# Patient Record
Sex: FEMALE | Race: WHITE | NOT HISPANIC OR LATINO | Employment: OTHER | URBAN - METROPOLITAN AREA
[De-identification: names, ages, dates, MRNs, and addresses within clinical notes are randomized per-mention and may not be internally consistent; named-entity substitution may affect disease eponyms.]

---

## 2017-01-04 ENCOUNTER — OFFICE VISIT (OUTPATIENT)
Dept: UROLOGY | Facility: CLINIC | Age: 72
End: 2017-01-04

## 2017-01-04 VITALS
WEIGHT: 117.5 LBS | BODY MASS INDEX: 18.88 KG/M2 | SYSTOLIC BLOOD PRESSURE: 113 MMHG | HEART RATE: 67 BPM | HEIGHT: 66 IN | DIASTOLIC BLOOD PRESSURE: 63 MMHG

## 2017-01-04 DIAGNOSIS — N81.11 CYSTOCELE, MIDLINE: ICD-10-CM

## 2017-01-04 DIAGNOSIS — N30.00 ACUTE CYSTITIS WITHOUT HEMATURIA: Primary | ICD-10-CM

## 2017-01-04 DIAGNOSIS — N39.3 FEMALE STRESS INCONTINENCE: ICD-10-CM

## 2017-01-04 RX ORDER — CIPROFLOXACIN 500 MG/1
500 TABLET, FILM COATED ORAL 2 TIMES DAILY
Qty: 10 TABLET | Refills: 0 | Status: SHIPPED | OUTPATIENT
Start: 2017-01-04 | End: 2017-01-04

## 2017-01-04 RX ORDER — CIPROFLOXACIN 500 MG/1
500 TABLET, FILM COATED ORAL 2 TIMES DAILY
Qty: 10 TABLET | Refills: 0 | Status: SHIPPED | OUTPATIENT
Start: 2017-01-04 | End: 2017-02-01

## 2017-01-04 ASSESSMENT — PAIN SCALES - GENERAL: PAINLEVEL: NO PAIN (0)

## 2017-01-04 NOTE — MR AVS SNAPSHOT
After Visit Summary   1/4/2017    Shala Caruso    MRN: [id number]           Patient Information     Date Of Birth          1945        Visit Information        Provider Department      1/4/2017 11:00 AM Jeanmarie Pierson MD [facility] and Inst for Prostate and Urologic Cancers        Today's Diagnoses     Urinary tract infection    -  1       Care Instructions    Cipro for infection.  Follow up in 1 month for second post operative appointment.      It was a pleasure meeting with you today.  Thank you for allowing me and my team the privilege of caring for you today.  YOU are the reason we are here, and I truly hope we provided you with the excellent service you deserve.  Please let us know if there is anything else we can do for you so that we can be sure you are leaving completely satisfied with your care experience.                Follow-ups after your visit        Your next 10 appointments already scheduled     Jan 06, 2017  1:00 PM   LAB with  LAB   [facility] ([facility])    [address] [place] [address] [phone]           Patient must bring picture ID.  Patient should be prepared to give a urine specimen  Please do not eat 10-12 hours before your appointment if you are coming in fasting for labs on lipids, cholesterol, or glucose (sugar).  Pregnant women should follow their Care Team instructions. Water with medications is okay. Do not drink coffee or other fluids.   If you have concerns about taking  your medications, please ask at office or if scheduling via [employer], send a message by clicking on Secure Messaging, Message Your Care Team.            Jan 23, 2017  4:00 PM   (Arrive by 3:45 PM)   RETURN DIABETES with Dorita Cramer MD   [facility] Endocrinology ([facility])    [address] [place] [address] [phone]            Feb 01, 2017  9:15 AM    (Arrive by 9:00 AM)   Post-Op with Jeanmarie Pierson MD   [facility] for Prostate and Urologic Cancers ([facility])    [address]  4th Floor  [facility] [address] [phone]            Apr 13, 2017  5:00 PM   (Arrive by 4:45 PM)   RETURN LUPUS with Santiago Corbett MD   [facility] Rheumatology ([facility])    [address]  3rd Floor  [facility] [address] [phone]              Who to contact     Please call your clinic at [phone] to:    Ask questions about your health    Make or cancel appointments    Discuss your medicines    Learn about your test results    Speak to your doctor   If you have compliments or concerns about an experience at your clinic, or if you wish to file a complaint, please contact [facility] Patient Relations at [phone] or email us at [email or web address]         Additional Information About Your Visit        [employer] Information     waygum gives you secure access to your electronic health record. If you see a primary care provider, you can also send messages to your care team and make appointments. If you have questions, please call your primary care clinic.  If you do not have a primary care provider, please call [phone] and they will assist you.      waygum is an electronic gateway that provides easy, online access to your medical records. With waygum, you can request a clinic appointment, read your test results, renew a prescription or communicate with your care team.     To access your existing account, please contact your [facility] or call [phone] for assistance.        Care EveryWhere ID     This is your Care EveryWhere ID. This could be used by other organizations to access your [place] medical records  [id number]        Your Vitals Were     Pulse Height BMI (Body Mass Index)             67  "1.664 m (5' 5.5\") 19.25 kg/m2          Blood Pressure from Last 3 Encounters:   01/04/17 113/63   12/20/16 108/57   12/14/16 122/69    Weight from Last 3 Encounters:   01/04/17 53.298 kg (117 lb 8 oz)   12/20/16 52.617 kg (116 lb)   12/14/16 52.617 kg (116 lb)              We Performed the Following     POST-VOID RESIDUAL BLADDER SCAN          Today's Medication Changes          These changes are accurate as of: 1/4/17 11:13 AM.  If you have any questions, ask your nurse or doctor.               Start taking these medicines.        Dose/Directions    ciprofloxacin 500 MG tablet   Commonly known as:  CIPRO   Used for:  Urinary tract infection   Started by:  Jeanmarie Pierson MD        Dose:  500 mg   Take 1 tablet (500 mg) by mouth 2 times daily   Quantity:  10 tablet   Refills:  0         These medicines have changed or have updated prescriptions.        Dose/Directions    estradiol 0.1 MG/GM cream   Commonly known as:  ESTRACE VAGINAL   This may have changed:    - when to take this  - additional instructions   Used for:  Atrophic vaginitis        Dose:  2 g   Place 2 g vaginally twice a week After using daily for 2 weeks   Quantity:  42.5 g   Refills:  11       insulin glargine 100 UNIT/ML injection   Commonly known as:  LANTUS   This may have changed:    - when to take this  - additional instructions   Used for:  Diabetes mellitus type 1 (H)        Inject 4 units as directed daily LANTUS SOLOSTAR PEN PLEASE   Quantity:  15 mL   Refills:  3       NovoLOG PENFILL 100 UNIT/ML injection   This may have changed:    - when to take this  - additional instructions   Used for:  Controlled type 1 diabetes mellitus (H)   Generic drug:  insulin aspart        Dose:  15 Units   Inject 15 Units Subcutaneous daily   Quantity:  4 cartridge   Refills:  3       warfarin 5 MG tablet   Commonly known as:  COUMADIN   This may have changed:  additional instructions   Used for:  Systemic lupus erythematosus (H)        Take 10mg onTu, " and 12.5mg on MWThFSatSun, or as directed by the Medication Monitoring Clinic at the [facility].   Quantity:  210 tablet   Refills:  1            Where to get your medicines      These medications were sent to [facility] - [place], [place] - [address]  [address], [facility] [address]    Hours:  TRANSPLANT PHONE NUMBER [phone] Phone:  [phone]    - ciprofloxacin 500 MG tablet             Primary Care Provider Office Phone # Fax #    Joe Contreras [phone]        [facility] [address]   [facility] [address]        Thank you!     Thank you for choosing [facility] AND [facility] FOR PROSTATE AND UROLOGIC CANCERS  for your care. Our goal is always to provide you with excellent care. Hearing back from our patients is one way we can continue to improve our services. Please take a few minutes to complete the written survey that you may receive in the mail after your visit with us. Thank you!             Your Updated Medication List - Protect others around you: Learn how to safely use, store and throw away your medicines at www.disposemymeds.org.          This list is accurate as of: 1/4/17 11:13 AM.  Always use your most recent med list.                   Brand Name Dispense Instructions for use    aspirin 81 MG tablet      Take 81 mg by mouth At Bedtime       AYR SALINE NASAL NO-DRIP Gel     3 Tube    Use as needed for nasal dryness       blood glucose monitoring lancets     7203 each    Test 7-8 times daily  (Lancets that go with pt current meter )       * blood glucose monitoring test strip    TRUE METRIX BLOOD GLUCOSE TEST    4 Box    Use to test blood sugar 4 times daily or as directed.       * blood glucose monitoring test strip    no brand specified    720 each    Use to test blood sugar 8 times daily . Trividia  True metrix  Brand DX E10.9       calcium + D 600-200 MG-UNIT Tabs   Generic drug:  calcium  carbonate-vitamin D      Take 1 tablet by mouth 3 times daily       CENTRUM SILVER PO      Take 1 tablet by mouth daily.       ciprofloxacin 500 MG tablet    CIPRO    10 tablet    Take 1 tablet (500 mg) by mouth 2 times daily       estradiol 0.1 MG/GM cream    ESTRACE VAGINAL    42.5 g    Place 2 g vaginally twice a week After using daily for 2 weeks       glucagon 1 MG kit    GLUCAGON EMERGENCY    3 each    Inject 1 mg subcutaneously or intramuscularly for severe hypoglycemic episodes.       HYDROcodone-acetaminophen 5-325 MG per tablet    NORCO    20 tablet    Take 1 tablet by mouth every 4 hours as needed for moderate to severe pain (Moderate to Severe Pain)       insulin glargine 100 UNIT/ML injection    LANTUS    15 mL    Inject 4 units as directed daily LANTUS SOLOSTAR PEN PLEASE       * insulin pen needle 32G X 4 MM    BD PAM U/F    800 each    Use   Up to 8 daily       * insulin pen needle 32G X 4 MM     400 each    Use 4 pen needles daily or as directed.       mycophenolate 500 MG tablet    CELLCEPT - GENERIC EQUIVALENT    360 tablet    Take 2 tablets (1,000 mg) by mouth 2 times daily 2 tabs in the a.m., 2 tabs in p.m.       NIFEdipine ER osmotic 30 MG 24 hr tablet    NIFEDICAL XL    90 tablet    Take 1 tablet (30 mg) by mouth daily       NovoLOG PENFILL 100 UNIT/ML injection   Generic drug:  insulin aspart     4 cartridge    Inject 15 Units Subcutaneous daily       NOVOPEN JR FELIX) Priscila   Generic drug:  Injection Device for insulin      Use as directed.       omega-3 fatty acids 1200 MG capsule      Take 1 capsule by mouth daily.       senna-docusate 8.6-50 MG per tablet    SENOKOT-S;PERICOLACE    100 tablet    Take 1-2 tablets by mouth 2 times daily To prevent constipation while taking narcotic pain medication. Start with 1 tablet twice daily. If no bowel movement in 24 hours, increase to 2 tablets twice daily.  Discontinue if you have loose stools or when you are no longer taking narcotics.        simvastatin 40 MG tablet    ZOCOR    100 tablet    Take 1 tablet (40 mg) by mouth At Bedtime       TRUE METRIX METER W/DEVICE Kit     1 kit    1 each 4 times daily       VITAMIN D (CHOLECALCIFEROL) PO      Take 1,000 Units by mouth daily (with lunch)       warfarin 5 MG tablet    COUMADIN    210 tablet    Take 10mg onTu, and 12.5mg on MWThFSatSun, or as directed by the Medication Monitoring Clinic at the [facility].       * Notice:  This list has 4 medication(s) that are the same as other medications prescribed for you. Read the directions carefully, and ask your doctor or other care provider to review them with you.

## 2017-01-04 NOTE — PROGRESS NOTES
"Urology Clinic Note    Date: 1/4/2017  Time: 10:56 AM  Patient: Shala Caruso  MRN: [id number]    HPI/Subjective: Shala Caruso is a 71 year old female with a history of cystocele s/p anterior colporrhaphy with midurethral sling on 12/20/16. She has been doing well since her surgery. She is having small amounts of leakage but this is improving. Pain controlled. Hematuria has resolved; no dysuria, urgency, frequency.     PVR today is 0. Urine dipstick shows small LE, positive nitrite, and moderate blood.     Objective:  /63 mmHg  Pulse 67  Ht 1.664 m (5' 5.5\")  Wt 53.298 kg (117 lb 8 oz)  BMI 19.25 kg/m2  Gen: In NAD  Resp: Breathing non-labored  CV: Regular rate; extremities warm  Abd: Soft, non-distended, non-tender  Ext: Warm and well perfused  : Well supported with no evidence of mesh extrusion. Minimal urethral mobility. No leakage with cough.     Assessment & Plan: Shala Caruso is a 71 year old female with a history of cystocele s/p anterior colporrhaphy with midurethral sling on 12/20/16.    -Follow up in 4 weeks for symptom and incision check  -Continue premarin cream  -Continue lifting restrictions and pelvic rest    This patient was seen & discussed with Dr. Pierson.    --    Kiko Cain MD  Urology PGY3  pgr: [phone]    10:56 AM, 1/4/2017  Patient was seen, evaluated and plan was formulated in conjunction with me and I agree with the above.  Jeanmarie Pierson MD  "

## 2017-01-04 NOTE — Clinical Note
"1/4/2017       RE: Shala Caruso  [address]  [place] [address]     Dear Colleague,    Thank you for referring your patient, Shala Caruso, to the [facility] UROLOGY AND INST FOR PROSTATE AND UROLOGIC CANCERS at [facility]. Please see a copy of my visit note below.    Urology Clinic Note    Date: 1/4/2017  Time: 10:56 AM  Patient: Shala Caruso  MRN: [id number]    HPI/Subjective: Shala Caruso is a 71 year old female with a history of cystocele s/p anterior colporrhaphy with midurethral sling on 12/20/16. She has been doing well since her surgery. She is having small amounts of leakage but this is improving. Pain controlled. Hematuria has resolved; no dysuria, urgency, frequency.     PVR today is 0. Urine dipstick shows small LE, positive nitrite, and moderate blood.     Objective:  /63 mmHg  Pulse 67  Ht 1.664 m (5' 5.5\")  Wt 53.298 kg (117 lb 8 oz)  BMI 19.25 kg/m2  Gen: In NAD  Resp: Breathing non-labored  CV: Regular rate; extremities warm  Abd: Soft, non-distended, non-tender  Ext: Warm and well perfused  : Well supported with no evidence of mesh extrusion. Minimal urethral mobility. No leakage with cough.     Assessment & Plan: Shala Caruso is a 71 year old female with a history of cystocele s/p anterior colporrhaphy with midurethral sling on 12/20/16.    -Follow up in 4 weeks for symptom and incision check  -Continue premarin cream  -Continue lifting restrictions and pelvic rest    This patient was seen & discussed with Dr. Pierson.    --    Kiko Cain MD  Urology PGY3  pgr: [phone]    10:56 AM, 1/4/2017  Patient was seen, evaluated and plan was formulated in conjunction with me and I agree with the above.    Jeanmarie Pierson MD      "

## 2017-01-04 NOTE — NURSING NOTE
"Chief Complaint   Patient presents with     Surgical Followup     Post op.  S/P Transvaginal Cystocele Repair with Midurethral Sling on 12-20-16       Initial /63 mmHg  Pulse 67  Ht 1.664 m (5' 5.5\")  Wt 53.298 kg (117 lb 8 oz)  BMI 19.25 kg/m2 Estimated body mass index is 19.25 kg/(m^2) as calculated from the following:    Height as of this encounter: 1.664 m (5' 5.5\").    Weight as of this encounter: 53.298 kg (117 lb 8 oz).  BP completed using cuff size: regular    FERNANDO Smith    "

## 2017-01-04 NOTE — PATIENT INSTRUCTIONS
Cipro for infection.  Follow up in 1 month for second post operative appointment.      It was a pleasure meeting with you today.  Thank you for allowing me and my team the privilege of caring for you today.  YOU are the reason we are here, and I truly hope we provided you with the excellent service you deserve.  Please let us know if there is anything else we can do for you so that we can be sure you are leaving completely satisfied with your care experience.

## 2017-01-06 ENCOUNTER — ANTICOAGULATION THERAPY VISIT (OUTPATIENT)
Dept: ANTICOAGULATION | Facility: CLINIC | Age: 72
End: 2017-01-06

## 2017-01-06 DIAGNOSIS — D68.61 ANTIPHOSPHOLIPID SYNDROME (H): ICD-10-CM

## 2017-01-06 DIAGNOSIS — M32.9 SLE (SYSTEMIC LUPUS ERYTHEMATOSUS) (H): ICD-10-CM

## 2017-01-06 DIAGNOSIS — Z79.899 ENCOUNTER FOR LONG-TERM CURRENT USE OF MEDICATION: ICD-10-CM

## 2017-01-06 DIAGNOSIS — Z79.01 LONG-TERM (CURRENT) USE OF ANTICOAGULANTS: Primary | ICD-10-CM

## 2017-01-06 DIAGNOSIS — M32.9 SYSTEMIC LUPUS ERYTHEMATOSUS (H): ICD-10-CM

## 2017-01-06 LAB
ALT SERPL W P-5'-P-CCNC: 20 U/L (ref 0–50)
AST SERPL W P-5'-P-CCNC: 16 U/L (ref 0–45)
CREAT SERPL-MCNC: 0.87 MG/DL (ref 0.52–1.04)
GFR SERPL CREATININE-BSD FRML MDRD: 64 ML/MIN/1.7M2
INR PPP: 1.82 (ref 0.86–1.14)
PROTHROM ACT/NOR PPP: 28 % (ref 60–140)

## 2017-01-06 NOTE — PROGRESS NOTES
"  ANTICOAGULATION FOLLOW-UP CLINIC VISIT    Patient Name:  Shala Caruso  Date:  1/6/2017  Contact Type:  Telephone    SUBJECTIVE:     Patient Findings     Positives Antibiotic use or infection (started cipro 1/4/17 (has had 3 doses) --will be on x 5 days)           OBJECTIVE    INR   Date Value Ref Range Status   01/06/2017 1.82* 0.86 - 1.14 Final     FACTOR 2 ASSAY   Date Value Ref Range Status   01/06/2017 28* 60 - 140 % Final       ASSESSMENT / PLAN  INR assessment SUB    Recheck INR In: 1 WEEK    INR Location Clinic      Anticoagulation Summary as of 1/6/2017     INR goal 2.0-3.0   Selected INR 1.82! (1/6/2017)   Maintenance plan 7.5 mg (5 mg x 1.5) on Mon, Wed; 10 mg (5 mg x 2) all other days   Full instructions 7.5 mg on Mon, Wed; 10 mg all other days   Weekly total 65 mg   No change documented Gwen Abel RN   Plan last modified Gwen Dietz RN (12/21/2016)   Next INR check 1/13/2017   Priority Factor 2   Target end date     Indications   SLE (systemic lupus erythematosus) (H) [M32.9]  Long-term (current) use of anticoagulants [Z79.01] [Z79.01]         Anticoagulation Episode Summary     INR check location Clinic Lab    Preferred lab     Send INR reminders to  ANTICO CLINIC    Comments Chromogenic 2  goal range is 15-25%  Ok to leave message on home [phone] and work voicemail, but call [email or web address] per pt request.  OK to leave message at office  May leave messages with Rodriguez Tom  DO NOT GIVE RECORDS TO EMPLOYER [employer]        Anticoagulation Care Providers     Provider Role Specialty Phone number    Mt Kiser MD Responsible Clinical Cardiac Electrophysiology [phone]            See the Encounter Report to view Anticoagulation Flowsheet and Dosing Calendar (Go to Encounters tab in chart review, and find the Anticoagulation Therapy Visit)    Spoke with Shala.  She thinks she is still \"catching up\" after holding for several days in December before surgery. She recently started " on cipro, so will leave coumadin dose as it is and recheck INR in one week.      Gwen Abel RN

## 2017-01-13 ENCOUNTER — ANTICOAGULATION THERAPY VISIT (OUTPATIENT)
Dept: ANTICOAGULATION | Facility: CLINIC | Age: 72
End: 2017-01-13

## 2017-01-13 DIAGNOSIS — I82.409 DVT (DEEP VENOUS THROMBOSIS) (H): ICD-10-CM

## 2017-01-13 DIAGNOSIS — M32.9 SLE (SYSTEMIC LUPUS ERYTHEMATOSUS) (H): ICD-10-CM

## 2017-01-13 DIAGNOSIS — Z79.01 LONG-TERM (CURRENT) USE OF ANTICOAGULANTS: Primary | ICD-10-CM

## 2017-01-13 DIAGNOSIS — M32.9 SYSTEMIC LUPUS ERYTHEMATOSUS (H): ICD-10-CM

## 2017-01-13 LAB
INR PPP: 1.74 (ref 0.86–1.14)
PROTHROM ACT/NOR PPP: 33 % (ref 60–140)

## 2017-01-13 NOTE — PROGRESS NOTES
ANTICOAGULATION FOLLOW-UP CLINIC VISIT    Patient Name:  Shala Caruso  Date:  1/13/2017  Contact Type:  Telephone    SUBJECTIVE:        OBJECTIVE    INR   Date Value Ref Range Status   01/13/2017 1.74* 0.86 - 1.14 Final     FACTOR 2 ASSAY   Date Value Ref Range Status   01/13/2017 33* 60 - 140 % Final       ASSESSMENT / PLAN  INR assessment SUB    Recheck INR In: 10 DAYS    INR Location Clinic      Anticoagulation Summary as of 1/13/2017     INR goal 2.0-3.0   Selected INR    Maintenance plan 10 mg (5 mg x 2) every day   Full instructions 10 mg every day   Weekly total 70 mg   Plan last modified Stacey Beal, RN (1/13/2017)   Next INR check 1/23/2017   Priority Factor 2   Target end date     Indications   SLE (systemic lupus erythematosus) (H) [M32.9]  Long-term (current) use of anticoagulants [Z79.01] [Z79.01]         Anticoagulation Episode Summary     INR check location Clinic Lab    Preferred lab     Send INR reminders to [facility]TICOAG CL[facility]IC    Comments Chromogenic 2  goal range is 15-25%  Ok to leave message on home [phone] and work voicemail, but call [email or web address] per pt request.  OK to leave message at office  May leave messages with Rodriguez Santos  DO NOT GIVE RECORDS TO EMPLOYER [employer]        Anticoagulation Care Providers     Provider Role Specialty Phone number    Mt Kiser MD Responsible Clinical Cardiac Electrophysiology [phone]            See the Encounter Report to view Anticoagulation Flowsheet and Dosing Calendar (Go to Encounters tab in chart review, and find the Anticoagulation Therapy Visit)    Message left.    Stacey Beal, RN

## 2017-01-20 ENCOUNTER — PRE VISIT (OUTPATIENT)
Dept: UROLOGY | Facility: CLINIC | Age: 72
End: 2017-01-20

## 2017-01-20 NOTE — TELEPHONE ENCOUNTER
Patient coming in for post operative check up S/P anterior colporrhaphy with sacrospinous ligament fixation with Matristem, mid urethral sling, and cystoscopy. Patient chart reviewed, no need for call, all records available and ready for appointment.

## 2017-01-23 ENCOUNTER — OFFICE VISIT (OUTPATIENT)
Dept: ENDOCRINOLOGY | Facility: CLINIC | Age: 72
End: 2017-01-23

## 2017-01-23 VITALS
BODY MASS INDEX: 19.3 KG/M2 | HEIGHT: 66 IN | DIASTOLIC BLOOD PRESSURE: 76 MMHG | HEART RATE: 73 BPM | WEIGHT: 120.1 LBS | SYSTOLIC BLOOD PRESSURE: 121 MMHG

## 2017-01-23 DIAGNOSIS — Z79.4 TYPE 2 DIABETES MELLITUS WITH DIABETIC AUTONOMIC NEUROPATHY, WITH LONG-TERM CURRENT USE OF INSULIN (H): Primary | ICD-10-CM

## 2017-01-23 DIAGNOSIS — E10.649 HYPOGLYCEMIA UNAWARENESS IN TYPE 1 DIABETES MELLITUS (H): ICD-10-CM

## 2017-01-23 DIAGNOSIS — E11.43 TYPE 2 DIABETES MELLITUS WITH DIABETIC AUTONOMIC NEUROPATHY, WITH LONG-TERM CURRENT USE OF INSULIN (H): Primary | ICD-10-CM

## 2017-01-23 LAB — HBA1C MFR BLD: 5 % (ref 4.3–6)

## 2017-01-23 ASSESSMENT — PAIN SCALES - GENERAL: PAINLEVEL: NO PAIN (0)

## 2017-01-23 NOTE — MR AVS SNAPSHOT
After Visit Summary   1/23/2017    Shala Caruso    MRN: [id number]           Patient Information     Date Of Birth          1945        Visit Information        Provider Department      1/23/2017 4:00 PM Dorita Cramer MD [facility]         Follow-ups after your visit        Follow-up notes from your care team     Return in about 6 months (around 7/23/2017).      Your next 10 appointments already scheduled     Jan 26, 2017  1:00 PM   LAB with  LAB   [facility] ([facility])    [address] [place] [id number] [phone]           Patient must bring picture ID.  Patient should be prepared to give a urine specimen  Please do not eat 10-12 hours before your appointment if you are coming in fasting for labs on lipids, cholesterol, or glucose (sugar).  Pregnant women should follow their Care Team instructions. Water with medications is okay. Do not drink coffee or other fluids.   If you have concerns about taking  your medications, please ask at office or if scheduling via [employer], send a message by clicking on Secure Messaging, Message Your Care Team.            Feb 01, 2017  9:15 AM   (Arrive by 9:00 AM)   Post-Op with Jeanmarie Pierson MD   [facility]rology [facility] for Prostate and Urologic Cancers ([facility])    [address] [place] [id number] [phone]            Apr 13, 2017  5:00 PM   (Arrive by 4:45 PM)   RETURN LUPUS with Santiago Corbett MD   [facility] Rheumatology ([facility])    [address] [place] [id number] [phone]            Jul 31, 2017  7:30 AM   (Arrive by 7:15 AM)   RETURN DIABETES with Dorita Cramer MD   [facility] Endocrinology ([facility])    [address] [place] [id number] [phone]              Who to contact      "Please call your clinic at [phone] to:    Ask questions about your health    Make or cancel appointments    Discuss your medicines    Learn about your test results    Speak to your doctor   If you have compliments or concerns about an experience at your clinic, or if you wish to file a complaint, please contact [facility] Patient Relations at [phone] or email us at [email or web address]         Additional Information About Your Visit        [employer] Information     [employer] gives you secure access to your electronic health record. If you see a primary care provider, you can also send messages to your care team and make appointments. If you have questions, please call your primary care clinic.  If you do not have a primary care provider, please call [phone] and they will assist you.      [employer] is an electronic gateway that provides easy, online access to your medical records. With [employer], you can request a clinic appointment, read your test results, renew a prescription or communicate with your care team.     To access your existing account, please contact your [facility] or call [phone] for assistance.        Care EveryWhere ID     This is your Care EveryWhere ID. This could be used by other organizations to access your [facility] medical records  [id number]        Your Vitals Were     Pulse Height BMI (Body Mass Index)             73 1.664 m (5' 5.5\") 19.67 kg/m2          Blood Pressure from Last 3 Encounters:   01/23/17 121/76   01/04/17 113/63   12/20/16 108/57    Weight from Last 3 Encounters:   01/23/17 54.477 kg (120 lb 1.6 oz)   01/04/17 53.298 kg (117 lb 8 oz)   12/20/16 52.617 kg (116 lb)              Today, you had the following     No orders found for display         Today's Medication Changes          These changes are accurate as of: 1/23/17  4:29 PM.  If you have any questions, ask your nurse or doctor.             "   These medicines have changed or have updated prescriptions.        Dose/Directions    estradiol 0.1 MG/GM cream   Commonly known as:  ESTRACE VAGINAL   This may have changed:    - when to take this  - additional instructions   Used for:  Atrophic vaginitis        Dose:  2 g   Place 2 g vaginally twice a week After using daily for 2 weeks   Quantity:  42.5 g   Refills:  11       insulin glargine 100 UNIT/ML injection   Commonly known as:  LANTUS   This may have changed:    - when to take this  - additional instructions   Used for:  Diabetes mellitus type 1 (H)        Inject 4 units as directed daily LANTUS SOLOSTAR PEN PLEASE   Quantity:  15 mL   Refills:  3       NovoLOG PENFILL 100 UNIT/ML injection   This may have changed:    - when to take this  - additional instructions   Used for:  Controlled type 1 diabetes mellitus (H)   Generic drug:  insulin aspart        Dose:  15 Units   Inject 15 Units Subcutaneous daily   Quantity:  4 cartridge   Refills:  3       warfarin 5 MG tablet   Commonly known as:  COUMADIN   This may have changed:  additional instructions   Used for:  Systemic lupus erythematosus (H)        Take 10mg onTu, and 12.5mg on MWThFSatSun, or as directed by the Medication Monitoring [facility] at the [facility]   Quantity:  210 tablet   Refills:  1                Primary Care Provider Office Phone # Fax #    Joe Contreras [phone]        [facility] [address] [facility] [address]  [facility] [address]        Thank you!     Thank you for choosing [facility]  for your care. Our goal is always to provide you with excellent care. Hearing back from our patients is one way we can continue to improve our services. Please take a few minutes to complete the written survey that you may receive in the mail after your visit with us. Thank you!             Your Updated Medication List - Protect others around you: Learn how to safely use, store and throw away your medicines at  www.disposemymeds.org.          This list is accurate as of: 1/23/17  4:29 PM.  Always use your most recent med list.                   Brand Name Dispense Instructions for use    aspirin 81 MG tablet      Take 81 mg by mouth At Bedtime       AYR SALINE NASAL NO-DRIP Gel     3 Tube    Use as needed for nasal dryness       blood glucose monitoring lancets     7203 each    Test 7-8 times daily  (Lancets that go with pt current meter )       * blood glucose monitoring test strip    TRUE METRIX BLOOD GLUCOSE TEST    4 Box    Use to test blood sugar 4 times daily or as directed.       * blood glucose monitoring test strip    no brand specified    720 each    Use to test blood sugar 8 times daily . Trividia  True metrix  Brand DX E10.9       calcium + D 600-200 MG-UNIT Tabs   Generic drug:  calcium carbonate-vitamin D      Take 1 tablet by mouth 3 times daily       CENTRUM SILVER PO      Take 1 tablet by mouth daily.       ciprofloxacin 500 MG tablet    CIPRO    10 tablet    Take 1 tablet (500 mg) by mouth 2 times daily       estradiol 0.1 MG/GM cream    ESTRACE VAGINAL    42.5 g    Place 2 g vaginally twice a week After using daily for 2 weeks       glucagon 1 MG kit    GLUCAGON EMERGENCY    3 each    Inject 1 mg subcutaneously or intramuscularly for severe hypoglycemic episodes.       HYDROcodone-acetaminophen 5-325 MG per tablet    NORCO    20 tablet    Take 1 tablet by mouth every 4 hours as needed for moderate to severe pain (Moderate to Severe Pain)       insulin glargine 100 UNIT/ML injection    LANTUS    15 mL    Inject 4 units as directed daily LANTUS SOLOSTAR PEN PLEASE       * insulin pen needle 32G X 4 MM    BD PAM U/F    800 each    Use   Up to 8 daily       * insulin pen needle 32G X 4 MM     400 each    Use 4 pen needles daily or as directed.       mycophenolate 500 MG tablet    CELLCEPT - GENERIC EQUIVALENT    360 tablet    Take 2 tablets (1,000 mg) by mouth 2 times daily 2 tabs in the a.m., 2 tabs in  p.m.       NIFEdipine ER osmotic 30 MG 24 hr tablet    NIFEDICAL XL    90 tablet    Take 1 tablet (30 mg) by mouth daily       NovoLOG PENFILL 100 UNIT/ML injection   Generic drug:  insulin aspart     4 cartridge    Inject 15 Units Subcutaneous daily       NOVOPEN JR (GREEN) Priscila   Generic drug:  Injection Device for insulin      Use as directed.       omega-3 fatty acids 1200 MG capsule      Take 1 capsule by mouth daily.       senna-docusate 8.6-50 MG per tablet    SENOKOT-S;PERICOLACE    100 tablet    Take 1-2 tablets by mouth 2 times daily To prevent constipation while taking narcotic pain medication. Start with 1 tablet twice daily. If no bowel movement in 24 hours, increase to 2 tablets twice daily.  Discontinue if you have loose stools or when you are no longer taking narcotics.       simvastatin 40 MG tablet    ZOCOR    100 tablet    Take 1 tablet (40 mg) by mouth At Bedtime       TRUE METRIX METER W/DEVICE Kit     1 kit    1 each 4 times daily       VITAMIN D (CHOLECALCIFEROL) PO      Take 1,000 Units by mouth daily (with lunch)       warfarin 5 MG tablet    COUMADIN    210 tablet    Take 10mg onTu, and 12.5mg on MWThFSatSun, or as directed by the Medication Monitoring Clinic at the [facility].       * Notice:  This list has 4 medication(s) that are the same as other medications prescribed for you. Read the directions carefully, and ask your doctor or other care provider to review them with you.

## 2017-01-23 NOTE — PROGRESS NOTES
Ms. Caruos is a 71 year old pleasant female with hx of SLE, APLS, DVT, osteoporosis and type 1 diabetes presenting for f/up.     1. Type 1 diabetes    Hemoglobin A1c today was 5%, slightly lower then her last apt here. Glucometer readings reveal that she checks her blood sugar 4-5 times daily. Average blood glucose is 80 with a standard deviation of 15 and a range variable between 53 and 110. The meter targets are set to 70 to 130 for the pre-meal blood sugar and  for the post meal blood sugar. Overall, 80% of the blood glucose numbers are within this target.  Insulin to carbohydrate ratio is 1 unit per 15 g for all meals.  She administers 4 U of Lantus in the morning, 2-3 U NovoLog for breakfast, lunch and dinner.  A couple of times, she experienced asymptomatic hypoglycemic episodes and her BG was in the 30s. When she rechecked her blood sugar, it came back in the lower 60s or 70s. She is asking whether or not this is related to her history of Raynaud.  In general, she is swimming twice a week and she goes to gym 5 times a week, where she does elliptical, stationary bike and yoga. For the last 6 weeks she hasn't been exercising, as she underwent cystocele surgery.    Diabetes complications:   No h/o microalbuminuria.  Last eye exam - September 2016, no BDR.   Last dental exam fall of 2016   Numbness and tingling sensation B/L toes - for many years but light sensation is intact. She does wear comfortable shoes/insoles. 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   She develops confusion, inability to concentrate or focus her vision and she feels extremely tired when her blood sugar is the lower 60s or 50s. She very rarely develops shakiness, but she denies any other symptoms suggestive of hypoglycemia. She has not experienced  prior episodes of loss of consciousness due to hypoglycemia.    2. Osteoporosis  Jeanmarie also has a history of osteoporosis, treated with Boniva for almost 3 years, followed by Forteo which  was started in 4/12 - interrupted treatment from 4/2013 and restarted in 7/2013 until July 2014. After she completed the treatment with Forteo, she received one dose of Reclast in July 2014.      She has no history of prior bone fractures. She's been off prednisone for over 4 years. Her height has decreased over the years from 5'6'' to 5'1/2''. Her mother had a hip fracture in her 80s. She continues to take oyster calcium 500 mg calcium plus 200 U Vitamin D TID and 1000 U vitamin D3 daily.     On the most recent DXA scan images from 7/1/16, L-L3 T score was - 1.  Overall, at the spine, there was a significant increase of bone mineral density since 2005 of 10.5%. Since 2015, the bone mineral density has remained stable at spine. The lowest T score at the hip level was -2.2, at the left femoral neck.  Overall, there was a significant improvement of bone mineral density at the mean hip of 8.9% since 2005, with no significant changes since 2015. While the bone mineral density at the left hip increased since baseline, at the right hip, there was a decrease of bone mineral density since baseline and also, since prior year.    Current Outpatient Prescriptions   Medication     ciprofloxacin (CIPRO) 500 MG tablet     HYDROcodone-acetaminophen (NORCO) 5-325 MG per tablet     senna-docusate (SENOKOT-S;PERICOLACE) 8.6-50 MG per tablet     estradiol (ESTRACE VAGINAL) 0.1 MG/GM vaginal cream     blood glucose monitoring (NO BRAND SPECIFIED) test strip     mycophenolate (CELLCEPT - GENERIC EQUIVALENT) 500 MG tablet     NIFEdipine ER osmotic (NIFEDICAL XL) 30 MG 24 hr tablet     simvastatin (ZOCOR) 40 MG tablet     Blood Glucose Monitoring Suppl (TRUE METRIX METER) W/DEVICE KIT     blood glucose monitoring (TRUE METRIX BLOOD GLUCOSE TEST) test strip     warfarin (COUMADIN) 5 MG tablet     insulin pen needle 32G X 4 MM     insulin glargine (LANTUS) 100 UNIT/ML vial     NOVOLOG PENFILL 100 UNIT/ML soln     VITAMIN D, CHOLECALCIFEROL,  PO     AYR SALINE NASAL NO-DRIP GEL     glucagon (GLUCAGON EMERGENCY) 1 MG injection     insulin pen needle (BD PEN NEEDLE PAM U/F) 32G X 4 MM MISC     LANCETS ULTRA THIN 30G MISC     aspirin 81 MG tablet     omega-3 fatty acids (FISH OIL) 1200 MG capsule     Calcium Carbonate-Vitamin D (CALCIUM + D) 600-200 MG-UNIT per tablet     Multiple Vitamins-Minerals (CENTRUM SILVER PO)     Injection Device for Insulin (NOVOPEN JRJUSTIN,) RORY     No current facility-administered medications for this visit.     ROS   Systemic symptoms: as above, good appetite  Eye symptoms: No eye symptoms.  Otolaryngeal symptoms: no dysphagia, no voice hoarseness or cough   Breast symptoms: No breast symptoms.  Cardiovascular symptoms: No cardiovascular symptoms.    Pulmonary symptoms: No pulmonary symptoms.  Gastrointestinal symptoms: No gastrointestinal symptoms.   Genitourinary symptoms: urinary incontinence   Endocrine symptoms: chronic cold intolerance  Hematologic symptoms: No hematologic symptoms.  Musculoskeletal symptoms: recurrent episodes of pain which affect the third to fifth left toes; bilateral knee pain; joint stiffness  Neurological symptoms: numbness and tingling sensation b/l toes   Psychological symptoms: no psychological symptoms   Skin symptoms: split lesions of the tips of her fingers - for years; has seen dermatology in the past    Family Hx   Maternal grandmother DM2. First cousin with RA. Mother, maternal grandmother and aunt, cousin diagnosed with thyroid disorders (? hypothyroidism). Mother and maternal grandmother had breast cancer.    Personal Hx   Behavioral history: No tobacco use.  Home environment: No secondhand tobacco smoke in home.  Denies smoking, drinking alcohol or using illicit drugs.  with one child. 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  Menopause at age 59. Had regular MP in the past. No h/o bone fractures or kidney stones.    PMH   SLE dx in 2000 with flare/pericarditis and tamponade  "on 3/5/2010 requiring high doses of steroids.  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  Osteoporosis diagnosed 2008 started on Boniva in 2010 (heartburn from oral fosamax).   Hyperlipidemia.  Severe GERD and Achalasia.  Raynaud's.  Chronic leukopenia on MTX.  Allergy to multiple meds  Vit D def.  Post thrombophlebitic syndrome with chronic LE swelling   Dyslipidemia  Chronic leukopenia on methotrexate   L arm lymphedema   Type 1 diabetes  Prolapsed uterus   Cataract     Physical Exam   Wt Readings from Last 10 Encounters:   01/04/17 53.298 kg (117 lb 8 oz)   12/20/16 52.617 kg (116 lb)   12/14/16 52.617 kg (116 lb)   12/14/16 52.617 kg (116 lb)   10/26/16 52.799 kg (116 lb 6.4 oz)   10/14/16 54.658 kg (120 lb 8 oz)   09/22/16 55.067 kg (121 lb 6.4 oz)   07/15/16 53.978 kg (119 lb)   04/27/16 52.98 kg (116 lb 12.8 oz)   01/11/16 52.164 kg (115 lb)     /76 mmHg  Pulse 73  Ht 1.664 m (5' 5.5\")  Wt 54.477 kg (120 lb 1.6 oz)  BMI 19.67 kg/m2    General appearance: she is thin, no acute distress noted  Eyes: conjunctivae and extraocular motions are normal. Pupils are equal, round, and reactive to light. No lid lag, no stare.  HENT: oropharynx moist; neck no JVD, no bruits, no thyromegaly, no palpable nodules  Cardiovascular: regular rhythm, no murmurs, distal pulses palpable, mild L arm edema   Respiratory: chest clear, no rales, no rhonchi  Musculoskeletal: normal tone and strength   Neurologic: left knee reflex decreased, normal B/L bicipital reflexes, no resting tremor  Psychiatric: affect and judgment normal   Skin: warm, nails onychomycosis, mild bruises at the insulin injection site; fingertips are cracked   Chronic mild edema L leg     Lab Results  I reviewed prior lab results documented in EPIC.   Lab Results   Component Value Date    A1C 5.5 06/10/2013    A1C 5.4 01/07/2013    A1C 5.5 07/02/2012    A1C 5.1 01/09/2012    A1C 5.1 11/07/2011    HEMOGLOBINA1 5.4 07/11/2016    " HEMOGLOBINA1 5.4 01/11/2016    HEMOGLOBINA1 5.4 07/20/2015    HEMOGLOBINA1 5.4 01/12/2015    HEMOGLOBINA1 5.5 07/14/2014       HEMOGLOBIN   Date Value Ref Range Status   12/14/2016 12.5 11.7 - 15.7 g/dL Final     HEMATOCRIT   Date Value Ref Range Status   12/14/2016 39.5 35.0 - 47.0 % Final     CHOLESTEROL   Date Value Ref Range Status   07/06/2016 164 <200 mg/dL Final     CHOLESTEROL/HDL RATIO   Date Value Ref Range Status   10/01/2014 1.5 0.0 - 5.0 Final     HDL CHOLESTEROL   Date Value Ref Range Status   07/06/2016 93 >49 mg/dL Final     LDL CHOLESTEROL CALCULATED   Date Value Ref Range Status   07/06/2016 62 <100 mg/dL Final     Comment:     Desirable:       <100 mg/dl     No results found for: MICROALBUMIN  TSH   Date Value Ref Range Status   08/05/2016 0.64 0.40 - 4.00 mU/L Final     Last Basic Metabolic Panel:    SODIUM   Date Value Ref Range Status   12/14/2016 142 133 - 144 mmol/L Final     POTASSIUM   Date Value Ref Range Status   12/14/2016 3.4 3.4 - 5.3 mmol/L Final     CHLORIDE   Date Value Ref Range Status   12/14/2016 107 94 - 109 mmol/L Final     CALCIUM   Date Value Ref Range Status   12/14/2016 9.2 8.5 - 10.1 mg/dL Final     CARBON DIOXIDE   Date Value Ref Range Status   12/14/2016 27 20 - 32 mmol/L Final     UREA NITROGEN   Date Value Ref Range Status   12/14/2016 27 7 - 30 mg/dL Final     CREATININE   Date Value Ref Range Status   01/06/2017 0.87 0.52 - 1.04 mg/dL Final     No results found for: GFR  GLUCOSE   Date Value Ref Range Status   12/14/2016 67* 70 - 99 mg/dL Final       AST   Date Value Ref Range Status   01/06/2017 16 0 - 45 U/L Final     ALT   Date Value Ref Range Status   01/06/2017 20 0 - 50 U/L Final     ALBUMIN   Date Value Ref Range Status   01/11/2016 3.8 3.4 - 5.0 g/dL Final     Assessment     1. Type 1 diabetes with very tight glycemic control, complicated by hypoglycemia unawareness. A tight BG control was preferred by the patient, although less aggressive blood glucose  targets were recommended.   I am concerned about hypoglycemia unawareness. I reviewed this with the patient in detail. I counseled her extensively on trying to change the targets to higher numbers, above 90. It's possible that some of the hypoglycemic episodes are actually really related to Raynoud syndrome and advised her not check her BG unless her fingers are warm and well perfused.   Plan:  - change insulin to carb ratio to 1 U per 20 or 25 grams   - consult the dietician for counseling on carb counting  - use lancet to stick the forearm and try to avoid using the fingertips.     2.  Osteoporosis   She was treated with Boniva for 3 years, followed by Forteo for 2 years. Received one dose of Reclast in July 2014 when she completed treatment with Forteo. Overall, she gained a significant amount of bone mass at the lumbar spine and left hip from 2005 to 2016. While there appear to be a loss of bone mineral density at the right hip, the lowest T score remained at the left femoral neck, at -2.2, in the pre-osteoporotic range.  Plan:   F/up DXA scan in 2018.     3. Hypercholesterolemia - on simvastatin, controlled.    Return to clinic 6 months.    No orders of the defined types were placed in this encounter.     Answers for HPI/ROS submitted by the patient on 1/23/2017   General Symptoms: No  Skin Symptoms: No  HENT Symptoms: No  EYE SYMPTOMS: No  HEART SYMPTOMS: No  LUNG SYMPTOMS: No  INTESTINAL SYMPTOMS: No  URINARY SYMPTOMS: No  BREAST SYMPTOMS: No  SKELETAL SYMPTOMS: Yes  BLOOD SYMPTOMS: No  NERVOUS SYSTEM SYMPTOMS: Yes  MENTAL HEALTH SYMPTOMS: No

## 2017-01-23 NOTE — Clinical Note
1/23/2017       RE: Shala Caruso  [address] [place] [address]     Dear Colleague,    Thank you for referring your patient, Shala Caruso, to the [facility] ENDOCRINOLOGY at [facility]. Please see a copy of my visit note below.    Ms. Caruso is a 71 year old pleasant female with hx of SLE, APLS, DVT, osteoporosis and type 1 diabetes presenting for f/up.     1. Type 1 diabetes    Hemoglobin A1c today was 5%, slightly lower then her last apt here. Glucometer readings reveal that she checks her blood sugar 4-5 times daily. Average blood glucose is 80 with a standard deviation of 15 and a range variable between 53 and 110. The meter targets are set to 70 to 130 for the pre-meal blood sugar and  for the post meal blood sugar. Overall, 80% of the blood glucose numbers are within this target.  Insulin to carbohydrate ratio is 1 unit per 15 g for all meals.  She administers 4 U of Lantus in the morning, 2-3 U NovoLog for breakfast, lunch and dinner.  A couple of times, she experienced asymptomatic hypoglycemic episodes and her BG was in the 30s. When she rechecked her blood sugar, it came back in the lower 60s or 70s. She is asking whether or not this is related to her history of Raynaud.  In general, she is swimming twice a week and she goes to gym 5 times a week, where she does elliptical, stationary bike and yoga. For the last 6 weeks she hasn't been exercising, as she underwent cystocele surgery.    Diabetes complications:   No h/o microalbuminuria.  Last eye exam - September 2016, no BDR.   Last dental exam fall of 2016   Numbness and tingling sensation B/L toes - for many years but light sensation is intact. She does wear comfortable shoes/insoles. 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   She develops confusion, inability to concentrate or focus her vision and she feels extremely tired when her blood sugar is the lower 60s or 50s. She very rarely develops  shakiness, but she denies any other symptoms suggestive of hypoglycemia. She has not experienced  prior episodes of loss of consciousness due to hypoglycemia.    2. Osteoporosis  Jeanmarie also has a history of osteoporosis, treated with Boniva for almost 3 years, followed by Forteo which was started in 4/12 - interrupted treatment from 4/2013 and restarted in 7/2013 until July 2014. After she completed the treatment with Forteo, she received one dose of Reclast in July 2014.      She has no history of prior bone fractures. She's been off prednisone for over 4 years. Her height has decreased over the years from 5'6'' to 5'1/2''. Her mother had a hip fracture in her 80s. She continues to take oyster calcium 500 mg calcium plus 200 U Vitamin D TID and 1000 U vitamin D3 daily.     On the most recent DXA scan images from 7/1/16, L-L3 T score was - 1.  Overall, at the spine, there was a significant increase of bone mineral density since 2005 of 10.5%. Since 2015, the bone mineral density has remained stable at spine. The lowest T score at the hip level was -2.2, at the left femoral neck.  Overall, there was a significant improvement of bone mineral density at the mean hip of 8.9% since 2005, with no significant changes since 2015. While the bone mineral density at the left hip increased since baseline, at the right hip, there was a decrease of bone mineral density since baseline and also, since prior year.    Current Outpatient Prescriptions   Medication     ciprofloxacin (CIPRO) 500 MG tablet     HYDROcodone-acetaminophen (NORCO) 5-325 MG per tablet     senna-docusate (SENOKOT-S;PERICOLACE) 8.6-50 MG per tablet     estradiol (ESTRACE VAGINAL) 0.1 MG/GM vaginal cream     blood glucose monitoring (NO BRAND SPECIFIED) test strip     mycophenolate (CELLCEPT - GENERIC EQUIVALENT) 500 MG tablet     NIFEdipine ER osmotic (NIFEDICAL XL) 30 MG 24 hr tablet     simvastatin (ZOCOR) 40 MG tablet     Blood Glucose Monitoring Suppl (TRUE  METRIX METER) W/DEVICE KIT     blood glucose monitoring (TRUE METRIX BLOOD GLUCOSE TEST) test strip     warfarin (COUMADIN) 5 MG tablet     insulin pen needle 32G X 4 MM     insulin glargine (LANTUS) 100 UNIT/ML vial     NOVOLOG PENFILL 100 UNIT/ML soln     VITAMIN D, CHOLECALCIFEROL, PO     AYR SALINE NASAL NO-DRIP GEL     glucagon (GLUCAGON EMERGENCY) 1 MG injection     insulin pen needle (BD PEN NEEDLE PAM U/F) 32G X 4 MM MISC     LANCETS ULTRA THIN 30G MISC     aspirin 81 MG tablet     omega-3 fatty acids (FISH OIL) 1200 MG capsule     Calcium Carbonate-Vitamin D (CALCIUM + D) 600-200 MG-UNIT per tablet     Multiple Vitamins-Minerals (CENTRUM SILVER PO)     Injection Device for Insulin (NOVOPEN JUSTIN CARPIO,) RORY     No current facility-administered medications for this visit.     ROS   Systemic symptoms: as above, good appetite  Eye symptoms: No eye symptoms.  Otolaryngeal symptoms: no dysphagia, no voice hoarseness or cough   Breast symptoms: No breast symptoms.  Cardiovascular symptoms: No cardiovascular symptoms.    Pulmonary symptoms: No pulmonary symptoms.  Gastrointestinal symptoms: No gastrointestinal symptoms.   Genitourinary symptoms: urinary incontinence   Endocrine symptoms: chronic cold intolerance  Hematologic symptoms: No hematologic symptoms.  Musculoskeletal symptoms: recurrent episodes of pain which affect the third to fifth left toes; bilateral knee pain; joint stiffness  Neurological symptoms: numbness and tingling sensation b/l toes   Psychological symptoms: no psychological symptoms   Skin symptoms: split lesions of the tips of her fingers - for years; has seen dermatology in the past    Family Hx   Maternal grandmother DM2. First cousin with RA. Mother, maternal grandmother and aunt, cousin diagnosed with thyroid disorders (? hypothyroidism). Mother and maternal grandmother had breast cancer.    Personal Hx   Behavioral history: No tobacco use.  Home environment: No secondhand tobacco smoke  "in home.  Denies smoking, drinking alcohol or using illicit drugs.  with one child. 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  Menopause at age 59. Had regular MP in the past. No h/o bone fractures or kidney stones.    PMH   SLE dx in 2000 with flare/pericarditis and tamponade on 3/5/2010 requiring high doses of steroids.  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  Osteoporosis diagnosed 2008 started on Boniva in 2010 (heartburn from oral fosamax).   Hyperlipidemia.  Severe GERD and Achalasia.  Raynaud's.  Chronic leukopenia on MTX.  Allergy to multiple meds  Vit D def.  Post thrombophlebitic syndrome with chronic LE swelling   Dyslipidemia  Chronic leukopenia on methotrexate   L arm lymphedema   Type 1 diabetes  Prolapsed uterus   Cataract     Physical Exam   Wt Readings from Last 10 Encounters:   01/04/17 53.298 kg (117 lb 8 oz)   12/20/16 52.617 kg (116 lb)   12/14/16 52.617 kg (116 lb)   12/14/16 52.617 kg (116 lb)   10/26/16 52.799 kg (116 lb 6.4 oz)   10/14/16 54.658 kg (120 lb 8 oz)   09/22/16 55.067 kg (121 lb 6.4 oz)   07/15/16 53.978 kg (119 lb)   04/27/16 52.98 kg (116 lb 12.8 oz)   01/11/16 52.164 kg (115 lb)     /76 mmHg  Pulse 73  Ht 1.664 m (5' 5.5\")  Wt 54.477 kg (120 lb 1.6 oz)  BMI 19.67 kg/m2    General appearance: she is thin, no acute distress noted  Eyes: conjunctivae and extraocular motions are normal. Pupils are equal, round, and reactive to light. No lid lag, no stare.  HENT: oropharynx moist; neck no JVD, no bruits, no thyromegaly, no palpable nodules  Cardiovascular: regular rhythm, no murmurs, distal pulses palpable, mild L arm edema   Respiratory: chest clear, no rales, no rhonchi  Musculoskeletal: normal tone and strength   Neurologic: left knee reflex decreased, normal B/L bicipital reflexes, no resting tremor  Psychiatric: affect and judgment normal   Skin: warm, nails onychomycosis, mild bruises at the insulin injection site; fingertips are " cracked   Chronic mild edema L leg     Lab Results  I reviewed prior lab results documented in EPIC.   Lab Results   Component Value Date    A1C 5.5 06/10/2013    A1C 5.4 01/07/2013    A1C 5.5 07/02/2012    A1C 5.1 01/09/2012    A1C 5.1 11/07/2011    HEMOGLOBINA1 5.4 07/11/2016    HEMOGLOBINA1 5.4 01/11/2016    HEMOGLOBINA1 5.4 07/20/2015    HEMOGLOBINA1 5.4 01/12/2015    HEMOGLOBINA1 5.5 07/14/2014       HEMOGLOBIN   Date Value Ref Range Status   12/14/2016 12.5 11.7 - 15.7 g/dL Final     HEMATOCRIT   Date Value Ref Range Status   12/14/2016 39.5 35.0 - 47.0 % Final     CHOLESTEROL   Date Value Ref Range Status   07/06/2016 164 <200 mg/dL Final     CHOLESTEROL/HDL RATIO   Date Value Ref Range Status   10/01/2014 1.5 0.0 - 5.0 Final     HDL CHOLESTEROL   Date Value Ref Range Status   07/06/2016 93 >49 mg/dL Final     LDL CHOLESTEROL CALCULATED   Date Value Ref Range Status   07/06/2016 62 <100 mg/dL Final     Comment:     Desirable:       <100 mg/dl     No results found for: MICROALBUMIN  TSH   Date Value Ref Range Status   08/05/2016 0.64 0.40 - 4.00 mU/L Final     Last Basic Metabolic Panel:    SODIUM   Date Value Ref Range Status   12/14/2016 142 133 - 144 mmol/L Final     POTASSIUM   Date Value Ref Range Status   12/14/2016 3.4 3.4 - 5.3 mmol/L Final     CHLORIDE   Date Value Ref Range Status   12/14/2016 107 94 - 109 mmol/L Final     CALCIUM   Date Value Ref Range Status   12/14/2016 9.2 8.5 - 10.1 mg/dL Final     CARBON DIOXIDE   Date Value Ref Range Status   12/14/2016 27 20 - 32 mmol/L Final     UREA NITROGEN   Date Value Ref Range Status   12/14/2016 27 7 - 30 mg/dL Final     CREATININE   Date Value Ref Range Status   01/06/2017 0.87 0.52 - 1.04 mg/dL Final     No results found for: GFR  GLUCOSE   Date Value Ref Range Status   12/14/2016 67* 70 - 99 mg/dL Final       AST   Date Value Ref Range Status   01/06/2017 16 0 - 45 U/L Final     ALT   Date Value Ref Range Status   01/06/2017 20 0 - 50 U/L Final      ALBUMIN   Date Value Ref Range Status   01/11/2016 3.8 3.4 - 5.0 g/dL Final     Assessment     1. Type 1 diabetes with very tight glycemic control, complicated by hypoglycemia unawareness. A tight BG control was preferred by the patient, although less aggressive blood glucose targets were recommended.   I am concerned about hypoglycemia unawareness. I reviewed this with the patient in detail. I counseled her extensively on trying to change the targets to higher numbers, above 90. It's possible that some of the hypoglycemic episodes are actually really related to Raynoud syndrome and advised her not check her BG unless her fingers are warm and well perfused.   Plan:  - change insulin to carb ratio to 1 U per 20 or 25 grams   - consult the dietician for counseling on carb counting  - use lancet to stick the forearm and try to avoid using the fingertips.     2.  Osteoporosis   She was treated with Boniva for 3 years, followed by Forteo for 2 years. Received one dose of Reclast in July 2014 when she completed treatment with Forteo. Overall, she gained a significant amount of bone mass at the lumbar spine and left hip from 2005 to 2016. While there appear to be a loss of bone mineral density at the right hip, the lowest T score remained at the left femoral neck, at -2.2, in the pre-osteoporotic range.  Plan:   F/up DXA scan in 2018.     3. Hypercholesterolemia - on simvastatin, controlled.    Return to clinic 6 months.    No orders of the defined types were placed in this encounter.         Again, thank you for allowing me to participate in the care of your patient.      Sincerely,    Dorita Cramer MD

## 2017-01-26 ENCOUNTER — ANTICOAGULATION THERAPY VISIT (OUTPATIENT)
Dept: ANTICOAGULATION | Facility: CLINIC | Age: 72
End: 2017-01-26

## 2017-01-26 DIAGNOSIS — M32.9 SLE (SYSTEMIC LUPUS ERYTHEMATOSUS) (H): ICD-10-CM

## 2017-01-26 DIAGNOSIS — Z79.01 LONG-TERM (CURRENT) USE OF ANTICOAGULANTS: Primary | ICD-10-CM

## 2017-01-26 DIAGNOSIS — I82.409 DVT (DEEP VENOUS THROMBOSIS) (H): ICD-10-CM

## 2017-01-26 DIAGNOSIS — M32.9 SYSTEMIC LUPUS ERYTHEMATOSUS (H): ICD-10-CM

## 2017-01-26 LAB
INR PPP: 1.46 (ref 0.86–1.14)
PROTHROM ACT/NOR PPP: 41 % (ref 60–140)

## 2017-01-26 NOTE — PROGRESS NOTES
ANTICOAGULATION FOLLOW-UP CLINIC VISIT    Patient Name:  Shala Caruso  Date:  1/26/2017  Contact Type:  Telephone    SUBJECTIVE:     Patient Findings     Positives No Problem Findings    Comments She is taking stool softeners due to the surgery in December, but will stop this in a couple of days              OBJECTIVE    INR   Date Value Ref Range Status   01/26/2017 1.46* 0.86 - 1.14 Final     FACTOR 2 ASSAY   Date Value Ref Range Status   01/26/2017 41* 60 - 140 % Final       ASSESSMENT / PLAN  INR assessment SUB    Recheck INR In: 6 DAYS    INR Location Clinic      Anticoagulation Summary as of 1/26/2017     INR goal 2.0-3.0   Selected INR    Maintenance plan 10 mg (5 mg x 2) every day   Full instructions 1/26: 12.5 mg; 1/27: 12.5 mg; 1/28: 12.5 mg; Otherwise 10 mg every day   Weekly total 70 mg   Plan last modified Stacey Beal RN (1/13/2017)   Next INR check 2/1/2017   Priority Factor 2   Target end date     Indications   SLE (systemic lupus erythematosus) (H) [M32.9]  Long-term (current) use of anticoagulants [Z79.01] [Z79.01]         Anticoagulation Episode Summary     INR check location Clinic Lab    Preferred lab     Send INR reminders to [facility]    Comments Chromogenic 2  goal range is 15-25%  Ok to leave message on home [phone] and work voicemail, but call [email or web address] per pt request.  OK to leave message at office  May leave messages with Rodriguez Santos  DO NOT GIVE RECORDS TO EMPLOYER U        Anticoagulation Care Providers     Provider Role Specialty Phone number    Mt Kiser MD Responsible Clinical Cardiac Electrophysiology [phone]            See the Encounter Report to view Anticoagulation Flowsheet and Dosing Calendar (Go to Encounters tab in chart review, and find the Anticoagulation Therapy Visit)    Spoke with Shala Morrison, SHERRON

## 2017-02-01 ENCOUNTER — OFFICE VISIT (OUTPATIENT)
Dept: UROLOGY | Facility: CLINIC | Age: 72
End: 2017-02-01

## 2017-02-01 ENCOUNTER — ANTICOAGULATION THERAPY VISIT (OUTPATIENT)
Dept: ANTICOAGULATION | Facility: CLINIC | Age: 72
End: 2017-02-01

## 2017-02-01 VITALS
SYSTOLIC BLOOD PRESSURE: 121 MMHG | BODY MASS INDEX: 19.29 KG/M2 | WEIGHT: 120 LBS | HEART RATE: 73 BPM | DIASTOLIC BLOOD PRESSURE: 58 MMHG | HEIGHT: 66 IN

## 2017-02-01 DIAGNOSIS — N39.3 FEMALE STRESS INCONTINENCE: Primary | ICD-10-CM

## 2017-02-01 DIAGNOSIS — Z79.01 LONG-TERM (CURRENT) USE OF ANTICOAGULANTS: Primary | ICD-10-CM

## 2017-02-01 DIAGNOSIS — I82.409 DVT (DEEP VENOUS THROMBOSIS) (H): ICD-10-CM

## 2017-02-01 DIAGNOSIS — N81.11 CYSTOCELE, MIDLINE: ICD-10-CM

## 2017-02-01 DIAGNOSIS — M32.9 SYSTEMIC LUPUS ERYTHEMATOSUS (H): ICD-10-CM

## 2017-02-01 DIAGNOSIS — M32.9 SLE (SYSTEMIC LUPUS ERYTHEMATOSUS) (H): ICD-10-CM

## 2017-02-01 LAB
INR PPP: 1.86 (ref 0.86–1.14)
PROTHROM ACT/NOR PPP: 29 % (ref 60–140)

## 2017-02-01 ASSESSMENT — PAIN SCALES - GENERAL: PAINLEVEL: NO PAIN (0)

## 2017-02-01 NOTE — PROGRESS NOTES
"Shala Caruso is a 71 year old female with a history of cystocele s/p anterior colporrhaphy with midurethral sling on 12/20/16. She has been doing well since her surgery. She is having small amounts of leakage. No dysuria, urgency, frequency.     PVR today is 0. Urine dipstick shows small LE, positive nitrite, and moderate blood.     Objective:  /58 mmHg  Pulse 73  Ht 1.664 m (5' 5.5\")  Wt 54.432 kg (120 lb)  BMI 19.66 kg/m2    Gen: In NAD  Resp: Breathing non-labored  CV: Regular rate; extremities warm  Abd: Soft, non-distended, non-tender  Ext: Warm and well perfused  : Well supported with no evidence of mesh extrusion. Minimal urethral mobility. There was leakage with cough, small amount but no hypermobility    Assessment & Plan: Shala Caruos is a 71 year old female with a history of cystocele s/p anterior colporrhaphy with midurethral sling on 12/20/16 doing well except for a little stress incontinence likely related to intrinsic sphincter deficiency.  We discussed options of observation vs. Periurethral bulking.  She will try more Kegel exercises for now.  -Discontinue restrictions.  -Continue premarin cream  -discontinue lifting restrictions and pelvic rest  -f/u in 6 months.    Thank you for allowing me to participate in the care of  Ms. Shala Caruso and I will keep you updated on her progress.    Jeanmarie Pierson MD  "

## 2017-02-01 NOTE — PROGRESS NOTES
ANTICOAGULATION FOLLOW-UP CLINIC VISIT    Patient Name:  Shala Caruso  Date:  2/1/2017  Contact Type:  Telephone    SUBJECTIVE:     Patient Findings     Positives No Problem Findings           OBJECTIVE    INR   Date Value Ref Range Status   02/01/2017 1.86* 0.86 - 1.14 Final     FACTOR 2 ASSAY   Date Value Ref Range Status   02/01/2017 29* 60 - 140 % Final       ASSESSMENT / PLAN  INR assessment THER    Recheck INR In: 1 WEEK    INR Location Clinic      Anticoagulation Summary as of 2/1/2017     INR goal 2.0-3.0   Selected INR    Maintenance plan 12.5 mg (5 mg x 2.5) on Mon, Wed, Fri; 10 mg (5 mg x 2) all other days   Full instructions 12.5 mg on Mon, Wed, Fri; 10 mg all other days   Weekly total 77.5 mg   Plan last modified Gwen Dietz RN (2/1/2017)   Next INR check 2/8/2017   Priority Factor 2   Target end date     Indications   SLE (systemic lupus erythematosus) (H) [M32.9]  Long-term (current) use of anticoagulants [Z79.01] [Z79.01]         Anticoagulation Episode Summary     INR check location Clinic Lab    Preferred lab     Send INR reminders to [facility]    Comments Chromogenic 2  goal range is 15-25%  Ok to leave message on home [phone] and work voicemail, but call [email or web address] per pt request.  OK to leave message at office  May leave messages with Rodriguez Santos  DO NOT GIVE RECORDS TO EMPLOYER U        Anticoagulation Care Providers     Provider Role Specialty Phone number    Mt Kiser MD Responsible Clinical Cardiac Electrophysiology [phone]            See the Encounter Report to view Anticoagulation Flowsheet and Dosing Calendar (Go to Encounters tab in chart review, and find the Anticoagulation Therapy Visit)    Spoke with Juan Dietz, RN

## 2017-02-01 NOTE — Clinical Note
"2/1/2017       RE: Shala Caruso  [address] [place] [address]     Dear Colleague,    Thank you for referring your patient, Shala Caruso, to the [facility] UROLOGY AND INST FOR PROSTATE AND UROLOGIC CANCERS at [facility]. Please see a copy of my visit note below.    Shala Caruso is a 71 year old female with a history of cystocele s/p anterior colporrhaphy with midurethral sling on 12/20/16. She has been doing well since her surgery. She is having small amounts of leakage. No dysuria, urgency, frequency.     PVR today is 0. Urine dipstick shows small LE, positive nitrite, and moderate blood.     Objective:  /58 mmHg  Pulse 73  Ht 1.664 m (5' 5.5\")  Wt 54.432 kg (120 lb)  BMI 19.66 kg/m2    Gen: In NAD  Resp: Breathing non-labored  CV: Regular rate; extremities warm  Abd: Soft, non-distended, non-tender  Ext: Warm and well perfused  : Well supported with no evidence of mesh extrusion. Minimal urethral mobility. There was leakage with cough, small amount but no hypermobility    Assessment & Plan: Shala Caruso is a 71 year old female with a history of cystocele s/p anterior colporrhaphy with midurethral sling on 12/20/16 doing well except for a little stress incontinence likely related to intrinsic sphincter deficiency.  We discussed options of observation vs. Periurethral bulking.  She will try more Kegel exercises for now.  -Discontinue restrictions.  -Continue premarin cream  -discontinue lifting restrictions and pelvic rest  -f/u in 6 months.    Thank you for allowing me to participate in the care of  Ms. Shala Caruso and I will keep you updated on her progress.    Jeanmarie Pierson MD    "

## 2017-02-01 NOTE — PATIENT INSTRUCTIONS
Follow up in 6 months.    It was a pleasure meeting with you today.  Thank you for allowing me and my team the privilege of caring for you today.  YOU are the reason we are here, and I truly hope we provided you with the excellent service you deserve.  Please let us know if there is anything else we can do for you so that we can be sure you are leaving completely satisfied with your care experience.

## 2017-02-03 ENCOUNTER — OFFICE VISIT (OUTPATIENT)
Dept: PHARMACY | Facility: CLINIC | Age: 72
End: 2017-02-03
Payer: COMMERCIAL

## 2017-02-03 VITALS — DIASTOLIC BLOOD PRESSURE: 55 MMHG | HEART RATE: 69 BPM | SYSTOLIC BLOOD PRESSURE: 112 MMHG

## 2017-02-03 DIAGNOSIS — I73.00 RAYNAUD'S DISEASE WITHOUT GANGRENE: ICD-10-CM

## 2017-02-03 DIAGNOSIS — E10.649 TYPE 1 DIABETES MELLITUS WITH HYPOGLYCEMIA AND WITHOUT COMA (H): Primary | ICD-10-CM

## 2017-02-03 DIAGNOSIS — M32.9 SLE (SYSTEMIC LUPUS ERYTHEMATOSUS) (H): ICD-10-CM

## 2017-02-03 DIAGNOSIS — N95.2 ATROPHIC VAGINITIS: ICD-10-CM

## 2017-02-03 DIAGNOSIS — M19.93 OTHER SECONDARY OSTEOARTHRITIS, UNSPECIFIED SITE: ICD-10-CM

## 2017-02-03 DIAGNOSIS — E78.5 HYPERLIPIDEMIA LDL GOAL <100: ICD-10-CM

## 2017-02-03 DIAGNOSIS — E63.9 NUTRITIONAL DEFICIENCY: ICD-10-CM

## 2017-02-03 PROCEDURE — 99605 MTMS BY PHARM NP 15 MIN: CPT | Performed by: PHARMACIST

## 2017-02-03 PROCEDURE — 99607 MTMS BY PHARM ADDL 15 MIN: CPT | Performed by: PHARMACIST

## 2017-02-03 NOTE — PROGRESS NOTES
SUBJECTIVE/OBJECTIVE:                                                    Shala Caruso is a 71 year old female coming in for a follow-up visit for Medication Therapy Management.      Chief Complaint: Follow up from our visit on 10/13/16.  Pt has questions related to her therapy for atrophy.    Tobacco: No tobacco use   Alcohol: not currently using    Medication Adherence: issues found and discussed below    Diabetes:  Pt currently taking Lantus 4 units daily and Novolog per carb count (1 unit=20 carbs), though she admits she is not following this sometimes in an attempt to keep tighter control. Pt is not experiencing side effects and admits she has mild unawareness of lows, but can usually tell.   SMBG: four times daily.   Ranges (patient reported): Usually 80 to 90 in the morning, maybe as high as 130 through the day but with occasional dips into that 40s that she attributes to her Reynauds (she doesn't believe these readings are accurate, as after warming her hands the BG jumps to 60 to 70 on recheck)  Frequency of hypoglycemia? 1-2 times per week.  Recent symptoms of high blood sugar? none  Microalbumin is < 30 mg/g. Pt is not taking an ACEi/ARB.  Aspirin: Taking 81mg daily and has the following issues: Bleeding from the cracks located in her fingers.  Diet/Exercise: Pt continues to eat a diet rich in greens and other vegetables with lean meat once a day. She also continues to exercise regularly.   Pt worries about micro and macrovascular complications associated with loosening her tight control of her blood sugars (she worries about kidney failure and amputations).  Vaginal Atrophy with other Complications: Pt had recent cystocele surgery to reduce her dependence on her pessary and has been instructed to use a pea sized dose of premarin nightly for 14 days followed by indefinite twice weekly dosing but she has continued on a pea sized dose nightly. She is wondering if this is okay. She is wondering if  staying on this dose or increasing it might help with dyspareunia. Pt has also continued senna/docusate dosing since her surgery despite stopping Norco after two doses and is wondering whether to continue this or not to avoid straining the surgical area.  Arthritis: Pt continues on APAP for pain but is wondering about topical options for knee pain. She spoke with her retail pharmacist who suggested capsaicin or Voltaren gel and she is wondering if these are helpful. She has also tried lidocaine gel in the past without much relief but has not tried patches.  SLE/Hx of DVT: Pt continues on Cellcept without complaint of concerning issues. Pt continues on warfarin as directed and has noted high Factor IIs on the last few checks, potentially due to an increase in vegetable intake.  Reynauds: Pt continues on nifedipine without complaint of dizziness or light-headedness.  Supplements: Pt is taking vitamin D 2000 IU and wondering if there is an upper limit, as she just takes whatever dose her  buys. She is also taking a multivitamin for general health and wondering whether she should be taking an additional B12, as she has heard this is good.  Hyperlipidemia: Current therapy includes Simvastatin 40 mg once daily.  Pt reports no significant myalgias or other side effects.     Current labs include:  BP Readings from Last 3 Encounters:   02/01/17 121/58   01/23/17 121/76   01/04/17 113/63     Today's Vitals: /55 mmHg  Pulse 69 Pt declined weight  A1C      5.5   6/10/2013.  CHOL      164   7/6/2016  TRIG       44   7/6/2016  HDL       93   7/6/2016  LDL       62   7/6/2016    Liver Function Studies -   Recent Labs   Lab Test  01/06/17   1257   01/11/16   1224   04/01/13   0707   PROTTOTAL   --    --    --    --   6.9   ALBUMIN   --    --   3.8   --   4.1   BILITOTAL   --    --    --    --   0.4   ALKPHOS   --    --    --    --   74   AST  16   < >   --    < >  31   ALT  20   < >   --    < >  31    < > = values in  this interval not displayed.       Lab Results   Component Value Date    UCRR 51 07/15/2016    MICROL <5 07/15/2016    UMALCR Unable to calculate due to low value 07/15/2016       Last Basic Metabolic Panel:  NA      142   12/14/2016   POTASSIUM      3.4   12/14/2016  CHLORIDE      107   12/14/2016  BUN       27   12/14/2016  CR     0.87   1/6/2017  GFR ESTIMATE   Date Value Ref Range Status   01/06/2017 64 >60 mL/min/1.7m2 Final     Comment:     Non  GFR Calc   12/14/2016 70 >60 mL/min/1.7m2 Final     Comment:     Non  GFR Calc   10/07/2016 74 >60 mL/min/1.7m2 Final     Comment:     Non  GFR Calc     GFR ESTIMATE IF BLACK   Date Value Ref Range Status   01/06/2017 78 >60 mL/min/1.7m2 Final     Comment:      GFR Calc   12/14/2016 84 >60 mL/min/1.7m2 Final     Comment:      GFR Calc   10/07/2016 89 >60 mL/min/1.7m2 Final     Comment:      GFR Calc     TSH   Date Value Ref Range Status   08/05/2016 0.64 0.40 - 4.00 mU/L Final   ]    Most Recent Immunizations   Administered Date(s) Administered     Influenza (H1N1) 11/01/2009     Influenza (IIV3) 09/22/2014     Pneumococcal (PCV 13) 07/18/2016     Pneumococcal 23 valent 02/17/2014     TD (ADULT, 7+) 04/28/2000     Tdap (Adacel,Boostrix) 04/17/2009     ASSESSMENT:                                                    Current medications were reviewed today as discussed above.      Medication Adherence: needs improvement - see below    Diabetes: Needs Improvement. Patient is meeting A1c goal of < 7%. Self monitoring of blood glucose is at goal of fasting  mg/dL and post prandial < 180 mg/dL. However, pt's A1c of 5.0 suggests pt is regularly having lows. We discussed the lack of evidence for micro and macrovascular complications occuring at A1c's less than 7 and loosening of her blood sugar control to readings that would reflect a slightly higher A1c (around 7 instead of  around 5). Pt is cautiously open to this.  Vaginal Atrophy with other Complications: Needs improvement. Long-term low-dose Premarin therapy is dosed at 0.5 grams twice weekly, which is a larger volume less frequently than what the patient is currently taking. Pt would benefit from contacting urology to discuss her goals and weighing risks and benefits of this therapy. We also discussed that it is likely safe at this point for her to discontinue scheduled dosing of stool softener/laxatives and to use only as needed (though risk of laxative dependence is very low).  Arthritis: Needs improvement. OTC lidocaine patches, Voltaren, and capsaicin risks and benefits discussed. Pt is most interested in Lidopatch.  SLE/Hx of DVT: Stable. Factor 2 not within range but pt being monitored appropriately.  Reynauds: Stable. BP well within goal but not excessively low.  Supplements: Needs improvement. Pt's vitamin D goal is 1000 IU daily with a long-term upper limit of 2000 IU daily. B12 supplementation is unlikely necessary as pt is getting 400% RDE from her multivitamin and she eats meat regularly.  Hyperlipidemia: Stable.     PLAN:                                                      1. Pt to follow endo rec to loosen carb unit to 1:20 grams to avoid low blood sugars.    2. Pt to contact urology to discuss appropriate long-term Premarin use.    3. Pt to try Lidopatch OTC to see if this provides additional benefit to APAP or allows her to take less systemic analgesic.    4. Pt to average 8207-7051 IU Vitamin D daily between multivitamin and other supplementation.    5. Pt to stop scheduled senna-docusate dosing (use as needed).    I spent 45 minutes with this patient today.  All changes were made via collaborative practice agreement with Joe Contreras. A copy of the visit note was provided to the patient's primary care provider.     Will follow up in 1 year.    The patient was given a summary of these recommendations  as an after visit summary.    Sarahi ArizmendiD  Medication Therapy Management Provider  Phone:[phone]  Pager: [phone]

## 2017-02-03 NOTE — MR AVS SNAPSHOT
After Visit Summary   2/3/2017    Shala Caruso    MRN: [id number]           Patient Information     Date Of Birth          1945        Visit Information        Provider Department      2[facility]3/2017 1:30 PM [facility]        Care Instructions    Recommendations from today's MTM visit:                                                    MTM (medication therapy management) is a service provided by a clinical pharmacist designed to help you get the most of out of your medicines.   Today we reviewed what your medicines are for, how to know if they are working, that your medicines are safe and how to make your medicine regimen as easy as possible.     1. Consider loosening up on your blood sugars by changing insulin to carb ratio to 1 unit per 20 or 25 grams. Your goal A1c is < 7%.       2. Contact urology to determine adjusting Premarin dose to assist with vaginal dryness.     3. Lidopatch (Lidoderm/Lidocaine) 4% patches can be purchased over-the-counter at your pharmacy to help with your knee pain.     4. Your goal vitamin D is 1000 units/day. Vitamin D is fat soluble, so it sticks around in your body.      5. Vitamin B12 is present within your diet in the meats you consume and also present within your multivitamin.    6. Stop taking senna-docusate tablets since prolonged use of stimulants (ie, senna) can lead to dependence. Contact your primary care physician if you have any issues with constipation.    7. You do not need to worry about liver problems with Tylenol. Tylenol is cleared through the liver.    Next MTM visit: Contact [employer] to determine eligibility for MTM visits. Plans that may cover MTM visits include: [employer], [employer], Blue Cross.    To schedule another MTM appointment, please call the clinic directly or you may call the MTM scheduling line at [phone] or toll-free at [phone].     My Clinical Pharmacist's contact  information:                                                      It was a pleasure seeing you today!  Please feel free to contact me with any questions or concerns you have.      Gayle Mendoza, Kath  Medication Therapy Management Provider  Phone:[phone]  Pager: [phone]    You may receive a survey about the [facility] services you received.  I would appreciate your feedback to help me serve you better in the future. Please fill it out and return it when you can. Your comments will be anonymous.                    Follow-ups after your visit        Your next 10 appointments already scheduled     Feb 10, 2017  1:00 PM   LAB with  LAB   [facility] ([facility])    [address] [place] [id number] [phone]           Patient must bring picture ID.  Patient should be prepared to give a urine specimen  Please do not eat 10-12 hours before your appointment if you are coming in fasting for labs on lipids, cholesterol, or glucose (sugar).  Pregnant women should follow their Care Team instructions. Water with medications is okay. Do not drink coffee or other fluids.   If you have concerns about taking  your medications, please ask at office or if scheduling via [employer] Mess[employer]ert, send a message by clicking on Secure Messaging, Message Your Care Team.            Apr 13, 2017  5:00 PM   (Arrive by 4:45 PM)   RETURN LUPUS with Santiago Corbett MD   [facility] ([facility])    [address] [place] [id number] [phone]            Jul 31, 2017  7:30 AM   (Arrive by 7:15 AM)   RETURN DIABETES with Dorita Cramer MD   [facility] ([facility])    [address] [place] [id number] [phone]            Aug 02, 2017  9:45 AM   (Arrive by 9:30 AM)   Return Visit with Jeanmarie Pierson MD   [facility] ([facility])    [address]  4th [address]  [facility] [id number] [phone]            Aug 02, 2017 11:00 AM   (Arrive by 10:45 AM)   MA SCREENING DIGITAL BILATERAL with [id number]   [facility] ([facility])    [address]  [address] [facility] [id number] [phone]           Do not use any powder, lotion or deodorant under your arms or on your breast. If you do, we will ask you to remove it before your exam.  Wear comfortable, two-piece clothing.  If you have any allergies, tell your care team.  Bring any previous mammograms from other facilities or have them mailed to the breast center. This mammogram location, [facility], now offers 3D mammography. It doesn't replace a screening mammogram and can be done with a regular screening mammogram. It is optional and not all insurances will pay for it. 3D mammography is a special kind of mammogram that produces a three-dimensional image of the breast by using low dose-xrays. 3D allows the radiologist to see the breast tissue differently from 2D, which reduces the chance of repeat testing due to overlapping breast tissue. If you are interested in have a 3D mammogram, please check with your insurance before you arrive for your exam. 3D mammography is offered to all patients. On the day of your exam you will be asked to sign a form stating yes, you wish to have 3D imaging or, no, you decline.              Future tests that were ordered for you today     Open Future Orders        Priority Expected Expires Ordered    MA Screening Digital Bilateral Routine  2/3/2018 2/3/2017            Who to contact     If you have questions or need follow up information about today's clinic visit or your schedule please contact [facility] MEDICATION THERAPY MANAGEMENT directly at [phone].  Normal or non-critical lab and imaging results will be communicated  to you by Voaltehart, letter or phone within 4 business days after the clinic has received the results. If you do not hear from us within 7 days, please contact the clinic through [employer] or phone. If you have a critical or abnormal lab result, we will notify you by phone as soon as possible.  Submit refill requests through [employer] or call your pharmacy and they will forward the refill request to us. Please allow 3 business days for your refill to be completed.          Additional Information About Your Visit        [employer] Information     [employer] gives you secure access to your electronic health record. If you see a primary care provider, you can also send messages to your care team and make appointments. If you have questions, please call your primary care clinic.  If you do not have a primary care provider, please call [phone] and they will assist you.        Care EveryWhere ID     This is your Care EveryWhere ID. This could be used by other organizations to access your [place] medical records  [id number]        Your Vitals Were     Pulse                   69            Blood Pressure from Last 3 Encounters:   02/03/17 112/55   02/01/17 121/58   01/23/17 121/76    Weight from Last 3 Encounters:   02/01/17 120 lb (54.432 kg)   01/23/17 120 lb 1.6 oz (54.477 kg)   01/04/17 117 lb 8 oz (53.298 kg)              Today, you had the following     No orders found for display         Today's Medication Changes          These changes are accurate as of: 2/3/17  2:10 PM.  If you have any questions, ask your nurse or doctor.               These medicines have changed or have updated prescriptions.        Dose/Directions    estradiol 0.1 MG/GM cream   Commonly known as:  ESTRACE VAGINAL   This may have changed:    - when to take this  - additional instructions   Used for:  Atrophic vaginitis        Dose:  2 g   Place 2 g vaginally twice a week After using daily for 2 weeks   Quantity:  42.5 g   Refills:  11       insulin  glargine 100 UNIT/ML injection   Commonly known as:  LANTUS   This may have changed:    - when to take this  - additional instructions   Used for:  Diabetes mellitus type 1 (H)        Inject 4 units as directed daily LANTUS SOLOSTAR PEN PLEASE   Quantity:  15 mL   Refills:  3       NovoLOG PENFILL 100 UNIT/ML injection   This may have changed:    - when to take this  - additional instructions   Used for:  Controlled type 1 diabetes mellitus (H)   Generic drug:  insulin aspart        Dose:  15 Units   Inject 15 Units Subcutaneous daily   Quantity:  4 cartridge   Refills:  3       warfarin 5 MG tablet   Commonly known as:  COUMADIN   This may have changed:  additional instructions   Used for:  Systemic lupus erythematosus (H)        Take 10mg onTu, and 12.5mg on MWThFSatSun, or as directed by the Medication Monitoring Clinic at the [facility]   Quantity:  210 tablet   Refills:  1                Primary Care Provider Office Phone # Fax #    Joe Tj Contreras [phone]        [facility] [address]  [facility] [address]        Thank you!     Thank you for choosing [facility] MEDICATION THERAPY MANAGEMENT  for your care. Our goal is always to provide you with excellent care. Hearing back from our patients is one way we can continue to improve our services. Please take a few minutes to complete the written survey that you may receive in the mail after your visit with us. Thank you!             Your Updated Medication List - Protect others around you: Learn how to safely use, store and throw away your medicines at www.disposemymeds.org.          This list is accurate as of: 2/3/17  2:10 PM.  Always use your most recent med list.                   Brand Name Dispense Instructions for use    aspirin 81 MG tablet      Take 81 mg by mouth At Bedtime       AYR SALINE NASAL NO-DRIP Gel     3 Tube    Use as needed for nasal dryness       blood glucose monitoring lancets     7203 each    Test 7-8  times daily  (Lancets that go with pt current meter )       * blood glucose monitoring test strip    TRUE METRIX BLOOD GLUCOSE TEST    4 Box    Use to test blood sugar 4 times daily or as directed.       * blood glucose monitoring test strip    no brand specified    720 each    Use to test blood sugar 8 times daily . Trividia  True metrix  Brand DX E10.9       calcium + D 600-200 MG-UNIT Tabs   Generic drug:  calcium carbonate-vitamin D      Take 1 tablet by mouth 3 times daily       CENTRUM SILVER PO      Take 1 tablet by mouth daily.       estradiol 0.1 MG/GM cream    ESTRACE VAGINAL    42.5 g    Place 2 g vaginally twice a week After using daily for 2 weeks       glucagon 1 MG kit    GLUCAGON EMERGENCY    3 each    Inject 1 mg subcutaneously or intramuscularly for severe hypoglycemic episodes.       insulin glargine 100 UNIT/ML injection    LANTUS    15 mL    Inject 4 units as directed daily LANTUS SOLOSTAR PEN PLEASE       * insulin pen needle 32G X 4 MM    BD PAM U/F    800 each    Use   Up to 8 daily       * insulin pen needle 32G X 4 MM     400 each    Use 4 pen needles daily or as directed.       mycophenolate 500 MG tablet    CELLCEPT - GENERIC EQUIVALENT    360 tablet    Take 2 tablets (1,000 mg) by mouth 2 times daily 2 tabs in the a.m., 2 tabs in p.m.       NIFEdipine ER osmotic 30 MG 24 hr tablet    NIFEDICAL XL    90 tablet    Take 1 tablet (30 mg) by mouth daily       NovoLOG PENFILL 100 UNIT/ML injection   Generic drug:  insulin aspart     4 cartridge    Inject 15 Units Subcutaneous daily       NOVOPEN  (JUSTIN) Priscila   Generic drug:  Injection Device for insulin      Use as directed.       omega-3 fatty acids 1200 MG capsule      Take 1 capsule by mouth daily.       senna-docusate 8.6-50 MG per tablet    SENOKOT-S;PERICOLACE    100 tablet    Take 1-2 tablets by mouth 2 times daily To prevent constipation while taking narcotic pain medication. Start with 1 tablet twice daily. If no bowel movement in  24 hours, increase to 2 tablets twice daily.  Discontinue if you have loose stools or when you are no longer taking narcotics.       simvastatin 40 MG tablet    ZOCOR    100 tablet    Take 1 tablet (40 mg) by mouth At Bedtime       TRUE METRIX METER W/DEVICE Kit     1 kit    1 each 4 times daily       VITAMIN D (CHOLECALCIFEROL) PO      Take 2,000 Units by mouth daily (with lunch)       warfarin 5 MG tablet    COUMADIN    210 tablet    Take 10mg onTu, and 12.5mg on MWThFSatSun, or as directed by the Medication Monitoring Clinic at the  of .       * Notice:  This list has 4 medication(s) that are the same as other medications prescribed for you. Read the directions carefully, and ask your doctor or other care provider to review them with you.

## 2017-02-03 NOTE — PATIENT INSTRUCTIONS
Recommendations from today's MTM visit:                                                    MTM (medication therapy management) is a service provided by a clinical pharmacist designed to help you get the most of out of your medicines.   Today we reviewed what your medicines are for, how to know if they are working, that your medicines are safe and how to make your medicine regimen as easy as possible.     1. Consider loosening up on your blood sugars by changing insulin to carb ratio to 1 unit per 20 grams. Your goal A1c is < 7%.       2. Contact urology to determine adjusting Premarin dose to assist with vaginal dryness.     3. Lidopatch (Lidoderm/Lidocaine) 4% patches can be purchased over-the-counter at your pharmacy to help with your knee pain.     4. Your goal vitamin D is 1000 units/day. Vitamin D is fat soluble, so it sticks around in your body.      5. Vitamin B12 is present within your diet in the meats you consume and also present within your multivitamin.    6. Stop taking senna-docusate tablets since prolonged use of stimulants (ie, senna) can lead to dependence. Contact your primary care physician if you have any issues with constipation.    7. You do not need to worry about liver problems with Tylenol. Tylenol is cleared through the liver.    Next MTM visit: Contact [employer] to determine eligibility for MTM visits. Plans that may cover MTM visits include: [employer], [employer], Blue Cross.    To schedule another MTM appointment, please call the clinic directly or you may call the MTM scheduling line at [phone] or toll-free at [phone].     My Clinical Pharmacist's contact information:                                                      It was a pleasure seeing you today!  Please feel free to contact me with any questions or concerns you have.      Gayle Mendoza, Kath  Medication Therapy Management Provider  Phone:[phone]  Pager: [phone]    You may receive a survey about the MTM  services you received.  I would appreciate your feedback to help me serve you better in the future. Please fill it out and return it when you can. Your comments will be anonymous.

## 2017-02-10 ENCOUNTER — ANTICOAGULATION THERAPY VISIT (OUTPATIENT)
Dept: ANTICOAGULATION | Facility: CLINIC | Age: 72
End: 2017-02-10

## 2017-02-10 DIAGNOSIS — Z79.01 LONG-TERM (CURRENT) USE OF ANTICOAGULANTS: Primary | ICD-10-CM

## 2017-02-10 DIAGNOSIS — M32.9 SYSTEMIC LUPUS ERYTHEMATOSUS (H): ICD-10-CM

## 2017-02-10 DIAGNOSIS — I82.409 DVT (DEEP VENOUS THROMBOSIS) (H): ICD-10-CM

## 2017-02-10 DIAGNOSIS — M32.9 SLE (SYSTEMIC LUPUS ERYTHEMATOSUS) (H): ICD-10-CM

## 2017-02-10 LAB
INR PPP: 2.13 (ref 0.86–1.14)
PROTHROM ACT/NOR PPP: 24 % (ref 60–140)

## 2017-02-10 NOTE — PROGRESS NOTES
ANTICOAGULATION FOLLOW-UP CLINIC VISIT    Patient Name:  Shala Caruso  Date:  2/10/2017  Contact Type:  Telephone    SUBJECTIVE:     Patient Findings     Positives Medication Changes (she is planning to start taking tylenol again. She will take 2 tablets before bed.  Asked that she call the [facility] if she increases this dose)           OBJECTIVE    INR   Date Value Ref Range Status   02/10/2017 2.13* 0.86 - 1.14 Final     FACTOR 2 ASSAY   Date Value Ref Range Status   02/10/2017 24* 60 - 140 % Final       ASSESSMENT / PLAN  INR assessment THER    Recheck INR In: 2 WEEKS    INR Location Clinic      Anticoagulation Summary as of 2/10/2017     INR goal 2.0-3.0   Selected INR 2.13 (2/10/2017)   Maintenance plan 12.5 mg (5 mg x 2.5) on Mon, Wed, Fri; 10 mg (5 mg x 2) all other days   Full instructions 12.5 mg on Mon, Wed, Fri; 10 mg all other days   Weekly total 77.5 mg   No change documented Gwen Abel RN   Plan last modified Gwen Dietz RN (2/1/2017)   Next INR check 2/24/2017   Priority Factor 2   Target end date     Indications   SLE (systemic lupus erythematosus) (H) [M32.9]  Long-term (current) use of anticoagulants [Z79.01] [Z79.01]         Anticoagulation Episode Summary     INR check location Clinic Lab    Preferred lab     Send INR reminders to  [facility]    Comments Chromogenic 2  goal range is 15-25%  Ok to leave message on home [phone] and work voicemail, but call [email or web address] per pt request.  OK to leave message at office  May leave messages with Rodriguez Santos  DO NOT GIVE RECORDS TO EMPLOYER [employer]        Anticoagulation Care Providers     Provider Role Specialty Phone number    Mt Kiser MD Responsible Clinical Cardiac Electrophysiology [phone]            See the Encounter Report to view Anticoagulation Flowsheet and Dosing Calendar (Go to Encounters tab in chart review, and find the Anticoagulation Therapy Visit)    Spoke with Shala.  She is going to start taking  tylenol again.  She is going to start with taking 2 tablets at night.  She does not know what strength the tablets are.  Asked that she call the [facility] if she increases this dose.  She wants to go 2 weeks before the next INR check.      Gwen Abel RN

## 2017-02-24 ENCOUNTER — ANTICOAGULATION THERAPY VISIT (OUTPATIENT)
Dept: ANTICOAGULATION | Facility: CLINIC | Age: 72
End: 2017-02-24

## 2017-02-24 DIAGNOSIS — Z79.01 LONG-TERM (CURRENT) USE OF ANTICOAGULANTS: ICD-10-CM

## 2017-02-24 DIAGNOSIS — M32.9 SLE (SYSTEMIC LUPUS ERYTHEMATOSUS) (H): ICD-10-CM

## 2017-02-24 DIAGNOSIS — I82.409 DVT (DEEP VENOUS THROMBOSIS) (H): ICD-10-CM

## 2017-02-24 DIAGNOSIS — M32.9 SYSTEMIC LUPUS ERYTHEMATOSUS (H): ICD-10-CM

## 2017-02-24 LAB
INR PPP: 1.58 (ref 0.86–1.14)
PROTHROM ACT/NOR PPP: 34 % (ref 60–140)

## 2017-02-24 NOTE — MR AVS SNAPSHOT
Shala Caruso   2/24/2017   Anticoagulation Therapy Visit    Description:  71 year old female   Provider:  Stacey Beal, RN   Department:  [facility]           INR as of 2/24/2017     Today's INR 1.58!      Anticoagulation Summary as of 2/24/2017     INR goal 2.0-3.0   Today's INR 1.58!   Full instructions 10 mg on Mon, Thu; 12.5 mg all other days   Next INR check 3/10/2017    Indications   SLE (systemic lupus erythematosus) (H) [M32.9]  Long-term (current) use of anticoagulants [Z79.01] [Z79.01]         February 2017 Details    Sun Mon Tue Wed Thu Fri Sat        1               2               3               4                 5               6               7               8               9               10               11                 12               13               14               15               16               17               18                 19               20               21               22               23               24      12.5 mg   See details      25      12.5 mg           26      12.5 mg         27      10 mg         28      12.5 mg              Date Details   02/24 This INR check               How to take your warfarin dose     To take:  10 mg Take 2 of the 5 mg tablets.    To take:  12.5 mg Take 2.5 of the 5 mg tablets.           March 2017 Details    Sun Mon Tue Wed Thu Fri Sat        1      12.5 mg         2      10 mg         3      12.5 mg         4      12.5 mg           5      12.5 mg         6      10 mg         7      12.5 mg         8      12.5 mg         9      10 mg         10            11                 12               13               14               15               16               17               18                 19               20               21               22               23               24               25                 26               27               28               29               30               31                 Date  Details   No additional details    Date of next INR:  3/10/2017         How to take your warfarin dose     To take:  10 mg Take 2 of the 5 mg tablets.    To take:  12.5 mg Take 2.5 of the 5 mg tablets.

## 2017-02-24 NOTE — PROGRESS NOTES
ANTICOAGULATION FOLLOW-UP CLINIC VISIT    Patient Name:  Shala Caruso  Date:  2/24/2017  Contact Type:  Telephone    SUBJECTIVE:     Patient Findings     Positives Unexplained INR or factor level change           OBJECTIVE    INR   Date Value Ref Range Status   02/24/2017 1.58 (H) 0.86 - 1.14 Final     Factor 2 Assay   Date Value Ref Range Status   02/24/2017 34 (L) 60 - 140 % Final       ASSESSMENT / PLAN  INR assessment SUB    Recheck INR In: 2 WEEKS    INR Location Clinic      Anticoagulation Summary as of 2/24/2017     INR goal 2.0-3.0   Today's INR 1.58!   Maintenance plan 10 mg (5 mg x 2) on Mon, Thu; 12.5 mg (5 mg x 2.5) all other days   Full instructions 10 mg on Mon, Thu; 12.5 mg all other days   Weekly total 82.5 mg   Plan last modified Stacey Beal, RN (2/24/2017)   Next INR check 3/10/2017   Priority Factor 2   Target end date     Indications   SLE (systemic lupus erythematosus) (H) [M32.9]  Long-term (current) use of anticoagulants [Z79.01] [Z79.01]         Anticoagulation Episode Summary     INR check location Clinic Lab    Preferred lab     Send INR reminders to [facility]    Comments Chromogenic 2  goal range is 15-25%  Ok to leave message on home [phone] and work voicemail, but call [email or web address] per pt request.  OK to leave message at office  May leave messages with Rodriguez Santos  DO NOT GIVE RECORDS TO EMPLOYER [employer]        Anticoagulation Care Providers     Provider Role Specialty Phone number    Mt Kiser MD Responsible Clinical Cardiac Electrophysiology [phone]            See the Encounter Report to view Anticoagulation Flowsheet and Dosing Calendar (Go to Encounters tab in chart review, and find the Anticoagulation Therapy Visit)    Spoke with Juan Beal, RN

## 2017-03-01 ENCOUNTER — MEDICAL CORRESPONDENCE (OUTPATIENT)
Dept: HEALTH INFORMATION MANAGEMENT | Facility: CLINIC | Age: 72
End: 2017-03-01

## 2017-03-06 DIAGNOSIS — M32.9 SYSTEMIC LUPUS ERYTHEMATOSUS (H): ICD-10-CM

## 2017-03-06 RX ORDER — WARFARIN SODIUM 5 MG/1
TABLET ORAL
Qty: 215 TABLET | Refills: 1 | Status: SHIPPED | OUTPATIENT
Start: 2017-03-06 | End: 2017-11-08

## 2017-03-09 ENCOUNTER — ANTICOAGULATION THERAPY VISIT (OUTPATIENT)
Dept: ANTICOAGULATION | Facility: CLINIC | Age: 72
End: 2017-03-09

## 2017-03-09 DIAGNOSIS — Z79.01 LONG TERM CURRENT USE OF ANTICOAGULANT THERAPY: Primary | ICD-10-CM

## 2017-03-09 DIAGNOSIS — M32.9 SLE (SYSTEMIC LUPUS ERYTHEMATOSUS) (H): ICD-10-CM

## 2017-03-09 DIAGNOSIS — Z79.01 LONG TERM CURRENT USE OF ANTICOAGULANT THERAPY: ICD-10-CM

## 2017-03-09 DIAGNOSIS — Z79.01 LONG-TERM (CURRENT) USE OF ANTICOAGULANTS: ICD-10-CM

## 2017-03-09 LAB
INR PPP: 2.27 (ref 0.86–1.14)
PROTHROM ACT/NOR PPP: 21 % (ref 60–140)

## 2017-03-09 NOTE — MR AVS SNAPSHOT
Shala Caruso   3/9/2017   Anticoagulation Therapy Visit    Description:  71 year old female   Provider:  Stacey Beal, RN   Department:  [facility]           INR as of 3/9/2017     Today's INR       Anticoagulation Summary as of 3/9/2017     INR goal 2.0-3.0   Today's INR    Full instructions 10 mg on Mon, Thu; 12.5 mg all other days   Next INR check 3/23/2017    Indications   SLE (systemic lupus erythematosus) (H) [M32.9]  Long-term (current) use of anticoagulants [Z79.01] [Z79.01]         March 2017 Details    Sun Mon Tue Wed Thu Fri Sat        1               2               3               4                 5               6               7               8               9      10 mg   See details      10      12.5 mg         11      12.5 mg           12      12.5 mg         13      10 mg         14      12.5 mg         15      12.5 mg         16      10 mg         17      12.5 mg         18      12.5 mg           19      12.5 mg         20      10 mg         21      12.5 mg         22      12.5 mg         23            24               25                 26               27               28               29               30               31                 Date Details   03/09 This INR check       Date of next INR:  3/23/2017         How to take your warfarin dose     To take:  10 mg Take 2 of the 5 mg tablets.    To take:  12.5 mg Take 2.5 of the 5 mg tablets.

## 2017-03-09 NOTE — PROGRESS NOTES
ANTICOAGULATION FOLLOW-UP CLINIC VISIT    Patient Name:  Shala Caruso  Date:  3/9/2017  Contact Type:  Telephone    SUBJECTIVE:     Patient Findings     Positives No Problem Findings           OBJECTIVE    INR   Date Value Ref Range Status   03/09/2017 2.27 (H) 0.86 - 1.14 Final     Factor 2 Assay   Date Value Ref Range Status   03/09/2017 21 (L) 60 - 140 % Final       ASSESSMENT / PLAN  INR assessment THER    Recheck INR In: 2 WEEKS    INR Location Clinic      Anticoagulation Summary as of 3/9/2017     INR goal 2.0-3.0   Today's INR    Maintenance plan 10 mg (5 mg x 2) on Mon, Thu; 12.5 mg (5 mg x 2.5) all other days   Full instructions 10 mg on Mon, Thu; 12.5 mg all other days   Weekly total 82.5 mg   No change documented Stacey Beal RN   Plan last modified Stacey Beal RN (2/24/2017)   Next INR check 3/23/2017   Priority Factor 2   Target end date     Indications   SLE (systemic lupus erythematosus) (H) [M32.9]  Long-term (current) use of anticoagulants [Z79.01] [Z79.01]         Anticoagulation Episode Summary     INR check location Clinic Lab    Preferred lab     Send INR reminders to [facility]    Comments Chromogenic 2  goal range is 15-25%  Ok to leave message on home [phone] and work voicemail, but call [email or web address] per pt request.  OK to leave message at office  May leave messages with Rodriguez Santos  DO NOT GIVE RECORDS TO EMPLOYER [employer]        Anticoagulation Care Providers     Provider Role Specialty Phone number    Mt Kiser MD Responsible Clinical Cardiac Electrophysiology [phone]            See the Encounter Report to view Anticoagulation Flowsheet and Dosing Calendar (Go to Encounters tab in chart review, and find the Anticoagulation Therapy Visit)    Message left.    Stacey Beal RN               ANTICOAGULATION FOLLOW-UP CLINIC VISIT    Patient Name:  Shala Caruso  Date:  3/9/2017  Contact Type:  Telephone    SUBJECTIVE:     Patient Findings      Positives No Problem Findings           OBJECTIVE    INR   Date Value Ref Range Status   03/09/2017 2.27 (H) 0.86 - 1.14 Final     Factor 2 Assay   Date Value Ref Range Status   03/09/2017 21 (L) 60 - 140 % Final       ASSESSMENT / PLAN  INR assessment THER    Recheck INR In: 2 WEEKS    INR Location Clinic      Anticoagulation Summary as of 3/9/2017     INR goal 2.0-3.0   Today's INR    Maintenance plan 10 mg (5 mg x 2) on Mon, Thu; 12.5 mg (5 mg x 2.5) all other days   Full instructions 10 mg on Mon, Thu; 12.5 mg all other days   Weekly total 82.5 mg   No change documented Stacey Beal RN   Plan last modified Stacey Beal RN (2/24/2017)   Next INR check 3/23/2017   Priority Factor 2   Target end date     Indications   SLE (systemic lupus erythematosus) (H) [M32.9]  Long-term (current) use of anticoagulants [Z79.01] [Z79.01]         Anticoagulation Episode Summary     INR check location Clinic Lab    Preferred lab     Send INR reminders to [facility]    Comments Chromogenic 2  goal range is 15-25%  Ok to leave message on home [phone] and work voicemail, but call [email or web address] per pt request.  OK to leave message at office  May leave messages with Rodriguez Santos  DO NOT GIVE RECORDS TO EMPLOYER [employer]        Anticoagulation Care Providers     Provider Role Specialty Phone number    Mt Kiser MD Responsible Clinical Cardiac Electrophysiology [phone]            See the Encounter Report to view Anticoagulation Flowsheet and Dosing Calendar (Go to Encounters tab in chart review, and find the Anticoagulation Therapy Visit)    Spoke with Juan Beal, RN

## 2017-03-23 ENCOUNTER — ANTICOAGULATION THERAPY VISIT (OUTPATIENT)
Dept: ANTICOAGULATION | Facility: CLINIC | Age: 72
End: 2017-03-23

## 2017-03-23 ENCOUNTER — OFFICE VISIT (OUTPATIENT)
Dept: INTERNAL MEDICINE | Facility: CLINIC | Age: 72
End: 2017-03-23

## 2017-03-23 VITALS
DIASTOLIC BLOOD PRESSURE: 60 MMHG | HEART RATE: 62 BPM | OXYGEN SATURATION: 100 % | SYSTOLIC BLOOD PRESSURE: 122 MMHG | RESPIRATION RATE: 20 BRPM | TEMPERATURE: 97.7 F

## 2017-03-23 DIAGNOSIS — B02.8 HERPES ZOSTER DERMATITIS: Primary | ICD-10-CM

## 2017-03-23 DIAGNOSIS — Z79.01 LONG TERM CURRENT USE OF ANTICOAGULANT THERAPY: ICD-10-CM

## 2017-03-23 DIAGNOSIS — M32.9 SLE (SYSTEMIC LUPUS ERYTHEMATOSUS) (H): ICD-10-CM

## 2017-03-23 DIAGNOSIS — Z79.899 ENCOUNTER FOR LONG-TERM CURRENT USE OF MEDICATION: ICD-10-CM

## 2017-03-23 DIAGNOSIS — Z79.01 LONG-TERM (CURRENT) USE OF ANTICOAGULANTS: ICD-10-CM

## 2017-03-23 DIAGNOSIS — L30.8 HERPES ZOSTER DERMATITIS: Primary | ICD-10-CM

## 2017-03-23 LAB
ALT SERPL W P-5'-P-CCNC: 23 U/L (ref 0–50)
AST SERPL W P-5'-P-CCNC: 15 U/L (ref 0–45)
BASOPHILS # BLD AUTO: 0 10E9/L (ref 0–0.2)
BASOPHILS NFR BLD AUTO: 0.9 %
DIFFERENTIAL METHOD BLD: ABNORMAL
EOSINOPHIL # BLD AUTO: 0.1 10E9/L (ref 0–0.7)
EOSINOPHIL NFR BLD AUTO: 1.5 %
ERYTHROCYTE [DISTWIDTH] IN BLOOD BY AUTOMATED COUNT: 17 % (ref 10–15)
HCT VFR BLD AUTO: 40.2 % (ref 35–47)
HGB BLD-MCNC: 12.6 G/DL (ref 11.7–15.7)
IMM GRANULOCYTES # BLD: 0 10E9/L (ref 0–0.4)
IMM GRANULOCYTES NFR BLD: 0.3 %
INR PPP: 2.54 (ref 0.86–1.14)
LYMPHOCYTES # BLD AUTO: 0.9 10E9/L (ref 0.8–5.3)
LYMPHOCYTES NFR BLD AUTO: 25.6 %
MCH RBC QN AUTO: 28.5 PG (ref 26.5–33)
MCHC RBC AUTO-ENTMCNC: 31.3 G/DL (ref 31.5–36.5)
MCV RBC AUTO: 91 FL (ref 78–100)
MONOCYTES # BLD AUTO: 0.3 10E9/L (ref 0–1.3)
MONOCYTES NFR BLD AUTO: 9.4 %
NEUTROPHILS # BLD AUTO: 2.1 10E9/L (ref 1.6–8.3)
NEUTROPHILS NFR BLD AUTO: 62.3 %
NRBC # BLD AUTO: 0 10*3/UL
NRBC BLD AUTO-RTO: 0 /100
PLATELET # BLD AUTO: 239 10E9/L (ref 150–450)
PROTHROM ACT/NOR PPP: 21 % (ref 60–140)
RBC # BLD AUTO: 4.42 10E12/L (ref 3.8–5.2)
WBC # BLD AUTO: 3.4 10E9/L (ref 4–11)

## 2017-03-23 RX ORDER — ACETAMINOPHEN 500 MG
TABLET ORAL
COMMUNITY

## 2017-03-23 RX ORDER — VALACYCLOVIR HYDROCHLORIDE 1 G/1
1000 TABLET, FILM COATED ORAL 3 TIMES DAILY
Qty: 42 TABLET | Refills: 0 | Status: SHIPPED | OUTPATIENT
Start: 2017-03-23 | End: 2017-04-04

## 2017-03-23 RX ORDER — GABAPENTIN 100 MG/1
100 CAPSULE ORAL AT BEDTIME
Qty: 90 CAPSULE | Refills: 0 | Status: SHIPPED | OUTPATIENT
Start: 2017-03-23 | End: 2017-04-04

## 2017-03-23 ASSESSMENT — PAIN SCALES - GENERAL: PAINLEVEL: SEVERE PAIN (7)

## 2017-03-23 NOTE — PROGRESS NOTES
SUBJECTIVE:   Shala Caruso is a 71 year old year old female here for:  Rash and back pain for approximately past 1 week.      Prior to development of rash, she had cramping pain along L back and L flank.  Rash appeared several days after pain started.  Pain is not electric or burning.  Pain is 7/10 at its worst and 4/10 at best.  She has not tried any treatments for the pain and has only applied bacitracin to her lesions thinking they were bacterial initially.  She had chicken pox as a child, but has never had shingles.  Denies vesicles on lesions that she has seen.  She is concerned the pain may be due to a muscle strain from her yoga.      Denies fevers, chills, nausea, vomiting, SOB, or purulence/exudate from lesions.      Review of systems:  10 point ROS negative except as noted above.    Past Medical History:   Diagnosis Date     Achalasia      Antiphospholipid syndrome (H)      Antiplatelet or antithrombotic long-term use      DM (diabetes mellitus) (H)      DVT (deep venous thrombosis) (H) 2003    and PE     Dysphagia     occasional, use Nifedipine     GERD (gastroesophageal reflux disease)      Hyperlipidemia      Lymphedema      Myopia      Neuropathy (H)     toes bilateral     Nonsenile cataract      osteoporosis      Pericarditis      Raynaud's disease      SLE (systemic lupus erythematosus) (H)           Current Outpatient Prescriptions on File Prior to Visit:  warfarin (COUMADIN) 5 MG tablet Take 10mgs on Mondays and Thursdays and 12.5mgs on all other days or as directed by the [facility].   estradiol (ESTRACE VAGINAL) 0.1 MG/GM vaginal cream Place 2 g vaginally twice a week After using daily for 2 weeks (Patient taking differently: Place 2 g vaginally At Bedtime )   blood glucose monitoring (NO BRAND SPECIFIED) test strip Use to test blood sugar 8 times daily . Trividia  True metrix  Brand DX E10.9   mycophenolate (CELLCEPT - GENERIC EQUIVALENT) 500 MG tablet Take 2  tablets (1,000 mg) by mouth 2 times daily 2 tabs in the a.m., 2 tabs in p.m.   NIFEdipine ER osmotic (NIFEDICAL XL) 30 MG 24 hr tablet Take 1 tablet (30 mg) by mouth daily   simvastatin (ZOCOR) 40 MG tablet Take 1 tablet (40 mg) by mouth At Bedtime   Blood Glucose Monitoring Suppl (TRUE METRIX METER) W/DEVICE KIT 1 each 4 times daily   blood glucose monitoring (TRUE METRIX BLOOD GLUCOSE TEST) test strip Use to test blood sugar 4 times daily or as directed.   insulin pen needle 32G X 4 MM Use 4 pen needles daily or as directed.   insulin glargine (LANTUS) 100 UNIT/ML vial Inject 4 units as directed daily LANTUS SOLOSTAR PEN PLEASE (Patient taking differently: every morning Inject 4 units as directed daily LANTUS SOLOSTAR PEN PLEASE   Take 3.2 units)   NOVOLOG PENFILL 100 UNIT/ML soln Inject 15 Units Subcutaneous daily (Patient taking differently: Inject 15 Units Subcutaneous 4 times daily (with meals and nightly) Sliding scale with meals and snacks)   VITAMIN D, CHOLECALCIFEROL, PO Take 2,000 Units by mouth daily (with lunch)    AYR SALINE NASAL NO-DRIP GEL Use as needed for nasal dryness   glucagon (GLUCAGON EMERGENCY) 1 MG injection Inject 1 mg subcutaneously or intramuscularly for severe hypoglycemic episodes.   insulin pen needle (BD PEN NEEDLE PAM U/F) 32G X 4 MM MISC Use   Up to 8 daily   LANCETS ULTRA THIN 30G MISC Test 7-8 times daily  (Lancets that go with pt current meter )   aspirin 81 MG tablet Take 81 mg by mouth At Bedtime    omega-3 fatty acids (FISH OIL) 1200 MG capsule Take 1 capsule by mouth daily.   Calcium Carbonate-Vitamin D (CALCIUM + D) 600-200 MG-UNIT per tablet Take 1 tablet by mouth 3 times daily    Multiple Vitamins-Minerals (CENTRUM SILVER PO) Take 1 tablet by mouth daily.   Injection Device for Insulin (NOVOPEN JR, GREEN,) RORY Use as directed.     No current facility-administered medications on file prior to visit.      OBJECTIVE:  Physical exam:  /60 (BP Location: Right arm,  Patient Position: Chair, Cuff Size: Adult Regular)  Pulse 62  Temp 97.7  F (36.5  C) (Oral)  Resp 20  SpO2 100%  There is no height or weight on file to calculate BMI.   Gen: Thin female, NAD. Pleasant   HEENT: normocephalic, atraumatic, no scleral icterus, cranial nerves II-XII grossly intact  Resp:  normal respiratory effort on RA  Ext: WWP, no LE edema, normal gait  Skin: warm and dry, L lower back raised erythematous rash without vesicles following 1-2 dermatomes (T12/L1). Tender to palpation.    Back: no spinous process or paraspinous tenderness.    Psych: normal mood, normal affect, alert and oriented x3    ASSESSMENT and PLAN:   Shala was seen today for pain and derm problem.    Diagnoses and all orders for this visit:    Herpes zoster dermatitis.  New diagnosis.  Also immune compromised, so will treat x2 weeks with valacyclovir despite being >72 hours since onset of lesions.  Will follow-up within 2 weeks to ensure lesions clearing.  Will start gabapentin 100mg QHS with dose up to 300mg.  Pt to call clinic if pain is persisting or is a problem during the day and we can discuss plan to increase dosing frequency.    -     valACYclovir (VALTREX) 1000 mg tablet; Take 1 tablet (1,000 mg) by mouth 3 times daily for 14 days  -     gabapentin (NEURONTIN) 100 MG capsule; Take 1 capsule (100 mg) by mouth At Bedtime    Return to clinic within 2 weeks.      Patient seen and discussed with Dr. Kendall who agrees with this plan.    Dimitrios Aguilar M.D.   PGY-2 Internal Medicine   Pager: [phone]    The Patient was seen in [facility] by DIMITRI[facility] AGUILAR. Patient was seen and examined by me with the resident.   I reviewed the history & exam. Assessment and plan were jointly made.    Gretchen Kendall MD

## 2017-03-23 NOTE — PATIENT INSTRUCTIONS
[facility] Medication Refill Request Information:  * Please contact your pharmacy regarding ANY request for medication refills.  ** [facility] Prescription Fax = [phone]  * Please allow 3 business days for routine medication refills.  * Please allow 5 business days for controlled substance medication refills.     [facility] Test Result notification information:  *You will be notified with in 7-10 days of your appointment day regarding the results of your test.  If you are on MyChart you will be notified as soon as the provider has reviewed the results and signed off on them.    Gabapentin (neurontin): take 1 pill every evening.  Can increase by one pill every three days to a maximum of three pills in the evening.  Call clinic if pain is still not well controlled.      Valacyclovir (Valtrex): Take 1 pill three times daily for 14 days.  Return to clinic before prescription is completed.     Shingles  Shingles is a viral infection caused by the same virus as chicken pox. Anyone who has had chicken pox may get shingles later in life. The virus stays in the body, but remains dormant (asleep). Shingles often occurs in older persons or persons with lowered immunity. But it can affect anyone at any age.  Shingles starts as a tingling patch of skin on one side of the body. Small, painful blisters may then appear. The rash does not spread to the rest of the body.  Exposure to shingles cannot cause shingles. However, it can cause chicken pox in anyone who has not had chicken pox or has not been vaccinated. The contagious period ends when all blisters have crusted over (generally about 2 weeks after the illness begins).  After the blisters heal, the affected skin may be sensitive or painful for months (neuralgia). This often gradually goes away.  A shingles vaccine is available. This can help prevent shingles or make it less painful. It is generally recommended for adults over the age of 60 who have had chicken  pox in the past, but who have never had shingles. Adults over 60 who have had neither chicken pox nor shingles can prevent both diseases with the chicken pox vaccine. Ask your healthcare provider about these vaccines.  Home care    Medicines may be prescribed to help relieve pain. Take these medicines as directed. Ask your healthcare provider or pharmacist before using over-the-counter medicines for helping treat pain and itching.     In certain cases, antiviral medicines may be prescribed to reduce pain, shorten the illness, and prevent neuralgia. Take these medicines as directed.    Compresses made from a solution of cool water mixed with cornstarch or baking soda may help relieve pain and itching.     Gently wash skin daily with soap and water to help prevent infection.  Be certain to rinse off all of the soap, which can be irritating.    Trim fingernails and try not to scratch. Scratching the sores may leave scars.    Stay home from work or school until all blisters have formed a crust and you are no longer contagious.  Follow-up care  Follow up with your healthcare provider or as directed by our staff.  When to seek medical advice    Fever of 100.4 F (38 C) or higher, or as directed by your healthcare provider    Affected skin is on the face or neck and any of the following occur:                          Headache                          Eye pain                          Changes in vision                          Sores near the eye                          Weakness of facial muscles    Pain, redness, or swelling of a joint    Signs of skin infection: colored drainage from the sores, warmth, increasing redness, or increasing pain    5979-3032 [employer]. [address] [place] [address], [place] [address]. All rights reserved. This information is not intended as a substitute for professional medical care. Always follow your healthcare professional's instructions.

## 2017-03-23 NOTE — MR AVS SNAPSHOT
After Visit Summary   3/23/2017    Shala Caruso    MRN: [id number]           Patient Information     Date Of Birth          1945        Visit Information        Provider Department      3/23/2017 2:35 PM Dimitrios Aguilar MD [facility]        Today's Diagnoses     Herpes zoster dermatitis    -  1      Care Instructions    Primary Care Center Medication Refill Request Information:  * Please contact your pharmacy regarding ANY request for medication refills.  ** [facility] Prescription Fax = [phone]  * Please allow 3 business days for routine medication refills.  * Please allow 5 business days for controlled substance medication refills.     Primary Care Center Test Result notification information:  *You will be notified with in 7-10 days of your appointment day regarding the results of your test.  If you are on MyChart you will be notified as soon as the provider has reviewed the results and signed off on them.    Gabapentin (neurontin): take 1 pill every evening.  Can increase by one pill every three days to a maximum of three pills in the evening.  Call clinic if pain is still not well controlled.      Valacyclovir (Valtrex): Take 1 pill three times daily for 14 days.  Return to clinic before prescription is completed.     Shingles  Shingles is a viral infection caused by the same virus as chicken pox. Anyone who has had chicken pox may get shingles later in life. The virus stays in the body, but remains dormant (asleep). Shingles often occurs in older persons or persons with lowered immunity. But it can affect anyone at any age.  Shingles starts as a tingling patch of skin on one side of the body. Small, painful blisters may then appear. The rash does not spread to the rest of the body.  Exposure to shingles cannot cause shingles. However, it can cause chicken pox in anyone who has not had chicken pox or has not been vaccinated. The contagious period ends when all  blisters have crusted over (generally about 2 weeks after the illness begins).  After the blisters heal, the affected skin may be sensitive or painful for months (neuralgia). This often gradually goes away.  A shingles vaccine is available. This can help prevent shingles or make it less painful. It is generally recommended for adults over the age of 60 who have had chicken pox in the past, but who have never had shingles. Adults over 60 who have had neither chicken pox nor shingles can prevent both diseases with the chicken pox vaccine. Ask your healthcare provider about these vaccines.  Home care    Medicines may be prescribed to help relieve pain. Take these medicines as directed. Ask your healthcare provider or pharmacist before using over-the-counter medicines for helping treat pain and itching.     In certain cases, antiviral medicines may be prescribed to reduce pain, shorten the illness, and prevent neuralgia. Take these medicines as directed.    Compresses made from a solution of cool water mixed with cornstarch or baking soda may help relieve pain and itching.     Gently wash skin daily with soap and water to help prevent infection.  Be certain to rinse off all of the soap, which can be irritating.    Trim fingernails and try not to scratch. Scratching the sores may leave scars.    Stay home from work or school until all blisters have formed a crust and you are no longer contagious.  Follow-up care  Follow up with your healthcare provider or as directed by our staff.  When to seek medical advice    Fever of 100.4 F (38 C) or higher, or as directed by your healthcare provider    Affected skin is on the face or neck and any of the following occur:                          Headache                          Eye pain                          Changes in vision                          Sores near the eye                          Weakness of facial muscles    Pain, redness, or swelling of a joint    Signs of skin  infection: colored drainage from the sores, warmth, increasing redness, or increasing pain    9092-8657 [employer]. [address] [place] [address], [place] [address]. All rights reserved. This information is not intended as a substitute for professional medical care. Always follow your healthcare professional's instructions.              Follow-ups after your visit        Follow-up notes from your care team     Return in about 2 weeks (around 4/6/2017) for Physical Exam.      Your next 10 appointments already scheduled     Apr 13, 2017  4:00 PM CDT   LAB with  LAB   [facility] ([facility])    [address] [place] [id number] [phone]           Patient must bring picture ID.  Patient should be prepared to give a urine specimen  Please do not eat 10-12 hours before your appointment if you are coming in fasting for labs on lipids, cholesterol, or glucose (sugar).  Pregnant women should follow their Care Team instructions. Water with medications is okay. Do not drink coffee or other fluids.   If you have concerns about taking  your medications, please ask at office or if scheduling via [employer], send a message by clicking on Secure Messaging, Message Your Care Team.            Apr 13, 2017  5:00 PM CDT   (Arrive by 4:45 PM)   RETURN LUPUS with Santiago Corbett MD   [facility] Rheumatology ([facility])    [address] [place] [address] [id number] [phone]            Jul 31, 2017  7:30 AM CDT   (Arrive by 7:15 AM)   RETURN DIABETES with Dorita Cramer MD   [facility] Endocrinology ([facility])    [address] [place] [address] [id number] [phone]            Aug 02, 2017  9:45 AM CDT   (Arrive by 9:30 AM)   Return Visit with Jeanmarie Pierson MD   [facility]rologic [facility] ([facility])    [address] [place] [address]  [address]  4th Floor  [facility] [id number] [phone]            Aug 02, 2017 11:00 AM CDT   (Arrive by 10:45 AM)   MA SCREENING DIGITAL BILATERAL with [id number]   [facility] ([facility])    [address]  2nd Floor  [facility] [id number] [phone]           Do not use any powder, lotion or deodorant under your arms or on your breast. If you do, we will ask you to remove it before your exam.  Wear comfortable, two-piece clothing.  If you have any allergies, tell your care team.  Bring any previous mammograms from other facilities or have them mailed to the breast center. This mammogram location, [facility], now offers 3D mammography. It doesn't replace a screening mammogram and can be done with a regular screening mammogram. It is optional and not all insurances will pay for it. 3D mammography is a special kind of mammogram that produces a three-dimensional image of the breast by using low dose-xrays. 3D allows the radiologist to see the breast tissue differently from 2D, which reduces the chance of repeat testing due to overlapping breast tissue. If you are interested in have a 3D mammogram, please check with your insurance before you arrive for your exam. 3D mammography is offered to all patients. On the day of your exam you will be asked to sign a form stating yes, you wish to have 3D imaging or, no, you decline.              Who to contact     Please call your clinic at [phone] to:    Ask questions about your health    Make or cancel appointments    Discuss your medicines    Learn about your test results    Speak to your doctor   If you have compliments or concerns about an experience at your clinic, or if you wish to file a complaint, please contact [facility] Patient Relations at [phone] or email us at [email or web address]         Additional Information About Your  Visit        Vizy Information     Vizy gives you secure access to your electronic health record. If you see a primary care provider, you can also send messages to your care team and make appointments. If you have questions, please call your primary care clinic.  If you do not have a primary care provider, please call [phone] and they will assist you.      Vizy is an electronic gateway that provides easy, online access to your medical records. With Vizy, you can request a clinic appointment, read your test results, renew a prescription or communicate with your care team.     To access your existing account, please contact your [facility] or call [phone] for assistance.        Care EveryWhere ID     This is your Care EveryWhere ID. This could be used by other organizations to access your [place] medical records  [id number]        Your Vitals Were     Pulse Temperature Respirations Pulse Oximetry          62 97.7  F (36.5  C) (Oral) 20 100%         Blood Pressure from Last 3 Encounters:   03/23/17 122/60   02/03/17 112/55   02/01/17 121/58    Weight from Last 3 Encounters:   02/01/17 54.4 kg (120 lb)   01/23/17 54.5 kg (120 lb 1.6 oz)   01/04/17 53.3 kg (117 lb 8 oz)              Today, you had the following     No orders found for display         Today's Medication Changes          These changes are accurate as of: 3/23/17  3:19 PM.  If you have any questions, ask your nurse or doctor.               Start taking these medicines.        Dose/Directions    gabapentin 100 MG capsule   Commonly known as:  NEURONTIN   Used for:  Herpes zoster dermatitis   Started by:  Dimitrios Aguilar MD        Dose:  100 mg   Take 1 capsule (100 mg) by mouth At Bedtime   Quantity:  90 capsule   Refills:  0       valACYclovir 1000 mg tablet   Commonly known as:  VALTREX   Used for:  Herpes zoster dermatitis   Started by:  Dimitrios Aguilar MD        Dose:  1000 mg    Take 1 tablet (1,000 mg) by mouth 3 times daily for 14 days   Quantity:  42 tablet   Refills:  0         These medicines have changed or have updated prescriptions.        Dose/Directions    estradiol 0.1 MG/GM cream   Commonly known as:  ESTRACE VAGINAL   This may have changed:    - when to take this  - additional instructions   Used for:  Atrophic vaginitis        Dose:  2 g   Place 2 g vaginally twice a week After using daily for 2 weeks   Quantity:  42.5 g   Refills:  11       insulin glargine 100 UNIT/ML injection   Commonly known as:  LANTUS   This may have changed:    - when to take this  - additional instructions   Used for:  Diabetes mellitus type 1 (H)        Inject 4 units as directed daily LANTUS SOLOSTAR PEN PLEASE   Quantity:  15 mL   Refills:  3       NovoLOG PENFILL 100 UNIT/ML injection   This may have changed:    - when to take this  - additional instructions   Used for:  Controlled type 1 diabetes mellitus (H)   Generic drug:  insulin aspart        Dose:  15 Units   Inject 15 Units Subcutaneous daily   Quantity:  4 cartridge   Refills:  3            Where to get your medicines      These medications were sent to [place] [id number] [facility] [address] [facility] [address] [address]    Hours:  TRANSPLANT PHONE NUMBER [phone] Phone:  [phone]     gabapentin 100 MG capsule    valACYclovir 1000 mg tablet                Primary Care Provider Office Phone # Fax #    Joe Contreras [phone]        [facility] [address] [facility] [address]  [facility] [address]        Thank you!     Thank you for choosing [facility]  for your care. Our goal is always to provide you with excellent care. Hearing back from our patients is one way we can continue to improve our services. Please take a few minutes to complete the written survey that you may receive in the mail after your visit with us. Thank  you!             Your Updated Medication List - Protect others around you: Learn how to safely use, store and throw away your medicines at www.disposemymeds.org.          This list is accurate as of: 3/23/17  3:19 PM.  Always use your most recent med list.                   Brand Name Dispense Instructions for use    aspirin 81 MG tablet      Take 81 mg by mouth At Bedtime       AYR SALINE NASAL NO-DRIP Gel     3 Tube    Use as needed for nasal dryness       blood glucose monitoring lancets     7203 each    Test 7-8 times daily  (Lancets that go with pt current meter )       * blood glucose monitoring test strip    TRUE METRIX BLOOD GLUCOSE TEST    4 Box    Use to test blood sugar 4 times daily or as directed.       * blood glucose monitoring test strip    no brand specified    720 each    Use to test blood sugar 8 times daily . Trividia  True metrix  Brand DX E10.9       calcium + D 600-200 MG-UNIT Tabs   Generic drug:  calcium carbonate-vitamin D      Take 1 tablet by mouth 3 times daily       CENTRUM SILVER PO      Take 1 tablet by mouth daily.       estradiol 0.1 MG/GM cream    ESTRACE VAGINAL    42.5 g    Place 2 g vaginally twice a week After using daily for 2 weeks       gabapentin 100 MG capsule    NEURONTIN    90 capsule    Take 1 capsule (100 mg) by mouth At Bedtime       glucagon 1 MG kit    GLUCAGON EMERGENCY    3 each    Inject 1 mg subcutaneously or intramuscularly for severe hypoglycemic episodes.       insulin glargine 100 UNIT/ML injection    LANTUS    15 mL    Inject 4 units as directed daily LANTUS SOLOSTAR PEN PLEASE       * insulin pen needle 32G X 4 MM    BD PAM U/F    800 each    Use   Up to 8 daily       * insulin pen needle 32G X 4 MM     400 each    Use 4 pen needles daily or as directed.       mycophenolate 500 MG tablet    CELLCEPT - GENERIC EQUIVALENT    360 tablet    Take 2 tablets (1,000 mg) by mouth 2 times daily 2 tabs in the a.m., 2 tabs in p.m.       NIFEdipine ER osmotic 30 MG 24  hr tablet    NIFEDICAL XL    90 tablet    Take 1 tablet (30 mg) by mouth daily       NovoLOG PENFILL 100 UNIT/ML injection   Generic drug:  insulin aspart     4 cartridge    Inject 15 Units Subcutaneous daily       NOVOPEN JR (GREEN) Priscila   Generic drug:  Injection Device for insulin      Use as directed.       omega-3 fatty acids 1200 MG capsule      Take 1 capsule by mouth daily.       simvastatin 40 MG tablet    ZOCOR    100 tablet    Take 1 tablet (40 mg) by mouth At Bedtime       TRUE METRIX METER W/DEVICE Kit     1 kit    1 each 4 times daily       TYLENOL 500 MG tablet   Generic drug:  acetaminophen      Take 500 mg by mouth At Bedtime Takes 3 tablets (500 mg ) .Christa Acharya LPN 2:40 PM on 3/23/2017       valACYclovir 1000 mg tablet    VALTREX    42 tablet    Take 1 tablet (1,000 mg) by mouth 3 times daily for 14 days       VITAMIN D (CHOLECALCIFEROL) PO      Take 2,000 Units by mouth daily (with lunch)       warfarin 5 MG tablet    COUMADIN    215 tablet    Take 10mgs on Mondays and Thursdays and 12.5mgs on all other days or as directed by the Medication Monitoring Clinic at the [facility].       * Notice:  This list has 4 medication(s) that are the same as other medications prescribed for you. Read the directions carefully, and ask your doctor or other care provider to review them with you.

## 2017-03-23 NOTE — NURSING NOTE
Chief Complaint   Patient presents with     Pain     Pain on right side of lower abdomen that spreads around to back      Derm Problem     rash on right side of lower abdomen that spreads around to back   Christa Acharya LPN 2:40 PM on 3/23/2017

## 2017-03-23 NOTE — PROGRESS NOTES
ANTICOAGULATION FOLLOW-UP CLINIC VISIT    Patient Name:  Shala Caruso  Date:  3/23/2017  Contact Type:  Telephone    SUBJECTIVE:     Patient Findings     Positives Change in medications, Other complaints, Antibiotic use or infection    Comments Just diagnosed with Shingles. Started valacyclovir and gabapentin.           OBJECTIVE    INR   Date Value Ref Range Status   03/23/2017 2.54 (H) 0.86 - 1.14 Final     Factor 2 Assay   Date Value Ref Range Status   03/23/2017 21 (L) 60 - 140 % Final       ASSESSMENT / PLAN  INR assessment THER    Recheck INR In: 1 WEEK    INR Location Clinic      Anticoagulation Summary as of 3/23/2017     INR goal 2.0-3.0   Today's INR    Maintenance plan 10 mg (5 mg x 2) on Mon, Thu; 12.5 mg (5 mg x 2.5) all other days   Full instructions 10 mg on Mon, Thu; 12.5 mg all other days   Weekly total 82.5 mg   No change documented Gwen Dietz, SHERRON   Plan last modified Stacey Beal RN (2/24/2017)   Next INR check 3/30/2017   Priority Factor 2   Target end date     Indications   SLE (systemic lupus erythematosus) (H) [M32.9]  Long-term (current) use of anticoagulants [Z79.01] [Z79.01]         Anticoagulation Episode Summary     INR check location Clinic Lab    Preferred lab     Send INR reminders to [facility]    Comments Chromogenic 2  goal range is 15-25%  Ok to leave message on home [phone] and work voicemail, but call [email or web address] per pt request.  OK to leave message at office  May leave messages with Rodriguez Santos  DO NOT GIVE RECORDS TO EMPLOYER U        Anticoagulation Care Providers     Provider Role Specialty Phone number    Mt Kiser MD Responsible Clinical Cardiac Electrophysiology [phone]            See the Encounter Report to view Anticoagulation Flowsheet and Dosing Calendar (Go to Encounters tab in chart review, and find the Anticoagulation Therapy Visit)    Spoke with Shala. Have asked her to check again in one week due to Shingles  diagnosis.    Gwen Dietz RN

## 2017-03-23 NOTE — MR AVS SNAPSHOT
Shala Caruso   3/23/2017   Anticoagulation Therapy Visit    Description:  71 year old female   Provider:  Gwen Dietz, RN   Department:  [facility]           INR as of 3/23/2017     Today's INR       Anticoagulation Summary as of 3/23/2017     INR goal 2.0-3.0   Today's INR    Full instructions 10 mg on Mon, Thu; 12.5 mg all other days   Next INR check 3/30/2017    Indications   SLE (systemic lupus erythematosus) (H) [M32.9]  Long-term (current) use of anticoagulants [Z79.01] [Z79.01]         March 2017 Details    Sun Mon Tue Wed Thu Fri Sat        1               2               3               4                 5               6               7               8               9               10               11                 12               13               14               15               16               17               18                 19               20               21               22               23      10 mg   See details      24      12.5 mg         25      12.5 mg           26      12.5 mg         27      10 mg         28      12.5 mg         29      12.5 mg         30            31                 Date Details   03/23 This INR check       Date of next INR:  3/30/2017         How to take your warfarin dose     To take:  10 mg Take 2 of the 5 mg tablets.    To take:  12.5 mg Take 2.5 of the 5 mg tablets.

## 2017-03-30 ENCOUNTER — ANTICOAGULATION THERAPY VISIT (OUTPATIENT)
Dept: ANTICOAGULATION | Facility: CLINIC | Age: 72
End: 2017-03-30

## 2017-03-30 DIAGNOSIS — Z79.01 LONG-TERM (CURRENT) USE OF ANTICOAGULANTS: ICD-10-CM

## 2017-03-30 DIAGNOSIS — Z79.01 LONG TERM CURRENT USE OF ANTICOAGULANT THERAPY: ICD-10-CM

## 2017-03-30 DIAGNOSIS — M32.9 SLE (SYSTEMIC LUPUS ERYTHEMATOSUS) (H): ICD-10-CM

## 2017-03-30 LAB
INR PPP: 2.6 (ref 0.86–1.14)
PROTHROM ACT/NOR PPP: 19 % (ref 60–140)

## 2017-03-30 NOTE — PROGRESS NOTES
ANTICOAGULATION FOLLOW-UP CLINIC VISIT    Patient Name:  Shala Caruso  Date:  3/30/2017  Contact Type:  Telephone    SUBJECTIVE:     Patient Findings     Positives Change in medications, Other complaints    Comments Continues Valtrex 1000mg for shingles until 4/5. Pt would like to come in 3 weeks            OBJECTIVE    INR   Date Value Ref Range Status   03/30/2017 2.60 (H) 0.86 - 1.14 Final     Factor 2 Assay   Date Value Ref Range Status   03/30/2017 19 (L) 60 - 140 % Final       ASSESSMENT / PLAN  INR assessment THER    Recheck INR In: 3 WEEKS    INR Location Clinic      Anticoagulation Summary as of 3/30/2017     INR goal 2.0-3.0   Today's INR    Maintenance plan 10 mg (5 mg x 2) on Mon, Thu; 12.5 mg (5 mg x 2.5) all other days   Full instructions 10 mg on Mon, Thu; 12.5 mg all other days   Weekly total 82.5 mg   Plan last modified Stacey Beal RN (2/24/2017)   Next INR check 4/20/2017   Priority Factor 2   Target end date     Indications   SLE (systemic lupus erythematosus) (H) [M32.9]  Long-term (current) use of anticoagulants [Z79.01] [Z79.01]         Anticoagulation Episode Summary     INR check location Clinic Lab    Preferred lab     Send INR reminders to [facility]    Comments Chromogenic 2  goal range is 15-25%  Ok to leave message on home [phone] and work voicemail, but call [email or web address] per pt request.  OK to leave message at office  May leave messages with Rodriguez Santos  DO NOT GIVE RECORDS TO EMPLOYER U        Anticoagulation Care Providers     Provider Role Specialty Phone number    Mt Kiser MD Responsible Clinical Cardiac Electrophysiology [phone]            See the Encounter Report to view Anticoagulation Flowsheet and Dosing Calendar (Go to Encounters tab in chart review, and find the Anticoagulation Therapy Visit)    Spoke with Shala Morrison, RN

## 2017-03-30 NOTE — MR AVS SNAPSHOT
Shala Caruso   3/30/2017   Anticoagulation Therapy Visit    Description:  71 year old female   Provider:  Melly Morrison, RN   Department:  [facility]           INR as of 3/30/2017     Today's INR       Anticoagulation Summary as of 3/30/2017     INR goal 2.0-3.0   Today's INR    Full instructions 10 mg on Mon, Thu; 12.5 mg all other days   Next INR check 4/20/2017    Indications   SLE (systemic lupus erythematosus) (H) [M32.9]  Long-term (current) use of anticoagulants [Z79.01] [Z79.01]         March 2017 Details    Sun Mon Tue Wed Thu Fri Sat        1               2               3               4                 5               6               7               8               9               10               11                 12               13               14               15               16               17               18                 19               20               21               22               23               24               25                 26               27               28               29               30      10 mg   See details      31      12.5 mg           Date Details   03/30 This INR check               How to take your warfarin dose     To take:  10 mg Take 2 of the 5 mg tablets.    To take:  12.5 mg Take 2.5 of the 5 mg tablets.           April 2017 Details    Sun Mon Tue Wed Thu Fri Sat           1      12.5 mg           2      12.5 mg         3      10 mg         4      12.5 mg         5      12.5 mg         6      10 mg         7      12.5 mg         8      12.5 mg           9      12.5 mg         10      10 mg         11      12.5 mg         12      12.5 mg         13      10 mg         14      12.5 mg         15      12.5 mg           16      12.5 mg         17      10 mg         18      12.5 mg         19      12.5 mg         20            21               22                 23               24               25               26               27                28               29                 30                      Date Details   No additional details    Date of next INR:  4/20/2017         How to take your warfarin dose     To take:  10 mg Take 2 of the 5 mg tablets.    To take:  12.5 mg Take 2.5 of the 5 mg tablets.

## 2017-04-04 ENCOUNTER — OFFICE VISIT (OUTPATIENT)
Dept: INTERNAL MEDICINE | Facility: CLINIC | Age: 72
End: 2017-04-04

## 2017-04-04 VITALS
RESPIRATION RATE: 16 BRPM | BODY MASS INDEX: 19.88 KG/M2 | WEIGHT: 121.3 LBS | SYSTOLIC BLOOD PRESSURE: 114 MMHG | DIASTOLIC BLOOD PRESSURE: 64 MMHG | HEART RATE: 66 BPM

## 2017-04-04 DIAGNOSIS — B02.8 HERPES ZOSTER DERMATITIS: Primary | ICD-10-CM

## 2017-04-04 DIAGNOSIS — L30.8 HERPES ZOSTER DERMATITIS: Primary | ICD-10-CM

## 2017-04-04 RX ORDER — VALACYCLOVIR HYDROCHLORIDE 1 G/1
1000 TABLET, FILM COATED ORAL 3 TIMES DAILY
Qty: 42 TABLET | Refills: 0 | Status: SHIPPED | OUTPATIENT
Start: 2017-04-04 | End: 2017-04-18

## 2017-04-04 RX ORDER — LIDOCAINE 40 MG/G
CREAM TOPICAL 2 TIMES DAILY PRN
Qty: 30 G | Refills: 3 | Status: SHIPPED | OUTPATIENT
Start: 2017-04-04 | End: 2017-05-22

## 2017-04-04 RX ORDER — GABAPENTIN 100 MG/1
100 CAPSULE ORAL AT BEDTIME
Qty: 90 CAPSULE | Refills: 0 | Status: SHIPPED | OUTPATIENT
Start: 2017-04-04 | End: 2017-05-22

## 2017-04-04 ASSESSMENT — PAIN SCALES - GENERAL: PAINLEVEL: SEVERE PAIN (7)

## 2017-04-04 NOTE — MR AVS SNAPSHOT
After Visit Summary   4/4/2017    Shala Caruso    MRN: [id number]           Patient Information     Date Of Birth          1945        Visit Information        Provider Department      4/4/2017 2:05 PM Vidal Garcia DO [facility]        Today's Diagnoses     Herpes zoster dermatitis    -  1      Care Instructions    Primary Care Center Phone Number [phone]  Primary Care Center Medication Refill Request Information:  * Please contact your pharmacy regarding ANY request for medication refills.  ** [facility] Prescription Fax = [phone]  * Please allow 3 business days for routine medication refills.  * Please allow 5 business days for controlled substance medication refills.     Primary Care Center Test Result notification information:  *You will be notified with in 7-10 days of your appointment day regarding the results of your test.  If you are on MyChart you will be notified as soon as the provider has reviewed the results and signed off on them.        1. Increase Gabapentin to 100 mg at noon and 200 mg at bedtime.  2. Continue taking Valacylovir for 2 weeks.  3. Apply lidocaine cream as needed for pain.  4. Follow up in 2 weeks.         Follow-ups after your visit        Follow-up notes from your care team     Return in about 2 weeks (around 4/18/2017).      Your next 10 appointments already scheduled     Apr 13, 2017  4:00 PM CDT   LAB with [facility] ([facility])    [address] [place] [id number] [phone]           Patient must bring picture ID.  Patient should be prepared to give a urine specimen  Please do not eat 10-12 hours before your appointment if you are coming in fasting for labs on lipids, cholesterol, or glucose (sugar).  Pregnant women should follow their Care Team instructions. Water with medications is okay. Do not drink coffee or other fluids.   If you have concerns about taking  your  medications, please ask at office or if scheduling via BEETmobilet, send a message by clicking on Secure Messaging, Message Your Care Team.            Apr 13, 2017  5:00 PM CDT   (Arrive by 4:45 PM)   RETURN LUPUS with Santiago Corbett MD   [facility] ([facility])    [address] [place] [address]  3rd [facility] [address]   [phone]            Apr 18, 2017  2:25 PM CDT   (Arrive by 2:10 PM)   Return Visit with Rogelio Rubio MD   [facility] ([facility])    [address] [place] [address]  4th [facility] [address]   [phone]            Jul 31, 2017  7:30 AM CDT   (Arrive by 7:15 AM)   RETURN DIABETES with Dorita Cramer MD   [facility] Endocrin[facility] ([facility])    [address] [place] [id number]   [phone]            Aug 02, 2017  9:45 AM CDT   (Arrive by 9:30 AM)   Return Visit with Jeanmarie Pierson MD   [facility]rologic [facility] ([facility])    [address] [place] [address]   [phone]            Aug 02, 2017 11:00 AM CDT   (Arrive by 10:45 AM)   MA SCREENING DIGITAL BILATERAL with [id number]   [facility] ([facility])    [address] [place] [id number] [phone]           Do not use any powder, lotion or deodorant under your arms or on your breast. If you do, we will ask you to remove it before your exam.  Wear comfortable, two-piece clothing.  If you have any allergies, tell your care team.  Bring any previous mammograms from other facilities or have them mailed to the breast center. This mammogram location, [facility], now offers 3D mammography. It doesn't replace a screening mammogram and can be done with a regular screening mammogram. It is optional and not all  insurances will pay for it. 3D mammography is a special kind of mammogram that produces a three-dimensional image of the breast by using low dose-xrays. 3D allows the radiologist to see the breast tissue differently from 2D, which reduces the chance of repeat testing due to overlapping breast tissue. If you are interested in have a 3D mammogram, please check with your insurance before you arrive for your exam. 3D mammography is offered to all patients. On the day of your exam you will be asked to sign a form stating yes, you wish to have 3D imaging or, no, you decline.              Who to contact     Please call your clinic at [phone] to:    Ask questions about your health    Make or cancel appointments    Discuss your medicines    Learn about your test results    Speak to your doctor   If you have compliments or concerns about an experience at your clinic, or if you wish to file a complaint, please contact [facility] Patient Relations at [phone] or email us at [email or web address]         Additional Information About Your Visit        [employer] Information     [employer] gives you secure access to your electronic health record. If you see a primary care provider, you can also send messages to your care team and make appointments. If you have questions, please call your primary care clinic.  If you do not have a primary care provider, please call [phone] and they will assist you.      [employer] is an electronic gateway that provides easy, online access to your medical records. With [employer], you can request a clinic appointment, read your test results, renew a prescription or communicate with your care team.     To access your existing account, please contact your [facility] or call [phone] for assistance.        Care EveryWhere ID     This is your Care EveryWhere ID. This could be used by other organizations to access your [facility]  medical records  [id number]        Your Vitals Were     Pulse Respirations BMI (Body Mass Index)             66 16 19.88 kg/m2          Blood Pressure from Last 3 Encounters:   04/04/17 114/64   03/23/17 122/60   02/03/17 112/55    Weight from Last 3 Encounters:   04/04/17 55 kg (121 lb 4.8 oz)   02/01/17 54.4 kg (120 lb)   01/23/17 54.5 kg (120 lb 1.6 oz)              Today, you had the following     No orders found for display         Today's Medication Changes          These changes are accurate as of: 4/4/17  2:49 PM.  If you have any questions, ask your nurse or doctor.               Start taking these medicines.        Dose/Directions    lidocaine 4 % Crea cream   Commonly known as:  LMX4   Used for:  Herpes zoster dermatitis   Started by:  Vidal Garcia, DO        Apply topically 2 times daily as needed for mild pain   Quantity:  30 g   Refills:  3         These medicines have changed or have updated prescriptions.        Dose/Directions    estradiol 0.1 MG/GM cream   Commonly known as:  ESTRACE VAGINAL   This may have changed:    - when to take this  - additional instructions   Used for:  Atrophic vaginitis        Dose:  2 g   Place 2 g vaginally twice a week After using daily for 2 weeks   Quantity:  42.5 g   Refills:  11       insulin glargine 100 UNIT/ML injection   Commonly known as:  LANTUS   This may have changed:    - when to take this  - additional instructions   Used for:  Diabetes mellitus type 1 (H)        Inject 4 units as directed daily LANTUS SOLOSTAR PEN PLEASE   Quantity:  15 mL   Refills:  3       NovoLOG PENFILL 100 UNIT/ML injection   This may have changed:    - when to take this  - additional instructions   Used for:  Controlled type 1 diabetes mellitus (H)   Generic drug:  insulin aspart        Dose:  15 Units   Inject 15 Units Subcutaneous daily   Quantity:  4 cartridge   Refills:  3            Where to get your medicines      Some of these will need a paper prescription and others can  be bought over the counter.  Ask your nurse if you have questions.     Bring a paper prescription for each of these medications     gabapentin 100 MG capsule    lidocaine 4 % Crea cream    valACYclovir 1000 mg tablet                Primary Care Provider Office Phone # Fax #    Joe Tj Contreras [phone]        PH[facility]IC[facility] [address] [facility] [address]  [facility] [address]        Thank you!     Thank you for choosing [facility]  for your care. Our goal is always to provide you with excellent care. Hearing back from our patients is one way we can continue to improve our services. Please take a few minutes to complete the written survey that you may receive in the mail after your visit with us. Thank you!             Your Updated Medication List - Protect others around you: Learn how to safely use, store and throw away your medicines at www.disposemymeds.org.          This list is accurate as of: 4/4/17  2:49 PM.  Always use your most recent med list.                   Brand Name Dispense Instructions for use    aspirin 81 MG tablet      Take 81 mg by mouth At Bedtime       AYR SALINE NASAL NO-DRIP Gel     3 Tube    Use as needed for nasal dryness       blood glucose monitoring lancets     7203 each    Test 7-8 times daily  (Lancets that go with pt current meter )       * blood glucose monitoring test strip    TRUE METRIX BLOOD GLUCOSE TEST    4 Box    Use to test blood sugar 4 times daily or as directed.       * blood glucose monitoring test strip    no brand specified    720 each    Use to test blood sugar 8 times daily . Trividia  True metrix  Brand DX E10.9       calcium + D 600-200 MG-UNIT Tabs   Generic drug:  calcium carbonate-vitamin D      Take 1 tablet by mouth 3 times daily       CENTRUM SILVER PO      Take 1 tablet by mouth daily.       estradiol 0.1 MG/GM cream    ESTRACE VAGINAL    42.5 g    Place 2 g vaginally twice a week After using daily for 2 weeks        gabapentin 100 MG capsule    NEURONTIN    90 capsule    Take 1 capsule (100 mg) by mouth At Bedtime       glucagon 1 MG kit    GLUCAGON EMERGENCY    3 each    Inject 1 mg subcutaneously or intramuscularly for severe hypoglycemic episodes.       insulin glargine 100 UNIT/ML injection    LANTUS    15 mL    Inject 4 units as directed daily LANTUS SOLOSTAR PEN PLEASE       * insulin pen needle 32G X 4 MM    BD PAM U/F    800 each    Use   Up to 8 daily       * insulin pen needle 32G X 4 MM     400 each    Use 4 pen needles daily or as directed.       lidocaine 4 % Crea cream    LMX4    30 g    Apply topically 2 times daily as needed for mild pain       mycophenolate 500 MG tablet    CELLCEPT - GENERIC EQUIVALENT    360 tablet    Take 2 tablets (1,000 mg) by mouth 2 times daily 2 tabs in the a.m., 2 tabs in p.m.       NIFEdipine ER osmotic 30 MG 24 hr tablet    NIFEDICAL XL    90 tablet    Take 1 tablet (30 mg) by mouth daily       NovoLOG PENFILL 100 UNIT/ML injection   Generic drug:  insulin aspart     4 cartridge    Inject 15 Units Subcutaneous daily       NOVOPEN  (JUSTIN) Priscila   Generic drug:  Injection Device for insulin      Use as directed.       omega-3 fatty acids 1200 MG capsule      Take 1 capsule by mouth daily.       simvastatin 40 MG tablet    ZOCOR    100 tablet    Take 1 tablet (40 mg) by mouth At Bedtime       TRUE METRIX METER W/DEVICE Kit     1 kit    1 each 4 times daily       TYLENOL 500 MG tablet   Generic drug:  acetaminophen      Take 500 mg by mouth At Bedtime Takes 3 tablets (500 mg ) .Christa Acharya LPN 2:40 PM on 3/23/2017       valACYclovir 1000 mg tablet    VALTREX    42 tablet    Take 1 tablet (1,000 mg) by mouth 3 times daily       VITAMIN D (CHOLECALCIFEROL) PO      Take 2,000 Units by mouth daily (with lunch)       warfarin 5 MG tablet    COUMADIN    215 tablet    Take 10mgs on Mondays and Thursdays and 12.5mgs on all other days or as directed by the Medication Monitoring Clinic at  the [facility].       * Notice:  This list has 4 medication(s) that are the same as other medications prescribed for you. Read the directions carefully, and ask your doctor or other care provider to review them with you.

## 2017-04-04 NOTE — PATIENT INSTRUCTIONS
Primary Care Center Phone Number [phone]  Primary [facility] Medication Refill Request Information:  * Please contact your pharmacy regarding ANY request for medication refills.  ** [facility] Prescription Fax = [phone]  * Please allow 3 business days for routine medication refills.  * Please allow 5 business days for controlled substance medication refills.     [facility] Test Result notification information:  *You will be notified with in 7-10 days of your appointment day regarding the results of your test.  If you are on MyChart you will be notified as soon as the provider has reviewed the results and signed off on them.        1. Increase Gabapentin to 100 mg at noon and 200 mg at bedtime.  2. Continue taking Valacylovir for 2 weeks.  3. Apply lidocaine cream as needed for pain.  4. Follow up in 2 weeks.

## 2017-04-04 NOTE — PROGRESS NOTES
[facility] Note  Date of Service: April 4, 2017    Patient: Shala Caruso  MRN: [id number]  PCP: Joe Contreras    Reason for visit: Follow up for shingles    Subjective  Shala Caruso is a 71 year old female with history of SLE, DM, GERD, HDL, APS that presents for 2 week follow up for shingles. Patient diagnosed with shingles involving left T12/L1 dermatomes last month. She has been taking Gabapentin and Valacyclovir since that time. Overall feeling better. Continues to have burning pain, rated 6/10, relatively constant. The pain is worse in the early afternoon and in the evenings. She reports the rash is starting to scab over. Denies fevers, chills, night sweats, abdominal pain, or nausea/vomiting. No changes in respiratory status or chest pain. She has no other complaints or concerns today.    Past Medical History:   Diagnosis Date     Achalasia      Antiphospholipid syndrome (H)      Antiplatelet or antithrombotic long-term use      DM (diabetes mellitus) (H)      DVT (deep venous thrombosis) (H) 2003    and PE     Dysphagia     occasional, use Nifedipine     GERD (gastroesophageal reflux disease)      Hyperlipidemia      Lymphedema      Myopia      Neuropathy (H)     toes bilateral     Nonsenile cataract      osteoporosis      Pericarditis      Raynaud's disease      SLE (systemic lupus erythematosus) (H)        Past Surgical History:   Procedure Laterality Date     COLONOSCOPY N/A 11/28/2016    Procedure: COLONOSCOPY;  Surgeon: Sang August MD;  [facility]     CYSTOSCOPY, SLING TRANSVAGINAL N/A 12/20/2016    Procedure: CYSTOSCOPY, SLING TRANSVAGINAL;  Surgeon: Jeanmarie Pierson MD;  [facility]     DISSECT LYMPH NODE AXILLA  2004    Lt, during eval for SLE     HYSTEROSCOPIC PLACEMENT CONTRACEPTIVE DEVICE  1985     TUBAL LIGATION Bilateral        Current Outpatient Prescriptions   Medication Sig Dispense Refill     valACYclovir (VALTREX) 1000 mg tablet  Take 1 tablet (1,000 mg) by mouth 3 times daily 42 tablet 0     gabapentin (NEURONTIN) 100 MG capsule Take 1 capsule (100 mg) by mouth At Bedtime 90 capsule 0     lidocaine (LMX4) 4 % CREA cream Apply topically 2 times daily as needed for mild pain 30 g 3     acetaminophen (TYLENOL) 500 MG tablet Take 500 mg by mouth At Bedtime Takes 3 tablets (500 mg ) .Chritsa Acharya LPN 2:40 PM on 3/23/2017       warfarin (COUMADIN) 5 MG tablet Take 10mgs on Mondays and Thursdays and 12.5mgs on all other days or as directed by the Medication Monitoring Clinic at the [facility]. 215 tablet 1     estradiol (ESTRACE VAGINAL) 0.1 MG/GM vaginal cream Place 2 g vaginally twice a week After using daily for 2 weeks (Patient taking differently: Place 2 g vaginally At Bedtime ) 42.5 g 11     blood glucose monitoring (NO BRAND SPECIFIED) test strip Use to test blood sugar 8 times daily . Trividia  True metrix  Brand DX E10.9 720 each 3     mycophenolate (CELLCEPT - GENERIC EQUIVALENT) 500 MG tablet Take 2 tablets (1,000 mg) by mouth 2 times daily 2 tabs in the a.m., 2 tabs in p.m. 360 tablet 2     NIFEdipine ER osmotic (NIFEDICAL XL) 30 MG 24 hr tablet Take 1 tablet (30 mg) by mouth daily 90 tablet 2     simvastatin (ZOCOR) 40 MG tablet Take 1 tablet (40 mg) by mouth At Bedtime 100 tablet 3     Blood Glucose Monitoring Suppl (TRUE METRIX METER) W/DEVICE KIT 1 each 4 times daily 1 kit 0     blood glucose monitoring (TRUE METRIX BLOOD GLUCOSE TEST) test strip Use to test blood sugar 4 times daily or as directed. 4 Box 3     insulin pen needle 32G X 4 MM Use 4 pen needles daily or as directed. 400 each 3     insulin glargine (LANTUS) 100 UNIT/ML vial Inject 4 units as directed daily LANTUS SOLOSTAR PEN PLEASE (Patient taking differently: every morning Inject 4 units as directed daily LANTUS SOLOSTAR PEN PLEASE   Take 3.2 units) 15 mL 3     NOVOLOG PENFILL 100 UNIT/ML soln Inject 15 Units Subcutaneous daily (Patient taking differently: Inject 15  Units Subcutaneous 4 times daily (with meals and nightly) Sliding scale with meals and snacks) 4 cartridge 3     VITAMIN D, CHOLECALCIFEROL, PO Take 2,000 Units by mouth daily (with lunch)        AYR SALINE NASAL NO-DRIP GEL Use as needed for nasal dryness 3 Tube 3     glucagon (GLUCAGON EMERGENCY) 1 MG injection Inject 1 mg subcutaneously or intramuscularly for severe hypoglycemic episodes. 3 each 3     insulin pen needle (BD PEN NEEDLE PAM U/F) 32G X 4 MM MISC Use   Up to 8 daily 800 each 3     LANCETS ULTRA THIN 30G MISC Test 7-8 times daily  (Lancets that go with pt current meter ) 7203 each 3     aspirin 81 MG tablet Take 81 mg by mouth At Bedtime        omega-3 fatty acids (FISH OIL) 1200 MG capsule Take 1 capsule by mouth daily.       Calcium Carbonate-Vitamin D (CALCIUM + D) 600-200 MG-UNIT per tablet Take 1 tablet by mouth 3 times daily        Multiple Vitamins-Minerals (CENTRUM SILVER PO) Take 1 tablet by mouth daily.       Injection Device for Insulin (NOVOPEN JR, GREEN,) RORY Use as directed.       [DISCONTINUED] valACYclovir (VALTREX) 1000 mg tablet Take 1 tablet (1,000 mg) by mouth 3 times daily for 14 days 42 tablet 0     [DISCONTINUED] gabapentin (NEURONTIN) 100 MG capsule Take 1 capsule (100 mg) by mouth At Bedtime 90 capsule 0       Family History   Problem Relation Age of Onset     CANCER       breast     CEREBROVASCULAR DISEASE       C.A.D.       Glaucoma Father        Social History     Social History     Marital status:      Spouse name: N/A     Number of children: N/A     Years of education: N/A     Occupational History     Not on file.     Social History Main Topics     Smoking status: Never Smoker     Smokeless tobacco: Never Used     Alcohol use No     Drug use: No     Sexual activity: Yes     Partners: Male      Comment: , safe in relationship     Other Topics Concern     Not on file     Social History Narrative    How much exercise per week? Walk to work every morning (2  miles), swimming 3 times a week, takes walks with , periodically works out at gym on stationary bike     How much calcium per day? Supplement 3x daily        How much caffeine per day? On rare occasion, only if she is out to eat and that is all they have    How much vitamin D per day? supplement    Do you/your family wear seatbelts?  Yes    Do you/your family use safety helmets? Yes    Do you/your family use sunscreen? Yes    Do you/your family keep firearms in the home? No    Do you/your family have a smoke detector(s)? Yes        Do you feel safe in your home? Yes    Has anyone ever touched you in an unwanted manner? No        Klever R LPN 7/18/2013                     Review of Systems  12 point ROS negative other than in the HPI above.     Objective  /64  Pulse 66  Resp 16  Wt 55 kg (121 lb 4.8 oz)  BMI 19.88 kg/m2    Physical Exam  General: Sitting in the chair, conversing normally, NAD, appears stated age. Non-toxic.  HEENT: NC/AT, MMM, sclera anicteric. Oropharynx appears clear.  Resp: Non-labored. CTAB, no wheezes or crackles.   CV: RRR, no MRG.   Neuro: Alert and interacting appropriately. Moving all extremities spontaneously.  Skin: Erythematous zoster rash involving left T12/L1 dermatome. There is several areas that are scabbed over, but no noticeable open vesicles. The rash is mildly tender to palpation.   Psych: Appropriate affect.     Assessment and Plan  Shala Caruso is a 71 year old female with history of SLE, DM, GERD, HDL, APS that presents for 2 week follow up for shingles.     Shala was seen today for shingles.    Diagnoses and all orders for this visit:    Herpes zoster dermatitis: Diagnosed with shingles involving left T12/L1 dermatome on 3/23. Rash is improving overall, but patient continues to have herpetic pain. She has no open vesicles on exam, but will treat for an additional 2 weeks with Valacyclovir.  Also recommended patient increase her Gabapentin to 200 mg po  qhs and 100 mg po in the afternoons (total 300 mg daily). Patient is currently taking 200 mg po qhs. We can further increase her Gabapentin dose as needed. We will also add lidocaine topical cream for additional symptomatic relief. She will follow up with us in 2 weeks to reassess, sooner if she has worsening pain or new symptoms. Patient is agreeable to this plan.  -     valACYclovir (VALTREX) 1000 mg tablet; Take 1 tablet (1,000 mg) by mouth 3 times daily  -     gabapentin (NEURONTIN) 100 MG capsule; Take 1 capsule (100 mg) by mouth At Bedtime  -     lidocaine (LMX4) 4 % CREA cream; Apply topically 2 times daily as needed for mild pain    Blood pressure check: WNL  Follow up: 2 weeks    Plan of care discussed with the patient who agrees with and understands the plan. All questions answered at time of encounter.     Patient seen and discussed with Dr. Modi who agrees with my assessment and plan.     Vidal Garcia, DO  Internal Medicine PGY-2  [phone]   I have seen and examined albertaus patient with the resident Dr Garcia.I have reviewed his note ,assessment and plan for care ,I concur.  Brandon Bedolla MD

## 2017-04-13 ENCOUNTER — OFFICE VISIT (OUTPATIENT)
Dept: RHEUMATOLOGY | Facility: CLINIC | Age: 72
End: 2017-04-13
Attending: INTERNAL MEDICINE
Payer: COMMERCIAL

## 2017-04-13 VITALS
TEMPERATURE: 98.1 F | OXYGEN SATURATION: 99 % | HEART RATE: 67 BPM | WEIGHT: 120.6 LBS | HEIGHT: 66 IN | DIASTOLIC BLOOD PRESSURE: 82 MMHG | SYSTOLIC BLOOD PRESSURE: 134 MMHG | BODY MASS INDEX: 19.38 KG/M2

## 2017-04-13 DIAGNOSIS — M32.9 SYSTEMIC LUPUS ERYTHEMATOSUS (H): ICD-10-CM

## 2017-04-13 DIAGNOSIS — Z79.01 LONG TERM CURRENT USE OF ANTICOAGULANT THERAPY: ICD-10-CM

## 2017-04-13 DIAGNOSIS — Z79.899 ENCOUNTER FOR LONG-TERM CURRENT USE OF MEDICATION: ICD-10-CM

## 2017-04-13 DIAGNOSIS — D68.61 ANTIPHOSPHOLIPID SYNDROME (H): ICD-10-CM

## 2017-04-13 PROCEDURE — 99212 OFFICE O/P EST SF 10 MIN: CPT | Mod: ZF

## 2017-04-13 RX ORDER — MYCOPHENOLATE MOFETIL 500 MG/1
1000 TABLET ORAL 2 TIMES DAILY
Qty: 360 TABLET | Refills: 3 | Status: SHIPPED | OUTPATIENT
Start: 2017-04-13 | End: 2018-02-16

## 2017-04-13 ASSESSMENT — PAIN SCALES - GENERAL: PAINLEVEL: EXTREME PAIN (8)

## 2017-04-13 NOTE — PROGRESS NOTES
Rheumatology Visit     Shala Caruso MRN# [id number]   YOB: 1945 Age: 71 year old     Date of Visit: April 13, 2017  Primary care provider: Joe Contreras          Assessment and Plan:   1. 72 yo female with SLE, diagnosed 2000 (+CRISTAL,+dsDNA ab, arthritis, serositis): flare (3/10) with pericardial effusion/tamponade, and now off of prednisone; Cellcept was started 6/10 and tolerating well. She has no significant current symptoms of inflammation. SLE lab stable with Cellcept monitoring.   2. 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 Allergies to multiple antibiotics and Plaquenil   4. Osteoprosis on Boniva (Last DXA with some continued decrease of BMD)   5. Severe GERD, Achalasia   6. Post thrombophlebitic syndrome (chronic LE swelling), stable  7. Dyslipidemia with current excellent lipid values   8. Raynauds   9. Chronic leukopenia   10. 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 Herpes Zoster  Plan   Discussed in detail with the patient   1. Continue Cellcept 2000 mg daily   2. She does not require additional therapy at this time   3. Call if new symptoms   4. Continue lab monitoring ordered before next appointment at 3 months and soon before next appointment   5. Return to SLE Clinic in 6 months   6. Continue followup in [facility]; in [facility] for Zoster and other concerns   7. Follow up regarding lymphedema as needed   8. flu shot is current  Santiago Corbett MD.            History of Present Illness:   72 yo female is seen for followup of SLE. I saw her last in April 2016. EPIC reviewed.   Problem list:   1. SLE, diagnosed 2000 (+CRISTAL,+dsDNA ab, arthritis, serositis): flare (3/10) with pericardial effusion/tamponade, on Cellcept (started 6/10)   2. APS, s/p DVT LLE, PE on warfarin in 2004   3. Allergies to multiple antibiotics and Plaquenil   4. Osteoprosis on Boniva (Last DXA 1/09, Tscore: -1.9 L femoral neck)   5. Severe GERD, Achalasia   6. Post thrombophlebitic  syndrome (chronic LE swelling)   7. Dyslipidemia   8. Raynauds   9. Chronic leukopenia on Methotrexate  10. L arm lymphedema    HISTORY CARRIED FORWARD:   She has been off Prednisone for about 3.5 years and off Methotrexate since May 2012; she is on Cellcept 2000mg/day and no longer on Plaquenil.   She has left arm lymphedema. She has hx of axillary LN dissection for enlarged nodes. This have been evaluated by OT. She was using her glove and sleeve but the glove is too tight and she is not using it regularly; this does not impede her at present.  She has had no recurrent chest pain/SOB or pleurisy since her hospitalization in March 2010 for pericarditis.    INTERVAL HISTORY:    Her biggest interval problem is Herpes Zoster in L Thoracic dermatome, dx 3/23 in the [facility].  She was on Valtrex and is on Neurontin.  She is having a lot of pain.  She sees them again next week.     She again had an overall uneventful 6 months from an SLE perspective. She is tolerating the medications without problem. Her labs have been stable with persistent leukopenia now at 3.4 last month; DSDNA normal at 29 for first time in July 2014 and normal last checked. Her complements have been stable but slightly low without change. Creatinine has been variable but normal last check.  Her joints are stable with no swelling. She has osteoarthritis of the bilateral knees which is the most bothersome problem. She is doing low impact Yoga.  She denies fevers, fatigue, oral ulcers, rash. Raynauds is stable without Digital ulcers. No new rash.  She remains on Boniva. She is followed in [facility] Clinic with hx of DVT and PE.    We discussed her prior therapy and options for going forward. She is looking ahead to eventual [facility]. She has Diabetes so wishes to avoid Prednisone unless absolutely needed.       Review of Systems:   Review Of Systems  Skin: Zoster - see HPI  Eyes: see Eye note  Ears/Nose/Throat: negative  Respiratory: No shortness of  breath, dyspnea on exertion, cough, or hemoptysis  Cardiovascular: stable LE edema  Gastrointestinal: negative  Genitourinary: has Pessary  Musculoskeletal: see HPI  Neurologic: negative  Psychiatric: negative  Hematologic/Lymphatic/Immunologic: on Warfarin  Endocrine: negative           Past Medical History:     Past Medical History:   Diagnosis Date     Achalasia      Antiphospholipid syndrome (H)      Antiplatelet or antithrombotic long-term use      DM (diabetes mellitus) (H)      DVT (deep venous thrombosis) (H) 2003    and PE     Dysphagia     occasional, use Nifedipine     GERD (gastroesophageal reflux disease)      Hyperlipidemia      Lymphedema      Myopia      Neuropathy (H)     toes bilateral     Nonsenile cataract      osteoporosis      Pericarditis      Raynaud's disease      SLE (systemic lupus erythematosus) (H)        Patient Active Problem List    Diagnosis Date Noted     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 Contreras.         Cystocele, midline 10/15/2014     Priority: Medium     Urinary urgency 10/15/2014     Priority: Medium     Urinary frequency 10/15/2014     Priority: Medium     Female stress incontinence 10/15/2014     Priority: Medium     Atrophic vaginitis 10/15/2014     Priority: Medium     Osteoporosis, postmenopausal 07/14/2014     Priority: Medium     Osteoporosis, idiopathic 07/11/2013     Priority: Medium     Type 1 diabetes mellitus (H) 07/02/2012     Priority: Medium     Encounter for long-term current use of medication 05/30/2012     Priority: Medium     Problem list name updated by automated process. Provider to review       Achalasia      Priority: Medium     Antiphospholipid syndrome (H)      Priority: Medium     DM (diabetes mellitus) (H)      Priority: Medium     GERD  (gastroesophageal reflux disease)      Priority: Medium     Hyperlipidemia      Priority: Medium     HTN (hypertension)      Priority: Medium     Osteopenia      Priority: Medium     Raynaud's disease      Priority: Medium     SLE (systemic lupus erythematosus) (H)      Priority: Medium     DVT (deep venous thrombosis) (H)      Priority: Medium     and PE               Past Surgical History:     Past Surgical History:   Procedure Laterality Date     COLONOSCOPY N/A 11/28/2016    Procedure: COLONOSCOPY;  Surgeon: Sang August MD;  Location: [facility]     CYSTOSCOPY, SLING TRANSVAGINAL N/A 12/20/2016    Procedure: CYSTOSCOPY, SLING TRANSVAGINAL;  Surgeon: Jeanmarie Pierson MD;  Location: [facility]     DISSECT LYMPH NODE AXILLA  2004    Lt, during eval for SLE     HYSTEROSCOPIC PLACEMENT CONTRACEPTIVE DEVICE  1985     TUBAL LIGATION Bilateral              Social History:     Social History   Substance Use Topics     Smoking status: Never Smoker     Smokeless tobacco: Never Used     Alcohol use No             Family History:     Family History   Problem Relation Age of Onset     CANCER Other      breast     CEREBROVASCULAR DISEASE Other      C.A.D. Other      Glaucoma Father             Allergies:     Allergies   Allergen Reactions     Amaryl [Glimepiride] Swelling     Swelling of tongue     Metformin Blisters     Experienced big blisters all over body and sloughing of skin     Plaquenil [Aminoquinolines] Hives     Sulfa Drugs Other (See Comments)     Turned bright red     Amoxicillin Rash     Hydroxychloroquine Rash     Penicillins Rash             Medications:     Current Outpatient Prescriptions   Medication Sig Dispense Refill     valACYclovir (VALTREX) 1000 mg tablet Take 1 tablet (1,000 mg) by mouth 3 times daily 42 tablet 0     gabapentin (NEURONTIN) 100 MG capsule Take 1 capsule (100 mg) by mouth At Bedtime (Patient taking differently: Take 200 mg by mouth 2 times daily ) 90 capsule 0     acetaminophen  (TYLENOL) 500 MG tablet Take 500 mg by mouth At Bedtime Takes 6 tablets (500 mg ) .Christa Acharya LPN 2:40 PM on 3/23/2017       warfarin (COUMADIN) 5 MG tablet Take 10mgs on Mondays and Thursdays and 12.5mgs on all other days or as directed by the Medication Monitoring Clinic at the [facility] 215 tablet 1     estradiol (ESTRACE VAGINAL) 0.1 MG/GM vaginal cream Place 2 g vaginally twice a week After using daily for 2 weeks (Patient taking differently: Place 2 g vaginally At Bedtime ) 42.5 g 11     blood glucose monitoring (NO BRAND SPECIFIED) test strip Use to test blood sugar 8 times daily . Trividia  True metrix  Brand DX E10.9 720 each 3     mycophenolate (CELLCEPT - GENERIC EQUIVALENT) 500 MG tablet Take 2 tablets (1,000 mg) by mouth 2 times daily 2 tabs in the a.m., 2 tabs in p.m. 360 tablet 2     NIFEdipine ER osmotic (NIFEDICAL XL) 30 MG 24 hr tablet Take 1 tablet (30 mg) by mouth daily 90 tablet 2     simvastatin (ZOCOR) 40 MG tablet Take 1 tablet (40 mg) by mouth At Bedtime 100 tablet 3     Blood Glucose Monitoring Suppl (TRUE METRIX METER) W/DEVICE KIT 1 each 4 times daily 1 kit 0     blood glucose monitoring (TRUE METRIX BLOOD GLUCOSE TEST) test strip Use to test blood sugar 4 times daily or as directed. 4 Box 3     insulin pen needle 32G X 4 MM Use 4 pen needles daily or as directed. 400 each 3     insulin glargine (LANTUS) 100 UNIT/ML vial Inject 4 units as directed daily LANTUS SOLOSTAR PEN PLEASE (Patient taking differently: every morning Inject 4 units as directed daily LANTUS SOLOSTAR PEN PLEASE   Take 3.2 units) 15 mL 3     NOVOLOG PENFILL 100 UNIT/ML soln Inject 15 Units Subcutaneous daily (Patient taking differently: Inject 15 Units Subcutaneous 4 times daily (with meals and nightly) Sliding scale with meals and snacks) 4 cartridge 3     VITAMIN D, CHOLECALCIFEROL, PO Take 2,000 Units by mouth daily (with lunch)        AYR SALINE NASAL NO-DRIP GEL Use as needed for nasal dryness 3 Tube 3      "glucagon (GLUCAGON EMERGENCY) 1 MG injection Inject 1 mg subcutaneously or intramuscularly for severe hypoglycemic episodes. 3 each 3     insulin pen needle (BD PEN NEEDLE PAM U/F) 32G X 4 MM MISC Use   Up to 8 daily 800 each 3     LANCETS ULTRA THIN 30G MISC Test 7-8 times daily  (Lancets that go with pt current meter ) 7203 each 3     aspirin 81 MG tablet Take 81 mg by mouth At Bedtime        omega-3 fatty acids (FISH OIL) 1200 MG capsule Take 1 capsule by mouth daily.       Calcium Carbonate-Vitamin D (CALCIUM + D) 600-200 MG-UNIT per tablet Take 1 tablet by mouth 3 times daily        Multiple Vitamins-Minerals (CENTRUM SILVER PO) Take 1 tablet by mouth daily.       Injection Device for Insulin (NOVOPEN JR, GREEN,) RORY Use as directed.       lidocaine (LMX4) 4 % CREA cream Apply topically 2 times daily as needed for mild pain (Patient not taking: Reported on 4/13/2017) 30 g 3            Physical Exam:   Blood pressure 134/82, pulse 67, temperature 98.1  F (36.7  C), temperature source Oral, height 1.664 m (5' 5.5\"), weight 54.7 kg (120 lb 9.6 oz), SpO2 99 %, not currently breastfeeding.  Constitutional: [id number] cooperative   Eyes: nl conjunctiva, sclera   ENT: nl external ears, nose, throat   No mucous membrane lesions, normal saliva pool   Neck: no mass or thyroid enlargement   GI: no ABD mass or tenderness, no HSM   Lymph: no cervical, supraclavicular, inguinal or epitrochlear nodes   MS: All TMJ, neck, shoulder, elbow, wrist, MCP/PIP/DIP, spine, hip, knee, ankle, and foot MTP/IP joints were examined and found without active synovitis or deformity. No dactylitis, tenosynovitis, enthesopathy. Stable mild lymphedema LUE. She has bilateral support stockings  Skin: Xerosis and fissures of the distal fingers unchanged. No nail pitting, alopecia.  She has her Zoster lesions in L Thoracic dermatome as described in [facility] notes.  They are evolving.   Neuro: nl cranial nerves, strength  Psych: nl affect.         Data: "     Lab Results   Component Value Date    WBC 3.4 (L) 03/23/2017    WBC 2.6 (L) 12/14/2016    WBC 2.2 (L) 10/07/2016    HGB 12.6 03/23/2017    HGB 12.5 12/14/2016    HGB 12.3 10/07/2016    HCT 40.2 03/23/2017    HCT 39.5 12/14/2016    HCT 39.1 10/07/2016    MCV 91 03/23/2017    MCV 95 12/14/2016    MCV 94 10/07/2016     03/23/2017     12/14/2016     10/07/2016     Lab Results   Component Value Date    BUN 27 12/14/2016    BUN 30 07/14/2014    BUN 32 (H) 04/01/2013     No components found for: SEDRATE  Lab Results   Component Value Date    TSH 0.64 08/05/2016    TSH 1.02 01/11/2016    TSH 0.82 01/12/2015     Lab Results   Component Value Date    AST 15 03/23/2017    AST 16 01/06/2017    AST 19 12/14/2016    ALT 23 03/23/2017    ALT 20 01/06/2017    ALT 23 12/14/2016    ALKPHOS 74 04/01/2013    ALKPHOS 57 08/25/2011    ALKPHOS 39 (L) 08/05/2011     Reviewed Rheumatology lab flowsheet    Santiago Corbett MD

## 2017-04-13 NOTE — NURSING NOTE
Chief Complaint   Patient presents with     RECHECK     Lupus   Pt roomed, vitals, meds, and allergies reviewed with pt. Pt ready for provider.  Diego Burks, CMA

## 2017-04-13 NOTE — MR AVS SNAPSHOT
After Visit Summary   4/13/2017    Shala Caruso    MRN: [id number]           Patient Information     Date Of Birth          1945        Visit Information        Provider Department      4/13/2017 5:00 PM Santiago Corbett MD [facility] Rheumatology        Today's Diagnoses     Systemic lupus erythematosus (H)        Antiphospholipid syndrome (H)        Encounter for long-term current use of medication           Follow-ups after your visit        Follow-up notes from your care team     Return in about 6 months (around 10/13/2017).      Your next 10 appointments already scheduled     Apr 18, 2017  2:25 PM CDT   (Arrive by 2:10 PM)   Return Visit with Rogelio Rubio MD   [facility] ([facility])    [address] [place] [address]  [address] [facility] [id number] [phone]            Jul 31, 2017  7:30 AM CDT   (Arrive by 7:15 AM)   RETURN DIABETES with Dorita Cramer MD   [facility] Endocrinology ([facility])    [address] [place] [address]  [address] [facility] [id number] [phone]            Aug 02, 2017  9:45 AM CDT   (Arrive by 9:30 AM)   Return Visit with Jeanmarie Pierson MD   [facility] ([facility])    [address] [place] [address]  [address] [facility] [id number] [phone]            Aug 02, 2017 11:00 AM CDT   (Arrive by 10:45 AM)   MA SCREENING DIGITAL BILATERAL with [id number]   [facility] ([facility])    [address] [place] [address]  [address] [facility] [id number] [phone]           Do not use any powder, lotion or deodorant under your arms or on your breast. If you do, we will ask you to remove it before your exam.  Wear comfortable, two-piece clothing.  If you have any allergies, tell your care team.  Bring any previous mammograms from other facilities or have them mailed to the breast  center. This mammogram location, [facility], now offers 3D mammography. It doesn't replace a screening mammogram and can be done with a regular screening mammogram. It is optional and not all insurances will pay for it. 3D mammography is a special kind of mammogram that produces a three-dimensional image of the breast by using low dose-xrays. 3D allows the radiologist to see the breast tissue differently from 2D, which reduces the chance of repeat testing due to overlapping breast tissue. If you are interested in have a 3D mammogram, please check with your insurance before you arrive for your exam. 3D mammography is offered to all patients. On the day of your exam you will be asked to sign a form stating yes, you wish to have 3D imaging or, no, you decline.            Oct 12, 2017  3:00 PM CDT   (Arrive by 2:45 PM)   RETURN LUPUS with Santiago Corbett MD   [facility] Rheumatology ([facility])    [address] [place] [id number] [phone]              Who to contact     If you have questions or need follow up information about today's clinic visit or your schedule please contact [facility] RHEUMATOLOGY directly at [phone].  Normal or non-critical lab and imaging results will be communicated to you by MyChart, letter or phone within 4 business days after the clinic has received the results. If you do not hear from us within 7 days, please contact the clinic through [employer] or phone. If you have a critical or abnormal lab result, we will notify you by phone as soon as possible.  Submit refill requests through [employer] or call your pharmacy and they will forward the refill request to us. Please allow 3 business days for your refill to be completed.          Additional Information About Your Visit        [employer] Information     [employer] gives you secure access to your electronic health record. If you see a primary care provider, you can  "also send messages to your care team and make appointments. If you have questions, please call your primary care clinic.  If you do not have a primary care provider, please call [phone] and they will assist you.        Care EveryWhere ID     This is your Care EveryWhere ID. This could be used by other organizations to access your [place] medical records  [id number]        Your Vitals Were     Pulse Temperature Height Pulse Oximetry BMI (Body Mass Index)       67 98.1  F (36.7  C) (Oral) 1.664 m (5' 5.5\") 99% 19.76 kg/m2        Blood Pressure from Last 3 Encounters:   04/13/17 134/82   04/04/17 114/64   03/23/17 122/60    Weight from Last 3 Encounters:   04/13/17 54.7 kg (120 lb 9.6 oz)   04/04/17 55 kg (121 lb 4.8 oz)   02/01/17 54.4 kg (120 lb)              Today, you had the following     No orders found for display         Today's Medication Changes          These changes are accurate as of: 4/13/17  5:27 PM.  If you have any questions, ask your nurse or doctor.               These medicines have changed or have updated prescriptions.        Dose/Directions    estradiol 0.1 MG/GM cream   Commonly known as:  ESTRACE VAGINAL   This may have changed:    - when to take this  - additional instructions   Used for:  Atrophic vaginitis        Dose:  2 g   Place 2 g vaginally twice a week After using daily for 2 weeks   Quantity:  42.5 g   Refills:  11       gabapentin 100 MG capsule   Commonly known as:  NEURONTIN   This may have changed:    - how much to take  - when to take this   Used for:  Herpes zoster dermatitis        Dose:  100 mg   Take 1 capsule (100 mg) by mouth At Bedtime   Quantity:  90 capsule   Refills:  0       insulin glargine 100 UNIT/ML injection   Commonly known as:  LANTUS   This may have changed:    - when to take this  - additional instructions   Used for:  Diabetes mellitus type 1 (H)        Inject 4 units as directed daily LANTUS SOLOSTAR PEN PLEASE   Quantity:  15 mL   Refills:  3       " NovoLOG PENFILL 100 UNIT/ML injection   This may have changed:    - when to take this  - additional instructions   Used for:  Controlled type 1 diabetes mellitus (H)   Generic drug:  insulin aspart        Dose:  15 Units   Inject 15 Units Subcutaneous daily   Quantity:  4 cartridge   Refills:  3            Where to get your medicines      These medications were sent to [facility] - [address]  [address], [place] [address]     Phone:  [phone]     mycophenolate 500 MG tablet                Primary Care Provider Office Phone # Fax #    Joe Contreras [phone]        [facility] [address] [facility] [address]  [facility] [address]        Thank you!     Thank you for choosing [facility]  for your care. Our goal is always to provide you with excellent care. Hearing back from our patients is one way we can continue to improve our services. Please take a few minutes to complete the written survey that you may receive in the mail after your visit with us. Thank you!             Your Updated Medication List - Protect others around you: Learn how to safely use, store and throw away your medicines at www.disposemymeds.org.          This list is accurate as of: 4/13/17  5:27 PM.  Always use your most recent med list.                   Brand Name Dispense Instructions for use    aspirin 81 MG tablet      Take 81 mg by mouth At Bedtime       AYR SALINE NASAL NO-DRIP Gel     3 Tube    Use as needed for nasal dryness       blood glucose monitoring lancets     7203 each    Test 7-8 times daily  (Lancets that go with pt current meter )       * blood glucose monitoring test strip    TRUE METRIX BLOOD GLUCOSE TEST    4 Box    Use to test blood sugar 4 times daily or as directed.       * blood glucose monitoring test strip    no brand specified    720 each    Use to test blood sugar 8 times daily . Trividia  True metrix  Brand DX E10.9        calcium + D 600-200 MG-UNIT Tabs   Generic drug:  calcium carbonate-vitamin D      Take 1 tablet by mouth 3 times daily       CENTRUM SILVER PO      Take 1 tablet by mouth daily.       estradiol 0.1 MG/GM cream    ESTRACE VAGINAL    42.5 g    Place 2 g vaginally twice a week After using daily for 2 weeks       gabapentin 100 MG capsule    NEURONTIN    90 capsule    Take 1 capsule (100 mg) by mouth At Bedtime       glucagon 1 MG kit    GLUCAGON EMERGENCY    3 each    Inject 1 mg subcutaneously or intramuscularly for severe hypoglycemic episodes.       insulin glargine 100 UNIT/ML injection    LANTUS    15 mL    Inject 4 units as directed daily LANTUS SOLOSTAR PEN PLEASE       * insulin pen needle 32G X 4 MM    BD PAM U/F    800 each    Use   Up to 8 daily       * insulin pen needle 32G X 4 MM     400 each    Use 4 pen needles daily or as directed.       lidocaine 4 % Crea cream    LMX4    30 g    Apply topically 2 times daily as needed for mild pain       mycophenolate 500 MG tablet    CELLCEPT - GENERIC EQUIVALENT    360 tablet    Take 2 tablets (1,000 mg) by mouth 2 times daily 2 tabs in the a.m., 2 tabs in p.m.       NIFEdipine ER osmotic 30 MG 24 hr tablet    NIFEDICAL XL    90 tablet    Take 1 tablet (30 mg) by mouth daily       NovoLOG PENFILL 100 UNIT/ML injection   Generic drug:  insulin aspart     4 cartridge    Inject 15 Units Subcutaneous daily       NOVOPEN  (JUSTIN) Priscila   Generic drug:  Injection Device for insulin      Use as directed.       omega-3 fatty acids 1200 MG capsule      Take 1 capsule by mouth daily.       simvastatin 40 MG tablet    ZOCOR    100 tablet    Take 1 tablet (40 mg) by mouth At Bedtime       TRUE METRIX METER W/DEVICE Kit     1 kit    1 each 4 times daily       TYLENOL 500 MG tablet   Generic drug:  acetaminophen      Take 500 mg by mouth At Bedtime Takes 6 tablets (500 mg ) .Christa Acharya LPN 2:40 PM on 3/23/2017       valACYclovir 1000 mg tablet    VALTREX    42 tablet     Take 1 tablet (1,000 mg) by mouth 3 times daily       VITAMIN D (CHOLECALCIFEROL) PO      Take 2,000 Units by mouth daily (with lunch)       warfarin 5 MG tablet    COUMADIN    215 tablet    Take 10mgs on Mondays and Thursdays and 12.5mgs on all other days or as directed by the Medication Monitoring Clinic at the [facility].       * Notice:  This list has 4 medication(s) that are the same as other medications prescribed for you. Read the directions carefully, and ask your doctor or other care provider to review them with you.

## 2017-04-13 NOTE — LETTER
4/13/2017      RE: Shala Caruso  [address]  [place] [address]       Rheumatology Visit     Shala Caruso MRN# [id number]   YOB: 1945 Age: 71 year old     Date of Visit: April 13, 2017  Primary care provider: Joe Contreras          Assessment and Plan:   1. 70 yo female with SLE, diagnosed 2000 (+CRISTAL,+dsDNA ab, arthritis, serositis): flare (3/10) with pericardial effusion/tamponade, and now off of prednisone; Cellcept was started 6/10 and tolerating well. She has no significant current symptoms of inflammation. SLE lab stable with Cellcept monitoring.   2. 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 Allergies to multiple antibiotics and Plaquenil   4. Osteoprosis on Boniva (Last DXA with some continued decrease of BMD)   5. Severe GERD, Achalasia   6. Post thrombophlebitic syndrome (chronic LE swelling), stable  7. Dyslipidemia with current excellent lipid values   8. Raynauds   9. Chronic leukopenia   10. 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 Herpes Zoster  Plan   Discussed in detail with the patient   1. Continue Cellcept 2000 mg daily   2. She does not require additional therapy at this time   3. Call if new symptoms   4. Continue lab monitoring ordered before next appointment at 3 months and soon before next appointment   5. Return to SLE Clinic in 6 months   6. Continue followup in [facility]; in [facility] for Zoster and other concerns   7. Follow up regarding lymphedema as needed   8. flu shot is current  Santiago Corbett MD.            History of Present Illness:   70 yo female is seen for followup of SLE. I saw her last in April 2016. EPIC reviewed.   Problem list:   1. SLE, diagnosed 2000 (+CRISTAL,+dsDNA ab, arthritis, serositis): flare (3/10) with pericardial effusion/tamponade, on Cellcept (started 6/10)   2. APS, s/p DVT LLE, PE on warfarin in 2004   3. Allergies to multiple antibiotics and Plaquenil   4. Osteoprosis on Boniva (Last DXA 1/09,  Tscore: -1.9 L femoral neck)   5. Severe GERD, Achalasia   6. Post thrombophlebitic syndrome (chronic LE swelling)   7. Dyslipidemia   8. Raynauds   9. Chronic leukopenia on Methotrexate  10. L arm lymphedema    HISTORY CARRIED FORWARD:   She has been off Prednisone for about 3.5 years and off Methotrexate since May 2012; she is on Cellcept 2000mg/day and no longer on Plaquenil.   She has left arm lymphedema. She has hx of axillary LN dissection for enlarged nodes. This have been evaluated by OT. She was using her glove and sleeve but the glove is too tight and she is not using it regularly; this does not impede her at present.  She has had no recurrent chest pain/SOB or pleurisy since her hospitalization in March 2010 for pericarditis.    INTERVAL HISTORY:    Her biggest interval problem is Herpes Zoster in L Thoracic dermatome, dx 3/23 in the [facility].  She was on Valtrex and is on Neurontin.  She is having a lot of pain.  She sees them again next week.     She again had an overall uneventful 6 months from an SLE perspective. She is tolerating the medications without problem. Her labs have been stable with persistent leukopenia now at 3.4 last month; DSDNA normal at 29 for first time in July 2014 and normal last checked. Her complements have been stable but slightly low without change. Creatinine has been variable but normal last check.  Her joints are stable with no swelling. She has osteoarthritis of the bilateral knees which is the most bothersome problem. She is doing low impact Yoga.  She denies fevers, fatigue, oral ulcers, rash. Raynauds is stable without Digital ulcers. No new rash.  She remains on Boniva. She is followed in [facility] with hx of DVT and PE.    We discussed her prior therapy and options for going forward. She is looking ahead to eventual jail. She has Diabetes so wishes to avoid Prednisone unless absolutely needed.       Review of Systems:   Review Of Systems  Skin: Zoster - see  HPI  Eyes: see Eye note  Ears/Nose/Throat: negative  Respiratory: No shortness of breath, dyspnea on exertion, cough, or hemoptysis  Cardiovascular: stable LE edema  Gastrointestinal: negative  Genitourinary: has Pessary  Musculoskeletal: see HPI  Neurologic: negative  Psychiatric: negative  Hematologic/Lymphatic/Immunologic: on Warfarin  Endocrine: negative           Past Medical History:     Past Medical History:   Diagnosis Date     Achalasia      Antiphospholipid syndrome (H)      Antiplatelet or antithrombotic long-term use      DM (diabetes mellitus) (H)      DVT (deep venous thrombosis) (H) 2003    and PE     Dysphagia     occasional, use Nifedipine     GERD (gastroesophageal reflux disease)      Hyperlipidemia      Lymphedema      Myopia      Neuropathy (H)     toes bilateral     Nonsenile cataract      osteoporosis      Pericarditis      Raynaud's disease      SLE (systemic lupus erythematosus) (H)        Patient Active Problem List    Diagnosis Date Noted     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 Contreras.         Cystocele, midline 10/15/2014     Priority: Medium     Urinary urgency 10/15/2014     Priority: Medium     Urinary frequency 10/15/2014     Priority: Medium     Female stress incontinence 10/15/2014     Priority: Medium     Atrophic vaginitis 10/15/2014     Priority: Medium     Osteoporosis, postmenopausal 07/14/2014     Priority: Medium     Osteoporosis, idiopathic 07/11/2013     Priority: Medium     Type 1 diabetes mellitus (H) 07/02/2012     Priority: Medium     Encounter for long-term current use of medication 05/30/2012     Priority: Medium     Problem list name updated by automated process. Provider to review       Achalasia      Priority: Medium     Antiphospholipid syndrome (H)       Priority: Medium     DM (diabetes mellitus) (H)      Priority: Medium     GERD (gastroesophageal reflux disease)      Priority: Medium     Hyperlipidemia      Priority: Medium     HTN (hypertension)      Priority: Medium     Osteopenia      Priority: Medium     Raynaud's disease      Priority: Medium     SLE (systemic lupus erythematosus) (H)      Priority: Medium     DVT (deep venous thrombosis) (H)      Priority: Medium     and PE               Past Surgical History:     Past Surgical History:   Procedure Laterality Date     COLONOSCOPY N/A 11/28/2016    Procedure: COLONOSCOPY;  Surgeon: Sang August MD;  Location: [facility]     CYSTOSCOPY, SLING TRANSVAGINAL N/A 12/20/2016    Procedure: CYSTOSCOPY, SLING TRANSVAGINAL;  Surgeon: Jeanmarie Pierson MD;  Location: [facility]     DISSECT LYMPH NODE AXILLA  2004    Lt, during eval for SLE     HYSTEROSCOPIC PLACEMENT CONTRACEPTIVE DEVICE  1985     TUBAL LIGATION Bilateral              Social History:     Social History   Substance Use Topics     Smoking status: Never Smoker     Smokeless tobacco: Never Used     Alcohol use No             Family History:     Family History   Problem Relation Age of Onset     CANCER Other      breast     CEREBROVASCULAR DISEASE Other      C.A.D. Other      Glaucoma Father             Allergies:     Allergies   Allergen Reactions     Amaryl [Glimepiride] Swelling     Swelling of tongue     Metformin Blisters     Experienced big blisters all over body and sloughing of skin     Plaquenil [Aminoquinolines] Hives     Sulfa Drugs Other (See Comments)     Turned bright red     Amoxicillin Rash     Hydroxychloroquine Rash     Penicillins Rash             Medications:     Current Outpatient Prescriptions   Medication Sig Dispense Refill     valACYclovir (VALTREX) 1000 mg tablet Take 1 tablet (1,000 mg) by mouth 3 times daily 42 tablet 0     gabapentin (NEURONTIN) 100 MG capsule Take 1 capsule (100 mg) by mouth At Bedtime (Patient taking  differently: Take 200 mg by mouth 2 times daily ) 90 capsule 0     acetaminophen (TYLENOL) 500 MG tablet Take 500 mg by mouth At Bedtime Takes 6 tablets (500 mg ) .Christa Acharya LPN 2:40 PM on 3/23/2017       warfarin (COUMADIN) 5 MG tablet Take 10mgs on Mondays and Thursdays and 12.5mgs on all other days or as directed by the Med[facility] at the [facility]. 215 tablet 1     estradiol (ESTRACE VAGINAL) 0.1 MG/GM vaginal cream Place 2 g vaginally twice a week After using daily for 2 weeks (Patient taking differently: Place 2 g vaginally At Bedtime ) 42.5 g 11     blood glucose monitoring (NO BRAND SPECIFIED) test strip Use to test blood sugar 8 times daily . Trividia  True metrix  Brand DX E10.9 720 each 3     mycophenolate (CELLCEPT - GENERIC EQUIVALENT) 500 MG tablet Take 2 tablets (1,000 mg) by mouth 2 times daily 2 tabs in the a.m., 2 tabs in p.m. 360 tablet 2     NIFEdipine ER osmotic (NIFEDICAL XL) 30 MG 24 hr tablet Take 1 tablet (30 mg) by mouth daily 90 tablet 2     simvastatin (ZOCOR) 40 MG tablet Take 1 tablet (40 mg) by mouth At Bedtime 100 tablet 3     Blood Glucose Monitoring Suppl (TRUE METRIX METER) W/DEVICE KIT 1 each 4 times daily 1 kit 0     blood glucose monitoring (TRUE METRIX BLOOD GLUCOSE TEST) test strip Use to test blood sugar 4 times daily or as directed. 4 Box 3     insulin pen needle 32G X 4 MM Use 4 pen needles daily or as directed. 400 each 3     insulin glargine (LANTUS) 100 UNIT/ML vial Inject 4 units as directed daily LANTUS SOLOSTAR PEN PLEASE (Patient taking differently: every morning Inject 4 units as directed daily LANTUS SOLOSTAR PEN PLEASE   Take 3.2 units) 15 mL 3     NOVOLOG PENFILL 100 UNIT/ML soln Inject 15 Units Subcutaneous daily (Patient taking differently: Inject 15 Units Subcutaneous 4 times daily (with meals and nightly) Sliding scale with meals and snacks) 4 cartridge 3     VITAMIN D, CHOLECALCIFEROL, PO Take 2,000 Units by mouth daily (with lunch)         "AYR SALINE NASAL NO-DRIP GEL Use as needed for nasal dryness 3 Tube 3     glucagon (GLUCAGON EMERGENCY) 1 MG injection Inject 1 mg subcutaneously or intramuscularly for severe hypoglycemic episodes. 3 each 3     insulin pen needle (BD PEN NEEDLE PAM U/F) 32G X 4 MM MISC Use   Up to 8 daily 800 each 3     LANCETS ULTRA THIN 30G MISC Test 7-8 times daily  (Lancets that go with pt current meter ) 7203 each 3     aspirin 81 MG tablet Take 81 mg by mouth At Bedtime        omega-3 fatty acids (FISH OIL) 1200 MG capsule Take 1 capsule by mouth daily.       Calcium Carbonate-Vitamin D (CALCIUM + D) 600-200 MG-UNIT per tablet Take 1 tablet by mouth 3 times daily        Multiple Vitamins-Minerals (CENTRUM SILVER PO) Take 1 tablet by mouth daily.       Injection Device for Insulin (NOVOPEN JR, GREEN,) RORY Use as directed.       lidocaine (LMX4) 4 % CREA cream Apply topically 2 times daily as needed for mild pain (Patient not taking: Reported on 4/13/2017) 30 g 3            Physical Exam:   Blood pressure 134/82, pulse 67, temperature 98.1  F (36.7  C), temperature source Oral, height 1.664 m (5' 5.5\"), weight 54.7 kg (120 lb 9.6 oz), SpO2 99 %, not currently breastfeeding.  Constitutional: [id number] cooperative   Eyes: nl conjunctiva, sclera   ENT: nl external ears, nose, throat   No mucous membrane lesions, normal saliva pool   Neck: no mass or thyroid enlargement   GI: no ABD mass or tenderness, no HSM   Lymph: no cervical, supraclavicular, inguinal or epitrochlear nodes   MS: All TMJ, neck, shoulder, elbow, wrist, MCP/PIP/DIP, spine, hip, knee, ankle, and foot MTP/IP joints were examined and found without active synovitis or deformity. No dactylitis, tenosynovitis, enthesopathy. Stable mild lymphedema LUE. She has bilateral support stockings  Skin: Xerosis and fissures of the distal fingers unchanged. No nail pitting, alopecia.  She has her Zoster lesions in L Thoracic dermatome as described in [facility] notes.  They are " evolving.   Neuro: nl cranial nerves, strength  Psych: nl affect.         Data:     Lab Results   Component Value Date    WBC 3.4 (L) 03/23/2017    WBC 2.6 (L) 12/14/2016    WBC 2.2 (L) 10/07/2016    HGB 12.6 03/23/2017    HGB 12.5 12/14/2016    HGB 12.3 10/07/2016    HCT 40.2 03/23/2017    HCT 39.5 12/14/2016    HCT 39.1 10/07/2016    MCV 91 03/23/2017    MCV 95 12/14/2016    MCV 94 10/07/2016     03/23/2017     12/14/2016     10/07/2016     Lab Results   Component Value Date    BUN 27 12/14/2016    BUN 30 07/14/2014    BUN 32 (H) 04/01/2013     No components found for: SEDRATE  Lab Results   Component Value Date    TSH 0.64 08/05/2016    TSH 1.02 01/11/2016    TSH 0.82 01/12/2015     Lab Results   Component Value Date    AST 15 03/23/2017    AST 16 01/06/2017    AST 19 12/14/2016    ALT 23 03/23/2017    ALT 20 01/06/2017    ALT 23 12/14/2016    ALKPHOS 74 04/01/2013    ALKPHOS 57 08/25/2011    ALKPHOS 39 (L) 08/05/2011     Reviewed Rheumatology lab flowsheet        Santiago Corbett MD

## 2017-04-14 ENCOUNTER — ANTICOAGULATION THERAPY VISIT (OUTPATIENT)
Dept: ANTICOAGULATION | Facility: CLINIC | Age: 72
End: 2017-04-14

## 2017-04-14 DIAGNOSIS — Z79.01 LONG-TERM (CURRENT) USE OF ANTICOAGULANTS: ICD-10-CM

## 2017-04-14 DIAGNOSIS — M32.9 SLE (SYSTEMIC LUPUS ERYTHEMATOSUS) (H): ICD-10-CM

## 2017-04-14 LAB — PROTHROM ACT/NOR PPP: 23 % (ref 60–140)

## 2017-04-14 NOTE — PROGRESS NOTES
ANTICOAGULATION FOLLOW-UP CLINIC VISIT    Patient Name:  Shala Caruso  Date:  4/14/2017  Contact Type:  Telephone    SUBJECTIVE:        OBJECTIVE    INR   Date Value Ref Range Status   03/30/2017 2.60 (H) 0.86 - 1.14 Final     Factor 2 Assay   Date Value Ref Range Status   04/13/2017 23 (L) 60 - 140 % Final       ASSESSMENT / PLAN  INR assessment THER    Recheck INR In: 4 WEEKS    INR Location Clinic      Anticoagulation Summary as of 4/14/2017     INR goal 2.0-3.0   Today's INR    Maintenance plan 10 mg (5 mg x 2) on Mon, Thu; 12.5 mg (5 mg x 2.5) all other days   Full instructions 10 mg on Mon, Thu; 12.5 mg all other days   Weekly total 82.5 mg   No change documented Gwen Dietz RN   Plan last modified Stacey Beal RN (2/24/2017)   Next INR check 5/12/2017   Priority Factor 2   Target end date     Indications   SLE (systemic lupus erythematosus) (H) [M32.9]  Long-term (current) use of anticoagulants [Z79.01] [Z79.01]         Anticoagulation Episode Summary     INR check location Clinic Lab    Preferred lab     Send INR reminders to [facility]    Comments Chromogenic 2  goal range is 15-25%  Ok to leave message on home [phone] and work voicemail, but call [email or web address] per pt request.  OK to leave message at office  May leave messages with Rodriguez Santos  DO NOT GIVE RECORDS TO [employer]PL[employer]ER [employer]        Anticoagulation Care Providers     Provider Role Specialty Phone number    Mt Kiser MD Responsible Clinical Cardiac Electrophysiology [phone]            See the Encounter Report to view Anticoagulation Flowsheet and Dosing Calendar (Go to Encounters tab in chart review, and find the Anticoagulation Therapy Visit)    Left message with results and dosing recommendations. Asked patient to call back to report any missed doses, falls, signs and symptoms of bleeding or clotting, or any changes to health or diet.     Gwen Dietz, SHERRON

## 2017-04-14 NOTE — MR AVS SNAPSHOT
Shala Caruso   4/14/2017   Anticoagulation Therapy Visit    Description:  71 year old female   Provider:  Gwen Dietz, RN   Department:  [facility]           INR as of 4/14/2017     Today's INR       Anticoagulation Summary as of 4/14/2017     INR goal 2.0-3.0   Today's INR    Full instructions 10 mg on Mon, Thu; 12.5 mg all other days   Next INR check 5/12/2017    Indications   SLE (systemic lupus erythematosus) (H) [M32.9]  Long-term (current) use of anticoagulants [Z79.01] [Z79.01]         April 2017 Details    Sun Mon Tue Wed Thu Fri Sat           1                 2               3               4               5               6               7               8                 9               10               11               12               13               14      12.5 mg   See details      15      12.5 mg           16      12.5 mg         17      10 mg         18      12.5 mg         19      12.5 mg         20      10 mg         21      12.5 mg         22      12.5 mg           23      12.5 mg         24      10 mg         25      12.5 mg         26      12.5 mg         27      10 mg         28      12.5 mg         29      12.5 mg           30      12.5 mg                Date Details   04/14 This INR check               How to take your warfarin dose     To take:  10 mg Take 2 of the 5 mg tablets.    To take:  12.5 mg Take 2.5 of the 5 mg tablets.           May 2017 Details    Sun Mon Tue Wed Thu Fri Sat      1      10 mg         2      12.5 mg         3      12.5 mg         4      10 mg         5      12.5 mg         6      12.5 mg           7      12.5 mg         8      10 mg         9      12.5 mg         10      12.5 mg         11      10 mg         12            13                 14               15               16               17               18               19               20                 21               22               23               24               25                26               27                 28               29               30               31                   Date Details   No additional details    Date of next INR:  5/12/2017         How to take your warfarin dose     To take:  10 mg Take 2 of the 5 mg tablets.    To take:  12.5 mg Take 2.5 of the 5 mg tablets.

## 2017-04-18 ENCOUNTER — OFFICE VISIT (OUTPATIENT)
Dept: INTERNAL MEDICINE | Facility: CLINIC | Age: 72
End: 2017-04-18

## 2017-04-18 VITALS — HEART RATE: 71 BPM | SYSTOLIC BLOOD PRESSURE: 107 MMHG | DIASTOLIC BLOOD PRESSURE: 68 MMHG

## 2017-04-18 DIAGNOSIS — B02.23 POST-HERPETIC POLYNEUROPATHY: Primary | ICD-10-CM

## 2017-04-18 RX ORDER — NORTRIPTYLINE HCL 10 MG
10 CAPSULE ORAL AT BEDTIME
Qty: 30 CAPSULE | Refills: 0 | Status: SHIPPED | OUTPATIENT
Start: 2017-04-18 | End: 2017-05-22

## 2017-04-18 RX ORDER — LIDOCAINE 50 MG/G
PATCH TOPICAL
Qty: 30 PATCH | Refills: 0 | Status: SHIPPED | OUTPATIENT
Start: 2017-04-18 | End: 2017-11-08

## 2017-04-18 ASSESSMENT — PAIN SCALES - GENERAL: PAINLEVEL: SEVERE PAIN (7)

## 2017-04-18 NOTE — NURSING NOTE
Chief Complaint   Patient presents with     Shingles     Pt is here to follow up on shingles.      Julee Lopes LPN April 18, 2017 2:03 PM

## 2017-04-18 NOTE — MR AVS SNAPSHOT
After Visit Summary   4/18/2017    Shala Caruso    MRN: [id number]           Patient Information     Date Of Birth          1945        Visit Information        Provider Department      4/18/2017 2:25 PM Rogelio Rubio MD [facility]        Today's Diagnoses     Post-herpetic polyneuropathy    -  1      Care Instructions    Primary Care Center Medication Refill Request Information:  * Please contact your pharmacy regarding ANY request for medication refills.  ** [facility] Prescription Fax = [phone]  * Please allow 3 business days for routine medication refills.  * Please allow 5 business days for controlled substance medication refills.     Primary Care Center Test Result notification information:  *You will be notified within 7-10 days of your appointment day regarding the results of your test.  If you are on MyChart you will be notified as soon as the provider has reviewed the results and signed off on them.          Pain plan:    First, work on tapering off the gabapentin. Take 2 pills per night for the next to evenings, following by 2 pills per night for the next 2 evenings.    Then begin to:    Take 10mg (1pill) nightly for 1 week. If tolerating side effects, increasing dosing to 20mg (2pills) nightly for 1 week. Continue to increase by 1 pill per week until you are taking 50mg (5 pills) at night.     You can use the lidocaine patches at the same time as the pills. Wear for 12 hours at a time and then remove.         Follow-ups after your visit        Follow-up notes from your care team     Return in about 1 month (around 5/18/2017).      Your next 10 appointments already scheduled     May 11, 2017  1:15 PM CDT   LAB with  LAB   [facility] ([facility])    [address] [place] [address] [phone]           Patient must bring picture ID.  Patient should be prepared to give a urine specimen  Please do not eat  10-12 hours before your appointment if you are coming in fasting for labs on lipids, cholesterol, or glucose (sugar).  Pregnant women should follow their Care Team instructions. Water with medications is okay. Do not drink coffee or other fluids.   If you have concerns about taking  your medications, please ask at office or if scheduling via [employer], send a message by clicking on Secure Messaging, Message Your Care Team.            May 22, 2017  5:20 PM CDT   (Arrive by 5:05 PM)   Return Visit with Joe Contreras MD   [facility] ([facility])    [address] [place] [address]  [address] [facility] [address]   [phone]            Jul 31, 2017  7:30 AM CDT   (Arrive by 7:15 AM)   RETURN DIABETES with Dorita Cramer MD   [facility] Endocrinology ([facility])    [address] [place] [address]  [address]rd [facility] [address]   [phone]            Aug 02, 2017  9:45 AM CDT   (Arrive by 9:30 AM)   Return Visit with Jeanmarie Pierson MD   [facility] Urology and Inst for Prostate and Urologic Cancers ([facility])    [address] [place] [address]  [address]th [facility] [address]   [phone]            Aug 02, 2017 11:00 AM CDT   (Arrive by 10:45 AM)   MA SCREENING DIGITAL BILATERAL with [id number]   [facility] Breast Center Imaging ([facility])    [address] [place] [address]  2nd [facility] [address] [phone]           Do not use any powder, lotion or deodorant under your arms or on your breast. If you do, we will ask you to remove it before your exam.  Wear comfortable, two-piece clothing.  If you have any allergies, tell your care team.  Bring any previous mammograms from other facilities or have them mailed to the breast center. This mammogram location, [facility], now offers 3D mammography. It doesn't replace a screening mammogram and can be done with a  regular screening mammogram. It is optional and not all insurances will pay for it. 3D mammography is a special kind of mammogram that produces a three-dimensional image of the breast by using low dose-xrays. 3D allows the radiologist to see the breast tissue differently from 2D, which reduces the chance of repeat testing due to overlapping breast tissue. If you are interested in have a 3D mammogram, please check with your insurance before you arrive for your exam. 3D mammography is offered to all patients. On the day of your exam you will be asked to sign a form stating yes, you wish to have 3D imaging or, no, you decline.            Oct 12, 2017  3:00 PM CDT   (Arrive by 2:45 PM)   RETURN LUPUS with Santiago Corbett MD   [facility] ([facility] [place])    [address] [place] [address] [phone]              Who to contact     Please call your clinic at [phone] to:    Ask questions about your health    Make or cancel appointments    Discuss your medicines    Learn about your test results    Speak to your doctor   If you have compliments or concerns about an experience at your clinic, or if you wish to file a complaint, please contact [facility] Patient Relations at [phone] or email us at [email or web address]         Additional Information About Your Visit        [employer] Information     [employer] gives you secure access to your electronic health record. If you see a primary care provider, you can also send messages to your care team and make appointments. If you have questions, please call your primary care clinic.  If you do not have a primary care provider, please call [phone] and they will assist you.      [employer] is an electronic gateway that provides easy, online access to your medical records. With [employer], you can request a clinic appointment, read your test results, renew a prescription or  communicate with your care team.     To access your existing account, please contact your [facility] or call [phone] for assistance.        Care EveryWhere ID     This is your Care EveryWhere ID. This could be used by other organizations to access your [place] medical records  [id number]        Your Vitals Were     Pulse                   71            Blood Pressure from Last 3 Encounters:   04/18/17 107/68   04/13/17 134/82   04/04/17 114/64    Weight from Last 3 Encounters:   04/13/17 54.7 kg (120 lb 9.6 oz)   04/04/17 55 kg (121 lb 4.8 oz)   02/01/17 54.4 kg (120 lb)              Today, you had the following     No orders found for display         Today's Medication Changes          These changes are accurate as of: 4/18/17  3:38 PM.  If you have any questions, ask your nurse or doctor.               Start taking these medicines.        Dose/Directions    nortriptyline 10 MG capsule   Commonly known as:  PAMELOR   Used for:  Post-herpetic polyneuropathy   Started by:  Rogelio Rubio MD        Dose:  10 mg   Take 1 capsule (10 mg) by mouth At Bedtime   Quantity:  30 capsule   Refills:  0         These medicines have changed or have updated prescriptions.        Dose/Directions    estradiol 0.1 MG/GM cream   Commonly known as:  ESTRACE VAGINAL   This may have changed:    - when to take this  - additional instructions   Used for:  Atrophic vaginitis        Dose:  2 g   Place 2 g vaginally twice a week After using daily for 2 weeks   Quantity:  42.5 g   Refills:  11       gabapentin 100 MG capsule   Commonly known as:  NEURONTIN   This may have changed:    - how much to take  - when to take this   Used for:  Herpes zoster dermatitis        Dose:  100 mg   Take 1 capsule (100 mg) by mouth At Bedtime   Quantity:  90 capsule   Refills:  0       insulin glargine 100 UNIT/ML injection   Commonly known as:  LANTUS   This may have changed:    - when to take this  - additional  instructions   Used for:  Diabetes mellitus type 1 (H)        Inject 4 units as directed daily LANTUS SOLOSTAR PEN PLEASE   Quantity:  15 mL   Refills:  3       * lidocaine 4 % Crea cream   Commonly known as:  LMX4   This may have changed:  Another medication with the same name was added. Make sure you understand how and when to take each.   Used for:  Herpes zoster dermatitis   Changed by:  Vidal Garcia,         Apply topically 2 times daily as needed for mild pain   Quantity:  30 g   Refills:  3       * lidocaine 5 % Patch   Commonly known as:  LIDODERM   This may have changed:  You were already taking a medication with the same name, and this prescription was added. Make sure you understand how and when to take each.   Used for:  Post-herpetic polyneuropathy   Changed by:  Rogelio Rubio MD        Apply up to 3 patches to painful area at once for up to 12 h within a 24 h period.  Remove after 12 hours.   Quantity:  30 patch   Refills:  0       NovoLOG PENFILL 100 UNIT/ML injection   This may have changed:    - when to take this  - additional instructions   Used for:  Controlled type 1 diabetes mellitus (H)   Generic drug:  insulin aspart        Dose:  15 Units   Inject 15 Units Subcutaneous daily   Quantity:  4 cartridge   Refills:  3       * Notice:  This list has 2 medication(s) that are the same as other medications prescribed for you. Read the directions carefully, and ask your doctor or other care provider to review them with you.         Where to get your medicines      These medications were sent to [facility] [place], [place] - [address] [facility] [address]  [address] [facility] [address] [address]    Hours:  TRANSPLANT PHONE NUMBER [phone] Phone:  [phone]     lidocaine 5 % Patch    nortriptyline 10 MG capsule                Primary Care Provider Office Phone # Fax #    Joe Contreras [phone]        [facility] [address] [place] [address]  [facility] [address]  [facility] [address]        Thank you!     Thank you for choosing [facility]  for your care. Our goal is always to provide you with excellent care. Hearing back from our patients is one way we can continue to improve our services. Please take a few minutes to complete the written survey that you may receive in the mail after your visit with us. Thank you!             Your Updated Medication List - Protect others around you: Learn how to safely use, store and throw away your medicines at www.disposemymeds.org.          This list is accurate as of: 4/18/17  3:38 PM.  Always use your most recent med list.                   Brand Name Dispense Instructions for use    aspirin 81 MG tablet      Take 81 mg by mouth At Bedtime       AYR SALINE NASAL NO-DRIP Gel     3 Tube    Use as needed for nasal dryness       blood glucose monitoring lancets     7203 each    Test 7-8 times daily  (Lancets that go with pt current meter )       * blood glucose monitoring test strip    TRUE METRIX BLOOD GLUCOSE TEST    4 Box    Use to test blood sugar 4 times daily or as directed.       * blood glucose monitoring test strip    no brand specified    720 each    Use to test blood sugar 8 times daily . Trividia  True metrix  Brand DX E10.9       calcium + D 600-200 MG-UNIT Tabs   Generic drug:  calcium carbonate-vitamin D      Take 1 tablet by mouth 3 times daily       CENTRUM SILVER PO      Take 1 tablet by mouth daily.       estradiol 0.1 MG/GM cream    ESTRACE VAGINAL    42.5 g    Place 2 g vaginally twice a week After using daily for 2 weeks       gabapentin 100 MG capsule    NEURONTIN    90 capsule    Take 1 capsule (100 mg) by mouth At Bedtime       glucagon 1 MG kit    GLUCAGON EMERGENCY    3 each    Inject 1 mg subcutaneously or intramuscularly for severe hypoglycemic episodes.       insulin glargine 100 UNIT/ML injection    LANTUS    15 mL    Inject 4 units as directed daily LANTUS SOLOSTAR PEN PLEASE        * insulin pen needle 32G X 4 MM    BD PAM U/F    800 each    Use   Up to 8 daily       * insulin pen needle 32G X 4 MM     400 each    Use 4 pen needles daily or as directed.       * lidocaine 4 % Crea cream    LMX4    30 g    Apply topically 2 times daily as needed for mild pain       * lidocaine 5 % Patch    LIDODERM    30 patch    Apply up to 3 patches to painful area at once for up to 12 h within a 24 h period.  Remove after 12 hours.       mycophenolate 500 MG tablet    CELLCEPT - GENERIC EQUIVALENT    360 tablet    Take 2 tablets (1,000 mg) by mouth 2 times daily 2 tabs in the a.m., 2 tabs in p.m.       NIFEdipine ER osmotic 30 MG 24 hr tablet    NIFEDICAL XL    90 tablet    Take 1 tablet (30 mg) by mouth daily       nortriptyline 10 MG capsule    PAMELOR    30 capsule    Take 1 capsule (10 mg) by mouth At Bedtime       NovoLOG PENFILL 100 UNIT/ML injection   Generic drug:  insulin aspart     4 cartridge    Inject 15 Units Subcutaneous daily       NOVOPEN JR (GREEN) Priscila   Generic drug:  Injection Device for insulin      Use as directed.       omega-3 fatty acids 1200 MG capsule      Take 1 capsule by mouth daily.       simvastatin 40 MG tablet    ZOCOR    100 tablet    Take 1 tablet (40 mg) by mouth At Bedtime       TRUE METRIX METER W/DEVICE Kit     1 kit    1 each 4 times daily       TYLENOL 500 MG tablet   Generic drug:  acetaminophen      Take 500 mg by mouth At Bedtime Takes 6 tablets (500 mg ) .Christa Acharya LPN 2:40 PM on 3/23/2017       VITAMIN D (CHOLECALCIFEROL) PO      Take 2,000 Units by mouth daily (with lunch)       warfarin 5 MG tablet    COUMADIN    215 tablet    Take 10mgs on Mondays and Thursdays and 12.5mgs on all other days or as directed by the Medication Monitoring Clinic at the [facility].       * Notice:  This list has 6 medication(s) that are the same as other medications prescribed for you. Read the directions carefully, and ask your doctor or other care provider to review them with  you.

## 2017-04-19 NOTE — PROGRESS NOTES
PRIMARY CARE CLINIC    Resident Clinic Note        Visit Date: April 19, 2017    Shala Caruso  MRN: [id number]  YOB: 1945    HPI:  Shala Caruso is a 71 year old female with history of SLE on mycophenolate, DM, APS that presents for 4 week follow up for shingles.    The shingles was diagnosed in the T12/L1 dermatomes on 3/23. She was started on valacyclovir and gabapentin at that time and returned to clinic 2 weeks later. At that appointment, she still had some lesions that were scabbed over, and so the valacyclovir was continued for additional 2 weeks in the context of her immunosuppressed state. Her evening gabapentin was also increased and topical lidocaine jelly was added with some relief.     At this encounter, patient is fatigued by her dermatomal pain. It is excruciating, burning, and sharp, sometime presenting as pain in the chest. The rash itself has continued to heal, but the pain is uncontrolled. She does not believe that the gabapentin has been helping her. The lidocaine jelly has provided some relief. She also believes that applying pressure to the skin alleviates the pain. Heat/cold does not help. She has been taking 1000mg of tylenol three times per day, which has been the most helpful.     ROS:  4 point ROS was reviewed and negative other than stated in HPI    Past medical history reviewed.    Past Medical History:   Diagnosis Date     Achalasia      Antiphospholipid syndrome (H)      Antiplatelet or antithrombotic long-term use      DM (diabetes mellitus) (H)      DVT (deep venous thrombosis) (H) 2003    and PE     Dysphagia     occasional, use Nifedipine     GERD (gastroesophageal reflux disease)      Hyperlipidemia      Lymphedema      Myopia      Neuropathy (H)     toes bilateral     Nonsenile cataract      osteoporosis      Pericarditis      Raynaud's disease      SLE (systemic lupus erythematosus) (H)        Allergies   Allergen Reactions     Amaryl [Glimepiride]  Swelling     Swelling of tongue     Metformin Blisters     Experienced big blisters all over body and sloughing of skin     Plaquenil [Aminoquinolines] Hives     Sulfa Drugs Other (See Comments)     Turned bright red     Amoxicillin Rash     Hydroxychloroquine Rash     Penicillins Rash       Past Surgical History:   Procedure Laterality Date     COLONOSCOPY N/A 11/28/2016    Procedure: COLONOSCOPY;  Surgeon: Sang August MD;  Location: [facility]     CYSTOSCOPY, SLING TRANSVAGINAL N/A 12/20/2016    Procedure: CYSTOSCOPY, SLING TRANSVAGINAL;  Surgeon: Jeanmarie Pierson MD;  Location: [facility]     DISSECT LYMPH NODE AXILLA  2004    Lt, during eval for SLE     HYSTEROSCOPIC PLACEMENT CONTRACEPTIVE DEVICE  1985     TUBAL LIGATION Bilateral        Family History   Problem Relation Age of Onset     CANCER Other      breast     CEREBROVASCULAR DISEASE Other      C.A.D. Other      Glaucoma Father        Social History     Social History     Marital status:      Spouse name: N/A     Number of children: N/A     Years of education: N/A     Occupational History     Not on file.     Social History Main Topics     Smoking status: Never Smoker     Smokeless tobacco: Never Used     Alcohol use No     Drug use: No     Sexual activity: Yes     Partners: Male      Comment: , safe in relationship     Other Topics Concern     Not on file     Social History Narrative    How much exercise per week? Walk to work every morning (2 miles), swimming 3 times a week, takes walks with , periodically works out at gym on stationary bike     How much calcium per day? Supplement 3x daily        How much caffeine per day? On rare occasion, only if she is out to eat and that is all they have    How much vitamin D per day? supplement    Do you/your family wear seatbelts?  Yes    Do you/your family use safety helmets? Yes    Do you/your family use sunscreen? Yes    Do you/your family keep firearms in the home? No    Do you/your  family have a smoke detector(s)? Yes        Do you feel safe in your home? Yes    Has anyone ever touched you in an unwanted manner? No        Klever R LPN 7/18/2013                       Current Outpatient Prescriptions   Medication     nortriptyline (PAMELOR) 10 MG capsule     lidocaine (LIDODERM) 5 % Patch     mycophenolate (CELLCEPT - GENERIC EQUIVALENT) 500 MG tablet     gabapentin (NEURONTIN) 100 MG capsule     lidocaine (LMX4) 4 % CREA cream     acetaminophen (TYLENOL) 500 MG tablet     warfarin (COUMADIN) 5 MG tablet     estradiol (ESTRACE VAGINAL) 0.1 MG/GM vaginal cream     blood glucose monitoring (NO BRAND SPECIFIED) test strip     NIFEdipine ER osmotic (NIFEDICAL XL) 30 MG 24 hr tablet     simvastatin (ZOCOR) 40 MG tablet     Blood Glucose Monitoring Suppl (TRUE METRIX METER) W/DEVICE KIT     blood glucose monitoring (TRUE METRIX BLOOD GLUCOSE TEST) test strip     insulin pen needle 32G X 4 MM     insulin glargine (LANTUS) 100 UNIT/ML vial     NOVOLOG PENFILL 100 UNIT/ML soln     VITAMIN D, CHOLECALCIFEROL, PO     AYR SALINE NASAL NO-DRIP GEL     glucagon (GLUCAGON EMERGENCY) 1 MG injection     insulin pen needle (BD PEN NEEDLE PAM U/F) 32G X 4 MM MISC     LANCETS ULTRA THIN 30G MISC     aspirin 81 MG tablet     omega-3 fatty acids (FISH OIL) 1200 MG capsule     Calcium Carbonate-Vitamin D (CALCIUM + D) 600-200 MG-UNIT per tablet     Multiple Vitamins-Minerals (CENTRUM SILVER PO)     Injection Device for Insulin (NOVOPEN JR GREEN,) RORY     No current facility-administered medications for this visit.        Vitals:  /68  Pulse 71    Physical Exam:  General: NAD  Skin: Erythematous zoster rash over the left T12/L1 dermatome. Scabs have resolved. There are no areas of open ulceration or blistering. Markedly red with allodynia.   Neuro: AAOx3, no lateralizing symptoms or focal neurologic deficits  Psych: anxious, restless    Assessment:  Shala Caruso is a 71 year old female with history of  SLE, DM, GERD, HDL, APS that presents for 4 week follow up for shingles    Plan:    #Herpes Zoster dermatitis:  Diagnosed on 3/23. Has been on valacyclovir for 4 weeks now. Rash has continued to resolve, but still with persistent pain. Explained to patient that people often have chronic component of their herpetic neuralgia and that strategy should shift to focus on better pain control, with hopeful improvement over time.  -Discontinue valacyclovir  -Will try lidocaine patches in the evenings as the jelly improved pain somewhat  -Discussed increasing gabapentin/switching to lyrica, but instead will start nortriptyline  -Instructed to wean off gabapentin over next 3-4 days, and then titrate up nortriptyline by 10mg per week qHS up to 50mg if tolerating side effects.  -Continue Tylenol 1000mg TID  -Offered low dose narcotic for acute pain but she declined  -Follow up with Dr. Contreras in 1 month for further pain management      Follow-up: 1 month with Dr. Contreras    Patient seen and discussed with Dr. Clementine Rubio MD, [facility]ILKA  PGY-1, Internal Medicine   [phone]     Attending Addendum:  Patient seen and examined with resident in clinic today.  Pertinent portions of history and exam were independently verified by myself.  I agree with the exam and plan as outlined above with the following modifications: none.  Jannette [place] MD  Internal Medicine

## 2017-05-12 ENCOUNTER — ANTICOAGULATION THERAPY VISIT (OUTPATIENT)
Dept: ANTICOAGULATION | Facility: CLINIC | Age: 72
End: 2017-05-12

## 2017-05-12 DIAGNOSIS — Z79.899 ENCOUNTER FOR LONG-TERM CURRENT USE OF MEDICATION: ICD-10-CM

## 2017-05-12 DIAGNOSIS — Z79.01 LONG-TERM (CURRENT) USE OF ANTICOAGULANTS: ICD-10-CM

## 2017-05-12 DIAGNOSIS — M32.9 SLE (SYSTEMIC LUPUS ERYTHEMATOSUS) (H): ICD-10-CM

## 2017-05-12 DIAGNOSIS — Z79.01 LONG TERM CURRENT USE OF ANTICOAGULANT THERAPY: ICD-10-CM

## 2017-05-12 LAB
ALT SERPL W P-5'-P-CCNC: 26 U/L (ref 0–50)
AST SERPL W P-5'-P-CCNC: 20 U/L (ref 0–45)
BASOPHILS # BLD AUTO: 0 10E9/L (ref 0–0.2)
BASOPHILS NFR BLD AUTO: 0.8 %
DIFFERENTIAL METHOD BLD: ABNORMAL
EOSINOPHIL # BLD AUTO: 0 10E9/L (ref 0–0.7)
EOSINOPHIL NFR BLD AUTO: 1.1 %
ERYTHROCYTE [DISTWIDTH] IN BLOOD BY AUTOMATED COUNT: 20.8 % (ref 10–15)
HCT VFR BLD AUTO: 39.2 % (ref 35–47)
HGB BLD-MCNC: 12.8 G/DL (ref 11.7–15.7)
IMM GRANULOCYTES # BLD: 0 10E9/L (ref 0–0.4)
IMM GRANULOCYTES NFR BLD: 0 %
INR PPP: 2.92 (ref 0.86–1.14)
LYMPHOCYTES # BLD AUTO: 1.1 10E9/L (ref 0.8–5.3)
LYMPHOCYTES NFR BLD AUTO: 29.3 %
MCH RBC QN AUTO: 29.9 PG (ref 26.5–33)
MCHC RBC AUTO-ENTMCNC: 32.7 G/DL (ref 31.5–36.5)
MCV RBC AUTO: 92 FL (ref 78–100)
MONOCYTES # BLD AUTO: 0.4 10E9/L (ref 0–1.3)
MONOCYTES NFR BLD AUTO: 10.1 %
NEUTROPHILS # BLD AUTO: 2.1 10E9/L (ref 1.6–8.3)
NEUTROPHILS NFR BLD AUTO: 58.7 %
NRBC # BLD AUTO: 0 10*3/UL
NRBC BLD AUTO-RTO: 0 /100
PLATELET # BLD AUTO: 253 10E9/L (ref 150–450)
PROTHROM ACT/NOR PPP: 15 % (ref 60–140)
RBC # BLD AUTO: 4.28 10E12/L (ref 3.8–5.2)
WBC # BLD AUTO: 3.7 10E9/L (ref 4–11)

## 2017-05-12 NOTE — MR AVS SNAPSHOT
Shala Caruso   5/12/2017   Anticoagulation Therapy Visit    Description:  71 year old female   Provider:  Melly Morrison, RN   Department:  [facility]           INR as of 5/12/2017     Today's INR       Anticoagulation Summary as of 5/12/2017     INR goal 2.0-3.0   Today's INR    Full instructions 10 mg on Mon, Thu; 12.5 mg all other days   Next INR check 6/9/2017    Indications   SLE (systemic lupus erythematosus) (H) [M32.9]  Long-term (current) use of anticoagulants [Z79.01] [Z79.01]         May 2017 Details    Sun Mon Tue Wed Thu Fri Sat      1               2               3               4               5               6                 7               8               9               10               11               12      12.5 mg   See details      13      12.5 mg           14      12.5 mg         15      10 mg         16      12.5 mg         17      12.5 mg         18      10 mg         19      12.5 mg         20      12.5 mg           21      12.5 mg         22      10 mg         23      12.5 mg         24      12.5 mg         25      10 mg         26      12.5 mg         27      12.5 mg           28      12.5 mg         29      10 mg         30      12.5 mg         31      12.5 mg             Date Details   05/12 This INR check               How to take your warfarin dose     To take:  10 mg Take 2 of the 5 mg tablets.    To take:  12.5 mg Take 2.5 of the 5 mg tablets.           June 2017 Details    Sun Mon Tue Wed Thu Fri Sat         1      10 mg         2      12.5 mg         3      12.5 mg           4      12.5 mg         5      10 mg         6      12.5 mg         7      12.5 mg         8      10 mg         9            10                 11               12               13               14               15               16               17                 18               19               20               21               22               23               24                 25                26               27               28               29               30                 Date Details   No additional details    Date of next INR:  6/9/2017         How to take your warfarin dose     To take:  10 mg Take 2 of the 5 mg tablets.    To take:  12.5 mg Take 2.5 of the 5 mg tablets.

## 2017-05-12 NOTE — PROGRESS NOTES
ANTICOAGULATION FOLLOW-UP CLINIC VISIT    Patient Name:  Shala Caruso  Date:  5/12/2017  Contact Type:  Telephone    SUBJECTIVE:        OBJECTIVE    INR   Date Value Ref Range Status   05/12/2017 2.92 (H) 0.86 - 1.14 Final     Factor 2 Assay   Date Value Ref Range Status   05/12/2017 15 (L) 60 - 140 % Final       ASSESSMENT / PLAN  INR assessment THER    Recheck INR In: 4 WEEKS    INR Location Clinic      Anticoagulation Summary as of 5/12/2017     INR goal 2.0-3.0   Today's INR    Maintenance plan 10 mg (5 mg x 2) on Mon, Thu; 12.5 mg (5 mg x 2.5) all other days   Full instructions 10 mg on Mon, Thu; 12.5 mg all other days   Weekly total 82.5 mg   Plan last modified Stacey Beal RN (2/24/2017)   Next INR check 6/9/2017   Priority Factor 2   Target end date     Indications   SLE (systemic lupus erythematosus) (H) [M32.9]  Long-term (current) use of anticoagulants [Z79.01] [Z79.01]         Anticoagulation Episode Summary     INR check location Clinic Lab    Preferred lab     Send INR reminders to  ANTICOAG CLINIC    Comments Chromogenic 2  goal range is 15-25%  Ok to leave message on home [phone] and work voicemail, but call [email or web address] per pt request.  OK to leave message at office  May leave messages with Rodriguez Santos  DO NOT GIVE RECORDS TO EMPLOYER [employer]        Anticoagulation Care Providers     Provider Role Specialty Phone number    Mt Kiser MD Responsible Clinical Cardiac Electrophysiology [phone]            See the Encounter Report to view Anticoagulation Flowsheet and Dosing Calendar (Go to Encounters tab in chart review, and find the Anticoagulation Therapy Visit)    Left message for patient with results and dosing recommendations. Asked patient to call back to report any missed doses, falls, signs and symptoms of bleeding or clotting, any changes in health, medication, or diet. Asked patient to call back with any questions or concerns.     Melly Morrison RN

## 2017-05-12 NOTE — LETTER
May 15, 2017      Shala Caruso  [address] [place] [address]              Dear Dr. Albert Last reviewed your labs in Dr. Corbett absence. These are normal or stable. Please follow with the coumadin clinic in the INRs and factor 2. Dr. Corbett will be back mid May     Thank-you for allowing me to participate in your care.     Jeanmarie Zuniga APRN, CNP, MSN  [facility]  Department of Rheumatology & Autoimmune Disorders  [facility]: [phone] Opt 2, then Opt 1 Scheduling   [facility]: [phone]; [phone] Option 7 scheduling      Results for orders placed or performed in visit on 05/12/17   INR   Result Value Ref Range    INR 2.92 (H) 0.86 - 1.14   Factor 2 assay   Result Value Ref Range    Factor 2 Assay 15 (L) 60 - 140 %   ALT   Result Value Ref Range    ALT 26 0 - 50 U/L   AST   Result Value Ref Range    AST 20 0 - 45 U/L   CBC with platelets differential   Result Value Ref Range    WBC 3.7 (L) 4.0 - 11.0 10e9/L    RBC Count 4.28 3.8 - 5.2 10e12/L    Hemoglobin 12.8 11.7 - 15.7 g/dL    Hematocrit 39.2 35.0 - 47.0 %    MCV 92 78 - 100 fl    MCH 29.9 26.5 - 33.0 pg    MCHC 32.7 31.5 - 36.5 g/dL    RDW 20.8 (H) 10.0 - 15.0 %    Platelet Count 253 150 - 450 10e9/L    Diff Method Automated Method     % Neutrophils 58.7 %    % Lymphocytes 29.3 %    % Monocytes 10.1 %    % Eosinophils 1.1 %    % Basophils 0.8 %    % Immature Granulocytes 0.0 %    Nucleated RBCs 0 0 /100    Absolute Neutrophil 2.1 1.6 - 8.3 10e9/L    Absolute Lymphocytes 1.1 0.8 - 5.3 10e9/L    Absolute Monocytes 0.4 0.0 - 1.3 10e9/L    Absolute Eosinophils 0.0 0.0 - 0.7 10e9/L    Absolute Basophils 0.0 0.0 - 0.2 10e9/L    Abs Immature Granulocytes 0.0 0 - 0.4 10e9/L    Absolute Nucleated RBC 0.0      *Note: Due to a large number of results and/or encounters for the requested time period, some results have not been displayed. A complete set of results can be found in  Results Review.

## 2017-05-12 NOTE — PROGRESS NOTES
Dx SLE. I will ask on-call MD to review in Dr. Corbett's abscence.. WBC improved liver and kidney tests normal.     Jeanmarie DOMÍNGUEZ, CNP, MSN  [facility]  Department of Rheumatology & Autoimmune Disorders

## 2017-05-22 ENCOUNTER — OFFICE VISIT (OUTPATIENT)
Dept: INTERNAL MEDICINE | Facility: CLINIC | Age: 72
End: 2017-05-22

## 2017-05-22 VITALS
HEART RATE: 67 BPM | SYSTOLIC BLOOD PRESSURE: 118 MMHG | BODY MASS INDEX: 19.49 KG/M2 | WEIGHT: 118.9 LBS | DIASTOLIC BLOOD PRESSURE: 68 MMHG

## 2017-05-22 DIAGNOSIS — B02.29 POST HERPETIC NEURALGIA: Primary | ICD-10-CM

## 2017-05-22 RX ORDER — NORTRIPTYLINE HCL 25 MG
25 CAPSULE ORAL AT BEDTIME
Qty: 60 CAPSULE | Refills: 1 | Status: SHIPPED | OUTPATIENT
Start: 2017-05-22 | End: 2017-08-02

## 2017-05-22 ASSESSMENT — PAIN SCALES - GENERAL: PAINLEVEL: MILD PAIN (3)

## 2017-05-22 NOTE — PATIENT INSTRUCTIONS
[facility] Medication Refill Request Information:  * Please contact your pharmacy regarding ANY request for medication refills.  ** [facility] Prescription Fax = [phone]  * Please allow 3 business days for routine medication refills.  * Please allow 5 business days for controlled substance medication refills.     [facility] Test Result notification information:  *You will be notified with in 7-10 days of your appointment day regarding the results of your test.  If you are on MyChart you will be notified as soon as the provider has reviewed the results and signed off on them.    [facility] [phone]

## 2017-05-22 NOTE — MR AVS SNAPSHOT
After Visit Summary   5/22/2017    Shala Caruso    MRN: [id number]           Patient Information     Date Of Birth          1945        Visit Information        Provider Department      5/22/2017 5:20 PM Joe Contreras MD [facility]        Today's Diagnoses     Post herpetic neuralgia    -  1      Care Instructions    Primary Care Center Medication Refill Request Information:  * Please contact your pharmacy regarding ANY request for medication refills.  ** [facility] Prescription Fax = [phone]  * Please allow 3 business days for routine medication refills.  * Please allow 5 business days for controlled substance medication refills.     [facility] Test Result notification information:  *You will be notified with in 7-10 days of your appointment day regarding the results of your test.  If you are on MyChart you will be notified as soon as the provider has reviewed the results and signed off on them.    Primary Care Center [phone]             Follow-ups after your visit        Your next 10 appointments already scheduled     Jun 08, 2017  1:15 PM CDT   Lab with [facility]   [facility] ([facility])    [address] [place] [id number]   [phone]            Jul 31, 2017  7:30 AM CDT   (Arrive by 7:15 AM)   RETURN DIABETES with Dorita Cramer MD   [facility] ([facility])    [address] [place] [id number] [phone]            Aug 02, 2017  9:45 AM CDT   (Arrive by 9:30 AM)   Return Visit with Jeanmarie Pierson MD   [facility] ([facility])    [address] [place] [id number] [phone]            Aug 02, 2017 11:00 AM CDT   (Arrive by 10:45 AM)   MA SCREENING DIGITAL BILATERAL with [id number]   [facility] (  [facility])    [address] [place] [id number] [phone]           Do not use any powder, lotion or deodorant under your arms or on your breast. If you do, we will ask you to remove it before your exam.  Wear comfortable, two-piece clothing.  If you have any allergies, tell your care team.  Bring any previous mammograms from other facilities or have them mailed to the breast center. This mammogram location, [facility], now offers 3D mammography. It doesn't replace a screening mammogram and can be done with a regular screening mammogram. It is optional and not all insurances will pay for it. 3D mammography is a special kind of mammogram that produces a three-dimensional image of the breast by using low dose-xrays. 3D allows the radiologist to see the breast tissue differently from 2D, which reduces the chance of repeat testing due to overlapping breast tissue. If you are interested in have a 3D mammogram, please check with your insurance before you arrive for your exam. 3D mammography is offered to all patients. On the day of your exam you will be asked to sign a form stating yes, you wish to have 3D imaging or, no, you decline.            Oct 12, 2017  3:00 PM CDT   (Arrive by 2:45 PM)   RETURN LUPUS with Santiago Corbett MD   [facility] ([facility])    [address] [place] [id number] [phone]              Who to contact     Please call your clinic at [phone] to:    Ask questions about your health    Make or cancel appointments    Discuss your medicines    Learn about your test results    Speak to your doctor   If you have compliments or concerns about an experience at your clinic, or if you wish to file a complaint, please contact [facility] Patient Relations at [phone] or email us at [email or web address]          Additional Information About Your Visit        [employer] Information     [employer] gives you secure access to your electronic health record. If you see a primary care provider, you can also send messages to your care team and make appointments. If you have questions, please call your primary care clinic.  If you do not have a primary care provider, please call [phone] and they will assist you.      [employer] is an electronic gateway that provides easy, online access to your medical records. With [employer], you can request a clinic appointment, read your test results, renew a prescription or communicate with your care team.     To access your existing account, please contact your [facility] or call [phone] for assistance.        Care EveryWhere ID     This is your Care EveryWhere ID. This could be used by other organizations to access your [place] medical records  [id number]        Your Vitals Were     Pulse BMI (Body Mass Index)                67 19.49 kg/m2           Blood Pressure from Last 3 Encounters:   05/22/17 118/68   04/18/17 107/68   04/13/17 134/82    Weight from Last 3 Encounters:   05/22/17 53.9 kg (118 lb 14.4 oz)   04/13/17 54.7 kg (120 lb 9.6 oz)   04/04/17 55 kg (121 lb 4.8 oz)              Today, you had the following     No orders found for display         Today's Medication Changes          These changes are accurate as of: 5/22/17 11:59 PM.  If you have any questions, ask your nurse or doctor.               These medicines have changed or have updated prescriptions.        Dose/Directions    blood glucose monitoring test strip   Commonly known as:  TRUE METRIX BLOOD GLUCOSE TEST   This may have changed:  Another medication with the same name was removed. Continue taking this medication, and follow the directions you see here.   Used for:  Type I diabetes mellitus, well controlled (H)   Changed by:  Rohit Ashton, SHERRON        Use to test blood sugar 4 times daily  or as directed.   Quantity:  4 Box   Refills:  3       estradiol 0.1 MG/GM cream   Commonly known as:  ESTRACE VAGINAL   This may have changed:    - when to take this  - additional instructions   Used for:  Atrophic vaginitis        Dose:  2 g   Place 2 g vaginally twice a week After using daily for 2 weeks   Quantity:  42.5 g   Refills:  11       insulin glargine 100 UNIT/ML injection   Commonly known as:  LANTUS   This may have changed:    - when to take this  - additional instructions   Used for:  Diabetes mellitus type 1 (H)        Inject 4 units as directed daily LANTUS SOLOSTAR PEN PLEASE   Quantity:  15 mL   Refills:  3       lidocaine 5 % Patch   Commonly known as:  LIDODERM   This may have changed:  Another medication with the same name was removed. Continue taking this medication, and follow the directions you see here.   Used for:  Post-herpetic polyneuropathy   Changed by:  Rogelio Rubio MD        Apply up to 3 patches to painful area at once for up to 12 h within a 24 h period.  Remove after 12 hours.   Quantity:  30 patch   Refills:  0       nortriptyline 25 MG capsule   Commonly known as:  PAMELOR   This may have changed:    - medication strength  - how much to take   Used for:  Post herpetic neuralgia   Changed by:  Joe Contreras MD        Dose:  25 mg   Take 1 capsule (25 mg) by mouth At Bedtime   Quantity:  60 capsule   Refills:  1       NovoLOG PENFILL 100 UNIT/ML injection   This may have changed:    - when to take this  - additional instructions   Used for:  Controlled type 1 diabetes mellitus (H)   Generic drug:  insulin aspart        Dose:  15 Units   Inject 15 Units Subcutaneous daily   Quantity:  4 cartridge   Refills:  3         Stop taking these medicines if you haven't already. Please contact your care team if you have questions.     gabapentin 100 MG capsule   Commonly known as:  NEURONTIN   Stopped by:  Joe Contreras MD                Where to get your  medicines      These medications were sent to [facility] - [place], [place] - [phone]  [phone], [facility] [address]    Hours:  TRANSPLANT PHONE NUMBER [phone] Phone:  [phone]     nortriptyline 25 MG capsule                Primary Care Provider Office Phone # Fax #    Joe Contreras [phone]        [facility] [address]   [facility] [address]        Thank you!     Thank you for choosing [facility]  for your care. Our goal is always to provide you with excellent care. Hearing back from our patients is one way we can continue to improve our services. Please take a few minutes to complete the written survey that you may receive in the mail after your visit with us. Thank you!             Your Updated Medication List - Protect others around you: Learn how to safely use, store and throw away your medicines at www.disposemymeds.org.          This list is accurate as of: 5/22/17 11:59 PM.  Always use your most recent med list.                   Brand Name Dispense Instructions for use    aspirin 81 MG tablet      Take 81 mg by mouth At Bedtime       AYR SALINE NASAL NO-DRIP Gel     3 Tube    Use as needed for nasal dryness       blood glucose monitoring lancets     7203 each    Test 7-8 times daily  (Lancets that go with pt current meter )       blood glucose monitoring test strip    TRUE METRIX BLOOD GLUCOSE TEST    4 Box    Use to test blood sugar 4 times daily or as directed.       calcium + D 600-200 MG-UNIT Tabs   Generic drug:  calcium carbonate-vitamin D      Take 1 tablet by mouth 3 times daily       CENTRUM SILVER PO      Take 1 tablet by mouth daily.       estradiol 0.1 MG/GM cream    ESTRACE VAGINAL    42.5 g    Place 2 g vaginally twice a week After using daily for 2 weeks       glucagon 1 MG kit    GLUCAGON EMERGENCY    3 each    Inject 1 mg subcutaneously or intramuscularly for  severe hypoglycemic episodes.       insulin glargine 100 UNIT/ML injection    LANTUS    15 mL    Inject 4 units as directed daily LANTUS SOLOSTAR PEN PLEASE       * insulin pen needle 32G X 4 MM    BD PAM U/F    800 each    Use   Up to 8 daily       * insulin pen needle 32G X 4 MM     400 each    Use 4 pen needles daily or as directed.       lidocaine 5 % Patch    LIDODERM    30 patch    Apply up to 3 patches to painful area at once for up to 12 h within a 24 h period.  Remove after 12 hours.       mycophenolate 500 MG tablet    CELLCEPT - GENERIC EQUIVALENT    360 tablet    Take 2 tablets (1,000 mg) by mouth 2 times daily 2 tabs in the a.m., 2 tabs in p.m.       NIFEdipine ER osmotic 30 MG 24 hr tablet    NIFEDICAL XL    90 tablet    Take 1 tablet (30 mg) by mouth daily       nortriptyline 25 MG capsule    PAMELOR    60 capsule    Take 1 capsule (25 mg) by mouth At Bedtime       NovoLOG PENFILL 100 UNIT/ML injection   Generic drug:  insulin aspart     4 cartridge    Inject 15 Units Subcutaneous daily       NOVOPEN JR (GREEN) Priscila   Generic drug:  Injection Device for insulin      Use as directed.       omega-3 fatty acids 1200 MG capsule      Take 1 capsule by mouth daily.       simvastatin 40 MG tablet    ZOCOR    100 tablet    Take 1 tablet (40 mg) by mouth At Bedtime       TRUE METRIX METER W/DEVICE Kit     1 kit    1 each 4 times daily       TYLENOL 500 MG tablet   Generic drug:  acetaminophen      Take 500 mg by mouth At Bedtime Takes 6 tablets (500 mg ) .Christa Acharya LPN 2:40 PM on 3/23/2017       VITAMIN D (CHOLECALCIFEROL) PO      Take 2,000 Units by mouth daily (with lunch)       warfarin 5 MG tablet    COUMADIN    215 tablet    Take 10mgs on Mondays and Thursdays and 12.5mgs on all other days or as directed by the Medication Monitoring Clinic at the [facility].       * Notice:  This list has 2 medication(s) that are the same as other medications prescribed for you. Read the directions carefully, and ask  your doctor or other care provider to review them with you.

## 2017-05-22 NOTE — NURSING NOTE
Chief Complaint   Patient presents with     Medication Request     pt would like to discuss medications     Shingles     pt states she has jv Thomas CMA at 5:21 PM on 5/22/2017.

## 2017-05-22 NOTE — PROGRESS NOTES
HPI  HISTORY OF PRESENT ILLNESS:  A 71-year-old presents today for reevaluation of her shingles.  She was diagnosed in late March with shingles and she was treated with a 4-week course of Valtrex.  She developed post-herpetic neuralgia, was treated initially with gabapentin and subsequent lidocaine patches and then with a low dose of nortriptyline.  She is uncertain of the benefits of the nortriptyline, but has increased it to 20 mg at bedtime.  She is sleeping well with this.  She is having some well localized discomfort during the day in the left front and left back.  She is also concerned about persistent skin changes since the infection.  She was never vaccinated because she is immunosuppressed.  She has otherwise been doing well, has not been swimming, has been exercising doing a combination of elliptical and yoga, has tolerated this well without symptoms or problems.       Past Medical History:   Diagnosis Date     Achalasia      Antiphospholipid syndrome (H)      Antiplatelet or antithrombotic long-term use      DM (diabetes mellitus) (H)      DVT (deep venous thrombosis) (H) 2003    and PE     Dysphagia     occasional, use Nifedipine     GERD (gastroesophageal reflux disease)      Hyperlipidemia      Lymphedema      Myopia      Neuropathy (H)     toes bilateral     Nonsenile cataract      osteoporosis      Pericarditis      Raynaud's disease      SLE (systemic lupus erythematosus) (H)      Past Surgical History:   Procedure Laterality Date     COLONOSCOPY N/A 11/28/2016    Procedure: COLONOSCOPY;  Surgeon: Sang August MD;  Location: [facility]     CYSTOSCOPY, SLING TRANSVAGINAL N/A 12/20/2016    Procedure: CYSTOSCOPY, SLING TRANSVAGINAL;  Surgeon: Jeanmarie Pierson MD;  [facility]     DISSECT LYMPH NODE AXILLA  2004    Lt, during eval for SLE     HYSTEROSCOPIC PLACEMENT CONTRACEPTIVE DEVICE  1985     TUBAL LIGATION Bilateral      Family History   Problem Relation Age of Onset     CANCER Other       breast     CEREBROVASCULAR DISEASE Other      C.A.D. Other      Glaucoma Father      Social History     Social History     Marital status:      Spouse name: N/A     Number of children: N/A     Years of education: N/A     Social History Main Topics     Smoking status: Never Smoker     Smokeless tobacco: Never Used     Alcohol use No     Drug use: No     Sexual activity: Yes     Partners: Male      Comment: , safe in relationship     Other Topics Concern     None     Social History Narrative    How much exercise per week? Walk to work every morning (2 miles), swimming 3 times a week, takes walks with , periodically works out at gym on stationary bike     How much calcium per day? Supplement 3x daily        How much caffeine per day? On rare occasion, only if she is out to eat and that is all they have    How much vitamin D per day? supplement    Do you/your family wear seatbelts?  Yes    Do you/your family use safety helmets? Yes    Do you/your family use sunscreen? Yes    Do you/your family keep firearms in the home? No    Do you/your family have a smoke detector(s)? Yes        Do you feel safe in your home? Yes    Has anyone ever touched you in an unwanted manner? No        Klever R JESSICA 7/18/2013                       Exam:  /68 (BP Location: Right arm, Patient Position: Chair, Cuff Size: Adult Regular)  Pulse 67  Wt 53.9 kg (118 lb 14.4 oz)  BMI 19.49 kg/m2  118 lbs 14.4 oz  PHYSICAL EXAMINATION:  The patient has scarring in the area for shingles.  There are 2 areas that are still painful and both of these show some residual inflammation with erythema on a raised base but no evidence of blistering or scabbing.      ASSESSMENT:   1. Resolving shingles.   2. Mild post-traumatic neuralgia.      PLAN:  I am going to increase her nortriptyline 25 mg at bedtime for the next week or 10 days and then go to 50 mg if she is still having discomfort.  She can swim.  No restrictions on her physical  activity.  Follow up in a month or two if symptoms have not resolved by then.       Joe Contreras MD  General Internal Medicine  Primary Care Center  [phone]

## 2017-06-01 ENCOUNTER — TELEPHONE (OUTPATIENT)
Dept: GASTROENTEROLOGY | Facility: CLINIC | Age: 72
End: 2017-06-01

## 2017-06-01 DIAGNOSIS — E10.9 CONTROLLED TYPE 1 DIABETES MELLITUS (H): ICD-10-CM

## 2017-06-01 RX ORDER — INSULIN ASPART 100 [IU]/ML
15 INJECTION, SOLUTION INTRAVENOUS; SUBCUTANEOUS DAILY
Qty: 15 ML | Refills: 3 | Status: SHIPPED | OUTPATIENT
Start: 2017-06-01 | End: 2017-06-01

## 2017-06-01 RX ORDER — INSULIN ASPART 100 [IU]/ML
INJECTION, SOLUTION INTRAVENOUS; SUBCUTANEOUS
Qty: 15 ML | Refills: 3 | Status: SHIPPED | OUTPATIENT
Start: 2017-06-01 | End: 2018-01-29

## 2017-06-01 NOTE — TELEPHONE ENCOUNTER
Kamala, pharmacist, calling asking for more specific instructions for Novolog Rx recently sent. Should it be with a certain meal? Sliding scale? Please verify. Kamala can be contacted directly at [phone].

## 2017-06-08 ENCOUNTER — HOSPITAL ENCOUNTER (OUTPATIENT)
Facility: CLINIC | Age: 72
Setting detail: SPECIMEN
Discharge: HOME | End: 2017-06-08
Attending: INTERNAL MEDICINE | Admitting: INTERNAL MEDICINE
Payer: COMMERCIAL

## 2017-06-08 DIAGNOSIS — Z79.01 LONG TERM CURRENT USE OF ANTICOAGULANT THERAPY: ICD-10-CM

## 2017-06-08 LAB — INR PPP: 4.08 (ref 0.86–1.14)

## 2017-06-08 PROCEDURE — 85210 CLOT FACTOR II PROTHROM SPEC: CPT | Performed by: INTERNAL MEDICINE

## 2017-06-08 PROCEDURE — 36415 COLL VENOUS BLD VENIPUNCTURE: CPT | Performed by: INTERNAL MEDICINE

## 2017-06-08 PROCEDURE — 85610 PROTHROMBIN TIME: CPT | Performed by: INTERNAL MEDICINE

## 2017-06-09 ENCOUNTER — ANTICOAGULATION THERAPY VISIT (OUTPATIENT)
Dept: ANTICOAGULATION | Facility: CLINIC | Age: 72
End: 2017-06-09

## 2017-06-09 DIAGNOSIS — Z79.01 LONG-TERM (CURRENT) USE OF ANTICOAGULANTS: ICD-10-CM

## 2017-06-09 DIAGNOSIS — M32.9 SLE (SYSTEMIC LUPUS ERYTHEMATOSUS) (H): ICD-10-CM

## 2017-06-09 LAB — PROTHROM ACT/NOR PPP: 10 % (ref 60–140)

## 2017-06-09 NOTE — MR AVS SNAPSHOT
Shala Caruso   6/9/2017   Anticoagulation Therapy Visit    Description:  71 year old female   Provider:  Ilana Gonzalez, RN   Department:  [facility]           INR as of 6/9/2017     Today's INR 4.08! (6/8/2017)      Anticoagulation Summary as of 6/9/2017     INR goal 2.0-3.0   Today's INR 4.08! (6/8/2017)   Full instructions 6/9: 7.5 mg; Otherwise 10 mg on Mon, Thu; 12.5 mg all other days   Next INR check 6/15/2017    Indications   SLE (systemic lupus erythematosus) (H) [M32.9]  Long-term (current) use of anticoagulants [Z79.01] [Z79.01]         June 2017 Details    Sun Mon Tue Wed Thu Fri Sat         1               2               3                 4               5               6               7               8               9      7.5 mg   See details      10      12.5 mg           11      12.5 mg         12      10 mg         13      12.5 mg         14      12.5 mg         15            16               17                 18               19               20               21               22               23               24                 25               26               27               28               29               30                 Date Details   06/09 This INR check       Date of next INR:  6/15/2017         How to take your warfarin dose     To take:  7.5 mg Take 1.5 of the 5 mg tablets.    To take:  10 mg Take 2 of the 5 mg tablets.    To take:  12.5 mg Take 2.5 of the 5 mg tablets.

## 2017-06-09 NOTE — PROGRESS NOTES
ANTICOAGULATION FOLLOW-UP CLINIC VISIT    Patient Name:  Shala Caruso  Date:  6/9/2017  Contact Type:  Telephone    SUBJECTIVE:     Patient Findings     Positives Bruising, OTC meds    Comments Pt suspected she was low as she has experienced bruising on extremities.  She is taking 2 Gm of tylenol/24 hr for effects from shingles earlier this year.  This will most likely continue & may require change in her maintenance dose.           OBJECTIVE    INR   Date Value Ref Range Status   06/08/2017 4.08 (H) 0.86 - 1.14 Final     Factor 2 Assay   Date Value Ref Range Status   06/08/2017 10 (L) 60 - 140 % Final       ASSESSMENT / PLAN  No question data found.  Anticoagulation Summary as of 6/9/2017     INR goal 2.0-3.0   Today's INR 4.08! (6/8/2017)   Maintenance plan 10 mg (5 mg x 2) on Mon, Thu; 12.5 mg (5 mg x 2.5) all other days   Full instructions 6/9: 7.5 mg; Otherwise 10 mg on Mon, Thu; 12.5 mg all other days   Weekly total 82.5 mg   Plan last modified Stacey Beal, RN (2/24/2017)   Next INR check 6/15/2017   Priority Factor 2   Target end date     Indications   SLE (systemic lupus erythematosus) (H) [M32.9]  Long-term (current) use of anticoagulants [Z79.01] [Z79.01]         Anticoagulation Episode Summary     INR check location Clinic Lab    Preferred lab     Send INR reminders to [facility]    Comments Factor 2  goal range is 15-25%  Ok to leave message on home [phone] and work voicemail, but call [email or web address] per pt request.  OK to leave message at office  May leave messages with Rodriguez Santos  DO NOT GIVE RECORDS TO EMPLOYER U        Anticoagulation Care Providers     Provider Role Specialty Phone number    Mt Kiser MD Responsible Clinical Cardiac Electrophysiology [phone]            See the Encounter Report to view Anticoagulation Flowsheet and Dosing Calendar (Go to Encounters tab in chart review, and find the Anticoagulation Therapy Visit)    One time dose adjustment today &  re-check in 1 week.  Factor 2 from 6/8: 10%  See above notation     Ilana Gonzalez RN

## 2017-06-15 DIAGNOSIS — Z79.01 LONG TERM CURRENT USE OF ANTICOAGULANT THERAPY: ICD-10-CM

## 2017-06-15 LAB — INR PPP: 4.24 (ref 0.86–1.14)

## 2017-06-16 ENCOUNTER — ANTICOAGULATION THERAPY VISIT (OUTPATIENT)
Dept: ANTICOAGULATION | Facility: CLINIC | Age: 72
End: 2017-06-16

## 2017-06-16 DIAGNOSIS — M32.9 SLE (SYSTEMIC LUPUS ERYTHEMATOSUS) (H): ICD-10-CM

## 2017-06-16 DIAGNOSIS — Z79.01 LONG-TERM (CURRENT) USE OF ANTICOAGULANTS: ICD-10-CM

## 2017-06-16 LAB — PROTHROM ACT/NOR PPP: 9 % (ref 60–140)

## 2017-06-16 NOTE — PROGRESS NOTES
ANTICOAGULATION FOLLOW-UP CLINIC VISIT    Patient Name:  Shala Caruso  Date:  6/16/2017  Contact Type:  Telephone    SUBJECTIVE:     Patient Findings     Positives Bruising (Major bruising on forearms and some on knees. ), OTC meds (Taking less tylenol- some days 1000mg, and some days none.)    Comments Shingles improving-2 spots that are still there , but diminishing in size and painfulness.  Factor 2=9%.  Goal range=15-25%.           OBJECTIVE    INR   Date Value Ref Range Status   06/15/2017 4.24 (H) 0.86 - 1.14 Final     Factor 2 Assay   Date Value Ref Range Status   06/15/2017 9 (LL) 60 - 140 % Final     Comment:     Critical Value called to and read back by  SHERRON SHAFFER [id number] CI         ASSESSMENT / PLAN  INR assessment SUPRA    Recheck INR In: 1 WEEK    INR Location Clinic      Anticoagulation Summary as of 6/16/2017     INR goal 2.0-3.0   Today's INR 4.24! (6/15/2017)   Maintenance plan 12.5 mg (5 mg x 2.5) on Sun, Tue, Thu; 10 mg (5 mg x 2) all other days   Full instructions 6/16: 5 mg; Otherwise 12.5 mg on Sun, Tue, Thu; 10 mg all other days   Weekly total 77.5 mg   Plan last modified Portia Shaffer RN (6/16/2017)   Next INR check 6/23/2017   Priority Factor 2   Target end date     Indications   SLE (systemic lupus erythematosus) (H) [M32.9]  Long-term (current) use of anticoagulants [Z79.01] [Z79.01]         Anticoagulation Episode Summary     INR check location Clinic Lab    Preferred lab     [facility]    Comments Factor 2  goal range is 15-25%  Ok to leave message on home [phone] and work voicemail, but call [email or web address] per pt request.  OK to leave message at office  May leave messages with Rodriguez Santos  DO NOT GIVE RECORDS TO EMPLOYER U        Anticoagulation Care Providers     Provider Role Specialty Phone number    Mt Kiser MD Responsible Clinical Cardiac Electrophysiology [phone]            See the Encounter Report to view  Anticoagulation Flowsheet and Dosing Calendar (Go to Encounters tab in chart review, and find the Anticoagulation Therapy Visit)    Spoke with patient.    Portia Cherry RN

## 2017-06-16 NOTE — MR AVS SNAPSHOT
Shala Caruso   6/16/2017   Anticoagulation Therapy Visit    Description:  71 year old female   Provider:  Portia Cherry RN   Department:   [facility] Clinic           INR as of 6/16/2017     Today's INR 4.24! (6/15/2017)      Anticoagulation Summary as of 6/16/2017     INR goal 2.0-3.0   Today's INR 4.24! (6/15/2017)   Full instructions 6/16: 5 mg; Otherwise 12.5 mg on Sun, Tue, Thu; 10 mg all other days   Next INR check 6/23/2017    Indications   SLE (systemic lupus erythematosus) (H) [M32.9]  Long-term (current) use of anticoagulants [Z79.01] [Z79.01]         June 2017 Details    Sun Mon Tue Wed Thu Fri Sat         1               2               3                 4               5               6               7               8               9               10                 11               12               13               14               15               16      5 mg   See details      17      10 mg           18      12.5 mg         19      10 mg         20      12.5 mg         21      10 mg         22      12.5 mg         23            24                 25               26               27               28               29               30                 Date Details   06/16 This INR check       Date of next INR:  6/23/2017         How to take your warfarin dose     To take:  5 mg Take 1 of the 5 mg tablets.    To take:  10 mg Take 2 of the 5 mg tablets.    To take:  12.5 mg Take 2.5 of the 5 mg tablets.

## 2017-06-19 DIAGNOSIS — E10.9 DIABETES MELLITUS TYPE 1 (H): ICD-10-CM

## 2017-06-19 RX ORDER — INSULIN GLARGINE 100 [IU]/ML
INJECTION, SOLUTION SUBCUTANEOUS
Qty: 15 ML | Refills: 3 | Status: SHIPPED | OUTPATIENT
Start: 2017-06-19 | End: 2018-01-29

## 2017-06-22 ENCOUNTER — ANTICOAGULATION THERAPY VISIT (OUTPATIENT)
Dept: ANTICOAGULATION | Facility: CLINIC | Age: 72
End: 2017-06-22

## 2017-06-22 DIAGNOSIS — Z79.01 LONG TERM CURRENT USE OF ANTICOAGULANT THERAPY: ICD-10-CM

## 2017-06-22 DIAGNOSIS — M32.9 SLE (SYSTEMIC LUPUS ERYTHEMATOSUS) (H): ICD-10-CM

## 2017-06-22 DIAGNOSIS — Z79.01 LONG-TERM (CURRENT) USE OF ANTICOAGULANTS: ICD-10-CM

## 2017-06-22 LAB
INR PPP: 2.25 (ref 0.86–1.14)
PROTHROM ACT/NOR PPP: 20 % (ref 60–140)

## 2017-06-22 NOTE — PROGRESS NOTES
ANTICOAGULATION FOLLOW-UP CLINIC VISIT    Patient Name:  Shala Caruso  Date:  6/22/2017  Contact Type:  Telephone    SUBJECTIVE:     Patient Findings     Comments Pt reports that now she is taking Tylenol PRN and not Q 24 hours.            OBJECTIVE    INR   Date Value Ref Range Status   06/22/2017 2.25 (H) 0.86 - 1.14 Final     Factor 2 Assay   Date Value Ref Range Status   06/22/2017 20 (L) 60 - 140 % Final       ASSESSMENT / PLAN  INR assessment THER    Recheck INR In: 1 WEEK    INR Location Clinic      Anticoagulation Summary as of 6/22/2017     INR goal 2.0-3.0   Today's INR    Maintenance plan 12.5 mg (5 mg x 2.5) on Sun, Tue, Thu; 10 mg (5 mg x 2) all other days   Full instructions 6/24: 12.5 mg; 6/25: 10 mg; 6/26: 12.5 mg; 6/27: 10 mg; 6/28: 12.5 mg; Otherwise 12.5 mg on Sun, Tue, Thu; 10 mg all other days   Weekly total 77.5 mg   Plan last modified Portia Cherry, RN (6/16/2017)   Next INR check 6/29/2017   Priority Factor 2   Target end date     Indications   SLE (systemic lupus erythematosus) (H) [M32.9]  Long-term (current) use of anticoagulants [Z79.01] [Z79.01]         Anticoagulation Episode Summary     INR check location Clinic Lab    Preferred lab     Send INR reminders to [facility]    Comments Factor 2  goal range is 15-25%  Ok to leave message on home [phone] and work voicemail, but call [email or web address] per pt request.  OK to leave message at office  May leave messages with Rodriguez Santos  DO NOT GIVE RECORDS TO EMPLOYER U        Anticoagulation Care Providers     Provider Role Specialty Phone number    Mt Kiser MD Responsible Clinical Cardiac Electrophysiology [phone]            See the Encounter Report to view Anticoagulation Flowsheet and Dosing Calendar (Go to Encounters tab in chart review, and find the Anticoagulation Therapy Visit)    Spoke with patient. Gave them their lab results and new warfarin recommendation.  No changes in health, medication, or diet. No  missed doses, no falls. No signs or symptoms of bleed or clotting.     Will try alternating 12.5mg/10mg due to last two Factor II's have been supra therapeutic     Melly Morrison RN

## 2017-06-22 NOTE — MR AVS SNAPSHOT
Shala Caruso   6/22/2017   Anticoagulation Therapy Visit    Description:  71 year old female   Provider:  Melly Morrison, RN   Department[facility]  [facility]           INR as of 6/22/2017     Today's INR       Anticoagulation Summary as of 6/22/2017     INR goal 2.0-3.0   Today's INR    Full instructions 6/24: 12.5 mg; 6/25: 10 mg; 6/26: 12.5 mg; 6/27: 10 mg; 6/28: 12.5 mg; Otherwise 12.5 mg on Sun, Tue, Thu; 10 mg all other days   Next INR check 6/29/2017    Indications   SLE (systemic lupus erythematosus) (H) [M32.9]  Long-term (current) use of anticoagulants [Z79.01] [Z79.01]         June 2017 Details    Sun Mon Tue Wed Thu Fri Sat         1               2               3                 4               5               6               7               8               9               10                 11               12               13               14               15               16               17                 18               19               20               21               22      12.5 mg   See details      23      10 mg         24      12.5 mg           25      10 mg         26      12.5 mg         27      10 mg         28      12.5 mg         29            30                 Date Details   06/22 This INR check       Date of next INR:  6/29/2017         How to take your warfarin dose     To take:  10 mg Take 2 of the 5 mg tablets.    To take:  12.5 mg Take 2.5 of the 5 mg tablets.

## 2017-06-29 DIAGNOSIS — Z79.01 LONG TERM CURRENT USE OF ANTICOAGULANT THERAPY: ICD-10-CM

## 2017-06-29 LAB — INR PPP: 3.05 (ref 0.86–1.14)

## 2017-06-30 ENCOUNTER — ANTICOAGULATION THERAPY VISIT (OUTPATIENT)
Dept: ANTICOAGULATION | Facility: CLINIC | Age: 72
End: 2017-06-30

## 2017-06-30 DIAGNOSIS — M32.9 SLE (SYSTEMIC LUPUS ERYTHEMATOSUS) (H): ICD-10-CM

## 2017-06-30 DIAGNOSIS — Z79.01 LONG-TERM (CURRENT) USE OF ANTICOAGULANTS: ICD-10-CM

## 2017-06-30 LAB — PROTHROM ACT/NOR PPP: 16 % (ref 60–140)

## 2017-06-30 NOTE — MR AVS SNAPSHOT
Shala Caruso   6/30/2017   Anticoagulation Therapy Visit    Description:  71 year old female   Provider:  Portia Cherry, RN   Department:  [facility]           INR as of 6/30/2017     Today's INR 3.05! (6/29/2017)      Anticoagulation Summary as of 6/30/2017     INR goal 2.0-3.0   Today's INR 3.05! (6/29/2017)   Full instructions 12.5 mg on Sun, Tue, Thu; 10 mg all other days   Next INR check 7/14/2017    Indications   SLE (systemic lupus erythematosus) (H) [M32.9]  Long-term (current) use of anticoagulants [Z79.01] [Z79.01]         June 2017 Details    Sun Mon Tue Wed Thu Fri Sat         1               2               3                 4               5               6               7               8               9               10                 11               12               13               14               15               16               17                 18               19               20               21               22               23               24                 25               26               27               28               29               30      10 mg   See details        Date Details   06/30 This INR check               How to take your warfarin dose     To take:  10 mg Take 2 of the 5 mg tablets.           July 2017 Details    Sun Mon Tue Wed Thu Fri Sat           1      10 mg           2      12.5 mg         3      10 mg         4      12.5 mg         5      10 mg         6      12.5 mg         7      10 mg         8      10 mg           9      12.5 mg         10      10 mg         11      12.5 mg         12      10 mg         13      12.5 mg         14            15                 16               17               18               19               20               21               22                 23               24               25               26               27               28               29                 30               31                      Date Details   No additional details    Date of next INR:  7/14/2017         How to take your warfarin dose     To take:  10 mg Take 2 of the 5 mg tablets.    To take:  12.5 mg Take 2.5 of the 5 mg tablets.

## 2017-06-30 NOTE — PROGRESS NOTES
ANTICOAGULATION FOLLOW-UP CLINIC VISIT    Patient Name:  Shala Caruso  Date:  6/30/2017  Contact Type:  Telephone    SUBJECTIVE:        OBJECTIVE    INR   Date Value Ref Range Status   06/29/2017 3.05 (H) 0.86 - 1.14 Final     Factor 2 Assay   Date Value Ref Range Status   06/29/2017 16 (L) 60 - 140 % Final       ASSESSMENT / PLAN  INR assessment THER    Recheck INR In: 2 WEEKS    INR Location Clinic      Anticoagulation Summary as of 6/30/2017     INR goal 2.0-3.0   Today's INR 3.05! (6/29/2017)   Maintenance plan 12.5 mg (5 mg x 2.5) on Sun, Tue, Thu; 10 mg (5 mg x 2) all other days   Full instructions 12.5 mg on Sun, Tue, Thu; 10 mg all other days   Weekly total 77.5 mg   Plan last modified Portia Cherry RN (6/16/2017)   Next INR check 7/14/2017   Priority Factor 2   Target end date     Indications   SLE (systemic lupus erythematosus) (H) [M32.9]  Long-term (current) use of anticoagulants [Z79.01] [Z79.01]         Anticoagulation Episode Summary     INR check location Clinic Lab    Preferred lab     Send INR reminders to [facility]    Comments Factor 2  goal range is 15-25%  Ok to leave message on home [phone] and work voicemail, but call [email or web address] per pt request.  OK to leave message at office  May leave messages with Rodriguez Santos  DO NOT GIVE RECORDS TO EMPLOYER U        Anticoagulation Care Providers     Provider Role Specialty Phone number    Mt Kiser MD Responsible Clinical Cardiac Electrophysiology [phone]            See the Encounter Report to view Anticoagulation Flowsheet and Dosing Calendar (Go to Encounters tab in chart review, and find the Anticoagulation Therapy Visit)  Left message for patient with results and dosing recommendations. Asked patient to call back to report any missed doses, falls, signs and symptoms of bleeding or clotting, any changes in health, medication, or diet. Asked patient to call back with any questions or concerns.      Portia LINCOLN  SHERRON Cherry

## 2017-07-03 DIAGNOSIS — I10 HTN (HYPERTENSION): Primary | ICD-10-CM

## 2017-07-03 DIAGNOSIS — K22.0 ACHALASIA OF ESOPHAGUS: ICD-10-CM

## 2017-07-06 RX ORDER — NIFEDIPINE 30 MG/1
30 TABLET, EXTENDED RELEASE ORAL DAILY
Qty: 90 TABLET | Refills: 3 | Status: SHIPPED | OUTPATIENT
Start: 2017-07-06 | End: 2017-10-12

## 2017-07-06 NOTE — TELEPHONE ENCOUNTER
NIFEdipine        Last Written Prescription Date:  9/29/16  Last Fill Quantity: 90,   # refills: 2  Last Office Visit : 5/22/17  Future Office visit:  7/11/17  BP Readings from Last 3 Encounters:   05/22/17 118/68   04/18/17 107/68   04/13/17 134/82

## 2017-07-11 ENCOUNTER — OFFICE VISIT (OUTPATIENT)
Dept: INTERNAL MEDICINE | Facility: CLINIC | Age: 72
End: 2017-07-11

## 2017-07-11 VITALS — HEART RATE: 79 BPM | SYSTOLIC BLOOD PRESSURE: 101 MMHG | DIASTOLIC BLOOD PRESSURE: 66 MMHG

## 2017-07-11 DIAGNOSIS — Z79.899 ENCOUNTER FOR LONG-TERM CURRENT USE OF MEDICATION: ICD-10-CM

## 2017-07-11 DIAGNOSIS — M32.9 SLE (SYSTEMIC LUPUS ERYTHEMATOSUS) (H): ICD-10-CM

## 2017-07-11 DIAGNOSIS — T30.0 BURN: ICD-10-CM

## 2017-07-11 DIAGNOSIS — Z00.00 HEALTH MAINTENANCE EXAMINATION: Primary | ICD-10-CM

## 2017-07-11 DIAGNOSIS — Z79.01 LONG TERM CURRENT USE OF ANTICOAGULANT THERAPY: ICD-10-CM

## 2017-07-11 DIAGNOSIS — Z00.00 HEALTH MAINTENANCE EXAMINATION: ICD-10-CM

## 2017-07-11 DIAGNOSIS — T14.8XXA HEMATOMA AND CONTUSION: ICD-10-CM

## 2017-07-11 LAB
ALT SERPL W P-5'-P-CCNC: 23 U/L (ref 0–50)
AST SERPL W P-5'-P-CCNC: 19 U/L (ref 0–45)
BASOPHILS # BLD AUTO: 0 10E9/L (ref 0–0.2)
BASOPHILS NFR BLD AUTO: 0.9 %
CREAT UR-MCNC: 76 MG/DL
DIFFERENTIAL METHOD BLD: ABNORMAL
EOSINOPHIL # BLD AUTO: 0.1 10E9/L (ref 0–0.7)
EOSINOPHIL NFR BLD AUTO: 3.2 %
ERYTHROCYTE [DISTWIDTH] IN BLOOD BY AUTOMATED COUNT: 15.9 % (ref 10–15)
HBA1C MFR BLD: 5.3 % (ref 4.3–6)
HCT VFR BLD AUTO: 37.5 % (ref 35–47)
HGB BLD-MCNC: 11.9 G/DL (ref 11.7–15.7)
IMM GRANULOCYTES # BLD: 0 10E9/L (ref 0–0.4)
IMM GRANULOCYTES NFR BLD: 0 %
INR PPP: 2.43 (ref 0.86–1.14)
LYMPHOCYTES # BLD AUTO: 0.7 10E9/L (ref 0.8–5.3)
LYMPHOCYTES NFR BLD AUTO: 21.1 %
MCH RBC QN AUTO: 30.9 PG (ref 26.5–33)
MCHC RBC AUTO-ENTMCNC: 31.7 G/DL (ref 31.5–36.5)
MCV RBC AUTO: 97 FL (ref 78–100)
MICROALBUMIN UR-MCNC: <5 MG/L
MICROALBUMIN/CREAT UR: NORMAL MG/G CR (ref 0–25)
MONOCYTES # BLD AUTO: 0.4 10E9/L (ref 0–1.3)
MONOCYTES NFR BLD AUTO: 10.6 %
NEUTROPHILS # BLD AUTO: 2.2 10E9/L (ref 1.6–8.3)
NEUTROPHILS NFR BLD AUTO: 64.2 %
NRBC # BLD AUTO: 0 10*3/UL
NRBC BLD AUTO-RTO: 0 /100
PLATELET # BLD AUTO: 202 10E9/L (ref 150–450)
RBC # BLD AUTO: 3.85 10E12/L (ref 3.8–5.2)
WBC # BLD AUTO: 3.4 10E9/L (ref 4–11)

## 2017-07-11 RX ORDER — SILVER SULFADIAZINE 10 MG/G
CREAM TOPICAL DAILY
Status: CANCELLED | OUTPATIENT
Start: 2017-07-11

## 2017-07-11 ASSESSMENT — PAIN SCALES - GENERAL: PAINLEVEL: NO PAIN (0)
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
## 2017-07-11 NOTE — NURSING NOTE
Chief Complaint   Patient presents with     Mass     pt states having bruising and lumps on right arm and right breast       Jenn Thomas CMA at 1:37 PM on 7/11/2017.

## 2017-07-11 NOTE — PATIENT INSTRUCTIONS
[facility] Medication Refill Request Information:  * Please contact your pharmacy regarding ANY request for medication refills.  ** [facility] Prescription Fax = [phone]  * Please allow 3 business days for routine medication refills.  * Please allow 5 business days for controlled substance medication refills.     [facility] Test Result notification information:  *You will be notified with in 7-10 days of your appointment day regarding the results of your test.  If you are on MyChart you will be notified as soon as the provider has reviewed the results and signed off on them.    [facility] [phone]     1. Take medications as instructed.  2. Labs today.  3. Follow up as needed.

## 2017-07-12 ENCOUNTER — ANTICOAGULATION THERAPY VISIT (OUTPATIENT)
Dept: ANTICOAGULATION | Facility: CLINIC | Age: 72
End: 2017-07-12

## 2017-07-12 DIAGNOSIS — M32.9 SLE (SYSTEMIC LUPUS ERYTHEMATOSUS) (H): ICD-10-CM

## 2017-07-12 DIAGNOSIS — Z79.01 LONG-TERM (CURRENT) USE OF ANTICOAGULANTS: ICD-10-CM

## 2017-07-12 LAB — PROTHROM ACT/NOR PPP: 20 % (ref 60–140)

## 2017-07-12 NOTE — MR AVS SNAPSHOT
Shala Caruso   7/12/2017   Anticoagulation Therapy Visit    Description:  71 year old female   Provider:  Stacey Beal, RN   Department:   Antico Clinic           INR as of 7/12/2017     Today's INR       Anticoagulation Summary as of 7/12/2017     INR goal 2.0-3.0   Today's INR    Full instructions 12.5 mg on Sun, Tue, Thu; 10 mg all other days   Next INR check 8/1/2017    Indications   SLE (systemic lupus erythematosus) (H) [M32.9]  Long-term (current) use of anticoagulants [Z79.01] [Z79.01]         July 2017 Details    Sun Mon Tue Wed Thu Fri Sat           1                 2               3               4               5               6               7               8                 9               10               11               12      10 mg   See details      13      12.5 mg         14      10 mg         15      10 mg           16      12.5 mg         17      10 mg         18      12.5 mg         19      10 mg         20      12.5 mg         21      10 mg         22      10 mg           23      12.5 mg         24      10 mg         25      12.5 mg         26      10 mg         27      12.5 mg         28      10 mg         29      10 mg           30      12.5 mg         31      10 mg               Date Details   07/12 This INR check               How to take your warfarin dose     To take:  10 mg Take 2 of the 5 mg tablets.    To take:  12.5 mg Take 2.5 of the 5 mg tablets.           August 2017 Details    Sun Mon Tue Wed Thu Fri Sat       1            2               3               4               5                 6               7               8               9               10               11               12                 13               14               15               16               17               18               19                 20               21               22               23               24               25               26                 27                28               29               30               31                  Date Details   No additional details    Date of next INR:  8/1/2017         How to take your warfarin dose     To take:  12.5 mg Take 2.5 of the 5 mg tablets.

## 2017-07-12 NOTE — PROGRESS NOTES
ANTICOAGULATION FOLLOW-UP CLINIC VISIT    Patient Name:  Shala Caruso  Date:  7/12/2017  Contact Type:  Telephone    SUBJECTIVE:        OBJECTIVE    INR   Date Value Ref Range Status   07/11/2017 2.43 (H) 0.86 - 1.14 Final     Factor 2 Assay   Date Value Ref Range Status   07/11/2017 20 (L) 60 - 140 % Final       ASSESSMENT / PLAN  INR assessment THER    Recheck INR In: 3 WEEKS    INR Location Clinic      Anticoagulation Summary as of 7/12/2017     INR goal 2.0-3.0   Today's INR    Maintenance plan 12.5 mg (5 mg x 2.5) on Sun, Tue, Thu; 10 mg (5 mg x 2) all other days   Full instructions 12.5 mg on Sun, Tue, Thu; 10 mg all other days   Weekly total 77.5 mg   No change documented Stacey Beal RN   Plan last modified Portia Cherry RN (6/16/2017)   Next INR check 8/1/2017   Priority Factor 2   Target end date     Indications   SLE (systemic lupus erythematosus) (H) [M32.9]  Long-term (current) use of anticoagulants [Z79.01] [Z79.01]         Anticoagulation Episode Summary     INR check location Clinic Lab    Preferred lab     Send INR reminders to  ANTICO CLINIC    Comments Factor 2  goal range is 15-25%  Ok to leave message on home [phone] and work voicemail, but call [email or web address] per pt request.  OK to leave message at office  May leave messages with Rodriguez Santos  DO NOT GIVE RECORDS TO EMPLOYER [employer]        Anticoagulation Care Providers     Provider Role Specialty Phone number    Mt Kiser MD Responsible Clinical Cardiac Electrophysiology [phone]            See the Encounter Report to view Anticoagulation Flowsheet and Dosing Calendar (Go to Encounters tab in chart review, and find the Anticoagulation Therapy Visit)    Left message for patient with results and dosing recommendations. Asked patient to call back to report any missed doses, falls, signs and symptoms of bleeding or clotting, any changes in health, medication, or diet. Asked patient to call back with any questions or  concerns.     Stacey Beal RN

## 2017-07-13 DIAGNOSIS — Z00.00 HEALTH MAINTENANCE EXAMINATION: ICD-10-CM

## 2017-07-13 DIAGNOSIS — Z79.01 LONG TERM CURRENT USE OF ANTICOAGULANT THERAPY: ICD-10-CM

## 2017-07-13 LAB
CHOLEST SERPL-MCNC: 160 MG/DL
HDLC SERPL-MCNC: 94 MG/DL
INR PPP: 2.68 (ref 0.86–1.14)
LDLC SERPL CALC-MCNC: 56 MG/DL
NONHDLC SERPL-MCNC: 66 MG/DL
TRIGL SERPL-MCNC: 53 MG/DL

## 2017-07-14 LAB — PROTHROM ACT/NOR PPP: 19 % (ref 60–140)

## 2017-07-18 DIAGNOSIS — E78.5 HYPERLIPIDEMIA, UNSPECIFIED HYPERLIPIDEMIA TYPE: ICD-10-CM

## 2017-07-18 DIAGNOSIS — E78.5 HYPERLIPIDEMIA, UNSPECIFIED HYPERLIPIDEMIA TYPE: Primary | ICD-10-CM

## 2017-07-18 DIAGNOSIS — Z79.01 LONG TERM CURRENT USE OF ANTICOAGULANT THERAPY: ICD-10-CM

## 2017-07-19 LAB
CHOLEST SERPL-MCNC: 164 MG/DL
HDLC SERPL-MCNC: 101 MG/DL
LDLC SERPL CALC-MCNC: 53 MG/DL
NONHDLC SERPL-MCNC: 62 MG/DL
TRIGL SERPL-MCNC: 45 MG/DL

## 2017-07-20 ENCOUNTER — OFFICE VISIT (OUTPATIENT)
Dept: INTERNAL MEDICINE | Facility: CLINIC | Age: 72
End: 2017-07-20

## 2017-07-20 VITALS — SYSTOLIC BLOOD PRESSURE: 95 MMHG | DIASTOLIC BLOOD PRESSURE: 59 MMHG | HEART RATE: 71 BPM

## 2017-07-20 DIAGNOSIS — B02.8 HERPES ZOSTER WITH COMPLICATION: Primary | ICD-10-CM

## 2017-07-20 ASSESSMENT — PAIN SCALES - GENERAL: PAINLEVEL: MODERATE PAIN (5)

## 2017-07-20 NOTE — MR AVS SNAPSHOT
After Visit Summary   7/20/2017    Shala Caruso    MRN: [id number]           Patient Information     Date Of Birth          1945        Visit Information        Provider Department      7/20/2017 2:50 PM Levi Infante MD [facility]        Today's Diagnoses     Herpes zoster with complication    -  1      Care Instructions    Primary Care Center Medication Refill Request Information:  * Please contact your pharmacy regarding ANY request for medication refills.  ** [facility] Prescription Fax = [phone]  * Please allow 3 business days for routine medication refills.  * Please allow 5 business days for controlled substance medication refills.     Primary Care Center Test Result notification information:  *You will be notified with in 7-10 days of your appointment day regarding the results of your test.  If you are on MyChart you will be notified as soon as the provider has reviewed the results and signed off on them.    [facility] [phone]             Follow-ups after your visit        Follow-up notes from your care team     Return if symptoms worsen or fail to improve.      Your next 10 appointments already scheduled     Jul 31, 2017  7:30 AM CDT   (Arrive by 7:15 AM)   RETURN DIABETES with Dorita Cramer MD   [facility] ([facility])    [address] [place] [address]  [address] [facility] [address] [phone]            Aug 02, 2017  9:45 AM CDT   (Arrive by 9:30 AM)   Return Visit with Jeanmarie Pierson MD   [facility] Inst for Prostate and Urologic Cancers ([facility])    [address] [place] [address]  [address] [facility] [address] [phone]            Aug 02, 2017 11:00 AM CDT   (Arrive by 10:45 AM)   MA SCREENING DIGITAL BILATERAL with [id number]   [facility] Imaging ([facility])    [address] [place] [address]  [address] [facility] [address]  "  [phone]           Do not use any powder, lotion or deodorant under your arms or on your breast. If you do, we will ask you to remove it before your exam.  Wear comfortable, two-piece clothing.  If you have any allergies, tell your care team.  Bring any previous mammograms from other facilities or have them mailed to the breast center. Three-dimensional (3D) mammograms are available at [place] locations in [facility], and [place]. [facility] locations include [place] and [facility] in [place]. Benefits of 3D mammograms include: - Improved rate of cancer detection - Decreases your chance of having to go back for more tests, which means fewer: - \"False-positive\" results (This means that there is an abnormal area but it isn't cancer.) - Invasive testing procedures, such as a biopsy or surgery - Can provide clearer images of the breast if you have dense breast tissue. 3D mammography is an optional exam that anyone can have with a 2D mammogram. It doesn't replace or take the place of a 2D mammogram. 2D mammograms remain an effective screening test for all women.  Not all insurance companies cover the cost of a 3D mammogram. Check with your insurance.            Aug 02, 2017 12:00 PM CDT   LAB with [facility] ([facility])    [address] [place] [address] [phone]           Patient must bring picture ID. Patient should be prepared to give a urine specimen  Please do not eat 10-12 hours before your appointment if you are coming in fasting for labs on lipids, cholesterol, or glucose (sugar). Pregnant women should follow their Care Team instructions. Water with medications is okay. Do not drink coffee or other fluids. If you have concerns about taking  your medications, please ask at office or if scheduling via [email or web address], send a message by clicking on Secure Messaging, Message Your Care " Team.            Oct 12, 2017  3:00 PM CDT   (Arrive by 2:45 PM)   RETURN LUPUS with Santiago Corbett MD   [facility] ([facility])    [address] [address] [phone]              Who to contact     Please call your clinic at [phone] to:    Ask questions about your health    Make or cancel appointments    Discuss your medicines    Learn about your test results    Speak to your doctor   If you have compliments or concerns about an experience at your clinic, or if you wish to file a complaint, please contact [facility] Patient Relations at [phone] or email us at [email or web address]         Additional Information About Your Visit        [employer] Information     [employer] gives you secure access to your electronic health record. If you see a primary care provider, you can also send messages to your care team and make appointments. If you have questions, please call your primary care clinic.  If you do not have a primary care provider, please call [phone] and they will assist you.      [employer] is an electronic gateway that provides easy, online access to your medical records. With [employer], you can request a clinic appointment, read your test results, renew a prescription or communicate with your care team.     To access your existing account, please contact your [facility] Clinic or call [phone] for assistance.        Care EveryWhere ID     This is your Care EveryWhere ID. This could be used by other organizations to access your [facility] medical records  [id number]        Your Vitals Were     Pulse                   71            Blood Pressure from Last 3 Encounters:   07/20/17 95/59   07/11/17 101/66   05/22/17 118/68    Weight from Last 3 Encounters:   05/22/17 53.9 kg (118 lb 14.4 oz)   04/13/17 54.7 kg (120 lb 9.6 oz)   04/04/17 55 kg (121 lb 4.8 oz)               Today, you had the following     No orders found for display         Today's Medication Changes          These changes are accurate as of: 7/20/17 11:59 PM.  If you have any questions, ask your nurse or doctor.               These medicines have changed or have updated prescriptions.        Dose/Directions    estradiol 0.1 MG/GM cream   Commonly known as:  ESTRACE VAGINAL   This may have changed:    - when to take this  - additional instructions   Used for:  Atrophic vaginitis        Dose:  2 g   Place 2 g vaginally twice a week After using daily for 2 weeks   Quantity:  42.5 g   Refills:  11                Primary Care Provider Office Phone # Fax #    Joe Tj Contreras [phone]        [facility] [address] [facility] [address]  [facility] [address]        Equal Access to Services     MARVIN CACERES : Erlinda barrosoo Sosue, waaxda luqadaha, qaybta kaalmada adeegyada, carmelita ruiz . So [facility] [phone].    ATENCIÓN: Si habla español, tiene a hurtado disposición servicios gratuitos de asistencia lingüística. [facility] [phone].    We comply with applicable federal civil rights laws and [place] laws. We do not discriminate on the basis of race, color, national origin, age, disability sex, sexual orientation or gender identity.            Thank you!     Thank you for choosing [facility]IMARY [facility] CL[facility]IC  for your care. Our goal is always to provide you with excellent care. Hearing back from our patients is one way we can continue to improve our services. Please take a few minutes to complete the written survey that you may receive in the mail after your visit with us. Thank you!             Your Updated Medication List - Protect others around you: Learn how to safely use, store and throw away your medicines at www.disposemymeds.org.          This list is accurate as of: 7/20/17 11:59 PM.  Always use your most recent med list.                   Brand Name  Dispense Instructions for use Diagnosis    aspirin 81 MG tablet      Take 81 mg by mouth At Bedtime        AYR SALINE NASAL NO-DRIP Gel     3 Tube    Use as needed for nasal dryness    Nasal ulcer       blood glucose monitoring lancets     7203 each    Test 7-8 times daily  (Lancets that go with pt current meter )    Diabetes mellitus (H)       blood glucose monitoring test strip    TRUE METRIX BLOOD GLUCOSE TEST    4 Box    Use to test blood sugar 4 times daily or as directed.    Type I diabetes mellitus, well controlled (H)       calcium + D 600-200 MG-UNIT Tabs   Generic drug:  calcium carbonate-vitamin D      Take 1 tablet by mouth 3 times daily        CENTRUM SILVER PO      Take 1 tablet by mouth daily.        estradiol 0.1 MG/GM cream    ESTRACE VAGINAL    42.5 g    Place 2 g vaginally twice a week After using daily for 2 weeks    Atrophic vaginitis       glucagon 1 MG kit    GLUCAGON EMERGENCY    3 each    Inject 1 mg subcutaneously or intramuscularly for severe hypoglycemic episodes.    Type 1 diabetes, HbA1c goal < 7% (H)       insulin glargine 100 UNIT/ML injection    LANTUS    15 mL    Inject 4 units as directed daily LANTUS SOLOSTAR PEN PLEASE    Diabetes mellitus type 1 (H)       * insulin pen needle 32G X 4 MM    BD PAM U/F    800 each    Use   Up to 8 daily    Diabetes mellitus, type 1       * insulin pen needle 32G X 4 MM     400 each    Use 4 pen needles daily or as directed.    Type 1 diabetes mellitus with diabetic neuropathy (H)       lidocaine 5 % Patch    LIDODERM    30 patch    Apply up to 3 patches to painful area at once for up to 12 h within a 24 h period.  Remove after 12 hours.    Post-herpetic polyneuropathy       mycophenolate 500 MG tablet    CELLCEPT - GENERIC EQUIVALENT    360 tablet    Take 2 tablets (1,000 mg) by mouth 2 times daily 2 tabs in the a.m., 2 tabs in p.m.    Systemic lupus erythematosus (H), Antiphospholipid syndrome (H), Encounter for long-term current use of  medication       NIFEdipine ER osmotic 30 MG 24 hr tablet    NIFEDICAL XL    90 tablet    Take 1 tablet (30 mg) by mouth daily    Achalasia of esophagus, HTN (hypertension)       nortriptyline 25 MG capsule    PAMELOR    60 capsule    Take 1 capsule (25 mg) by mouth At Bedtime    Post herpetic neuralgia       NovoLOG PENFILL 100 UNIT/ML injection   Generic drug:  insulin aspart     15 mL    Inject 1 unit per 15 grams CHO with meals TID approx 15 units daily    Controlled type 1 diabetes mellitus (H)       NOVOPEN JR (JUSTIN) Priscila   Generic drug:  Injection Device for insulin      Use as directed.        omega-3 fatty acids 1200 MG capsule      Take 1 capsule by mouth daily.    Systemic lupus erythematosus (H), Antiphospholipid syndrome (H), Encounter for long-term (current) use of other medications       simvastatin 40 MG tablet    ZOCOR    100 tablet    Take 1 tablet (40 mg) by mouth At Bedtime    Other hyperlipidemia       TRUE METRIX METER W/DEVICE Kit     1 kit    1 each 4 times daily    Type I diabetes mellitus, well controlled (H)       TYLENOL 500 MG tablet   Generic drug:  acetaminophen      Take 500 mg by mouth At Bedtime Takes 6 tablets (500 mg ) .Christa Acharya LPN 2:40 PM on 3/23/2017        VITAMIN D (CHOLECALCIFEROL) PO      Take 2,000 Units by mouth daily (with lunch)        warfarin 5 MG tablet    COUMADIN    215 tablet    Take 10mgs on Mondays and Thursdays and 12.5mgs on all other days or as directed by the [facility]    Systemic lupus erythematosus (H)       * Notice:  This list has 2 medication(s) that are the same as other medications prescribed for you. Read the directions carefully, and ask your doctor or other care provider to review them with you.

## 2017-07-20 NOTE — PATIENT INSTRUCTIONS
[facility] Medication Refill Request Information:  * Please contact your pharmacy regarding ANY request for medication refills.  ** [facility] Prescription Fax = [phone]  * Please allow 3 business days for routine medication refills.  * Please allow 5 business days for controlled substance medication refills.     [facility] Test Result notification information:  *You will be notified with in 7-10 days of your appointment day regarding the results of your test.  If you are on MyChart you will be notified as soon as the provider has reviewed the results and signed off on them.    [facility] [phone]

## 2017-07-20 NOTE — NURSING NOTE
Chief Complaint   Patient presents with     Lesion     pt states having an open sore on stomach     Jenn Thomas CMA at 2:38 PM on 7/20/2017.

## 2017-07-21 NOTE — PROGRESS NOTES
PRIMARY CARE CENTER       SUBJECTIVE:  Shala Caruso is a 71 year old female with a PMHx of   Past Medical History:   Diagnosis Date     Achalasia      Antiphospholipid syndrome (H)      Antiplatelet or antithrombotic long-term use      DM (diabetes mellitus) (H)      DVT (deep venous thrombosis) (H) 2003     Dysphagia      GERD (gastroesophageal reflux disease)      Hyperlipidemia      Lymphedema      Myopia      Neuropathy (H)      Nonsenile cataract      osteoporosis      Pericarditis      Raynaud's disease      SLE (systemic lupus erythematosus) (H)     that comes in for evaluation of shingles. Patient was seen here in March due to shingles on abdomen in her LLQ. Was given 14 days of Valacyclovir. Lesions have been healing, but noticed some active erythema on 3 days ago. However, one day ago this area is significantly smaller. She as been covering the area with gauzes and applying topical antibiotics.     Current Outpatient Prescriptions   Medication Sig Dispense Refill     NIFEdipine ER osmotic (NIFEDICAL XL) 30 MG 24 hr tablet Take 1 tablet (30 mg) by mouth daily 90 tablet 3     insulin glargine (LANTUS) 100 UNIT/ML injection Inject 4 units as directed daily LANTUS SOLOSTAR PEN PLEASE 15 mL 3     NOVOLOG PENFILL 100 UNIT/ML soln Inject 1 unit per 15 grams CHO with meals TID approx 15 units daily 15 mL 3     nortriptyline (PAMELOR) 25 MG capsule Take 1 capsule (25 mg) by mouth At Bedtime 60 capsule 1     lidocaine (LIDODERM) 5 % Patch Apply up to 3 patches to painful area at once for up to 12 h within a 24 h period.  Remove after 12 hours. 30 patch 0     mycophenolate (CELLCEPT - GENERIC EQUIVALENT) 500 MG tablet Take 2 tablets (1,000 mg) by mouth 2 times daily 2 tabs in the a.m., 2 tabs in p.m. 360 tablet 3     acetaminophen (TYLENOL) 500 MG tablet Take 500 mg by mouth At Bedtime Takes 6 tablets (500 mg ) .Christa Acharya LPN 2:40 PM on 3/23/2017       warfarin (COUMADIN) 5 MG tablet  Take 10mgs on Mondays and Thursdays and 12.5mgs on all other days or as directed by the Medication Monitoring [facility] at the [facility] 215 tablet 1     estradiol (ESTRACE VAGINAL) 0.1 MG/GM vaginal cream Place 2 g vaginally twice a week After using daily for 2 weeks (Patient taking differently: Place 2 g vaginally At Bedtime ) 42.5 g 11     simvastatin (ZOCOR) 40 MG tablet Take 1 tablet (40 mg) by mouth At Bedtime 100 tablet 3     Blood Glucose Monitoring Suppl (TRUE METRIX METER) W/DEVICE KIT 1 each 4 times daily 1 kit 0     blood glucose monitoring (TRUE METRIX BLOOD GLUCOSE TEST) test strip Use to test blood sugar 4 times daily or as directed. 4 Box 3     insulin pen needle 32G X 4 MM Use 4 pen needles daily or as directed. 400 each 3     VITAMIN D, CHOLECALCIFEROL, PO Take 2,000 Units by mouth daily (with lunch)        AYR SALINE NASAL NO-DRIP GEL Use as needed for nasal dryness 3 Tube 3     glucagon (GLUCAGON EMERGENCY) 1 MG injection Inject 1 mg subcutaneously or intramuscularly for severe hypoglycemic episodes. 3 each 3     insulin pen needle (BD PEN NEEDLE PAM U/F) 32G X 4 MM MISC Use   Up to 8 daily 800 each 3     LANCETS ULTRA THIN 30G MISC Test 7-8 times daily  (Lancets that go with pt current meter ) 7203 each 3     aspirin 81 MG tablet Take 81 mg by mouth At Bedtime        omega-3 fatty acids (FISH OIL) 1200 MG capsule Take 1 capsule by mouth daily.       Calcium Carbonate-Vitamin D (CALCIUM + D) 600-200 MG-UNIT per tablet Take 1 tablet by mouth 3 times daily        Multiple Vitamins-Minerals (CENTRUM SILVER PO) Take 1 tablet by mouth daily.       Injection Device for Insulin (NOVOPEN JR GREEN,) RORY Use as directed.       ROS:   Constitutional, neuro, ENT, endocrine, pulmonary, cardiac, gastrointestinal, genitourinary, musculoskeletal, integument and psychiatric systems are negative, except as otherwise noted.    OBJECTIVE:  BP 95/59  Pulse 71   Wt Readings from Last 1 Encounters:   05/22/17 53.9 kg  (118 lb 14.4 oz)     General: AAOX3, NAD  Skin: old herpetic lesion in abdomen on LLQ, active erythema without suppuration or signs of cellulitis, appears more macerated  Abdomen: exquisite tenderness on LLQ     ASSESSMENT/PLAN:  #Shingles  Lesion appears to be more due to friction than reactivating shingles. Per patient symptoms and size improving. Will not prescribe second course of valacyclovir. Patient to continue with bandaging and topical antibiotics.     Levi Infante MD  Jul 20, 2017    Pt was seen and plan of care discussed with Dr. Contreras. \    Pt was seen and examined with Dr. Infante.  I agree with his documentation as noted above.    My additional comments: None    Joe Contreras

## 2017-07-24 ENCOUNTER — PRE VISIT (OUTPATIENT)
Dept: UROLOGY | Facility: CLINIC | Age: 72
End: 2017-07-24

## 2017-07-24 NOTE — TELEPHONE ENCOUNTER
Patient with history of female stress incontinence coming in for 6 month follow up. Patient chart reviewed, no need for call, all records available and ready for appointment.

## 2017-07-31 ENCOUNTER — OFFICE VISIT (OUTPATIENT)
Dept: ENDOCRINOLOGY | Facility: CLINIC | Age: 72
End: 2017-07-31

## 2017-07-31 VITALS
BODY MASS INDEX: 18.85 KG/M2 | HEART RATE: 59 BPM | SYSTOLIC BLOOD PRESSURE: 110 MMHG | DIASTOLIC BLOOD PRESSURE: 68 MMHG | WEIGHT: 117.3 LBS | HEIGHT: 66 IN

## 2017-07-31 DIAGNOSIS — M81.8 OTHER OSTEOPOROSIS: ICD-10-CM

## 2017-07-31 DIAGNOSIS — E10.649 TYPE 1 DIABETES MELLITUS WITH HYPOGLYCEMIA AND WITHOUT COMA (H): Primary | ICD-10-CM

## 2017-07-31 DIAGNOSIS — E78.00 HYPERCHOLESTEROLEMIA: ICD-10-CM

## 2017-07-31 ASSESSMENT — PAIN SCALES - GENERAL: PAINLEVEL: MILD PAIN (2)

## 2017-07-31 NOTE — NURSING NOTE
"Chief Complaint   Patient presents with     RECHECK     DIABETES TYPE 1 F/U        Initial /68 (BP Location: Right arm, Patient Position: Sitting, Cuff Size: Adult Regular)  Pulse 59  Ht 1.67 m (5' 5.75\")  Wt 53.2 kg (117 lb 4.8 oz)  BMI 19.08 kg/m2 Estimated body mass index is 19.08 kg/(m^2) as calculated from the following:    Height as of this encounter: 1.67 m (5' 5.75\").    Weight as of this encounter: 53.2 kg (117 lb 4.8 oz).  Medication Reconciliation: complete         Angela Balderas CMA     "

## 2017-07-31 NOTE — LETTER
7/31/2017       RE: Shala Caruso  [address] [place] [address]     Dear Colleague,    Thank you for referring your patient, Shala Caruso, to the [facility] ENDOCR[facility]Y at [facility]. Please see a copy of my visit note below.    Ms. Caruso is a 71 year old pleasant female with hx of SLE, APLS, DVT, osteoporosis and type 1 diabetes presenting for f/up.     She was diagnosed with signs zoster in March this year. She still has some residual pain, requiring lidocaine patches.   She questions whether or not she should have a replacement pen for Jacquelyn Cb. She also has questions regarding the use of a CGM.    1. Type 1 diabetes    Hemoglobin A1c this month was 5.3%, stable since her last visit here. Glucometer readings reveal that she checks her blood sugar 4-5 times daily. Average blood glucose is 86 with a standard deviation of 16 and a range variable between 41 and 111. The meter targets are set to 70 to 130 for the pre-meal blood sugar and  for the post meal blood sugar. Overall, 83% of the blood glucose numbers are within this target.  Insulin to carbohydrate ratio is 1 unit per 20 g for all meals.  She administers 4 U of Lantus in the morning, and 2-3 U NovoLog for breakfast, lunch and dinner.  In the last month, she had a total of 7 hypoglycemic episodes, mostly mild, in the 60s. The lowest blood sugar documented by the meter was 41 and occurred after taking too much NovoLog before lunch.    Diabetes complications:   No h/o microalbuminuria.  Last eye exam - September 2016, no BDR.   Last dental exam fall of 2016   Numbness and tingling sensation B/L toes - for many years but light sensation is intact. She does wear comfortable shoes/insoles. She did see podiatry in the past - Wilde neuroma - left foot.   She develops confusion, inability to concentrate or focus her vision and she feels extremely tired when her blood sugar is the lower 60s or 50s.  More recently, given the fact that she has been experiencing less frequent hypoglycemic episodes, she has noticed episodes of increased sweating and even shakiness at the time when her blood sugar is low.  In general, she is swimming twice a week and she goes to gym 5 times a week, where she does elliptical, stationary bike and yoga.  She has not experienced  prior episodes of loss of consciousness due to hypoglycemia.    2. Osteoporosis  Jeanmarie also has a history of osteoporosis, treated with Boniva for almost 3 years, followed by Forteo which was started in 4/12 - interrupted treatment from 4/2013 and restarted in 7/2013 until July 2014. After she completed the treatment with Forteo, she received one dose of Reclast in July 2014.      She has no history of prior bone fractures. She's been off prednisone for over 4 years. Her height has decreased over the years from 5'6'' to 5'1/2''. Her mother had a hip fracture in her 80s. She continues to take oyster calcium 500 mg calcium plus 200 U Vitamin D TID and 1000 U vitamin D3 daily.     On the most recent DXA scan images from 7/1/16, L-L3 T score was - 1.  Overall, at the spine, there was a significant increase of bone mineral density since 2005 of 10.5%. Since 2015, the bone mineral density has remained stable at spine. The lowest T score at the hip level was -2.2, at the left femoral neck.  Overall, there was a significant improvement of bone mineral density at the mean hip of 8.9% since 2005, with no significant changes since 2015. While the bone mineral density at the left hip increased since baseline, at the right hip, there was a decrease of bone mineral density since baseline and also, since prior year.    Current Outpatient Prescriptions   Medication     NIFEdipine ER osmotic (NIFEDICAL XL) 30 MG 24 hr tablet     insulin glargine (LANTUS) 100 UNIT/ML injection     NOVOLOG PENFILL 100 UNIT/ML soln     nortriptyline (PAMELOR) 25 MG capsule     lidocaine (LIDODERM)  5 % Patch     mycophenolate (CELLCEPT - GENERIC EQUIVALENT) 500 MG tablet     acetaminophen (TYLENOL) 500 MG tablet     warfarin (COUMADIN) 5 MG tablet     estradiol (ESTRACE VAGINAL) 0.1 MG/GM vaginal cream     simvastatin (ZOCOR) 40 MG tablet     Blood Glucose Monitoring Suppl (TRUE METRIX METER) W/DEVICE KIT     blood glucose monitoring (TRUE METRIX BLOOD GLUCOSE TEST) test strip     insulin pen needle 32G X 4 MM     VITAMIN D, CHOLECALCIFEROL, PO     AYR SALINE NASAL NO-DRIP GEL     glucagon (GLUCAGON EMERGENCY) 1 MG injection     insulin pen needle (BD PEN NEEDLE PAM U/F) 32G X 4 MM MISC     LANCETS ULTRA THIN 30G MISC     aspirin 81 MG tablet     omega-3 fatty acids (FISH OIL) 1200 MG capsule     Calcium Carbonate-Vitamin D (CALCIUM + D) 600-200 MG-UNIT per tablet     Multiple Vitamins-Minerals (CENTRUM SILVER PO)     Injection Device for Insulin (NOVOPEN JRJUSTIN,) RORY     No current facility-administered medications for this visit.      ROS   Systemic symptoms: as above, good appetite  Eye symptoms: No eye symptoms.  Otolaryngeal symptoms: no dysphagia, no voice hoarseness or cough   Breast symptoms: No breast symptoms.  Cardiovascular symptoms: No cardiovascular symptoms.    Pulmonary symptoms: No pulmonary symptoms.  Gastrointestinal symptoms: No gastrointestinal symptoms.   Genitourinary symptoms: urinary incontinence   Endocrine symptoms: chronic cold intolerance  Hematologic symptoms: No hematologic symptoms.  Musculoskeletal symptoms: recurrent episodes of pain which affect the third to fifth left toes; bilateral knee pain; joint stiffness  Neurological symptoms: numbness and tingling sensation b/l toes   Psychological symptoms: no psychological symptoms   Skin symptoms: split lesions of the tips of her fingers - for years; has seen dermatology in the past    Family Hx   Maternal grandmother DM2. First cousin with RA. Mother, maternal grandmother and aunt, cousin diagnosed with thyroid disorders (?  "hypothyroidism). Mother and maternal grandmother had breast cancer.    Personal Hx   Behavioral history: No tobacco use.  Home environment: No secondhand tobacco smoke in home.  Denies smoking, drinking alcohol or using illicit drugs.  with one child. 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  Menopause at age 59. Had regular MP in the past. No h/o bone fractures or kidney stones.    PMH   SLE dx in 2000 with flare/pericarditis and tamponade on 3/5/2010 requiring high doses of steroids.  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  Osteoporosis diagnosed 2008 started on Boniva in 2010 (heartburn from oral fosamax).   Hyperlipidemia.  Severe GERD and Achalasia.  Raynaud's.  Chronic leukopenia on MTX.  Allergy to multiple meds  Vit D def.  Post thrombophlebitic syndrome with chronic LE swelling   Dyslipidemia  Chronic leukopenia on methotrexate   L arm lymphedema   Type 1 diabetes  Prolapsed uterus   Cataract     Physical Exam   Wt Readings from Last 10 Encounters:   07/31/17 53.2 kg (117 lb 4.8 oz)   05/22/17 53.9 kg (118 lb 14.4 oz)   04/13/17 54.7 kg (120 lb 9.6 oz)   04/04/17 55 kg (121 lb 4.8 oz)   02/01/17 54.4 kg (120 lb)   01/23/17 54.5 kg (120 lb 1.6 oz)   01/04/17 53.3 kg (117 lb 8 oz)   12/20/16 52.6 kg (116 lb)   12/14/16 52.6 kg (116 lb)   12/14/16 52.6 kg (116 lb)     /68 (BP Location: Right arm, Patient Position: Sitting, Cuff Size: Adult Regular)  Pulse 59  Ht 1.67 m (5' 5.75\")  Wt 53.2 kg (117 lb 4.8 oz)  BMI 19.08 kg/m2    General appearance: she is thin, no acute distress noted  Eyes: conjunctivae and extraocular motions are normal. Pupils are equal, round, and reactive to light. No lid lag, no stare.  HENT: oropharynx moist; neck no JVD, no bruits, no thyromegaly, no palpable nodules  Cardiovascular: regular rhythm, no murmurs, distal pulses palpable, mild L arm edema   Respiratory: chest clear, no rales, no rhonchi  Musculoskeletal: normal tone and strength "   Neurologic: left knee reflex decreased, normal B/L bicipital reflexes, no resting tremor  Psychiatric: affect and judgment normal   Skin: warm, mild bruises at the insulin injection site; fingertips are cracked   Chronic mild edema L leg   Feet: Sensation intact to monofilament testing, onychomycosis    Lab Results  I reviewed prior lab results documented in EPIC.   Lab Results   Component Value Date    A1C 5.3 07/11/2017    A1C 5.5 06/10/2013    A1C 5.4 01/07/2013    A1C 5.5 07/02/2012    A1C 5.1 01/09/2012    HEMOGLOBINA1 5.0 01/23/2017    HEMOGLOBINA1 5.4 07/11/2016    HEMOGLOBINA1 5.4 01/11/2016    HEMOGLOBINA1 5.4 07/20/2015    HEMOGLOBINA1 5.4 01/12/2015       Hemoglobin   Date Value Ref Range Status   07/11/2017 11.9 11.7 - 15.7 g/dL Final     Hematocrit   Date Value Ref Range Status   07/11/2017 37.5 35.0 - 47.0 % Final     Cholesterol   Date Value Ref Range Status   07/18/2017 164 <200 mg/dL Final     Cholesterol/HDL Ratio   Date Value Ref Range Status   10/01/2014 1.5 0.0 - 5.0 Final     HDL Cholesterol   Date Value Ref Range Status   07/18/2017 101 >49 mg/dL Final     LDL Cholesterol Calculated   Date Value Ref Range Status   07/18/2017 53 <100 mg/dL Final     Comment:     Desirable:       <100 mg/dl     No results found for: MICROALBUMIN  TSH   Date Value Ref Range Status   08/05/2016 0.64 0.40 - 4.00 mU/L Final     Last Basic Metabolic Panel:    Sodium   Date Value Ref Range Status   12/14/2016 142 133 - 144 mmol/L Final     Potassium   Date Value Ref Range Status   12/14/2016 3.4 3.4 - 5.3 mmol/L Final     Chloride   Date Value Ref Range Status   12/14/2016 107 94 - 109 mmol/L Final     Calcium   Date Value Ref Range Status   12/14/2016 9.2 8.5 - 10.1 mg/dL Final     Carbon Dioxide   Date Value Ref Range Status   12/14/2016 27 20 - 32 mmol/L Final     Urea Nitrogen   Date Value Ref Range Status   12/14/2016 27 7 - 30 mg/dL Final     Creatinine   Date Value Ref Range Status   01/06/2017 0.87 0.52 -  1.04 mg/dL Final     No results found for: GFR  Glucose   Date Value Ref Range Status   12/14/2016 67 (L) 70 - 99 mg/dL Final       AST   Date Value Ref Range Status   07/11/2017 19 0 - 45 U/L Final     ALT   Date Value Ref Range Status   07/11/2017 23 0 - 50 U/L Final     Albumin   Date Value Ref Range Status   01/11/2016 3.8 3.4 - 5.0 g/dL Final     Assessment     1. Type 1 diabetes with very tight glycemic control, complicated by partial hypoglycemia unawareness. A tight BG control was preferred by the patient, although less aggressive blood glucose targets were recommended. Overall, the patient has been experiencing less hypoglycemic episodes since her last visit here. We discussed about her target blood glucose of less than 130 in the morning and less than 180, maximum postprandially. Not sure whether or not she is going to benefit from a CGM, but this might help her act sooner on rapid decreases of the blood sugar numbers prior to developing hypoglycemic symptoms. Counseled on the use of DEXCOM G5, the need of calibrating the sensor twice daily, etc. She is going to check with her insurance and, if approved, she is going to schedule an apt with the CDE.     2.  Osteoporosis   She was treated with Boniva for 3 years, followed by Forteo for 2 years. Received one dose of Reclast in July 2014 when she completed treatment with Forteo. Overall, she gained a significant amount of bone mass at the lumbar spine and left hip from 2005 to 2016. While there appear to be a loss of bone mineral density at the right hip, the lowest T score remained at the left femoral neck, at -2.2, in the pre-osteoporotic range.  Plan:   F/up DXA scan in July 2018.     3. Hypercholesterolemia - on simvastatin, controlled.    Return to clinic 6 months.    Orders Placed This Encounter   Procedures     DX Hip/Pelvis/Spine       Dorita Cramer MD

## 2017-07-31 NOTE — MR AVS SNAPSHOT
"              After Visit Summary   7/31/2017    Shala Caruso    MRN: [id number]           Patient Information     Date Of Birth          1945        Visit Information        Provider Department      7/31/2017 7:30 AM Dorita Cramer MD [facility]        Today's Diagnoses     Type 1 diabetes mellitus with hypoglycemia and without coma (H)    -  1    Other osteoporosis        Type 1 diabetes mellitus with diabetic neuropathy (H)          Care Instructions    DEXCOM G5 sensor           Follow-ups after your visit        Follow-up notes from your care team     Return in about 6 months (around 1/31/2018).      Your next 10 appointments already scheduled     Aug 02, 2017  9:45 AM CDT   (Arrive by 9:30 AM)   Return Visit with Jeanmarie Pierson MD   [facility] ([facility])    [address] [place] [address]  4th Floor  [facility] [id number] [phone]            Aug 02, 2017 11:00 AM CDT   (Arrive by 10:45 AM)   MA SCREENING DIGITAL BILATERAL with [id number]   [facility] ([facility])    [address] [place] [address]  2nd Floor  [facility] [id number] [phone]           Do not use any powder, lotion or deodorant under your arms or on your breast. If you do, we will ask you to remove it before your exam.  Wear comfortable, two-piece clothing.  If you have any allergies, tell your care team.  Bring any previous mammograms from other facilities or have them mailed to the breast center. Three-dimensional (3D) mammograms are available at [place] locations in [facility], and [place]. [facility] locations include [place] [facility] in [place]. Benefits of 3D mammograms include: - Improved rate of cancer detection - Decreases your chance of having to go back for more tests, which means fewer: - \"False-positive\" results " (This means that there is an abnormal area but it isn't cancer.) - Invasive testing procedures, such as a biopsy or surgery - Can provide clearer images of the breast if you have dense breast tissue. 3D mammography is an optional exam that anyone can have with a 2D mammogram. It doesn't replace or take the place of a 2D mammogram. 2D mammograms remain an effective screening test for all women.  Not all insurance companies cover the cost of a 3D mammogram. Check with your insurance.            Aug 02, 2017 12:00 PM CDT   LAB with  LAB   [facility] ([facility])    [address] [place] [phone] [phone]           Patient must bring picture ID. Patient should be prepared to give a urine specimen  Please do not eat 10-12 hours before your appointment if you are coming in fasting for labs on lipids, cholesterol, or glucose (sugar). Pregnant women should follow their Care Team instructions. Water with medications is okay. Do not drink coffee or other fluids. If you have concerns about taking  your medications, please ask at office or if scheduling via [employer], send a message by clicking on Secure Messaging, Message Your Care Team.            Aug 30, 2017  7:45 AM CDT   RETURN RETINA with Monika Fermin MD   [facility] ([facility])    [address]  [facility] [address] [phone]            Oct 12, 2017  3:00 PM CDT   (Arrive by 2:45 PM)   RETURN LUPUS with Santiago Corbett MD   [facility] ([facility])    [address] [place] [id number] [phone]            Jan 29, 2018  7:30 AM CST   (Arrive by 7:15 AM)   RETURN DIABETES with Dorita Cramer MD   [facility] ([facility])    [address] [place] [address] [id number] [phone]              Future tests that  "were ordered for you today     Open Future Orders        Priority Expected Expires Ordered    DX Hip/Pelvis/Spine Routine 7/31/2018 7/31/2018 7/31/2017            Who to contact     Please call your clinic at [phone] to:    Ask questions about your health    Make or cancel appointments    Discuss your medicines    Learn about your test results    Speak to your doctor   If you have compliments or concerns about an experience at your clinic, or if you wish to file a complaint, please contact [facility] Patient Relations at [phone] or email us at [email or web address]         Additional Information About Your Visit        Nuhook Information     Nuhook gives you secure access to your electronic health record. If you see a primary care provider, you can also send messages to your care team and make appointments. If you have questions, please call your primary care clinic.  If you do not have a primary care provider, please call [phone] and they will assist you.      Nuhook is an electronic gateway that provides easy, online access to your medical records. With [employer]uhook, you can request a clinic appointment, read your test results, renew a prescription or communicate with your care team.     To access your existing account, please contact your [facility] or call [phone] for assistance.        Care EveryWhere ID     This is your Care EveryWhere ID. This could be used by other organizations to access your [facility] medical records  [id number]        Your Vitals Were     Pulse Height BMI (Body Mass Index)             59 1.67 m (5' 5.75\") 19.08 kg/m2          Blood Pressure from Last 3 Encounters:   07/31/17 110/68   07/20/17 95/59   07/11/17 101/66    Weight from Last 3 Encounters:   07/31/17 53.2 kg (117 lb 4.8 oz)   05/22/17 53.9 kg (118 lb 14.4 oz)   04/13/17 54.7 kg (120 lb 9.6 oz)                 Today's Medication Changes        "   These changes are accurate as of: 7/31/17  8:06 AM.  If you have any questions, ask your nurse or doctor.               These medicines have changed or have updated prescriptions.        Dose/Directions    estradiol 0.1 MG/GM cream   Commonly known as:  ESTRACE VAGINAL   This may have changed:    - when to take this  - additional instructions   Used for:  Atrophic vaginitis        Dose:  2 g   Place 2 g vaginally twice a week After using daily for 2 weeks   Quantity:  42.5 g   Refills:  11       * Injection Device for insulin Priscila   Commonly known as:  NOVOPEN ECHO   This may have changed:  You were already taking a medication with the same name, and this prescription was added. Make sure you understand how and when to take each.   Used for:  Type 1 diabetes mellitus with hypoglycemia and without coma (H)        Dose:  1 Device   1 each 4 times daily   Quantity:  1 each   Refills:  0       * NOVOPEN JR (GREEN) Priscila   This may have changed:  Another medication with the same name was added. Make sure you understand how and when to take each.   Generic drug:  Injection Device for insulin        Use as directed.   Refills:  0       * Notice:  This list has 2 medication(s) that are the same as other medications prescribed for you. Read the directions carefully, and ask your doctor or other care provider to review them with you.         Where to get your medicines      These medications were sent to [facility] - [place], [place] - [address] [facility]  [address] [facility] [address]     Phone:  [phone]     Injection Device for insulin Priscila    insulin pen needle 32G X 4 MM                Primary Care Provider Office Phone # Fax #    Joe Contreras [phone]        [facility] [address] [place] [address] [address]  [facility] [address]        Equal Access to Services     MARVIN CACERES AH: Erlinda Rivera, adriana yanes, carmelita marte  bernarda mullen. [facility] [phone].    ATENCIÓN: Si habla juanita, tiene a hurtado disposición servicios gratuitos de asistencia lingüística. Christopher mohamud [phone].    We comply with applicable federal civil rights laws and [place] laws. We do not discriminate on the basis of race, color, national origin, age, disability sex, sexual orientation or gender identity.            Thank you!     Thank you for choosing [facility]  for your care. Our goal is always to provide you with excellent care. Hearing back from our patients is one way we can continue to improve our services. Please take a few minutes to complete the written survey that you may receive in the mail after your visit with us. Thank you!             Your Updated Medication List - Protect others around you: Learn how to safely use, store and throw away your medicines at www.disposemymeds.org.          This list is accurate as of: 7/31/17  8:06 AM.  Always use your most recent med list.                   Brand Name Dispense Instructions for use Diagnosis    aspirin 81 MG tablet      Take 81 mg by mouth At Bedtime        AYR SALINE NASAL NO-DRIP Gel     3 Tube    Use as needed for nasal dryness    Nasal ulcer       blood glucose monitoring lancets     7203 each    Test 7-8 times daily  (Lancets that go with pt current meter )    Diabetes mellitus (H)       blood glucose monitoring test strip    TRUE METRIX BLOOD GLUCOSE TEST    4 Box    Use to test blood sugar 4 times daily or as directed.    Type I diabetes mellitus, well controlled (H)       calcium + D 600-200 MG-UNIT Tabs   Generic drug:  calcium carbonate-vitamin D      Take 1 tablet by mouth 3 times daily        CENTRUM SILVER PO      Take 1 tablet by mouth daily.        estradiol 0.1 MG/GM cream    ESTRACE VAGINAL    42.5 g    Place 2 g vaginally twice a week After using daily for 2 weeks    Atrophic vaginitis       glucagon 1 MG kit    GLUCAGON EMERGENCY    3 each    Inject  1 mg subcutaneously or intramuscularly for severe hypoglycemic episodes.    Type 1 diabetes, HbA1c goal < 7% (H)       insulin glargine 100 UNIT/ML injection    LANTUS    15 mL    Inject 4 units as directed daily LANTUS SOLOSTAR PEN PLEASE    Diabetes mellitus type 1 (H)       * insulin pen needle 32G X 4 MM    BD PAM U/F    800 each    Use   Up to 8 daily    Diabetes mellitus, type 1       * insulin pen needle 32G X 4 MM     400 each    Use 4 pen needles daily or as directed.    Type 1 diabetes mellitus with diabetic neuropathy (H)       lidocaine 5 % Patch    LIDODERM    30 patch    Apply up to 3 patches to painful area at once for up to 12 h within a 24 h period.  Remove after 12 hours.    Post-herpetic polyneuropathy       mycophenolate 500 MG tablet    CELLCEPT - GENERIC EQUIVALENT    360 tablet    Take 2 tablets (1,000 mg) by mouth 2 times daily 2 tabs in the a.m., 2 tabs in p.m.    Systemic lupus erythematosus (H), Antiphospholipid syndrome (H), Encounter for long-term current use of medication       NIFEdipine ER osmotic 30 MG 24 hr tablet    NIFEDICAL XL    90 tablet    Take 1 tablet (30 mg) by mouth daily    Achalasia of esophagus, HTN (hypertension)       nortriptyline 25 MG capsule    PAMELOR    60 capsule    Take 1 capsule (25 mg) by mouth At Bedtime    Post herpetic neuralgia       NovoLOG PENFILL 100 UNIT/ML injection   Generic drug:  insulin aspart     15 mL    Inject 1 unit per 15 grams CHO with meals TID approx 15 units daily    Controlled type 1 diabetes mellitus (H)       * Injection Device for insulin Priscila    NOVOPEN ECHO    1 each    1 each 4 times daily    Type 1 diabetes mellitus with hypoglycemia and without coma (H)       * NOVOPEN JR (GREEN) Priscila   Generic drug:  Injection Device for insulin      Use as directed.        omega-3 fatty acids 1200 MG capsule      Take 1 capsule by mouth daily.    Systemic lupus erythematosus (H), Antiphospholipid syndrome (H), Encounter for long-term  (current) use of other medications       simvastatin 40 MG tablet    ZOCOR    100 tablet    Take 1 tablet (40 mg) by mouth At Bedtime    Other hyperlipidemia       TRUE METRIX METER W/DEVICE Kit     1 kit    1 each 4 times daily    Type I diabetes mellitus, well controlled (H)       TYLENOL 500 MG tablet   Generic drug:  acetaminophen      Take 500 mg by mouth At Bedtime Takes 6 tablets (500 mg ) .Christa Acharya LPN 2:40 PM on 3/23/2017        VITAMIN D (CHOLECALCIFEROL) PO      Take 2,000 Units by mouth daily (with lunch)        warfarin 5 MG tablet    COUMADIN    215 tablet    Take 10mgs on Mondays and Thursdays and 12.5mgs on all other days or as directed by the [facility]    Systemic lupus erythematosus (H)       * Notice:  This list has 4 medication(s) that are the same as other medications prescribed for you. Read the directions carefully, and ask your doctor or other care provider to review them with you.

## 2017-07-31 NOTE — PROGRESS NOTES
Ms. Caruso is a 71 year old pleasant female with hx of SLE, APLS, DVT, osteoporosis and type 1 diabetes presenting for f/up.     She was diagnosed with signs zoster in March this year. She still has some residual pain, requiring lidocaine patches.   She questions whether or not she should have a replacement pen for Jacquelyn Cb. She also has questions regarding the use of a CGM.    1. Type 1 diabetes    Hemoglobin A1c this month was 5.3%, stable since her last visit here. Glucometer readings reveal that she checks her blood sugar 4-5 times daily. Average blood glucose is 86 with a standard deviation of 16 and a range variable between 41 and 111. The meter targets are set to 70 to 130 for the pre-meal blood sugar and  for the post meal blood sugar. Overall, 83% of the blood glucose numbers are within this target.  Insulin to carbohydrate ratio is 1 unit per 20 g for all meals.  She administers 4 U of Lantus in the morning, and 2-3 U NovoLog for breakfast, lunch and dinner.  In the last month, she had a total of 7 hypoglycemic episodes, mostly mild, in the 60s. The lowest blood sugar documented by the meter was 41 and occurred after taking too much NovoLog before lunch.    Diabetes complications:   No h/o microalbuminuria.  Last eye exam - September 2016, no BDR.   Last dental exam fall of 2016   Numbness and tingling sensation B/L toes - for many years but light sensation is intact. She does wear comfortable shoes/insoles. She did see podiatry in the past - Wilde neuroma - left foot.   She develops confusion, inability to concentrate or focus her vision and she feels extremely tired when her blood sugar is the lower 60s or 50s. More recently, given the fact that she has been experiencing less frequent hypoglycemic episodes, she has noticed episodes of increased sweating and even shakiness at the time when her blood sugar is low.  In general, she is swimming twice a week and she goes to gym 5 times a week,  where she does elliptical, stationary bike and yoga.  She has not experienced  prior episodes of loss of consciousness due to hypoglycemia.    2. Osteoporosis  Jeanmarie also has a history of osteoporosis, treated with Boniva for almost 3 years, followed by Forteo which was started in 4/12 - interrupted treatment from 4/2013 and restarted in 7/2013 until July 2014. After she completed the treatment with Forteo, she received one dose of Reclast in July 2014.      She has no history of prior bone fractures. She's been off prednisone for over 4 years. Her height has decreased over the years from 5'6'' to 5'1/2''. Her mother had a hip fracture in her 80s. She continues to take oyster calcium 500 mg calcium plus 200 U Vitamin D TID and 1000 U vitamin D3 daily.     On the most recent DXA scan images from 7/1/16, L-L3 T score was - 1.  Overall, at the spine, there was a significant increase of bone mineral density since 2005 of 10.5%. Since 2015, the bone mineral density has remained stable at spine. The lowest T score at the hip level was -2.2, at the left femoral neck.  Overall, there was a significant improvement of bone mineral density at the mean hip of 8.9% since 2005, with no significant changes since 2015. While the bone mineral density at the left hip increased since baseline, at the right hip, there was a decrease of bone mineral density since baseline and also, since prior year.    Current Outpatient Prescriptions   Medication     NIFEdipine ER osmotic (NIFEDICAL XL) 30 MG 24 hr tablet     insulin glargine (LANTUS) 100 UNIT/ML injection     NOVOLOG PENFILL 100 UNIT/ML soln     nortriptyline (PAMELOR) 25 MG capsule     lidocaine (LIDODERM) 5 % Patch     mycophenolate (CELLCEPT - GENERIC EQUIVALENT) 500 MG tablet     acetaminophen (TYLENOL) 500 MG tablet     warfarin (COUMADIN) 5 MG tablet     estradiol (ESTRACE VAGINAL) 0.1 MG/GM vaginal cream     simvastatin (ZOCOR) 40 MG tablet     Blood Glucose Monitoring Suppl  (TRUE METRIX METER) W/DEVICE KIT     blood glucose monitoring (TRUE METRIX BLOOD GLUCOSE TEST) test strip     insulin pen needle 32G X 4 MM     VITAMIN D, CHOLECALCIFEROL, PO     AYR SALINE NASAL NO-DRIP GEL     glucagon (GLUCAGON EMERGENCY) 1 MG injection     insulin pen needle (BD PEN NEEDLE PAM U/F) 32G X 4 MM MISC     LANCETS ULTRA THIN 30G MISC     aspirin 81 MG tablet     omega-3 fatty acids (FISH OIL) 1200 MG capsule     Calcium Carbonate-Vitamin D (CALCIUM + D) 600-200 MG-UNIT per tablet     Multiple Vitamins-Minerals (CENTRUM SILVER PO)     Injection Device for Insulin (NOVOPEN JR, GREEN,) RORY     No current facility-administered medications for this visit.      ROS   Systemic symptoms: as above, good appetite  Eye symptoms: No eye symptoms.  Otolaryngeal symptoms: no dysphagia, no voice hoarseness or cough   Breast symptoms: No breast symptoms.  Cardiovascular symptoms: No cardiovascular symptoms.    Pulmonary symptoms: No pulmonary symptoms.  Gastrointestinal symptoms: No gastrointestinal symptoms.   Genitourinary symptoms: urinary incontinence   Endocrine symptoms: chronic cold intolerance  Hematologic symptoms: No hematologic symptoms.  Musculoskeletal symptoms: recurrent episodes of pain which affect the third to fifth left toes; bilateral knee pain; joint stiffness  Neurological symptoms: numbness and tingling sensation b/l toes   Psychological symptoms: no psychological symptoms   Skin symptoms: split lesions of the tips of her fingers - for years; has seen dermatology in the past    Family Hx   Maternal grandmother DM2. First cousin with RA. Mother, maternal grandmother and aunt, cousin diagnosed with thyroid disorders (? hypothyroidism). Mother and maternal grandmother had breast cancer.    Personal Hx   Behavioral history: No tobacco use.  Home environment: No secondhand tobacco smoke in home.  Denies smoking, drinking alcohol or using illicit drugs.  with one child. 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  Menopause at age 59. Had regular MP in the past. No h/o bone fractures or kidney stones.    PMH   SLE dx in 2000 with flare/pericarditis and tamponade on 3/5/2010 requiring high doses of steroids.  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  Osteoporosis diagnosed 2008 started on Boniva in 2010 (heartburn from oral fosamax).   Hyperlipidemia.  Severe GERD and Achalasia.  Raynaud's.  Chronic leukopenia on MTX.  Allergy to multiple meds  Vit D def.  Post thrombophlebitic syndrome with chronic LE swelling   Dyslipidemia  Chronic leukopenia on methotrexate   L arm lymphedema   Type 1 diabetes  Prolapsed uterus   Cataract     Physical Exam   Wt Readings from Last 10 Encounters:   07/31/17 53.2 kg (117 lb 4.8 oz)   05/22/17 53.9 kg (118 lb 14.4 oz)   04/13/17 54.7 kg (120 lb 9.6 oz)   04/04/17 55 kg (121 lb 4.8 oz)   02/01/17 54.4 kg (120 lb)   01/23/17 54.5 kg (120 lb 1.6 oz)   01/04/17 53.3 kg (117 lb 8 oz)   12/20/16 52.6 kg (116 lb)   12/14/16 52.6 kg (116 lb)   12/14/16 52.6 kg (116 lb)     /68 (BP Location: Right arm, Patient Position: Sitting, Cuff Size: Adult Regular)  Pulse 59  Ht 1.67 m (5' 5.75\")  Wt 53.2 kg (117 lb 4.8 oz)  BMI 19.08 kg/m2    General appearance: she is thin, no acute distress noted  Eyes: conjunctivae and extraocular motions are normal. Pupils are equal, round, and reactive to light. No lid lag, no stare.  HENT: oropharynx moist; neck no JVD, no bruits, no thyromegaly, no palpable nodules  Cardiovascular: regular rhythm, no murmurs, distal pulses palpable, mild L arm edema   Respiratory: chest clear, no rales, no rhonchi  Musculoskeletal: normal tone and strength   Neurologic: left knee reflex decreased, normal B/L bicipital reflexes, no resting tremor  Psychiatric: affect and judgment normal   Skin: warm, mild bruises at the insulin injection site; fingertips are cracked   Chronic mild edema L leg   Feet: Sensation intact to " monofilament testing, onychomycosis    Lab Results  I reviewed prior lab results documented in EPIC.   Lab Results   Component Value Date    A1C 5.3 07/11/2017    A1C 5.5 06/10/2013    A1C 5.4 01/07/2013    A1C 5.5 07/02/2012    A1C 5.1 01/09/2012    HEMOGLOBINA1 5.0 01/23/2017    HEMOGLOBINA1 5.4 07/11/2016    HEMOGLOBINA1 5.4 01/11/2016    HEMOGLOBINA1 5.4 07/20/2015    HEMOGLOBINA1 5.4 01/12/2015       Hemoglobin   Date Value Ref Range Status   07/11/2017 11.9 11.7 - 15.7 g/dL Final     Hematocrit   Date Value Ref Range Status   07/11/2017 37.5 35.0 - 47.0 % Final     Cholesterol   Date Value Ref Range Status   07/18/2017 164 <200 mg/dL Final     Cholesterol/HDL Ratio   Date Value Ref Range Status   10/01/2014 1.5 0.0 - 5.0 Final     HDL Cholesterol   Date Value Ref Range Status   07/18/2017 101 >49 mg/dL Final     LDL Cholesterol Calculated   Date Value Ref Range Status   07/18/2017 53 <100 mg/dL Final     Comment:     Desirable:       <100 mg/dl     No results found for: MICROALBUMIN  TSH   Date Value Ref Range Status   08/05/2016 0.64 0.40 - 4.00 mU/L Final     Last Basic Metabolic Panel:    Sodium   Date Value Ref Range Status   12/14/2016 142 133 - 144 mmol/L Final     Potassium   Date Value Ref Range Status   12/14/2016 3.4 3.4 - 5.3 mmol/L Final     Chloride   Date Value Ref Range Status   12/14/2016 107 94 - 109 mmol/L Final     Calcium   Date Value Ref Range Status   12/14/2016 9.2 8.5 - 10.1 mg/dL Final     Carbon Dioxide   Date Value Ref Range Status   12/14/2016 27 20 - 32 mmol/L Final     Urea Nitrogen   Date Value Ref Range Status   12/14/2016 27 7 - 30 mg/dL Final     Creatinine   Date Value Ref Range Status   01/06/2017 0.87 0.52 - 1.04 mg/dL Final     No results found for: GFR  Glucose   Date Value Ref Range Status   12/14/2016 67 (L) 70 - 99 mg/dL Final       AST   Date Value Ref Range Status   07/11/2017 19 0 - 45 U/L Final     ALT   Date Value Ref Range Status   07/11/2017 23 0 - 50 U/L Final      Albumin   Date Value Ref Range Status   01/11/2016 3.8 3.4 - 5.0 g/dL Final     Assessment     1. Type 1 diabetes with very tight glycemic control, complicated by partial hypoglycemia unawareness. A tight BG control was preferred by the patient, although less aggressive blood glucose targets were recommended. Overall, the patient has been experiencing less hypoglycemic episodes since her last visit here. We discussed about her target blood glucose of less than 130 in the morning and less than 180, maximum postprandially. Not sure whether or not she is going to benefit from a CGM, but this might help her act sooner on rapid decreases of the blood sugar numbers prior to developing hypoglycemic symptoms. Counseled on the use of DEXCOM G5, the need of calibrating the sensor twice daily, etc. She is going to check with her insurance and, if approved, she is going to schedule an apt with the CDE.     2.  Osteoporosis   She was treated with Boniva for 3 years, followed by Forteo for 2 years. Received one dose of Reclast in July 2014 when she completed treatment with Forteo. Overall, she gained a significant amount of bone mass at the lumbar spine and left hip from 2005 to 2016. While there appear to be a loss of bone mineral density at the right hip, the lowest T score remained at the left femoral neck, at -2.2, in the pre-osteoporotic range.  Plan:   F/up DXA scan in July 2018.     3. Hypercholesterolemia - on simvastatin, controlled.    Return to clinic 6 months.    Orders Placed This Encounter   Procedures     DX Hip/Pelvis/Spine     Answers for HPI/ROS submitted by the patient on 1/23/2017   General Symptoms: No  Skin Symptoms: No  HENT Symptoms: No  EYE SYMPTOMS: No  HEART SYMPTOMS: No  LUNG SYMPTOMS: No  INTESTINAL SYMPTOMS: No  URINARY SYMPTOMS: No  BREAST SYMPTOMS: No  SKELETAL SYMPTOMS: Yes  BLOOD SYMPTOMS: No  NERVOUS SYSTEM SYMPTOMS: Yes  MENTAL HEALTH SYMPTOMS: No

## 2017-08-02 ENCOUNTER — OFFICE VISIT (OUTPATIENT)
Dept: UROLOGY | Facility: CLINIC | Age: 72
End: 2017-08-02

## 2017-08-02 ENCOUNTER — ANTICOAGULATION THERAPY VISIT (OUTPATIENT)
Dept: ANTICOAGULATION | Facility: CLINIC | Age: 72
End: 2017-08-02

## 2017-08-02 VITALS
HEART RATE: 63 BPM | HEIGHT: 66 IN | DIASTOLIC BLOOD PRESSURE: 56 MMHG | SYSTOLIC BLOOD PRESSURE: 105 MMHG | BODY MASS INDEX: 18.72 KG/M2 | WEIGHT: 116.5 LBS

## 2017-08-02 DIAGNOSIS — N95.2 ATROPHIC VAGINITIS: ICD-10-CM

## 2017-08-02 DIAGNOSIS — N39.3 STRESS INCONTINENCE: Primary | ICD-10-CM

## 2017-08-02 DIAGNOSIS — M32.9 SLE (SYSTEMIC LUPUS ERYTHEMATOSUS) (H): ICD-10-CM

## 2017-08-02 DIAGNOSIS — Z79.01 LONG-TERM (CURRENT) USE OF ANTICOAGULANTS: ICD-10-CM

## 2017-08-02 DIAGNOSIS — Z79.01 LONG TERM CURRENT USE OF ANTICOAGULANT THERAPY: ICD-10-CM

## 2017-08-02 LAB
INR PPP: 3.03 (ref 0.86–1.14)
PROTHROM ACT/NOR PPP: 17 % (ref 60–140)

## 2017-08-02 RX ORDER — ESTRADIOL 0.1 MG/G
CREAM VAGINAL
Qty: 42.5 G | Refills: 11 | Status: SHIPPED | OUTPATIENT
Start: 2017-08-02 | End: 2018-01-29

## 2017-08-02 ASSESSMENT — PAIN SCALES - GENERAL: PAINLEVEL: MILD PAIN (2)

## 2017-08-02 NOTE — PROGRESS NOTES
ANTICOAGULATION FOLLOW-UP CLINIC VISIT    Patient Name:  Shala Caruso  Date:  8/2/2017  Contact Type:  Telephone    SUBJECTIVE:     Patient Findings     Positives No Problem Findings    Comments Pt happy to eat an extra salad today.           OBJECTIVE    INR   Date Value Ref Range Status   08/02/2017 3.03 (H) 0.86 - 1.14 Final     Factor 2 Assay   Date Value Ref Range Status   08/02/2017 17 (L) 60 - 140 % Final       ASSESSMENT / PLAN  INR assessment THER    Recheck INR In: 3 WEEKS    INR Location Clinic      Anticoagulation Summary as of 8/2/2017     INR goal 2.0-3.0   Today's INR    Maintenance plan 12.5 mg (5 mg x 2.5) on Sun, Tue, Thu; 10 mg (5 mg x 2) all other days   Full instructions 12.5 mg on Sun, Tue, Thu; 10 mg all other days   Weekly total 77.5 mg   Plan last modified Portia Cherry RN (6/16/2017)   Next INR check 8/23/2017   Priority Factor 2   Target end date     Indications   SLE (systemic lupus erythematosus) (H) [M32.9]  Long-term (current) use of anticoagulants [Z79.01] [Z79.01]         Anticoagulation Episode Summary     INR check location Clinic Lab    Preferred lab     Send INR reminders to [facility]    Comments Factor 2  goal range is 15-25%  Ok to leave message on home [phone] and work voicemail, but call [email or web address] per pt request.  OK to leave message at office  May leave messages with Rodriguez Santos  DO NOT GIVE RECORDS TO EMPLOYER [employer]        Anticoagulation Care Providers     Provider Role Specialty Phone number    Mt Kiser MD Responsible Clinical Cardiac Electrophysiology [phone]            See the Encounter Report to view Anticoagulation Flowsheet and Dosing Calendar (Go to Encounters tab in chart review, and find the Anticoagulation Therapy Visit)    Spoke with patient.    Portia Cherry, SHERRON

## 2017-08-02 NOTE — MR AVS SNAPSHOT
Shala Caruso   8/2/2017   Anticoagulation Therapy Visit    Description:  71 year old female   Provider:  Portia Cherry RN   Department:  [facility]           INR as of 8/2/2017     Today's INR       Anticoagulation Summary as of 8/2/2017     INR goal 2.0-3.0   Today's INR    Full instructions 12.5 mg on Sun, Tue, Thu; 10 mg all other days   Next INR check 8/23/2017    Indications   SLE (systemic lupus erythematosus) (H) [M32.9]  Long-term (current) use of anticoagulants [Z79.01] [Z79.01]         August 2017 Details    Sun Mon Tue Wed Thu Fri Sat       1               2      10 mg   See details      3      12.5 mg         4      10 mg         5      10 mg           6      12.5 mg         7      10 mg         8      12.5 mg         9      10 mg         10      12.5 mg         11      10 mg         12      10 mg           13      12.5 mg         14      10 mg         15      12.5 mg         16      10 mg         17      12.5 mg         18      10 mg         19      10 mg           20      12.5 mg         21      10 mg         22      12.5 mg         23            24               25               26                 27               28               29               30               31                  Date Details   08/02 This INR check       Date of next INR:  8/23/2017         How to take your warfarin dose     To take:  10 mg Take 2 of the 5 mg tablets.    To take:  12.5 mg Take 2.5 of the 5 mg tablets.

## 2017-08-02 NOTE — NURSING NOTE
"Chief Complaint   Patient presents with     RECHECK     Midurethral sling done in 2016       Blood pressure 105/56, pulse 63, height 1.676 m (5' 6\"), weight 52.8 kg (116 lb 8 oz), not currently breastfeeding. Body mass index is 18.8 kg/(m^2).    Patient Active Problem List   Diagnosis     Achalasia     Antiphospholipid syndrome (H)     DM (diabetes mellitus) (H)     GERD (gastroesophageal reflux disease)     Hyperlipidemia     HTN (hypertension)     Osteopenia     Raynaud's disease     SLE (systemic lupus erythematosus) (H)     DVT (deep venous thrombosis) (H)     Encounter for long-term current use of medication     Type 1 diabetes mellitus (H)     Osteoporosis, idiopathic     Osteoporosis, postmenopausal     Cystocele, midline     Urinary urgency     Urinary frequency     Female stress incontinence     Atrophic vaginitis     Long-term (current) use of anticoagulants [Z79.01]     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)       Allergies   Allergen Reactions     Amaryl [Glimepiride] Swelling     Swelling of tongue     Metformin Blisters     Experienced big blisters all over body and sloughing of skin     Plaquenil [Aminoquinolines] Hives     Sulfa Drugs Other (See Comments)     Turned bright red     Amoxicillin Rash     Hydroxychloroquine Rash     Penicillins Rash       Current Outpatient Prescriptions   Medication Sig Dispense Refill     insulin pen needle 32G X 4 MM Use 4 pen needles daily or as directed. 400 each 3     Injection Device for insulin (NOVOPEN ECHO) RORY 1 each 4 times daily 1 each 0     NIFEdipine ER osmotic (NIFEDICAL XL) 30 MG 24 hr tablet Take 1 tablet (30 mg) by mouth daily 90 tablet 3     insulin glargine (LANTUS) 100 UNIT/ML injection Inject 4 units as directed daily LANTUS SOLOSTAR PEN PLEASE 15 mL 3     NOVOLOG PENFILL 100 UNIT/ML soln Inject 1 unit per 15 grams CHO with meals TID approx 15 units daily 15 mL 3     lidocaine (LIDODERM) 5 % Patch Apply up to 3 patches to painful " area at once for up to 12 h within a 24 h period.  Remove after 12 hours. 30 patch 0     mycophenolate (CELLCEPT - GENERIC EQUIVALENT) 500 MG tablet Take 2 tablets (1,000 mg) by mouth 2 times daily 2 tabs in the a.m., 2 tabs in p.m. 360 tablet 3     acetaminophen (TYLENOL) 500 MG tablet Take 500 mg by mouth At Bedtime Takes 6 tablets (500 mg ) .Christa Acharya LPN 2:40 PM on 3/23/2017       warfarin (COUMADIN) 5 MG tablet Take 10mgs on Mondays and Thursdays and 12.5mgs on all other days or as directed by the Medication Monitoring [facility] at the [facility]. 215 tablet 1     estradiol (ESTRACE VAGINAL) 0.1 MG/GM vaginal cream Place 2 g vaginally twice a week After using daily for 2 weeks (Patient taking differently: Place 2 g vaginally At Bedtime ) 42.5 g 11     simvastatin (ZOCOR) 40 MG tablet Take 1 tablet (40 mg) by mouth At Bedtime 100 tablet 3     Blood Glucose Monitoring Suppl (TRUE METRIX METER) W/DEVICE KIT 1 each 4 times daily 1 kit 0     blood glucose monitoring (TRUE METRIX BLOOD GLUCOSE TEST) test strip Use to test blood sugar 4 times daily or as directed. 4 Box 3     VITAMIN D, CHOLECALCIFEROL, PO Take 2,000 Units by mouth daily (with lunch)        AYR SALINE NASAL NO-DRIP GEL Use as needed for nasal dryness 3 Tube 3     glucagon (GLUCAGON EMERGENCY) 1 MG injection Inject 1 mg subcutaneously or intramuscularly for severe hypoglycemic episodes. 3 each 3     insulin pen needle (BD PEN NEEDLE PAM U/F) 32G X 4 MM MISC Use   Up to 8 daily 800 each 3     LANCETS ULTRA THIN 30G MISC Test 7-8 times daily  (Lancets that go with pt current meter ) 7203 each 3     aspirin 81 MG tablet Take 81 mg by mouth At Bedtime        omega-3 fatty acids (FISH OIL) 1200 MG capsule Take 1 capsule by mouth daily.       Calcium Carbonate-Vitamin D (CALCIUM + D) 600-200 MG-UNIT per tablet Take 1 tablet by mouth 3 times daily        Multiple Vitamins-Minerals (CENTRUM SILVER PO) Take 1 tablet by mouth daily.       Injection Device for  Insulin (NOVOPEN JR, JUSTIN,) RORY Use as directed.         Social History   Substance Use Topics     Smoking status: Never Smoker     Smokeless tobacco: Never Used     Alcohol use No       Mariaelena Washington LPN  8/2/2017  9:38 AM

## 2017-08-02 NOTE — MR AVS SNAPSHOT
"              After Visit Summary   8/2/2017    Shala Caruso    MRN: [id number]           Patient Information     Date Of Birth          1945        Visit Information        Provider Department      8/2/2017 9:45 AM Jeanmarie Pierson MD [facility] Urology and Inst for Prostate and Urologic Cancers        Today's Diagnoses     Stress incontinence    -  1    Atrophic vaginitis           Follow-ups after your visit        Follow-up notes from your care team     Return if symptoms worsen or fail to improve.      Your next 10 appointments already scheduled     Aug 02, 2017 11:00 AM CDT   (Arrive by 10:45 AM)   MA SCREENING DIGITAL BILATERAL with [id number]   [facility] ([facility])    [address]  2nd Floor  [facility] [address] [phone]           Do not use any powder, lotion or deodorant under your arms or on your breast. If you do, we will ask you to remove it before your exam.  Wear comfortable, two-piece clothing.  If you have any allergies, tell your care team.  Bring any previous mammograms from other facilities or have them mailed to the breast center. Three-dimensional (3D) mammograms are available at [place] locations in [facility], and [place]. [facility] [place] locations include [facility] [facility] in [place]. Benefits of 3D mammograms include: - Improved rate of cancer detection - Decreases your chance of having to go back for more tests, which means fewer: - \"False-positive\" results (This means that there is an abnormal area but it isn't cancer.) - Invasive testing procedures, such as a biopsy or surgery - Can provide clearer images of the breast if you have dense breast tissue. 3D mammography is an optional exam that anyone can have with a 2D mammogram. It doesn't replace or take the place of a 2D mammogram. 2D mammograms remain an effective screening test for all women.  Not " all insurance companies cover the cost of a 3D mammogram. Check with your insurance.            Aug 02, 2017 12:00 PM CDT   LAB with  LAB   [facility] ([facility])    [address] [place] [address]  1[address] [facility] [id number] [phone]           Patient must bring picture ID. Patient should be prepared to give a urine specimen  Please do not eat 10-12 hours before your appointment if you are coming in fasting for labs on lipids, cholesterol, or glucose (sugar). Pregnant women should follow their Care Team instructions. Water with medications is okay. Do not drink coffee or other fluids. If you have concerns about taking  your medications, please ask at office or if scheduling via Check, send a message by clicking on Secure Messaging, Message Your Care Team.            Aug 30, 2017  7:45 AM CDT   RETURN RETINA with Monika Fermin MD   Eye Clinic ([facility])    [address]  [facility] [id number] [phone]            Oct 12, 2017  3:00 PM CDT   (Arrive by 2:45 PM)   RETURN LUPUS with Santiago Corbett MD   [facility] Rheumatology ([facility])    [address] [place] [id number] [phone]            Jan 29, 2018  7:30 AM CST   (Arrive by 7:15 AM)   RETURN DIABETES with Dorita Cramer MD   [facility] Endocrinology ([facility])    [address] [place] [id number] [phone]              Who to contact     Please call your clinic at [phone] to:    Ask questions about your health    Make or cancel appointments    Discuss your medicines    Learn about your test results    Speak to your doctor   If you have compliments or concerns about an experience at your clinic, or if you wish to file a complaint, please contact [facility] Patient Relations at [phone] or email us at  [email or web address]         Additional Information About Your Visit        [employer] Information     [employer] gives you secure access to your electronic health record. If you see a primary care provider, you can also send messages to your care team and make appointments. If you have questions, please call your primary care clinic.  If you do not have a primary care provider, please call [phone] and they will assist you.      [employer] is an electronic gateway that provides easy, online access to your medical records. With [employer], you can request a clinic appointment, read your test results, renew a prescription or communicate with your care team.     To access your existing account, please contact your [facility] or call [phone] for assistance.        Care EveryWhere ID     This is your Care EveryWhere ID. This could be used by other organizations to access your [facility] medical records  [id number]        Your Vitals Were     Pulse Height BMI (Body Mass Index)             63 1.676 m (5' 6\") 18.8 kg/m2          Blood Pressure from Last 3 Encounters:   08/02/17 105/56   07/31/17 110/68   07/20/17 95/59    Weight from Last 3 Encounters:   08/02/17 52.8 kg (116 lb 8 oz)   07/31/17 53.2 kg (117 lb 4.8 oz)   05/22/17 53.9 kg (118 lb 14.4 oz)              Today, you had the following     No orders found for display         Today's Medication Changes          These changes are accurate as of: 8/2/17 10:11 AM.  If you have any questions, ask your nurse or doctor.               These medicines have changed or have updated prescriptions.        Dose/Directions    estradiol 0.1 MG/GM cream   Commonly known as:  ESTRACE VAGINAL   This may have changed:    - how much to take  - how to take this  - when to take this  - additional instructions   Used for:  Atrophic vaginitis   Changed by:  Jeanmarie Pierson MD        Use 2 g vaginally twice per week.   Quantity:  42.5 g "   Refills:  11            Where to get your medicines      These medications were sent to [facility] - [place], [place] - [address]  [address], [facility] [address]     Phone:  [phone]     estradiol 0.1 MG/GM cream                Primary Care Provider Office Phone # Fax #    Joe Tj Contreras [phone]        [facility] [address] [facility] [address]  [facility] [address]        Equal Access to Services     MARVIN CACERES : Hadii aad ku hadasho Soomaali, waaxda luqadaha, qaybta kaalmada adeegyada, waxay idiin hayaan adeeg kharaange la'juju mullen. So [facility] [phone].    ATENCIÓN: Si habla español, tiene a hurtado disposición servicios gratuitos de asistencia lingüística. Llame al [phone].    We comply with applicable federal civil rights laws and [place] laws. We do not discriminate on the basis of race, color, national origin, age, disability sex, sexual orientation or gender identity.            Thank you!     Thank you for choosing [facility]OLOGY AND [facility] FOR PROSTATE AND UROLOGIC CANCERS  for your care. Our goal is always to provide you with excellent care. Hearing back from our patients is one way we can continue to improve our services. Please take a few minutes to complete the written survey that you may receive in the mail after your visit with us. Thank you!             Your Updated Medication List - Protect others around you: Learn how to safely use, store and throw away your medicines at www.disposemymeds.org.          This list is accurate as of: 8/2/17 10:11 AM.  Always use your most recent med list.                   Brand Name Dispense Instructions for use Diagnosis    aspirin 81 MG tablet      Take 81 mg by mouth At Bedtime        AYR SALINE NASAL NO-DRIP Gel     3 Tube    Use as needed for nasal dryness    Nasal ulcer       blood glucose monitoring lancets     2968 each    Test 7-8 times daily  (Lancets that go with pt current meter )     Diabetes mellitus (H)       blood glucose monitoring test strip    TRUE METRIX BLOOD GLUCOSE TEST    4 Box    Use to test blood sugar 4 times daily or as directed.    Type I diabetes mellitus, well controlled (H)       calcium + D 600-200 MG-UNIT Tabs   Generic drug:  calcium carbonate-vitamin D      Take 1 tablet by mouth 3 times daily        CENTRUM SILVER PO      Take 1 tablet by mouth daily.        estradiol 0.1 MG/GM cream    ESTRACE VAGINAL    42.5 g    Use 2 g vaginally twice per week.    Atrophic vaginitis       glucagon 1 MG kit    GLUCAGON EMERGENCY    3 each    Inject 1 mg subcutaneously or intramuscularly for severe hypoglycemic episodes.    Type 1 diabetes, HbA1c goal < 7% (H)       insulin glargine 100 UNIT/ML injection    LANTUS    15 mL    Inject 4 units as directed daily LANTUS SOLOSTAR PEN PLEASE    Diabetes mellitus type 1 (H)       * insulin pen needle 32G X 4 MM    BD PAM U/F    800 each    Use   Up to 8 daily    Diabetes mellitus, type 1       * insulin pen needle 32G X 4 MM     400 each    Use 4 pen needles daily or as directed.        lidocaine 5 % Patch    LIDODERM    30 patch    Apply up to 3 patches to painful area at once for up to 12 h within a 24 h period.  Remove after 12 hours.    Post-herpetic polyneuropathy       mycophenolate 500 MG tablet    CELLCEPT - GENERIC EQUIVALENT    360 tablet    Take 2 tablets (1,000 mg) by mouth 2 times daily 2 tabs in the a.m., 2 tabs in p.m.    Systemic lupus erythematosus (H), Antiphospholipid syndrome (H), Encounter for long-term current use of medication       NIFEdipine ER osmotic 30 MG 24 hr tablet    NIFEDICAL XL    90 tablet    Take 1 tablet (30 mg) by mouth daily    Achalasia of esophagus, HTN (hypertension)       NovoLOG PENFILL 100 UNIT/ML injection   Generic drug:  insulin aspart     15 mL    Inject 1 unit per 15 grams CHO with meals TID approx 15 units daily    Controlled type 1 diabetes mellitus (H)       * Injection Device for insulin  Priscila    NOVOPEN ECHO    1 each    1 each 4 times daily    Type 1 diabetes mellitus with hypoglycemia and without coma (H)       * NOVOPEN JR (GREEN) Priscila   Generic drug:  Injection Device for insulin      Use as directed.        omega-3 fatty acids 1200 MG capsule      Take 1 capsule by mouth daily.    Systemic lupus erythematosus (H), Antiphospholipid syndrome (H), Encounter for long-term (current) use of other medications       simvastatin 40 MG tablet    ZOCOR    100 tablet    Take 1 tablet (40 mg) by mouth At Bedtime    Other hyperlipidemia       TRUE METRIX METER W/DEVICE Kit     1 kit    1 each 4 times daily    Type I diabetes mellitus, well controlled (H)       TYLENOL 500 MG tablet   Generic drug:  acetaminophen      Take 500 mg by mouth At Bedtime Takes 6 tablets (500 mg ) .Christa Acharya LPN 2:40 PM on 3/23/2017        VITAMIN D (CHOLECALCIFEROL) PO      Take 2,000 Units by mouth daily (with lunch)        warfarin 5 MG tablet    COUMADIN    215 tablet    Take 10mgs on Mondays and Thursdays and 12.5mgs on all other days or as directed by the [facility]    Systemic lupus erythematosus (H)       * Notice:  This list has 4 medication(s) that are the same as other medications prescribed for you. Read the directions carefully, and ask your doctor or other care provider to review them with you.

## 2017-08-02 NOTE — LETTER
"8/2/2017       RE: Shala Caruso  [address]  [place] [address]     Dear Colleague,    Thank you for referring your patient, Shala Caruso, to the [facility] UROLOGY AND INST FOR PROSTATE AND UROLOGIC CANCERS at [facility]. Please see a copy of my visit note below.    Reason for Visit:  F/u on stress incontinence.    Clinical Data:  Ms. Shala Caruso is a 71 year old female with a history of cystocele s/p anterior colporrhaphy with midurethral sling on 12/20/16. She has been doing well since her surgery. She was having small amounts of leakage that seemed to be related to intrinsic sphincter deficiency but then was started on estrogen cream and did some pelvic floor exercises and now feels much better.  She rarely has any incontinence.  No dysuria, urgency, frequency.     Objective:  /56  Pulse 63  Ht 1.676 m (5' 6\")  Wt 52.8 kg (116 lb 8 oz)  BMI 18.8 kg/m2    Gen: In NAD  Resp: Breathing non-labored    Assessment & Plan: Shala Caruso is a 71 year old female with a history of cystocele s/p anterior colporrhaphy with midurethral sling on 12/20/16 doing well except for rare stress incontinence likely related to intrinsic sphincter deficiency.  We discussed again options of observation vs. Periurethral bulking.  She will try more Kegel exercises for now and observe.  She feels things are so much better.  -Continue estrogen cream  -continue exercises  -f/u prn    Thank you for allowing me to participate in the care of  Ms. Shala Caruso and I will keep you updated on her progress.    Jeanmarie Pierson MD    "

## 2017-08-07 DIAGNOSIS — E78.49 OTHER HYPERLIPIDEMIA: ICD-10-CM

## 2017-08-08 RX ORDER — SIMVASTATIN 40 MG
40 TABLET ORAL AT BEDTIME
Qty: 90 TABLET | Refills: 3 | Status: SHIPPED | OUTPATIENT
Start: 2017-08-08 | End: 2017-10-12

## 2017-08-21 DIAGNOSIS — H31.8 CHOROIDAL LESION: Primary | ICD-10-CM

## 2017-08-23 DIAGNOSIS — Z79.01 LONG TERM CURRENT USE OF ANTICOAGULANT THERAPY: ICD-10-CM

## 2017-08-23 LAB — INR PPP: 2.35 (ref 0.86–1.14)

## 2017-08-24 ENCOUNTER — ANTICOAGULATION THERAPY VISIT (OUTPATIENT)
Dept: ANTICOAGULATION | Facility: CLINIC | Age: 72
End: 2017-08-24

## 2017-08-24 DIAGNOSIS — M32.9 SLE (SYSTEMIC LUPUS ERYTHEMATOSUS) (H): ICD-10-CM

## 2017-08-24 DIAGNOSIS — Z79.01 LONG-TERM (CURRENT) USE OF ANTICOAGULANTS: ICD-10-CM

## 2017-08-24 LAB — PROTHROM ACT/NOR PPP: 22 % (ref 60–140)

## 2017-08-24 NOTE — MR AVS SNAPSHOT
Shala Caruso   8/24/2017   Anticoagulation Therapy Visit    Description:  71 year old female   Provider:  Stacey Beal, RN   Department:  [facility]           INR as of 8/24/2017     Today's INR       Anticoagulation Summary as of 8/24/2017     INR goal 2.0-3.0   Today's INR    Full instructions 12.5 mg on Sun, Tue, Thu; 10 mg all other days   Next INR check 9/21/2017    Indications   SLE (systemic lupus erythematosus) (H) [M32.9]  Long-term (current) use of anticoagulants [Z79.01] [Z79.01]         August 2017 Details    Sun Mon Tue Wed Thu Fri Sat       1               2               3               4               5                 6               7               8               9               10               11               12                 13               14               15               16               17               18               19                 20               21               22               23               24      12.5 mg   See details      25      10 mg         26      10 mg           27      12.5 mg         28      10 mg         29      12.5 mg         30      10 mg         31      12.5 mg            Date Details   08/24 This INR check               How to take your warfarin dose     To take:  10 mg Take 2 of the 5 mg tablets.    To take:  12.5 mg Take 2.5 of the 5 mg tablets.           September 2017 Details    Sun Mon Tue Wed Thu Fri Sat          1      10 mg         2      10 mg           3      12.5 mg         4      10 mg         5      12.5 mg         6      10 mg         7      12.5 mg         8      10 mg         9      10 mg           10      12.5 mg         11      10 mg         12      12.5 mg         13      10 mg         14      12.5 mg         15      10 mg         16      10 mg           17      12.5 mg         18      10 mg         19      12.5 mg         20      10 mg         21            22               23                 24               25                26               27               28               29               30                Date Details   No additional details    Date of next INR:  9/21/2017         How to take your warfarin dose     To take:  10 mg Take 2 of the 5 mg tablets.    To take:  12.5 mg Take 2.5 of the 5 mg tablets.

## 2017-08-24 NOTE — PROGRESS NOTES
ANTICOAGULATION FOLLOW-UP CLINIC VISIT    Patient Name:  Shala Caruso  Date:  8/24/2017  Contact Type:  Telephone    SUBJECTIVE:     Patient Findings     Comments INR 2.35  Factor 2 22%.  Goal 15-25%           OBJECTIVE    INR   Date Value Ref Range Status   08/23/2017 2.35 (H) 0.86 - 1.14 Final     Factor 2 Assay   Date Value Ref Range Status   08/23/2017 22 (L) 60 - 140 % Final       ASSESSMENT / PLAN  INR assessment THER    Recheck INR In: 4 WEEKS    INR Location Clinic      Anticoagulation Summary as of 8/24/2017     INR goal 2.0-3.0   Today's INR    Maintenance plan 12.5 mg (5 mg x 2.5) on Sun, Tue, Thu; 10 mg (5 mg x 2) all other days   Full instructions 12.5 mg on Sun, Tue, Thu; 10 mg all other days   Weekly total 77.5 mg   No change documented Stacey Beal RN   Plan last modified Portia Cherry RN (6/16/2017)   Next INR check 9/21/2017   Priority Factor 2   Target end date     Indications   SLE (systemic lupus erythematosus) (H) [M32.9]  Long-term (current) use of anticoagulants [Z79.01] [Z79.01]         Anticoagulation Episode Summary     INR check location Clinic Lab    Preferred lab     Send INR reminders to [facility]    Comments Factor 2  goal range is 15-25%  Ok to leave message on home [phone] and work voicemail, but call [id number] per pt request.  OK to leave message at office  May leave messages with Rodriguez Santos  DO NOT GIVE RECORDS TO EMPLOYER U        Anticoagulation Care Providers     Provider Role Specialty Phone number    Mt Kiser MD Responsible Clinical Cardiac Electrophysiology [phone]            See the Encounter Report to view Anticoagulation Flowsheet and Dosing Calendar (Go to Encounters tab in chart review, and find the Anticoagulation Therapy Visit)    Left message for patient with results and dosing recommendations. Asked patient to call back to report any missed doses, falls, signs and symptoms of bleeding or clotting, any changes in  health, medication, or diet. Asked patient to call back with any questions or concerns.     Stacey Beal RN

## 2017-08-30 ENCOUNTER — OFFICE VISIT (OUTPATIENT)
Dept: OPHTHALMOLOGY | Facility: CLINIC | Age: 72
End: 2017-08-30
Attending: OPHTHALMOLOGY
Payer: COMMERCIAL

## 2017-08-30 DIAGNOSIS — H52.13 MYOPIA OF BOTH EYES: Primary | ICD-10-CM

## 2017-08-30 DIAGNOSIS — E10.9 TYPE 1 DIABETES MELLITUS WITHOUT RETINOPATHY (H): ICD-10-CM

## 2017-08-30 DIAGNOSIS — H25.13 SENILE NUCLEAR SCLEROSIS, BILATERAL: ICD-10-CM

## 2017-08-30 DIAGNOSIS — H31.8 CHOROIDAL LESION: ICD-10-CM

## 2017-08-30 PROCEDURE — 92015 DETERMINE REFRACTIVE STATE: CPT | Mod: ZF

## 2017-08-30 PROCEDURE — 92134 CPTRZ OPH DX IMG PST SGM RTA: CPT | Mod: ZF | Performed by: OPHTHALMOLOGY

## 2017-08-30 PROCEDURE — 92250 FUNDUS PHOTOGRAPHY W/I&R: CPT | Mod: ZF | Performed by: OPHTHALMOLOGY

## 2017-08-30 PROCEDURE — 76510 OPH US DX B-SCAN&QUAN A-SCAN: CPT | Mod: ZF | Performed by: OPHTHALMOLOGY

## 2017-08-30 PROCEDURE — 99214 OFFICE O/P EST MOD 30 MIN: CPT | Mod: 25,ZF

## 2017-08-30 ASSESSMENT — REFRACTION_WEARINGRX
OS_CYLINDER: +0.75
OS_AXIS: 015
OD_ADD: +2.75
OD_AXIS: 138
OS_SPHERE: -5.50
OD_SPHERE: -5.50
OS_ADD: +2.75
OD_CYLINDER: +1.50

## 2017-08-30 ASSESSMENT — CONF VISUAL FIELD
OS_NORMAL: 1
OD_NORMAL: 1
METHOD: COUNTING FINGERS

## 2017-08-30 ASSESSMENT — REFRACTION_MANIFEST
OD_AXIS: 135
OD_CYLINDER: +1.25
OD_ADD: +2.75
OS_ADD: +2.75
OS_CYLINDER: +0.75
OS_AXIS: 005
OD_SPHERE: -5.50
OS_SPHERE: -5.25

## 2017-08-30 ASSESSMENT — VISUAL ACUITY
OS_CC+: -1
OD_CC: 20/30
METHOD: SNELLEN - LINEAR
OD_CC+: -1
CORRECTION_TYPE: GLASSES
OS_CC: 20/30

## 2017-08-30 ASSESSMENT — SLIT LAMP EXAM - LIDS
COMMENTS: NORMAL
COMMENTS: NORMAL

## 2017-08-30 ASSESSMENT — CUP TO DISC RATIO
OD_RATIO: 0.3
OS_RATIO: 0.3

## 2017-08-30 ASSESSMENT — TONOMETRY
IOP_METHOD: TONOPEN
OS_IOP_MMHG: 15
OD_IOP_MMHG: 13

## 2017-08-30 ASSESSMENT — EXTERNAL EXAM - LEFT EYE: OS_EXAM: NORMAL

## 2017-08-30 ASSESSMENT — EXTERNAL EXAM - RIGHT EYE: OD_EXAM: NORMAL

## 2017-08-30 NOTE — NURSING NOTE
Chief Complaints and History of Present Illnesses   Patient presents with     Follow Up For     Yearly follow up Choroidal pigmented lesion, left eye     Eye Exam For Diabetes     HPI    Affected eye(s):  Both   Symptoms:     No floaters   No flashes   No redness   No Dryness         Do you have eye pain now?:  No      Comments:  Pt states vision appearing more blurry in RE over the past year. Pt needing to look thru her bifocals to see clearer.  DM1 BS: 97 this morning.  Lab Results       Component                Value               Date                       A1C                      5.3                 07/11/2017                 A1C                      5.5                 06/10/2013                 A1C                      5.4                 01/07/2013                 A1C                      5.5                 07/02/2012                 A1C                      5.1                 01/09/2012              Ina Wei [facility] August 30, 2017 8:20 AM

## 2017-08-30 NOTE — PROGRESS NOTES
CC: blurry vision OU    HPI: 72YO F hx of DM type I here for diabetic eye exam. Eyes getting slightly blurrier. Denies flashes and floaters. Last hgA1c 5.3 (7/11/17).     Past med hx: SLE currently on cellcept (allergic to plaq)  Past ocular hx: none     OCULAR IMAGING  Optical Coherence Tomography 8-30-17  Right eye normal foveal contour, no srf/irf  Left eye normal foveal contour, OCT thru lesion appears 1.3mm thick with drusen overlying, no srf    PHOTOS 8-30-17  Right eye  Left eye  Choroidal pigmented lesion with drusen     U/S left eye   9/14/16 C1 0.56 mm; C2 5.46 mm  8-30-17 C1 1.3 mm; C2     A/P:   1) DM type I   - no evidence of DR   - good glucose control    2) Cataracts OU  - likely not visually significant   - observe    3) Myopic astigmatism, OU   - updated Rx given     4) Choroidal pigmented lesion, left eye  - no orange pigmentation, no subretinal fluid, + drusen   - ultrasound 8/30/17: ~0.6mm height, normal retrobulbar echo pattern  - OCT depth 1.3mm  - appears slightly thicker on OCT stable on US today  - observe     5) patient with history of Lupus  Took prednisolone for 10 yrs --> patient developed Diabetes mellitus  Currently Micophenolate  No lupus retinopahty  Patient had DVT left upper leg--> patient on coumadin    - f/u in 12 months, repeat oct enhanced depth image of the choroidal peripheral lesion , macula oct, US, optos photos  - prescription given  ~~~~~~~~~~~~~~~~~~~~~~~~~~~~~~~~~~~~~~~~~~~~~~~~~~~~~~~~~~~~~~~~~~~~~~~~~~~~~~~~~~  I personally viewed the patient's HPI and review of systems entered by [place].  I have discussed the case and the management of this patient's care with the Resident/Fellow, if applicable. I also have reviewed and agree with the assessment and plan as stated above and agree with all of its relevant components. I personally viewed the patient images and I agree with the interpretation as documented by the resident/fellow and edited by me. I was present for  critical portions of the procedure and was immediately available for the entire procedure.  Monika Fermin M.D.

## 2017-09-13 DIAGNOSIS — Z79.01 LONG TERM CURRENT USE OF ANTICOAGULANT THERAPY: Primary | ICD-10-CM

## 2017-09-13 RX ORDER — WARFARIN SODIUM 5 MG/1
TABLET ORAL
Qty: 75 TABLET | Refills: 5 | Status: SHIPPED | OUTPATIENT
Start: 2017-09-13 | End: 2018-10-31

## 2017-09-21 ENCOUNTER — ANTICOAGULATION THERAPY VISIT (OUTPATIENT)
Dept: ANTICOAGULATION | Facility: CLINIC | Age: 72
End: 2017-09-21

## 2017-09-21 DIAGNOSIS — M32.9 SLE (SYSTEMIC LUPUS ERYTHEMATOSUS) (H): ICD-10-CM

## 2017-09-21 DIAGNOSIS — Z79.899 ENCOUNTER FOR LONG-TERM CURRENT USE OF MEDICATION: ICD-10-CM

## 2017-09-21 DIAGNOSIS — Z79.01 LONG-TERM (CURRENT) USE OF ANTICOAGULANTS: ICD-10-CM

## 2017-09-21 DIAGNOSIS — Z79.01 LONG TERM CURRENT USE OF ANTICOAGULANT THERAPY: ICD-10-CM

## 2017-09-21 LAB
ALT SERPL W P-5'-P-CCNC: 27 U/L (ref 0–50)
AST SERPL W P-5'-P-CCNC: 19 U/L (ref 0–45)
BASOPHILS # BLD AUTO: 0 10E9/L (ref 0–0.2)
BASOPHILS NFR BLD AUTO: 1.4 %
DIFFERENTIAL METHOD BLD: ABNORMAL
EOSINOPHIL # BLD AUTO: 0 10E9/L (ref 0–0.7)
EOSINOPHIL NFR BLD AUTO: 1.4 %
ERYTHROCYTE [DISTWIDTH] IN BLOOD BY AUTOMATED COUNT: 15.1 % (ref 10–15)
HCT VFR BLD AUTO: 39.4 % (ref 35–47)
HGB BLD-MCNC: 12.6 G/DL (ref 11.7–15.7)
IMM GRANULOCYTES # BLD: 0 10E9/L (ref 0–0.4)
IMM GRANULOCYTES NFR BLD: 0.3 %
INR PPP: 2.32 (ref 0.86–1.14)
LYMPHOCYTES # BLD AUTO: 0.8 10E9/L (ref 0.8–5.3)
LYMPHOCYTES NFR BLD AUTO: 26.3 %
MCH RBC QN AUTO: 29.4 PG (ref 26.5–33)
MCHC RBC AUTO-ENTMCNC: 32 G/DL (ref 31.5–36.5)
MCV RBC AUTO: 92 FL (ref 78–100)
MONOCYTES # BLD AUTO: 0.3 10E9/L (ref 0–1.3)
MONOCYTES NFR BLD AUTO: 11.8 %
NEUTROPHILS # BLD AUTO: 1.7 10E9/L (ref 1.6–8.3)
NEUTROPHILS NFR BLD AUTO: 58.8 %
NRBC # BLD AUTO: 0 10*3/UL
NRBC BLD AUTO-RTO: 0 /100
PLATELET # BLD AUTO: 205 10E9/L (ref 150–450)
PROTHROM ACT/NOR PPP: 23 % (ref 60–140)
RBC # BLD AUTO: 4.28 10E12/L (ref 3.8–5.2)
WBC # BLD AUTO: 2.9 10E9/L (ref 4–11)

## 2017-09-21 NOTE — PROGRESS NOTES
ANTICOAGULATION FOLLOW-UP CLINIC VISIT    Patient Name:  Shala Caruso  Date:  9/21/2017  Contact Type:  Telephone    SUBJECTIVE:     Patient Findings     Comments Factor 2=23  Goal range 15-25%           OBJECTIVE    INR   Date Value Ref Range Status   09/21/2017 2.32 (H) 0.86 - 1.14 Final     Factor 2 Assay   Date Value Ref Range Status   09/21/2017 23 (L) 60 - 140 % Final       ASSESSMENT / PLAN  INR assessment THER    Recheck INR In: 4 WEEKS    INR Location Clinic      Anticoagulation Summary as of 9/21/2017     INR goal 2.0-3.0   Today's INR    Maintenance plan 12.5 mg (5 mg x 2.5) on Sun, Tue, Thu; 10 mg (5 mg x 2) all other days   Full instructions 12.5 mg on Sun, Tue, Thu; 10 mg all other days   Weekly total 77.5 mg   No change documented Stacey Beal RN   Plan last modified Portia Cherry RN (6/16/2017)   Next INR check 10/19/2017   Priority Factor 2   Target end date     Indications   SLE (systemic lupus erythematosus) (H) [M32.9]  Long-term (current) use of anticoagulants [Z79.01] [Z79.01]         Anticoagulation Episode Summary     INR check location Clinic Lab    Preferred lab     Send INR reminders to [facility]    Comments Factor 2  goal range is 15-25%  Ok to leave message on home [phone] and work voicemail, but call [id number] per pt request.  OK to leave message at office  May leave messages with Rodriguez Santos  DO NOT GIVE RECORDS TO EMPLOYER U        Anticoagulation Care Providers     Provider Role Specialty Phone number    Mt Kiser MD Responsible Clinical Cardiac Electrophysiology [phone]            See the Encounter Report to view Anticoagulation Flowsheet and Dosing Calendar (Go to Encounters tab in chart review, and find the Anticoagulation Therapy Visit)    Left message for patient with results and dosing recommendations. Asked patient to call back to report any missed doses, falls, signs and symptoms of bleeding or clotting, any changes in health,  medication, or diet. Asked patient to call back with any questions or concerns.     Stacey Beal RN

## 2017-09-21 NOTE — MR AVS SNAPSHOT
Shala Caruso   9/21/2017   Anticoagulation Therapy Visit    Description:  71 year old female   Provider:  Stacey Beal, RN   Department:  [facility]           INR as of 9/21/2017     Today's INR       Anticoagulation Summary as of 9/21/2017     INR goal 2.0-3.0   Today's INR    Full instructions 12.5 mg on Sun, Tue, Thu; 10 mg all other days   Next INR check 10/19/2017    Indications   SLE (systemic lupus erythematosus) (H) [M32.9]  Long-term (current) use of anticoagulants [Z79.01] [Z79.01]         September 2017 Details    Sun Mon Tue Wed Thu Fri Sat          1               2                 3               4               5               6               7               8               9                 10               11               12               13               14               15               16                 17               18               19               20               21      12.5 mg   See details      22      10 mg         23      10 mg           24      12.5 mg         25      10 mg         26      12.5 mg         27      10 mg         28      12.5 mg         29      10 mg         30      10 mg          Date Details   09/21 This INR check               How to take your warfarin dose     To take:  10 mg Take 2 of the 5 mg tablets.    To take:  12.5 mg Take 2.5 of the 5 mg tablets.           October 2017 Details    Sun Mon Tue Wed Thu Fri Sat     1      12.5 mg         2      10 mg         3      12.5 mg         4      10 mg         5      12.5 mg         6      10 mg         7      10 mg           8      12.5 mg         9      10 mg         10      12.5 mg         11      10 mg         12      12.5 mg         13      10 mg         14      10 mg           15      12.5 mg         16      10 mg         17      12.5 mg         18      10 mg         19            20               21                 22               23               24               25               26                27               28                 29               30               31                    Date Details   No additional details    Date of next INR:  10/19/2017         How to take your warfarin dose     To take:  10 mg Take 2 of the 5 mg tablets.    To take:  12.5 mg Take 2.5 of the 5 mg tablets.

## 2017-10-10 ENCOUNTER — MYC MEDICAL ADVICE (OUTPATIENT)
Dept: RHEUMATOLOGY | Facility: CLINIC | Age: 72
End: 2017-10-10

## 2017-10-10 ENCOUNTER — MYC MEDICAL ADVICE (OUTPATIENT)
Dept: INTERNAL MEDICINE | Facility: CLINIC | Age: 72
End: 2017-10-10

## 2017-10-10 DIAGNOSIS — K22.0 ACHALASIA OF ESOPHAGUS: ICD-10-CM

## 2017-10-10 DIAGNOSIS — I10 HTN (HYPERTENSION): ICD-10-CM

## 2017-10-10 DIAGNOSIS — E78.49 OTHER HYPERLIPIDEMIA: ICD-10-CM

## 2017-10-10 NOTE — TELEPHONE ENCOUNTER
Writer made necessary changes to patient chart and updated patient via [employer].   Chanelle Aparicio LPN

## 2017-10-12 ENCOUNTER — OFFICE VISIT (OUTPATIENT)
Dept: RHEUMATOLOGY | Facility: CLINIC | Age: 72
End: 2017-10-12
Attending: INTERNAL MEDICINE
Payer: COMMERCIAL

## 2017-10-12 VITALS — DIASTOLIC BLOOD PRESSURE: 68 MMHG | SYSTOLIC BLOOD PRESSURE: 116 MMHG | HEART RATE: 62 BPM | OXYGEN SATURATION: 100 %

## 2017-10-12 DIAGNOSIS — M32.15 SYSTEMIC LUPUS ERYTHEMATOSUS WITH TUBULO-INTERSTITIAL NEPHROPATHY, UNSPECIFIED SLE TYPE (H): Primary | ICD-10-CM

## 2017-10-12 DIAGNOSIS — Z79.899 ENCOUNTER FOR LONG-TERM CURRENT USE OF MEDICATION: ICD-10-CM

## 2017-10-12 PROCEDURE — 99212 OFFICE O/P EST SF 10 MIN: CPT | Mod: ZF

## 2017-10-12 RX ORDER — SIMVASTATIN 40 MG
40 TABLET ORAL AT BEDTIME
Qty: 90 TABLET | Refills: 2 | Status: SHIPPED | OUTPATIENT
Start: 2017-10-12 | End: 2018-08-23

## 2017-10-12 RX ORDER — NIFEDIPINE 30 MG/1
30 TABLET, EXTENDED RELEASE ORAL DAILY
Qty: 90 TABLET | Refills: 2 | Status: SHIPPED | OUTPATIENT
Start: 2017-10-12 | End: 2018-09-20

## 2017-10-12 ASSESSMENT — PAIN SCALES - GENERAL: PAINLEVEL: MILD PAIN (2)

## 2017-10-12 NOTE — NURSING NOTE
"Chief Complaint   Patient presents with     RECHECK     Follow up for SLE        Initial /68  Pulse 62  SpO2 100% Estimated body mass index is 18.8 kg/(m^2) as calculated from the following:    Height as of 8/2/17: 1.676 m (5' 6\").    Weight as of 8/2/17: 52.8 kg (116 lb 8 oz).  Medication Reconciliation: complete   Karina Zamudio CMA    "

## 2017-10-12 NOTE — MR AVS SNAPSHOT
After Visit Summary   10/12/2017    Shala Caruso    MRN: [id number]           Patient Information     Date Of Birth          1945        Visit Information        Provider Department      10/12/2017 3:00 PM Santiago Corbett MD [facility]        Today's Diagnoses     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, unspecified SLE type (H)    -  1    Encounter for long-term current use of medication           Follow-ups after your visit        Follow-up notes from your care team     Return in about 4 months (around 2/12/2018).      Your next 10 appointments already scheduled     Oct 19, 2017 12:30 PM CDT   LAB with  LAB   [facility] ([facility])    [address] [place] [id number] [phone]           Patient must bring picture ID. Patient should be prepared to give a urine specimen  Please do not eat 10-12 hours before your appointment if you are coming in fasting for labs on lipids, cholesterol, or glucose (sugar). Pregnant women should follow their Care Team instructions. Water with medications is okay. Do not drink coffee or other fluids. If you have concerns about taking  your medications, please ask at office or if scheduling via [employer], send a message by clicking on Secure Messaging, Message Your Care Team.            Jan 29, 2018  7:30 AM CST   (Arrive by 7:15 AM)   RETURN DIABETES with Dorita Cramer MD   [facility] End[facility]in[facility] ([facility])    [address] [place] [id number] [phone]            Feb 08, 2018  3:30 PM CST   (Arrive by 3:15 PM)   RETURN LUPUS with Santiago Corbett MD   [facility] Rhe[facility] ([facility])    [address] [place] [id number] [phone]              Who to contact     If you have questions or need follow up information about today's clinic visit or  your schedule please contact [facility] directly at [phone].  Normal or non-critical lab and imaging results will be communicated to you by MyChart, letter or phone within 4 business days after the clinic has received the results. If you do not hear from us within 7 days, please contact the clinic through [employer] or phone. If you have a critical or abnormal lab result, we will notify you by phone as soon as possible.  Submit refill requests through [employer] or call your pharmacy and they will forward the refill request to us. Please allow 3 business days for your refill to be completed.          Additional Information About Your Visit        We[employer] Information     [employer] gives you secure access to your electronic health record. If you see a primary care provider, you can also send messages to your care team and make appointments. If you have questions, please call your primary care clinic.  If you do not have a primary care provider, please call [phone] and they will assist you.        Care EveryWhere ID     This is your Care EveryWhere ID. This could be used by other organizations to access your [facility] medical records  [id number]        Your Vitals Were     Pulse Pulse Oximetry                62 100%           Blood Pressure from Last 3 Encounters:   10/12/17 116/68   08/02/17 105/56   07/31/17 110/68    Weight from Last 3 Encounters:   08/02/17 52.8 kg (116 lb 8 oz)   07/31/17 53.2 kg (117 lb 4.8 oz)   05/22/17 53.9 kg (118 lb 14.4 oz)              Today, you had the following     No orders found for display       Primary Care Provider Office Phone # Fax #    Joe Contreras [phone]       [address] [place] [address]        Equal Access to Services     WAYNE CACERES : Erlinda Rivera, adriana yanes, carmelita marte. So [facility] [phone].    ATENCIÓN: diana Angeles  hurtado disposición servicios gratuitos de asistencia lingüística. Christopher mohamud [phone].    We comply with applicable federal civil rights laws and [place] laws. We do not discriminate on the basis of race, color, national origin, age, disability, sex, sexual orientation, or gender identity.            Thank you!     Thank you for choosing [facility]  for your care. Our goal is always to provide you with excellent care. Hearing back from our patients is one way we can continue to improve our services. Please take a few minutes to complete the written survey that you may receive in the mail after your visit with us. Thank you!             Your Updated Medication List - Protect others around you: Learn how to safely use, store and throw away your medicines at www.disposemymeds.org.          This list is accurate as of: 10/12/17  3:45 PM.  Always use your most recent med list.                   Brand Name Dispense Instructions for use Diagnosis    aspirin 81 MG tablet      Take 81 mg by mouth At Bedtime        AYR SALINE NASAL NO-DRIP Gel     3 Tube    Use as needed for nasal dryness    Nasal ulcer       blood glucose monitoring lancets     7203 each    Test 7-8 times daily  (Lancets that go with pt current meter )    Diabetes mellitus (H)       blood glucose monitoring test strip    TRUE METRIX BLOOD GLUCOSE TEST    4 Box    Use to test blood sugar 4 times daily or as directed.    Type I diabetes mellitus, well controlled (H)       calcium + D 600-200 MG-UNIT Tabs   Generic drug:  calcium carbonate-vitamin D      Take 1 tablet by mouth 3 times daily        CENTRUM SILVER PO      Take 1 tablet by mouth daily.        estradiol 0.1 MG/GM cream    ESTRACE VAGINAL    42.5 g    Use 2 g vaginally twice per week.    Atrophic vaginitis       glucagon 1 MG kit    GLUCAGON EMERGENCY    3 each    Inject 1 mg subcutaneously or intramuscularly for severe hypoglycemic episodes.    Type 1 diabetes, HbA1c goal < 7% (H)        insulin glargine 100 UNIT/ML injection    LANTUS    15 mL    Inject 4 units as directed daily LANTUS SOLOSTAR PEN PLEASE    Diabetes mellitus type 1 (H)       * insulin pen needle 32G X 4 MM    BD PAM U/F    800 each    Use   Up to 8 daily    Diabetes mellitus, type 1       * insulin pen needle 32G X 4 MM     400 each    Use 4 pen needles daily or as directed.        lidocaine 5 % Patch    LIDODERM    30 patch    Apply up to 3 patches to painful area at once for up to 12 h within a 24 h period.  Remove after 12 hours.    Post-herpetic polyneuropathy       mycophenolate 500 MG tablet    GENERIC EQUIVALENT    360 tablet    Take 2 tablets (1,000 mg) by mouth 2 times daily 2 tabs in the a.m., 2 tabs in p.m.    Systemic lupus erythematosus (H), Antiphospholipid syndrome (H), Encounter for long-term current use of medication       NIFEdipine ER osmotic 30 MG 24 hr tablet    NIFEDICAL XL    90 tablet    Take 1 tablet (30 mg) by mouth daily    Achalasia of esophagus, HTN (hypertension)       NovoLOG PENFILL 100 UNIT/ML injection   Generic drug:  insulin aspart     15 mL    Inject 1 unit per 15 grams CHO with meals TID approx 15 units daily    Controlled type 1 diabetes mellitus (H)       * Injection Device for insulin Priscila    NOVOPEN ECHO    1 each    1 each 4 times daily    Type 1 diabetes mellitus with hypoglycemia and without coma (H)       * NOVOPEN JR (GREEN) Priscila   Generic drug:  Injection Device for insulin      Use as directed.        omega-3 fatty acids 1200 MG capsule      Take 1 capsule by mouth daily.    Systemic lupus erythematosus (H), Antiphospholipid syndrome (H), Encounter for long-term (current) use of other medications       simvastatin 40 MG tablet    ZOCOR    90 tablet    Take 1 tablet (40 mg) by mouth At Bedtime    Other hyperlipidemia       TRUE METRIX METER W/DEVICE Kit     1 kit    1 each 4 times daily    Type I diabetes mellitus, well controlled (H)       TYLENOL 500 MG tablet   Generic drug:   acetaminophen      Take 500 mg by mouth At Bedtime Takes 6 tablets (500 mg ) .Christa Acharya LPN 2:40 PM on 3/23/2017        VITAMIN D (CHOLECALCIFEROL) PO      Take 2,000 Units by mouth daily (with lunch)        * warfarin 5 MG tablet    COUMADIN    215 tablet    Take 10mgs on Mondays and Thursdays and 12.5mgs on all other days or as directed by the Medication Monitoring Clinic at the  of .    Systemic lupus erythematosus (H)       * warfarin 5 MG tablet    COUMADIN    75 tablet    Take 2-2.5 tablets daily or as directed by coumadin clinic.    Long term current use of anticoagulant therapy       * Notice:  This list has 6 medication(s) that are the same as other medications prescribed for you. Read the directions carefully, and ask your doctor or other care provider to review them with you.

## 2017-10-12 NOTE — PROGRESS NOTES
Rheumatology Visit     Shala Caruso MRN# [id number]   YOB: 1945 Age: 71 year old     Date of Visit: October 12, 2017  Primary care provider: Joe Contreras          Assessment and Plan:   1. 72 yo female with SLE, diagnosed 2000 (+CRISTAL,+dsDNA ab, arthritis, serositis): flare (3/10) with pericardial effusion/tamponade, and now off of prednisone; Cellcept was started 6/10 and tolerating well. She has no significant current symptoms of inflammation. SLE lab stable with Cellcept monitoring.   2. 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 Allergies to multiple antibiotics and Plaquenil   4. Osteoprosis on Boniva (Last DXA with some continued decrease of BMD)   5. Severe GERD, Achalasia   6. Post thrombophlebitic syndrome (chronic LE swelling), stable  7. Dyslipidemia with current excellent lipid values   8. Raynauds   9. Chronic leukopenia   10. 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 Herpes Zoster  Plan   Discussed in detail with the patient   1. Continue Cellcept 2000 mg daily   2. She does not require additional therapy at this time   3. Call if new symptoms   4. Continue lab monitoring ordered before next appointment at 3 months and soon before next appointment   5. Return to SLE Clinic in 6 months   6. Continue followup in [facility] for Diabetes; in [facility] for Zoster and other concerns   7. Follow up regarding lymphedema as needed   8. flu shot is current  Santiago Corbett MD.           History of Present Illness:   72 yo female is seen for followup of SLE. I saw her last in April 2017. EPIC reviewed.   Problem list:   1. SLE, diagnosed 2000 (+CRISTAL,+dsDNA ab, arthritis, serositis): flare (3/10) with pericardial effusion/tamponade, on Cellcept (started 6/10)   2. APS, s/p DVT LLE, PE on warfarin in 2004   3. Allergies to multiple antibiotics and Plaquenil   4. Osteoprosis on Boniva (Last DXA 1/09, Tscore: -1.9 L femoral neck)   5. Severe GERD, Achalasia   6. Post thrombophlebitic  syndrome (chronic LE swelling)   7. Dyslipidemia   8. Raynauds   9. Chronic leukopenia on Methotrexate  10. L arm lymphedema    HISTORY CARRIED FORWARD:   She has been off Prednisone for about 3.5 years and off Methotrexate since May 2012; she is on Cellcept 2000mg/day and no longer on Plaquenil.   She has left arm lymphedema. She has hx of axillary LN dissection for enlarged nodes. This have been evaluated by OT. She was using her glove and sleeve but the glove is too tight and she is not using it regularly; this does not impede her at present.  She has had no recurrent chest pain/SOB or pleurisy since her hospitalization in March 2010 for pericarditis.      Her biggest interval problem is Herpes Zoster in L Thoracic dermatome, dx 3/23 in the [facility].  She was on Valtrex and is on Neurontin.  She is having a lot of pain.  She sees them again next week.     INTERVAL HISTORY:     She again had an overall uneventful 6 months from an SLE perspective. She is tolerating the medications without problem. Her labs have been stable with persistent leukopenia now at 2.p last month; DSDNA normal at 29 for first time in July 2014 and normal last checked. Her complements have been stable but slightly low without change. Creatinine has been variable but normal last check.  Her joints are stable with no swelling. She has osteoarthritis of the bilateral knees which is the most bothersome problem. She is doing low impact Yoga.  She denies fevers, fatigue, oral ulcers, rash. Raynauds is stable without Digital ulcers. No new rash.  She remains on Boniva. She is followed in Coumadin Clinic with hx of DVT and PE.    We discussed her prior therapy and options for going forward. She is looking ahead to eventual FDC. She has Diabetes so wishes to avoid Prednisone unless absolutely needed.       Review of Systems:   Review Of Systems  Skin: negative  Eyes: negative  Ears/Nose/Throat: negative  Respiratory: No shortness of breath, dyspnea  on exertion, cough, or hemoptysis  Cardiovascular: negative  Gastrointestinal: negative  Genitourinary: negative  Musculoskeletal: negative  Neurologic: negative  Psychiatric: negative  Hematologic/Lymphatic/Immunologic: negative  Endocrine: negative          Past Medical History:     Past Medical History:   Diagnosis Date     Achalasia      Antiphospholipid syndrome (H)      Antiplatelet or antithrombotic long-term use      DM (diabetes mellitus) (H)      DVT (deep venous thrombosis) (H) 2003    and PE     Dysphagia     occasional, use Nifedipine     GERD (gastroesophageal reflux disease)      Hyperlipidemia      Lymphedema      Myopia      Neuropathy     toes bilateral     Nonsenile cataract      osteoporosis      Pericarditis      Raynaud's disease      SLE (systemic lupus erythematosus) (H)        Patient Active Problem List    Diagnosis Date Noted     Choroidal lesion 08/30/2017     Priority: Medium     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 Contreras.         Cystocele, midline 10/15/2014     Priority: Medium     Urinary urgency 10/15/2014     Priority: Medium     Urinary frequency 10/15/2014     Priority: Medium     Female stress incontinence 10/15/2014     Priority: Medium     Atrophic vaginitis 10/15/2014     Priority: Medium     Osteoporosis, postmenopausal 07/14/2014     Priority: Medium     Osteoporosis, idiopathic 07/11/2013     Priority: Medium     Type 1 diabetes mellitus (H) 07/02/2012     Priority: Medium     Encounter for long-term current use of medication 05/30/2012     Priority: Medium     Problem list name updated by automated process. Provider to review       Achalasia      Priority: Medium     Antiphospholipid syndrome (H)      Priority: Medium     DM (diabetes mellitus) (H)      Priority:  Medium     GERD (gastroesophageal reflux disease)      Priority: Medium     Hyperlipidemia      Priority: Medium     HTN (hypertension)      Priority: Medium     Osteopenia      Priority: Medium     Raynaud's disease      Priority: Medium     SLE (systemic lupus erythematosus) (H)      Priority: Medium     DVT (deep venous thrombosis) (H)      Priority: Medium     and PE               Past Surgical History:     Past Surgical History:   Procedure Laterality Date     COLONOSCOPY N/A 11/28/2016    Procedure: COLONOSCOPY;  Surgeon: Sang August MD;  Location: [facility]     CYSTOSCOPY, SLING TRANSVAGINAL N/A 12/20/2016    Procedure: CYSTOSCOPY, SLING TRANSVAGINAL;  Surgeon: Jeanmarie Pierson MD;  Location: [facility]     DISSECT LYMPH NODE AXILLA  2004    Lt, during eval for SLE     HYSTEROSCOPIC PLACEMENT CONTRACEPTIVE DEVICE  1985     TUBAL LIGATION Bilateral              Social History:     Social History   Substance Use Topics     Smoking status: Never Smoker     Smokeless tobacco: Never Used     Alcohol use No             Family History:     Family History   Problem Relation Age of Onset     CANCER Other      breast     CEREBROVASCULAR DISEASE Other      C.A.D. Other      Glaucoma Father             Allergies:     Allergies   Allergen Reactions     Amaryl [Glimepiride] Swelling     Swelling of tongue     Metformin Blisters     Experienced big blisters all over body and sloughing of skin     Plaquenil [Aminoquinolines] Hives     Sulfa Drugs Other (See Comments)     Turned bright red     Amoxicillin Rash     Hydroxychloroquine Rash     Penicillins Rash             Medications:     Current Outpatient Prescriptions   Medication Sig Dispense Refill     NIFEdipine ER osmotic (NIFEDICAL XL) 30 MG 24 hr tablet Take 1 tablet (30 mg) by mouth daily 90 tablet 2     simvastatin (ZOCOR) 40 MG tablet Take 1 tablet (40 mg) by mouth At Bedtime 90 tablet 2     warfarin (COUMADIN) 5 MG tablet Take 2-2.5 tablets daily or as directed  by coumadin clinic. 75 tablet 5     [DISCONTINUED] simvastatin (ZOCOR) 40 MG tablet Take 1 tablet (40 mg) by mouth At Bedtime 90 tablet 3     estradiol (ESTRACE VAGINAL) 0.1 MG/GM cream Use 2 g vaginally twice per week. 42.5 g 11     insulin pen needle 32G X 4 MM Use 4 pen needles daily or as directed. 400 each 3     Injection Device for insulin (NOVOPEN ECHO) RORY 1 each 4 times daily 1 each 0     [DISCONTINUED] NIFEdipine ER osmotic (NIFEDICAL XL) 30 MG 24 hr tablet Take 1 tablet (30 mg) by mouth daily 90 tablet 3     insulin glargine (LANTUS) 100 UNIT/ML injection Inject 4 units as directed daily LANTUS SOLOSTAR PEN PLEASE 15 mL 3     NOVOLOG PENFILL 100 UNIT/ML soln Inject 1 unit per 15 grams CHO with meals TID approx 15 units daily 15 mL 3     lidocaine (LIDODERM) 5 % Patch Apply up to 3 patches to painful area at once for up to 12 h within a 24 h period.  Remove after 12 hours. 30 patch 0     mycophenolate (CELLCEPT - GENERIC EQUIVALENT) 500 MG tablet Take 2 tablets (1,000 mg) by mouth 2 times daily 2 tabs in the a.m., 2 tabs in p.m. 360 tablet 3     acetaminophen (TYLENOL) 500 MG tablet Take 500 mg by mouth At Bedtime Takes 6 tablets (500 mg ) .Christa Acharya LPN 2:40 PM on 3/23/2017       warfarin (COUMADIN) 5 MG tablet Take 10mgs on Mondays and Thursdays and 12.5mgs on all other days or as directed by the Medication Monitoring Clinic at the [facility] 215 tablet 1     Blood Glucose Monitoring Suppl (TRUE METRIX METER) W/DEVICE KIT 1 each 4 times daily 1 kit 0     blood glucose monitoring (TRUE METRIX BLOOD GLUCOSE TEST) test strip Use to test blood sugar 4 times daily or as directed. 4 Box 3     VITAMIN D, CHOLECALCIFEROL, PO Take 2,000 Units by mouth daily (with lunch)        AYR SALINE NASAL NO-DRIP GEL Use as needed for nasal dryness 3 Tube 3     glucagon (GLUCAGON EMERGENCY) 1 MG injection Inject 1 mg subcutaneously or intramuscularly for severe hypoglycemic episodes. 3 each 3     insulin pen needle (BD  PEN NEEDLE PAM U/F) 32G X 4 MM MISC Use   Up to 8 daily 800 each 3     LANCETS ULTRA THIN 30G MISC Test 7-8 times daily  (Lancets that go with pt current meter ) 7203 each 3     aspirin 81 MG tablet Take 81 mg by mouth At Bedtime        omega-3 fatty acids (FISH OIL) 1200 MG capsule Take 1 capsule by mouth daily.       Calcium Carbonate-Vitamin D (CALCIUM + D) 600-200 MG-UNIT per tablet Take 1 tablet by mouth 3 times daily        Multiple Vitamins-Minerals (CENTRUM SILVER PO) Take 1 tablet by mouth daily.       Injection Device for Insulin (NOVOPEN JR, GREEN,) RORY Use as directed.              Physical Exam:     Constitutional: [id number] cooperative   Eyes: nl conjunctiva, sclera   ENT: nl external ears, nose, throat   No mucous membrane lesions, normal saliva pool   Neck: no mass or thyroid enlargement   GI: no ABD mass or tenderness, no HSM   Lymph: no cervical, supraclavicular, inguinal or epitrochlear nodes   MS: All TMJ, neck, shoulder, elbow, wrist, MCP/PIP/DIP, spine, hip, knee, ankle, and foot MTP/IP joints were examined and found without active synovitis or deformity. No dactylitis, tenosynovitis, enthesopathy. Stable mild lymphedema LUE. She has bilateral support stockings  Skin: Xerosis and fissures of the distal fingers unchanged. No nail pitting, alopecia.  She has her Zoster lesions in L Thoracic dermatome as described in [facility] notes.  They are evolving.   Neuro: nl cranial nerves, strength  Psych: nl affect       Data:     Lab Results   Component Value Date    WBC 2.9 (L) 09/21/2017    WBC 3.4 (L) 07/11/2017    WBC 3.7 (L) 05/12/2017    HGB 12.6 09/21/2017    HGB 11.9 07/11/2017    HGB 12.8 05/12/2017    HCT 39.4 09/21/2017    HCT 37.5 07/11/2017    HCT 39.2 05/12/2017    MCV 92 09/21/2017    MCV 97 07/11/2017    MCV 92 05/12/2017     09/21/2017     07/11/2017     05/12/2017     Lab Results   Component Value Date    BUN 27 12/14/2016    BUN 30 07/14/2014    BUN 32 (H) 04/01/2013      No components found for: SEDRATE  Lab Results   Component Value Date    TSH 0.64 08/05/2016    TSH 1.02 01/11/2016    TSH 0.82 01/12/2015     Lab Results   Component Value Date    AST 19 09/21/2017    AST 19 07/11/2017    AST 20 05/12/2017    ALT 27 09/21/2017    ALT 23 07/11/2017    ALT 26 05/12/2017    ALKPHOS 74 04/01/2013    ALKPHOS 57 08/25/2011    ALKPHOS 39 (L) 08/05/2011     Reviewed Rheumatology lab flowsheet    Santiago Corbett

## 2017-10-12 NOTE — LETTER
10/12/2017    RE: Shala Caruso  [address]  [place] [address]       Rheumatology Visit     Shala Caruso MRN# [id number]   YOB: 1945 Age: 71 year old     Date of Visit: October 12, 2017  Primary care provider: Joe Contreras          Assessment and Plan:   1. 72 yo female with SLE, diagnosed 2000 (+CRISTAL,+dsDNA ab, arthritis, serositis): flare (3/10) with pericardial effusion/tamponade, and now off of prednisone; Cellcept was started 6/10 and tolerating well. She has no significant current symptoms of inflammation. SLE lab stable with Cellcept monitoring.   2. 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 Allergies to multiple antibiotics and Plaquenil   4. Osteoprosis on Boniva (Last DXA with some continued decrease of BMD)   5. Severe GERD, Achalasia   6. Post thrombophlebitic syndrome (chronic LE swelling), stable  7. Dyslipidemia with current excellent lipid values   8. Raynauds   9. Chronic leukopenia   10. 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 Herpes Zoster  Plan   Discussed in detail with the patient   1. Continue Cellcept 2000 mg daily   2. She does not require additional therapy at this time   3. Call if new symptoms   4. Continue lab monitoring ordered before next appointment at 3 months and soon before next appointment   5. Return to SLE Clinic in 6 months   6. Continue followup in [facility] for Diabetes; in [facility] for Zoster and other concerns   7. Follow up regarding lymphedema as needed   8. flu shot is current  Santiago Corbett MD.           History of Present Illness:   72 yo female is seen for followup of SLE. I saw her last in April 2017. EPIC reviewed.   Problem list:   1. SLE, diagnosed 2000 (+CRISTAL,+dsDNA ab, arthritis, serositis): flare (3/10) with pericardial effusion/tamponade, on Cellcept (started 6/10)   2. APS, s/p DVT LLE, PE on warfarin in 2004   3. Allergies to multiple antibiotics and Plaquenil   4. Osteoprosis on Boniva (Last DXA 1/09,  Tscore: -1.9 L femoral neck)   5. Severe GERD, Achalasia   6. Post thrombophlebitic syndrome (chronic LE swelling)   7. Dyslipidemia   8. Raynauds   9. Chronic leukopenia on Methotrexate  10. L arm lymphedema    HISTORY CARRIED FORWARD:   She has been off Prednisone for about 3.5 years and off Methotrexate since May 2012; she is on Cellcept 2000mg/day and no longer on Plaquenil.   She has left arm lymphedema. She has hx of axillary LN dissection for enlarged nodes. This have been evaluated by OT. She was using her glove and sleeve but the glove is too tight and she is not using it regularly; this does not impede her at present.  She has had no recurrent chest pain/SOB or pleurisy since her hospitalization in March 2010 for pericarditis.      Her biggest interval problem is Herpes Zoster in L Thoracic dermatome, dx 3/23 in the [facility].  She was on Valtrex and is on Neurontin.  She is having a lot of pain.  She sees them again next week.     INTERVAL HISTORY:     She again had an overall uneventful 6 months from an SLE perspective. She is tolerating the medications without problem. Her labs have been stable with persistent leukopenia now at 2.p last month; DSDNA normal at 29 for first time in July 2014 and normal last checked. Her complements have been stable but slightly low without change. Creatinine has been variable but normal last check.  Her joints are stable with no swelling. She has osteoarthritis of the bilateral knees which is the most bothersome problem. She is doing low impact Yoga.  She denies fevers, fatigue, oral ulcers, rash. Raynauds is stable without Digital ulcers. No new rash.  She remains on Boniva. She is followed in [facility] Clinic with hx of DVT and PE.    We discussed her prior therapy and options for going forward. She is looking ahead to eventual custodial. She has Diabetes so wishes to avoid Prednisone unless absolutely needed.       Review of Systems:   Review Of Systems  Skin:  negative  Eyes: negative  Ears/Nose/Throat: negative  Respiratory: No shortness of breath, dyspnea on exertion, cough, or hemoptysis  Cardiovascular: negative  Gastrointestinal: negative  Genitourinary: negative  Musculoskeletal: negative  Neurologic: negative  Psychiatric: negative  Hematologic/Lymphatic/Immunologic: negative  Endocrine: negative          Past Medical History:     Past Medical History:   Diagnosis Date     Achalasia      Antiphospholipid syndrome (H)      Antiplatelet or antithrombotic long-term use      DM (diabetes mellitus) (H)      DVT (deep venous thrombosis) (H) 2003    and PE     Dysphagia     occasional, use Nifedipine     GERD (gastroesophageal reflux disease)      Hyperlipidemia      Lymphedema      Myopia      Neuropathy     toes bilateral     Nonsenile cataract      osteoporosis      Pericarditis      Raynaud's disease      SLE (systemic lupus erythematosus) (H)        Patient Active Problem List    Diagnosis Date Noted     Choroidal lesion 08/30/2017     Priority: Medium     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 Contreras.         Cystocele, midline 10/15/2014     Priority: Medium     Urinary urgency 10/15/2014     Priority: Medium     Urinary frequency 10/15/2014     Priority: Medium     Female stress incontinence 10/15/2014     Priority: Medium     Atrophic vaginitis 10/15/2014     Priority: Medium     Osteoporosis, postmenopausal 07/14/2014     Priority: Medium     Osteoporosis, idiopathic 07/11/2013     Priority: Medium     Type 1 diabetes mellitus (H) 07/02/2012     Priority: Medium     Encounter for long-term current use of medication 05/30/2012     Priority: Medium     Problem list name updated by automated process. Provider to review       Achalasia      Priority: Medium      Antiphospholipid syndrome (H)      Priority: Medium     DM (diabetes mellitus) (H)      Priority: Medium     GERD (gastroesophageal reflux disease)      Priority: Medium     Hyperlipidemia      Priority: Medium     HTN (hypertension)      Priority: Medium     Osteopenia      Priority: Medium     Raynaud's disease      Priority: Medium     SLE (systemic lupus erythematosus) (H)      Priority: Medium     DVT (deep venous thrombosis) (H)      Priority: Medium     and PE               Past Surgical History:     Past Surgical History:   Procedure Laterality Date     COLONOSCOPY N/A 11/28/2016    Procedure: COLONOSCOPY;  Surgeon: Sang August MD;  Location: [facility]     CYSTOSCOPY, SLING TRANSVAGINAL N/A 12/20/2016    Procedure: CYSTOSCOPY, SLING TRANSVAGINAL;  Surgeon: Jeanmarie Pierson MD;  Location: [facility]     DISSECT LYMPH NODE AXILLA  2004    Lt, during eval for SLE     HYSTEROSCOPIC PLACEMENT CONTRACEPTIVE DEVICE  1985     TUBAL LIGATION Bilateral              Social History:     Social History   Substance Use Topics     Smoking status: Never Smoker     Smokeless tobacco: Never Used     Alcohol use No             Family History:     Family History   Problem Relation Age of Onset     CANCER Other      breast     CEREBROVASCULAR DISEASE Other      C.A.D. Other      Glaucoma Father             Allergies:     Allergies   Allergen Reactions     Amaryl [Glimepiride] Swelling     Swelling of tongue     Metformin Blisters     Experienced big blisters all over body and sloughing of skin     Plaquenil [Aminoquinolines] Hives     Sulfa Drugs Other (See Comments)     Turned bright red     Amoxicillin Rash     Hydroxychloroquine Rash     Penicillins Rash             Medications:     Current Outpatient Prescriptions   Medication Sig Dispense Refill     NIFEdipine ER osmotic (NIFEDICAL XL) 30 MG 24 hr tablet Take 1 tablet (30 mg) by mouth daily 90 tablet 2     simvastatin (ZOCOR) 40 MG tablet Take 1 tablet (40 mg) by mouth  At Bedtime 90 tablet 2     warfarin (COUMADIN) 5 MG tablet Take 2-2.5 tablets daily or as directed by coumadin clinic. 75 tablet 5     [DISCONTINUED] simvastatin (ZOCOR) 40 MG tablet Take 1 tablet (40 mg) by mouth At Bedtime 90 tablet 3     estradiol (ESTRACE VAGINAL) 0.1 MG/GM cream Use 2 g vaginally twice per week. 42.5 g 11     insulin pen needle 32G X 4 MM Use 4 pen needles daily or as directed. 400 each 3     Injection Device for insulin (NOVOPEN ECHO) RORY 1 each 4 times daily 1 each 0     [DISCONTINUED] NIFEdipine ER osmotic (NIFEDICAL XL) 30 MG 24 hr tablet Take 1 tablet (30 mg) by mouth daily 90 tablet 3     insulin glargine (LANTUS) 100 UNIT/ML injection Inject 4 units as directed daily LANTUS SOLOSTAR PEN PLEASE 15 mL 3     NOVOLOG PENFILL 100 UNIT/ML soln Inject 1 unit per 15 grams CHO with meals TID approx 15 units daily 15 mL 3     lidocaine (LIDODERM) 5 % Patch Apply up to 3 patches to painful area at once for up to 12 h within a 24 h period.  Remove after 12 hours. 30 patch 0     mycophenolate (CELLCEPT - GENERIC EQUIVALENT) 500 MG tablet Take 2 tablets (1,000 mg) by mouth 2 times daily 2 tabs in the a.m., 2 tabs in p.m. 360 tablet 3     acetaminophen (TYLENOL) 500 MG tablet Take 500 mg by mouth At Bedtime Takes 6 tablets (500 mg ) .Christa Acharya LPN 2:40 PM on 3/23/2017       warfarin (COUMADIN) 5 MG tablet Take 10mgs on Mondays and Thursdays and 12.5mgs on all other days or as directed by the [facility]. 215 tablet 1     Blood Glucose Monitoring Suppl (TRUE METRIX METER) W/DEVICE KIT 1 each 4 times daily 1 kit 0     blood glucose monitoring (TRUE METRIX BLOOD GLUCOSE TEST) test strip Use to test blood sugar 4 times daily or as directed. 4 Box 3     VITAMIN D, CHOLECALCIFEROL, PO Take 2,000 Units by mouth daily (with lunch)        AYR SALINE NASAL NO-DRIP GEL Use as needed for nasal dryness 3 Tube 3     glucagon (GLUCAGON EMERGENCY) 1 MG injection Inject 1 mg  subcutaneously or intramuscularly for severe hypoglycemic episodes. 3 each 3     insulin pen needle (BD PEN NEEDLE PAM U/F) 32G X 4 MM MISC Use   Up to 8 daily 800 each 3     LANCETS ULTRA THIN 30G MISC Test 7-8 times daily  (Lancets that go with pt current meter ) 7203 each 3     aspirin 81 MG tablet Take 81 mg by mouth At Bedtime        omega-3 fatty acids (FISH OIL) 1200 MG capsule Take 1 capsule by mouth daily.       Calcium Carbonate-Vitamin D (CALCIUM + D) 600-200 MG-UNIT per tablet Take 1 tablet by mouth 3 times daily        Multiple Vitamins-Minerals (CENTRUM SILVER PO) Take 1 tablet by mouth daily.       Injection Device for Insulin (NOVOPEN JR, GREEN,) RORY Use as directed.              Physical Exam:     Constitutional: [id number] cooperative   Eyes: nl conjunctiva, sclera   ENT: nl external ears, nose, throat   No mucous membrane lesions, normal saliva pool   Neck: no mass or thyroid enlargement   GI: no ABD mass or tenderness, no HSM   Lymph: no cervical, supraclavicular, inguinal or epitrochlear nodes   MS: All TMJ, neck, shoulder, elbow, wrist, MCP/PIP/DIP, spine, hip, knee, ankle, and foot MTP/IP joints were examined and found without active synovitis or deformity. No dactylitis, tenosynovitis, enthesopathy. Stable mild lymphedema LUE. She has bilateral support stockings  Skin: Xerosis and fissures of the distal fingers unchanged. No nail pitting, alopecia.  She has her Zoster lesions in L Thoracic dermatome as described in [facility] notes.  They are evolving.   Neuro: nl cranial nerves, strength  Psych: nl affect       Data:     Lab Results   Component Value Date    WBC 2.9 (L) 09/21/2017    WBC 3.4 (L) 07/11/2017    WBC 3.7 (L) 05/12/2017    HGB 12.6 09/21/2017    HGB 11.9 07/11/2017    HGB 12.8 05/12/2017    HCT 39.4 09/21/2017    HCT 37.5 07/11/2017    HCT 39.2 05/12/2017    MCV 92 09/21/2017    MCV 97 07/11/2017    MCV 92 05/12/2017     09/21/2017     07/11/2017     05/12/2017      Lab Results   Component Value Date    BUN 27 12/14/2016    BUN 30 07/14/2014    BUN 32 (H) 04/01/2013     No components found for: SEDRATE  Lab Results   Component Value Date    TSH 0.64 08/05/2016    TSH 1.02 01/11/2016    TSH 0.82 01/12/2015     Lab Results   Component Value Date    AST 19 09/21/2017    AST 19 07/11/2017    AST 20 05/12/2017    ALT 27 09/21/2017    ALT 23 07/11/2017    ALT 26 05/12/2017    ALKPHOS 74 04/01/2013    ALKPHOS 57 08/25/2011    ALKPHOS 39 (L) 08/05/2011     Reviewed Rheumatology lab flowsheet    Santiago Corbett

## 2017-10-19 DIAGNOSIS — Z79.01 LONG TERM CURRENT USE OF ANTICOAGULANT THERAPY: ICD-10-CM

## 2017-10-19 LAB — INR PPP: 2.21 (ref 0.86–1.14)

## 2017-10-20 ENCOUNTER — ANTICOAGULATION THERAPY VISIT (OUTPATIENT)
Dept: ANTICOAGULATION | Facility: CLINIC | Age: 72
End: 2017-10-20

## 2017-10-20 DIAGNOSIS — Z79.01 LONG-TERM (CURRENT) USE OF ANTICOAGULANTS: ICD-10-CM

## 2017-10-20 LAB — PROTHROM ACT/NOR PPP: 22 % (ref 60–140)

## 2017-10-20 NOTE — MR AVS SNAPSHOT
Shala Caruso   10/20/2017   Anticoagulation Therapy Visit    Description:  71 year old female   Provider:  Portia Cherry, RN   Department:  [facility]           INR as of 10/20/2017     Today's INR 2.21 (10/19/2017)      Anticoagulation Summary as of 10/20/2017     INR goal     Today's INR 2.21 (10/19/2017)    Full instructions 12.5 mg on Sun, Tue, Thu; 10 mg all other days    Next INR check 11/13/2017 October 2017 Details    Sun Mon Tue Wed Thu Fri Sat     1               2               3               4               5               6               7                 8               9               10               11               12               13               14                 15               16               17               18               19               20      10 mg   See details      21      10 mg           22      12.5 mg         23      10 mg         24      12.5 mg         25      10 mg         26      12.5 mg         27      10 mg         28      10 mg           29      12.5 mg         30      10 mg         31      12.5 mg              Date Details   10/20 This INR check               How to take your warfarin dose     To take:  10 mg Take 2 of the 5 mg tablets.    To take:  12.5 mg Take 2.5 of the 5 mg tablets.           November 2017 Details    Sun Mon Tue Wed Thu Fri Sat        1      10 mg         2      12.5 mg         3      10 mg         4      10 mg           5      12.5 mg         6      10 mg         7      12.5 mg         8      10 mg         9      12.5 mg         10      10 mg         11      10 mg           12      12.5 mg         13            14               15               16               17               18                 19               20               21               22               23               24               25                 26               27               28               29               30                  Date Details   No  additional details    Date of next INR:  11/13/2017         How to take your warfarin dose     To take:  10 mg Take 2 of the 5 mg tablets.    To take:  12.5 mg Take 2.5 of the 5 mg tablets.

## 2017-10-20 NOTE — PROGRESS NOTES
ANTICOAGULATION FOLLOW-UP CLINIC VISIT    Patient Name:  Shala Caruso  Date:  10/20/2017  Contact Type:  Telephone    SUBJECTIVE:     Patient Findings     Positives No Problem Findings    Comments Factor 2=22%.  Goal range=15-25%.           OBJECTIVE    INR   Date Value Ref Range Status   10/19/2017 2.21 (H) 0.86 - 1.14 Final     Factor 2 Assay   Date Value Ref Range Status   10/19/2017 22 (L) 60 - 140 % Final       ASSESSMENT / PLAN  INR assessment THER    Recheck INR In: 4 WEEKS    INR Location Clinic      Anticoagulation Summary as of 10/20/2017     INR goal     Today's INR 2.21 (10/19/2017)    Maintenance plan 12.5 mg (5 mg x 2.5) on Sun, Tue, Thu; 10 mg (5 mg x 2) all other days    Full instructions 12.5 mg on Sun, Tue, Thu; 10 mg all other days    Weekly total 77.5 mg    Plan last modified Portia Cherry RN (10/20/2017)    Next INR check 11/13/2017    Target end date Indefinite      Anticoagulation Episode Summary     INR check location     Preferred lab     Send INR reminders to [facility]    Comments       Anticoagulation Care Providers     Provider Role Specialty Phone number    Joe Contreras MD Responsible Internal Medicine [phone]            See the Encounter Report to view Anticoagulation Flowsheet and Dosing Calendar (Go to Encounters tab in chart review, and find the Anticoagulation Therapy Visit)    Spoke with patient.    Portia Cherry RN

## 2017-11-08 ENCOUNTER — OFFICE VISIT (OUTPATIENT)
Dept: PHARMACY | Facility: CLINIC | Age: 72
End: 2017-11-08
Payer: COMMERCIAL

## 2017-11-08 VITALS — HEART RATE: 65 BPM | SYSTOLIC BLOOD PRESSURE: 122 MMHG | DIASTOLIC BLOOD PRESSURE: 76 MMHG

## 2017-11-08 DIAGNOSIS — E10.649 TYPE 1 DIABETES MELLITUS WITH HYPOGLYCEMIA AND WITHOUT COMA (H): Primary | ICD-10-CM

## 2017-11-08 DIAGNOSIS — Z86.19 HISTORY OF SHINGLES: ICD-10-CM

## 2017-11-08 PROCEDURE — 99606 MTMS BY PHARM EST 15 MIN: CPT | Performed by: PHARMACIST

## 2017-11-08 PROCEDURE — 99607 MTMS BY PHARM ADDL 15 MIN: CPT | Performed by: PHARMACIST

## 2017-11-08 NOTE — PATIENT INSTRUCTIONS
Recommendations from today's MTM visit:                                                      1. Ask Endo about whether there would be a way you two could agree on that would involve less total insulin per day.    2. No other major recommendations today.    Next MTM visit: I will send a letter in 1-3 months    To schedule another MTM appointment, please call the clinic directly or you may call the MTM scheduling line at [phone] or toll-free at [phone].     My Clinical Pharmacist's contact information:                                                      It was a pleasure seeing you today!  Please feel free to contact me with any questions or concerns you have.      Gayle Mendoza, Kath  Medication Therapy Management Provider  Phone:[phone]  Pager: [phone]    You may receive a survey about the MTM services you received.  I would appreciate your feedback to help me serve you better in the future. Please fill it out and return it when you can. Your comments will be anonymous.

## 2017-11-08 NOTE — MR AVS SNAPSHOT
After Visit Summary   11/8/2017    Shala Caruso    MRN: [id number]           Patient Information     Date Of Birth          1945        Visit Information        Provider Department      11/8/2017 3:00 [facility]        Care Instructions    Recommendations from today's MTM visit:                                                      1. Ask Endo about whether there would be a way you two could agree on that would involve less total insulin per day.    2. No other major recommendations today.    Next MTM visit: I will send a letter in 1-3 months    To schedule another MTM appointment, please call the clinic directly or you may call the MTM scheduling line at [phone] or toll-free at [phone].     My Clinical Pharmacist's contact information:                                                      It was a pleasure seeing you today!  Please feel free to contact me with any questions or concerns you have.      Gayle Mendoza, PharmD  Medication Therapy Management Provider  Phone:[phone]  Pager: [phone]    You may receive a survey about the MTM services you received.  I would appreciate your feedback to help me serve you better in the future. Please fill it out and return it when you can. Your comments will be anonymous.                    Follow-ups after your visit        Your next 10 appointments already scheduled     Nov 16, 2017 12:45 PM CST   LAB with [facility] ([facility] [place])    [address] [place] [id number] [phone]           Please do not eat 10-12 hours before your appointment if you are coming in fasting for labs on lipids, cholesterol, or glucose (sugar). This does not apply to pregnant women. Water, hot tea and black coffee (with nothing added) are okay. Do not drink other fluids, diet soda or chew gum.            Jan 29, 2018  7:30 AM CST    (Arrive by 7:15 AM)   RETURN DIABETES with Dorita Cramer MD   [facility] Endocrinology ([facility] [place])    [address] [facility] [id number] [phone]            Feb 08, 2018  3:30 PM CST   (Arrive by 3:15 PM)   RETURN LUPUS with Santiago Corbett MD   [facility] Rheumatology ([facility])    [address]  [address] [facility] [address] [phone]              Who to contact     If you have questions or need follow up information about today's clinic visit or your schedule please contact [facility] MEDICATION THERAPY MANAGEMENT directly at [phone].  Normal or non-critical lab and imaging results will be communicated to you by [email or web address], letter or phone within 4 business days after the clinic has received the results. If you do not hear from us within 7 days, please contact the clinic through [employer]antage[employer] or phone. If you have a critical or abnormal lab result, we will notify you by phone as soon as possible.  Submit refill requests through [email or web address] or call your pharmacy and they will forward the refill request to us. Please allow 3 business days for your refill to be completed.          Additional Information About Your Visit        [email or web address] Information     [email or web address] gives you secure access to your electronic health record. If you see a primary care provider, you can also send messages to your care team and make appointments. If you have questions, please call your primary care clinic.  If you do not have a primary care provider, please call [phone] and they will assist you.        Care EveryWhere ID     This is your Care EveryWhere ID. This could be used by other organizations to access your [place] medical records  [id number]        Your Vitals Were     Pulse                   65            Blood Pressure from Last 3 Encounters:   11/08/17 122/76   10/12/17 116/68   08/02/17 105/56    Weight from Last 3 Encounters:    08/02/17 116 lb 8 oz (52.8 kg)   07/31/17 117 lb 4.8 oz (53.2 kg)   05/22/17 118 lb 14.4 oz (53.9 kg)              Today, you had the following     No orders found for display       Primary Care Provider Office Phone # Fax #    Joe Tj Contreras [phone]       [address] [place] [address]        Equal Access to Services     MARVIN CACERES : Hadii aad ku hadasho Soomaali, waaxda luqadaha, qaybta kaalmada adeegyada, waxay idiin hayaan adeeg kharash la'aan . So [facility] [phone].    ATENCIÓN: Si habla español, tiene a hurtado disposición servicios gratuitos de asistencia lingüística. [facility] [phone].    We comply with applicable federal civil rights laws and [place] laws. We do not discriminate on the basis of race, color, national origin, age, disability, sex, sexual orientation, or gender identity.            Thank you!     Thank you for choosing [facility] MEDICATION THERAPY MANAGEMENT  for your care. Our goal is always to provide you with excellent care. Hearing back from our patients is one way we can continue to improve our services. Please take a few minutes to complete the written survey that you may receive in the mail after your visit with us. Thank you!             Your Updated Medication List - Protect others around you: Learn how to safely use, store and throw away your medicines at www.disposemymeds.org.          This list is accurate as of: 11/8/17  3:37 PM.  Always use your most recent med list.                   Brand Name Dispense Instructions for use Diagnosis    aspirin 81 MG tablet      Take 81 mg by mouth At Bedtime        AYR SALINE NASAL NO-DRIP Gel     3 Tube    Use as needed for nasal dryness    Nasal ulcer       blood glucose monitoring lancets     7203 each    Test 7-8 times daily  (Lancets that go with pt current meter )    Diabetes mellitus (H)       blood glucose monitoring test strip    TRUE METRIX BLOOD GLUCOSE TEST    4 Box    Use to test  blood sugar 4 times daily or as directed.    Type I diabetes mellitus, well controlled (H)       calcium + D 600-200 MG-UNIT Tabs   Generic drug:  calcium carbonate-vitamin D      Take 1 tablet by mouth 3 times daily        CENTRUM SILVER PO      Take 1 tablet by mouth daily.        estradiol 0.1 MG/GM cream    ESTRACE VAGINAL    42.5 g    Use 2 g vaginally twice per week.    Atrophic vaginitis       glucagon 1 MG kit    GLUCAGON EMERGENCY    3 each    Inject 1 mg subcutaneously or intramuscularly for severe hypoglycemic episodes.    Type 1 diabetes, HbA1c goal < 7% (H)       insulin glargine 100 UNIT/ML injection    LANTUS    15 mL    Inject 4 units as directed daily LANTUS SOLOSTAR PEN PLEASE    Diabetes mellitus type 1 (H)       mycophenolate 500 MG tablet    GENERIC EQUIVALENT    360 tablet    Take 2 tablets (1,000 mg) by mouth 2 times daily 2 tabs in the a.m., 2 tabs in p.m.    Systemic lupus erythematosus (H), Antiphospholipid syndrome (H), Encounter for long-term current use of medication       NIFEdipine ER osmotic 30 MG 24 hr tablet    NIFEDICAL XL    90 tablet    Take 1 tablet (30 mg) by mouth daily    Achalasia of esophagus, HTN (hypertension)       NovoLOG PENFILL 100 UNIT/ML injection   Generic drug:  insulin aspart     15 mL    Inject 1 unit per 15 grams CHO with meals TID approx 15 units daily    Controlled type 1 diabetes mellitus (H)       * Injection Device for insulin Priscila    NOVOPEN ECHO    1 each    1 each 4 times daily    Type 1 diabetes mellitus with hypoglycemia and without coma (H)       * NOVOPEN JR (GREEN) Priscila   Generic drug:  Injection Device for insulin      Use as directed.        omega-3 fatty acids 1200 MG capsule      Take 1 capsule by mouth daily.    Systemic lupus erythematosus (H), Antiphospholipid syndrome (H), Encounter for long-term (current) use of other medications       simvastatin 40 MG tablet    ZOCOR    90 tablet    Take 1 tablet (40 mg) by mouth At Bedtime    Other  hyperlipidemia       TRUE METRIX METER W/DEVICE Kit     1 kit    1 each 4 times daily    Type I diabetes mellitus, well controlled (H)       TYLENOL 500 MG tablet   Generic drug:  acetaminophen      Take 500 mg by mouth At Bedtime Takes 6 tablets (500 mg ) .Christa Acharya LPN 2:40 PM on 3/23/2017        VITAMIN D (CHOLECALCIFEROL) PO      Take 1,000 Units by mouth daily (with lunch)        warfarin 5 MG tablet    COUMADIN    75 tablet    Take 2-2.5 tablets daily or as directed by coumadin clinic.    Long term current use of anticoagulant therapy       * Notice:  This list has 2 medication(s) that are the same as other medications prescribed for you. Read the directions carefully, and ask your doctor or other care provider to review them with you.

## 2017-11-08 NOTE — PROGRESS NOTES
SUBJECTIVE/OBJECTIVE:                Shala Caruso is a 72 year old female coming in for a follow-up visit for Medication Therapy Management.  She was referred to me from her health plan.     Chief Complaint: Follow up from our visit on 2/3.  Pt here without major questions for her follow up visit.    Tobacco: No tobacco use    Alcohol: not currently using    Medication Adherence: no issues reported    Hx of Shingles: Pt is still having some itching after a 3 month recovery from shingles. Her pain has resolved so she is no longer using any therapies for this. Lidoderm patches were removed from her med list.    Diabetes:  Pt currently taking Lantus 4 units and Novolog per carb count. Pt is not experiencing side effects.  SMBG: four times daily.   Ranges (from patient's glucose log):         Patient is experiencing hypoglycemia. Frequency of hypoglycemia? 1-2 times per week. She prefers lows to highs and so continues to manage things much more tightly than historically advised by Endo and myself.  Recent symptoms of high blood sugar? none  Microalbumin is < 30 mg/g. Pt is not taking an ACEi/ARB.  Aspirin: Taking 81mg daily and denies side effects    Current labs include:  BP Readings from Last 3 Encounters:   10/12/17 116/68   08/02/17 105/56   07/31/17 110/68     Today's Vitals: There were no vitals taken for this visit.  Lab Results   Component Value Date    A1C 5.3 07/11/2017   .  Lab Results   Component Value Date    CHOL 164 07/18/2017     Lab Results   Component Value Date    TRIG 45 07/18/2017     Lab Results   Component Value Date     07/18/2017     Lab Results   Component Value Date    LDL 53 07/18/2017       Liver Function Studies -   Recent Labs   Lab Test  09/21/17   1208   01/11/16   1224   04/01/13   0707   PROTTOTAL   --    --    --    --   6.9   ALBUMIN   --    --   3.8   --   4.1   BILITOTAL   --    --    --    --   0.4   ALKPHOS   --    --    --    --   74   AST  19   < >   --    < >  31    ALT  27   < >   --    < >  31    < > = values in this interval not displayed.       Lab Results   Component Value Date    UCRR 76 07/11/2017    MICROL <5 07/11/2017    UMALCR Unable to calculate due to low value 07/11/2017       Last Basic Metabolic Panel:  Lab Results   Component Value Date     12/14/2016      Lab Results   Component Value Date    POTASSIUM 3.4 12/14/2016     Lab Results   Component Value Date    CHLORIDE 107 12/14/2016     Lab Results   Component Value Date    BUN 27 12/14/2016     Lab Results   Component Value Date    CR 0.87 01/06/2017     GFR Estimate   Date Value Ref Range Status   01/06/2017 64 >60 mL/min/1.7m2 Final     Comment:     Non  GFR Calc   12/14/2016 70 >60 mL/min/1.7m2 Final     Comment:     Non  GFR Calc   10/07/2016 74 >60 mL/min/1.7m2 Final     Comment:     Non  GFR Calc     GFR Estimate If Black   Date Value Ref Range Status   01/06/2017 78 >60 mL/min/1.7m2 Final     Comment:      GFR Calc   12/14/2016 84 >60 mL/min/1.7m2 Final     Comment:      GFR Calc   10/07/2016 89 >60 mL/min/1.7m2 Final     Comment:      GFR Calc     TSH   Date Value Ref Range Status   08/05/2016 0.64 0.40 - 4.00 mU/L Final   ]    Most Recent Immunizations   Administered Date(s) Administered     Influenza (H1N1) 11/01/2009     Influenza (IIV3) 09/22/2014     Pneumococcal (PCV 13) 07/18/2016     Pneumococcal 23 valent 02/17/2014     TD (ADULT, 7+) 04/28/2000     Tdap (Adacel,Boostrix) 04/17/2009       ASSESSMENT:              Current medications were reviewed today as discussed above.        Medication Adherence: no issues identified    Hx of Shingles: Mostly Resolved.    Diabetes: Needs Improvement. Patient is meeting A1c goal of < 7%. Self monitoring of blood glucose is at goal of fasting  mg/dL and post prandial < 180 mg/dL. However, pt continues to have frequent lows complicated by the fact  that she is on a beta blocker for Reynauds which masks her symptoms. As in previous visits, pt is politely reluctant to make any changes in terms of relaxing control of blood sugars at this time. Though this was not discussed during the visit and would be off-label, pt might benefit from GLP-1 therapy to see if this allows her to maintain control on an even lower dose of insulin with less hypoglycemia.     PLAN:                  1. I will discuss whether GLP-1 therapy would possibly be appropriate for this patient with Dr. Cramer.    2. No changes today.    I spent 30 minutes with this patient today  . All changes were made via collaborative practice agreement with Joe Contreras. A copy of the visit note was provided to the patient's primary care provider.     Will follow up in 2-3 months per pt request.    The patient was given a summary of these recommendations as an after visit summary.    Gayle Mendoza PharmD  Medication Therapy Management Provider  Phone:[phone]  Pager: [phone]

## 2017-11-09 ENCOUNTER — MYC MEDICAL ADVICE (OUTPATIENT)
Dept: INTERNAL MEDICINE | Facility: CLINIC | Age: 72
End: 2017-11-09

## 2017-11-09 DIAGNOSIS — M32.15 SYSTEMIC LUPUS ERYTHEMATOSUS WITH TUBULO-INTERSTITIAL NEPHROPATHY, UNSPECIFIED SLE TYPE (H): ICD-10-CM

## 2017-11-09 DIAGNOSIS — I82.4Y9 DEEP VEIN THROMBOSIS (DVT) OF PROXIMAL LOWER EXTREMITY, UNSPECIFIED CHRONICITY, UNSPECIFIED LATERALITY (H): Primary | ICD-10-CM

## 2017-11-16 ENCOUNTER — ANTICOAGULATION THERAPY VISIT (OUTPATIENT)
Dept: ANTICOAGULATION | Facility: CLINIC | Age: 72
End: 2017-11-16

## 2017-11-16 DIAGNOSIS — Z79.01 LONG-TERM (CURRENT) USE OF ANTICOAGULANTS: ICD-10-CM

## 2017-11-16 DIAGNOSIS — Z79.01 LONG TERM CURRENT USE OF ANTICOAGULANT THERAPY: ICD-10-CM

## 2017-11-16 LAB
INR PPP: 2.67 (ref 0.86–1.14)
PROTHROM ACT/NOR PPP: 20 % (ref 60–140)

## 2017-11-16 NOTE — PROGRESS NOTES
ANTICOAGULATION FOLLOW-UP CLINIC VISIT    Patient Name:  Shala Caruso  Date:  11/16/2017  Contact Type:  Telephone    SUBJECTIVE:     Patient Findings     Comments Factor 2=20  INR 2.67           OBJECTIVE    INR   Date Value Ref Range Status   11/16/2017 2.67 (H) 0.86 - 1.14 Final     Factor 2 Assay   Date Value Ref Range Status   11/16/2017 20 (L) 60 - 140 % Final       ASSESSMENT / PLAN  INR assessment THER    Recheck INR In: 4 WEEKS    INR Location Clinic      Anticoagulation Summary as of 11/16/2017     INR goal 2.0-3.0   Today's INR    Maintenance plan 12.5 mg (5 mg x 2.5) on Sun, Tue, Thu; 10 mg (5 mg x 2) all other days   Full instructions 12.5 mg on Sun, Tue, Thu; 10 mg all other days   Weekly total 77.5 mg   No change documented Stacey Beal RN   Plan last modified Portia Cherry RN (6/16/2017)   Next INR check 12/14/2017   Priority Factor 2   Target end date     Indications   SLE (systemic lupus erythematosus) (H) (Resolved) [M32.9]  Long-term (current) use of anticoagulants [Z79.01] [Z79.01]         Anticoagulation Episode Summary     INR check location Clinic Lab    Preferred lab     Send INR reminders to [facility] CL[facility]IC    Comments Factor 2  goal range is 15-25%  Ok to leave message on home [phone] and work voicemail, but call [email or web address] per pt request.  OK to leave message at office  May leave messages with Rodriguez Santos  DO NOT GIVE RECORDS TO EMPLOYER U        Anticoagulation Care Providers     Provider Role Specialty Phone number    Mt Kiser MD Responsible Clinical Cardiac Electrophysiology [phone]            See the Encounter Report to view Anticoagulation Flowsheet and Dosing Calendar (Go to Encounters tab in chart review, and find the Anticoagulation Therapy Visit)    Left message for patient with results and dosing recommendations. Asked patient to call back to report any missed doses, falls, signs and symptoms of bleeding or clotting, any changes in health,  medication, or diet. Asked patient to call back with any questions or concerns.     Stacey Beal RN

## 2017-11-16 NOTE — MR AVS SNAPSHOT
Shala Caruso   11/16/2017   Anticoagulation Therapy Visit    Description:  72 year old female   Provider:  Stacey Beal, RN   Department:  [facility]           INR as of 11/16/2017     Today's INR       Anticoagulation Summary as of 11/16/2017     INR goal 2.0-3.0   Today's INR    Full instructions 12.5 mg on Sun, Tue, Thu; 10 mg all other days   Next INR check 12/14/2017    Indications   SLE (systemic lupus erythematosus) (H) (Resolved) [M32.9]  Long-term (current) use of anticoagulants [Z79.01] [Z79.01]         November 2017 Details    Sun Mon Tue Wed Thu Fri Sat        1               2               3               4                 5               6               7               8               9               10               11                 12               13               14               15               16      12.5 mg   See details      17      10 mg         18      10 mg           19      12.5 mg         20      10 mg         21      12.5 mg         22      10 mg         23      12.5 mg         24      10 mg         25      10 mg           26      12.5 mg         27      10 mg         28      12.5 mg         29      10 mg         30      12.5 mg            Date Details   11/16 This INR check               How to take your warfarin dose     To take:  10 mg Take 2 of the 5 mg tablets.    To take:  12.5 mg Take 2.5 of the 5 mg tablets.           December 2017 Details    Sun Mon Tue Wed Thu Fri Sat          1      10 mg         2      10 mg           3      12.5 mg         4      10 mg         5      12.5 mg         6      10 mg         7      12.5 mg         8      10 mg         9      10 mg           10      12.5 mg         11      10 mg         12      12.5 mg         13      10 mg         14            15               16                 17               18               19               20               21               22               23                 24               25                26               27               28               29               30                 31                      Date Details   No additional details    Date of next INR:  12/14/2017         How to take your warfarin dose     To take:  10 mg Take 2 of the 5 mg tablets.    To take:  12.5 mg Take 2.5 of the 5 mg tablets.

## 2017-12-05 ENCOUNTER — MEDICAL CORRESPONDENCE (OUTPATIENT)
Dept: HEALTH INFORMATION MANAGEMENT | Facility: CLINIC | Age: 72
End: 2017-12-05

## 2017-12-14 DIAGNOSIS — Z79.01 LONG TERM CURRENT USE OF ANTICOAGULANT THERAPY: ICD-10-CM

## 2017-12-14 LAB — INR PPP: 1.86 (ref 0.86–1.14)

## 2017-12-15 ENCOUNTER — ANTICOAGULATION THERAPY VISIT (OUTPATIENT)
Dept: ANTICOAGULATION | Facility: CLINIC | Age: 72
End: 2017-12-15

## 2017-12-15 DIAGNOSIS — Z79.01 LONG-TERM (CURRENT) USE OF ANTICOAGULANTS: ICD-10-CM

## 2017-12-15 LAB
FACTOR 2 ASSAY: NORMAL
PROTHROM ACT/NOR PPP: 28 % (ref 60–140)

## 2017-12-15 NOTE — MR AVS SNAPSHOT
Shala Caruso   12/15/2017   Anticoagulation Therapy Visit    Description:  72 year old female   Provider:  Andre Bledsoe, RN   Department:  [facility]           INR as of 12/15/2017     Today's INR 1.86! (12/14/2017)      Anticoagulation Summary as of 12/15/2017     INR goal 2.0-3.0   Today's INR 1.86! (12/14/2017)   Full instructions 12/15: 12.5 mg; Otherwise 12.5 mg on Sun, Tue, Thu; 10 mg all other days   Next INR check 12/29/2017    Indications   SLE (systemic lupus erythematosus) (H) (Resolved) [M32.9]  Long-term (current) use of anticoagulants [Z79.01] [Z79.01]         December 2017 Details    Sun Mon Tue Wed Thu Fri Sat          1               2                 3               4               5               6               7               8               9                 10               11               12               13               14               15      12.5 mg   See details      16      10 mg           17      12.5 mg         18      10 mg         19      12.5 mg         20      10 mg         21      12.5 mg         22      10 mg         23      10 mg           24      12.5 mg         25      10 mg         26      12.5 mg         27      10 mg         28      12.5 mg         29            30                 31                      Date Details   12/15 This INR check       Date of next INR:  12/29/2017         How to take your warfarin dose     To take:  10 mg Take 2 of the 5 mg tablets.    To take:  12.5 mg Take 2.5 of the 5 mg tablets.

## 2017-12-15 NOTE — PROGRESS NOTES
ANTICOAGULATION FOLLOW-UP CLINIC VISIT    Patient Name:  Shala Caruso  Date:  12/15/2017  Contact Type:  Telephone    SUBJECTIVE:     Patient Findings     Positives Unexplained INR or factor level change    Comments Requested patient phone the [facility] if she missed any doses.  Recommended one time increase today to 12.5mg.  Then resume maintenance dosing.  Asked patient to return in two weeks for a check, then if within goal range, may push out a month.           OBJECTIVE    INR   Date Value Ref Range Status   12/14/2017 1.86 (H) 0.86 - 1.14 Final     Factor 2 Assay   Date Value Ref Range Status   12/15/2017 28%  Final       ASSESSMENT / PLAN  INR assessment SUB    Recheck INR In: 2 WEEKS    INR Location Clinic      Anticoagulation Summary as of 12/15/2017     INR goal 2.0-3.0   Today's INR 1.86! (12/14/2017)   Maintenance plan 12.5 mg (5 mg x 2.5) on Sun, Tue, Thu; 10 mg (5 mg x 2) all other days   Full instructions 12/15: 12.5 mg; Otherwise 12.5 mg on Sun, Tue, Thu; 10 mg all other days   Weekly total 77.5 mg   Plan last modified Portia Cherry, RN (6/16/2017)   Next INR check 12/29/2017   Priority Factor 2   Target end date     Indications   SLE (systemic lupus erythematosus) (H) (Resolved) [M32.9]  Long-term (current) use of anticoagulants [Z79.01] [Z79.01]         Anticoagulation Episode Summary     INR check location Clinic Lab    Preferred lab     Send INR reminders to [facility]    Comments Factor 2  goal range is 15-25%  Ok to leave message on home [phone] and work voicemail, but call [email or web address] per pt request.  OK to leave message at office  May leave messages with Rodriguez Santos  DO NOT GIVE RECORDS TO EMPLOYER [employer]        Anticoagulation Care Providers     Provider Role Specialty Phone number    Mt Kiser MD Responsible Clinical Cardiac Electrophysiology [phone]            See the Encounter Report to view Anticoagulation Flowsheet and Dosing Calendar (Go to Encounters tab in  chart review, and find the Anticoagulation Therapy Visit)    Left message for patient with results and dosing recommendations. Asked patient to call back to report any missed doses, falls, signs and symptoms of bleeding or clotting, any changes in health, medication, or diet. Asked patient to call back with any questions or concerns.    Andre Bledsoe, RN

## 2018-01-02 DIAGNOSIS — Z79.899 ENCOUNTER FOR LONG-TERM CURRENT USE OF MEDICATION: ICD-10-CM

## 2018-01-02 DIAGNOSIS — Z79.01 LONG TERM CURRENT USE OF ANTICOAGULANT THERAPY: ICD-10-CM

## 2018-01-02 DIAGNOSIS — M32.9 SLE (SYSTEMIC LUPUS ERYTHEMATOSUS) (H): ICD-10-CM

## 2018-01-02 LAB
ALT SERPL W P-5'-P-CCNC: 20 U/L (ref 0–50)
AST SERPL W P-5'-P-CCNC: 18 U/L (ref 0–45)
BASOPHILS # BLD AUTO: 0 10E9/L (ref 0–0.2)
BASOPHILS NFR BLD AUTO: 0.9 %
DIFFERENTIAL METHOD BLD: ABNORMAL
EOSINOPHIL # BLD AUTO: 0 10E9/L (ref 0–0.7)
EOSINOPHIL NFR BLD AUTO: 0.9 %
ERYTHROCYTE [DISTWIDTH] IN BLOOD BY AUTOMATED COUNT: 15.7 % (ref 10–15)
HCT VFR BLD AUTO: 41.4 % (ref 35–47)
HGB BLD-MCNC: 13 G/DL (ref 11.7–15.7)
IMM GRANULOCYTES # BLD: 0 10E9/L (ref 0–0.4)
IMM GRANULOCYTES NFR BLD: 0 %
INR PPP: 2.32 (ref 0.86–1.14)
LYMPHOCYTES # BLD AUTO: 0.9 10E9/L (ref 0.8–5.3)
LYMPHOCYTES NFR BLD AUTO: 27.3 %
MCH RBC QN AUTO: 29.1 PG (ref 26.5–33)
MCHC RBC AUTO-ENTMCNC: 31.4 G/DL (ref 31.5–36.5)
MCV RBC AUTO: 93 FL (ref 78–100)
MONOCYTES # BLD AUTO: 0.3 10E9/L (ref 0–1.3)
MONOCYTES NFR BLD AUTO: 8.4 %
NEUTROPHILS # BLD AUTO: 2 10E9/L (ref 1.6–8.3)
NEUTROPHILS NFR BLD AUTO: 62.5 %
NRBC # BLD AUTO: 0 10*3/UL
NRBC BLD AUTO-RTO: 0 /100
PLATELET # BLD AUTO: 216 10E9/L (ref 150–450)
RBC # BLD AUTO: 4.46 10E12/L (ref 3.8–5.2)
WBC # BLD AUTO: 3.2 10E9/L (ref 4–11)

## 2018-01-03 ENCOUNTER — ANTICOAGULATION THERAPY VISIT (OUTPATIENT)
Dept: ANTICOAGULATION | Facility: CLINIC | Age: 73
End: 2018-01-03

## 2018-01-03 DIAGNOSIS — Z79.01 LONG-TERM (CURRENT) USE OF ANTICOAGULANTS: ICD-10-CM

## 2018-01-03 LAB — PROTHROM ACT/NOR PPP: 25 % (ref 60–140)

## 2018-01-03 NOTE — PROGRESS NOTES
ANTICOAGULATION FOLLOW-UP CLINIC VISIT    Patient Name:  Shala Caruso  Date:  1/3/2018  Contact Type:  Telephone    SUBJECTIVE:     Patient Findings     Positives No Problem Findings           OBJECTIVE    INR   Date Value Ref Range Status   01/02/2018 2.32 (H) 0.86 - 1.14 Final     Factor 2 Assay   Date Value Ref Range Status   01/02/2018 25 (L) 60 - 140 % Final       ASSESSMENT / PLAN  INR assessment THER    Recheck INR In: 4 WEEKS    INR Location Clinic      Anticoagulation Summary as of 1/3/2018     INR goal 2.0-3.0   Today's INR    Maintenance plan 12.5 mg (5 mg x 2.5) on Sun, Tue, Thu; 10 mg (5 mg x 2) all other days   Full instructions 12.5 mg on Sun, Tue, Thu; 10 mg all other days   Weekly total 77.5 mg   No change documented Gwen Abel RN   Plan last modified Portia Cherry RN (6/16/2017)   Next INR check 1/30/2018   Priority Factor 2   Target end date     Indications   SLE (systemic lupus erythematosus) (H) (Resolved) [M32.9]  Long-term (current) use of anticoagulants [Z79.01] [Z79.01]         Anticoagulation Episode Summary     INR check location Clinic Lab    Preferred lab     Send INR reminders to [facility] CL[facility]    Comments Factor 2  goal range is 15-25%  Ok to leave message on home [phone] and work voicemail, but call [email or web address] per pt request.  OK to leave message at office  May leave messages with Rodriguez Tom  DO NOT GIVE RECORDS TO EMPLOYER U        Anticoagulation Care Providers     Provider Role Specialty Phone number    Mt Kiser MD Responsible Clinical Cardiac Electrophysiology [phone]            See the Encounter Report to view Anticoagulation Flowsheet and Dosing Calendar (Go to Encounters tab in chart review, and find the Anticoagulation Therapy Visit)    Spoke with Shala.  She reports no changes in health, diet, medications.    Gwen Abel, RN

## 2018-01-03 NOTE — MR AVS SNAPSHOT
Shala Caruso   1/3/2018   Anticoagulation Therapy Visit    Description:  72 year old female   Provider:  Gwen Abel, RN   Department:  [facility]           INR as of 1/3/2018     Today's INR       Anticoagulation Summary as of 1/3/2018     INR goal 2.0-3.0   Today's INR    Full instructions 12.5 mg on Sun, Tue, Thu; 10 mg all other days   Next INR check 1/30/2018    Indications   SLE (systemic lupus erythematosus) (H) (Resolved) [M32.9]  Long-term (current) use of anticoagulants [Z79.01] [Z79.01]         January 2018 Details    Sun Mon Tue Wed Thu Fri Sat      1               2               3      10 mg   See details      4      12.5 mg         5      10 mg         6      10 mg           7      12.5 mg         8      10 mg         9      12.5 mg         10      10 mg         11      12.5 mg         12      10 mg         13      10 mg           14      12.5 mg         15      10 mg         16      12.5 mg         17      10 mg         18      12.5 mg         19      10 mg         20      10 mg           21      12.5 mg         22      10 mg         23      12.5 mg         24      10 mg         25      12.5 mg         26      10 mg         27      10 mg           28      12.5 mg         29      10 mg         30            31                   Date Details   01/03 This INR check       Date of next INR:  1/30/2018         How to take your warfarin dose     To take:  10 mg Take 2 of the 5 mg tablets.    To take:  12.5 mg Take 2.5 of the 5 mg tablets.

## 2018-01-23 ENCOUNTER — MYC MEDICAL ADVICE (OUTPATIENT)
Dept: PHARMACY | Facility: CLINIC | Age: 73
End: 2018-01-23

## 2018-01-29 ENCOUNTER — ANTICOAGULATION THERAPY VISIT (OUTPATIENT)
Dept: ANTICOAGULATION | Facility: CLINIC | Age: 73
End: 2018-01-29

## 2018-01-29 ENCOUNTER — OFFICE VISIT (OUTPATIENT)
Dept: ENDOCRINOLOGY | Facility: CLINIC | Age: 73
End: 2018-01-29
Payer: COMMERCIAL

## 2018-01-29 VITALS — HEIGHT: 66 IN | WEIGHT: 119 LBS | BODY MASS INDEX: 19.13 KG/M2

## 2018-01-29 DIAGNOSIS — E10.649 TYPE 1 DIABETES MELLITUS WITH HYPOGLYCEMIA AND WITHOUT COMA (H): ICD-10-CM

## 2018-01-29 DIAGNOSIS — E10.9 TYPE I DIABETES MELLITUS, WELL CONTROLLED (H): ICD-10-CM

## 2018-01-29 DIAGNOSIS — E78.00 HYPERCHOLESTEROLEMIA: ICD-10-CM

## 2018-01-29 DIAGNOSIS — M81.0 OSTEOPOROSIS, POSTMENOPAUSAL: Primary | ICD-10-CM

## 2018-01-29 DIAGNOSIS — Z79.01 LONG TERM CURRENT USE OF ANTICOAGULANT THERAPY: ICD-10-CM

## 2018-01-29 DIAGNOSIS — Z79.01 LONG-TERM (CURRENT) USE OF ANTICOAGULANTS: ICD-10-CM

## 2018-01-29 LAB
HBA1C MFR BLD: 5 % (ref 4.3–6)
INR PPP: 2.15 (ref 0.86–1.14)
PROTHROM ACT/NOR PPP: 23 % (ref 60–140)

## 2018-01-29 RX ORDER — INSULIN ASPART 100 [IU]/ML
INJECTION, SOLUTION INTRAVENOUS; SUBCUTANEOUS
Qty: 15 ML | Refills: 3 | Status: SHIPPED | OUTPATIENT
Start: 2018-01-29 | End: 2019-04-28

## 2018-01-29 RX ORDER — INSULIN GLARGINE 100 [IU]/ML
INJECTION, SOLUTION SUBCUTANEOUS
Qty: 15 ML | Refills: 3 | Status: SHIPPED | OUTPATIENT
Start: 2018-01-29 | End: 2019-04-29

## 2018-01-29 ASSESSMENT — ENCOUNTER SYMPTOMS
NUMBNESS: 1
SEIZURES: 0
DIZZINESS: 1
MUSCLE CRAMPS: 1
EYE WATERING: 0
WEAKNESS: 0
LOSS OF CONSCIOUSNESS: 0
MUSCLE WEAKNESS: 0
STIFFNESS: 1
SWOLLEN GLANDS: 0
DISTURBANCES IN COORDINATION: 0
ARTHRALGIAS: 1
MEMORY LOSS: 1
HEADACHES: 0
TREMORS: 0
JOINT SWELLING: 0
DOUBLE VISION: 0
EYE IRRITATION: 0
EYE REDNESS: 0
PARALYSIS: 0
NECK PAIN: 0
SPEECH CHANGE: 0
BRUISES/BLEEDS EASILY: 1
MYALGIAS: 0
BACK PAIN: 0
EYE PAIN: 0
TINGLING: 0

## 2018-01-29 ASSESSMENT — PAIN SCALES - GENERAL: PAINLEVEL: NO PAIN (0)

## 2018-01-29 NOTE — LETTER
1/29/2018       RE: Shala Caruso  [address]  [place] [address]     Dear Colleague,    Thank you for referring your patient, Shala Caruso, to the [facility] at [facility]. Please see a copy of my visit note below.    Ms. Caruso is a 72 year old pleasant female with hx of SLE, APLS, DVT, osteoporosis and type 1 diabetes presenting for f/up.     1. Type 1 diabetes    Hemoglobin A1c today is 5%, down from 5.3% at her last visit here. Glucometer readings reveal that she checks her blood sugar 4 times daily. Average blood glucose is 81 with a standard deviation of 16 and a range variable between 44 and 130. The hypoglycemic episodes remain frequent, most of the days of the week.  Most of them are in the 60s.  2-3 times a week, she experiences more severe hypoglycemic episodes, in the 40s or 50s.  They are scattered throughout the day, with no identifiable pattern.  She very rarely develops hypoglycemic episodes in the morning, fasting.  The meter targets are set to 70 to 130 for the pre-meal blood sugar and  for the post meal blood sugar. Overall, 82% of the blood glucose numbers are within this target.  Insulin to carbohydrate ratio is 1 unit per 20 g for all meals.  She administers 4 U of Lantus in the morning, and 2-3 U NovoLog for breakfast, lunch and dinner.    She continues to use the echo NovoLog pen.  She attributes the hypoglycemic episodes to taking too much NovoLog for food intake, mainly due to misjudging and calculation errors.  If her premeal blood glucose is low, she sometimes does not correct it and she has something to eat but she denies taking a lower dose of NovoLog, to account for the lower pre-meal blood sugar.    Diabetes complications:   No h/o microalbuminuria.  Last eye exam - August 2017, no DR.   Last dental exam fall of 2017   Numbness and tingling sensation B/L toes - for many years but light sensation is intact. She  does wear comfortable shoes/insoles. She did see podiatry in the past for a left fpot Wilde neuroma.  She develops confusion, inability to concentrate or focus her vision and she feels extremely tired when her blood sugar is the lower 60s or 50s.  She has no prior episodes of loss of consciousness due to hypoglycemia.  When her blood sugar is in the lower 50s or 40s, she sometimes does experience tremors and/or sweating.    She continues to exercise regularly.  She goes to the gym 5 days a week and she runs on a bike or elliptical machine for 45 minutes and she does 1 hour of yoga.    2. Osteoporosis    Jeanmarie also has a history of osteoporosis, treated with Boniva for almost 3 years, followed by Forteo which was started in 4/12 - interrupted treatment from 4/2013 and restarted in 7/2013 until July 2014. After she completed the treatment with Forteo, she received one dose of Reclast, in July 2014.      She has no history of prior bone fractures. She's been off prednisone for over 4 years. Her height has decreased over the years from 5'6'' to 5'1/2''. Her mother had a hip fracture in her 80s. She continues to take oyster calcium 500 mg calcium plus 200 U Vitamin D TID and 1000 U vitamin D3 daily.     On the most recent DXA scan images from 7/1/16, L-L3 T score was - 1.  Overall, at the spine, there was a significant increase of bone mineral density since 2005 of 10.5%. Since 2015, the bone mineral density has remained stable at spine. The lowest T score at the hip level was -2.2, at the left femoral neck.  Overall, there was a significant improvement of bone mineral density at the mean hip of 8.9% since 2005, with no significant changes since 2015. While the bone mineral density at the left hip increased since baseline, at the right hip, there was a decrease of bone mineral density since baseline and also, since prior year.    Current Outpatient Prescriptions   Medication     NIFEdipine ER osmotic (NIFEDICAL XL) 30 MG  24 hr tablet     simvastatin (ZOCOR) 40 MG tablet     warfarin (COUMADIN) 5 MG tablet     estradiol (ESTRACE VAGINAL) 0.1 MG/GM cream     Injection Device for insulin (NOVOPEN ECHO) RORY     insulin glargine (LANTUS) 100 UNIT/ML injection     NOVOLOG PENFILL 100 UNIT/ML soln     mycophenolate (CELLCEPT - GENERIC EQUIVALENT) 500 MG tablet     acetaminophen (TYLENOL) 500 MG tablet     Blood Glucose Monitoring Suppl (TRUE METRIX METER) W/DEVICE KIT     blood glucose monitoring (TRUE METRIX BLOOD GLUCOSE TEST) test strip     VITAMIN D, CHOLECALCIFEROL, PO     AYR SALINE NASAL NO-DRIP GEL     glucagon (GLUCAGON EMERGENCY) 1 MG injection     LANCETS ULTRA THIN 30G MISC     aspirin 81 MG tablet     omega-3 fatty acids (FISH OIL) 1200 MG capsule     Calcium Carbonate-Vitamin D (CALCIUM + D) 600-200 MG-UNIT per tablet     Multiple Vitamins-Minerals (CENTRUM SILVER PO)     Injection Device for Insulin (NOVOPEN JR, GREEN,) RORY     No current facility-administered medications for this visit.      ROS   Systemic symptoms: as above, good appetite  Eye symptoms: No eye symptoms.  Otolaryngeal symptoms: no dysphagia, no voice hoarseness or cough   Breast symptoms: No breast symptoms.  Cardiovascular symptoms: No cardiovascular symptoms.    Pulmonary symptoms: No pulmonary symptoms.  Gastrointestinal symptoms: No gastrointestinal symptoms.   Genitourinary symptoms: urinary incontinence   Endocrine symptoms: chronic cold intolerance  Hematologic symptoms: No hematologic symptoms.  Musculoskeletal symptoms: recurrent episodes of pain which affect the third to fifth left toes; bilateral knee pain; joint stiffness  Neurological symptoms: numbness and tingling sensation b/l toes   Psychological symptoms: no psychological symptoms   Skin symptoms: split lesions of the tips of her fingers - for years; has seen dermatology in the past    Family Hx   Maternal grandmother DM2. First cousin with RA. Mother, maternal grandmother and aunt,  "cousin diagnosed with thyroid disorders (? hypothyroidism). Mother and maternal grandmother had breast cancer.    Personal Hx   Behavioral history: No tobacco use.  Home environment: No secondhand tobacco smoke in home.  Denies smoking, drinking alcohol or using illicit drugs.  with one child. 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  Menopause at age 59. Had regular MP in the past. No h/o bone fractures or kidney stones.    PMH   SLE dx in 2000 with flare/pericarditis and tamponade on 3/5/2010 requiring high doses of steroids.  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  Osteoporosis diagnosed 2008 started on Boniva in 2010 (heartburn from oral fosamax).   Hyperlipidemia.  Severe GERD and Achalasia.  Raynaud's  Allergy to multiple meds  Vit D def.  Post thrombophlebitic syndrome with chronic LE swelling   Dyslipidemia  Chronic leukopenia on methotrexate   L arm lymphedema   Type 1 diabetes  Prolapsed uterus   Cataract   Vale Zoster     10 point ROS   Osteoarthritis joint pain     Physical Exam   Wt Readings from Last 10 Encounters:   01/29/18 54 kg (119 lb)   08/02/17 52.8 kg (116 lb 8 oz)   07/31/17 53.2 kg (117 lb 4.8 oz)   05/22/17 53.9 kg (118 lb 14.4 oz)   04/13/17 54.7 kg (120 lb 9.6 oz)   04/04/17 55 kg (121 lb 4.8 oz)   02/01/17 54.4 kg (120 lb)   01/23/17 54.5 kg (120 lb 1.6 oz)   01/04/17 53.3 kg (117 lb 8 oz)   12/20/16 52.6 kg (116 lb)     BP (P) 117/75  Pulse (P) 64  Ht 1.676 m (5' 6\")  Wt 54 kg (119 lb)  BMI 19.21 kg/m2    General appearance: she is thin, no acute distress noted  Eyes: conjunctivae and extraocular motions are normal. Pupils are equal, round, and reactive to light. No lid lag, no stare.  HENT: oropharynx moist; neck no JVD, no bruits, no thyromegaly, no palpable nodules  Cardiovascular: regular rhythm, no murmurs, distal pulses palpable, mild L arm edema   Respiratory: chest clear, no rales, no rhonchi  Musculoskeletal: normal tone and strength "   Neurologic: left knee reflex decreased, normal B/L bicipital reflexes, no resting tremor  Psychiatric: affect and judgment normal   Skin: warm, mild bruises at the insulin injection site  Chronic mild edema L leg     Lab Results  I reviewed prior lab results documented in EPIC.   Lab Results   Component Value Date    A1C 5.3 07/11/2017    A1C 5.5 06/10/2013    A1C 5.4 01/07/2013    A1C 5.5 07/02/2012    A1C 5.1 01/09/2012    HEMOGLOBINA1 5.0 01/29/2018    HEMOGLOBINA1 5.0 01/23/2017    HEMOGLOBINA1 5.4 07/11/2016    HEMOGLOBINA1 5.4 01/11/2016    HEMOGLOBINA1 5.4 07/20/2015       Hemoglobin   Date Value Ref Range Status   01/02/2018 13.0 11.7 - 15.7 g/dL Final     Hematocrit   Date Value Ref Range Status   01/02/2018 41.4 35.0 - 47.0 % Final     Cholesterol   Date Value Ref Range Status   07/18/2017 164 <200 mg/dL Final     Cholesterol/HDL Ratio   Date Value Ref Range Status   10/01/2014 1.5 0.0 - 5.0 Final     HDL Cholesterol   Date Value Ref Range Status   07/18/2017 101 >49 mg/dL Final     LDL Cholesterol Calculated   Date Value Ref Range Status   07/18/2017 53 <100 mg/dL Final     Comment:     Desirable:       <100 mg/dl     VLDL-Cholesterol   Date Value Ref Range Status   10/01/2014 7 0 - 30 mg/dL Final     Triglycerides   Date Value Ref Range Status   07/18/2017 45 <150 mg/dL Final     Albumin Urine mg/L   Date Value Ref Range Status   07/11/2017 <5 mg/L Final     TSH   Date Value Ref Range Status   08/05/2016 0.64 0.40 - 4.00 mU/L Final       Last Basic Metabolic Panel:    Sodium   Date Value Ref Range Status   12/14/2016 142 133 - 144 mmol/L Final     Potassium   Date Value Ref Range Status   12/14/2016 3.4 3.4 - 5.3 mmol/L Final     Chloride   Date Value Ref Range Status   12/14/2016 107 94 - 109 mmol/L Final     Calcium   Date Value Ref Range Status   12/14/2016 9.2 8.5 - 10.1 mg/dL Final     Carbon Dioxide   Date Value Ref Range Status   12/14/2016 27 20 - 32 mmol/L Final     Urea Nitrogen   Date Value  Ref Range Status   12/14/2016 27 7 - 30 mg/dL Final     Creatinine   Date Value Ref Range Status   01/06/2017 0.87 0.52 - 1.04 mg/dL Final     GFR Estimate   Date Value Ref Range Status   01/06/2017 64 >60 mL/min/1.7m2 Final     Comment:     Non  GFR Calc     Glucose   Date Value Ref Range Status   12/14/2016 67 (L) 70 - 99 mg/dL Final       AST   Date Value Ref Range Status   01/02/2018 18 0 - 45 U/L Final     ALT   Date Value Ref Range Status   01/02/2018 20 0 - 50 U/L Final     Albumin   Date Value Ref Range Status   01/11/2016 3.8 3.4 - 5.0 g/dL Final     Assessment     1. Type 1 diabetes with very tight glycemic control, complicated by partial hypoglycemia unawareness. A tight BG control is preferred by the patient, although less aggressive blood glucose targets were recommended.     I discussed with the patient about the complications associated with recurrent hypoglycemic episodes, including hypoglycemia unawareness, memory impairment, cognitive dysfunction.  I would prefer her blood sugar numbers to be much higher.  I reminded her about current ADA guidelines, which recommend a fasting blood sugar below 130 and a maximum postprandial below 180.    Counseled on ways should correct her hypoglycemia by having easily absorbable carbohydrates.  If her pre-meal blood sugar is lowish, she should subtract 1 or 2 units of NovoLog from the total amount of NovoLog  which covers the carbohydrate content of the meal.  Prescription for glucagon refilled.    2.  Osteoporosis   She was treated with Boniva for 3 years, followed by Forteo for 2 years. Received one dose of Reclast in July 2014 when she completed treatment with Forteo. Overall, she gained a significant amount of bone mass at the lumbar spine and left hip from 2005 to 2016. While there appear to be a loss of bone mineral density at the right hip, the lowest T score remained at the left femoral neck, at -2.2, in the pre-osteoporotic range.  Plan:    F/up vitamin D level  F/up DXA scan in July 2018.     3. Hypercholesterolemia - on simvastatin, controlled.    Return to clinic 6 months with labs:    Orders Placed This Encounter   Procedures     Albumin Random Urine Quantitative with Creat Ratio     Basic metabolic panel     Lipid panel reflex to direct LDL Fasting     TSH with free T4 reflex     25 Hydroxyvitamin D2 and D3     CK total     Hemoglobin A1c POCT         Sincerely,    Dorita Cramer MD

## 2018-01-29 NOTE — MR AVS SNAPSHOT
After Visit Summary   1/29/2018    Shala Caruso    MRN: [id number]           Patient Information     Date Of Birth          1945        Visit Information        Provider Department      1/29/2018 7:30 AM Dorita Cramer MD [facility]        Today's Diagnoses     Osteoporosis, postmenopausal    -  1    Type 1 diabetes mellitus with hypoglycemia and without coma (H)        Hypercholesterolemia        Type I diabetes mellitus, well controlled (H)           Follow-ups after your visit        Follow-up notes from your care team     Return in about 6 months (around 7/29/2018).      Your next 10 appointments already scheduled     Jan 29, 2018  8:45 AM CST   LAB with  LAB    [facility] Lab ([facility])    [address] [place] [address]  1st [facility] [id number] [phone]           Please do not eat 10-12 hours before your appointment if you are coming in fasting for labs on lipids, cholesterol, or glucose (sugar). This does not apply to pregnant women. Water, hot tea and black coffee (with nothing added) are okay. Do not drink other fluids, diet soda or chew gum.            Feb 16, 2018  3:00 PM CST   (Arrive by 2:45 PM)   RETURN LUPUS with Santiago Corbett MD   [facility] Rheumat[facility] ([facility])    [address] [place] [address]  [address]  [facility] [id number] [phone]            Jul 27, 2018 12:45 PM CDT   LAB with  LAB   [facility] Lab ([facility])    [address] [place] [id number] [phone]           Please do not eat 10-12 hours before your appointment if you are coming in fasting for labs on lipids, cholesterol, or glucose (sugar). This does not apply to pregnant women. Water, hot tea and black coffee (with nothing added) are okay. Do not drink other fluids, diet soda or chew gum.            Jul 27, 2018  1:00 PM CDT   DX HIP/PELVIS/SPINE with DANIELLEDX1   MIKAEL  [facility] ([place] [facility])    [address] [address] [phone]           Please do not take any of the following 24 hours prior to the day of your exam: vitamins, calcium tablets, antacids.  If possible, please wear clothes without metal (snaps, zippers). A sweatsuit works well.            Jul 30, 2018  7:30 AM CDT   (Arrive by 7:15 AM)   RETURN DIABETES with Dorita Cramer MD   [facility] ([place] [facility])    [address] [place] [id number] [phone]              Future tests that were ordered for you today     Open Future Orders        Priority Expected Expires Ordered    Albumin Random Urine Quantitative with Creat Ratio Routine 7/28/2018 1/29/2019 1/29/2018    Basic metabolic panel Routine 7/28/2018 1/29/2019 1/29/2018    Lipid panel reflex to direct LDL Fasting Routine 7/28/2018 1/29/2019 1/29/2018    TSH with free T4 reflex Routine 7/28/2018 1/29/2019 1/29/2018    25 Hydroxyvitamin D2 and D3 Routine 7/28/2018 1/29/2019 1/29/2018    CK total Routine 7/28/2018 1/29/2019 1/29/2018            Who to contact     Please call your clinic at [phone] to:    Ask questions about your health    Make or cancel appointments    Discuss your medicines    Learn about your test results    Speak to your doctor   If you have compliments or concerns about an experience at your clinic, or if you wish to file a complaint, please contact [facility] Patient Relations at [phone] or email us at [email or web address]         Additional Information About Your Visit        MyChart Information     [facility] gives you secure access to your electronic health record. If you see a primary care provider, you can also send messages to your care team and make appointments. If you have questions, please call your primary care clinic.  If you do not have  "a primary care provider, please call [phone] and they will assist you.      [employer] is an electronic gateway that provides easy, online access to your medical records. With [employer], you can request a clinic appointment, read your test results, renew a prescription or communicate with your care team.     To access your existing account, please contact your [facility] or call [phone] for assistance.        Care EveryWhere ID     This is your Care EveryWhere ID. This could be used by other organizations to access your [facility] medical records  [id number]        Your Vitals Were     Height BMI (Body Mass Index)                1.676 m (5' 6\") 19.21 kg/m2           Blood Pressure from Last 3 Encounters:   01/29/18 (P) 117/75   11/08/17 122/76   10/12/17 116/68    Weight from Last 3 Encounters:   01/29/18 54 kg (119 lb)   08/02/17 52.8 kg (116 lb 8 oz)   07/31/17 53.2 kg (117 lb 4.8 oz)                 Today's Medication Changes          These changes are accurate as of 1/29/18  8:10 AM.  If you have any questions, ask your nurse or doctor.               Start taking these medicines.        Dose/Directions    insulin pen needle 32G X 4 MM   Commonly known as:  BD PAM U/F   Used for:  Type 1 diabetes mellitus with hypoglycemia and without coma (H)   Started by:  Dorita Cramer MD        Use 200 pen needles daily or as directed.   Quantity:  200 each   Refills:  3         Stop taking these medicines if you haven't already. Please contact your care team if you have questions.     estradiol 0.1 MG/GM cream   Commonly known as:  ESTRACE VAGINAL   Stopped by:  Dorita Cramer MD                Where to get your medicines      These medications were sent to [facility] - [place], [place] - [address]  [address] [address]     Phone:  [phone]     blood glucose monitoring test strip    glucagon 1 MG kit    insulin " glargine 100 UNIT/ML injection    insulin pen needle 32G X 4 MM    NovoLOG PENFILL 100 UNIT/ML injection                Primary Care Provider Office Phone # Fax #    Joe Tj Contreras [phone]       [address] [place] [address]        Equal Access to Services     MARVIN CACERES : Hadii aad ku hadasho Soomaali, waaxda luqadaha, qaybta kaalmada adeegyada, waxay idiin hayaan adeeg khancelmosh la'aan sebas. So [facility] [phone].    ATENCIÓN: Si habla español, tiene a hurtado disposición servicios gratuitos de asistencia lingüística. [facility] [phone].    We comply with applicable federal civil rights laws and [place] laws. We do not discriminate on the basis of race, color, national origin, age, disability, sex, sexual orientation, or gender identity.            Thank you!     Thank you for choosing [facility]  for your care. Our goal is always to provide you with excellent care. Hearing back from our patients is one way we can continue to improve our services. Please take a few minutes to complete the written survey that you may receive in the mail after your visit with us. Thank you!             Your Updated Medication List - Protect others around you: Learn how to safely use, store and throw away your medicines at www.disposemymeds.org.          This list is accurate as of 1/29/18  8:10 AM.  Always use your most recent med list.                   Brand Name Dispense Instructions for use Diagnosis    aspirin 81 MG tablet      Take 81 mg by mouth At Bedtime        AYR SALINE NASAL NO-DRIP Gel     3 Tube    Use as needed for nasal dryness    Nasal ulcer       blood glucose monitoring lancets     7203 each    Test 7-8 times daily  (Lancets that go with pt current meter )    Diabetes mellitus (H)       blood glucose monitoring test strip    TRUE METRIX BLOOD GLUCOSE TEST    4 Box    Use to test blood sugar 4 times daily or as directed.    Type I diabetes mellitus, well  controlled (H)       calcium + D 600-200 MG-UNIT Tabs   Generic drug:  calcium carbonate-vitamin D      Take 1 tablet by mouth 3 times daily        CENTRUM SILVER PO      Take 1 tablet by mouth daily.        glucagon 1 MG kit    GLUCAGON EMERGENCY    1 each    Inject 1 mg subcutaneously or intramuscularly for severe hypoglycemic episodes.    Type 1 diabetes mellitus with hypoglycemia and without coma (H)       insulin glargine 100 UNIT/ML injection    LANTUS    15 mL    Inject 4 units as directed daily LANTUS SOLOSTAR PEN PLEASE    Type 1 diabetes mellitus with hypoglycemia and without coma (H)       insulin pen needle 32G X 4 MM    BD PAM U/F    200 each    Use 200 pen needles daily or as directed.    Type 1 diabetes mellitus with hypoglycemia and without coma (H)       mycophenolate 500 MG tablet    GENERIC EQUIVALENT    360 tablet    Take 2 tablets (1,000 mg) by mouth 2 times daily 2 tabs in the a.m., 2 tabs in p.m.    Systemic lupus erythematosus (H), Antiphospholipid syndrome (H), Encounter for long-term current use of medication       NIFEdipine ER osmotic 30 MG 24 hr tablet    NIFEDICAL XL    90 tablet    Take 1 tablet (30 mg) by mouth daily    Achalasia of esophagus, HTN (hypertension)       NovoLOG PENFILL 100 UNIT/ML injection   Generic drug:  insulin aspart     15 mL    Inject 1 unit per 15 grams CHO with meals TID approx 15 units daily    Type 1 diabetes mellitus with hypoglycemia and without coma (H)       * Injection Device for insulin Priscila    NOVOPEN ECHO    1 each    1 each 4 times daily    Type 1 diabetes mellitus with hypoglycemia and without coma (H)       * NOVOPEN JR (GREEN) Priscila   Generic drug:  Injection Device for insulin      Use as directed.        omega-3 fatty acids 1200 MG capsule      Take 1 capsule by mouth daily.    Systemic lupus erythematosus (H), Antiphospholipid syndrome (H), Encounter for long-term (current) use of other medications       simvastatin 40 MG tablet    ZOCOR    90  tablet    Take 1 tablet (40 mg) by mouth At Bedtime    Other hyperlipidemia       TRUE METRIX METER W/DEVICE Kit     1 kit    1 each 4 times daily    Type I diabetes mellitus, well controlled (H)       TYLENOL 500 MG tablet   Generic drug:  acetaminophen      Take 500 mg by mouth At Bedtime Takes 6 tablets (500 mg ) .Christa Acharya LPN 2:40 PM on 3/23/2017        VITAMIN D (CHOLECALCIFEROL) PO      Take 1,000 Units by mouth daily (with lunch)        warfarin 5 MG tablet    COUMADIN    75 tablet    Take 2-2.5 tablets daily or as directed by coumadin clinic.    Long term current use of anticoagulant therapy       * Notice:  This list has 2 medication(s) that are the same as other medications prescribed for you. Read the directions carefully, and ask your doctor or other care provider to review them with you.

## 2018-01-29 NOTE — MR AVS SNAPSHOT
Shala SIMPSON Murtazaadalberto   1/29/2018   Anticoagulation Therapy Visit    Description:  72 year old female   Provider:  Stacey Beal, RN   Department:  [facility]           INR as of 1/29/2018     Today's INR       Anticoagulation Summary as of 1/29/2018     INR goal 2.0-3.0   Today's INR    Full instructions 12.5 mg on Sun, Tue, Thu; 10 mg all other days   Next INR check 3/5/2018    Indications   SLE (systemic lupus erythematosus) (H) (Resolved) [M32.9]  Long-term (current) use of anticoagulants [Z79.01] [Z79.01]         January 2018 Details    Sun Mon Tue Wed Thu Fri Sat      1               2               3               4               5               6                 7               8               9               10               11               12               13                 14               15               16               17               18               19               20                 21               22               23               24               25               26               27                 28               29      10 mg   See details      30      12.5 mg         31      10 mg             Date Details   01/29 This INR check               How to take your warfarin dose     To take:  10 mg Take 2 of the 5 mg tablets.    To take:  12.5 mg Take 2.5 of the 5 mg tablets.           February 2018 Details    Sun Mon Tue Wed Thu Fri Sat         1      12.5 mg         2      10 mg         3      10 mg           4      12.5 mg         5      10 mg         6      12.5 mg         7      10 mg         8      12.5 mg         9      10 mg         10      10 mg           11      12.5 mg         12      10 mg         13      12.5 mg         14      10 mg         15      12.5 mg         16      10 mg         17      10 mg           18      12.5 mg         19      10 mg         20      12.5 mg         21      10 mg         22      12.5 mg         23      10 mg         24      10 mg            25      12.5 mg         26      10 mg         27      12.5 mg         28      10 mg             Date Details   No additional details            How to take your warfarin dose     To take:  10 mg Take 2 of the 5 mg tablets.    To take:  12.5 mg Take 2.5 of the 5 mg tablets.           March 2018 Details    Sun Mon Tue Wed Thu Fri Sat         1      12.5 mg         2      10 mg         3      10 mg           4      12.5 mg         5            6               7               8               9               10                 11               12               13               14               15               16               17                 18               19               20               21               22               23               24                 25               26               27               28               29               30               31                Date Details   No additional details    Date of next INR:  3/5/2018         How to take your warfarin dose     To take:  10 mg Take 2 of the 5 mg tablets.    To take:  12.5 mg Take 2.5 of the 5 mg tablets.

## 2018-01-29 NOTE — PROGRESS NOTES
ANTICOAGULATION FOLLOW-UP CLINIC VISIT    Patient Name:  Shala Caruso  Date:  1/29/2018  Contact Type:  Telephone    SUBJECTIVE:     Patient Findings     Comments INR 2.15  Factor 2=23%  Goal 15-25%           OBJECTIVE    INR   Date Value Ref Range Status   01/29/2018 2.15 (H) 0.86 - 1.14 Final     Factor 2 Assay   Date Value Ref Range Status   01/29/2018 23 (L) 60 - 140 % Final       ASSESSMENT / PLAN  INR assessment THER    Recheck INR In: 4 WEEKS    INR Location Clinic      Anticoagulation Summary as of 1/29/2018     INR goal 2.0-3.0   Today's INR    Maintenance plan 12.5 mg (5 mg x 2.5) on Sun, Tue, Thu; 10 mg (5 mg x 2) all other days   Full instructions 12.5 mg on Sun, Tue, Thu; 10 mg all other days   Weekly total 77.5 mg   No change documented Stacey Beal RN   Plan last modified Portia Cherry RN (6/16/2017)   Next INR check 3/5/2018   Priority Factor 2   Target end date     Indications   SLE (systemic lupus erythematosus) (H) (Resolved) [M32.9]  Long-term (current) use of anticoagulants [Z79.01] [Z79.01]         Anticoagulation Episode Summary     INR check location Clinic Lab    Preferred lab     Send INR reminders to [facility]    Comments Factor 2  goal range is 15-25%  Ok to leave message on home [phone] and work voicemail, but call [email or web address] per pt request.  OK to leave message at office  May leave messages with Rodriguez Santos  DO NOT GIVE RECORDS TO EMPLOYER [employer]        Anticoagulation Care Providers     Provider Role Specialty Phone number    Mt Kiser MD Responsible Clinical Cardiac Electrophysiology [phone]            See the Encounter Report to view Anticoagulation Flowsheet and Dosing Calendar (Go to Encounters tab in chart review, and find the Anticoagulation Therapy Visit)    Left message for patient with results and dosing recommendations. Asked patient to call back to report any missed doses, falls, signs and symptoms of bleeding or clotting, any changes  in health, medication, or diet. Asked patient to call back with any questions or concerns.     Stacey Beal RN

## 2018-01-29 NOTE — PROGRESS NOTES
Ms. Caruso is a 72 year old pleasant female with hx of SLE, APLS, DVT, osteoporosis and type 1 diabetes presenting for f/up.     1. Type 1 diabetes    Hemoglobin A1c today is 5%, down from 5.3% at her last visit here. Glucometer readings reveal that she checks her blood sugar 4 times daily. Average blood glucose is 81 with a standard deviation of 16 and a range variable between 44 and 130. The hypoglycemic episodes remain frequent, most of the days of the week.  Most of them are in the 60s.  2-3 times a week, she experiences more severe hypoglycemic episodes, in the 40s or 50s.  They are scattered throughout the day, with no identifiable pattern.  She very rarely develops hypoglycemic episodes in the morning, fasting.  The meter targets are set to 70 to 130 for the pre-meal blood sugar and  for the post meal blood sugar. Overall, 82% of the blood glucose numbers are within this target.  Insulin to carbohydrate ratio is 1 unit per 20 g for all meals.  She administers 4 U of Lantus in the morning, and 2-3 U NovoLog for breakfast, lunch and dinner.    She continues to use the echo NovoLog pen.  She attributes the hypoglycemic episodes to taking too much NovoLog for food intake, mainly due to misjudging and calculation errors.  If her premeal blood glucose is low, she sometimes does not correct it and she has something to eat but she denies taking a lower dose of NovoLog, to account for the lower pre-meal blood sugar.    Diabetes complications:   No h/o microalbuminuria.  Last eye exam - August 2017, no DR.   Last dental exam fall of 2017   Numbness and tingling sensation B/L toes - for many years but light sensation is intact. She does wear comfortable shoes/insoles. She did see podiatry in the past for a left fpot Wilde neuroma.  She develops confusion, inability to concentrate or focus her vision and she feels extremely tired when her blood sugar is the lower 60s or 50s.  She has no prior episodes of loss of  consciousness due to hypoglycemia.  When her blood sugar is in the lower 50s or 40s, she sometimes does experience tremors and/or sweating.    She continues to exercise regularly.  She goes to the gym 5 days a week and she runs on a bike or elliptical machine for 45 minutes and she does 1 hour of yoga.    2. Osteoporosis    Jeanmarie also has a history of osteoporosis, treated with Boniva for almost 3 years, followed by Forteo which was started in 4/12 - interrupted treatment from 4/2013 and restarted in 7/2013 until July 2014. After she completed the treatment with Forteo, she received one dose of Reclast, in July 2014.      She has no history of prior bone fractures. She's been off prednisone for over 4 years. Her height has decreased over the years from 5'6'' to 5'1/2''. Her mother had a hip fracture in her 80s. She continues to take oyster calcium 500 mg calcium plus 200 U Vitamin D TID and 1000 U vitamin D3 daily.     On the most recent DXA scan images from 7/1/16, L-L3 T score was - 1.  Overall, at the spine, there was a significant increase of bone mineral density since 2005 of 10.5%. Since 2015, the bone mineral density has remained stable at spine. The lowest T score at the hip level was -2.2, at the left femoral neck.  Overall, there was a significant improvement of bone mineral density at the mean hip of 8.9% since 2005, with no significant changes since 2015. While the bone mineral density at the left hip increased since baseline, at the right hip, there was a decrease of bone mineral density since baseline and also, since prior year.    Current Outpatient Prescriptions   Medication     NIFEdipine ER osmotic (NIFEDICAL XL) 30 MG 24 hr tablet     simvastatin (ZOCOR) 40 MG tablet     warfarin (COUMADIN) 5 MG tablet     estradiol (ESTRACE VAGINAL) 0.1 MG/GM cream     Injection Device for insulin (NOVOPEN ECHO) RORY     insulin glargine (LANTUS) 100 UNIT/ML injection     NOVOLOG PENFILL 100 UNIT/ML soln      mycophenolate (CELLCEPT - GENERIC EQUIVALENT) 500 MG tablet     acetaminophen (TYLENOL) 500 MG tablet     Blood Glucose Monitoring Suppl (TRUE METRIX METER) W/DEVICE KIT     blood glucose monitoring (TRUE METRIX BLOOD GLUCOSE TEST) test strip     VITAMIN D, CHOLECALCIFEROL, PO     AYR SALINE NASAL NO-DRIP GEL     glucagon (GLUCAGON EMERGENCY) 1 MG injection     LANCETS ULTRA THIN 30G MISC     aspirin 81 MG tablet     omega-3 fatty acids (FISH OIL) 1200 MG capsule     Calcium Carbonate-Vitamin D (CALCIUM + D) 600-200 MG-UNIT per tablet     Multiple Vitamins-Minerals (CENTRUM SILVER PO)     Injection Device for Insulin (NOVOPEN JR, GREEN,) RORY     No current facility-administered medications for this visit.      ROS   Systemic symptoms: as above, good appetite  Eye symptoms: No eye symptoms.  Otolaryngeal symptoms: no dysphagia, no voice hoarseness or cough   Breast symptoms: No breast symptoms.  Cardiovascular symptoms: No cardiovascular symptoms.    Pulmonary symptoms: No pulmonary symptoms.  Gastrointestinal symptoms: No gastrointestinal symptoms.   Genitourinary symptoms: urinary incontinence   Endocrine symptoms: chronic cold intolerance  Hematologic symptoms: No hematologic symptoms.  Musculoskeletal symptoms: recurrent episodes of pain which affect the third to fifth left toes; bilateral knee pain; joint stiffness  Neurological symptoms: numbness and tingling sensation b/l toes   Psychological symptoms: no psychological symptoms   Skin symptoms: split lesions of the tips of her fingers - for years; has seen dermatology in the past    Family Hx   Maternal grandmother DM2. First cousin with RA. Mother, maternal grandmother and aunt, cousin diagnosed with thyroid disorders (? hypothyroidism). Mother and maternal grandmother had breast cancer.    Personal Hx   Behavioral history: No tobacco use.  Home environment: No secondhand tobacco smoke in home.  Denies smoking, drinking alcohol or using illicit drugs.  " with one child. 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  Menopause at age 59. Had regular MP in the past. No h/o bone fractures or kidney stones.    PMH   SLE dx in 2000 with flare/pericarditis and tamponade on 3/5/2010 requiring high doses of steroids.  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  Osteoporosis diagnosed 2008 started on Boniva in 2010 (heartburn from oral fosamax).   Hyperlipidemia.  Severe GERD and Achalasia.  Raynaud's  Allergy to multiple meds  Vit D def.  Post thrombophlebitic syndrome with chronic LE swelling   Dyslipidemia  Chronic leukopenia on methotrexate   L arm lymphedema   Type 1 diabetes  Prolapsed uterus   Cataract   Vale Zoster     10 point ROS   Osteoarthritis joint pain     Physical Exam   Wt Readings from Last 10 Encounters:   01/29/18 54 kg (119 lb)   08/02/17 52.8 kg (116 lb 8 oz)   07/31/17 53.2 kg (117 lb 4.8 oz)   05/22/17 53.9 kg (118 lb 14.4 oz)   04/13/17 54.7 kg (120 lb 9.6 oz)   04/04/17 55 kg (121 lb 4.8 oz)   02/01/17 54.4 kg (120 lb)   01/23/17 54.5 kg (120 lb 1.6 oz)   01/04/17 53.3 kg (117 lb 8 oz)   12/20/16 52.6 kg (116 lb)     BP (P) 117/75  Pulse (P) 64  Ht 1.676 m (5' 6\")  Wt 54 kg (119 lb)  BMI 19.21 kg/m2    General appearance: she is thin, no acute distress noted  Eyes: conjunctivae and extraocular motions are normal. Pupils are equal, round, and reactive to light. No lid lag, no stare.  HENT: oropharynx moist; neck no JVD, no bruits, no thyromegaly, no palpable nodules  Cardiovascular: regular rhythm, no murmurs, distal pulses palpable, mild L arm edema   Respiratory: chest clear, no rales, no rhonchi  Musculoskeletal: normal tone and strength   Neurologic: left knee reflex decreased, normal B/L bicipital reflexes, no resting tremor  Psychiatric: affect and judgment normal   Skin: warm, mild bruises at the insulin injection site  Chronic mild edema L leg     Lab Results  I reviewed prior lab results documented in EPIC. "   Lab Results   Component Value Date    A1C 5.3 07/11/2017    A1C 5.5 06/10/2013    A1C 5.4 01/07/2013    A1C 5.5 07/02/2012    A1C 5.1 01/09/2012    HEMOGLOBINA1 5.0 01/29/2018    HEMOGLOBINA1 5.0 01/23/2017    HEMOGLOBINA1 5.4 07/11/2016    HEMOGLOBINA1 5.4 01/11/2016    HEMOGLOBINA1 5.4 07/20/2015       Hemoglobin   Date Value Ref Range Status   01/02/2018 13.0 11.7 - 15.7 g/dL Final     Hematocrit   Date Value Ref Range Status   01/02/2018 41.4 35.0 - 47.0 % Final     Cholesterol   Date Value Ref Range Status   07/18/2017 164 <200 mg/dL Final     Cholesterol/HDL Ratio   Date Value Ref Range Status   10/01/2014 1.5 0.0 - 5.0 Final     HDL Cholesterol   Date Value Ref Range Status   07/18/2017 101 >49 mg/dL Final     LDL Cholesterol Calculated   Date Value Ref Range Status   07/18/2017 53 <100 mg/dL Final     Comment:     Desirable:       <100 mg/dl     VLDL-Cholesterol   Date Value Ref Range Status   10/01/2014 7 0 - 30 mg/dL Final     Triglycerides   Date Value Ref Range Status   07/18/2017 45 <150 mg/dL Final     Albumin Urine mg/L   Date Value Ref Range Status   07/11/2017 <5 mg/L Final     TSH   Date Value Ref Range Status   08/05/2016 0.64 0.40 - 4.00 mU/L Final       Last Basic Metabolic Panel:    Sodium   Date Value Ref Range Status   12/14/2016 142 133 - 144 mmol/L Final     Potassium   Date Value Ref Range Status   12/14/2016 3.4 3.4 - 5.3 mmol/L Final     Chloride   Date Value Ref Range Status   12/14/2016 107 94 - 109 mmol/L Final     Calcium   Date Value Ref Range Status   12/14/2016 9.2 8.5 - 10.1 mg/dL Final     Carbon Dioxide   Date Value Ref Range Status   12/14/2016 27 20 - 32 mmol/L Final     Urea Nitrogen   Date Value Ref Range Status   12/14/2016 27 7 - 30 mg/dL Final     Creatinine   Date Value Ref Range Status   01/06/2017 0.87 0.52 - 1.04 mg/dL Final     GFR Estimate   Date Value Ref Range Status   01/06/2017 64 >60 mL/min/1.7m2 Final     Comment:     Non  GFR Calc      Glucose   Date Value Ref Range Status   12/14/2016 67 (L) 70 - 99 mg/dL Final       AST   Date Value Ref Range Status   01/02/2018 18 0 - 45 U/L Final     ALT   Date Value Ref Range Status   01/02/2018 20 0 - 50 U/L Final     Albumin   Date Value Ref Range Status   01/11/2016 3.8 3.4 - 5.0 g/dL Final     Assessment     1. Type 1 diabetes with very tight glycemic control, complicated by partial hypoglycemia unawareness. A tight BG control is preferred by the patient, although less aggressive blood glucose targets were recommended.     I discussed with the patient about the complications associated with recurrent hypoglycemic episodes, including hypoglycemia unawareness, memory impairment, cognitive dysfunction.  I would prefer her blood sugar numbers to be much higher.  I reminded her about current ADA guidelines, which recommend a fasting blood sugar below 130 and a maximum postprandial below 180.    Counseled on ways should correct her hypoglycemia by having easily absorbable carbohydrates.  If her pre-meal blood sugar is lowish, she should subtract 1 or 2 units of NovoLog from the total amount of NovoLog  which covers the carbohydrate content of the meal.  Prescription for glucagon refilled.    2.  Osteoporosis   She was treated with Boniva for 3 years, followed by Forteo for 2 years. Received one dose of Reclast in July 2014 when she completed treatment with Forteo. Overall, she gained a significant amount of bone mass at the lumbar spine and left hip from 2005 to 2016. While there appear to be a loss of bone mineral density at the right hip, the lowest T score remained at the left femoral neck, at -2.2, in the pre-osteoporotic range.  Plan:   F/up vitamin D level  F/up DXA scan in July 2018.     3. Hypercholesterolemia - on simvastatin, controlled.    Return to clinic 6 months with labs:    Orders Placed This Encounter   Procedures     Albumin Random Urine Quantitative with Creat Ratio     Basic metabolic  panel     Lipid panel reflex to direct LDL Fasting     TSH with free T4 reflex     25 Hydroxyvitamin D2 and D3     CK total     Hemoglobin A1c POCT       Answers for HPI/ROS submitted by the patient on 1/29/2018   General Symptoms: No  Skin Symptoms: No  HENT Symptoms: No  EYE SYMPTOMS: Yes  HEART SYMPTOMS: No  LUNG SYMPTOMS: No  INTESTINAL SYMPTOMS: No  URINARY SYMPTOMS: No  GYNECOLOGIC SYMPTOMS: No  BREAST SYMPTOMS: No  SKELETAL SYMPTOMS: Yes  BLOOD SYMPTOMS: Yes  NERVOUS SYSTEM SYMPTOMS: Yes  MENTAL HEALTH SYMPTOMS: No  Eye pain: No  Vision loss: No  Dry eyes: No  Watery eyes: No  Eye bulging: No  Double vision: No  Flashing of lights: No  Spots: No  Floaters: Yes  Redness: No  Crossed eyes: No  Tunnel Vision: No  Yellowing of eyes: No  Eye irritation: No  Back pain: No  Muscle aches: No  Neck pain: No  Swollen joints: No  Joint pain: Yes  Bone pain: No  Muscle cramps: Yes  Muscle weakness: No  Joint stiffness: Yes  Bone fracture: No  Anemia: No  Swollen glands: No  Easy bleeding or bruising: Yes  Edema or swelling: Yes  Trouble with coordination: No  Dizziness or trouble with balance: Yes  Fainting or black-out spells: No  Memory loss: Yes  Headache: No  Seizures: No  Speech problems: No  Tingling: No  Tremor: No  Weakness: No  Difficulty walking: No  Paralysis: No  Numbness: Yes

## 2018-01-29 NOTE — NURSING NOTE
Chief Complaint   Patient presents with     RECHECK     F/U TYPE I DM     Tootie Espinal, CMA  Endocrinology [facility] Diabetes 3G      Capillary Fingerstick performed for an Hemoglobin A1C test

## 2018-02-08 DIAGNOSIS — Z79.01 LONG TERM CURRENT USE OF ANTICOAGULANT THERAPY: ICD-10-CM

## 2018-02-16 ENCOUNTER — OFFICE VISIT (OUTPATIENT)
Dept: RHEUMATOLOGY | Facility: CLINIC | Age: 73
End: 2018-02-16
Attending: INTERNAL MEDICINE
Payer: COMMERCIAL

## 2018-02-16 VITALS
HEIGHT: 66 IN | SYSTOLIC BLOOD PRESSURE: 119 MMHG | HEART RATE: 69 BPM | OXYGEN SATURATION: 98 % | TEMPERATURE: 98.2 F | DIASTOLIC BLOOD PRESSURE: 73 MMHG

## 2018-02-16 DIAGNOSIS — Z79.899 ENCOUNTER FOR LONG-TERM CURRENT USE OF MEDICATION: ICD-10-CM

## 2018-02-16 DIAGNOSIS — M32.15 SYSTEMIC LUPUS ERYTHEMATOSUS WITH TUBULO-INTERSTITIAL NEPHROPATHY, UNSPECIFIED SLE TYPE (H): Primary | ICD-10-CM

## 2018-02-16 DIAGNOSIS — D68.61 ANTIPHOSPHOLIPID SYNDROME (H): ICD-10-CM

## 2018-02-16 PROCEDURE — G0463 HOSPITAL OUTPT CLINIC VISIT: HCPCS | Mod: ZF

## 2018-02-16 RX ORDER — MYCOPHENOLATE MOFETIL 500 MG/1
1000 TABLET ORAL 2 TIMES DAILY
Qty: 360 TABLET | Refills: 3 | Status: SHIPPED | OUTPATIENT
Start: 2018-02-16 | End: 2018-09-06

## 2018-02-16 ASSESSMENT — PAIN SCALES - GENERAL: PAINLEVEL: NO PAIN (0)

## 2018-02-16 NOTE — PROGRESS NOTES
Rheumatology Visit     Shala Caruso MRN# [id number]   YOB: 1945 Age: 72 year old     Date of Visit: February 16, 2018  Primary care provider: Joe Contreras          Assessment and Plan:   1. 73 yo female with SLE, diagnosed 2000 (+CRISTAL,+dsDNA ab, arthritis, serositis): flare (3/10) with pericardial effusion/tamponade, and now off of prednisone; Cellcept was started 6/10 and tolerating well. She has no significant current symptoms of inflammation. SLE lab stable with Cellcept monitoring.   2. 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 Allergies to multiple antibiotics and Plaquenil   4. Osteoprosis on Boniva (Last DXA with some continued decrease of BMD)   5. Severe GERD, Achalasia   6. Post thrombophlebitic syndrome (chronic LE swelling), stable  7. Dyslipidemia with current excellent lipid values   8. Raynauds   9. Chronic leukopenia   10. 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 Herpes Zoster  Plan   Discussed in detail with the patient   1. Continue Cellcept 2000 mg daily   2. She does not require additional therapy at this time   3. Call if new symptoms   4. Continue lab monitoring ordered before next appointment at 3 months and soon before next appointment   5. Return to SLE Clinic in 6 months   6. Continue followup in [facility] for [facility]; in [facility] for other concerns   7. Follow up regarding lymphedema as needed   8. flu shot is current  Santiago Corbett MD.           History of Present Illness:   73 yo female is seen for followup of SLE. I saw her last in October 2017. EPIC reviewed.   Problem list:   1. SLE, diagnosed 2000 (+CRISTAL,+dsDNA ab, arthritis, serositis): flare (3/10) with pericardial effusion/tamponade, on Cellcept (started 6/10)   2. APS, s/p DVT LLE, PE on warfarin in 2004   3. Allergies to multiple antibiotics and Plaquenil   4. Osteoprosis on Boniva (Last DXA 1/09, Tscore: -1.9 L femoral neck)   5. Severe GERD, Achalasia   6. Post thrombophlebitic syndrome  (chronic LE swelling)   7. Dyslipidemia   8. Raynauds   9. Chronic leukopenia on Methotrexate  10. L arm lymphedema    HISTORY CARRIED FORWARD:   She has been off Prednisone for about 3.5 years and off Methotrexate since May 2012; she is on Cellcept 2000mg/day and no longer on Plaquenil.   She has left arm lymphedema. She has hx of axillary LN dissection for enlarged nodes. This have been evaluated by OT. She was using her glove and sleeve but the glove is too tight and she is not using it regularly; this does not impede her at present.  She has had no recurrent chest pain/SOB or pleurisy since her hospitalization in March 2010 for pericarditis.       Her biggest interval problem is Herpes Zoster in L Thoracic dermatome, dx 3/23 in the [facility].  She was on Valtrex and is on Neurontin.  She is having a lot of pain.  She sees them again next week.      INTERVAL HISTORY:      She has had an overall uneventful 4 months from an SLE perspective. She is tolerating the medications without problem. Her labs have been stable with persistent leukopenia now at 3.2 last month; DSDNA normal at 29 for first time in July 2014 and normal last checked. Her complements have been stable but slightly low without change. Creatinine has been variable but again normal last check.  Her joints are stable with no swelling. She has osteoarthritis of the bilateral knees which is the most bothersome problem. She continues to do low impact Yoga.  She denies fevers, fatigue, oral ulcers, rash. Raynauds is stable without Digital ulcers. No new rash.  She remains on Boniva followed in Endocrine for this and Type I DM. She is followed in [facility]mad[facility] Clinic with hx of DVT and PE.    We discussed her prior therapy and options for going forward. She is looking ahead to eventual penitentiary. She has Diabetes so wishes to avoid Prednisone unless absolutely needed.       Review of Systems:   Review Of Systems  Skin: negative  Eyes: negative  Ears/Nose/Throat:  negative  Respiratory: No shortness of breath, dyspnea on exertion, cough, or hemoptysis  Cardiovascular: negative  Gastrointestinal: negative  Genitourinary: negative  Musculoskeletal: see HPI  Neurologic: negative  Psychiatric: negative  Hematologic/Lymphatic/Immunologic: on Anticoagulation  Endocrine: DM under care          Past Medical History:     Past Medical History:   Diagnosis Date     Achalasia      Antiphospholipid syndrome (H)      Antiplatelet or antithrombotic long-term use      DM (diabetes mellitus) (H)      DVT (deep venous thrombosis) (H) 2003    and PE     Dysphagia     occasional, use Nifedipine     GERD (gastroesophageal reflux disease)      Hyperlipidemia      Lymphedema      Myopia      Neuropathy     toes bilateral     Nonsenile cataract      osteoporosis      Pericarditis      Raynaud's disease      SLE (systemic lupus erythematosus) (H)        Patient Active Problem List    Diagnosis Date Noted     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, unspecified SLE type (H) 10/12/2017     Priority: Medium     Choroidal lesion 08/30/2017     Priority: Medium     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 Contreras.         Cystocele, midline 10/15/2014     Priority: Medium     Urinary urgency 10/15/2014     Priority: Medium     Urinary frequency 10/15/2014     Priority: Medium     Female stress incontinence 10/15/2014     Priority: Medium     Atrophic vaginitis 10/15/2014     Priority: Medium     Osteoporosis, postmenopausal 07/14/2014     Priority: Medium     Osteoporosis, idiopathic 07/11/2013     Priority: Medium     Type 1 diabetes mellitus (H) 07/02/2012     Priority: Medium     Encounter for long-term current use of medication 05/30/2012     Priority: Medium     Problem list  name updated by automated process. Provider to review       Achalasia      Priority: Medium     Antiphospholipid syndrome (H)      Priority: Medium     DM (diabetes mellitus) (H)      Priority: Medium     GERD (gastroesophageal reflux disease)      Priority: Medium     Hyperlipidemia      Priority: Medium     HTN (hypertension)      Priority: Medium     Osteopenia      Priority: Medium     Raynaud's disease      Priority: Medium     DVT (deep venous thrombosis) (H)      Priority: Medium     and PE               Past Surgical History:     Past Surgical History:   Procedure Laterality Date     COLONOSCOPY N/A 11/28/2016    Procedure: COLONOSCOPY;  Surgeon: Sang August MD;  Location: [facility]     CYSTOSCOPY, SLING TRANSVAGINAL N/A 12/20/2016    Procedure: CYSTOSCOPY, SLING TRANSVAGINAL;  Surgeon: Jeanmarie Pierson MD;  Location: [facility]     DISSECT LYMPH NODE AXILLA  2004    Lt, during eval for SLE     HYSTEROSCOPIC PLACEMENT CONTRACEPTIVE DEVICE  1985     TUBAL LIGATION Bilateral              Social History:     Social History   Substance Use Topics     Smoking status: Never Smoker     Smokeless tobacco: Never Used     Alcohol use No             Family History:     Family History   Problem Relation Age of Onset     CANCER Other      breast     CEREBROVASCULAR DISEASE Other      C.A.D. Other      Glaucoma Father             Allergies:     Allergies   Allergen Reactions     Amaryl [Glimepiride] Swelling     Swelling of tongue     Metformin Blisters     Experienced big blisters all over body and sloughing of skin     Plaquenil [Aminoquinolines] Hives     Sulfa Drugs Other (See Comments)     Turned bright red     Amoxicillin Rash     Hydroxychloroquine Rash     Penicillins Rash             Medications:     Current Outpatient Prescriptions   Medication Sig Dispense Refill     glucagon (GLUCAGON EMERGENCY) 1 MG kit Inject 1 mg subcutaneously or intramuscularly for severe hypoglycemic episodes. 1 each 3     blood  glucose monitoring (TRUE METRIX BLOOD GLUCOSE TEST) test strip Use to test blood sugar 4 times daily or as directed. 4 Box 3     NOVOLOG PENFILL 100 UNIT/ML soln Inject 1 unit per 15 grams CHO with meals TID approx 15 units daily 15 mL 3     insulin glargine (LANTUS) 100 UNIT/ML injection Inject 4 units as directed daily LANTUS SOLOSTAR PEN PLEASE 15 mL 3     insulin pen needle (BD PAM U/F) 32G X 4 MM Use 200 pen needles daily or as directed. 200 each 3     NIFEdipine ER osmotic (NIFEDICAL XL) 30 MG 24 hr tablet Take 1 tablet (30 mg) by mouth daily 90 tablet 2     simvastatin (ZOCOR) 40 MG tablet Take 1 tablet (40 mg) by mouth At Bedtime 90 tablet 2     warfarin (COUMADIN) 5 MG tablet Take 2-2.5 tablets daily or as directed by coumadin clinic. 75 tablet 5     Injection Device for insulin (NOVOPEN ECHO) RORY 1 each 4 times daily 1 each 0     mycophenolate (CELLCEPT - GENERIC EQUIVALENT) 500 MG tablet Take 2 tablets (1,000 mg) by mouth 2 times daily 2 tabs in the a.m., 2 tabs in p.m. 360 tablet 3     acetaminophen (TYLENOL) 500 MG tablet Take 500 mg by mouth At Bedtime Takes 6 tablets (500 mg ) .Christa Acharya LPN 2:40 PM on 3/23/2017       Blood Glucose Monitoring Suppl (TRUE METRIX METER) W/DEVICE KIT 1 each 4 times daily 1 kit 0     VITAMIN D, CHOLECALCIFEROL, PO Take 1,000 Units by mouth daily (with lunch)        AYR SALINE NASAL NO-DRIP GEL Use as needed for nasal dryness 3 Tube 3     LANCETS ULTRA THIN 30G MISC Test 7-8 times daily  (Lancets that go with pt current meter ) 7203 each 3     aspirin 81 MG tablet Take 81 mg by mouth At Bedtime        omega-3 fatty acids (FISH OIL) 1200 MG capsule Take 1 capsule by mouth daily.       Calcium Carbonate-Vitamin D (CALCIUM + D) 600-200 MG-UNIT per tablet Take 1 tablet by mouth 3 times daily        Multiple Vitamins-Minerals (CENTRUM SILVER PO) Take 1 tablet by mouth daily.       Injection Device for Insulin (NOVOPEN JR, GREEN,) RORY Use as directed.               "Physical Exam:   Blood pressure 119/73, pulse 69, temperature 98.2  F (36.8  C), temperature source Oral, height 1.676 m (5' 6\"), SpO2 98 %, not currently breastfeeding.  Constitutional: WD-WN-WG cooperative   Eyes: nl conjunctiva, sclera   ENT: nl external ears, nose, throat   No mucous membrane lesions, normal saliva pool   Neck: no mass or thyroid enlargement   GI: no ABD mass or tenderness, no HSM   Lymph: no cervical, supraclavicular, inguinal or epitrochlear nodes   MS: All TMJ, neck, shoulder, elbow, wrist, MCP/PIP/DIP, spine, hip, knee, ankle, and foot MTP/IP joints were examined and found without active synovitis or deformity. No dactylitis, tenosynovitis, enthesopathy. Stable mild lymphedema LUE. She has bilateral support stockings  Skin: Xerosis and fissures of the distal fingers unchanged. No nail pitting, alopecia.    Neuro: nl cranial nerves, strength  Psych: nl affect         Data:     Lab Results   Component Value Date    WBC 3.2 (L) 01/02/2018    WBC 2.9 (L) 09/21/2017    WBC 3.4 (L) 07/11/2017    HGB 13.0 01/02/2018    HGB 12.6 09/21/2017    HGB 11.9 07/11/2017    HCT 41.4 01/02/2018    HCT 39.4 09/21/2017    HCT 37.5 07/11/2017    MCV 93 01/02/2018    MCV 92 09/21/2017    MCV 97 07/11/2017     01/02/2018     09/21/2017     07/11/2017     Lab Results   Component Value Date    BUN 27 12/14/2016    BUN 30 07/14/2014    BUN 32 (H) 04/01/2013     No components found for: SEDRATE  Lab Results   Component Value Date    TSH 0.64 08/05/2016    TSH 1.02 01/11/2016    TSH 0.82 01/12/2015     Lab Results   Component Value Date    AST 18 01/02/2018    AST 19 09/21/2017    AST 19 07/11/2017    ALT 20 01/02/2018    ALT 27 09/21/2017    ALT 23 07/11/2017    ALKPHOS 74 04/01/2013    ALKPHOS 57 08/25/2011    ALKPHOS 39 (L) 08/05/2011     Reviewed Rheumatology lab flowsheet    Santiago Corbett    "

## 2018-02-16 NOTE — NURSING NOTE
"Chief Complaint   Patient presents with     RECHECK     Lupus       Initial /73 (BP Location: Right arm, Patient Position: Sitting, Cuff Size: Adult Regular)  Pulse 69  Temp 98.2  F (36.8  C) (Oral)  Ht 1.676 m (5' 6\")  SpO2 98% Estimated body mass index is 19.21 kg/(m^2) as calculated from the following:    Height as of 1/29/18: 1.676 m (5' 6\").    Weight as of 1/29/18: 54 kg (119 lb).  Medication Reconciliation: complete  Ramila Swartz MA  "

## 2018-02-16 NOTE — LETTER
2/16/2018      RE: Shala Caruso  [address]  [place] [address]       Rheumatology Visit     Shala Caruso MRN# [id number]   YOB: 1945 Age: 72 year old     Date of Visit: February 16, 2018  Primary care provider: Joe Contreras          Assessment and Plan:   1. 73 yo female with SLE, diagnosed 2000 (+CRISTAL,+dsDNA ab, arthritis, serositis): flare (3/10) with pericardial effusion/tamponade, and now off of prednisone; Cellcept was started 6/10 and tolerating well. She has no significant current symptoms of inflammation. SLE lab stable with Cellcept monitoring.   2. 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 Allergies to multiple antibiotics and Plaquenil   4. Osteoprosis on Boniva (Last DXA with some continued decrease of BMD)   5. Severe GERD, Achalasia   6. Post thrombophlebitic syndrome (chronic LE swelling), stable  7. Dyslipidemia with current excellent lipid values   8. Raynauds   9. Chronic leukopenia   10. 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 Herpes Zoster  Plan   Discussed in detail with the patient   1. Continue Cellcept 2000 mg daily   2. She does not require additional therapy at this time   3. Call if new symptoms   4. Continue lab monitoring ordered before next appointment at 3 months and soon before next appointment   5. Return to SLE Clinic in 6 months   6. Continue followup in [facility] for [facility]; in [facility] for other concerns   7. Follow up regarding lymphedema as needed   8. flu shot is current  Santiago Corbett MD.           History of Present Illness:   73 yo female is seen for followup of SLE. I saw her last in October 2017. EPIC reviewed.   Problem list:   1. SLE, diagnosed 2000 (+CRISTAL,+dsDNA ab, arthritis, serositis): flare (3/10) with pericardial effusion/tamponade, on Cellcept (started 6/10)   2. APS, s/p DVT LLE, PE on warfarin in 2004   3. Allergies to multiple antibiotics and Plaquenil   4. Osteoprosis on Boniva (Last DXA 1/09, Tscore:  -1.9 L femoral neck)   5. Severe GERD, Achalasia   6. Post thrombophlebitic syndrome (chronic LE swelling)   7. Dyslipidemia   8. Raynauds   9. Chronic leukopenia on Methotrexate  10. L arm lymphedema    HISTORY CARRIED FORWARD:   She has been off Prednisone for about 3.5 years and off Methotrexate since May 2012; she is on Cellcept 2000mg/day and no longer on Plaquenil.   She has left arm lymphedema. She has hx of axillary LN dissection for enlarged nodes. This have been evaluated by OT. She was using her glove and sleeve but the glove is too tight and she is not using it regularly; this does not impede her at present.  She has had no recurrent chest pain/SOB or pleurisy since her hospitalization in March 2010 for pericarditis.       Her biggest interval problem is Herpes Zoster in L Thoracic dermatome, dx 3/23 in the [facility].  She was on Valtrex and is on Neurontin.  She is having a lot of pain.  She sees them again next week.      INTERVAL HISTORY:      She has had an overall uneventful 4 months from an SLE perspective. She is tolerating the medications without problem. Her labs have been stable with persistent leukopenia now at 3.2 last month; DSDNA normal at 29 for first time in July 2014 and normal last checked. Her complements have been stable but slightly low without change. Creatinine has been variable but again normal last check.  Her joints are stable with no swelling. She has osteoarthritis of the bilateral knees which is the most bothersome problem. She continues to do low impact Yoga.  She denies fevers, fatigue, oral ulcers, rash. Raynauds is stable without Digital ulcers. No new rash.  She remains on Boniva followed in Endocrine for this and Type I DM. She is followed in [facility]madin Clinic with hx of DVT and PE.    We discussed her prior therapy and options for going forward. She is looking ahead to eventual [facility]. She has Diabetes so wishes to avoid Prednisone unless absolutely needed.       Review of  Systems:   Review Of Systems  Skin: negative  Eyes: negative  Ears/Nose/Throat: negative  Respiratory: No shortness of breath, dyspnea on exertion, cough, or hemoptysis  Cardiovascular: negative  Gastrointestinal: negative  Genitourinary: negative  Musculoskeletal: see HPI  Neurologic: negative  Psychiatric: negative  Hematologic/Lymphatic/Immunologic: on Anticoagulation  Endocrine: DM under care          Past Medical History:     Past Medical History:   Diagnosis Date     Achalasia      Antiphospholipid syndrome (H)      Antiplatelet or antithrombotic long-term use      DM (diabetes mellitus) (H)      DVT (deep venous thrombosis) (H) 2003    and PE     Dysphagia     occasional, use Nifedipine     GERD (gastroesophageal reflux disease)      Hyperlipidemia      Lymphedema      Myopia      Neuropathy     toes bilateral     Nonsenile cataract      osteoporosis      Pericarditis      Raynaud's disease      SLE (systemic lupus erythematosus) (H)        Patient Active Problem List    Diagnosis Date Noted     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, unspecified SLE type (H) 10/12/2017     Priority: Medium     Choroidal lesion 08/30/2017     Priority: Medium     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 Contreras.         Cystocele, midline 10/15/2014     Priority: Medium     Urinary urgency 10/15/2014     Priority: Medium     Urinary frequency 10/15/2014     Priority: Medium     Female stress incontinence 10/15/2014     Priority: Medium     Atrophic vaginitis 10/15/2014     Priority: Medium     Osteoporosis, postmenopausal 07/14/2014     Priority: Medium     Osteoporosis, idiopathic 07/11/2013     Priority: Medium     Type 1 diabetes mellitus (H) 07/02/2012     Priority: Medium     Encounter for  long-term current use of medication 05/30/2012     Priority: Medium     Problem list name updated by automated process. Provider to review       Achalasia      Priority: Medium     Antiphospholipid syndrome (H)      Priority: Medium     DM (diabetes mellitus) (H)      Priority: Medium     GERD (gastroesophageal reflux disease)      Priority: Medium     Hyperlipidemia      Priority: Medium     HTN (hypertension)      Priority: Medium     Osteopenia      Priority: Medium     Raynaud's disease      Priority: Medium     DVT (deep venous thrombosis) (H)      Priority: Medium     and PE               Past Surgical History:     Past Surgical History:   Procedure Laterality Date     COLONOSCOPY N/A 11/28/2016    Procedure: COLONOSCOPY;  Surgeon: Sang August MD;  Location: [facility]     CYSTOSCOPY, SLING TRANSVAGINAL N/A 12/20/2016    Procedure: CYSTOSCOPY, SLING TRANSVAGINAL;  Surgeon: Jeanmarie Pierson MD;  Location[facility]     DISSECT LYMPH NODE AXILLA  2004    Lt, during eval for SLE     HYSTEROSCOPIC PLACEMENT CONTRACEPTIVE DEVICE  1985     TUBAL LIGATION Bilateral              Social History:     Social History   Substance Use Topics     Smoking status: Never Smoker     Smokeless tobacco: Never Used     Alcohol use No             Family History:     Family History   Problem Relation Age of Onset     CANCER Other      breast     CEREBROVASCULAR DISEASE Other      C.A.D. Other      Glaucoma Father             Allergies:     Allergies   Allergen Reactions     Amaryl [Glimepiride] Swelling     Swelling of tongue     Metformin Blisters     Experienced big blisters all over body and sloughing of skin     Plaquenil [Aminoquinolines] Hives     Sulfa Drugs Other (See Comments)     Turned bright red     Amoxicillin Rash     Hydroxychloroquine Rash     Penicillins Rash             Medications:     Current Outpatient Prescriptions   Medication Sig Dispense Refill     glucagon (GLUCAGON EMERGENCY) 1 MG kit Inject 1 mg  subcutaneously or intramuscularly for severe hypoglycemic episodes. 1 each 3     blood glucose monitoring (TRUE METRIX BLOOD GLUCOSE TEST) test strip Use to test blood sugar 4 times daily or as directed. 4 Box 3     NOVOLOG PENFILL 100 UNIT/ML soln Inject 1 unit per 15 grams CHO with meals TID approx 15 units daily 15 mL 3     insulin glargine (LANTUS) 100 UNIT/ML injection Inject 4 units as directed daily LANTUS SOLOSTAR PEN PLEASE 15 mL 3     insulin pen needle (BD PAM U/F) 32G X 4 MM Use 200 pen needles daily or as directed. 200 each 3     NIFEdipine ER osmotic (NIFEDICAL XL) 30 MG 24 hr tablet Take 1 tablet (30 mg) by mouth daily 90 tablet 2     simvastatin (ZOCOR) 40 MG tablet Take 1 tablet (40 mg) by mouth At Bedtime 90 tablet 2     warfarin (COUMADIN) 5 MG tablet Take 2-2.5 tablets daily or as directed by coumadin clinic. 75 tablet 5     Injection Device for insulin (NOVOPEN ECHO) RORY 1 each 4 times daily 1 each 0     mycophenolate (CELLCEPT - GENERIC EQUIVALENT) 500 MG tablet Take 2 tablets (1,000 mg) by mouth 2 times daily 2 tabs in the a.m., 2 tabs in p.m. 360 tablet 3     acetaminophen (TYLENOL) 500 MG tablet Take 500 mg by mouth At Bedtime Takes 6 tablets (500 mg ) .Christa Acharya LPN 2:40 PM on 3/23/2017       Blood Glucose Monitoring Suppl (TRUE METRIX METER) W/DEVICE KIT 1 each 4 times daily 1 kit 0     VITAMIN D, CHOLECALCIFEROL, PO Take 1,000 Units by mouth daily (with lunch)        AYR SALINE NASAL NO-DRIP GEL Use as needed for nasal dryness 3 Tube 3     LANCETS ULTRA THIN 30G MISC Test 7-8 times daily  (Lancets that go with pt current meter ) 7203 each 3     aspirin 81 MG tablet Take 81 mg by mouth At Bedtime        omega-3 fatty acids (FISH OIL) 1200 MG capsule Take 1 capsule by mouth daily.       Calcium Carbonate-Vitamin D (CALCIUM + D) 600-200 MG-UNIT per tablet Take 1 tablet by mouth 3 times daily        Multiple Vitamins-Minerals (CENTRUM SILVER PO) Take 1 tablet by mouth daily.        "Injection Device for Insulin (NOVOPEN JR, GREEN,) RORY Use as directed.              Physical Exam:   Blood pressure 119/73, pulse 69, temperature 98.2  F (36.8  C), temperature source Oral, height 1.676 m (5' 6\"), SpO2 98 %, not currently breastfeeding.  Constitutional: WD-WN-WG cooperative   Eyes: nl conjunctiva, sclera   ENT: nl external ears, nose, throat   No mucous membrane lesions, normal saliva pool   Neck: no mass or thyroid enlargement   GI: no ABD mass or tenderness, no HSM   Lymph: no cervical, supraclavicular, inguinal or epitrochlear nodes   MS: All TMJ, neck, shoulder, elbow, wrist, MCP/PIP/DIP, spine, hip, knee, ankle, and foot MTP/IP joints were examined and found without active synovitis or deformity. No dactylitis, tenosynovitis, enthesopathy. Stable mild lymphedema LUE. She has bilateral support stockings  Skin: Xerosis and fissures of the distal fingers unchanged. No nail pitting, alopecia.    Neuro: nl cranial nerves, strength  Psych: nl affect         Data:     Lab Results   Component Value Date    WBC 3.2 (L) 01/02/2018    WBC 2.9 (L) 09/21/2017    WBC 3.4 (L) 07/11/2017    HGB 13.0 01/02/2018    HGB 12.6 09/21/2017    HGB 11.9 07/11/2017    HCT 41.4 01/02/2018    HCT 39.4 09/21/2017    HCT 37.5 07/11/2017    MCV 93 01/02/2018    MCV 92 09/21/2017    MCV 97 07/11/2017     01/02/2018     09/21/2017     07/11/2017     Lab Results   Component Value Date    BUN 27 12/14/2016    BUN 30 07/14/2014    BUN 32 (H) 04/01/2013     No components found for: SEDRATE  Lab Results   Component Value Date    TSH 0.64 08/05/2016    TSH 1.02 01/11/2016    TSH 0.82 01/12/2015     Lab Results   Component Value Date    AST 18 01/02/2018    AST 19 09/21/2017    AST 19 07/11/2017    ALT 20 01/02/2018    ALT 27 09/21/2017    ALT 23 07/11/2017    ALKPHOS 74 04/01/2013    ALKPHOS 57 08/25/2011    ALKPHOS 39 (L) 08/05/2011     Reviewed Rheumatology lab flowsheet    Santiago Corbett      "

## 2018-02-16 NOTE — MR AVS SNAPSHOT
After Visit Summary   2/16/2018    Shala Caruso    MRN: [id number]           Patient Information     Date Of Birth          1945        Visit Information        Provider Department      2/16/2018 1:00 PM Santiago Corbett MD [facility]        Today's Diagnoses     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, unspecified SLE type (H)    -  1    Antiphospholipid syndrome (H)        Encounter for long-term current use of medication           Follow-ups after your visit        Follow-up notes from your care team     Return in about 6 months (around 8/16/2018).      Your next 10 appointments already scheduled     Mar 06, 2018  4:00 PM CST   LAB with  LAB   [facility] ([facility])    [address] [place] [id number] [phone]           Please do not eat 10-12 hours before your appointment if you are coming in fasting for labs on lipids, cholesterol, or glucose (sugar). This does not apply to pregnant women. Water, hot tea and black coffee (with nothing added) are okay. Do not drink other fluids, diet soda or chew gum.            Jul 27, 2018 12:45 PM CDT   LAB with  LAB   [facility] ([facility])    [address] [place] [address] [id number] [phone]           Please do not eat 10-12 hours before your appointment if you are coming in fasting for labs on lipids, cholesterol, or glucose (sugar). This does not apply to pregnant women. Water, hot tea and black coffee (with nothing added) are okay. Do not drink other fluids, diet soda or chew gum.            Jul 27, 2018  1:00 PM CDT   DX HIP/PELVIS/SPINE with UCDX1   [facility] ([facility])    [address] [place] [address] [id number] [phone]           Please do not take any of the following 24 hours prior to the day of your exam: vitamins,  calcium tablets, antacids.  If possible, please wear clothes without metal (snaps, zippers). A sweatsuit works well.            Jul 30, 2018  7:30 AM CDT   (Arrive by 7:15 AM)   RETURN DIABETES with Dorita Cramer MD   [facility] ([facility])    [address]  3rd Floor  [facility] [id number] [phone]            Aug 16, 2018  3:00 PM CDT   (Arrive by 2:45 PM)   RETURN LUPUS with Santiago Corbett MD   [facility] ([facility])    [address]  [address]  [facility] [address] [phone]              Who to contact     If you have questions or need follow up information about today's clinic visit or your schedule please contact [facility] RHEUMATOLOGY directly at [phone].  Normal or non-critical lab and imaging results will be communicated to you by MyChart, letter or phone within 4 business days after the clinic has received the results. If you do not hear from us within 7 days, please contact the clinic through iTwint or phone. If you have a critical or abnormal lab result, we will notify you by phone as soon as possible.  Submit refill requests through Meebler or call your pharmacy and they will forward the refill request to us. Please allow 3 business days for your refill to be completed.          Additional Information About Your Visit        Oculus VRhart Information     Meebler gives you secure access to your electronic health record. If you see a primary care provider, you can also send messages to your care team and make appointments. If you have questions, please call your primary care clinic.  If you do not have a primary care provider, please call [phone] and they will assist you.        Care EveryWhere ID     This is your Care EveryWhere ID. This could be used by other organizations to access your [facility] medical records  [id number]        Your Vitals Were     Pulse Temperature Height  "Pulse Oximetry          69 98.2  F (36.8  C) (Oral) 1.676 m (5' 6\") 98%         Blood Pressure from Last 3 Encounters:   02/16/18 119/73   01/29/18 (P) 117/75   11/08/17 122/76    Weight from Last 3 Encounters:   01/29/18 54 kg (119 lb)   08/02/17 52.8 kg (116 lb 8 oz)   07/31/17 53.2 kg (117 lb 4.8 oz)              Today, you had the following     No orders found for display         Where to get your medicines      These medications were sent to [facility] - [place], [place] - [address] AT [facility] & [facility]  [address], [facility] [address]     Phone:  [phone]     mycophenolate 500 MG tablet          Primary Care Provider Office Phone # Fax #    Joe Contreras [phone]       [address] [place] [address]        Equal Access to Services     [facility]: Hadii jorge l ku hadasho Soomaali, waaxda luqadaha, qaybta kaalmada adeegyada, carmelita ruiz . So [facility] [phone].    ATENCIÓN: Si habla español, tiene a hurtado disposición servicios gratuitos de asistencia lingüística. [facility] [phone].    We comply with applicable federal civil rights laws and [place] laws. We do not discriminate on the basis of race, color, national origin, age, disability, sex, sexual orientation, or gender identity.            Thank you!     Thank you for choosing [facility] RHEUMAT[facility]  for your care. Our goal is always to provide you with excellent care. Hearing back from our patients is one way we can continue to improve our services. Please take a few minutes to complete the written survey that you may receive in the mail after your visit with us. Thank you!             Your Updated Medication List - Protect others around you: Learn how to safely use, store and throw away your medicines at www.disposemymeds.org.          This list is accurate as of 2/16/18  1:11 PM.  Always use your most recent med list.                   Brand " Name Dispense Instructions for use Diagnosis    aspirin 81 MG tablet      Take 81 mg by mouth At Bedtime        AYR SALINE NASAL NO-DRIP Gel     3 Tube    Use as needed for nasal dryness    Nasal ulcer       blood glucose monitoring lancets     7203 each    Test 7-8 times daily  (Lancets that go with pt current meter )    Diabetes mellitus (H)       blood glucose monitoring test strip    TRUE METRIX BLOOD GLUCOSE TEST    4 Box    Use to test blood sugar 4 times daily or as directed.    Type I diabetes mellitus, well controlled (H)       calcium + D 600-200 MG-UNIT Tabs   Generic drug:  calcium carbonate-vitamin D      Take 1 tablet by mouth 3 times daily        CENTRUM SILVER PO      Take 1 tablet by mouth daily.        glucagon 1 MG kit    GLUCAGON EMERGENCY    1 each    Inject 1 mg subcutaneously or intramuscularly for severe hypoglycemic episodes.    Type 1 diabetes mellitus with hypoglycemia and without coma (H)       insulin glargine 100 UNIT/ML injection    LANTUS    15 mL    Inject 4 units as directed daily LANTUS SOLOSTAR PEN PLEASE    Type 1 diabetes mellitus with hypoglycemia and without coma (H)       insulin pen needle 32G X 4 MM    BD PAM U/F    200 each    Use 200 pen needles daily or as directed.    Type 1 diabetes mellitus with hypoglycemia and without coma (H)       mycophenolate 500 MG tablet    GENERIC EQUIVALENT    360 tablet    Take 2 tablets (1,000 mg) by mouth 2 times daily 2 tabs in the a.m., 2 tabs in p.m.    Antiphospholipid syndrome (H), Encounter for long-term current use of medication       NIFEdipine ER osmotic 30 MG 24 hr tablet    NIFEDICAL XL    90 tablet    Take 1 tablet (30 mg) by mouth daily    Achalasia of esophagus, HTN (hypertension)       NovoLOG PENFILL 100 UNIT/ML injection   Generic drug:  insulin aspart     15 mL    Inject 1 unit per 15 grams CHO with meals TID approx 15 units daily    Type 1 diabetes mellitus with hypoglycemia and without coma (H)       * Injection  Device for insulin Priscila    NOVOPEN ECHO    1 each    1 each 4 times daily    Type 1 diabetes mellitus with hypoglycemia and without coma (H)       * NOVOPEN JR (GREEN) Priscila   Generic drug:  Injection Device for insulin      Use as directed.        omega-3 fatty acids 1200 MG capsule      Take 1 capsule by mouth daily.    Systemic lupus erythematosus (H), Antiphospholipid syndrome (H), Encounter for long-term (current) use of other medications       simvastatin 40 MG tablet    ZOCOR    90 tablet    Take 1 tablet (40 mg) by mouth At Bedtime    Other hyperlipidemia       TRUE METRIX METER W/DEVICE Kit     1 kit    1 each 4 times daily    Type I diabetes mellitus, well controlled (H)       TYLENOL 500 MG tablet   Generic drug:  acetaminophen      Take 500 mg by mouth At Bedtime Takes 6 tablets (500 mg ) .Christa Acharya LPN 2:40 PM on 3/23/2017        VITAMIN D (CHOLECALCIFEROL) PO      Take 1,000 Units by mouth daily (with lunch)        warfarin 5 MG tablet    COUMADIN    75 tablet    Take 2-2.5 tablets daily or as directed by coumadin clinic.    Long term current use of anticoagulant therapy       * Notice:  This list has 2 medication(s) that are the same as other medications prescribed for you. Read the directions carefully, and ask your doctor or other care provider to review them with you.

## 2018-02-20 ENCOUNTER — TELEPHONE (OUTPATIENT)
Dept: PHARMACY | Facility: CLINIC | Age: 73
End: 2018-02-20

## 2018-02-20 NOTE — TELEPHONE ENCOUNTER
We have been unable to reach this patient for MTM follow-up after several attempts. We will stop reaching out to the patient at this time. Please let us know if we can assist in this patient's care in the future.    Routing to PCP as JERARDO Cohen, Pharm.D., [facility]  Medication Therapy Management Pharmacist  Page/VM:  [phone]

## 2018-03-09 ENCOUNTER — ANTICOAGULATION THERAPY VISIT (OUTPATIENT)
Dept: ANTICOAGULATION | Facility: CLINIC | Age: 73
End: 2018-03-09

## 2018-03-09 DIAGNOSIS — Z79.01 LONG-TERM (CURRENT) USE OF ANTICOAGULANTS: ICD-10-CM

## 2018-03-09 DIAGNOSIS — Z79.01 LONG TERM CURRENT USE OF ANTICOAGULANT THERAPY: ICD-10-CM

## 2018-03-09 LAB
INR PPP: 2.15 (ref 0.86–1.14)
PROTHROM ACT/NOR PPP: 24 % (ref 60–140)

## 2018-03-09 NOTE — MR AVS SNAPSHOT
Shala Caruso   3/9/2018   Anticoagulation Therapy Visit    Description:  72 year old female   Provider:  Portia Cherry, RN   Department:   Antico Clinic           INR as of 3/9/2018     Today's INR       Anticoagulation Summary as of 3/9/2018     INR goal 2.0-3.0   Today's INR    Full instructions 12.5 mg on Sun, Tue, Thu; 10 mg all other days   Next INR check 4/20/2018    Indications   SLE (systemic lupus erythematosus) (H) (Resolved) [M32.9]  Long-term (current) use of anticoagulants [Z79.01] [Z79.01]         March 2018 Details    Sun Mon Tue Wed Thu Fri Sat         1               2               3                 4               5               6               7               8               9      10 mg   See details      10      10 mg           11      12.5 mg         12      10 mg         13      12.5 mg         14      10 mg         15      12.5 mg         16      10 mg         17      10 mg           18      12.5 mg         19      10 mg         20      12.5 mg         21      10 mg         22      12.5 mg         23      10 mg         24      10 mg           25      12.5 mg         26      10 mg         27      12.5 mg         28      10 mg         29      12.5 mg         30      10 mg         31      10 mg          Date Details   03/09 This INR check               How to take your warfarin dose     To take:  10 mg Take 2 of the 5 mg tablets.    To take:  12.5 mg Take 2.5 of the 5 mg tablets.           April 2018 Details    Sun Mon Tue Wed Thu Fri Sat     1      12.5 mg         2      10 mg         3      12.5 mg         4      10 mg         5      12.5 mg         6      10 mg         7      10 mg           8      12.5 mg         9      10 mg         10      12.5 mg         11      10 mg         12      12.5 mg         13      10 mg         14      10 mg           15      12.5 mg         16      10 mg         17      12.5 mg         18      10 mg         19      12.5 mg         20             21                 22               23               24               25               26               27               28                 29               30                     Date Details   No additional details    Date of next INR:  4/20/2018         How to take your warfarin dose     To take:  10 mg Take 2 of the 5 mg tablets.    To take:  12.5 mg Take 2.5 of the 5 mg tablets.

## 2018-03-09 NOTE — PROGRESS NOTES
ANTICOAGULATION FOLLOW-UP CLINIC VISIT    Patient Name:  Shala Caruso  Date:  3/9/2018  Contact Type:  Telephone    SUBJECTIVE:        OBJECTIVE    INR   Date Value Ref Range Status   03/09/2018 2.15 (H) 0.86 - 1.14 Final     Factor 2 Assay   Date Value Ref Range Status   03/09/2018 24 (L) 60 - 140 % Final       ASSESSMENT / PLAN  INR assessment THER    Recheck INR In: 6 WEEKS    INR Location Clinic      Anticoagulation Summary as of 3/9/2018     INR goal 2.0-3.0   Today's INR    Maintenance plan 12.5 mg (5 mg x 2.5) on Sun, Tue, Thu; 10 mg (5 mg x 2) all other days   Full instructions 12.5 mg on Sun, Tue, Thu; 10 mg all other days   Weekly total 77.5 mg   Plan last modified Portia Cherry RN (6/16/2017)   Next INR check 4/20/2018   Priority INR   Target end date     Indications   SLE (systemic lupus erythematosus) (H) (Resolved) [M32.9]  Long-term (current) use of anticoagulants [Z79.01] [Z79.01]         Anticoagulation Episode Summary     INR check location Clinic Lab    Preferred lab     Send INR reminders to  ANTICO CLINIC    Comments Factor 2  goal range is 15-25%  Ok to leave message on home [phone] and work voicemail, but call [email or web address] per pt request.  OK to leave message at office  May leave messages with Rodriguez Santos  DO NOT GIVE RECORDS TO EMPLOYER U        Anticoagulation Care Providers     Provider Role Specialty Phone number    Mt Kiser MD Responsible Clinical Cardiac Electrophysiology [phone]            See the Encounter Report to view Anticoagulation Flowsheet and Dosing Calendar (Go to Encounters tab in chart review, and find the Anticoagulation Therapy Visit)  Left message for patient with results and dosing recommendations. Asked patient to call back to report any missed doses, falls, signs and symptoms of bleeding or clotting, any changes in health, medication, or diet. Asked patient to call back with any questions or concerns.      Portia Cherry RN

## 2018-03-09 NOTE — MR AVS SNAPSHOT
Shala Caruso   3/9/2018   Anticoagulation Therapy Visit    Description:  72 year old female   Provider:  Shant Mcduffie, [facility]   Department:  [facility]           INR as of 3/9/2018     Today's INR 2.15      Anticoagulation Summary as of 3/9/2018     INR goal 2.0-3.0   Today's INR 2.15   Full instructions 12.5 mg on Sun, Tue, Thu; 10 mg all other days   Next INR check 4/13/2018    Indications   SLE (systemic lupus erythematosus) (H) (Resolved) [M32.9]  Long-term (current) use of anticoagulants [Z79.01] [Z79.01]         March 2018 Details    Sun Mon Tue Wed Thu Fri Sat         1               2               3                 4               5               6               7               8               9      10 mg   See details      10      10 mg           11      12.5 mg         12      10 mg         13      12.5 mg         14      10 mg         15      12.5 mg         16      10 mg         17      10 mg           18      12.5 mg         19      10 mg         20      12.5 mg         21      10 mg         22      12.5 mg         23      10 mg         24      10 mg           25      12.5 mg         26      10 mg         27      12.5 mg         28      10 mg         29      12.5 mg         30      10 mg         31      10 mg          Date Details   03/09 This INR check               How to take your warfarin dose     To take:  10 mg Take 2 of the 5 mg tablets.    To take:  12.5 mg Take 2.5 of the 5 mg tablets.           April 2018 Details    Sun Mon Tue Wed Thu Fri Sat     1      12.5 mg         2      10 mg         3      12.5 mg         4      10 mg         5      12.5 mg         6      10 mg         7      10 mg           8      12.5 mg         9      10 mg         10      12.5 mg         11      10 mg         12      12.5 mg         13      10 mg         14      10 mg           15      12.5 mg         16      10 mg         17      12.5 mg         18      10 mg         19      12.5 mg          20            21                 22               23               24               25               26               27               28                 29               30                     Date Details   No additional details    Date of next INR:  4/20/2018         How to take your warfarin dose     To take:  10 mg Take 2 of the 5 mg tablets.    To take:  12.5 mg Take 2.5 of the 5 mg tablets.

## 2018-04-27 DIAGNOSIS — M32.9 SLE (SYSTEMIC LUPUS ERYTHEMATOSUS) (H): ICD-10-CM

## 2018-04-27 DIAGNOSIS — Z79.899 ENCOUNTER FOR LONG-TERM CURRENT USE OF MEDICATION: ICD-10-CM

## 2018-04-27 DIAGNOSIS — Z79.01 LONG TERM CURRENT USE OF ANTICOAGULANT THERAPY: ICD-10-CM

## 2018-04-27 LAB
ALT SERPL W P-5'-P-CCNC: 24 U/L (ref 0–50)
AST SERPL W P-5'-P-CCNC: 20 U/L (ref 0–45)
BASOPHILS # BLD AUTO: 0 10E9/L (ref 0–0.2)
BASOPHILS NFR BLD AUTO: 1 %
DIFFERENTIAL METHOD BLD: ABNORMAL
EOSINOPHIL # BLD AUTO: 0 10E9/L (ref 0–0.7)
EOSINOPHIL NFR BLD AUTO: 1 %
ERYTHROCYTE [DISTWIDTH] IN BLOOD BY AUTOMATED COUNT: 15.5 % (ref 10–15)
HCT VFR BLD AUTO: 39.6 % (ref 35–47)
HGB BLD-MCNC: 12.4 G/DL (ref 11.7–15.7)
IMM GRANULOCYTES # BLD: 0 10E9/L (ref 0–0.4)
IMM GRANULOCYTES NFR BLD: 0.3 %
INR PPP: 2.58 (ref 0.86–1.14)
LYMPHOCYTES # BLD AUTO: 0.7 10E9/L (ref 0.8–5.3)
LYMPHOCYTES NFR BLD AUTO: 25 %
MCH RBC QN AUTO: 29.1 PG (ref 26.5–33)
MCHC RBC AUTO-ENTMCNC: 31.3 G/DL (ref 31.5–36.5)
MCV RBC AUTO: 93 FL (ref 78–100)
MONOCYTES # BLD AUTO: 0.3 10E9/L (ref 0–1.3)
MONOCYTES NFR BLD AUTO: 10.8 %
NEUTROPHILS # BLD AUTO: 1.8 10E9/L (ref 1.6–8.3)
NEUTROPHILS NFR BLD AUTO: 61.9 %
NRBC # BLD AUTO: 0 10*3/UL
NRBC BLD AUTO-RTO: 0 /100
PLATELET # BLD AUTO: 202 10E9/L (ref 150–450)
RBC # BLD AUTO: 4.26 10E12/L (ref 3.8–5.2)
WBC # BLD AUTO: 3 10E9/L (ref 4–11)

## 2018-04-28 LAB — PROTHROM ACT/NOR PPP: 21 % (ref 60–140)

## 2018-04-30 ENCOUNTER — ANTICOAGULATION THERAPY VISIT (OUTPATIENT)
Dept: ANTICOAGULATION | Facility: CLINIC | Age: 73
End: 2018-04-30

## 2018-04-30 DIAGNOSIS — Z79.01 LONG-TERM (CURRENT) USE OF ANTICOAGULANTS: ICD-10-CM

## 2018-04-30 NOTE — PROGRESS NOTES
ANTICOAGULATION FOLLOW-UP CLINIC VISIT    Patient Name:  Shala Caruso  Date:  4/30/2018  Contact Type:  Telephone    SUBJECTIVE:     Patient Findings     Positives No Problem Findings           OBJECTIVE    INR   Date Value Ref Range Status   04/27/2018 2.58 (H) 0.86 - 1.14 Final     Factor 2 Assay   Date Value Ref Range Status   04/27/2018 21 (L) 60 - 140 % Final       ASSESSMENT / PLAN  INR assessment THER    Recheck INR In: 6 WEEKS    INR Location Clinic      Anticoagulation Summary as of 4/30/2018     INR goal 2.0-3.0   Today's INR 2.58 (4/27/2018)   Maintenance plan 12.5 mg (5 mg x 2.5) on Sun, Tue, Thu; 10 mg (5 mg x 2) all other days   Full instructions 12.5 mg on Sun, Tue, Thu; 10 mg all other days   Weekly total 77.5 mg   Plan last modified Portia Cherry RN (6/16/2017)   Next INR check 6/8/2018   Priority INR   Target end date     Indications   SLE (systemic lupus erythematosus) (H) (Resolved) [M32.9]  Long-term (current) use of anticoagulants [Z79.01] [Z79.01]         Anticoagulation Episode Summary     INR check location Clinic Lab    Preferred lab     Send INR reminders to [facility]    Comments Factor 2  goal range is 15-25%  Ok to leave message on home [phone] and work voicemail, but call [email or web address] per pt request.  OK to leave message at office  May leave messages with Rodriguez Santos  DO NOT GIVE RECORDS TO EMPLOYER U        Anticoagulation Care Providers     Provider Role Specialty Phone number    Mt Kiser MD Responsible Clinical Cardiac Electrophysiology [phone]            See the Encounter Report to view Anticoagulation Flowsheet and Dosing Calendar (Go to Encounters tab in chart review, and find the Anticoagulation Therapy Visit)    Spoke with patient. Gave them their lab results and new warfarin recommendation.  No changes in health, medication, or diet. No missed doses, no falls. No signs or symptoms of bleed or clotting.      Shant Mcduffie, [facility]

## 2018-04-30 NOTE — MR AVS SNAPSHOT
Shala Caruso   4/30/2018   Anticoagulation Therapy Visit    Description:  72 year old female   Provider:  Shant Mcduffie [facility]   Department:  [facility]           INR as of 4/30/2018     Today's INR 2.58 (4/27/2018)      Anticoagulation Summary as of 4/30/2018     INR goal 2.0-3.0   Today's INR 2.58 (4/27/2018)   Full instructions 12.5 mg on Sun, Tue, Thu; 10 mg all other days   Next INR check 6/8/2018    Indications   SLE (systemic lupus erythematosus) (H) (Resolved) [M32.9]  Long-term (current) use of anticoagulants [Z79.01] [Z79.01]         April 2018 Details    Sun Mon Tue Wed Thu Fri Sat     1               2               3               4               5               6               7                 8               9               10               11               12               13               14                 15               16               17               18               19               20               21                 22               23               24               25               26               27               28                 29               30      10 mg   See details            Date Details   04/30 This INR check               How to take your warfarin dose     To take:  10 mg Take 2 of the 5 mg tablets.           May 2018 Details    Sun Mon Tue Wed Thu Fri Sat       1      12.5 mg         2      10 mg         3      12.5 mg         4      10 mg         5      10 mg           6      12.5 mg         7      10 mg         8      12.5 mg         9      10 mg         10      12.5 mg         11      10 mg         12      10 mg           13      12.5 mg         14      10 mg         15      12.5 mg         16      10 mg         17      12.5 mg         18      10 mg         19      10 mg           20      12.5 mg         21      10 mg         22      12.5 mg         23      10 mg         24      12.5 mg         25      10 mg         26      10 mg           27       12.5 mg         28      10 mg         29      12.5 mg         30      10 mg         31      12.5 mg            Date Details   No additional details            How to take your warfarin dose     To take:  10 mg Take 2 of the 5 mg tablets.    To take:  12.5 mg Take 2.5 of the 5 mg tablets.           June 2018 Details    Sun Mon Tue Wed Thu Fri Sat          1      10 mg         2      10 mg           3      12.5 mg         4      10 mg         5      12.5 mg         6      10 mg         7      12.5 mg         8            9                 10               11               12               13               14               15               16                 17               18               19               20               21               22               23                 24               25               26               27               28               29               30                Date Details   No additional details    Date of next INR:  6/8/2018         How to take your warfarin dose     To take:  10 mg Take 2 of the 5 mg tablets.    To take:  12.5 mg Take 2.5 of the 5 mg tablets.

## 2018-05-25 NOTE — PATIENT INSTRUCTIONS
[facility] Medication Refill Request Information:  * Please contact your pharmacy regarding ANY request for medication refills.  ** [facility] Prescription Fax = [phone]  * Please allow 3 business days for routine medication refills.  * Please allow 5 business days for controlled substance medication refills.     [facility] Test Result notification information:  *You will be notified within 7-10 days of your appointment day regarding the results of your test.  If you are on MyChart you will be notified as soon as the provider has reviewed the results and signed off on them.          Pain plan:    First, work on tapering off the gabapentin. Take 2 pills per night for the next to evenings, following by 2 pills per night for the next 2 evenings.    Then begin to:    Take 10mg (1pill) nightly for 1 week. If tolerating side effects, increasing dosing to 20mg (2pills) nightly for 1 week. Continue to increase by 1 pill per week until you are taking 50mg (5 pills) at night.     You can use the lidocaine patches at the same time as the pills. Wear for 12 hours at a time and then remove.    yes

## 2018-06-08 ENCOUNTER — ANTICOAGULATION THERAPY VISIT (OUTPATIENT)
Dept: ANTICOAGULATION | Facility: CLINIC | Age: 73
End: 2018-06-08

## 2018-06-08 DIAGNOSIS — Z79.01 LONG-TERM (CURRENT) USE OF ANTICOAGULANTS: ICD-10-CM

## 2018-06-08 LAB
INR PPP: 3.22 (ref 0.86–1.14)
PROTHROM ACT/NOR PPP: 14 % (ref 60–140)

## 2018-06-08 NOTE — PROGRESS NOTES
ANTICOAGULATION FOLLOW-UP CLINIC VISIT    Patient Name:  Shala Caruso  Date:  6/8/2018  Contact Type:  Telephone    SUBJECTIVE:     Patient Findings     Positives Change in diet/appetite (Encouraged a moderate serving of Vit K rich food today.)           OBJECTIVE    INR   Date Value Ref Range Status   06/08/2018 3.22 (H) 0.86 - 1.14 Final     Factor 2 Assay   Date Value Ref Range Status   06/08/2018 14 (L) 60 - 140 % Final       ASSESSMENT / PLAN  INR assessment SUPRA    Recheck INR In: 6 WEEKS    INR Location Clinic      Anticoagulation Summary as of 6/8/2018     INR goal 2.0-3.0   Today's INR 3.22!   Warfarin maintenance plan 12.5 mg (5 mg x 2.5) on Sun, Tue, Thu; 10 mg (5 mg x 2) all other days   Full warfarin instructions 6/8: 7.5 mg; Otherwise 12.5 mg on Sun, Tue, Thu; 10 mg all other days   Weekly warfarin total 77.5 mg   Plan last modified Portia Cherry RN (6/16/2017)   Next INR check 7/20/2018   Priority INR   Target end date     Indications   SLE (systemic lupus erythematosus) (H) (Resolved) [M32.9]  Long-term (current) use of anticoagulants [Z79.01] [Z79.01]         Anticoagulation Episode Summary     INR check location Clinic Lab    Preferred lab     Send INR reminders to [facility]    Comments Factor 2  goal range is 15-25%  Ok to leave message on home [phone] and work voicemail, but call [email or web address] per pt request.  OK to leave message at office  May leave messages with Rodriguez Santos  DO NOT GIVE RECORDS TO EMPLOYER [employer]        Anticoagulation Care Providers     Provider Role Specialty Phone number    Mt Kiser MD Responsible Clinical Cardiac Electrophysiology [phone]            See the Encounter Report to view Anticoagulation Flowsheet and Dosing Calendar (Go to Encounters tab in chart review, and find the Anticoagulation Therapy Visit)  Left message for patient with results and dosing recommendations. Asked patient to call back to report any missed doses, falls, signs  and symptoms of bleeding or clotting, any changes in health, medication, or diet. Asked patient to call back with any questions or concerns.      Portia Cherry RN

## 2018-06-08 NOTE — MR AVS SNAPSHOT
Shala Caruso   6/8/2018   Anticoagulation Therapy Visit    Description:  72 year old female   Provider:  Portia Cherry, RN   Department:  [facility]           INR as of 6/8/2018     Today's INR 3.22!      Anticoagulation Summary as of 6/8/2018     INR goal 2.0-3.0   Today's INR 3.22!   Full warfarin instructions 6/8: 7.5 mg; Otherwise 12.5 mg on Sun, Tue, Thu; 10 mg all other days   Next INR check 7/20/2018    Indications   SLE (systemic lupus erythematosus) (H) (Resolved) [M32.9]  Long-term (current) use of anticoagulants [Z79.01] [Z79.01]         June 2018 Details    Sun Mon Tue Wed Thu Fri Sat          1               2                 3               4               5               6               7               8      7.5 mg   See details      9      10 mg           10      12.5 mg         11      10 mg         12      12.5 mg         13      10 mg         14      12.5 mg         15      10 mg         16      10 mg           17      12.5 mg         18      10 mg         19      12.5 mg         20      10 mg         21      12.5 mg         22      10 mg         23      10 mg           24      12.5 mg         25      10 mg         26      12.5 mg         27      10 mg         28      12.5 mg         29      10 mg         30      10 mg          Date Details   06/08 This INR check               How to take your warfarin dose     To take:  7.5 mg Take 1.5 of the 5 mg tablets.    To take:  10 mg Take 2 of the 5 mg tablets.    To take:  12.5 mg Take 2.5 of the 5 mg tablets.           July 2018 Details    Sun Mon Tue Wed Thu Fri Sat     1      12.5 mg         2      10 mg         3      12.5 mg         4      10 mg         5      12.5 mg         6      10 mg         7      10 mg           8      12.5 mg         9      10 mg         10      12.5 mg         11      10 mg         12      12.5 mg         13      10 mg         14      10 mg           15      12.5 mg         16      10 mg         17       12.5 mg         18      10 mg         19      12.5 mg         20            21                 22               23               24               25               26               27               28                 29               30               31                    Date Details   No additional details    Date of next INR:  7/20/2018         How to take your warfarin dose     To take:  10 mg Take 2 of the 5 mg tablets.    To take:  12.5 mg Take 2.5 of the 5 mg tablets.

## 2018-06-21 DIAGNOSIS — Z79.01 LONG-TERM (CURRENT) USE OF ANTICOAGULANTS: ICD-10-CM

## 2018-06-21 LAB — INR PPP: 3.22 (ref 0.86–1.14)

## 2018-06-22 ENCOUNTER — ANTICOAGULATION THERAPY VISIT (OUTPATIENT)
Dept: ANTICOAGULATION | Facility: CLINIC | Age: 73
End: 2018-06-22

## 2018-06-22 DIAGNOSIS — Z79.01 LONG-TERM (CURRENT) USE OF ANTICOAGULANTS: ICD-10-CM

## 2018-06-22 LAB — PROTHROM ACT/NOR PPP: 15 % (ref 60–140)

## 2018-06-22 NOTE — MR AVS SNAPSHOT
Shala Caruso   6/22/2018   Anticoagulation Therapy Visit    Description:  72 year old female   Provider:  Ilana Gonzalez, RN   Department:  [facility]           INR as of 6/22/2018     Today's INR 3.22! (6/21/2018)      Anticoagulation Summary as of 6/22/2018     INR goal 2.0-3.0   Today's INR 3.22! (6/21/2018)   Full warfarin instructions 6/22: 7.5 mg; Otherwise 12.5 mg on Sun, Tue, Thu; 10 mg all other days   Next INR check 7/6/2018    Indications   SLE (systemic lupus erythematosus) (H) (Resolved) [M32.9]  Long-term (current) use of anticoagulants [Z79.01] [Z79.01]         June 2018 Details    Sun Mon Tue Wed Thu Fri Sat          1               2                 3               4               5               6               7               8               9                 10               11               12               13               14               15               16                 17               18               19               20               21               22      7.5 mg   See details      23      10 mg           24      12.5 mg         25      10 mg         26      12.5 mg         27      10 mg         28      12.5 mg         29      10 mg         30      10 mg          Date Details   06/22 This INR check               How to take your warfarin dose     To take:  7.5 mg Take 1.5 of the 5 mg tablets.    To take:  10 mg Take 2 of the 5 mg tablets.    To take:  12.5 mg Take 2.5 of the 5 mg tablets.           July 2018 Details    Sun Mon Tue Wed Thu Fri Sat     1      12.5 mg         2      10 mg         3      12.5 mg         4      10 mg         5      12.5 mg         6            7                 8               9               10               11               12               13               14                 15               16               17               18               19               20               21                 22               23               24                25               26               27               28                 29               30               31                    Date Details   No additional details    Date of next INR:  7/6/2018         How to take your warfarin dose     To take:  10 mg Take 2 of the 5 mg tablets.    To take:  12.5 mg Take 2.5 of the 5 mg tablets.

## 2018-06-22 NOTE — PROGRESS NOTES
ANTICOAGULATION FOLLOW-UP CLINIC VISIT    Patient Name:  Shala Caruso  Date:  6/22/2018  Contact Type:  Telephone    SUBJECTIVE:     Patient Findings     Comments Factor II = 15%  These results were unexpected - as these were due in approx 1 month.  Requested pt call back if there is a particular reason why she decided to get the labs earlier            OBJECTIVE    INR   Date Value Ref Range Status   06/21/2018 3.22 (H) 0.86 - 1.14 Final     Factor 2 Assay   Date Value Ref Range Status   06/21/2018 15 (L) 60 - 140 % Final       ASSESSMENT / PLAN  INR assessment THER    Recheck INR In: 2 WEEKS    INR Location Clinic      Anticoagulation Summary as of 6/22/2018     INR goal 2.0-3.0   Today's INR 3.22! (6/21/2018)   Warfarin maintenance plan 12.5 mg (5 mg x 2.5) on Sun, Tue, Thu; 10 mg (5 mg x 2) all other days   Full warfarin instructions 6/22: 7.5 mg; Otherwise 12.5 mg on Sun, Tue, Thu; 10 mg all other days   Weekly warfarin total 77.5 mg   Plan last modified Portia Cherry, RN (6/16/2017)   Next INR check 7/6/2018   Priority INR   Target end date     Indications   SLE (systemic lupus erythematosus) (H) (Resolved) [M32.9]  Long-term (current) use of anticoagulants [Z79.01] [Z79.01]         Anticoagulation Episode Summary     INR check location Clinic Lab    Preferred lab     Send INR reminders to [facility]    Comments Factor 2  goal range is 15-25%  Ok to leave message on home [phone] and work voicemail, but call [email or web address] per pt request.  OK to leave message at office  May leave messages with Rodriguez Santos  DO NOT GIVE RECORDS TO EMPLOYER [employer]        Anticoagulation Care Providers     Provider Role Specialty Phone number    Mt Kiser MD Responsible Clinical Cardiac Electrophysiology [phone]            See the Encounter Report to view Anticoagulation Flowsheet and Dosing Calendar (Go to Encounters tab in chart review, and find the Anticoagulation Therapy Visit)    Left message for  patient with results and dosing recommendations. Asked patient to call back to report any missed doses, falls, signs and symptoms of bleeding or clotting, any changes in health, medication, or diet. Asked patient to call back with any questions or concerns.      Ilana Gonzalez RN

## 2018-07-05 DIAGNOSIS — Z79.01 LONG-TERM (CURRENT) USE OF ANTICOAGULANTS: ICD-10-CM

## 2018-07-05 LAB
FACTOR 2 ASSAY: NORMAL
INR PPP: 2.73 (ref 0.86–1.14)

## 2018-07-06 ENCOUNTER — ANTICOAGULATION THERAPY VISIT (OUTPATIENT)
Dept: ANTICOAGULATION | Facility: CLINIC | Age: 73
End: 2018-07-06

## 2018-07-06 DIAGNOSIS — Z79.01 LONG-TERM (CURRENT) USE OF ANTICOAGULANTS: ICD-10-CM

## 2018-07-06 LAB — PROTHROM ACT/NOR PPP: 16 % (ref 60–140)

## 2018-07-06 NOTE — MR AVS SNAPSHOT
Shala Caruso   7/6/2018   Anticoagulation Therapy Visit    Description:  72 year old female   Provider:  Andre Bledsoe RN   Department:  [facility]           INR as of 7/6/2018     Today's INR       Anticoagulation Summary as of 7/6/2018     INR goal 2.0-3.0   Today's INR    Full warfarin instructions 12.5 mg on Sun, Tue, Thu; 10 mg all other days   Next INR check 7/26/2018    Indications   SLE (systemic lupus erythematosus) (H) (Resolved) [M32.9]  Long-term (current) use of anticoagulants [Z79.01] [Z79.01]         July 2018 Details    Sun Mon Tue Wed Thu Fri Sat     1               2               3               4               5               6      10 mg   See details      7      10 mg           8      12.5 mg         9      10 mg         10      12.5 mg         11      10 mg         12      12.5 mg         13      10 mg         14      10 mg           15      12.5 mg         16      10 mg         17      12.5 mg         18      10 mg         19      12.5 mg         20      10 mg         21      10 mg           22      12.5 mg         23      10 mg         24      12.5 mg         25      10 mg         26            27               28                 29               30               31                    Date Details   07/06 This INR check       Date of next INR:  7/26/2018         How to take your warfarin dose     To take:  10 mg Take 2 of the 5 mg tablets.    To take:  12.5 mg Take 2.5 of the 5 mg tablets.

## 2018-07-06 NOTE — PROGRESS NOTES
ANTICOAGULATION FOLLOW-UP CLINIC VISIT    Patient Name:  Shala Caruso  Date:  7/6/2018  Contact Type:  Telephone    SUBJECTIVE:     Patient Findings     Positives No Problem Findings    Comments Factor 2 result on 7/5 is 16%  Goal range is 15-25%  Left message for Shala.  Did not adjust her Coumadin.           OBJECTIVE    INR   Date Value Ref Range Status   07/05/2018 2.73 (H) 0.86 - 1.14 Final     Factor 2 Assay   Date Value Ref Range Status   07/05/2018 16 (L) 60 - 140 % Final       ASSESSMENT / PLAN  INR assessment THER    Recheck INR In: 2 WEEKS    INR Location Clinic      Anticoagulation Summary as of 7/6/2018     INR goal 2.0-3.0   Today's INR    Warfarin maintenance plan 12.5 mg (5 mg x 2.5) on Sun, Tue, Thu; 10 mg (5 mg x 2) all other days   Full warfarin instructions 12.5 mg on Sun, Tue, Thu; 10 mg all other days   Weekly warfarin total 77.5 mg   No change documented Andre Bledsoe RN   Plan last modified Portia Cherry RN (6/16/2017)   Next INR check 7/26/2018   Priority INR   Target end date     Indications   SLE (systemic lupus erythematosus) (H) (Resolved) [M32.9]  Long-term (current) use of anticoagulants [Z79.01] [Z79.01]         Anticoagulation Episode Summary     INR check location Clinic Lab    Preferred lab     Send INR reminders to [facility]    Comments Factor 2  goal range is 15-25%  Ok to leave message on home [phone] and work voicemail, but call [email or web address] per pt request.  OK to leave message at office  May leave messages with Rodriguez Santos  DO NOT GIVE RECORDS TO EMPLOYER [employer]        Anticoagulation Care Providers     Provider Role Specialty Phone number    Mt Kiser MD Responsible Clinical Cardiac Electrophysiology [phone]            See the Encounter Report to view Anticoagulation Flowsheet and Dosing Calendar (Go to Encounters tab in chart review, and find the Anticoagulation Therapy Visit)    Left message for patient with results and dosing  recommendations. Asked patient to call back to report any missed doses, falls, signs and symptoms of bleeding or clotting, any changes in health, medication, or diet. Asked patient to call back with any questions or concerns.    Andre Bledsoe, RN

## 2018-07-19 ASSESSMENT — ENCOUNTER SYMPTOMS
BRUISES/BLEEDS EASILY: 1
EYE WATERING: 1
STIFFNESS: 1
NUMBNESS: 1
ARTHRALGIAS: 1
MUSCLE CRAMPS: 1

## 2018-07-26 ENCOUNTER — ANTICOAGULATION THERAPY VISIT (OUTPATIENT)
Dept: ANTICOAGULATION | Facility: CLINIC | Age: 73
End: 2018-07-26

## 2018-07-26 DIAGNOSIS — E10.649 TYPE 1 DIABETES MELLITUS WITH HYPOGLYCEMIA AND WITHOUT COMA (H): ICD-10-CM

## 2018-07-26 DIAGNOSIS — Z79.01 LONG-TERM (CURRENT) USE OF ANTICOAGULANTS: ICD-10-CM

## 2018-07-26 DIAGNOSIS — M81.0 OSTEOPOROSIS, POSTMENOPAUSAL: ICD-10-CM

## 2018-07-26 LAB
ANION GAP SERPL CALCULATED.3IONS-SCNC: 4 MMOL/L (ref 3–14)
BUN SERPL-MCNC: 24 MG/DL (ref 7–30)
CALCIUM SERPL-MCNC: 8.5 MG/DL (ref 8.5–10.1)
CHLORIDE SERPL-SCNC: 107 MMOL/L (ref 94–109)
CHOLEST SERPL-MCNC: 173 MG/DL
CK SERPL-CCNC: 109 U/L (ref 30–225)
CO2 SERPL-SCNC: 27 MMOL/L (ref 20–32)
CREAT SERPL-MCNC: 0.68 MG/DL (ref 0.52–1.04)
CREAT UR-MCNC: 102 MG/DL
GFR SERPL CREATININE-BSD FRML MDRD: 85 ML/MIN/1.7M2
GLUCOSE SERPL-MCNC: 103 MG/DL (ref 70–99)
HDLC SERPL-MCNC: 98 MG/DL
INR PPP: 3.15 (ref 0.86–1.14)
LDLC SERPL CALC-MCNC: 64 MG/DL
MICROALBUMIN UR-MCNC: 9 MG/L
MICROALBUMIN/CREAT UR: 8.74 MG/G CR (ref 0–25)
NONHDLC SERPL-MCNC: 75 MG/DL
POTASSIUM SERPL-SCNC: 4 MMOL/L (ref 3.4–5.3)
PROTHROM ACT/NOR PPP: 17 % (ref 60–140)
SODIUM SERPL-SCNC: 138 MMOL/L (ref 133–144)
TRIGL SERPL-MCNC: 51 MG/DL
TSH SERPL DL<=0.005 MIU/L-ACNC: 0.95 MU/L (ref 0.4–4)

## 2018-07-26 NOTE — MR AVS SNAPSHOT
Shala Caruso   7/26/2018   Anticoagulation Therapy Visit    Description:  72 year old female   Provider:  Stacey Beal, RN   Department:   [facility]           INR as of 7/26/2018     Today's INR       Anticoagulation Summary as of 7/26/2018     INR goal 2.0-3.0   Today's INR    Full warfarin instructions 12.5 mg on Sun, Tue, Thu; 10 mg all other days   Next INR check 8/16/2018    Indications   SLE (systemic lupus erythematosus) (H) (Resolved) [M32.9]  Long-term (current) use of anticoagulants [Z79.01] [Z79.01]         July 2018 Details    Sun Mon Tue Wed Thu Fri Sat     1               2               3               4               5               6               7                 8               9               10               11               12               13               14                 15               16               17               18               19               20               21                 22               23               24               25               26      12.5 mg   See details      27      10 mg         28      10 mg           29      12.5 mg         30      10 mg         31      12.5 mg              Date Details   07/26 This INR check               How to take your warfarin dose     To take:  10 mg Take 2 of the 5 mg tablets.    To take:  12.5 mg Take 2.5 of the 5 mg tablets.           August 2018 Details    Sun Mon Tue Wed Thu Fri Sat        1      10 mg         2      12.5 mg         3      10 mg         4      10 mg           5      12.5 mg         6      10 mg         7      12.5 mg         8      10 mg         9      12.5 mg         10      10 mg         11      10 mg           12      12.5 mg         13      10 mg         14      12.5 mg         15      10 mg         16            17               18                 19               20               21               22               23               24               25                 26                27               28               29               30               31                 Date Details   No additional details    Date of next INR:  8/16/2018         How to take your warfarin dose     To take:  10 mg Take 2 of the 5 mg tablets.    To take:  12.5 mg Take 2.5 of the 5 mg tablets.

## 2018-07-26 NOTE — PROGRESS NOTES
ANTICOAGULATION FOLLOW-UP CLINIC VISIT    Patient Name:  Shala Caruso  Date:  7/26/2018  Contact Type:  Telephone    SUBJECTIVE:     Patient Findings     Comments INR 3.15  Factor 2=17  Goal 15-25           OBJECTIVE    INR   Date Value Ref Range Status   07/26/2018 3.15 (H) 0.86 - 1.14 Final     Factor 2 Assay   Date Value Ref Range Status   07/26/2018 17 (L) 60 - 140 % Final       ASSESSMENT / PLAN  No question data found.  Anticoagulation Summary as of 7/26/2018     INR goal 2.0-3.0   Today's INR    Warfarin maintenance plan 12.5 mg (5 mg x 2.5) on Sun, Tue, Thu; 10 mg (5 mg x 2) all other days   Full warfarin instructions 12.5 mg on Sun, Tue, Thu; 10 mg all other days   Weekly warfarin total 77.5 mg   No change documented Stacey Beal RN   Plan last modified Portia Cherry RN (6/16/2017)   Next INR check 8/16/2018   Priority INR   Target end date     Indications   SLE (systemic lupus erythematosus) (H) (Resolved) [M32.9]  Long-term (current) use of anticoagulants [Z79.01] [Z79.01]         Anticoagulation Episode Summary     INR check location Clinic Lab    Preferred lab     Send INR reminders to [facility]    Comments Factor 2  goal range is 15-25%  Ok to leave message on home [phone] and work voicemail, but call [email or web address] per pt request.  OK to leave message at office  May leave messages with Rodriguez Santos  DO NOT GIVE RECORDS TO EMPLOYER [employer]        Anticoagulation Care Providers     Provider Role Specialty Phone number    Mt Kiser MD Responsible Clinical Cardiac Electrophysiology [phone]            See the Encounter Report to view Anticoagulation Flowsheet and Dosing Calendar (Go to Encounters tab in chart review, and find the Anticoagulation Therapy Visit)    Left message for patient with results and dosing recommendations. Asked patient to call back to report any missed doses, falls, signs and symptoms of bleeding or clotting, any changes in health, medication, or  diet. Asked patient to call back with any questions or concerns.     Stacey Beal RN

## 2018-07-27 ENCOUNTER — RADIANT APPOINTMENT (OUTPATIENT)
Dept: BONE DENSITY | Facility: CLINIC | Age: 73
End: 2018-07-27
Attending: INTERNAL MEDICINE
Payer: COMMERCIAL

## 2018-07-27 DIAGNOSIS — M81.0 OSTEOPOROSIS: ICD-10-CM

## 2018-07-30 ENCOUNTER — OFFICE VISIT (OUTPATIENT)
Dept: ENDOCRINOLOGY | Facility: CLINIC | Age: 73
End: 2018-07-30
Payer: COMMERCIAL

## 2018-07-30 VITALS
SYSTOLIC BLOOD PRESSURE: 117 MMHG | HEIGHT: 66 IN | BODY MASS INDEX: 19.16 KG/M2 | HEART RATE: 67 BPM | DIASTOLIC BLOOD PRESSURE: 71 MMHG | WEIGHT: 119.2 LBS

## 2018-07-30 DIAGNOSIS — J06.9 VIRAL UPPER RESPIRATORY TRACT INFECTION: ICD-10-CM

## 2018-07-30 DIAGNOSIS — E10.649 HYPOGLYCEMIA UNAWARENESS IN TYPE 1 DIABETES MELLITUS (H): Primary | ICD-10-CM

## 2018-07-30 DIAGNOSIS — M81.0 OSTEOPOROSIS, POSTMENOPAUSAL: ICD-10-CM

## 2018-07-30 LAB
DEPRECATED CALCIDIOL+CALCIFEROL SERPL-MC: <81 UG/L (ref 20–75)
HBA1C MFR BLD: 5 % (ref 4.3–6)
VITAMIN D2 SERPL-MCNC: <5 UG/L
VITAMIN D3 SERPL-MCNC: 76 UG/L

## 2018-07-30 RX ORDER — PSEUDOEPHEDRINE HCL 30 MG
30 TABLET ORAL EVERY 4 HOURS PRN
Qty: 20 TABLET | Refills: 0 | Status: SHIPPED | OUTPATIENT
Start: 2018-07-30 | End: 2018-07-30

## 2018-07-30 RX ORDER — PSEUDOEPHEDRINE HCL 30 MG
30 TABLET ORAL EVERY 4 HOURS PRN
Qty: 20 TABLET | Refills: 0 | Status: SHIPPED | OUTPATIENT
Start: 2018-07-30 | End: 2018-11-07

## 2018-07-30 NOTE — PROGRESS NOTES
Vitamin D level came back elevated.  Please let us know how much vitamin D you've been taking.  In the past, you used to take 600 units daily from the calcium supplements and one additional tablet of 1000 units, for a total daily dose of 1600 U.

## 2018-07-30 NOTE — LETTER
7/30/2018       RE: Shala Caruso  [address] [place] [address]     Dear Colleague,    Thank you for referring your patient, Shala Caruso, to the [facility] ENDOCRINOLOGY at [facility]. Please see a copy of my visit note below.    Ms. Caruso is a 72 year old pleasant female with hx of SLE, APLS, DVT, osteoporosis and type 1 diabetes presenting for f/up.     1. Type 1 diabetes    Hemoglobin A1c today is 5%, stable since her last visit here. Glucometer readings reveal that she checks her blood sugar 4-5 times daily. Average blood glucose is 87 with a standard deviation of 16 and a range variable between 46 and 130. The hypoglycemic episodes remain frequent, most of the days of the week.  Most of them are in the 60s.  1-2 times a week, she experiences more severe hypoglycemic episodes, in the 40s or 50s.  The hypoglycemic episodes are scattered throughout the day and occur either before lunch, before dinner or at bedtime.  There are no morning hypoglycemic episodes.  The patient does not feel the hypoglycemic episodes are a consequence of exercise/increased physical activity.  88% of the blood sugars are within the target of  and 12% are in the hypoglycemic range, below 70.  Insulin to carbohydrate ratio is 1 unit per 20 g for all meals.  She administers 4 U of Lantus in the morning, and 2-3 U NovoLog for breakfast, lunch and dinner.  She continues to use an echo NovoLog pen.    Diabetes complications:   No h/o microalbuminuria.  Last eye exam - scheduled August 2018, no DR.   Last dental exam fall of 2017   Numbness and tingling sensation B/L toes - for many years but light sensation is intact. She does wear comfortable shoes/insoles. 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  She develops confusion, inability to concentrate or focus her vision and she feels extremely tired when her blood sugar is the lower 60s or 50s.  She has no  prior episodes of loss of consciousness due to hypoglycemia.  When her blood sugar is in the lower 50s or 40s, she sometimes does experience tremors and/or sweating.    She continues to exercise regularly.  She runs on a bike or elliptical machine daily, for 45 minutes, and she does 1 hour of yoga, 4 times a week. She also swims, once a week.     2. Osteoporosis    Jeanmarie also has a history of osteoporosis, treated with Boniva for almost 3 years, followed by Forteo which was started in 4/12 - interrupted treatment from 4/2013 and restarted in 7/2013 until July 2014. After she completed the treatment with Forteo, she received one dose of Reclast, in July 2014.      She has no history of prior bone fractures. She's been off prednisone since 2013.  Her height has decreased over the years from 5'6'' to 5'1/2''. Her mother had a hip fracture in her 80s. She continues to take calcium and vitamin D but she does not remember the exact dosages (she used to take oyster calcium, 500 mg calcium plus 200 U Vitamin D TID and 1000 U vitamin D3 daily).     On the DXA scan images from 7/1/16, L1-L3 T score was - 1.  Overall, at the spine, there was a significant increase of bone mineral density since 2005 of 10.5%. Since 2015, the bone mineral density has remained stable at spine. The lowest T score at the hip level was -2.2, at the left femoral neck.  Overall, there was a significant improvement of bone mineral density at the mean hip of 8.9% since 2005, with no significant changes since 2015. While the bone mineral density at the left hip increased since baseline, at the right hip, there was a decrease of bone mineral density since baseline and also, since prior year.    I reviewed with the patient the most recent DEXA scan from 7/27/18.  The lowest T score was -2.1, at the left femoral neck.  Compared with the prior scan from 2016, the bone mineral density at the hips remained stable.  At the lumbar spine, L1-L3 T score was -1.3,  not significantly changed since 2016.    Current Outpatient Prescriptions   Medication     acetaminophen (TYLENOL) 500 MG tablet     aspirin 81 MG tablet     AYR SALINE NASAL NO-DRIP GEL     Calcium Carbonate-Vitamin D (CALCIUM + D) 600-200 MG-UNIT per tablet     Injection Device for insulin (NOVOPEN ECHO) RORY     Injection Device for Insulin (NOVOPEN JR, GREEN,) RORY     insulin glargine (LANTUS) 100 UNIT/ML injection     Multiple Vitamins-Minerals (CENTRUM SILVER PO)     mycophenolate (GENERIC EQUIVALENT) 500 MG tablet     NIFEdipine ER osmotic (NIFEDICAL XL) 30 MG 24 hr tablet     NOVOLOG PENFILL 100 UNIT/ML soln     simvastatin (ZOCOR) 40 MG tablet     VITAMIN D, CHOLECALCIFEROL, PO     warfarin (COUMADIN) 5 MG tablet     blood glucose monitoring (TRUE METRIX BLOOD GLUCOSE TEST) test strip     Blood Glucose Monitoring Suppl (TRUE METRIX METER) W/DEVICE KIT     glucagon (GLUCAGON EMERGENCY) 1 MG kit     insulin pen needle (BD PAM U/F) 32G X 4 MM     LANCETS ULTRA THIN 30G MISC     omega-3 fatty acids (FISH OIL) 1200 MG capsule     No current facility-administered medications for this visit.      ROS   Systemic symptoms: as above, good appetite  Eye symptoms: No eye symptoms.  Otolaryngeal symptoms: some cold symptoms for the last few days    Breast symptoms: No breast symptoms.  Cardiovascular symptoms: No cardiovascular symptoms.    Pulmonary symptoms: No pulmonary symptoms.  Gastrointestinal symptoms: No gastrointestinal symptoms.   Genitourinary symptoms: urinary incontinence   Endocrine symptoms: chronic cold intolerance  Hematologic symptoms: No hematologic symptoms.  Musculoskeletal symptoms: recurrent episodes of pain which affect the third to fifth left toes; bilateral knee pain; joint stiffness  Neurological symptoms: numbness and tingling sensation b/l toes   Psychological symptoms: no psychological symptoms   Skin symptoms: split lesions of the tips of her fingers/toes - for years; has seen  "dermatology in the past    Family Hx   Maternal grandmother DM2. First cousin with RA. Mother, maternal grandmother and aunt, cousin diagnosed with thyroid disorders (? hypothyroidism). Mother and maternal grandmother had breast cancer.    Personal Hx   Behavioral history: No tobacco use.  Home environment: No secondhand tobacco smoke in home.  Denies smoking, drinking alcohol or using illicit drugs. , with one child. 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  Menopause at age 59. Had regular MP in the past. No h/o bone fractures or kidney stones.    PMH   SLE dx in 2000 with flare/pericarditis and tamponade on 3/5/2010 requiring high doses of steroids.  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  Osteoporosis diagnosed 2008 started on Boniva in 2010 (heartburn from oral fosamax).   Hyperlipidemia.  Severe GERD and Achalasia.  Raynaud's  Allergy to multiple meds  Vit D def.  Post thrombophlebitic syndrome with chronic LE swelling   Dyslipidemia  Chronic leukopenia on methotrexate   L arm lymphedema   Type 1 diabetes  Prolapsed uterus   Cataract   Vale Zoster     Physical Exam   Wt Readings from Last 10 Encounters:   07/30/18 54.1 kg (119 lb 3.2 oz)   01/29/18 54 kg (119 lb)   08/02/17 52.8 kg (116 lb 8 oz)   07/31/17 53.2 kg (117 lb 4.8 oz)   05/22/17 53.9 kg (118 lb 14.4 oz)   04/13/17 54.7 kg (120 lb 9.6 oz)   04/04/17 55 kg (121 lb 4.8 oz)   02/01/17 54.4 kg (120 lb)   01/23/17 54.5 kg (120 lb 1.6 oz)   01/04/17 53.3 kg (117 lb 8 oz)     /71 (BP Location: Right arm)  Pulse 67  Ht 1.664 m (5' 5.5\")  Wt 54.1 kg (119 lb 3.2 oz)  BMI 19.53 kg/m2    General appearance: she is thin, no acute distress noted  Eyes: conjunctivae and extraocular motions are normal. Pupils are equal, round, and reactive to light. No lid lag, no stare.  HENT: oropharynx moist; neck no JVD, no bruits, no thyromegaly, no palpable nodules  Cardiovascular: regular rhythm, no murmurs, distal pulses " palpable, mild L arm edema   Respiratory: chest clear, no rales, no rhonchi  Musculoskeletal: normal tone and strength   Neurologic: left knee reflex decreased, normal B/L bicipital reflexes, no resting tremor  Psychiatric: affect and judgment normal   Skin: warm, mild bruises at the insulin injection site  Feet:  Sensation intact to monofilament testing, mild bluish discoloration of the tips of the toes, dorsal pedis pulse palpable.     Lab Results  I reviewed prior lab results documented in EPIC.   Lab Results   Component Value Date    A1C 5.3 07/11/2017    A1C 5.5 06/10/2013    A1C 5.4 01/07/2013    A1C 5.5 07/02/2012    A1C 5.1 01/09/2012    HEMOGLOBINA1 5.0 07/30/2018    HEMOGLOBINA1 5.0 01/29/2018    HEMOGLOBINA1 5.0 01/23/2017    HEMOGLOBINA1 5.4 07/11/2016    HEMOGLOBINA1 5.4 01/11/2016       Hemoglobin   Date Value Ref Range Status   04/27/2018 12.4 11.7 - 15.7 g/dL Final     Hematocrit   Date Value Ref Range Status   04/27/2018 39.6 35.0 - 47.0 % Final     Cholesterol   Date Value Ref Range Status   07/26/2018 173 <200 mg/dL Final     Cholesterol/HDL Ratio   Date Value Ref Range Status   10/01/2014 1.5 0.0 - 5.0 Final     HDL Cholesterol   Date Value Ref Range Status   07/26/2018 98 >49 mg/dL Final     LDL Cholesterol Calculated   Date Value Ref Range Status   07/26/2018 64 <100 mg/dL Final     Comment:     Desirable:       <100 mg/dl     VLDL-Cholesterol   Date Value Ref Range Status   10/01/2014 7 0 - 30 mg/dL Final     Triglycerides   Date Value Ref Range Status   07/26/2018 51 <150 mg/dL Final     Comment:     Fasting specimen     Albumin Urine mg/L   Date Value Ref Range Status   07/26/2018 9 mg/L Final     TSH   Date Value Ref Range Status   07/26/2018 0.95 0.40 - 4.00 mU/L Final       Last Basic Metabolic Panel:    Sodium   Date Value Ref Range Status   07/26/2018 138 133 - 144 mmol/L Final     Potassium   Date Value Ref Range Status   07/26/2018 4.0 3.4 - 5.3 mmol/L Final     Chloride   Date Value  Ref Range Status   07/26/2018 107 94 - 109 mmol/L Final     Calcium   Date Value Ref Range Status   07/26/2018 8.5 8.5 - 10.1 mg/dL Final     Carbon Dioxide   Date Value Ref Range Status   07/26/2018 27 20 - 32 mmol/L Final     Urea Nitrogen   Date Value Ref Range Status   07/26/2018 24 7 - 30 mg/dL Final     Creatinine   Date Value Ref Range Status   07/26/2018 0.68 0.52 - 1.04 mg/dL Final     GFR Estimate   Date Value Ref Range Status   07/26/2018 85 >60 mL/min/1.7m2 Final     Comment:     Non  GFR Calc     Glucose   Date Value Ref Range Status   07/26/2018 103 (H) 70 - 99 mg/dL Final     Comment:     Fasting specimen       AST   Date Value Ref Range Status   04/27/2018 20 0 - 45 U/L Final     ALT   Date Value Ref Range Status   04/27/2018 24 0 - 50 U/L Final     Albumin   Date Value Ref Range Status   01/11/2016 3.8 3.4 - 5.0 g/dL Final     Assessment     1. Type 1 diabetes with very tight glycemic control, complicated by partial hypoglycemia unawareness.  She continues to experience frequent hypoglycemia, reflected by the 12% of the blood glucose numbers in the hypoglycemic range.  A tight BG control is preferred by the patient, although less aggressive blood glucose targets were recommended.   Again I discussed with the patient about loosening up the blood glucose targets and allowing the pre-meal and bedtime blood sugar to go up to 130.  I advised her to try to reduce the dose of preprandial NovoLog by half a unit with all meals, in order to prevent hypoglycemia.     2.  Osteoporosis   She was treated with Boniva for 3 years, followed by Forteo for 2 years. Received one dose of Reclast in July 2014 when she completed treatment with Forteo.  Since discontinuing Forteo, the bone mineral density has either remained stable or minimally increased.  The vitamin D was pending at the time of the visit.  Advised the patient to contact us with the current dose of calcium and vitamin D she takes.    3.  Hypercholesterolemia - on simvastatin, controlled.    Return to clinic 6 months.    Orders Placed This Encounter   Procedures     Hemoglobin A1c POCT          Again, thank you for allowing me to participate in the care of your patient.      Sincerely,    Dorita Cramer MD

## 2018-07-30 NOTE — MR AVS SNAPSHOT
"              After Visit Summary   7/30/2018    Shala Caruso    MRN: [id number]           Patient Information     Date Of Birth          1945        Visit Information        Provider Department      7/30/2018 7:30 AM Dorita Cramer MD [facility] Endocrinology        Today's Diagnoses     Diabetes mellitus type 1 (H)    -  1    Type I diabetes mellitus, well controlled (H)        Viral upper respiratory tract infection          Care Instructions    DEXCOM G6           Follow-ups after your visit        Follow-up notes from your care team     Return in about 6 months (around 1/30/2019).      Your next 10 appointments already scheduled     Aug 03, 2018  1:30 PM CDT   MA SCREENING DIGITAL BILATERAL with [id number]   [facility] ([facility])    [address]  [place] [address] [phone]           Do not use any powder, lotion or deodorant under your arms or on your breast. If you do, we will ask you to remove it before your exam.  Wear comfortable, two-piece clothing.  If you have any allergies, tell your care team.  Bring any previous mammograms from other facilities or have them mailed to the breast center. Three-dimensional (3D) mammograms are available at [place] locations in [facility], [facility], [facility], [facility], and [place]. [facility] locations include [place] [facility] in [place]. Benefits of 3D mammograms include: - Improved rate of cancer detection - Decreases your chance of having to go back for more tests, which means fewer: - \"False-positive\" results (This means that there is an abnormal area but it isn't cancer.) - Invasive testing procedures, such as a biopsy or surgery - Can provide clearer images of the breast if you have dense breast tissue. 3D mammography is an optional exam that anyone can have with a 2D mammogram. It doesn't replace or take the " place of a 2D mammogram. 2D mammograms remain an effective screening test for all women.  Not all insurance companies cover the cost of a 3D mammogram. Check with your insurance.            Aug 09, 2018  3:00 PM CDT   RETURN RETINA with Monika Fermin MD   [facility] ([facility])    [address]  [address]  [facility] [address]   [phone]            Sep 06, 2018  4:30 PM CDT   (Arrive by 4:15 PM)   RETURN LUPUS with Santiago Corbett MD   [facility] ([facility])    [address]  [address]  [facility] [id number] [phone]            Jan 14, 2019  7:30 AM CST   (Arrive by 7:15 AM)   RETURN DIABETES with Dorita Cramer MD   [facility] End[facility] ([facility])    [address]  3rd Floor  [facility] [id number] [phone]              Who to contact     Please call your clinic at [phone] to:    Ask questions about your health    Make or cancel appointments    Discuss your medicines    Learn about your test results    Speak to your doctor            Additional Information About Your Visit        Collect[employer] Information     [employer]a gives you secure access to your electronic health record. If you see a primary care provider, you can also send messages to your care team and make appointments. If you have questions, please call your primary care clinic.  If you do not have a primary care provider, please call [phone] and they will assist you.      [employer] is an electronic gateway that provides easy, online access to your medical records. With [employer], you can request a clinic appointment, read your test results, renew a prescription or communicate with your care team.     To access your existing account, please contact your [facility] or call [phone] for assistance.        Care EveryWhere ID     This is your Care  "EveryWhere ID. This could be used by other organizations to access your [place] medical records  [id number]        Your Vitals Were     Pulse Height BMI (Body Mass Index)             67 1.664 m (5' 5.5\") 19.53 kg/m2          Blood Pressure from Last 3 Encounters:   07/30/18 117/71   02/16/18 119/73   01/29/18 (P) 117/75    Weight from Last 3 Encounters:   07/30/18 54.1 kg (119 lb 3.2 oz)   01/29/18 54 kg (119 lb)   08/02/17 52.8 kg (116 lb 8 oz)              We Performed the Following     Hemoglobin A1c POCT          Today's Medication Changes          These changes are accurate as of 7/30/18  8:06 AM.  If you have any questions, ask your nurse or doctor.               Start taking these medicines.        Dose/Directions    pseudoePHEDrine 30 MG tablet   Commonly known as:  SUDAFED   Used for:  Viral upper respiratory tract infection   Started by:  Dorita Cramer MD        Dose:  30 mg   Take 1 tablet (30 mg) by mouth every 4 hours as needed for congestion   Quantity:  20 tablet   Refills:  0         These medicines have changed or have updated prescriptions.        Dose/Directions    blood glucose monitoring test strip   Commonly known as:  TRUE METRIX BLOOD GLUCOSE TEST   This may have changed:  additional instructions   Used for:  Type I diabetes mellitus, well controlled (H)   Changed by:  Dorita Cramer MD        Use to test blood sugar 5 times daily or as directed.   Quantity:  450 strip   Refills:  3            Where to get your medicines      These medications were sent to [facility] - [place], [place] - [address] AT [facility] ([place]  [address] [address]     Phone:  [phone]     blood glucose monitoring test strip         Some of these will need a paper prescription and others can be bought over the counter.  Ask your nurse if you have questions.     Bring a paper prescription for each of these medications     " pseudoePHEDrine 30 MG tablet                Primary Care Provider Office Phone # Fax #    Joe Tj Contreras [phone]       [address] [place] [address]        Equal Access to Services     MARVIN CACERES : Hadsusan jorge l olivia ernst Somichelali, waaxda luqadaha, qaybta kaalmada adeegyada, carmelita ly laJiajuju mullen. So [facility] [phone].    ATENCIÓN: Si habla español, tiene a hurtado disposición servicios gratuitos de asistencia lingüística. Llame al [phone].    We comply with applicable federal civil rights laws and [place] laws. We do not discriminate on the basis of race, color, national origin, age, disability, sex, sexual orientation, or gender identity.            Thank you!     Thank you for choosing [facility]  for your care. Our goal is always to provide you with excellent care. Hearing back from our patients is one way we can continue to improve our services. Please take a few minutes to complete the written survey that you may receive in the mail after your visit with us. Thank you!             Your Updated Medication List - Protect others around you: Learn how to safely use, store and throw away your medicines at www.disposemymeds.org.          This list is accurate as of 7/30/18  8:06 AM.  Always use your most recent med list.                   Brand Name Dispense Instructions for use Diagnosis    aspirin 81 MG tablet      Take 81 mg by mouth At Bedtime        AYR SALINE NASAL NO-DRIP Gel     3 Tube    Use as needed for nasal dryness    Nasal ulcer       blood glucose monitoring lancets     7203 each    Test 7-8 times daily  (Lancets that go with pt current meter )    Diabetes mellitus (H)       blood glucose monitoring test strip    TRUE METRIX BLOOD GLUCOSE TEST    450 strip    Use to test blood sugar 5 times daily or as directed.    Type I diabetes mellitus, well controlled (H)       calcium + D 600-200 MG-UNIT Tabs   Generic drug:  calcium  carbonate-vitamin D      Take 1 tablet by mouth 3 times daily        CENTRUM SILVER PO      Take 1 tablet by mouth daily.        glucagon 1 MG kit    GLUCAGON EMERGENCY    1 each    Inject 1 mg subcutaneously or intramuscularly for severe hypoglycemic episodes.    Type 1 diabetes mellitus with hypoglycemia and without coma (H)       insulin glargine 100 UNIT/ML injection    LANTUS    15 mL    Inject 4 units as directed daily LANTUS SOLOSTAR PEN PLEASE    Type 1 diabetes mellitus with hypoglycemia and without coma (H)       insulin pen needle 32G X 4 MM    BD PAM U/F    200 each    Use 200 pen needles daily or as directed.    Type 1 diabetes mellitus with hypoglycemia and without coma (H)       mycophenolate 500 MG tablet    GENERIC EQUIVALENT    360 tablet    Take 2 tablets (1,000 mg) by mouth 2 times daily 2 tabs in the a.m., 2 tabs in p.m.    Antiphospholipid syndrome (H), Encounter for long-term current use of medication       NIFEdipine ER osmotic 30 MG 24 hr tablet    NIFEDICAL XL    90 tablet    Take 1 tablet (30 mg) by mouth daily    Achalasia of esophagus, HTN (hypertension)       NovoLOG PENFILL 100 UNIT/ML injection   Generic drug:  insulin aspart     15 mL    Inject 1 unit per 15 grams CHO with meals TID approx 15 units daily    Type 1 diabetes mellitus with hypoglycemia and without coma (H)       * Injection Device for insulin Priscila    NOVOPEN ECHO    1 each    1 each 4 times daily    Type 1 diabetes mellitus with hypoglycemia and without coma (H)       * NOVOPEN JR (GREEN) Priscila   Generic drug:  Injection Device for insulin      Use as directed.        omega-3 fatty acids 1200 MG capsule      Take 1 capsule by mouth daily.    Systemic lupus erythematosus (H), Antiphospholipid syndrome (H), Encounter for long-term (current) use of other medications       pseudoePHEDrine 30 MG tablet    SUDAFED    20 tablet    Take 1 tablet (30 mg) by mouth every 4 hours as needed for congestion    Viral upper respiratory  tract infection       simvastatin 40 MG tablet    ZOCOR    90 tablet    Take 1 tablet (40 mg) by mouth At Bedtime    Other hyperlipidemia       TRUE METRIX METER w/Device Kit     1 kit    1 each 4 times daily    Type I diabetes mellitus, well controlled (H)       TYLENOL 500 MG tablet   Generic drug:  acetaminophen      Take 500 mg by mouth At Bedtime Takes 6 tablets (500 mg ) .Christa Acharya LPN 2:40 PM on 3/23/2017        VITAMIN D (CHOLECALCIFEROL) PO      Take 1,000 Units by mouth daily (with lunch)        warfarin 5 MG tablet    COUMADIN    75 tablet    Take 2-2.5 tablets daily or as directed by coumadin clinic.    Long term current use of anticoagulant therapy       * Notice:  This list has 2 medication(s) that are the same as other medications prescribed for you. Read the directions carefully, and ask your doctor or other care provider to review them with you.

## 2018-07-30 NOTE — PROGRESS NOTES
Ms. Caruso is a 72 year old pleasant female with hx of SLE, APLS, DVT, osteoporosis and type 1 diabetes presenting for f/up.     1. Type 1 diabetes    Hemoglobin A1c today is 5%, stable since her last visit here. Glucometer readings reveal that she checks her blood sugar 4-5 times daily. Average blood glucose is 87 with a standard deviation of 16 and a range variable between 46 and 130. The hypoglycemic episodes remain frequent, most of the days of the week.  Most of them are in the 60s.  1-2 times a week, she experiences more severe hypoglycemic episodes, in the 40s or 50s.  The hypoglycemic episodes are scattered throughout the day and occur either before lunch, before dinner or at bedtime.  There are no morning hypoglycemic episodes.  The patient does not feel the hypoglycemic episodes are a consequence of exercise/increased physical activity.  88% of the blood sugars are within the target of  and 12% are in the hypoglycemic range, below 70.  Insulin to carbohydrate ratio is 1 unit per 20 g for all meals.  She administers 4 U of Lantus in the morning, and 2-3 U NovoLog for breakfast, lunch and dinner.  She continues to use an echo NovoLog pen.    Diabetes complications:   No h/o microalbuminuria.  Last eye exam - scheduled August 2018, no DR.   Last dental exam fall of 2017   Numbness and tingling sensation B/L toes - for many years but light sensation is intact. She does wear comfortable shoes/insoles. 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  She develops confusion, inability to concentrate or focus her vision and she feels extremely tired when her blood sugar is the lower 60s or 50s.  She has no prior episodes of loss of consciousness due to hypoglycemia.  When her blood sugar is in the lower 50s or 40s, she sometimes does experience tremors and/or sweating.    She continues to exercise regularly.  She runs on a bike or elliptical machine daily, for 45 minutes, and she does 1  hour of yoga, 4 times a week. She also swims, once a week.     2. Osteoporosis    Jeanmarie also has a history of osteoporosis, treated with Boniva for almost 3 years, followed by Forteo which was started in 4/12 - interrupted treatment from 4/2013 and restarted in 7/2013 until July 2014. After she completed the treatment with Forteo, she received one dose of Reclast, in July 2014.      She has no history of prior bone fractures. She's been off prednisone since 2013.  Her height has decreased over the years from 5'6'' to 5'1/2''. Her mother had a hip fracture in her 80s. She continues to take calcium and vitamin D but she does not remember the exact dosages (she used to take oyster calcium, 500 mg calcium plus 200 U Vitamin D TID and 1000 U vitamin D3 daily).     On the DXA scan images from 7/1/16, L1-L3 T score was - 1.  Overall, at the spine, there was a significant increase of bone mineral density since 2005 of 10.5%. Since 2015, the bone mineral density has remained stable at spine. The lowest T score at the hip level was -2.2, at the left femoral neck.  Overall, there was a significant improvement of bone mineral density at the mean hip of 8.9% since 2005, with no significant changes since 2015. While the bone mineral density at the left hip increased since baseline, at the right hip, there was a decrease of bone mineral density since baseline and also, since prior year.    I reviewed with the patient the most recent DEXA scan from 7/27/18.  The lowest T score was -2.1, at the left femoral neck.  Compared with the prior scan from 2016, the bone mineral density at the hips remained stable.  At the lumbar spine, L1-L3 T score was -1.3, not significantly changed since 2016.    Current Outpatient Prescriptions   Medication     acetaminophen (TYLENOL) 500 MG tablet     aspirin 81 MG tablet     AYR SALINE NASAL NO-DRIP GEL     Calcium Carbonate-Vitamin D (CALCIUM + D) 600-200 MG-UNIT per tablet     Injection Device for  insulin (NOVOPEN ECHO) RORY     Injection Device for Insulin (NOVOPEN JR, GREEN,) RORY     insulin glargine (LANTUS) 100 UNIT/ML injection     Multiple Vitamins-Minerals (CENTRUM SILVER PO)     mycophenolate (GENERIC EQUIVALENT) 500 MG tablet     NIFEdipine ER osmotic (NIFEDICAL XL) 30 MG 24 hr tablet     NOVOLOG PENFILL 100 UNIT/ML soln     simvastatin (ZOCOR) 40 MG tablet     VITAMIN D, CHOLECALCIFEROL, PO     warfarin (COUMADIN) 5 MG tablet     blood glucose monitoring (TRUE METRIX BLOOD GLUCOSE TEST) test strip     Blood Glucose Monitoring Suppl (TRUE METRIX METER) W/DEVICE KIT     glucagon (GLUCAGON EMERGENCY) 1 MG kit     insulin pen needle (BD PAM U/F) 32G X 4 MM     LANCETS ULTRA THIN 30G MISC     omega-3 fatty acids (FISH OIL) 1200 MG capsule     No current facility-administered medications for this visit.      ROS   Systemic symptoms: as above, good appetite  Eye symptoms: No eye symptoms.  Otolaryngeal symptoms: some cold symptoms for the last few days    Breast symptoms: No breast symptoms.  Cardiovascular symptoms: No cardiovascular symptoms.    Pulmonary symptoms: No pulmonary symptoms.  Gastrointestinal symptoms: No gastrointestinal symptoms.   Genitourinary symptoms: urinary incontinence   Endocrine symptoms: chronic cold intolerance  Hematologic symptoms: No hematologic symptoms.  Musculoskeletal symptoms: recurrent episodes of pain which affect the third to fifth left toes; bilateral knee pain; joint stiffness  Neurological symptoms: numbness and tingling sensation b/l toes   Psychological symptoms: no psychological symptoms   Skin symptoms: split lesions of the tips of her fingers/toes - for years; has seen dermatology in the past    Family Hx   Maternal grandmother DM2. First cousin with RA. Mother, maternal grandmother and aunt, cousin diagnosed with thyroid disorders (? hypothyroidism). Mother and maternal grandmother had breast cancer.    Personal Hx   Behavioral history: No tobacco  "use.  Home environment: No secondhand tobacco smoke in home.  Denies smoking, drinking alcohol or using illicit drugs. , with one child. 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  Menopause at age 59. Had regular MP in the past. No h/o bone fractures or kidney stones.    PMH   SLE dx in 2000 with flare/pericarditis and tamponade on 3/5/2010 requiring high doses of steroids.  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  Osteoporosis diagnosed 2008 started on Boniva in 2010 (heartburn from oral fosamax).   Hyperlipidemia.  Severe GERD and Achalasia.  Raynaud's  Allergy to multiple meds  Vit D def.  Post thrombophlebitic syndrome with chronic LE swelling   Dyslipidemia  Chronic leukopenia on methotrexate   L arm lymphedema   Type 1 diabetes  Prolapsed uterus   Cataract   Vale Zoster     Physical Exam   Wt Readings from Last 10 Encounters:   07/30/18 54.1 kg (119 lb 3.2 oz)   01/29/18 54 kg (119 lb)   08/02/17 52.8 kg (116 lb 8 oz)   07/31/17 53.2 kg (117 lb 4.8 oz)   05/22/17 53.9 kg (118 lb 14.4 oz)   04/13/17 54.7 kg (120 lb 9.6 oz)   04/04/17 55 kg (121 lb 4.8 oz)   02/01/17 54.4 kg (120 lb)   01/23/17 54.5 kg (120 lb 1.6 oz)   01/04/17 53.3 kg (117 lb 8 oz)     /71 (BP Location: Right arm)  Pulse 67  Ht 1.664 m (5' 5.5\")  Wt 54.1 kg (119 lb 3.2 oz)  BMI 19.53 kg/m2    General appearance: she is thin, no acute distress noted  Eyes: conjunctivae and extraocular motions are normal. Pupils are equal, round, and reactive to light. No lid lag, no stare.  HENT: oropharynx moist; neck no JVD, no bruits, no thyromegaly, no palpable nodules  Cardiovascular: regular rhythm, no murmurs, distal pulses palpable, mild L arm edema   Respiratory: chest clear, no rales, no rhonchi  Musculoskeletal: normal tone and strength   Neurologic: left knee reflex decreased, normal B/L bicipital reflexes, no resting tremor  Psychiatric: affect and judgment normal   Skin: warm, mild bruises at the insulin " injection site  Feet:  Sensation intact to monofilament testing, mild bluish discoloration of the tips of the toes, dorsal pedis pulse palpable.     Lab Results  I reviewed prior lab results documented in EPIC.   Lab Results   Component Value Date    A1C 5.3 07/11/2017    A1C 5.5 06/10/2013    A1C 5.4 01/07/2013    A1C 5.5 07/02/2012    A1C 5.1 01/09/2012    HEMOGLOBINA1 5.0 07/30/2018    HEMOGLOBINA1 5.0 01/29/2018    HEMOGLOBINA1 5.0 01/23/2017    HEMOGLOBINA1 5.4 07/11/2016    HEMOGLOBINA1 5.4 01/11/2016       Hemoglobin   Date Value Ref Range Status   04/27/2018 12.4 11.7 - 15.7 g/dL Final     Hematocrit   Date Value Ref Range Status   04/27/2018 39.6 35.0 - 47.0 % Final     Cholesterol   Date Value Ref Range Status   07/26/2018 173 <200 mg/dL Final     Cholesterol/HDL Ratio   Date Value Ref Range Status   10/01/2014 1.5 0.0 - 5.0 Final     HDL Cholesterol   Date Value Ref Range Status   07/26/2018 98 >49 mg/dL Final     LDL Cholesterol Calculated   Date Value Ref Range Status   07/26/2018 64 <100 mg/dL Final     Comment:     Desirable:       <100 mg/dl     VLDL-Cholesterol   Date Value Ref Range Status   10/01/2014 7 0 - 30 mg/dL Final     Triglycerides   Date Value Ref Range Status   07/26/2018 51 <150 mg/dL Final     Comment:     Fasting specimen     Albumin Urine mg/L   Date Value Ref Range Status   07/26/2018 9 mg/L Final     TSH   Date Value Ref Range Status   07/26/2018 0.95 0.40 - 4.00 mU/L Final       Last Basic Metabolic Panel:    Sodium   Date Value Ref Range Status   07/26/2018 138 133 - 144 mmol/L Final     Potassium   Date Value Ref Range Status   07/26/2018 4.0 3.4 - 5.3 mmol/L Final     Chloride   Date Value Ref Range Status   07/26/2018 107 94 - 109 mmol/L Final     Calcium   Date Value Ref Range Status   07/26/2018 8.5 8.5 - 10.1 mg/dL Final     Carbon Dioxide   Date Value Ref Range Status   07/26/2018 27 20 - 32 mmol/L Final     Urea Nitrogen   Date Value Ref Range Status   07/26/2018 24 7 -  30 mg/dL Final     Creatinine   Date Value Ref Range Status   07/26/2018 0.68 0.52 - 1.04 mg/dL Final     GFR Estimate   Date Value Ref Range Status   07/26/2018 85 >60 mL/min/1.7m2 Final     Comment:     Non  GFR Calc     Glucose   Date Value Ref Range Status   07/26/2018 103 (H) 70 - 99 mg/dL Final     Comment:     Fasting specimen       AST   Date Value Ref Range Status   04/27/2018 20 0 - 45 U/L Final     ALT   Date Value Ref Range Status   04/27/2018 24 0 - 50 U/L Final     Albumin   Date Value Ref Range Status   01/11/2016 3.8 3.4 - 5.0 g/dL Final     Assessment     1. Type 1 diabetes with very tight glycemic control, complicated by partial hypoglycemia unawareness.  She continues to experience frequent hypoglycemia, reflected by the 12% of the blood glucose numbers in the hypoglycemic range.  A tight BG control is preferred by the patient, although less aggressive blood glucose targets were recommended.   Again I discussed with the patient about loosening up the blood glucose targets and allowing the pre-meal and bedtime blood sugar to go up to 130.  I advised her to try to reduce the dose of preprandial NovoLog by half a unit with all meals, in order to prevent hypoglycemia.     2.  Osteoporosis   She was treated with Boniva for 3 years, followed by Forteo for 2 years. Received one dose of Reclast in July 2014 when she completed treatment with Forteo.  Since discontinuing Forteo, the bone mineral density has either remained stable or minimally increased.  The vitamin D was pending at the time of the visit.  Advised the patient to contact us with the current dose of calcium and vitamin D she takes.    3. Hypercholesterolemia - on simvastatin, controlled.    Return to clinic 6 months.    Orders Placed This Encounter   Procedures     Hemoglobin A1c POCT       Answers for HPI/ROS submitted by the patient on 7/19/2018   General Symptoms: No  Skin Symptoms: No  HENT Symptoms: No  EYE SYMPTOMS:  Yes  HEART SYMPTOMS: No  LUNG SYMPTOMS: No  INTESTINAL SYMPTOMS: No  URINARY SYMPTOMS: No  GYNECOLOGIC SYMPTOMS: No  BREAST SYMPTOMS: No  SKELETAL SYMPTOMS: Yes  BLOOD SYMPTOMS: Yes  NERVOUS SYSTEM SYMPTOMS: Yes  MENTAL HEALTH SYMPTOMS: No  Watery eyes: Yes  Joint pain: Yes  Muscle cramps: Yes  Joint stiffness: Yes  Easy bleeding or bruising: Yes  Difficulty walking: Yes  Numbness: Yes

## 2018-07-30 NOTE — NURSING NOTE
Chief Complaint   Patient presents with     RECHECK     Type I Diabetes f/u      Performed capillary puncture for A1C testing. Patient tolerated well.    Nisa Streeter [facility]  Endocrinology & Diabetes

## 2018-08-02 ENCOUNTER — MYC MEDICAL ADVICE (OUTPATIENT)
Dept: ENDOCRINOLOGY | Facility: CLINIC | Age: 73
End: 2018-08-02

## 2018-08-02 NOTE — TELEPHONE ENCOUNTER
Called patient and left message. She might take a MVI? Advised to discontinue the 1000 U vitamin D supplements.

## 2018-08-03 ENCOUNTER — RADIANT APPOINTMENT (OUTPATIENT)
Dept: MAMMOGRAPHY | Facility: CLINIC | Age: 73
End: 2018-08-03
Payer: COMMERCIAL

## 2018-08-03 DIAGNOSIS — Z12.31 VISIT FOR SCREENING MAMMOGRAM: ICD-10-CM

## 2018-08-06 DIAGNOSIS — H31.8 CHOROIDAL LESION: Primary | ICD-10-CM

## 2018-08-09 ENCOUNTER — OFFICE VISIT (OUTPATIENT)
Dept: OPHTHALMOLOGY | Facility: CLINIC | Age: 73
End: 2018-08-09
Attending: OPHTHALMOLOGY
Payer: COMMERCIAL

## 2018-08-09 DIAGNOSIS — H25.013 CORTICAL AGE-RELATED CATARACT OF BOTH EYES: ICD-10-CM

## 2018-08-09 DIAGNOSIS — H31.8 CHOROIDAL LESION: Primary | ICD-10-CM

## 2018-08-09 DIAGNOSIS — E11.9 TYPE 2 DIABETES MELLITUS WITHOUT COMPLICATION, UNSPECIFIED LONG TERM INSULIN USE STATUS: ICD-10-CM

## 2018-08-09 PROCEDURE — 92250 FUNDUS PHOTOGRAPHY W/I&R: CPT | Mod: ZF | Performed by: OPHTHALMOLOGY

## 2018-08-09 PROCEDURE — 92134 CPTRZ OPH DX IMG PST SGM RTA: CPT | Mod: ZF | Performed by: OPHTHALMOLOGY

## 2018-08-09 PROCEDURE — 92015 DETERMINE REFRACTIVE STATE: CPT | Mod: ZF

## 2018-08-09 PROCEDURE — G0463 HOSPITAL OUTPT CLINIC VISIT: HCPCS | Mod: 25,ZF

## 2018-08-09 PROCEDURE — 76510 OPH US DX B-SCAN&QUAN A-SCAN: CPT | Mod: ZF | Performed by: OPHTHALMOLOGY

## 2018-08-09 ASSESSMENT — REFRACTION_WEARINGRX
OS_CYLINDER: +0.75
OS_SPHERE: -5.50
OD_CYLINDER: +1.50
OD_SPHERE: -5.50
OD_ADD: +2.75
OS_ADD: +2.75
OS_AXIS: 015
OD_AXIS: 138
SPECS_TYPE: TRIFOCAL

## 2018-08-09 ASSESSMENT — EXTERNAL EXAM - LEFT EYE: OS_EXAM: NORMAL

## 2018-08-09 ASSESSMENT — REFRACTION_MANIFEST
OD_AXIS: 140
OS_AXIS: 005
OS_CYLINDER: +1.00
OD_ADD: +3.00
OD_CYLINDER: +1.75
OS_ADD: +3.00
OD_SPHERE: -5.75
OS_SPHERE: -5.00

## 2018-08-09 ASSESSMENT — VISUAL ACUITY
OS_PH_CC: 20/25-1
METHOD: SNELLEN - LINEAR
OS_CC+: +2
OS_CC: 20/30
OD_CC: 20/30
OD_CC+: -1

## 2018-08-09 ASSESSMENT — TONOMETRY
OD_IOP_MMHG: 11
OS_IOP_MMHG: 12
IOP_METHOD: TONOPEN

## 2018-08-09 ASSESSMENT — CONF VISUAL FIELD
METHOD: COUNTING FINGERS
OD_NORMAL: 1
OS_NORMAL: 1

## 2018-08-09 ASSESSMENT — EXTERNAL EXAM - RIGHT EYE: OD_EXAM: NORMAL

## 2018-08-09 ASSESSMENT — CUP TO DISC RATIO
OD_RATIO: 0.3
OS_RATIO: 0.3

## 2018-08-09 ASSESSMENT — SLIT LAMP EXAM - LIDS
COMMENTS: NORMAL
COMMENTS: NORMAL

## 2018-08-09 NOTE — MR AVS SNAPSHOT
After Visit Summary   8/9/2018    Shala Caruso    MRN: [id number]           Patient Information     Date Of Birth          1945        Visit Information        Provider Department      8/9/2018 3:00 PM Monika Fermin MD [facility]        Today's Diagnoses     Choroidal lesion    -  1    Cortical age-related cataract of both eyes        Type 2 diabetes mellitus without complication, unspecified long term insulin use status (H)           Follow-ups after your visit        Your next 10 appointments already scheduled     Aug 16, 2018  1:00 PM CDT   LAB with  LAB   [facility] ([facility])    [address] [place] [address]  [address] [facility] [id number] [phone]           Please do not eat 10-12 hours before your appointment if you are coming in fasting for labs on lipids, cholesterol, or glucose (sugar). This does not apply to pregnant women. Water, hot tea and black coffee (with nothing added) are okay. Do not drink other fluids, diet soda or chew gum.            Sep 06, 2018  4:30 PM CDT   (Arrive by 4:15 PM)   RETURN LUPUS with Santiago Corbett MD   [facility] Rheumat[facility] ([facility])    [address] [place] [address]  [address]  [facility] [id number] [phone]            Jan 14, 2019  7:30 AM CST   (Arrive by 7:15 AM)   RETURN DIABETES with Dorita Cramer MD   [facility] Endocrin[facility] ([facility])    [address] [place] [address]  [address] [facility] [address] [phone]              Who to contact     Please call your clinic at [phone] to:    Ask questions about your health    Make or cancel appointments    Discuss your medicines    Learn about your test results    Speak to your doctor            Additional Information About Your Visit        MyChart Information     [employer] gives you secure access to your electronic health record. If you see a primary care provider, you can also send  messages to your care team and make appointments. If you have questions, please call your primary care clinic.  If you do not have a primary care provider, please call [phone] and they will assist you.      [employer] is an electronic gateway that provides easy, online access to your medical records. With [employer], you can request a clinic appointment, read your test results, renew a prescription or communicate with your care team.     To access your existing account, please contact your [facility] or call [phone] for assistance.        Care EveryWhere ID     This is your Care EveryWhere ID. This could be used by other organizations to access your [facility] medical records  [id number]         Blood Pressure from Last 3 Encounters:   07/30/18 117/71   02/16/18 119/73   01/29/18 (P) 117/75    Weight from Last 3 Encounters:   07/30/18 54.1 kg (119 lb 3.2 oz)   01/29/18 54 kg (119 lb)   08/02/17 52.8 kg (116 lb 8 oz)              We Performed the Following     Fundus Photos OU (both eyes)     OCT Retina Spectralis OU (both eyes)     US B-scan and A-scan OS (left eye)          Today's Medication Changes          These changes are accurate as of 8/9/18  4:26 PM.  If you have any questions, ask your nurse or doctor.               Stop taking these medicines if you haven't already. Please contact your care team if you have questions.     calcium + D 600-200 MG-UNIT Tabs   Generic drug:  calcium carbonate-vitamin D                    Primary Care Provider Office Phone # Fax #    Joe Contreras [phone]       [address] [place] [id number]        Equal Access to Services     MARVIN CACERES : Hadsusan Rivera, adriana yanes, qacarmelita adams. So [facility] [phone].    ATENCIÓN: Si habla español, tiene a hurtado disposición servicios gratuitos de asistencia lingüística. Llame al  [phone].    We comply with applicable federal civil rights laws and [place] laws. We do not discriminate on the basis of race, color, national origin, age, disability, sex, sexual orientation, or gender identity.            Thank you!     Thank you for choosing [facility]  for your care. Our goal is always to provide you with excellent care. Hearing back from our patients is one way we can continue to improve our services. Please take a few minutes to complete the written survey that you may receive in the mail after your visit with us. Thank you!             Your Updated Medication List - Protect others around you: Learn how to safely use, store and throw away your medicines at www.disposemymeds.org.          This list is accurate as of 8/9/18  4:26 PM.  Always use your most recent med list.                   Brand Name Dispense Instructions for use Diagnosis    aspirin 81 MG tablet      Take 81 mg by mouth At Bedtime        AYR SALINE NASAL NO-DRIP Gel     3 Tube    Use as needed for nasal dryness    Nasal ulcer       blood glucose monitoring lancets     7203 each    Test 7-8 times daily  (Lancets that go with pt current meter )    Diabetes mellitus (H)       blood glucose monitoring test strip    TRUE METRIX BLOOD GLUCOSE TEST    450 strip    Use to test blood sugar 5 times daily or as directed.        CENTRUM SILVER PO      Take 1 tablet by mouth daily.        glucagon 1 MG kit    GLUCAGON EMERGENCY    1 each    Inject 1 mg subcutaneously or intramuscularly for severe hypoglycemic episodes.    Type 1 diabetes mellitus with hypoglycemia and without coma (H)       insulin glargine 100 UNIT/ML injection    LANTUS    15 mL    Inject 4 units as directed daily LANTUS SOLOSTAR PEN PLEASE    Type 1 diabetes mellitus with hypoglycemia and without coma (H)       insulin pen needle 32G X 4 MM    BD PAM U/F    200 each    Use 200 pen needles daily or as directed.    Type 1 diabetes mellitus with hypoglycemia and  without coma (H)       mycophenolate 500 MG tablet    GENERIC EQUIVALENT    360 tablet    Take 2 tablets (1,000 mg) by mouth 2 times daily 2 tabs in the a.m., 2 tabs in p.m.    Antiphospholipid syndrome (H), Encounter for long-term current use of medication       NIFEdipine ER osmotic 30 MG 24 hr tablet    NIFEDICAL XL    90 tablet    Take 1 tablet (30 mg) by mouth daily    Achalasia of esophagus, HTN (hypertension)       NovoLOG PENFILL 100 UNIT/ML injection   Generic drug:  insulin aspart     15 mL    Inject 1 unit per 15 grams CHO with meals TID approx 15 units daily    Type 1 diabetes mellitus with hypoglycemia and without coma (H)       * Injection Device for insulin Priscila    NOVOPEN ECHO    1 each    1 each 4 times daily    Type 1 diabetes mellitus with hypoglycemia and without coma (H)       * NOVOPEN JR (GREEN) Priscila   Generic drug:  Injection Device for insulin      Use as directed.        omega-3 fatty acids 1200 MG capsule      Take 1 capsule by mouth daily.    Systemic lupus erythematosus (H), Antiphospholipid syndrome (H), Encounter for long-term (current) use of other medications       pseudoePHEDrine 30 MG tablet    SUDAFED    20 tablet    Take 1 tablet (30 mg) by mouth every 4 hours as needed for congestion    Viral upper respiratory tract infection       simvastatin 40 MG tablet    ZOCOR    90 tablet    Take 1 tablet (40 mg) by mouth At Bedtime    Other hyperlipidemia       TRUE METRIX METER w/Device Kit     1 kit    1 each 4 times daily    Type I diabetes mellitus, well controlled (H)       TYLENOL 500 MG tablet   Generic drug:  acetaminophen      Take 500 mg by mouth At Bedtime Takes 6 tablets (500 mg ) .Christa Acharya LPN 2:40 PM on 3/23/2017        VITAMIN D (CHOLECALCIFEROL) PO      Take 1,000 Units by mouth daily (with lunch)        warfarin 5 MG tablet    COUMADIN    75 tablet    Take 2-2.5 tablets daily or as directed by coumadin clinic.    Long term current use of anticoagulant therapy        * Notice:  This list has 2 medication(s) that are the same as other medications prescribed for you. Read the directions carefully, and ask your doctor or other care provider to review them with you.

## 2018-08-09 NOTE — PROGRESS NOTES
CC: blurry vision OU  HPI: 72 year old F with  hx of DM type I here for diabetic eye exam.  Eyes getting slightly blurrier. Denies flashes and floaters. Last hgA1c 5  Past med hx: SLE currently on cellcept (allergic to plaq)  Past ocular hx: none     OCULAR IMAGING  Optical Coherence Tomography 8-9-18  Right eye normal foveal contour, no srf/irf  Left eye normal foveal contour,   OCT thru lesion less than 1 mm thick with drusen overlying, no srf    PHOTOS 8-9-18  Right eye   Left eye  Choroidal pigmented lesion with drusen     U/S left eye   9/14/16 C1 0.56 mm; C2 5.46 mm  8-30-17 C1 1.3 mm; C2   8-9-18 less than 1 mm ashwini lesion. 0.73mm x 5.15T x 5.03L    A/P:   1) DM type I   - no evidence of DR   - good glucose control    2) Cataracts OU  - becoming visually significant   - will need cataract evaluation  - patient will check with insurance    3) Myopic astigmatism, OU   - updated Rx      4) Choroidal pigmented lesion, left eye  - no orange pigmentation, no subretinal fluid, + drusen   - ultrasound 08/09/18  less than 1 mm height, normal retrobulbar echo pattern  - continue to observe     5) patient with history of Lupus  Took prednisolone for 10 yrs --> patient developed Diabetes mellitus  Currently Micophenolate  No lupus retinopahty  Patient had DVT left upper leg--> patient on coumadin    - f/u in 12 months, repeat oct enhanced depth image of the choroidal peripheral lesion , macula oct, US  ~~~~~~~~~~~~~~~~~~~~~~~~~~~~~~~~~~   Complete documentation of historical and exam elements from today's encounter can be found in the full encounter summary report (not reduplicated in this progress note).  I personally obtained the chief complaint(s) and history of present illness.  I confirmed and edited as necessary the review of systems, past medical/surgical history, family history, social history, and examination findings as documented by others; and I examined the patient myself.  I personally reviewed the  relevant tests, images, and reports as documented above.  I personally reviewed the ophthalmic test(s) associated with this encounter, agree with the interpretation(s) as documented by the resident/fellow, and have edited the corresponding report(s) as necessary.   I formulated and edited as necessary the assessment and plan and discussed the findings and management plan with the patient and family    Monika Fermin MD   of Ophthalmology.  Retina Service   Department of Ophthalmology and Visual Neurosciences   [facility]  Phone: [phone]   Fax: [phone]

## 2018-08-09 NOTE — NURSING NOTE
Chief Complaints and History of Present Illnesses   Patient presents with     Follow Up For      Choroidal lesion      HPI    Last Eye Exam:  8/30/18   Affected eye(s):  Both   Symptoms:        Frequency:  Constant       Do you have eye pain now?:  No      Comments:  Shala is here for an annual follow up of a Choroidal lesion   She feels her vision is about the same as at last visit. She has a history of diabetes, over 8 years.     Last A1C was 5.0 about a month ago  BS  Today was 87    Shant Velázquez COT 3:11 PM August 9, 2018

## 2018-08-16 ENCOUNTER — ANTICOAGULATION THERAPY VISIT (OUTPATIENT)
Dept: ANTICOAGULATION | Facility: CLINIC | Age: 73
End: 2018-08-16

## 2018-08-16 ENCOUNTER — MYC MEDICAL ADVICE (OUTPATIENT)
Dept: RHEUMATOLOGY | Facility: CLINIC | Age: 73
End: 2018-08-16

## 2018-08-16 DIAGNOSIS — Z79.01 LONG TERM CURRENT USE OF ANTICOAGULANT THERAPY: ICD-10-CM

## 2018-08-16 DIAGNOSIS — Z79.01 LONG-TERM (CURRENT) USE OF ANTICOAGULANTS: ICD-10-CM

## 2018-08-16 DIAGNOSIS — M32.15 SYSTEMIC LUPUS ERYTHEMATOSUS WITH TUBULO-INTERSTITIAL NEPHROPATHY, UNSPECIFIED SLE TYPE (H): Primary | ICD-10-CM

## 2018-08-16 DIAGNOSIS — Z79.899 HIGH RISK MEDICATIONS (NOT ANTICOAGULANTS) LONG-TERM USE: ICD-10-CM

## 2018-08-16 LAB
FACTOR 2 ASSAY: NORMAL
INR PPP: 2.81 (ref 0.86–1.14)
PROTHROM ACT/NOR PPP: 17 % (ref 60–140)

## 2018-08-16 NOTE — PROGRESS NOTES
ANTICOAGULATION FOLLOW-UP CLINIC VISIT    Patient Name:  Shala Caruso  Date:  8/16/2018  Contact Type:  Telephone    SUBJECTIVE:     Patient Findings     Positives No Problem Findings    Comments Spoke to Shala.  Factor 2 result today is 17%.  Goal range is 15-25%.  Did not adjust Warfarin dose.  Will check in three weeks.           OBJECTIVE    INR   Date Value Ref Range Status   08/16/2018 2.81 (H) 0.86 - 1.14 Final     Factor 2 Assay   Date Value Ref Range Status   08/16/2018 17 (L) 60 - 140 % Final       ASSESSMENT / PLAN  INR assessment THER    Recheck INR In: 3 WEEKS    INR Location Clinic      Anticoagulation Summary as of 8/16/2018     INR goal 2.0-3.0   Today's INR No new INR was available at the time of this encounter.   Warfarin maintenance plan 12.5 mg (5 mg x 2.5) on Sun, Tue, Thu; 10 mg (5 mg x 2) all other days   Full warfarin instructions 12.5 mg on Sun, Tue, Thu; 10 mg all other days   Weekly warfarin total 77.5 mg   No change documented Andre Bledsoe RN   Plan last modified Portia Cherry RN (6/16/2017)   Next INR check 9/6/2018   Priority INR   Target end date     Indications   SLE (systemic lupus erythematosus) (H) (Resolved) [M32.9]  Long-term (current) use of anticoagulants [Z79.01] [Z79.01]         Anticoagulation Episode Summary     INR check location Clinic Lab    Preferred lab     Send INR reminders to [facility]    Comments Factor 2  goal range is 15-25%  Ok to leave message on home [phone] and work voicemail, but call [email or web address] per pt request.  OK to leave message at office  May leave messages with Rodriguez Santos  DO NOT GIVE RECORDS TO EMPLOYER [employer]        Anticoagulation Care Providers     Provider Role Specialty Phone number    Mt Kiser MD Responsible Clinical Cardiac Electrophysiology [phone]            See the Encounter Report to view Anticoagulation Flowsheet and Dosing Calendar (Go to Encounters tab in chart review, and find the  Anticoagulation Therapy Visit)    Spoke with patient. Gave them their lab results and new warfarin recommendation.  No changes in health, medication, or diet. No missed doses, no falls. No signs or symptoms of bleed or clotting.    Andre Bledsoe, RN

## 2018-08-16 NOTE — MR AVS SNAPSHOT
Shala Caruso   8/16/2018   Anticoagulation Therapy Visit    Description:  72 year old female   Provider:  Andre Bledsoe, RN   Department:  [facility]           INR as of 8/16/2018     Today's INR No new INR was available at the time of this encounter.      Anticoagulation Summary as of 8/16/2018     INR goal 2.0-3.0   Today's INR No new INR was available at the time of this encounter.   Full warfarin instructions 12.5 mg on Sun, Tue, Thu; 10 mg all other days   Next INR check 9/6/2018    Indications   SLE (systemic lupus erythematosus) (H) (Resolved) [M32.9]  Long-term (current) use of anticoagulants [Z79.01] [Z79.01]         August 2018 Details    Sun Mon Tue Wed Thu Fri Sat        1               2               3               4                 5               6               7               8               9               10               11                 12               13               14               15               16      12.5 mg   See details      17      10 mg         18      10 mg           19      12.5 mg         20      10 mg         21      12.5 mg         22      10 mg         23      12.5 mg         24      10 mg         25      10 mg           26      12.5 mg         27      10 mg         28      12.5 mg         29      10 mg         30      12.5 mg         31      10 mg           Date Details   08/16 This INR check               How to take your warfarin dose     To take:  10 mg Take 2 of the 5 mg tablets.    To take:  12.5 mg Take 2.5 of the 5 mg tablets.           September 2018 Details    Sun Mon Tue Wed Thu Fri Sat           1      10 mg           2      12.5 mg         3      10 mg         4      12.5 mg         5      10 mg         6            7               8                 9               10               11               12               13               14               15                 16               17               18               19               20                21               22                 23               24               25               26               27               28               29                 30                      Date Details   No additional details    Date of next INR:  9/6/2018         How to take your warfarin dose     To take:  10 mg Take 2 of the 5 mg tablets.    To take:  12.5 mg Take 2.5 of the 5 mg tablets.

## 2018-08-21 NOTE — TELEPHONE ENCOUNTER
[facility] Call Center    Phone Message    May a detailed message be left on voicemail: yes    Reason for Call: Order(s): Other:   Reason for requested: pt needs labs drawn prior to Dr Gonsalez appt 9.6.18. The pt needs the orders added to EPIC  Date needed: now  Provider name: earl      Action Taken: Message routed to:  [facility]

## 2018-08-23 DIAGNOSIS — E78.49 OTHER HYPERLIPIDEMIA: ICD-10-CM

## 2018-08-23 NOTE — TELEPHONE ENCOUNTER
zocor     Last Written Prescription Date:  10/12/17  Last Fill Quantity: 90   # refills: 2  Last Office Visit : 7/30/18  Future Office visit:  1/14/19    Routing refill request to provider for review/approval because:  Mail order, new rx endocrine rx needed

## 2018-08-27 RX ORDER — SIMVASTATIN 40 MG
40 TABLET ORAL AT BEDTIME
Qty: 90 TABLET | Refills: 3 | Status: SHIPPED | OUTPATIENT
Start: 2018-08-27 | End: 2019-07-15

## 2018-08-29 ENCOUNTER — ANTICOAGULATION THERAPY VISIT (OUTPATIENT)
Dept: ANTICOAGULATION | Facility: CLINIC | Age: 73
End: 2018-08-29

## 2018-08-29 DIAGNOSIS — Z79.01 LONG TERM CURRENT USE OF ANTICOAGULANT THERAPY: ICD-10-CM

## 2018-08-29 DIAGNOSIS — Z79.01 LONG-TERM (CURRENT) USE OF ANTICOAGULANTS: ICD-10-CM

## 2018-08-29 DIAGNOSIS — Z79.899 HIGH RISK MEDICATIONS (NOT ANTICOAGULANTS) LONG-TERM USE: ICD-10-CM

## 2018-08-29 DIAGNOSIS — M32.15 SYSTEMIC LUPUS ERYTHEMATOSUS WITH TUBULO-INTERSTITIAL NEPHROPATHY, UNSPECIFIED SLE TYPE (H): ICD-10-CM

## 2018-08-29 LAB
ALT SERPL W P-5'-P-CCNC: 26 U/L (ref 0–50)
AST SERPL W P-5'-P-CCNC: 21 U/L (ref 0–45)
ERYTHROCYTE [DISTWIDTH] IN BLOOD BY AUTOMATED COUNT: 15.9 % (ref 10–15)
FACTOR 2 ASSAY: NORMAL
HCT VFR BLD AUTO: 40.2 % (ref 35–47)
HGB BLD-MCNC: 12.7 G/DL (ref 11.7–15.7)
INR PPP: 2.88 (ref 0.86–1.14)
MCH RBC QN AUTO: 28.9 PG (ref 26.5–33)
MCHC RBC AUTO-ENTMCNC: 31.6 G/DL (ref 31.5–36.5)
MCV RBC AUTO: 91 FL (ref 78–100)
PLATELET # BLD AUTO: 236 10E9/L (ref 150–450)
PROTHROM ACT/NOR PPP: 17 % (ref 60–140)
RBC # BLD AUTO: 4.4 10E12/L (ref 3.8–5.2)
WBC # BLD AUTO: 3.5 10E9/L (ref 4–11)

## 2018-08-29 NOTE — MR AVS SNAPSHOT
Shala Caruso   8/29/2018   Anticoagulation Therapy Visit    Description:  72 year old female   Provider:  Andre Bledsoe, RN   Department:  [facility]           INR as of 8/29/2018     Today's INR No new INR was available at the time of this encounter.      Anticoagulation Summary as of 8/29/2018     INR goal 2.0-3.0   Today's INR No new INR was available at the time of this encounter.   Full warfarin instructions 12.5 mg on Sun, Tue, Thu; 10 mg all other days   Next INR check 9/26/2018    Indications   SLE (systemic lupus erythematosus) (H) (Resolved) [M32.9]  Long-term (current) use of anticoagulants [Z79.01] [Z79.01]         August 2018 Details    Sun Mon Tue Wed Thu Fri Sat        1               2               3               4                 5               6               7               8               9               10               11                 12               13               14               15               16               17               18                 19               20               21               22               23               24               25                 26               27               28               29      10 mg   See details      30      12.5 mg         31      10 mg           Date Details   08/29 This INR check               How to take your warfarin dose     To take:  10 mg Take 2 of the 5 mg tablets.    To take:  12.5 mg Take 2.5 of the 5 mg tablets.           September 2018 Details    Sun Mon Tue Wed Thu Fri Sat           1      10 mg           2      12.5 mg         3      10 mg         4      12.5 mg         5      10 mg         6      12.5 mg         7      10 mg         8      10 mg           9      12.5 mg         10      10 mg         11      12.5 mg         12      10 mg         13      12.5 mg         14      10 mg         15      10 mg           16      12.5 mg         17      10 mg         18      12.5 mg         19      10 mg          20      12.5 mg         21      10 mg         22      10 mg           23      12.5 mg         24      10 mg         25      12.5 mg         26            27               28               29                 30                      Date Details   No additional details    Date of next INR:  9/26/2018         How to take your warfarin dose     To take:  10 mg Take 2 of the 5 mg tablets.    To take:  12.5 mg Take 2.5 of the 5 mg tablets.

## 2018-08-29 NOTE — PROGRESS NOTES
ANTICOAGULATION FOLLOW-UP CLINIC VISIT    Patient Name:  Shala Caruso  Date:  8/29/2018  Contact Type:  Telephone    SUBJECTIVE:     Patient Findings     Positives No Problem Findings    Comments Factor 2 result today (8/29) is 17%.  Goal range is 15-25%.           OBJECTIVE    INR   Date Value Ref Range Status   08/29/2018 2.88 (H) 0.86 - 1.14 Final     Factor 2 Assay   Date Value Ref Range Status   08/29/2018 17 (L) 60 - 140 % Final       ASSESSMENT / PLAN  INR assessment THER    Recheck INR In: 4 WEEKS    INR Location Clinic      Anticoagulation Summary as of 8/29/2018     INR goal 2.0-3.0   Today's INR No new INR was available at the time of this encounter.   Warfarin maintenance plan 12.5 mg (5 mg x 2.5) on Sun, Tue, Thu; 10 mg (5 mg x 2) all other days   Full warfarin instructions 12.5 mg on Sun, Tue, Thu; 10 mg all other days   Weekly warfarin total 77.5 mg   No change documented Andre Bledsoe RN   Plan last modified Portia Cherry RN (6/16/2017)   Next INR check 9/26/2018   Priority INR   Target end date     Indications   SLE (systemic lupus erythematosus) (H) (Resolved) [M32.9]  Long-term (current) use of anticoagulants [Z79.01] [Z79.01]         Anticoagulation Episode Summary     INR check location Clinic Lab    Preferred lab     Send INR reminders to [facility]    Comments Factor 2  goal range is 15-25%  Ok to leave message on home [phone] and work voicemail, but call [email or web address] per pt request.  OK to leave message at office  May leave messages with Rodriguez Santos  DO NOT GIVE RECORDS TO EMPLOYER [employer]        Anticoagulation Care Providers     Provider Role Specialty Phone number    Mt Kiser MD Responsible Clinical Cardiac Electrophysiology [phone]            See the Encounter Report to view Anticoagulation Flowsheet and Dosing Calendar (Go to Encounters tab in chart review, and find the Anticoagulation Therapy Visit)    Spoke with patient. Gave them their lab results  and new warfarin recommendation.  No changes in health, medication, or diet. No missed doses, no falls. No signs or symptoms of bleed or clotting.    Andre Bledsoe, RN

## 2018-08-30 LAB
C4 SERPL-MCNC: 12 MG/DL (ref 15–50)
DSDNA AB SER-ACNC: 1 IU/ML

## 2018-09-01 LAB — CH50 SERPL-ACNC: 75 CAE UNITS (ref 60–144)

## 2018-09-04 ENCOUNTER — TELEPHONE (OUTPATIENT)
Dept: ENDOCRINOLOGY | Facility: CLINIC | Age: 73
End: 2018-09-04

## 2018-09-04 DIAGNOSIS — E10.9 DIABETES MELLITUS TYPE 1 (H): Primary | ICD-10-CM

## 2018-09-04 NOTE — TELEPHONE ENCOUNTER
Faxed refill request received from [facility].   Medication Requested: True Metrix Blood Glucose Test Strips  - Not on current med list  Directions: use to test blood sugar 5 times daily or as directed  Quantity: 450  Last Office Visit: 7/30/18  Next Appointment Scheduled for: 1/14/19

## 2018-09-06 ENCOUNTER — OFFICE VISIT (OUTPATIENT)
Dept: RHEUMATOLOGY | Facility: CLINIC | Age: 73
End: 2018-09-06
Attending: INTERNAL MEDICINE
Payer: COMMERCIAL

## 2018-09-06 VITALS
OXYGEN SATURATION: 97 % | DIASTOLIC BLOOD PRESSURE: 67 MMHG | SYSTOLIC BLOOD PRESSURE: 112 MMHG | HEART RATE: 67 BPM | TEMPERATURE: 98.2 F | RESPIRATION RATE: 16 BRPM

## 2018-09-06 DIAGNOSIS — D68.61 ANTIPHOSPHOLIPID SYNDROME (H): ICD-10-CM

## 2018-09-06 DIAGNOSIS — M32.15 SYSTEMIC LUPUS ERYTHEMATOSUS WITH TUBULO-INTERSTITIAL NEPHROPATHY, UNSPECIFIED SLE TYPE (H): Primary | ICD-10-CM

## 2018-09-06 DIAGNOSIS — Z79.899 ENCOUNTER FOR LONG-TERM CURRENT USE OF MEDICATION: ICD-10-CM

## 2018-09-06 PROCEDURE — G0463 HOSPITAL OUTPT CLINIC VISIT: HCPCS | Mod: ZF

## 2018-09-06 RX ORDER — MYCOPHENOLATE MOFETIL 500 MG/1
1000 TABLET ORAL 2 TIMES DAILY
Qty: 360 TABLET | Refills: 3 | Status: SHIPPED | OUTPATIENT
Start: 2018-09-06 | End: 2019-03-21

## 2018-09-06 ASSESSMENT — PAIN SCALES - GENERAL: PAINLEVEL: MODERATE PAIN (5)

## 2018-09-06 NOTE — LETTER
9/6/2018     RE: Shala Caruso  [address]  [place] [address]     Dear Colleague,    Thank you for referring your patient, Shala Caruso, to the [facility] RHEUMATOLOGY at [facility]. Please see a copy of my visit note below.    Rheumatology Visit     Shala Caruso MRN# [id number]   YOB: 1945 Age: 72 year old     Date of Visit: September 6, 2018  Primary care provider: Joe Contreras          Assessment and Plan:   1. 71 yo female with SLE, diagnosed 2000 (+CRISTAL,+dsDNA ab, arthritis, serositis): flare (3/10) with pericardial effusion/tamponade, and now off of prednisone; Cellcept was started 6/10 and tolerating well. She has no significant current symptoms of inflammation. SLE lab stable with Cellcept monitoring.   2. 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 Allergies to multiple antibiotics and Plaquenil   4. Osteoprosis on Boniva (Last DXA with some continued decrease of BMD)   5. Severe GERD, Achalasia   6. Post thrombophlebitic syndrome (chronic LE swelling), stable  7. Dyslipidemia with current excellent lipid values   8. Raynauds   9. Chronic leukopenia   10. 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 Herpes Zoster  Plan   Discussed in detail with the patient   1. Continue Cellcept 2000 mg daily   2. She does not require additional therapy at this time   3. Call if new symptoms   4. Continue lab monitoring ordered before next appointment at 3 months and soon before next appointment   5. Return to SLE Clinic in 6 months   6. Continue followup in [facility] for Diabetes; in [facility] for other concerns   7. Follow up regarding lymphedema as needed   8. flu shot this fall  Santiago Corbett MD.           History of Present Illness:   71 yo female is seen for followup of SLE. I saw her last in February 2018. EPIC reviewed.   Problem list:   1. SLE, diagnosed 2000 (+CRISTAL,+dsDNA ab, arthritis, serositis): flare (3/10) with pericardial  effusion/tamponade, on Cellcept (started 6/10)   2. APS, s/p DVT LLE, PE on warfarin in 2004   3. Allergies to multiple antibiotics and Plaquenil   4. Osteoprosis on Boniva (Last DXA 1/09, Tscore: -1.9 L femoral neck)   5. Severe GERD, Achalasia   6. Post thrombophlebitic syndrome (chronic LE swelling)   7. Dyslipidemia   8. Raynauds   9. Chronic leukopenia on Methotrexate  10. L arm lymphedema    HISTORY CARRIED FORWARD:   She has been off Prednisone for about 3.5 years and off Methotrexate since May 2012; she is on Cellcept 2000mg/day and no longer on Plaquenil.   She has left arm lymphedema. She has hx of axillary LN dissection for enlarged nodes. This have been evaluated by OT. She was using her glove and sleeve but the glove is too tight and she is not using it regularly; this does not impede her at present.  She has had no recurrent chest pain/SOB or pleurisy since her hospitalization in March 2010 for pericarditis.       Her biggest interval problem is Herpes Zoster in L Thoracic dermatome, dx 3/23 in the [facility].  She was on Valtrex and is on Neurontin.  She is having a lot of pain.  She sees them again next week.       INTERVAL HISTORY:      She has had an overall uneventful 6 months from an SLE perspective. She is tolerating the medications without problem remaining on Mycophenolate 1 gram bid. Her labs have been stable with persistent leukopenia now at 3.5 last month; DSDNA normal at 29 for first time in July 2014 and normal last checked in late August. Her complements have been stable but slightly low without change. Creatinine has been variable but again normal at 0.68 last check.  Her joints are stable with no swelling. She has osteoarthritis of the bilateral knees which is the most bothersome problem. She continues to do low impact Yoga.  She notes more foot pain and problems with wearing closed toe shoes. She may have DM neuropathy, has seen a Podiatrist and also has a Wilde's neuroma.  She denies  fevers, fatigue, oral ulcers, rash. Raynauds is stable without Digital ulcers. No new rashes.  She remains on Boniva followed in Endocrine for this and Type I DM. She is followed in Coumadin Clinic with hx of DVT and PE.    We discussed her prior therapy and options for going forward. She is looking ahead to eventual residential. She has Diabetes so wishes to avoid Prednisone unless absolutely needed.       Review of Systems:   Review Of Systems  Skin: negative  Eyes: negative  Ears/Nose/Throat: negative  Respiratory: No shortness of breath, dyspnea on exertion, cough, or hemoptysis  Cardiovascular: negative  Gastrointestinal: negative  Genitourinary: negative  Musculoskeletal: see HPI  Neurologic: negative  Psychiatric: negative  Hematologic/Lymphatic/Immunologic: on Anticoagulation  Endocrine: DM under care          Past Medical History:     Past Medical History:   Diagnosis Date     Achalasia      Antiphospholipid syndrome (H)      Antiplatelet or antithrombotic long-term use      DM (diabetes mellitus) (H)      DVT (deep venous thrombosis) (H) 2003    and PE     Dysphagia     occasional, use Nifedipine     GERD (gastroesophageal reflux disease)      Hyperlipidemia      Lymphedema      Myopia      Neuropathy     toes bilateral     Nonsenile cataract      osteoporosis      Pericarditis      Raynaud's disease      SLE (systemic lupus erythematosus) (H)        Patient Active Problem List    Diagnosis Date Noted     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, unspecified SLE type (H) 10/12/2017     Priority: Medium     Choroidal lesion 08/30/2017     Priority: Medium     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 Contreras.         Cystocele, midline 10/15/2014     Priority: Medium     Urinary urgency  10/15/2014     Priority: Medium     Urinary frequency 10/15/2014     Priority: Medium     Female stress incontinence 10/15/2014     Priority: Medium     Atrophic vaginitis 10/15/2014     Priority: Medium     Osteoporosis, postmenopausal 07/14/2014     Priority: Medium     Osteoporosis, idiopathic 07/11/2013     Priority: Medium     Type 1 diabetes mellitus (H) 07/02/2012     Priority: Medium     Encounter for long-term current use of medication 05/30/2012     Priority: Medium     Problem list name updated by automated process. Provider to review       Achalasia      Priority: Medium     Antiphospholipid syndrome (H)      Priority: Medium     DM (diabetes mellitus) (H)      Priority: Medium     GERD (gastroesophageal reflux disease)      Priority: Medium     Hyperlipidemia      Priority: Medium     HTN (hypertension)      Priority: Medium     Osteopenia      Priority: Medium     Raynaud's disease      Priority: Medium     DVT (deep venous thrombosis) (H)      Priority: Medium     and PE               Past Surgical History:     Past Surgical History:   Procedure Laterality Date     COLONOSCOPY N/A 11/28/2016    Procedure: COLONOSCOPY;  Surgeon: Sang August MD;  Location: [facility]     CYSTOSCOPY, SLING TRANSVAGINAL N/A 12/20/2016    Procedure: CYSTOSCOPY, SLING TRANSVAGINAL;  Surgeon: Jeanmarie Pierson MD;  Location[facility]     DISSECT LYMPH NODE AXILLA  2004    Lt, during eval for SLE     HYSTEROSCOPIC PLACEMENT CONTRACEPTIVE DEVICE  1985     TUBAL LIGATION Bilateral              Social History:     Social History   Substance Use Topics     Smoking status: Never Smoker     Smokeless tobacco: Never Used     Alcohol use No             Family History:     Family History   Problem Relation Age of Onset     Cancer Other      breast     Cerebrovascular Disease Other      C.A.D. Other      Glaucoma Father             Allergies:     Allergies   Allergen Reactions     Amaryl [Glimepiride] Swelling     Swelling of tongue      Metformin Blisters     Experienced big blisters all over body and sloughing of skin     Plaquenil [Aminoquinolines] Hives     Sulfa Drugs Other (See Comments)     Turned bright red     Amoxicillin Rash     Hydroxychloroquine Rash     Penicillins Rash             Medications:     Current Outpatient Prescriptions   Medication Sig Dispense Refill     acetaminophen (TYLENOL) 500 MG tablet Take 500 mg by mouth At Bedtime Takes 6 tablets (500 mg ) .Christa Acharya LPN 2:40 PM on 3/23/2017       aspirin 81 MG tablet Take 81 mg by mouth At Bedtime        AYR SALINE NASAL NO-DRIP GEL Use as needed for nasal dryness 3 Tube 3     blood glucose monitoring (TRUE METRIX BLOOD GLUCOSE TEST) test strip Use to test blood sugar 5 times daily or as directed. 500 strip 3     Blood Glucose Monitoring Suppl (TRUE METRIX METER) W/DEVICE KIT 1 each 4 times daily 1 kit 0     glucagon (GLUCAGON EMERGENCY) 1 MG kit Inject 1 mg subcutaneously or intramuscularly for severe hypoglycemic episodes. (Patient not taking: Reported on 7/30/2018) 1 each 3     Injection Device for insulin (NOVOPEN ECHO) RORY 1 each 4 times daily 1 each 0     Injection Device for Insulin (NOVOPEN JR, GREEN,) RORY Use as directed.       insulin glargine (LANTUS) 100 UNIT/ML injection Inject 4 units as directed daily LANTUS SOLOSTAR PEN PLEASE 15 mL 3     insulin pen needle (BD PAM U/F) 32G X 4 MM Use 200 pen needles daily or as directed. 200 each 3     LANCETS ULTRA THIN 30G MISC Test 7-8 times daily  (Lancets that go with pt current meter ) 7203 each 3     Multiple Vitamins-Minerals (CENTRUM SILVER PO) Take 1 tablet by mouth daily.       mycophenolate (GENERIC EQUIVALENT) 500 MG tablet Take 2 tablets (1,000 mg) by mouth 2 times daily 2 tabs in the a.m., 2 tabs in p.m. 360 tablet 3     NIFEdipine ER osmotic (NIFEDICAL XL) 30 MG 24 hr tablet Take 1 tablet (30 mg) by mouth daily 90 tablet 2     NOVOLOG PENFILL 100 UNIT/ML soln Inject 1 unit per 15 grams CHO with meals  TID approx 15 units daily 15 mL 3     omega-3 fatty acids (FISH OIL) 1200 MG capsule Take 1 capsule by mouth daily.       pseudoePHEDrine (SUDAFED) 30 MG tablet Take 1 tablet (30 mg) by mouth every 4 hours as needed for congestion 20 tablet 0     simvastatin (ZOCOR) 40 MG tablet Take 1 tablet (40 mg) by mouth At Bedtime 90 tablet 3     VITAMIN D, CHOLECALCIFEROL, PO Take 1,000 Units by mouth daily (with lunch)        warfarin (COUMADIN) 5 MG tablet Take 2-2.5 tablets daily or as directed by coumadin clinic. 75 tablet 5            Physical Exam:   /67 (BP Location: Right arm, Patient Position: Sitting, Cuff Size: Adult Small)  Pulse 67  Temp 98.2  F (36.8  C) (Oral)  Resp 16  SpO2 97%    Constitutional: WD-WN-WG cooperative   Eyes: nl conjunctiva, sclera   ENT: nl external ears, nose, throat   No mucous membrane lesions, normal saliva pool   Neck: no mass or thyroid enlargement   GI: no ABD mass or tenderness, no HSM   Lymph: no cervical, supraclavicular, inguinal or epitrochlear nodes   MS: All TMJ, neck, shoulder, elbow, wrist, MCP/PIP/DIP, spine, hip, knee, ankle, and foot MTP/IP joints were examined and found without active synovitis or deformity. No dactylitis, tenosynovitis, enthesopathy. Stable mild lymphedema LUE. She has bilateral support stockings  Skin: Xerosis and fissures of the distal fingers unchanged. No nail pitting, alopecia.    Neuro: nl cranial nerves, strength  Psych: nl affect       Data:     Lab Results   Component Value Date    WBC 3.5 (L) 08/29/2018    WBC 3.0 (L) 04/27/2018    WBC 3.2 (L) 01/02/2018    HGB 12.7 08/29/2018    HGB 12.4 04/27/2018    HGB 13.0 01/02/2018    HCT 40.2 08/29/2018    HCT 39.6 04/27/2018    HCT 41.4 01/02/2018    MCV 91 08/29/2018    MCV 93 04/27/2018    MCV 93 01/02/2018     08/29/2018     04/27/2018     01/02/2018     Lab Results   Component Value Date    BUN 24 07/26/2018    BUN 27 12/14/2016    BUN 30 07/14/2014     No components  found for: SEDRATE  Lab Results   Component Value Date    TSH 0.95 07/26/2018    TSH 0.64 08/05/2016    TSH 1.02 01/11/2016     Lab Results   Component Value Date    AST 21 08/29/2018    AST 20 04/27/2018    AST 18 01/02/2018    ALT 26 08/29/2018    ALT 24 04/27/2018    ALT 20 01/02/2018    ALKPHOS 74 04/01/2013    ALKPHOS 57 08/25/2011    ALKPHOS 39 (L) 08/05/2011     Reviewed Rheumatology lab flowsheet      Santaigo Corbett MD

## 2018-09-06 NOTE — MR AVS SNAPSHOT
After Visit Summary   9/6/2018    Shala Caruso    MRN: [id number]           Patient Information     Date Of Birth          1945        Visit Information        Provider Department      9/6/2018 4:30 PM Santiago Corbett MD [facility] Rheumatology        Today's Diagnoses     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, unspecified SLE type (H)    -  1    Antiphospholipid syndrome (H)        Encounter for long-term current use of medication           Follow-ups after your visit        Follow-up notes from your care team     Return in about 6 months (around 3/6/2019).      Your next 10 appointments already scheduled     Sep 06, 2018  4:30 PM CDT   (Arrive by 4:15 PM)   RETURN LUPUS with Santiago Corbett MD   [facility] Rheumatology ([facility])    [address] [place] [address]  [address]  [facility] [address] [phone]            Sep 26, 2018  1:00 PM CDT   LAB with  LAB   [facility] Lab ([facility])    [address] [place] [address]  1st Floor  [facility] [address] [phone]           Please do not eat 10-12 hours before your appointment if you are coming in fasting for labs on lipids, cholesterol, or glucose (sugar). This does not apply to pregnant women. Water, hot tea and black coffee (with nothing added) are okay. Do not drink other fluids, diet soda or chew gum.            Jan 14, 2019  7:30 AM CST   (Arrive by 7:15 AM)   RETURN DIABETES with Dorita Cramer MD   [facility] Endocrinology ([facility])    [address] [place] [address]  3rd Floor  [facility] [id number] [phone]            Mar 07, 2019  4:00 PM CST   (Arrive by 3:45 PM)   RETURN LUPUS with Santiago Corbett MD   [facility] Rheumatology ([facility])    [address] [place] [address]  [address]  [facility] [address] [phone]              Who to contact     If you have questions or need follow up  information about today's clinic visit or your schedule please contact [facility] RHEUMATOLOGY directly at [phone].  Normal or non-critical lab and imaging results will be communicated to you by NetClarityhart, letter or phone within 4 business days after the clinic has received the results. If you do not hear from us within 7 days, please contact the clinic through NetClarityhart or phone. If you have a critical or abnormal lab result, we will notify you by phone as soon as possible.  Submit refill requests through Lottay or call your pharmacy and they will forward the refill request to us. Please allow 3 business days for your refill to be completed.          Additional Information About Your Visit        NetClarityharUberpong Information     Lottay gives you secure access to your electronic health record. If you see a primary care provider, you can also send messages to your care team and make appointments. If you have questions, please call your primary care clinic.  If you do not have a primary care provider, please call [phone] and they will assist you.        Care EveryWhere ID     This is your Care EveryWhere ID. This could be used by other organizations to access your [facility] medical records  [id number]        Your Vitals Were     Pulse Temperature Respirations Pulse Oximetry          67 98.2  F (36.8  C) (Oral) 16 97%         Blood Pressure from Last 3 Encounters:   09/06/18 112/67   07/30/18 117/71   02/16/18 119/73    Weight from Last 3 Encounters:   07/30/18 54.1 kg (119 lb 3.2 oz)   01/29/18 54 kg (119 lb)   08/02/17 52.8 kg (116 lb 8 oz)              Today, you had the following     No orders found for display         Today's Medication Changes          These changes are accurate as of 9/6/18  4:25 PM.  If you have any questions, ask your nurse or doctor.               Stop taking these medicines if you haven't already. Please contact your care team if you have questions.     VITAMIN D (CHOLECALCIFEROL) PO   Stopped  by:  Santiago Corbett MD                Where to get your medicines      These medications were sent to [facility] - [place], [place] - [address] AT [facility]  [address] [address]     Phone:  [phone]     mycophenolate 500 MG tablet                Primary Care Provider Office Phone # Fax #    Joe Tj Contreras [phone]       [address] [place] [address]        Equal Access to Services     [facility]: Hadii aad ku hadasho Soomaali, waaxda luqadaha, qaybta kaalmada adeegyada, waxlove ruiz . So [facility] [phone].    ATENCIÓN: Si habla español, tiene a hurtado disposición servicios gratuitos de asistencia lingüística. [facility] [phone].    We comply with applicable federal civil rights laws and [place] laws. We do not discriminate on the basis of race, color, national origin, age, disability, sex, sexual orientation, or gender identity.            Thank you!     Thank you for choosing [facility] RHEUMATOLOGY  for your care. Our goal is always to provide you with excellent care. Hearing back from our patients is one way we can continue to improve our services. Please take a few minutes to complete the written survey that you may receive in the mail after your visit with us. Thank you!             Your Updated Medication List - Protect others around you: Learn how to safely use, store and throw away your medicines at www.disposemymeds.org.          This list is accurate as of 9/6/18  4:25 PM.  Always use your most recent med list.                   Brand Name Dispense Instructions for use Diagnosis    aspirin 81 MG tablet      Take 81 mg by mouth At Bedtime        AYR SALINE NASAL NO-DRIP Gel     3 Tube    Use as needed for nasal dryness    Nasal ulcer       blood glucose monitoring lancets     3324 each    Test 7-8 times daily  (Lancets that go with pt current meter )     Diabetes mellitus (H)       blood glucose monitoring test strip    TRUE METRIX BLOOD GLUCOSE TEST    500 strip    Use to test blood sugar 5 times daily or as directed.    Diabetes mellitus type 1 (H)       CALCIUM 500 + D PO      Take 1 tablet by mouth 3 times daily        CENTRUM SILVER PO      Take 1 tablet by mouth daily.        glucagon 1 MG kit    GLUCAGON EMERGENCY    1 each    Inject 1 mg subcutaneously or intramuscularly for severe hypoglycemic episodes.    Type 1 diabetes mellitus with hypoglycemia and without coma (H)       insulin glargine 100 UNIT/ML injection    LANTUS    15 mL    Inject 4 units as directed daily LANTUS SOLOSTAR PEN PLEASE    Type 1 diabetes mellitus with hypoglycemia and without coma (H)       insulin pen needle 32G X 4 MM    BD PAM U/F    200 each    Use 200 pen needles daily or as directed.    Type 1 diabetes mellitus with hypoglycemia and without coma (H)       mycophenolate 500 MG tablet    GENERIC EQUIVALENT    360 tablet    Take 2 tablets (1,000 mg) by mouth 2 times daily 2 tabs in the a.m., 2 tabs in p.m.    Antiphospholipid syndrome (H), Encounter for long-term current use of medication       NIFEdipine ER osmotic 30 MG 24 hr tablet    NIFEDICAL XL    90 tablet    Take 1 tablet (30 mg) by mouth daily    Achalasia of esophagus, HTN (hypertension)       NovoLOG PENFILL 100 UNIT/ML injection   Generic drug:  insulin aspart     15 mL    Inject 1 unit per 15 grams CHO with meals TID approx 15 units daily    Type 1 diabetes mellitus with hypoglycemia and without coma (H)       * Injection Device for insulin Priscila    NOVOPEN ECHO    1 each    1 each 4 times daily    Type 1 diabetes mellitus with hypoglycemia and without coma (H)       * NOVOPEN JR (GREEN) Priscila   Generic drug:  Injection Device for insulin      Use as directed.        pseudoePHEDrine 30 MG tablet    SUDAFED    20 tablet    Take 1 tablet (30 mg) by mouth every 4 hours as needed for congestion    Viral upper respiratory  tract infection       simvastatin 40 MG tablet    ZOCOR    90 tablet    Take 1 tablet (40 mg) by mouth At Bedtime    Other hyperlipidemia       TRUE METRIX METER w/Device Kit     1 kit    1 each 4 times daily    Type I diabetes mellitus, well controlled (H)       TYLENOL 500 MG tablet   Generic drug:  acetaminophen      Take 1,000-1,500 mg by mouth nightly as needed        warfarin 5 MG tablet    COUMADIN    75 tablet    Take 2-2.5 tablets daily or as directed by coumadin clinic.    Long term current use of anticoagulant therapy       * Notice:  This list has 2 medication(s) that are the same as other medications prescribed for you. Read the directions carefully, and ask your doctor or other care provider to review them with you.

## 2018-09-06 NOTE — NURSING NOTE
Chief Complaint   Patient presents with     RECHECK     SLE       /67 (BP Location: Right arm, Patient Position: Sitting, Cuff Size: Adult Small)  Pulse 67  Temp 98.2  F (36.8  C) (Oral)  Resp 16  SpO2 97%    Lidia Holguin CMA  9/6/2018 4:08 PM

## 2018-09-06 NOTE — PROGRESS NOTES
Rheumatology Visit     Shala Caruso MRN# [id number]   YOB: 1945 Age: 72 year old     Date of Visit: September 6, 2018  Primary care provider: Joe Contreras          Assessment and Plan:   1. 73 yo female with SLE, diagnosed 2000 (+CRISTAL,+dsDNA ab, arthritis, serositis): flare (3/10) with pericardial effusion/tamponade, and now off of prednisone; Cellcept was started 6/10 and tolerating well. She has no significant current symptoms of inflammation. SLE lab stable with Cellcept monitoring.   2. 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 Allergies to multiple antibiotics and Plaquenil   4. Osteoprosis on Boniva (Last DXA with some continued decrease of BMD)   5. Severe GERD, Achalasia   6. Post thrombophlebitic syndrome (chronic LE swelling), stable  7. Dyslipidemia with current excellent lipid values   8. Raynauds   9. Chronic leukopenia   10. 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 Herpes Zoster  Plan   Discussed in detail with the patient   1. Continue Cellcept 2000 mg daily   2. She does not require additional therapy at this time   3. Call if new symptoms   4. Continue lab monitoring ordered before next appointment at 3 months and soon before next appointment   5. Return to SLE Clinic in 6 months   6. Continue followup in [facility]; in [facility] for other concerns   7. Follow up regarding lymphedema as needed   8. flu shot this fall  Santiago Corbett MD.           History of Present Illness:   73 yo female is seen for followup of SLE. I saw her last in February 2018. EPIC reviewed.   Problem list:   1. SLE, diagnosed 2000 (+CRISTAL,+dsDNA ab, arthritis, serositis): flare (3/10) with pericardial effusion/tamponade, on Cellcept (started 6/10)   2. APS, s/p DVT LLE, PE on warfarin in 2004   3. Allergies to multiple antibiotics and Plaquenil   4. Osteoprosis on Boniva (Last DXA 1/09, Tscore: -1.9 L femoral neck)   5. Severe GERD, Achalasia   6. Post thrombophlebitic syndrome  (chronic LE swelling)   7. Dyslipidemia   8. Raynauds   9. Chronic leukopenia on Methotrexate  10. L arm lymphedema    HISTORY CARRIED FORWARD:   She has been off Prednisone for about 3.5 years and off Methotrexate since May 2012; she is on Cellcept 2000mg/day and no longer on Plaquenil.   She has left arm lymphedema. She has hx of axillary LN dissection for enlarged nodes. This have been evaluated by OT. She was using her glove and sleeve but the glove is too tight and she is not using it regularly; this does not impede her at present.  She has had no recurrent chest pain/SOB or pleurisy since her hospitalization in March 2010 for pericarditis.       Her biggest interval problem is Herpes Zoster in L Thoracic dermatome, dx 3/23 in the [facility].  She was on Valtrex and is on Neurontin.  She is having a lot of pain.  She sees them again next week.       INTERVAL HISTORY:      She has had an overall uneventful 6 months from an SLE perspective. She is tolerating the medications without problem remaining on Mycophenolate 1 gram bid. Her labs have been stable with persistent leukopenia now at 3.5 last month; DSDNA normal at 29 for first time in July 2014 and normal last checked in late August. Her complements have been stable but slightly low without change. Creatinine has been variable but again normal at 0.68 last check.  Her joints are stable with no swelling. She has osteoarthritis of the bilateral knees which is the most bothersome problem. She continues to do low impact Yoga.  She notes more foot pain and problems with wearing closed toe shoes. She may have DM neuropathy, has seen a Podiatrist and also has a Wilde's neuroma.  She denies fevers, fatigue, oral ulcers, rash. Raynauds is stable without Digital ulcers. No new rashes.  She remains on Boniva followed in Endocrine for this and Type I DM. She is followed in Coumadin Clinic with hx of DVT and PE.    We discussed her prior therapy and options for going forward.  She is looking ahead to eventual senior care. She has Diabetes so wishes to avoid Prednisone unless absolutely needed.       Review of Systems:   Review Of Systems  Skin: negative  Eyes: negative  Ears/Nose/Throat: negative  Respiratory: No shortness of breath, dyspnea on exertion, cough, or hemoptysis  Cardiovascular: negative  Gastrointestinal: negative  Genitourinary: negative  Musculoskeletal: see HPI  Neurologic: negative  Psychiatric: negative  Hematologic/Lymphatic/Immunologic: on Anticoagulation  Endocrine: DM under care          Past Medical History:     Past Medical History:   Diagnosis Date     Achalasia      Antiphospholipid syndrome (H)      Antiplatelet or antithrombotic long-term use      DM (diabetes mellitus) (H)      DVT (deep venous thrombosis) (H) 2003    and PE     Dysphagia     occasional, use Nifedipine     GERD (gastroesophageal reflux disease)      Hyperlipidemia      Lymphedema      Myopia      Neuropathy     toes bilateral     Nonsenile cataract      osteoporosis      Pericarditis      Raynaud's disease      SLE (systemic lupus erythematosus) (H)        Patient Active Problem List    Diagnosis Date Noted     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, unspecified SLE type (H) 10/12/2017     Priority: Medium     Choroidal lesion 08/30/2017     Priority: Medium     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 Contreras.         Cystocele, midline 10/15/2014     Priority: Medium     Urinary urgency 10/15/2014     Priority: Medium     Urinary frequency 10/15/2014     Priority: Medium     Female stress incontinence 10/15/2014     Priority: Medium     Atrophic vaginitis 10/15/2014     Priority: Medium     Osteoporosis, postmenopausal 07/14/2014     Priority: Medium      Osteoporosis, idiopathic 07/11/2013     Priority: Medium     Type 1 diabetes mellitus (H) 07/02/2012     Priority: Medium     Encounter for long-term current use of medication 05/30/2012     Priority: Medium     Problem list name updated by automated process. Provider to review       Achalasia      Priority: Medium     Antiphospholipid syndrome (H)      Priority: Medium     DM (diabetes mellitus) (H)      Priority: Medium     GERD (gastroesophageal reflux disease)      Priority: Medium     Hyperlipidemia      Priority: Medium     HTN (hypertension)      Priority: Medium     Osteopenia      Priority: Medium     Raynaud's disease      Priority: Medium     DVT (deep venous thrombosis) (H)      Priority: Medium     and PE               Past Surgical History:     Past Surgical History:   Procedure Laterality Date     COLONOSCOPY N/A 11/28/2016    Procedure: COLONOSCOPY;  Surgeon: Sang August MD;  Location: [facility]     CYSTOSCOPY, SLING TRANSVAGINAL N/A 12/20/2016    Procedure: CYSTOSCOPY, SLING TRANSVAGINAL;  Surgeon: Jeanmarie Pierson MD;  Location: [facility] OR     DISSECT LYMPH NODE AXILLA  2004    Lt, during eval for SLE     HYSTEROSCOPIC PLACEMENT CONTRACEPTIVE DEVICE  1985     TUBAL LIGATION Bilateral              Social History:     Social History   Substance Use Topics     Smoking status: Never Smoker     Smokeless tobacco: Never Used     Alcohol use No             Family History:     Family History   Problem Relation Age of Onset     Cancer Other      breast     Cerebrovascular Disease Other      C.A.D. Other      Glaucoma Father             Allergies:     Allergies   Allergen Reactions     Amaryl [Glimepiride] Swelling     Swelling of tongue     Metformin Blisters     Experienced big blisters all over body and sloughing of skin     Plaquenil [Aminoquinolines] Hives     Sulfa Drugs Other (See Comments)     Turned bright red     Amoxicillin Rash     Hydroxychloroquine Rash     Penicillins Rash              Medications:     Current Outpatient Prescriptions   Medication Sig Dispense Refill     acetaminophen (TYLENOL) 500 MG tablet Take 500 mg by mouth At Bedtime Takes 6 tablets (500 mg ) .Christa Acharya LPN 2:40 PM on 3/23/2017       aspirin 81 MG tablet Take 81 mg by mouth At Bedtime        AYR SALINE NASAL NO-DRIP GEL Use as needed for nasal dryness 3 Tube 3     blood glucose monitoring (TRUE METRIX BLOOD GLUCOSE TEST) test strip Use to test blood sugar 5 times daily or as directed. 500 strip 3     Blood Glucose Monitoring Suppl (TRUE METRIX METER) W/DEVICE KIT 1 each 4 times daily 1 kit 0     glucagon (GLUCAGON EMERGENCY) 1 MG kit Inject 1 mg subcutaneously or intramuscularly for severe hypoglycemic episodes. (Patient not taking: Reported on 7/30/2018) 1 each 3     Injection Device for insulin (NOVOPEN ECHO) RORY 1 each 4 times daily 1 each 0     Injection Device for Insulin (NOVOPEN JR, GREEN,) RORY Use as directed.       insulin glargine (LANTUS) 100 UNIT/ML injection Inject 4 units as directed daily LANTUS SOLOSTAR PEN PLEASE 15 mL 3     insulin pen needle (BD PAM U/F) 32G X 4 MM Use 200 pen needles daily or as directed. 200 each 3     LANCETS ULTRA THIN 30G MISC Test 7-8 times daily  (Lancets that go with pt current meter ) 7203 each 3     Multiple Vitamins-Minerals (CENTRUM SILVER PO) Take 1 tablet by mouth daily.       mycophenolate (GENERIC EQUIVALENT) 500 MG tablet Take 2 tablets (1,000 mg) by mouth 2 times daily 2 tabs in the a.m., 2 tabs in p.m. 360 tablet 3     NIFEdipine ER osmotic (NIFEDICAL XL) 30 MG 24 hr tablet Take 1 tablet (30 mg) by mouth daily 90 tablet 2     NOVOLOG PENFILL 100 UNIT/ML soln Inject 1 unit per 15 grams CHO with meals TID approx 15 units daily 15 mL 3     omega-3 fatty acids (FISH OIL) 1200 MG capsule Take 1 capsule by mouth daily.       pseudoePHEDrine (SUDAFED) 30 MG tablet Take 1 tablet (30 mg) by mouth every 4 hours as needed for congestion 20 tablet 0     simvastatin (ZOCOR)  40 MG tablet Take 1 tablet (40 mg) by mouth At Bedtime 90 tablet 3     VITAMIN D, CHOLECALCIFEROL, PO Take 1,000 Units by mouth daily (with lunch)        warfarin (COUMADIN) 5 MG tablet Take 2-2.5 tablets daily or as directed by coumadin clinic. 75 tablet 5            Physical Exam:   /67 (BP Location: Right arm, Patient Position: Sitting, Cuff Size: Adult Small)  Pulse 67  Temp 98.2  F (36.8  C) (Oral)  Resp 16  SpO2 97%    Constitutional: WD-WN-WG cooperative   Eyes: nl conjunctiva, sclera   ENT: nl external ears, nose, throat   No mucous membrane lesions, normal saliva pool   Neck: no mass or thyroid enlargement   GI: no ABD mass or tenderness, no HSM   Lymph: no cervical, supraclavicular, inguinal or epitrochlear nodes   MS: All TMJ, neck, shoulder, elbow, wrist, MCP/PIP/DIP, spine, hip, knee, ankle, and foot MTP/IP joints were examined and found without active synovitis or deformity. No dactylitis, tenosynovitis, enthesopathy. Stable mild lymphedema LUE. She has bilateral support stockings  Skin: Xerosis and fissures of the distal fingers unchanged. No nail pitting, alopecia.    Neuro: nl cranial nerves, strength  Psych: nl affect       Data:     Lab Results   Component Value Date    WBC 3.5 (L) 08/29/2018    WBC 3.0 (L) 04/27/2018    WBC 3.2 (L) 01/02/2018    HGB 12.7 08/29/2018    HGB 12.4 04/27/2018    HGB 13.0 01/02/2018    HCT 40.2 08/29/2018    HCT 39.6 04/27/2018    HCT 41.4 01/02/2018    MCV 91 08/29/2018    MCV 93 04/27/2018    MCV 93 01/02/2018     08/29/2018     04/27/2018     01/02/2018     Lab Results   Component Value Date    BUN 24 07/26/2018    BUN 27 12/14/2016    BUN 30 07/14/2014     No components found for: SEDRATE  Lab Results   Component Value Date    TSH 0.95 07/26/2018    TSH 0.64 08/05/2016    TSH 1.02 01/11/2016     Lab Results   Component Value Date    AST 21 08/29/2018    AST 20 04/27/2018    AST 18 01/02/2018    ALT 26 08/29/2018    ALT 24 04/27/2018     ALT 20 01/02/2018    ALKPHOS 74 04/01/2013    ALKPHOS 57 08/25/2011    ALKPHOS 39 (L) 08/05/2011     Reviewed Rheumatology lab flowsheet    Santiago Corbett

## 2018-09-20 ENCOUNTER — TELEPHONE (OUTPATIENT)
Dept: INTERNAL MEDICINE | Facility: CLINIC | Age: 73
End: 2018-09-20

## 2018-09-20 DIAGNOSIS — I10 HTN (HYPERTENSION): ICD-10-CM

## 2018-09-20 DIAGNOSIS — K22.0 ACHALASIA OF ESOPHAGUS: ICD-10-CM

## 2018-09-20 RX ORDER — NIFEDIPINE 30 MG/1
30 TABLET, EXTENDED RELEASE ORAL DAILY
Qty: 30 TABLET | Refills: 0 | Status: SHIPPED | OUTPATIENT
Start: 2018-09-20 | End: 2018-09-27

## 2018-09-20 NOTE — TELEPHONE ENCOUNTER
Last Clinic Visit:  7/20/17   NV:NONE  30 DAY RF OVERDUE APPT.  Scheduling has been notified to contact the pt for appointment.

## 2018-09-26 DIAGNOSIS — Z79.01 LONG-TERM (CURRENT) USE OF ANTICOAGULANTS: ICD-10-CM

## 2018-09-26 DIAGNOSIS — H26.9 NUCLEAR CATARACT, NONSENILE: Primary | ICD-10-CM

## 2018-09-26 DIAGNOSIS — Z79.01 LONG TERM CURRENT USE OF ANTICOAGULANT THERAPY: ICD-10-CM

## 2018-09-26 LAB — INR PPP: 2.95 (ref 0.86–1.14)

## 2018-09-27 ENCOUNTER — ANTICOAGULATION THERAPY VISIT (OUTPATIENT)
Dept: ANTICOAGULATION | Facility: CLINIC | Age: 73
End: 2018-09-27

## 2018-09-27 ENCOUNTER — MYC MEDICAL ADVICE (OUTPATIENT)
Dept: INTERNAL MEDICINE | Facility: CLINIC | Age: 73
End: 2018-09-27

## 2018-09-27 ENCOUNTER — MYC MEDICAL ADVICE (OUTPATIENT)
Dept: OPHTHALMOLOGY | Facility: CLINIC | Age: 73
End: 2018-09-27

## 2018-09-27 DIAGNOSIS — I10 HTN (HYPERTENSION): ICD-10-CM

## 2018-09-27 DIAGNOSIS — Z79.01 LONG-TERM (CURRENT) USE OF ANTICOAGULANTS: ICD-10-CM

## 2018-09-27 DIAGNOSIS — K22.0 ACHALASIA OF ESOPHAGUS: ICD-10-CM

## 2018-09-27 LAB — PROTHROM ACT/NOR PPP: 15 % (ref 60–140)

## 2018-09-27 NOTE — MR AVS SNAPSHOT
Shala Caruso   9/27/2018   Anticoagulation Therapy Visit    Description:  72 year old female   Provider:  Stacey Beal, RN   Department:  [facility]           INR as of 9/27/2018     Today's INR       Anticoagulation Summary as of 9/27/2018     INR goal 2.0-3.0   Today's INR    Full warfarin instructions 12.5 mg on Sun, Tue, Thu; 10 mg all other days   Next INR check 10/24/2018    Indications   SLE (systemic lupus erythematosus) (H) (Resolved) [M32.9]  Long-term (current) use of anticoagulants [Z79.01] [Z79.01]         September 2018 Details    Sun Mon Tue Wed Thu Fri Sat           1                 2               3               4               5               6               7               8                 9               10               11               12               13               14               15                 16               17               18               19               20               21               22                 23               24               25               26               27      12.5 mg   See details      28      10 mg         29      10 mg           30      12.5 mg                Date Details   09/27 This INR check               How to take your warfarin dose     To take:  10 mg Take 2 of the 5 mg tablets.    To take:  12.5 mg Take 2.5 of the 5 mg tablets.           October 2018 Details    Sun Mon Tue Wed Thu Fri Sat      1      10 mg         2      12.5 mg         3      10 mg         4      12.5 mg         5      10 mg         6      10 mg           7      12.5 mg         8      10 mg         9      12.5 mg         10      10 mg         11      12.5 mg         12      10 mg         13      10 mg           14      12.5 mg         15      10 mg         16      12.5 mg         17      10 mg         18      12.5 mg         19      10 mg         20      10 mg           21      12.5 mg         22      10 mg         23      12.5 mg         24             25               26               27                 28               29               30               31                   Date Details   No additional details    Date of next INR:  10/24/2018         How to take your warfarin dose     To take:  10 mg Take 2 of the 5 mg tablets.    To take:  12.5 mg Take 2.5 of the 5 mg tablets.

## 2018-09-27 NOTE — PROGRESS NOTES
ANTICOAGULATION FOLLOW-UP CLINIC VISIT    Patient Name:  Shala Caruso  Date:  9/27/2018  Contact Type:  Telephone    SUBJECTIVE:     Patient Findings     Comments INR 2.95  Factor 2=15  Goal 15-25           OBJECTIVE    INR   Date Value Ref Range Status   09/26/2018 2.95 (H) 0.86 - 1.14 Final     Factor 2 Assay   Date Value Ref Range Status   09/26/2018 15 (L) 60 - 140 % Final       ASSESSMENT / PLAN  No question data found.  Anticoagulation Summary as of 9/27/2018     INR goal 2.0-3.0   Today's INR    Warfarin maintenance plan 12.5 mg (5 mg x 2.5) on Sun, Tue, Thu; 10 mg (5 mg x 2) all other days   Full warfarin instructions 12.5 mg on Sun, Tue, Thu; 10 mg all other days   Weekly warfarin total 77.5 mg   No change documented Stacey Beal RN   Plan last modified Portia Cherry RN (6/16/2017)   Next INR check 10/24/2018   Priority INR   Target end date     Indications   SLE (systemic lupus erythematosus) (H) (Resolved) [M32.9]  Long-term (current) use of anticoagulants [Z79.01] [Z79.01]         Anticoagulation Episode Summary     INR check location Clinic Lab    Preferred lab     Send INR reminders to [facility]    Comments Factor 2  goal range is 15-25%  Ok to leave message on home [phone] and work voicemail, but call [id number] per pt request.  OK to leave message at office  May leave messages with Rodriguez Santos  DO NOT GIVE RECORDS TO EMPLOYER U        Anticoagulation Care Providers     Provider Role Specialty Phone number    Mt Kiser MD Responsible Clinical Cardiac Electrophysiology [phone]            See the Encounter Report to view Anticoagulation Flowsheet and Dosing Calendar (Go to Encounters tab in chart review, and find the Anticoagulation Therapy Visit)    Left message for patient with results and dosing recommendations. Asked patient to call back to report any missed doses, falls, signs and symptoms of bleeding or clotting, any changes in health, medication, or  diet. Asked patient to call back with any questions or concerns.    Stacey Beal RN

## 2018-10-01 ENCOUNTER — TELEPHONE (OUTPATIENT)
Dept: INTERNAL MEDICINE | Facility: CLINIC | Age: 73
End: 2018-10-01

## 2018-10-01 RX ORDER — NIFEDIPINE 30 MG/1
30 TABLET, EXTENDED RELEASE ORAL DAILY
Qty: 90 TABLET | Refills: 0 | Status: SHIPPED | OUTPATIENT
Start: 2018-10-01 | End: 2019-01-16

## 2018-10-01 NOTE — TELEPHONE ENCOUNTER
Health Call Center    Phone Message    May a detailed message be left on voicemail: yes    Reason for Call: Other: Shala's last refill for Nifedipine was only filled for 30 days with no additional fills.  She was informed that she would need to see Dr. Contreras in clinic for a renewal.  Problem:  Shala will have a pre-op physical with Dr. Contreras in December and does not want to pay two co-pays and accrue a deductible costs for an additional appointment for a med refill.  I suggested that she come in for an annual physical and check her coverage with insurance.  She agreed.  However, there are no appointments until mid November.  Shala will be out of her medication.  Solutions and Considerations: 1. Shala is on the wait-list for a [facility] physical.  2. Shala is willing to go without the medication if it is safe to do so,  she can wait for a refill if it will not have an adverse affect to her health.  Please feel free to mychart or call work line to discuss if she can go without the medication.     Action Taken: Message routed to:  [facility] primary

## 2018-10-04 NOTE — TELEPHONE ENCOUNTER
Spoke to patient on the phone to let her know that and Rx was called in to get her to her 12/17/18 appointment. Patient stated verbal understanding and will come in on 12/17/18 for pre-op and physical. Katheryn Renteria LPN 10/4/2018 10:04 AM

## 2018-10-17 NOTE — TELEPHONE ENCOUNTER
Note to surgery scheduler and facilitator to f/u on surgical order and assist in scheduling  Maciel De La Torre RN 8:54 AM 10/17/18    LM with pt, surgery order is in, Sisi has been out ill, hopefully she will hear within the next 2 weeks.   Flavia QUEVEDO 5:27 PM October 17, 2018

## 2018-10-18 ENCOUNTER — TELEPHONE (OUTPATIENT)
Dept: OPHTHALMOLOGY | Facility: CLINIC | Age: 73
End: 2018-10-18

## 2018-10-22 ENCOUNTER — TELEPHONE (OUTPATIENT)
Dept: OPHTHALMOLOGY | Facility: CLINIC | Age: 73
End: 2018-10-22

## 2018-10-24 ENCOUNTER — ANTICOAGULATION THERAPY VISIT (OUTPATIENT)
Dept: ANTICOAGULATION | Facility: CLINIC | Age: 73
End: 2018-10-24

## 2018-10-24 DIAGNOSIS — Z79.01 LONG TERM CURRENT USE OF ANTICOAGULANT THERAPY: ICD-10-CM

## 2018-10-24 LAB
INR PPP: 2.42 (ref 0.86–1.14)
PROTHROM ACT/NOR PPP: 19 % (ref 60–140)

## 2018-10-24 NOTE — PROGRESS NOTES
ANTICOAGULATION FOLLOW-UP CLINIC VISIT    Patient Name:  Shala Caruso  Date:  10/24/2018  Contact Type:  Telephone    SUBJECTIVE:     Patient Findings     Comments INR 2.42 Factor 2=19  Goal 15-25           OBJECTIVE    INR   Date Value Ref Range Status   10/24/2018 2.42 (H) 0.86 - 1.14 Final     Factor 2 Assay   Date Value Ref Range Status   10/24/2018 19 (L) 60 - 140 % Final       ASSESSMENT / PLAN  No question data found.  Anticoagulation Summary as of 10/24/2018     INR goal 2.0-3.0   Today's INR    Warfarin maintenance plan 12.5 mg (5 mg x 2.5) on Sun, Tue, Thu; 10 mg (5 mg x 2) all other days   Full warfarin instructions 12.5 mg on Sun, Tue, Thu; 10 mg all other days   Weekly warfarin total 77.5 mg   Plan last modified Portia Cherry RN (6/16/2017)   Next INR check 11/21/2018   Priority INR   Target end date     Indications   SLE (systemic lupus erythematosus) (H) (Resolved) [M32.9]  Long-term (current) use of anticoagulants [Z79.01] [Z79.01]         Anticoagulation Episode Summary     INR check location Clinic Lab    Preferred lab     Send INR reminders to  [facility]    Comments Factor 2  goal range is 15-25%  Ok to leave message on home [phone] and work voicemail, but call [email or web address] per pt request.  OK to leave message at office  May leave messages with Rodriguez Santos  DO NOT GIVE RECORDS TO EMPLOYER [employer]        Anticoagulation Care Providers     Provider Role Specialty Phone number    Mt Kiser MD Responsible Clinical Cardiac Electrophysiology [phone]            See the Encounter Report to view Anticoagulation Flowsheet and Dosing Calendar (Go to Encounters tab in chart review, and find the Anticoagulation Therapy Visit)    Spoke with Jeanmarie.     Stacey Beal, RN

## 2018-10-24 NOTE — MR AVS SNAPSHOT
Shala Caruso   10/24/2018   Anticoagulation Therapy Visit    Description:  73 year old female   Provider:  Stacey Beal, RN   Department:   [facility]tico Clinic           INR as of 10/24/2018     Today's INR       Anticoagulation Summary as of 10/24/2018     INR goal 2.0-3.0   Today's INR    Full warfarin instructions 12.5 mg on Sun, Tue, Thu; 10 mg all other days   Next INR check 11/21/2018    Indications   SLE (systemic lupus erythematosus) (H) (Resolved) [M32.9]  Long-term (current) use of anticoagulants [Z79.01] [Z79.01]         October 2018 Details    Sun Mon Tue Wed Thu Fri Sat      1               2               3               4               5               6                 7               8               9               10               11               12               13                 14               15               16               17               18               19               20                 21               22               23               24      10 mg   See details      25      12.5 mg         26      10 mg         27      10 mg           28      12.5 mg         29      10 mg         30      12.5 mg         31      10 mg             Date Details   10/24 This INR check               How to take your warfarin dose     To take:  10 mg Take 2 of the 5 mg tablets.    To take:  12.5 mg Take 2.5 of the 5 mg tablets.           November 2018 Details    Sun Mon Tue Wed Thu Fri Sat         1      12.5 mg         2      10 mg         3      10 mg           4      12.5 mg         5      10 mg         6      12.5 mg         7      10 mg         8      12.5 mg         9      10 mg         10      10 mg           11      12.5 mg         12      10 mg         13      12.5 mg         14      10 mg         15      12.5 mg         16      10 mg         17      10 mg           18      12.5 mg         19      10 mg         20      12.5 mg         21            22               23                24                 25               26               27               28               29               30                 Date Details   No additional details    Date of next INR:  11/21/2018         How to take your warfarin dose     To take:  10 mg Take 2 of the 5 mg tablets.    To take:  12.5 mg Take 2.5 of the 5 mg tablets.

## 2018-10-31 DIAGNOSIS — Z79.01 LONG TERM CURRENT USE OF ANTICOAGULANT THERAPY: ICD-10-CM

## 2018-10-31 RX ORDER — WARFARIN SODIUM 5 MG/1
TABLET ORAL
Qty: 225 TABLET | Refills: 2 | Status: SHIPPED | OUTPATIENT
Start: 2018-10-31 | End: 2021-04-21

## 2018-11-07 ENCOUNTER — OFFICE VISIT (OUTPATIENT)
Dept: PHARMACY | Facility: CLINIC | Age: 73
End: 2018-11-07
Payer: COMMERCIAL

## 2018-11-07 DIAGNOSIS — E10.649 TYPE 1 DIABETES MELLITUS WITH HYPOGLYCEMIA AND WITHOUT COMA (H): Primary | ICD-10-CM

## 2018-11-07 DIAGNOSIS — J34.89 NASAL DRYNESS: ICD-10-CM

## 2018-11-07 DIAGNOSIS — M32.9 SLE (SYSTEMIC LUPUS ERYTHEMATOSUS) (H): ICD-10-CM

## 2018-11-07 DIAGNOSIS — K22.0 ACHALASIA: ICD-10-CM

## 2018-11-07 DIAGNOSIS — M19.90 ARTHRITIS: ICD-10-CM

## 2018-11-07 DIAGNOSIS — M32.9 SYSTEMIC LUPUS ERYTHEMATOSUS, UNSPECIFIED SLE TYPE, UNSPECIFIED ORGAN INVOLVEMENT STATUS (H): ICD-10-CM

## 2018-11-07 PROCEDURE — 99606 MTMS BY PHARM EST 15 MIN: CPT | Performed by: PHARMACIST

## 2018-11-07 PROCEDURE — 99607 MTMS BY PHARM ADDL 15 MIN: CPT | Performed by: PHARMACIST

## 2018-11-07 NOTE — PROGRESS NOTES
"SUBJECTIVE/OBJECTIVE:                           Shala Caruso is a 73 year old female coming in for an initial visit for Medication Therapy Management.  She was referred to me from self.    Chief Complaint: Review diabetes medication management    Allergies/ADRs: Reviewed in Epic  Tobacco: No tobacco use  Alcohol: not currently using  PMH: Reviewed in Epic    Medication Adherence/Access:  no issues reported    Diabetes, Type I:  Pt currently taking Novolog 2 units in the morning (then go exercise, followed by hypoglycemia; adjusted to no insulin, wasn't pleased with the results, then went to 1 unit) and Lantus 4 units daily. Pt is currently experiencing hypoglycemia. She reports that she can tell when she gets very low (foggy, fatigue). 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 She is having \"milton envy\" right now. She has looked into the cost and it would be about $75 per month. She currently takes novolog 2-3 units before dinner. She wonders about adding beans to her dinnertime salad but is concerned about the number of carbohydrates in beans. She reports that she has heard different recommended blood glucose ranges and has looked up some of the studies herself and was wondering about this today.  SMB-2 times daily.     Ranges (patient reported):   FP-100  Post-Breakfast: most frequent hypoglycemia    Recent symptoms of high blood sugar? none  Eye exam: up to date  Foot exam: due  ACEi/ARB: No.   Urine Albumin:   Lab Results   Component Value Date    UMALCR 8.74 2018      Aspirin: Taking 81mg daily and denies side effects  Diet/Exercise: Patient enjoys healthy foods including salads, lentils, beans, and apples. She reports that she loves vegetables and could eat them for every meal. She exercises everyday. She states that she does not like salt and does not really like sweets but she does miss ice cream. Her father passed away at a young age from an MI and she was raised to eat a very " healthy diet throughout her entire life due to this event.    Lab Results   Component Value Date    A1C 5.3 07/11/2017    A1C 5.5 06/10/2013    A1C 5.4 01/07/2013    A1C 5.5 07/02/2012    A1C 5.1 01/09/2012     Last Comprehensive Metabolic Panel:  Sodium   Date Value Ref Range Status   07/26/2018 138 133 - 144 mmol/L Final     Potassium   Date Value Ref Range Status   07/26/2018 4.0 3.4 - 5.3 mmol/L Final     Chloride   Date Value Ref Range Status   07/26/2018 107 94 - 109 mmol/L Final     Carbon Dioxide   Date Value Ref Range Status   07/26/2018 27 20 - 32 mmol/L Final     Anion Gap   Date Value Ref Range Status   07/26/2018 4 3 - 14 mmol/L Final     Glucose   Date Value Ref Range Status   07/26/2018 103 (H) 70 - 99 mg/dL Final     Comment:     Fasting specimen     Urea Nitrogen   Date Value Ref Range Status   07/26/2018 24 7 - 30 mg/dL Final     Creatinine   Date Value Ref Range Status   07/26/2018 0.68 0.52 - 1.04 mg/dL Final     GFR Estimate   Date Value Ref Range Status   07/26/2018 85 >60 mL/min/1.7m2 Final     Comment:     Non  GFR Calc     Calcium   Date Value Ref Range Status   07/26/2018 8.5 8.5 - 10.1 mg/dL Final     CrCl cannot be calculated (Patient's most recent sCr result is older than the maximum 90 days allowed.).    Nasal Dryness: Currently uses AYR saline nasal gel and reports that it is very effective.    Arthritis: She wakes up in the night with pain in her knees.  She has tried taking APAP.  She has tried lidocaine patches on her knees but they do not stick on. She wonders about other options that she could try.     Achalasia: Current medications include nifedipine 30 mg daily. She denies side effects. She wonders if she needs to get a routine evaluation of this condition to assess whether she needs to continue taking this medication.     Lupus: Current medications include mycophenolate 1000 mg twice daily. Patient reports that this medication has been very helpful and no side  effects reported.    Today's Vitals: There were no vitals taken for this visit. - not addressed today    BP Readings from Last 3 Encounters:   09/06/18 112/67   07/30/18 117/71   02/16/18 119/73     ASSESSMENT:                             Current medications were reviewed today.     Medication Adherence: good, no issues identified    Diabetes, Type I: Needs improvement. Patient currently meeting goal A1c < 7%. However, patient has frequent episodes of hypoglycemia with current insulin doses. Patient is apprehensive about increasing carbohydrate intake and prefers to have her blood glucose in the range of 80- 100 mg/dL. Spent the majority of the visit discussing nutrition. Patient would benefit from attempting to increase carbohydrate intake and aim to have at least 15 g CHO with each meal but ideally should have 60 g CHO with each main meal and 15 g with each snack. Reviewed blood glucose targets from the AACE recommended guidelines.     Nasal Dryness: Stable.    Arthritis: Needs improvement. Discussed other arthritis options including diclofenac gel, Aspercreme, capsaicin cream, BenGay, and [place]. Patient agreed that she will try another over the counter option.    Achalasia: Stable. Discussed that achalasia is a chronic condition that does not require frequent assessment unless change in symptoms.    Lupus: Stable.    PLAN:                            1. I challenge you to have at least 15 g CHO with each meal.      A typical CHO scheme for a patient with diabetes:    60 g with each main meal of the day (3 meals, typically)  15 g with each snack (typically, 1-2)    Fasting blood sugars 80 mg/dL - 110 mg/dL   After eating (1-2 hours) - near 140 mg/dL or less  More than 4 hours after eating  mg/dL     I spent 60 minutes with this patient today. A copy of the visit note was provided to the patient's primary care provider.    Will follow up in one week over the phone to assess blood glucose readings.    The  patient was given a summary of these recommendations as an after visit summary.     Kristen Weiler, PharmD. IV Student    Kari Cohen Pharm.D., [facility]  Medication Therapy Management Pharmacist  Page/VM:  [phone]

## 2018-11-07 NOTE — MR AVS SNAPSHOT
After Visit Summary   11/7/2018    Shala Caruso    MRN: [id number]           Patient Information     Date Of Birth          1945        Visit Information        Provider Department      11/7/2018 3:00 PM [facility] Medication Therapy Management        Care Instructions    Recommendations from today's MTM visit:                                                    Today we reviewed what your medicines are for, how to know if they are working, that your medicines are safe and how to make your medicine regimen as easy as possible.     1. I challenge you to have at least 15 g CHO with each meal.      A typical CHO scheme for a patient with diabetes:    60 g with each main meal of the day (3 meals, typically)  15 g with each snack (typically, 1-2)    Fasting blood sugars 80 mg/dL - 110 mg/dL   After eating (1-2 hours) - near 140 mg/dL or less  More than 4 hours after eating  mg/dL     Next MTM visit: I will call you to follow up on your blood sugars on Wednesday, December 12th at 9:30 AM.     To schedule another MTM appointment, please call the clinic directly or you may call the MTM scheduling line at [phone] or toll-free at [phone].     My Clinical Pharmacist's contact information:                                                      It was a pleasure talking with you today!  Please feel free to contact me with any questions or concerns you have.      Kari Cohen, Pharm.D., [facility]  Medication Therapy Management Pharmacist  Page/VM:  [phone]    You may receive a survey about the MTM services you received.  I would appreciate your feedback to help me serve you better in the future. Please fill it out and return it when you can. Your comments will be anonymous.                  Follow-ups after your visit        Your next 10 appointments already scheduled     Nov 21, 2018  1:00 PM CST   LAB with [facility] ([facility])    [address]  1st Floor  [facility] [address] [phone]           Please do not eat 10-12 hours before your appointment if you are coming in fasting for labs on lipids, cholesterol, or glucose (sugar). This does not apply to pregnant women. Water, hot tea and black coffee (with nothing added) are okay. Do not drink other fluids, diet soda or chew gum.            Dec 12, 2018  9:30 AM CST   [facility]    [address]  4th Floor  [facility] [id number] [phone]           Note: this is not an onsite visit; there is no need to come to the facility.            Dec 17, 2018  4:00 PM CST   (Arrive by 3:45 PM)   PRE-OP with Joe Contreras MD   [facility] ([facility] [place])    [address] [place] [address]  4th Floor  [facility] [id number] [phone]            Dec 19, 2018  2:45 PM CST   RETURN RETINA with Monika Fermin MD   Eye Clinic ([facility])    [address] [address]   [phone]            Jan 08, 2019   Procedure with Monika Fermin MD   [facility] ([facility] [place])    [address] [place] [address]  5th Floor  [facility] [address] [phone]           Located in the Clinics and Surgery Center at [address] [place] [address].   parking is very convenient and highly recommended.  is a $6 flat rate fee.  Both  and self parkers should enter the main arrival plaza from [facility]; parking attendants will direct you based on your parking preference.            Jan 09, 2019  8:00 AM CST   Post-Op with Monika Fermin MD   Eye Clinic ([facility])    [address] [address]    [phone]            Jan 14, 2019  7:30 AM CST   (Arrive by 7:15 AM)   RETURN DIABETES with Dorita Cramer MD   [facility] Endocrinology ([facility] [place])    [address]  3rd Floor  [facility] [id number] [phone]            Jan 16, 2019  8:00 AM CST   Post-Op with Monika Fermin MD   Eye Clinic ([facility])    [address]  [address] [address]  [facility] [id number] [phone]            Mar 07, 2019  4:00 PM CST   (Arrive by 3:45 PM)   RETURN LUPUS with Santiago Corbett MD   [facility] Rheumatology ([facility] [place])    [address]  [address]  [facility] [address] [phone]              Who to contact     If you have questions or need follow up information about today's clinic visit or your schedule please contact [facility] MEDICATION THERAPY MANAGEMENT directly at [phone].  Normal or non-critical lab and imaging results will be communicated to you by BarEyehart, letter or phone within 4 business days after the clinic has received the results. If you do not hear from us within 7 days, please contact the clinic through Socializet or phone. If you have a critical or abnormal lab result, we will notify you by phone as soon as possible.  Submit refill requests through Empressr or call your pharmacy and they will forward the refill request to us. Please allow 3 business days for your refill to be completed.          Additional Information About Your Visit        Empressr Information     Empressr gives you secure access to your electronic health record. If you see a primary care provider, you can also send messages to your care team and make appointments. If you have questions, please call your primary care clinic.  If you do not have a primary care provider, please call [phone] and they will assist you.        Care EveryWhere ID     This is your Care EveryWhere ID. This could be  used by other organizations to access your [place] medical records  [id number]         Blood Pressure from Last 3 Encounters:   09/06/18 112/67   07/30/18 117/71   02/16/18 119/73    Weight from Last 3 Encounters:   07/30/18 119 lb 3.2 oz (54.1 kg)   01/29/18 119 lb (54 kg)   08/02/17 116 lb 8 oz (52.8 kg)              Today, you had the following     No orders found for display       Primary Care Provider Office Phone # Fax #    Joe Contreras [phone]       [address] [place] [address]        Equal Access to Services     WAYNE CACERES : Erlinda Rivera, wareji yanes, qaoliver kaalmada gavin, carmelita mullen. So [facility] [phone].    ATENCIÓN: Si habla español, tiene a hurtado disposición servicios gratuitos de asistencia lingüística. Llame al [phone].    We comply with applicable federal civil rights laws and [place] laws. We do not discriminate on the basis of race, color, national origin, age, disability, sex, sexual orientation, or gender identity.            Thank you!     Thank you for choosing [facility] MEDICATION THERAPY MANAGEMENT  for your care. Our goal is always to provide you with excellent care. Hearing back from our patients is one way we can continue to improve our services. Please take a few minutes to complete the written survey that you may receive in the mail after your visit with us. Thank you!             Your Updated Medication List - Protect others around you: Learn how to safely use, store and throw away your medicines at www.disposemymeds.org.          This list is accurate as of 11/7/18  3:59 PM.  Always use your most recent med list.                   Brand Name Dispense Instructions for use Diagnosis    aspirin 81 MG tablet      Take 81 mg by mouth At Bedtime        AYR SALINE NASAL NO-DRIP Gel     3 Tube    Use as needed for nasal dryness    Nasal ulcer       blood glucose monitoring lancets      7203 each    Test 7-8 times daily  (Lancets that go with pt current meter )    Diabetes mellitus (H)       blood glucose monitoring test strip    TRUE METRIX BLOOD GLUCOSE TEST    500 strip    Use to test blood sugar 5 times daily or as directed.    Diabetes mellitus type 1 (H)       CALCIUM 500 + D PO      Take 1 tablet by mouth 3 times daily        CENTRUM SILVER PO      Take 1 tablet by mouth daily.        glucagon 1 MG kit    GLUCAGON EMERGENCY    1 each    Inject 1 mg subcutaneously or intramuscularly for severe hypoglycemic episodes.    Type 1 diabetes mellitus with hypoglycemia and without coma (H)       insulin glargine 100 UNIT/ML injection    LANTUS    15 mL    Inject 4 units as directed daily LANTUS SOLOSTAR PEN PLEASE    Type 1 diabetes mellitus with hypoglycemia and without coma (H)       insulin pen needle 32G X 4 MM    BD PAM U/F    200 each    Use 200 pen needles daily or as directed.    Type 1 diabetes mellitus with hypoglycemia and without coma (H)       mycophenolate 500 MG tablet    GENERIC EQUIVALENT    360 tablet    Take 2 tablets (1,000 mg) by mouth 2 times daily 2 tabs in the a.m., 2 tabs in p.m.    Antiphospholipid syndrome (H), Encounter for long-term current use of medication       NIFEdipine ER osmotic 30 MG 24 hr tablet    NIFEDICAL XL    90 tablet    Take 1 tablet (30 mg) by mouth daily Call clinic to schedule MD APPT.    Achalasia of esophagus, HTN (hypertension)       NovoLOG PENFILL 100 UNIT/ML injection   Generic drug:  insulin aspart     15 mL    Inject 1 unit per 15 grams CHO with meals TID approx 15 units daily    Type 1 diabetes mellitus with hypoglycemia and without coma (H)       * Injection Device for insulin Priscila LOPEZ ECHO    1 each    1 each 4 times daily    Type 1 diabetes mellitus with hypoglycemia and without coma (H)       * NOVOPEN JR (GREEN) Priscila   Generic drug:  Injection Device for insulin      Use as directed.        simvastatin 40 MG tablet    ZOCOR     90 tablet    Take 1 tablet (40 mg) by mouth At Bedtime    Other hyperlipidemia       TRUE METRIX METER w/Device Kit     1 kit    1 each 4 times daily    Type I diabetes mellitus, well controlled (H)       TYLENOL 500 MG tablet   Generic drug:  acetaminophen      Take 1,000-1,500 mg by mouth nightly as needed        warfarin 5 MG tablet    COUMADIN    225 tablet    Take 2-2.5 tablets daily or as directed by coumadin clinic.    Long term current use of anticoagulant therapy       * Notice:  This list has 2 medication(s) that are the same as other medications prescribed for you. Read the directions carefully, and ask your doctor or other care provider to review them with you.

## 2018-11-07 NOTE — PATIENT INSTRUCTIONS
Recommendations from today's MTM visit:                                                    Today we reviewed what your medicines are for, how to know if they are working, that your medicines are safe and how to make your medicine regimen as easy as possible.     1. I challenge you to have at least 15 g CHO with each meal.      A typical CHO scheme for a patient with diabetes:    60 g with each main meal of the day (3 meals, typically)  15 g with each snack (typically, 1-2)    Fasting blood sugars 80 mg/dL - 110 mg/dL   After eating (1-2 hours) - near 140 mg/dL or less  More than 4 hours after eating  mg/dL     Next MTM visit: I will call you to follow up on your blood sugars on Wednesday, December 12th at 9:30 AM.     To schedule another MTM appointment, please call the clinic directly or you may call the MTM scheduling line at [phone] or toll-free at [phone].     My Clinical Pharmacist's contact information:                                                      It was a pleasure talking with you today!  Please feel free to contact me with any questions or concerns you have.      Kari Cohen, Pharm.D., [facility]  Medication Therapy Management Pharmacist  Page/VM:  [phone]    You may receive a survey about the MTM services you received.  I would appreciate your feedback to help me serve you better in the future. Please fill it out and return it when you can. Your comments will be anonymous.

## 2018-11-14 ENCOUNTER — MYC MEDICAL ADVICE (OUTPATIENT)
Dept: OPHTHALMOLOGY | Facility: CLINIC | Age: 73
End: 2018-11-14

## 2018-11-14 NOTE — TELEPHONE ENCOUNTER
Note to surgery scheduler.       Problem: NICU 36+ weeks: Day of Life 5 to Discharge Goal: Activity/Safety Infant will be provided appropriate activity to stimulate growth and development according to gestational age. Outcome: Progressing Towards Goal 
Pt identification band verified. Pt allowed adequate rest periods between care to promote growth. Velcro name band x 2 in place. Maternal prenatal history on chart. Goal: Diagnostic Test/Procedures Infant will maintain normal blood glucose levels, optimal metabolic function, electrolyte and renal function, and growth related to birth weight/length. Infant will have normal hematocrit/hemoglobin values and will be free of signs/symptoms hyperbilirubinemia. Outcome: Progressing Towards Goal 
Hearing screen to be completed prior to discharge. No further diagnostic tests/ procedures ordered at this time. Goal: Nutrition/Diet Infant will demonstrate tolerance of feedings as evidenced by minimal residual and/or regurgitation. Infant will have adequate nutrition as evidenced by good weight gain of at least 15-30 grams a day, adequate intake with good PO skills. Outcome: Progressing Towards Goal 
Pt receiving Good Start/ Breast milk 20 stephan a minimum 50 ml Q 3 hours. RN attempting po feedings as tolerated and the remainder of feedings being administered via Ng tube. Goal: Medications Infant will receive right medication at the right time, right dose, and right route as ordered by physician. Outcome: Progressing Towards Goal 
Pt receiving Sucrose up to 2 ml po per procedure and/ or Q 8 hours administered as needed for comfort/ pain management. No further medications ordered at this time Goal: Respiratory Oxygen saturation within defined limits, target SpO2 92-97%. Infant will maintain effective airway clearance and will have effective gas exchange. Outcome: Progressing Towards Goal 
Continuous pulse oximetry in place with alarms set per protocol.  Pt remains on room air with O2 saturations within normal limits. Goal: Treatments/Interventions/Procedures Treatments, interventions, and procedures initiated in a timely manner to maintain a state of equilibrium during growth and development process as evidenced by standards of care. Infant will maintain a body temperature as evidenced by axillary temperature = or > 97.2 degrees F. Outcome: Progressing Towards Goal 
Pt remains in crib- temperature > = 97.2 degrees and stable. All further treatments/ interventions to be completed as tolerated per protocol. Goal: *Body weight gain 10-15 gm/kg/day Infant will maintain appropriate weight according to gestational age as evidenced by weight gain of 10 - 15 gm/kg/day. Outcome: Progressing Towards Goal 
Pt gaining weight appropriate for gestational age at this time. Problem: NICU 36+ weeks: Discharge Outcomes Goal: *Normal void/stool pattern Patient will maintain a normal void/stool pattern, as evidenced by 6 - 8 wet diapers per day and stool every 24 hours. Outcome: Resolved/Met Date Met: 11/05/18 Pt voiding/ stooling within normal limits within intervention

## 2018-11-21 ENCOUNTER — ANTICOAGULATION THERAPY VISIT (OUTPATIENT)
Dept: ANTICOAGULATION | Facility: CLINIC | Age: 73
End: 2018-11-21

## 2018-11-21 DIAGNOSIS — Z79.01 LONG TERM CURRENT USE OF ANTICOAGULANT THERAPY: ICD-10-CM

## 2018-11-21 DIAGNOSIS — Z79.899 HIGH RISK MEDICATIONS (NOT ANTICOAGULANTS) LONG-TERM USE: ICD-10-CM

## 2018-11-21 DIAGNOSIS — M32.15 SYSTEMIC LUPUS ERYTHEMATOSUS WITH TUBULO-INTERSTITIAL NEPHROPATHY, UNSPECIFIED SLE TYPE (H): ICD-10-CM

## 2018-11-21 LAB
ALT SERPL W P-5'-P-CCNC: 27 U/L (ref 0–50)
AST SERPL W P-5'-P-CCNC: 19 U/L (ref 0–45)
CHROMOGENIC FACTOR 10: NORMAL
ERYTHROCYTE [DISTWIDTH] IN BLOOD BY AUTOMATED COUNT: 15.5 % (ref 10–15)
HCT VFR BLD AUTO: 39.8 % (ref 35–47)
HGB BLD-MCNC: 12.2 G/DL (ref 11.7–15.7)
INR PPP: 2.79 (ref 0.86–1.14)
MCH RBC QN AUTO: 28.4 PG (ref 26.5–33)
MCHC RBC AUTO-ENTMCNC: 30.7 G/DL (ref 31.5–36.5)
MCV RBC AUTO: 93 FL (ref 78–100)
PLATELET # BLD AUTO: 236 10E9/L (ref 150–450)
PROTHROM ACT/NOR PPP: 16 % (ref 60–140)
RBC # BLD AUTO: 4.3 10E12/L (ref 3.8–5.2)
WBC # BLD AUTO: 3.4 10E9/L (ref 4–11)

## 2018-11-21 NOTE — PROGRESS NOTES
ANTICOAGULATION FOLLOW-UP CLINIC VISIT    Patient Name:  Shala Caruso  Date:  11/21/2018  Contact Type:  Telephone    SUBJECTIVE:     Patient Findings     Comments Factor 2 result is 16% today.           OBJECTIVE    INR   Date Value Ref Range Status   11/21/2018 2.79 (H) 0.86 - 1.14 Final     Chromogenic Factor 10   Date Value Ref Range Status   11/21/2018 16%  Final     Factor 2 Assay   Date Value Ref Range Status   11/21/2018 16 (L) 60 - 140 % Final       ASSESSMENT / PLAN  INR assessment THER    Recheck INR In: 4 WEEKS    INR Location Clinic      Anticoagulation Summary as of 11/21/2018     INR goal 2.0-3.0   Today's INR No new INR was available at the time of this encounter.   Warfarin maintenance plan 12.5 mg (5 mg x 2.5) on Sun, Tue, Thu; 10 mg (5 mg x 2) all other days   Full warfarin instructions 12.5 mg on Sun, Tue, Thu; 10 mg all other days   Weekly warfarin total 77.5 mg   No change documented Andre Bledsoe RN   Plan last modified Portia Cherry RN (6/16/2017)   Next INR check 12/17/2018   Priority INR   Target end date     Indications   SLE (systemic lupus erythematosus) (H) (Resolved) [M32.9]  Long-term (current) use of anticoagulants [Z79.01] [Z79.01]         Anticoagulation Episode Summary     INR check location Clinic Lab    Preferred lab     Send INR reminders to [facility]    Comments Factor 2  goal range is 15-25%  Ok to leave message on home [phone] and work voicemail, but call [id number] per pt request.  OK to leave message at office  May leave messages with Rodriguez Santos  DO NOT GIVE RECORDS TO EMPLOYER U        Anticoagulation Care Providers     Provider Role Specialty Phone number    Mt Kiser MD Responsible Clinical Cardiac Electrophysiology [phone]            See the Encounter Report to view Anticoagulation Flowsheet and Dosing Calendar (Go to Encounters tab in chart review, and find the Anticoagulation Therapy Visit)    Left message for patient with  results and dosing recommendations. Asked patient to call back to report any missed doses, falls, signs and symptoms of bleeding or clotting, any changes in health, medication, or diet. Asked patient to call back with any questions or concerns.    Andre Bledsoe RN

## 2018-11-21 NOTE — MR AVS SNAPSHOT
Shala Caruso   11/21/2018   Anticoagulation Therapy Visit    Description:  73 year old female   Provider:  Andre Bledsoe, RN   Department:  [facility]           INR as of 11/21/2018     Today's INR No new INR was available at the time of this encounter.      Anticoagulation Summary as of 11/21/2018     INR goal 2.0-3.0   Today's INR No new INR was available at the time of this encounter.   Full warfarin instructions 12.5 mg on Sun, Tue, Thu; 10 mg all other days   Next INR check 12/17/2018    Indications   SLE (systemic lupus erythematosus) (H) (Resolved) [M32.9]  Long-term (current) use of anticoagulants [Z79.01] [Z79.01]         November 2018 Details    Sun Mon Tue Wed Thu Fri Sat         1               2               3                 4               5               6               7               8               9               10                 11               12               13               14               15               16               17                 18               19               20               21      10 mg   See details      22      12.5 mg         23      10 mg         24      10 mg           25      12.5 mg         26      10 mg         27      12.5 mg         28      10 mg         29      12.5 mg         30      10 mg           Date Details   11/21 This INR check               How to take your warfarin dose     To take:  10 mg Take 2 of the 5 mg tablets.    To take:  12.5 mg Take 2.5 of the 5 mg tablets.           December 2018 Details    Sun Mon Tue Wed Thu Fri Sat           1      10 mg           2      12.5 mg         3      10 mg         4      12.5 mg         5      10 mg         6      12.5 mg         7      10 mg         8      10 mg           9      12.5 mg         10      10 mg         11      12.5 mg         12      10 mg         13      12.5 mg         14      10 mg         15      10 mg           16      12.5 mg         17            18                19               20               21               22                 23               24               25               26               27               28               29                 30               31                     Date Details   No additional details    Date of next INR:  12/17/2018         How to take your warfarin dose     To take:  10 mg Take 2 of the 5 mg tablets.    To take:  12.5 mg Take 2.5 of the 5 mg tablets.

## 2018-12-12 ENCOUNTER — ALLIED HEALTH/NURSE VISIT (OUTPATIENT)
Dept: PHARMACY | Facility: CLINIC | Age: 73
End: 2018-12-12
Payer: COMMERCIAL

## 2018-12-12 DIAGNOSIS — E10.649 TYPE 1 DIABETES MELLITUS WITH HYPOGLYCEMIA AND WITHOUT COMA (H): Primary | ICD-10-CM

## 2018-12-12 PROCEDURE — 99606 MTMS BY PHARM EST 15 MIN: CPT | Performed by: PHARMACIST

## 2018-12-12 PROCEDURE — 99607 MTMS BY PHARM ADDL 15 MIN: CPT | Performed by: PHARMACIST

## 2018-12-12 NOTE — PROGRESS NOTES
"SUBJECTIVE/OBJECTIVE:                Shala Caruso is a 73 year old female called for a follow-up visit for Medication Therapy Management.  She was referred to me from self. It is difficult for the patient to schedule during clinic hours due to working full time. We talk over the phone today to improve access.     Chief Complaint: Follow up from our visit on 11/7/18 and subsequent communications.      Tobacco: No tobacco use  Alcohol: not currently using    Medication Adherence/Access:  no issues reported    Diabetes, Type 1:  Pt currently taking Lantus 4 units daily and Novolog 1 units per 20 g CHO three times daily with meals. She says she is \"a failure\". She is still having crazy lows, mostly before dinner. She notices fairly significant differences in her sugars when her hands are cold.  She measured on two different fingers last night was 57 on 1 finger and 68 on the other finger.  She has reduced her use of insulin but she is still having numbers consistently in the 60-90's.  on the 5th, she had a low that was at 57/83 on various fingers. Occasionally in the 100's, the highest she had was 111 mg/dL. Pt is not experiencing side effects.  SMBG: four times daily.   Ranges (from patient's glucose log): See above.   Patient is experiencing hypoglycemia. Frequency of hypoglycemia? 1-2 times per week but improved from our last discussion. Symptoms of low blood sugar? She often just feels off and can tell when she is low, it is difficult for her to describe specific symptoms.   Recent symptoms of high blood sugar? none  Eye exam: up to date  Foot exam: due  ACEi/ARB: No.   Urine Albumin:   Lab Results   Component Value Date    UMALCR 8.74 07/26/2018      Aspirin: Taking 81mg daily and denies side effects  Diet/Exercise: She reports incorporating more CHO including a slice of toast or a few crackers with breakfast.  She is also eating some black beans with eggs.  For lunch, she is eating homemade vegetable soup, " cheese and celery with some Jello for desert. She will also have yogurt, walnuts and some sweetener.  Her soup has carrots, green beans and onions along with chicken.  She doesn't use bread or crackers with her soup.  She generally meal preps on the weekend so she can adjust her meals small amounts.      Lab Results   Component Value Date    A1C 5.3 07/11/2017    A1C 5.5 06/10/2013    A1C 5.4 01/07/2013    A1C 5.5 07/02/2012    A1C 5.1 01/09/2012     Last Comprehensive Metabolic Panel:  Sodium   Date Value Ref Range Status   07/26/2018 138 133 - 144 mmol/L Final     Potassium   Date Value Ref Range Status   07/26/2018 4.0 3.4 - 5.3 mmol/L Final     Chloride   Date Value Ref Range Status   07/26/2018 107 94 - 109 mmol/L Final     Carbon Dioxide   Date Value Ref Range Status   07/26/2018 27 20 - 32 mmol/L Final     Anion Gap   Date Value Ref Range Status   07/26/2018 4 3 - 14 mmol/L Final     Glucose   Date Value Ref Range Status   07/26/2018 103 (H) 70 - 99 mg/dL Final     Comment:     Fasting specimen     Urea Nitrogen   Date Value Ref Range Status   07/26/2018 24 7 - 30 mg/dL Final     Creatinine   Date Value Ref Range Status   07/26/2018 0.68 0.52 - 1.04 mg/dL Final     GFR Estimate   Date Value Ref Range Status   07/26/2018 85 >60 mL/min/1.7m2 Final     Comment:     Non  GFR Calc     Calcium   Date Value Ref Range Status   07/26/2018 8.5 8.5 - 10.1 mg/dL Final     CrCl cannot be calculated (Patient's most recent lab result is older than the maximum 90 days allowed.).    Today's Vitals: There were no vitals taken for this visit. - telemed       ASSESSMENT:              Current medications were reviewed today as discussed above.      Medication Adherence: good, no issues identified    Diabetes, type 1: Improved. Patient is having less frequent lows than when we talked initially with her efforts in increasing carbs with meals.  She has particularly mostly resolved her lows during and after exercise.   She would benefit from incorporating more carbs into her lunches to alleviate lows prior to dinner.      PLAN:                  1. During meal prep on the weekends, slowly titrate the carbs in your lunches to further reduce the number of lows you have, particularly before dinner.     I spent 20 minutes with this patient today. A copy of the visit note was provided to the patient's primary care provider.     [facility]    The patient declined a summary of these recommendations as an after visit summary.    Kari Cohen, Pharm.D., [facility]  Medication Therapy Management Pharmacist  Page/VM:  [phone]

## 2018-12-13 DIAGNOSIS — Z79.01 LONG TERM (CURRENT) USE OF ANTICOAGULANTS: Primary | ICD-10-CM

## 2018-12-17 ENCOUNTER — OFFICE VISIT (OUTPATIENT)
Dept: INTERNAL MEDICINE | Facility: CLINIC | Age: 73
End: 2018-12-17
Payer: COMMERCIAL

## 2018-12-17 VITALS — OXYGEN SATURATION: 99 % | DIASTOLIC BLOOD PRESSURE: 80 MMHG | HEART RATE: 66 BPM | SYSTOLIC BLOOD PRESSURE: 132 MMHG

## 2018-12-17 DIAGNOSIS — Z79.01 LONG TERM CURRENT USE OF ANTICOAGULANT THERAPY: ICD-10-CM

## 2018-12-17 DIAGNOSIS — Z79.01 LONG TERM (CURRENT) USE OF ANTICOAGULANTS: ICD-10-CM

## 2018-12-17 DIAGNOSIS — G62.9 PERIPHERAL POLYNEUROPATHY: Primary | ICD-10-CM

## 2018-12-17 LAB — INR PPP: 2.49 (ref 0.86–1.14)

## 2018-12-17 ASSESSMENT — PAIN SCALES - GENERAL: PAINLEVEL: MILD PAIN (3)

## 2018-12-17 NOTE — LETTER
9/6/2018    RE: Shala Caruso  [address]  [place] [address]       Rheumatology Visit     Shala Caruso MRN# [id number]   YOB: 1945 Age: 72 year old     Date of Visit: September 6, 2018  Primary care provider: Joe Contreras          Assessment and Plan:   1. 73 yo female with SLE, diagnosed 2000 (+CRISTAL,+dsDNA ab, arthritis, serositis): flare (3/10) with pericardial effusion/tamponade, and now off of prednisone; Cellcept was started 6/10 and tolerating well. She has no significant current symptoms of inflammation. SLE lab stable with Cellcept monitoring.   2. 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 Allergies to multiple antibiotics and Plaquenil   4. Osteoprosis on Boniva (Last DXA with some continued decrease of BMD)   5. Severe GERD, Achalasia   6. Post thrombophlebitic syndrome (chronic LE swelling), stable  7. Dyslipidemia with current excellent lipid values   8. Raynauds   9. Chronic leukopenia   10. 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 Herpes Zoster  Plan   Discussed in detail with the patient   1. Continue Cellcept 2000 mg daily   2. She does not require additional therapy at this time   3. Call if new symptoms   4. Continue lab monitoring ordered before next appointment at 3 months and soon before next appointment   5. Return to SLE Clinic in 6 months   6. Continue followup in [facility] for Diabetes; in [facility] for other concerns   7. Follow up regarding lymphedema as needed   8. flu shot this fall  Santiago Corbett MD.           History of Present Illness:   73 yo female is seen for followup of SLE. I saw her last in February 2018. EPIC reviewed.   Problem list:   1. SLE, diagnosed 2000 (+CRISTAL,+dsDNA ab, arthritis, serositis): flare (3/10) with pericardial effusion/tamponade, on Cellcept (started 6/10)   2. APS, s/p DVT LLE, PE on warfarin in 2004   3. Allergies to multiple antibiotics and Plaquenil   4. Osteoprosis on Boniva (Last DXA 1/09, Tscore: -1.9 L  femoral neck)   5. Severe GERD, Achalasia   6. Post thrombophlebitic syndrome (chronic LE swelling)   7. Dyslipidemia   8. Raynauds   9. Chronic leukopenia on Methotrexate  10. L arm lymphedema    HISTORY CARRIED FORWARD:   She has been off Prednisone for about 3.5 years and off Methotrexate since May 2012; she is on Cellcept 2000mg/day and no longer on Plaquenil.   She has left arm lymphedema. She has hx of axillary LN dissection for enlarged nodes. This have been evaluated by OT. She was using her glove and sleeve but the glove is too tight and she is not using it regularly; this does not impede her at present.  She has had no recurrent chest pain/SOB or pleurisy since her hospitalization in March 2010 for pericarditis.       Her biggest interval problem is Herpes Zoster in L Thoracic dermatome, dx 3/23 in the [facility].  She was on Valtrex and is on Neurontin.  She is having a lot of pain.  She sees them again next week.       INTERVAL HISTORY:      She has had an overall uneventful 6 months from an SLE perspective. She is tolerating the medications without problem remaining on Mycophenolate 1 gram bid. Her labs have been stable with persistent leukopenia now at 3.5 last month; DSDNA normal at 29 for first time in July 2014 and normal last checked in late August. Her complements have been stable but slightly low without change. Creatinine has been variable but again normal at 0.68 last check.  Her joints are stable with no swelling. She has osteoarthritis of the bilateral knees which is the most bothersome problem. She continues to do low impact Yoga.  She notes more foot pain and problems with wearing closed toe shoes. She may have DM neuropathy, has seen a Podiatrist and also has a Wilde's neuroma.  She denies fevers, fatigue, oral ulcers, rash. Raynauds is stable without Digital ulcers. No new rashes.  She remains on Boniva followed in Endocrine for this and Type I DM. She is followed in Coumadin Clinic with hx of  DVT and PE.    We discussed her prior therapy and options for going forward. She is looking ahead to eventual long term. She has Diabetes so wishes to avoid Prednisone unless absolutely needed.       Review of Systems:   Review Of Systems  Skin: negative  Eyes: negative  Ears/Nose/Throat: negative  Respiratory: No shortness of breath, dyspnea on exertion, cough, or hemoptysis  Cardiovascular: negative  Gastrointestinal: negative  Genitourinary: negative  Musculoskeletal: see HPI  Neurologic: negative  Psychiatric: negative  Hematologic/Lymphatic/Immunologic: on Anticoagulation  Endocrine: DM under care          Past Medical History:     Past Medical History:   Diagnosis Date     Achalasia      Antiphospholipid syndrome (H)      Antiplatelet or antithrombotic long-term use      DM (diabetes mellitus) (H)      DVT (deep venous thrombosis) (H) 2003    and PE     Dysphagia     occasional, use Nifedipine     GERD (gastroesophageal reflux disease)      Hyperlipidemia      Lymphedema      Myopia      Neuropathy     toes bilateral     Nonsenile cataract      osteoporosis      Pericarditis      Raynaud's disease      SLE (systemic lupus erythematosus) (H)        Patient Active Problem List    Diagnosis Date Noted     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, unspecified SLE type (H) 10/12/2017     Priority: Medium     Choroidal lesion 08/30/2017     Priority: Medium     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 Contreras.         Cystocele, midline 10/15/2014     Priority: Medium     Urinary urgency 10/15/2014     Priority: Medium     Urinary frequency 10/15/2014     Priority: Medium     Female stress incontinence 10/15/2014     Priority: Medium     Atrophic vaginitis 10/15/2014     Priority: Medium      Osteoporosis, postmenopausal 07/14/2014     Priority: Medium     Osteoporosis, idiopathic 07/11/2013     Priority: Medium     Type 1 diabetes mellitus (H) 07/02/2012     Priority: Medium     Encounter for long-term current use of medication 05/30/2012     Priority: Medium     Problem list name updated by automated process. Provider to review       Achalasia      Priority: Medium     Antiphospholipid syndrome (H)      Priority: Medium     DM (diabetes mellitus) (H)      Priority: Medium     GERD (gastroesophageal reflux disease)      Priority: Medium     Hyperlipidemia      Priority: Medium     HTN (hypertension)      Priority: Medium     Osteopenia      Priority: Medium     Raynaud's disease      Priority: Medium     DVT (deep venous thrombosis) (H)      Priority: Medium     and PE               Past Surgical History:     Past Surgical History:   Procedure Laterality Date     COLONOSCOPY N/A 11/28/2016    Procedure: COLONOSCOPY;  Surgeon: Sang August MD;  Location: [facility]     CYSTOSCOPY, SLING TRANSVAGINAL N/A 12/20/2016    Procedure: CYSTOSCOPY, SLING TRANSVAGINAL;  Surgeon: Jeanmarie Pierson MD;  Location: [facility]     DISSECT LYMPH NODE AXILLA  2004    Lt, during eval for SLE     HYSTEROSCOPIC PLACEMENT CONTRACEPTIVE DEVICE  1985     TUBAL LIGATION Bilateral              Social History:     Social History   Substance Use Topics     Smoking status: Never Smoker     Smokeless tobacco: Never Used     Alcohol use No             Family History:     Family History   Problem Relation Age of Onset     Cancer Other      breast     Cerebrovascular Disease Other      C.A.D. Other      Glaucoma Father             Allergies:     Allergies   Allergen Reactions     Amaryl [Glimepiride] Swelling     Swelling of tongue     Metformin Blisters     Experienced big blisters all over body and sloughing of skin     Plaquenil [Aminoquinolines] Hives     Sulfa Drugs Other (See Comments)     Turned bright red     Amoxicillin Rash      Hydroxychloroquine Rash     Penicillins Rash             Medications:     Current Outpatient Prescriptions   Medication Sig Dispense Refill     acetaminophen (TYLENOL) 500 MG tablet Take 500 mg by mouth At Bedtime Takes 6 tablets (500 mg ) .Christa Acharya LPN 2:40 PM on 3/23/2017       aspirin 81 MG tablet Take 81 mg by mouth At Bedtime        AYR SALINE NASAL NO-DRIP GEL Use as needed for nasal dryness 3 Tube 3     blood glucose monitoring (TRUE METRIX BLOOD GLUCOSE TEST) test strip Use to test blood sugar 5 times daily or as directed. 500 strip 3     Blood Glucose Monitoring Suppl (TRUE METRIX METER) W/DEVICE KIT 1 each 4 times daily 1 kit 0     glucagon (GLUCAGON EMERGENCY) 1 MG kit Inject 1 mg subcutaneously or intramuscularly for severe hypoglycemic episodes. (Patient not taking: Reported on 7/30/2018) 1 each 3     Injection Device for insulin (NOVOPEN ECHO) RORY 1 each 4 times daily 1 each 0     Injection Device for Insulin (NOVOPEN JR, GREEN,) RORY Use as directed.       insulin glargine (LANTUS) 100 UNIT/ML injection Inject 4 units as directed daily LANTUS SOLOSTAR PEN PLEASE 15 mL 3     insulin pen needle (BD PAM U/F) 32G X 4 MM Use 200 pen needles daily or as directed. 200 each 3     LANCETS ULTRA THIN 30G MISC Test 7-8 times daily  (Lancets that go with pt current meter ) 7203 each 3     Multiple Vitamins-Minerals (CENTRUM SILVER PO) Take 1 tablet by mouth daily.       mycophenolate (GENERIC EQUIVALENT) 500 MG tablet Take 2 tablets (1,000 mg) by mouth 2 times daily 2 tabs in the a.m., 2 tabs in p.m. 360 tablet 3     NIFEdipine ER osmotic (NIFEDICAL XL) 30 MG 24 hr tablet Take 1 tablet (30 mg) by mouth daily 90 tablet 2     NOVOLOG PENFILL 100 UNIT/ML soln Inject 1 unit per 15 grams CHO with meals TID approx 15 units daily 15 mL 3     omega-3 fatty acids (FISH OIL) 1200 MG capsule Take 1 capsule by mouth daily.       pseudoePHEDrine (SUDAFED) 30 MG tablet Take 1 tablet (30 mg) by mouth every 4 hours  as needed for congestion 20 tablet 0     simvastatin (ZOCOR) 40 MG tablet Take 1 tablet (40 mg) by mouth At Bedtime 90 tablet 3     VITAMIN D, CHOLECALCIFEROL, PO Take 1,000 Units by mouth daily (with lunch)        warfarin (COUMADIN) 5 MG tablet Take 2-2.5 tablets daily or as directed by coumadin clinic. 75 tablet 5            Physical Exam:   /67 (BP Location: Right arm, Patient Position: Sitting, Cuff Size: Adult Small)  Pulse 67  Temp 98.2  F (36.8  C) (Oral)  Resp 16  SpO2 97%    Constitutional: WD-WN-WG cooperative   Eyes: nl conjunctiva, sclera   ENT: nl external ears, nose, throat   No mucous membrane lesions, normal saliva pool   Neck: no mass or thyroid enlargement   GI: no ABD mass or tenderness, no HSM   Lymph: no cervical, supraclavicular, inguinal or epitrochlear nodes   MS: All TMJ, neck, shoulder, elbow, wrist, MCP/PIP/DIP, spine, hip, knee, ankle, and foot MTP/IP joints were examined and found without active synovitis or deformity. No dactylitis, tenosynovitis, enthesopathy. Stable mild lymphedema LUE. She has bilateral support stockings  Skin: Xerosis and fissures of the distal fingers unchanged. No nail pitting, alopecia.    Neuro: nl cranial nerves, strength  Psych: nl affect       Data:     Lab Results   Component Value Date    WBC 3.5 (L) 08/29/2018    WBC 3.0 (L) 04/27/2018    WBC 3.2 (L) 01/02/2018    HGB 12.7 08/29/2018    HGB 12.4 04/27/2018    HGB 13.0 01/02/2018    HCT 40.2 08/29/2018    HCT 39.6 04/27/2018    HCT 41.4 01/02/2018    MCV 91 08/29/2018    MCV 93 04/27/2018    MCV 93 01/02/2018     08/29/2018     04/27/2018     01/02/2018     Lab Results   Component Value Date    BUN 24 07/26/2018    BUN 27 12/14/2016    BUN 30 07/14/2014     No components found for: SEDRATE  Lab Results   Component Value Date    TSH 0.95 07/26/2018    TSH 0.64 08/05/2016    TSH 1.02 01/11/2016     Lab Results   Component Value Date    AST 21 08/29/2018    AST 20 04/27/2018     AST 18 01/02/2018    ALT 26 08/29/2018    ALT 24 04/27/2018    ALT 20 01/02/2018    ALKPHOS 74 04/01/2013    ALKPHOS 57 08/25/2011    ALKPHOS 39 (L) 08/05/2011     Reviewed Rheumatology lab flowsheet      Santiago Corbett MD       Halal/ kosher meals only

## 2018-12-17 NOTE — NURSING NOTE
Chief Complaint   Patient presents with     Pre-Op Exam     Pt is here for a pre-op exam.      Keily Perez LPN at 3:47 PM on 12/17/2018.

## 2018-12-17 NOTE — PATIENT INSTRUCTIONS
[facility] Medication Refill Request Information:  * Please contact your pharmacy regarding ANY request for medication refills.  ** [facility] Prescription Fax = [phone]  * Please allow 3 business days for routine medication refills.  * Please allow 5 business days for controlled substance medication refills.     [facility] Test Result notification information:  *You will be notified with in 7-10 days of your appointment day regarding the results of your test.  If you are on MyChart you will be notified as soon as the provider has reviewed the results and signed off on them.    [facility]: [phone]

## 2018-12-17 NOTE — PROGRESS NOTES
Surgeon (please enter first and last name):  Monika Fermin MD  Location of Surgery:  OR  Date of Surgery:  1/08/18  Procedure:  Right Eye Cataract Extraction with Intraocular Lens      HPI  73-year-old presents today for preoperative medical evaluation prior to right cataract surgery.  She has been doing reasonably well with the exception of her knees.  She continues to experience some pain discomfort occasional swelling in the knees bilaterally.  Despite this she is exercising regularly working out on the [employer] 45 minutes a day with an hours worth of yoga.  As a result of this she has not found that the knees limit her functional ability.  She is not having any chest pain dyspnea dizziness lightheadedness palpitations or other symptoms associated with her exercise.  She has had some problems with nausea and vomiting in the past with sedation for colonoscopy but otherwise no surgical complications.  She is maintained on Coumadin because a history of DVT and PE.  She had no bleeding complications or problems recently.  She has had some numbness and tingling in the feet and this is not related to exposure to cold and is different from the rainouts phenomena she experiences in her hands.  Past Medical History:   Diagnosis Date     Achalasia      Antiphospholipid syndrome (H)      Antiplatelet or antithrombotic long-term use      DM (diabetes mellitus) (H)      DVT (deep venous thrombosis) (H) 2003    and PE     Dysphagia     occasional, use Nifedipine     GERD (gastroesophageal reflux disease)      Hyperlipidemia      Lymphedema      Myopia      Neuropathy     toes bilateral     Nonsenile cataract      osteoporosis      Pericarditis      Raynaud's disease      SLE (systemic lupus erythematosus) (H)      Past Surgical History:   Procedure Laterality Date     COLONOSCOPY N/A 11/28/2016    Procedure: COLONOSCOPY;  Surgeon: Sang August MD;  Location:  GI     CYSTOSCOPY, SLING TRANSVAGINAL N/A  12/20/2016    Procedure: CYSTOSCOPY, SLING TRANSVAGINAL;  Surgeon: Jeanmarie Pierson MD;  Location: [facility] OR     DISSECT LYMPH NODE AXILLA  2004    Lt, during eval for SLE     HYSTEROSCOPIC PLACEMENT CONTRACEPTIVE DEVICE  1985     TUBAL LIGATION Bilateral      Family History   Problem Relation Age of Onset     Cancer Other         breast     Cerebrovascular Disease Other      C.A.D. Other      Glaucoma Father      Social History     Socioeconomic History     Marital status:      Spouse name: None     Number of children: None     Years of education: None     Highest education level: None   Social Needs     Financial resource strain: None     Food insecurity - worry: None     Food insecurity - inability: None     Transportation needs - medical: None     Transportation needs - non-medical: None   Occupational History     None   Tobacco Use     Smoking status: Never Smoker     Smokeless tobacco: Never Used   Substance and Sexual Activity     Alcohol use: No     Drug use: No     Sexual activity: Yes     Partners: Male     Comment: , safe in relationship   Other Topics Concern     Parent/sibling w/ CABG, MI or angioplasty before 65F 55M? Not Asked   Social History Narrative    How much exercise per week? Walk to work every morning (2 miles), swimming 3 times a week, takes walks with , periodically works out at gym on stationary bike     How much calcium per day? Supplement 3x daily        How much caffeine per day? On rare occasion, only if she is out to eat and that is all they have    How much vitamin D per day? supplement    Do you/your family wear seatbelts?  Yes    Do you/your family use safety helmets? Yes    Do you/your family use sunscreen? Yes    Do you/your family keep firearms in the home? No    Do you/your family have a smoke detector(s)? Yes        Do you feel safe in your home? Yes    Has anyone ever touched you in an unwanted manner? No        Klever MCCORD LPN 7/18/2013                  Complete review of symptoms negative except as noted above.    Exam:  /80   Pulse 66   SpO2 99%   Breastfeeding? No   PHYSICAL EXAMINATION:   The patient is alert, oriented with a clear sensorium.   Skin shows no lesions or rashes and good turgor.   Head is normocephalic and atraumatic.   Eyes show PERRLA. Fundi are poorly seen related to bilateral cataracts.   Ears show normal TMs bilaterally.   Mouth shows clear oral mucosa.   Neck shows no nodes, thyromegaly or bruits.   Back is nontender.   Lungs are clear to percussion and auscultation.   Heart shows normal S1 and S2 without murmur or gallop.   Abdomen is soft, nontender without masses or organomegaly.   Extremities show bilateral degenerative arthritis of both knees with small joint effusions superficial venous varicosities and trace pretibial edema and no evidence of active synovitis.  Onychomycosis changes of her fingernails and decreased pedal pulses bilaterally  Neurologic examination shows cranial nerves II-XII intact. Motor shows normal strength. Reflexes are full and symmetrical.  Sensation is intact to monofilament distally but absent to vibratory sense in the toes bilaterally    ASSESSMENT  1 symptomatic bilateral cataracts  2 diabetes mellitus fair control  3 hypertension well controlled  4 hyperlipidemia well controlled  5 osteoporosis  6 history of SLE with Raynaud's phenomena  7 GERD  8 peripheral neuropathy possibly related to #2 above  9 immunosuppression related to #6 above  10 bilateral osteoarthritis of the knees    Plan  Patient is stable from a medical and cardiovascular standpoint and does not require any additional imaging or investigation prior to her planned low risk surgery.  She would be low risk for cardiovascular complications.  She was given an exercise regimen to do for her knees we will plan to recheck her vitamin B12 level because of her peripheral neuropathy.  Otherwise no further imaging or investigation is  necessary prior to her planned surgery    This note was completed using Dragon voice recognition software.  Although reviewed after completion, some word and grammatical errors may occur.    Joe Contreras MD  General Internal Medicine  [facility]  [phone]

## 2018-12-17 NOTE — LETTER
12/17/2018      RE: Shala Caruso  [address]  [place] [address]       Surgeon (please enter first and last name):  Monika Fermin MD  Location of Surgery:  OR  Date of Surgery:  1/08/18  Procedure:  Right Eye Cataract Extraction with Intraocular Lens      HPI  73-year-old presents today for preoperative medical evaluation prior to right cataract surgery.  She has been doing reasonably well with the exception of her knees.  She continues to experience some pain discomfort occasional swelling in the knees bilaterally.  Despite this she is exercising regularly working out on the [employer] 45 minutes a day with an hours worth of yoga.  As a result of this she has not found that the knees limit her functional ability.  She is not having any chest pain dyspnea dizziness lightheadedness palpitations or other symptoms associated with her exercise.  She has had some problems with nausea and vomiting in the past with sedation for colonoscopy but otherwise no surgical complications.  She is maintained on Coumadin because a history of DVT and PE.  She had no bleeding complications or problems recently.  She has had some numbness and tingling in the feet and this is not related to exposure to cold and is different from the rainouts phenomena she experiences in her hands.  Past Medical History:   Diagnosis Date     Achalasia      Antiphospholipid syndrome (H)      Antiplatelet or antithrombotic long-term use      DM (diabetes mellitus) (H)      DVT (deep venous thrombosis) (H) 2003    and PE     Dysphagia     occasional, use Nifedipine     GERD (gastroesophageal reflux disease)      Hyperlipidemia      Lymphedema      Myopia      Neuropathy     toes bilateral     Nonsenile cataract      osteoporosis      Pericarditis      Raynaud's disease      SLE (systemic lupus erythematosus) (H)      Past Surgical History:   Procedure Laterality Date     COLONOSCOPY N/A 11/28/2016    Procedure: COLONOSCOPY;   Surgeon: Sang August MD;  Location: [facility]     CYSTOSCOPY, SLING TRANSVAGINAL N/A 12/20/2016    Procedure: CYSTOSCOPY, SLING TRANSVAGINAL;  Surgeon: Jeanmarie Pierson MD;  Location: [facility]     DISSECT LYMPH NODE AXILLA  2004    Lt, during eval for SLE     HYSTEROSCOPIC PLACEMENT CONTRACEPTIVE DEVICE  1985     TUBAL LIGATION Bilateral      Family History   Problem Relation Age of Onset     Cancer Other         breast     Cerebrovascular Disease Other      C.A.D. Other      Glaucoma Father      Social History     Socioeconomic History     Marital status:      Spouse name: None     Number of children: None     Years of education: None     Highest education level: None   Social Needs     Financial resource strain: None     Food insecurity - worry: None     Food insecurity - inability: None     Transportation needs - medical: None     Transportation needs - non-medical: None   Occupational History     None   Tobacco Use     Smoking status: Never Smoker     Smokeless tobacco: Never Used   Substance and Sexual Activity     Alcohol use: No     Drug use: No     Sexual activity: Yes     Partners: Male     Comment: , safe in relationship   Other Topics Concern     Parent/sibling w/ CABG, MI or angioplasty before 65F 55M? Not Asked   Social History Narrative    How much exercise per week? Walk to work every morning (2 miles), swimming 3 times a week, takes walks with , periodically works out at gym on stationary bike     How much calcium per day? Supplement 3x daily        How much caffeine per day? On rare occasion, only if she is out to eat and that is all they have    How much vitamin D per day? supplement    Do you/your family wear seatbelts?  Yes    Do you/your family use safety helmets? Yes    Do you/your family use sunscreen? Yes    Do you/your family keep firearms in the home? No    Do you/your family have a smoke detector(s)? Yes        Do you feel safe in your home? Yes    Has anyone ever  touched you in an unwanted manner? No        Klever R LPN 7/18/2013                 Complete review of symptoms negative except as noted above.    Exam:  /80   Pulse 66   SpO2 99%   Breastfeeding? No   PHYSICAL EXAMINATION:   The patient is alert, oriented with a clear sensorium.   Skin shows no lesions or rashes and good turgor.   Head is normocephalic and atraumatic.   Eyes show PERRLA. Fundi are poorly seen related to bilateral cataracts.   Ears show normal TMs bilaterally.   Mouth shows clear oral mucosa.   Neck shows no nodes, thyromegaly or bruits.   Back is nontender.   Lungs are clear to percussion and auscultation.   Heart shows normal S1 and S2 without murmur or gallop.   Abdomen is soft, nontender without masses or organomegaly.   Extremities show bilateral degenerative arthritis of both knees with small joint effusions superficial venous varicosities and trace pretibial edema and no evidence of active synovitis.  Onychomycosis changes of her fingernails and decreased pedal pulses bilaterally  Neurologic examination shows cranial nerves II-XII intact. Motor shows normal strength. Reflexes are full and symmetrical.  Sensation is intact to monofilament distally but absent to vibratory sense in the toes bilaterally    ASSESSMENT  1 symptomatic bilateral cataracts  2 diabetes mellitus fair control  3 hypertension well controlled  4 hyperlipidemia well controlled  5 osteoporosis  6 history of SLE with Raynaud's phenomena  7 GERD  8 peripheral neuropathy possibly related to #2 above  9 immunosuppression related to #6 above  10 bilateral osteoarthritis of the knees    Plan  Patient is stable from a medical and cardiovascular standpoint and does not require any additional imaging or investigation prior to her planned low risk surgery.  She would be low risk for cardiovascular complications.  She was given an exercise regimen to do for her knees we will plan to recheck her vitamin B12 level because of her  peripheral neuropathy.  Otherwise no further imaging or investigation is necessary prior to her planned surgery    This note was completed using Dragon voice recognition software.  Although reviewed after completion, some word and grammatical errors may occur.    Joe Contreras MD  General Internal Medicine  [facility]  [phone]

## 2018-12-18 ENCOUNTER — ANTICOAGULATION THERAPY VISIT (OUTPATIENT)
Dept: ANTICOAGULATION | Facility: CLINIC | Age: 73
End: 2018-12-18

## 2018-12-18 LAB
FACTOR 2 ASSAY: NORMAL
PROTHROM ACT/NOR PPP: 19 % (ref 60–140)

## 2018-12-18 NOTE — PROGRESS NOTES
ANTICOAGULATION FOLLOW-UP CLINIC VISIT    Patient Name:  Shala Caruso  Date:  12/18/2018  Contact Type:  Telephone    SUBJECTIVE:     Patient Findings     Positives:   No Problem Findings    Comments:   Factor 2 result is 19% on 12/17.           OBJECTIVE    INR   Date Value Ref Range Status   12/17/2018 2.49 (H) 0.86 - 1.14 Final     Chromogenic Factor 10   Date Value Ref Range Status   11/21/2018 16%  Final     Factor 2 Assay   Date Value Ref Range Status   12/17/2018 19 (L) 60 - 140 % Final       ASSESSMENT / PLAN  INR assessment THER    Recheck INR In: 2 WEEKS    INR Location Clinic      Anticoagulation Summary  As of 12/18/2018    INR goal:   2.0-3.0   TTR:   72.5 % (2.5 y)   INR used for dosing:      Warfarin maintenance plan:   12.5 mg (5 mg x 2.5) every Sun, Tue, Thu; 10 mg (5 mg x 2) all other days   Full warfarin instructions:   12.5 mg every Sun, Tue, Thu; 10 mg all other days   Weekly warfarin total:   77.5 mg   No change documented:   Andre Bledsoe RN   Plan last modified:   Portia Cherry RN (6/16/2017)   Next INR check:   1/3/2019   Priority:   INR   Target end date:       Indications    SLE (systemic lupus erythematosus) (H) (Resolved) [M32.9]  Long-term (current) use of anticoagulants [Z79.01] [Z79.01]             Anticoagulation Episode Summary     INR check location:   Clinic Lab    Preferred lab:       Send INR reminders to:   [facility]    Comments:   Factor 2  goal range is 15-25%  Ok to leave message on home [phone] and work voicemail, but call [email or web address] per pt request.  OK to leave message at office  May leave messages with Rodriguez Santos  DO NOT GIVE RECORDS TO EMPLOYER [employer]        Anticoagulation Care Providers     Provider Role Specialty Phone number    Mt Kiser MD Responsible Clinical Cardiac Electrophysiology [phone]            See the Encounter Report to view Anticoagulation Flowsheet and Dosing Calendar (Go to Encounters tab in chart review, and find  the Anticoagulation Therapy Visit)    Left message for patient with results and dosing recommendations. Asked patient to call back to report any missed doses, falls, signs and symptoms of bleeding or clotting, any changes in health, medication, or diet. Asked patient to call back with any questions or concerns.    Andre Bledsoe RN      12/18 Received communication from Dr. Contreras about Shala's upcoming procedure.    Joe Contreras MD Erickson, Toby L RN             No need to hold coumadin for cataract surgery.   JL    Previous Messages      ----- Message -----   From: Andre Bledsoe RN   Sent: 12/18/2018  11:39 AM   To: Joe Contreras MD     Good morning,   I'm reaching out from the Coumadin clinic.  Shala is scheduled on 1/8 for a cataract surgical procedure.  Please advise if the ACC should HOLD/Bridge pre procedure.  Thank you!     Delilah Powell RN   [phone]

## 2018-12-19 ENCOUNTER — OFFICE VISIT (OUTPATIENT)
Dept: OPHTHALMOLOGY | Facility: CLINIC | Age: 73
End: 2018-12-19
Attending: OPHTHALMOLOGY
Payer: COMMERCIAL

## 2018-12-19 DIAGNOSIS — D31.32 NEVUS OF CHOROID OF LEFT EYE: Primary | ICD-10-CM

## 2018-12-19 DIAGNOSIS — H26.9 NUCLEAR CATARACT, NONSENILE: ICD-10-CM

## 2018-12-19 DIAGNOSIS — H31.8 CHOROIDAL LESION: Primary | ICD-10-CM

## 2018-12-19 PROCEDURE — 76519 ECHO EXAM OF EYE: CPT | Mod: ZF | Performed by: OPHTHALMOLOGY

## 2018-12-19 PROCEDURE — G0463 HOSPITAL OUTPT CLINIC VISIT: HCPCS | Mod: ZF

## 2018-12-19 ASSESSMENT — CONF VISUAL FIELD
OS_NORMAL: 1
OD_NORMAL: 1
METHOD: COUNTING FINGERS

## 2018-12-19 ASSESSMENT — TONOMETRY
OD_IOP_MMHG: 12
IOP_METHOD: TONOPEN
OS_IOP_MMHG: 14

## 2018-12-19 ASSESSMENT — VISUAL ACUITY
OS_PH_CC: 20/25
CORRECTION_TYPE: GLASSES
OD_CC: 20/30
METHOD: SNELLEN - LINEAR
OS_CC+: +1
OS_PH_CC+: -3
OS_CC: 20/30
OD_CC+: +2

## 2018-12-19 ASSESSMENT — SLIT LAMP EXAM - LIDS
COMMENTS: NORMAL
COMMENTS: NORMAL

## 2018-12-19 ASSESSMENT — REFRACTION_WEARINGRX
OS_CYLINDER: +0.75
OS_ADD: +2.75
OS_AXIS: 015
OS_SPHERE: -5.50
OD_CYLINDER: +1.50
OD_SPHERE: -5.50
SPECS_TYPE: TRIFOCAL
OD_AXIS: 138
OD_ADD: +2.75

## 2018-12-19 ASSESSMENT — CUP TO DISC RATIO
OS_RATIO: 0.3
OD_RATIO: 0.3

## 2018-12-19 ASSESSMENT — EXTERNAL EXAM - RIGHT EYE: OD_EXAM: NORMAL

## 2018-12-19 ASSESSMENT — EXTERNAL EXAM - LEFT EYE: OS_EXAM: NORMAL

## 2018-12-19 NOTE — PROGRESS NOTES
CC: blurry vision OU  HPI: 72 year old F with  hx of DM type I here for diabetic eye exam.  Eyes getting slightly blurrier. Denies flashes and floaters. Last hgA1c 5  Past med hx: SLE currently on cellcept (allergic to plaq)  Past ocular hx: none     OCULAR IMAGING  Optical Coherence Tomography 8-9-18  Right eye normal foveal contour, no srf/irf  Left eye normal foveal contour,   OCT thru lesion less than 1 mm thick with drusen overlying, no srf    PHOTOS 8-9-18  Right eye   Left eye  Choroidal pigmented lesion with drusen     U/S left eye   9/14/16 C1 0.56 mm; C2 5.46 mm  8-30-17 C1 1.3 mm; C2   8-9-18 less than 1 mm ashwini lesion. 0.73mm x 5.15T x 5.03L    IOL CALCS 12-19-18  Done  Right eye: 25.34  Left eye: 25.59    A/P:   1) DM type I   - no evidence of DR   - good glucose control    2) Cataracts OU  - patient interested in Cataract extraction intraocular lens in Jan, 2019    3) Myopic astigmatism, OU   - updated Rx      4) Choroidal pigmented lesion, left eye  - no orange pigmentation, no subretinal fluid, + drusen   - ultrasound 08/09/18  less than 1 mm height, normal retrobulbar echo pattern  - continue to observe     5) patient with history of Lupus  Took prednisolone for 10 yrs --> patient developed Diabetes mellitus  Currently Micophenolate  No lupus retinopahty  Patient had DVT left upper leg--> patient on coumadin    - f/u in 12 months, repeat oct enhanced depth image of the choroidal peripheral lesion , macula oct, US  ~~~~~~~~~~~~~~~~~~~~~~~~~~~~~~~~~~   Complete documentation of historical and exam elements from today's encounter can be found in the full encounter summary report (not reduplicated in this progress note).  I personally obtained the chief complaint(s) and history of present illness.  I confirmed and edited as necessary the review of systems, past medical/surgical history, family history, social history, and examination findings as documented by others; and I examined the patient myself.   I personally reviewed the relevant tests, images, and reports as documented above.  I personally reviewed the ophthalmic test(s) associated with this encounter, agree with the interpretation(s) as documented by the resident/fellow, and have edited the corresponding report(s) as necessary.   I formulated and edited as necessary the assessment and plan and discussed the findings and management plan with the patient and family    Monika Fermin MD   of Ophthalmology.  Retina Service   Department of Ophthalmology and Visual Neurosciences   [facility]  Phone: [phone]   Fax: [phone]

## 2018-12-26 ENCOUNTER — ANTICOAGULATION THERAPY VISIT (OUTPATIENT)
Dept: ANTICOAGULATION | Facility: CLINIC | Age: 73
End: 2018-12-26

## 2018-12-26 NOTE — PROGRESS NOTES
Pt called reporting that Dr. Monika Fermin the MD doing the cataract surgery wants pt's INR to be less than 2.00. Pt is aware that Dr. Contreras said no to holding Warfarin, but Abi Sarmiento wants pt to have a lower INR even though we don't go off INR's. Will aim for pt's Factor 2 to be above 25%. Pt is going to get an INR/Factor 2 on 1/3 and we will dose accordingly

## 2019-01-03 DIAGNOSIS — G62.9 PERIPHERAL POLYNEUROPATHY: ICD-10-CM

## 2019-01-03 DIAGNOSIS — Z79.01 LONG TERM (CURRENT) USE OF ANTICOAGULANTS: ICD-10-CM

## 2019-01-03 LAB
INR PPP: 2.81 (ref 0.86–1.14)
VIT B12 SERPL-MCNC: 1004 PG/ML (ref 193–986)

## 2019-01-04 ENCOUNTER — ANTICOAGULATION THERAPY VISIT (OUTPATIENT)
Dept: ANTICOAGULATION | Facility: CLINIC | Age: 74
End: 2019-01-04

## 2019-01-04 LAB — PROTHROM ACT/NOR PPP: 16 % (ref 60–140)

## 2019-01-04 NOTE — PROGRESS NOTES
Factor 2=16%.  Goal range is 15-25%.  1/8-Pt will have cataract surgery, and doctor would like Factor 2 around 25%.    ANTICOAGULATION FOLLOW-UP CLINIC VISIT    Patient Name:  Shala Caruso  Date:  2019  Contact Type:  Telephone    SUBJECTIVE:        OBJECTIVE    INR   Date Value Ref Range Status   2019 2.81 (H) 0.86 - 1.14 Final     Chromogenic Factor 10   Date Value Ref Range Status   2018 16%  Final     Factor 2 Assay   Date Value Ref Range Status   2019 16 (L) 60 - 140 % Final       ASSESSMENT / PLAN  INR assessment THER    Recheck INR In: 5 DAYS    INR Location Clinic      Anticoagulation Summary  As of 2019    INR goal:   2.0-3.0   TTR:   73.0 % (2.6 y)   INR used for dosin.81 (1/3/2019)   Warfarin maintenance plan:   12.5 mg (5 mg x 2.5) every Sun, Tue, Thu; 10 mg (5 mg x 2) all other days   Full warfarin instructions:   /: 7.5 mg; : 7.5 mg; /: 7.5 mg; Otherwise 12.5 mg every Sun, Tue, Thu; 10 mg all other days   Weekly warfarin total:   77.5 mg   Plan last modified:   Portia Cherry RN (2017)   Next INR check:   2019   Priority:   Factor 2   Target end date:       Indications    SLE (systemic lupus erythematosus) (H) (Resolved) [M32.9]  Long-term (current) use of anticoagulants [Z79.01] [Z79.01]             Anticoagulation Episode Summary     INR check location:   Clinic Lab    Preferred lab:       Send INR reminders to:   [facility]    Comments:   Factor 2  goal range is 15-25%  Ok to leave message on home [phone] and work voicemail, but call [email or web address] per pt request.  OK to leave message at office  May leave messages with Rodriguez Santos  DO NOT GIVE RECORDS TO EMPLOYER U        Anticoagulation Care Providers     Provider Role Specialty Phone number    Mt Kiser MD Responsible Clinical Cardiac Electrophysiology [phone]            See the Encounter Report to view Anticoagulation Flowsheet and Dosing Calendar (Go to Encounters tab  in chart review, and find the Anticoagulation Therapy Visit)  Left message for patient with results and dosing recommendations. Asked patient to call back to report any missed doses, falls, signs and symptoms of bleeding or clotting, any changes in health, medication, or diet. Asked patient to call back with any questions or concerns.      Portia Cherry RN

## 2019-01-06 RX ORDER — PROPARACAINE HYDROCHLORIDE 5 MG/ML
1 SOLUTION/ DROPS OPHTHALMIC ONCE
Status: CANCELLED | OUTPATIENT
Start: 2019-01-06 | End: 2019-01-06

## 2019-01-07 ENCOUNTER — ANESTHESIA EVENT (OUTPATIENT)
Dept: SURGERY | Facility: AMBULATORY SURGERY CENTER | Age: 74
End: 2019-01-07

## 2019-01-08 ENCOUNTER — HOSPITAL ENCOUNTER (OUTPATIENT)
Facility: AMBULATORY SURGERY CENTER | Age: 74
End: 2019-01-08
Attending: OPHTHALMOLOGY
Payer: COMMERCIAL

## 2019-01-08 ENCOUNTER — ANESTHESIA (OUTPATIENT)
Dept: SURGERY | Facility: AMBULATORY SURGERY CENTER | Age: 74
End: 2019-01-08

## 2019-01-08 VITALS
TEMPERATURE: 97 F | WEIGHT: 119.3 LBS | RESPIRATION RATE: 16 BRPM | DIASTOLIC BLOOD PRESSURE: 69 MMHG | HEIGHT: 66 IN | SYSTOLIC BLOOD PRESSURE: 127 MMHG | OXYGEN SATURATION: 100 % | BODY MASS INDEX: 19.17 KG/M2

## 2019-01-08 DIAGNOSIS — H26.9 CATARACT OF RIGHT EYE, UNSPECIFIED CATARACT TYPE: Primary | ICD-10-CM

## 2019-01-08 LAB
GLUCOSE BLDC GLUCOMTR-MCNC: 96 MG/DL (ref 70–99)
INR PPP: 2.24 (ref 0.86–1.14)

## 2019-01-08 DEVICE — IMPLANTABLE DEVICE: Type: IMPLANTABLE DEVICE | Site: EYE | Status: FUNCTIONAL

## 2019-01-08 RX ORDER — KETOROLAC TROMETHAMINE 4 MG/ML
1 SOLUTION/ DROPS OPHTHALMIC 4 TIMES DAILY
Qty: 5 ML | Refills: 0 | Status: SHIPPED | OUTPATIENT
Start: 2019-01-08 | End: 2019-02-05

## 2019-01-08 RX ORDER — ONDANSETRON 2 MG/ML
INJECTION INTRAMUSCULAR; INTRAVENOUS PRN
Status: DISCONTINUED | OUTPATIENT
Start: 2019-01-08 | End: 2019-01-08

## 2019-01-08 RX ORDER — MEPERIDINE HYDROCHLORIDE 25 MG/ML
12.5 INJECTION INTRAMUSCULAR; INTRAVENOUS; SUBCUTANEOUS
Status: DISCONTINUED | OUTPATIENT
Start: 2019-01-08 | End: 2019-01-09 | Stop reason: HOSPADM

## 2019-01-08 RX ORDER — CYCLOPENTOLATE HYDROCHLORIDE 10 MG/ML
1 SOLUTION/ DROPS OPHTHALMIC
Status: COMPLETED | OUTPATIENT
Start: 2019-01-08 | End: 2019-01-08

## 2019-01-08 RX ORDER — FENTANYL CITRATE 50 UG/ML
25-50 INJECTION, SOLUTION INTRAMUSCULAR; INTRAVENOUS
Status: DISCONTINUED | OUTPATIENT
Start: 2019-01-08 | End: 2019-01-09 | Stop reason: HOSPADM

## 2019-01-08 RX ORDER — ACETAMINOPHEN 325 MG/1
975 TABLET ORAL ONCE
Status: COMPLETED | OUTPATIENT
Start: 2019-01-08 | End: 2019-01-08

## 2019-01-08 RX ORDER — FENTANYL CITRATE 50 UG/ML
INJECTION, SOLUTION INTRAMUSCULAR; INTRAVENOUS PRN
Status: DISCONTINUED | OUTPATIENT
Start: 2019-01-08 | End: 2019-01-08

## 2019-01-08 RX ORDER — TROPICAMIDE 10 MG/ML
1 SOLUTION/ DROPS OPHTHALMIC
Status: COMPLETED | OUTPATIENT
Start: 2019-01-08 | End: 2019-01-08

## 2019-01-08 RX ORDER — LIDOCAINE HYDROCHLORIDE 10 MG/ML
INJECTION, SOLUTION EPIDURAL; INFILTRATION; INTRACAUDAL; PERINEURAL PRN
Status: DISCONTINUED | OUTPATIENT
Start: 2019-01-08 | End: 2019-01-08 | Stop reason: HOSPADM

## 2019-01-08 RX ORDER — BALANCED SALT SOLUTION 6.4; .75; .48; .3; 3.9; 1.7 MG/ML; MG/ML; MG/ML; MG/ML; MG/ML; MG/ML
SOLUTION OPHTHALMIC PRN
Status: DISCONTINUED | OUTPATIENT
Start: 2019-01-08 | End: 2019-01-08 | Stop reason: HOSPADM

## 2019-01-08 RX ORDER — PROPOFOL 10 MG/ML
INJECTION, EMULSION INTRAVENOUS PRN
Status: DISCONTINUED | OUTPATIENT
Start: 2019-01-08 | End: 2019-01-08

## 2019-01-08 RX ORDER — PROPARACAINE HYDROCHLORIDE 5 MG/ML
1 SOLUTION/ DROPS OPHTHALMIC ONCE
Status: COMPLETED | OUTPATIENT
Start: 2019-01-08 | End: 2019-01-08

## 2019-01-08 RX ORDER — ONDANSETRON 2 MG/ML
4 INJECTION INTRAMUSCULAR; INTRAVENOUS EVERY 30 MIN PRN
Status: DISCONTINUED | OUTPATIENT
Start: 2019-01-08 | End: 2019-01-09 | Stop reason: HOSPADM

## 2019-01-08 RX ORDER — PREDNISOLONE ACETATE 10 MG/ML
1 SUSPENSION/ DROPS OPHTHALMIC 4 TIMES DAILY
Qty: 5 ML | Refills: 0 | Status: SHIPPED | OUTPATIENT
Start: 2019-01-08 | End: 2019-02-05

## 2019-01-08 RX ORDER — DEXAMETHASONE SODIUM PHOSPHATE 4 MG/ML
INJECTION, SOLUTION INTRA-ARTICULAR; INTRALESIONAL; INTRAMUSCULAR; INTRAVENOUS; SOFT TISSUE PRN
Status: DISCONTINUED | OUTPATIENT
Start: 2019-01-08 | End: 2019-01-08 | Stop reason: HOSPADM

## 2019-01-08 RX ORDER — ONDANSETRON 4 MG/1
4 TABLET, ORALLY DISINTEGRATING ORAL EVERY 30 MIN PRN
Status: DISCONTINUED | OUTPATIENT
Start: 2019-01-08 | End: 2019-01-09 | Stop reason: HOSPADM

## 2019-01-08 RX ORDER — LIDOCAINE HYDROCHLORIDE 20 MG/ML
INJECTION, SOLUTION INFILTRATION; PERINEURAL PRN
Status: DISCONTINUED | OUTPATIENT
Start: 2019-01-08 | End: 2019-01-08

## 2019-01-08 RX ORDER — NALOXONE HYDROCHLORIDE 0.4 MG/ML
.1-.4 INJECTION, SOLUTION INTRAMUSCULAR; INTRAVENOUS; SUBCUTANEOUS
Status: DISCONTINUED | OUTPATIENT
Start: 2019-01-08 | End: 2019-01-09 | Stop reason: HOSPADM

## 2019-01-08 RX ORDER — PHENYLEPHRINE HYDROCHLORIDE 25 MG/ML
1 SOLUTION/ DROPS OPHTHALMIC
Status: COMPLETED | OUTPATIENT
Start: 2019-01-08 | End: 2019-01-08

## 2019-01-08 RX ORDER — OXYCODONE HYDROCHLORIDE 5 MG/1
5 TABLET ORAL EVERY 4 HOURS PRN
Status: DISCONTINUED | OUTPATIENT
Start: 2019-01-08 | End: 2019-01-09 | Stop reason: HOSPADM

## 2019-01-08 RX ORDER — SODIUM CHLORIDE, SODIUM LACTATE, POTASSIUM CHLORIDE, CALCIUM CHLORIDE 600; 310; 30; 20 MG/100ML; MG/100ML; MG/100ML; MG/100ML
INJECTION, SOLUTION INTRAVENOUS CONTINUOUS
Status: DISCONTINUED | OUTPATIENT
Start: 2019-01-08 | End: 2019-01-09 | Stop reason: HOSPADM

## 2019-01-08 RX ORDER — OFLOXACIN 3 MG/ML
1 SOLUTION/ DROPS OPHTHALMIC 4 TIMES DAILY
Qty: 5 ML | Refills: 0 | Status: SHIPPED | OUTPATIENT
Start: 2019-01-08 | End: 2019-02-05

## 2019-01-08 RX ORDER — SODIUM CHLORIDE, SODIUM LACTATE, POTASSIUM CHLORIDE, CALCIUM CHLORIDE 600; 310; 30; 20 MG/100ML; MG/100ML; MG/100ML; MG/100ML
INJECTION, SOLUTION INTRAVENOUS CONTINUOUS
Status: DISCONTINUED | OUTPATIENT
Start: 2019-01-08 | End: 2019-01-08 | Stop reason: HOSPADM

## 2019-01-08 RX ADMIN — PROPARACAINE HYDROCHLORIDE 1 DROP: 5 SOLUTION/ DROPS OPHTHALMIC at 06:56

## 2019-01-08 RX ADMIN — TROPICAMIDE 1 DROP: 10 SOLUTION/ DROPS OPHTHALMIC at 07:14

## 2019-01-08 RX ADMIN — LIDOCAINE HYDROCHLORIDE 20 MG: 20 INJECTION, SOLUTION INFILTRATION; PERINEURAL at 08:38

## 2019-01-08 RX ADMIN — FENTANYL CITRATE 50 MCG: 50 INJECTION, SOLUTION INTRAMUSCULAR; INTRAVENOUS at 08:40

## 2019-01-08 RX ADMIN — CYCLOPENTOLATE HYDROCHLORIDE 1 DROP: 10 SOLUTION/ DROPS OPHTHALMIC at 07:01

## 2019-01-08 RX ADMIN — PROPOFOL 50 MG: 10 INJECTION, EMULSION INTRAVENOUS at 08:40

## 2019-01-08 RX ADMIN — CYCLOPENTOLATE HYDROCHLORIDE 1 DROP: 10 SOLUTION/ DROPS OPHTHALMIC at 07:17

## 2019-01-08 RX ADMIN — TROPICAMIDE 1 DROP: 10 SOLUTION/ DROPS OPHTHALMIC at 07:01

## 2019-01-08 RX ADMIN — PHENYLEPHRINE HYDROCHLORIDE 1 DROP: 25 SOLUTION/ DROPS OPHTHALMIC at 07:14

## 2019-01-08 RX ADMIN — ONDANSETRON 4 MG: 2 INJECTION INTRAMUSCULAR; INTRAVENOUS at 08:40

## 2019-01-08 RX ADMIN — ACETAMINOPHEN 975 MG: 325 TABLET ORAL at 06:52

## 2019-01-08 RX ADMIN — CYCLOPENTOLATE HYDROCHLORIDE 1 DROP: 10 SOLUTION/ DROPS OPHTHALMIC at 07:14

## 2019-01-08 RX ADMIN — SODIUM CHLORIDE, SODIUM LACTATE, POTASSIUM CHLORIDE, CALCIUM CHLORIDE: 600; 310; 30; 20 INJECTION, SOLUTION INTRAVENOUS at 06:53

## 2019-01-08 RX ADMIN — TROPICAMIDE 1 DROP: 10 SOLUTION/ DROPS OPHTHALMIC at 07:17

## 2019-01-08 RX ADMIN — PHENYLEPHRINE HYDROCHLORIDE 1 DROP: 25 SOLUTION/ DROPS OPHTHALMIC at 07:01

## 2019-01-08 RX ADMIN — PHENYLEPHRINE HYDROCHLORIDE 1 DROP: 25 SOLUTION/ DROPS OPHTHALMIC at 07:18

## 2019-01-08 ASSESSMENT — MIFFLIN-ST. JEOR: SCORE: 1068.92

## 2019-01-08 NOTE — ANESTHESIA POSTPROCEDURE EVALUATION
Anesthesia POST Procedure Evaluation    Patient: Shala Caruso   MRN:     [id number] Gender:   female   Age:    73 year old :      1945        Preoperative Diagnosis: Cataract   Procedure(s):  Right Eye Cataract Extraction with Intraocular Lens   Postop Comments: No value filed.       Anesthesia Type:  MAC    Reportable Event: NO     PAIN: Uncomplicated   Sign Out status: Comfortable, Well controlled pain     PONV: No PONV   Sign Out status:  No Nausea or Vomiting     Neuro/Psych: Uneventful perioperative course   Sign Out Status: Preoperative baseline; Age appropriate mentation     Airway/Resp.: Uneventful perioperative course   Sign Out Status: Non labored breathing, age appropriate RR; Resp. Status within EXPECTED Parameters     CV: Uneventful perioperative course   Sign Out status: Appropriate BP and perfusion indices; Appropriate HR/Rhythm     Disposition:   Sign Out in:  PACU  Disposition:  Phase II; Home  Recovery Course: Uneventful  Follow-Up: Not required           Last Anesthesia Record Vitals:  CRNA VITALS  2019 0840 - 2019 0940      2019             Resp Rate (observed):  2  (Abnormal)     Resp Rate (set):  10          Last PACU/Preop Vitals:  Vitals:    19 0623 19 0915 19 0930   BP: 134/74 119/65 127/69   Resp: 16 16 16   Temp: 36.4  C (97.5  F) 36.1  C (97  F)    SpO2: 100% 100% 100%         Electronically Signed By: Matthew Lui MD, MD, 2019, 12:01 PM

## 2019-01-08 NOTE — DISCHARGE INSTRUCTIONS
Dr. Monika Fermin  [facility]  [phone]  Post Operative Cataract Instructions      If you have a gauze eye patch on, please do not remove it until it is removed by your physician at your first post-operative visit.  You will start your eye drops the next day.      Wear the clear eye shield when sleeping for protection for 5 days.      Do not rub the operated eye.      Light sensitivity may be noticed. Sunglasses may be worn for comfort.      Some discomfort and irritation may be noticed. Acetaminophen (Tylenol) or Ibuprofen (Advil) may be taken for discomfort. If pain persists please call Dr. Fermin's office.      Keep the operated eye dry. You may wash your hair, bathe or shower, but keep the operated eye closed while doing so.       If you take glaucoma medications, bring them with you to the clinic on your first post operative visit.      Bring your prescribed eye drops with you to your scheduled post-operative appointment.      Use medication exactly as prescribed by your doctor. You may restart your regular home medications.       Call Dr. Fermin's office at [phone] if any of the following should occur:    - Any sudden vision changes, including decreased vision  - Nausea or severe headache  - Increase in pain not controlled  - Signs of infection (pus, increasing redness or tenderness)  - Severe sensitivity to light          [facility]  Home Care Following Anesthesia  For 24 hours after surgery:  1. Get plenty of rest.  A responsible adult must stay with you for at least 24 hours after you leave the surgery center.  2. Do not drive or use heavy equipment.  If you have weakness or tingling, don't drive or use heavy equipment until this feeling goes away.   3. Do not drink alcohol.   4. Avoid strenuous or risky activities.  Ask for help when climbing stairs.  5. You may feel lightheaded.  IF so, sit for a few minutes before standing.  Have  someone help you get up.   6. If you have nausea (feel sick to your stomach): Drink only clear liquids such as apple juice, ginger ale, broth or 7-Up.  Rest may also help.  Be sure to drink enough fluids.  Move to a regular diet as you feel able.   7. You may have a slight fever.  Call the doctor if your fever is over 100 F (37.7 C) (taken under the tongue) or lasts longer than 24 hours.  8. You may have a dry mouth, a sore throat, muscle aches or trouble sleeping. These should go away after 24 hours.  9. Do not make important or legal decisions.               Tips for taking pain medications  To get the best pain relief possible, remember these points:    Take pain medications as directed, before pain becomes severe.    Pain medication can upset your stomach: taking it with food may help.    Constipation is a common side effect of pain medication. Drink plenty of  fluids.    Eat foods high in fiber. Take a stool softener if recommended by your doctor or pharmacist.    Do not drink alcohol, drive or operate machinery while taking pain medications.    Ask about other ways to control pain, such as with heat, ice or relaxation.    Tylenol/Acetaminophen Consumption  To help encourage the safe use of acetaminophen, the makers of TYLENOL  have lowered the maximum daily dose for single-ingredient Extra Strength TYLENOL  (acetaminophen) products sold in the [place] from 8 pills per day (4,000 mg) to 6 pills per day (3,000 mg). The dosing interval has also changed from 2 pills every 4-6 hours to 2 pills every 6 hours.    If you feel your pain relief is insufficient, you may take Tylenol/Acetaminophen in addition to your narcotic pain medication.     Be careful not to exceed 3,000 mg of Tylenol/Acetaminophen in a 24 hour period from all sources.    If you are taking extra strength Tylenol/acetaminophen (500 mg), the maximum dose is 6 tablets in 24 hours.    If you are taking regular strength acetaminophen (325 mg), the maximum  dose is 9 tablets in 24 hours.    Tylenol 975 mg given at 0652 am today.  Next dose is ok to take at 1252pm today.  Follow box instructions.    Call a doctor for any of the followin. Signs of infection (fever, growing tenderness at the surgery site, a large amount of drainage or bleeding, severe pain, foul-smelling drainage, redness, swelling).  2. It has been over 8 to 10 hours since surgery and you are still not able to urinate (pass water).  3. Headache for over 24 hours.  4. Numbness, tingling or weakness the day after surgery (if you had spinal anesthesia).  Your doctor is:       Dr. Monika Fermin, Ophthalmology: [phone]               Or dial [phone] and ask for the resident on call for:  Ophthalmology  For emergency care, call the:  [facility]:  [phone] (TTY for hearing impaired: [phone])

## 2019-01-08 NOTE — OP NOTE
SURGEON: Monika Fermin MD   ASSISTANT SURGEON: Gabo Sarabia MD  PREOPERATIVE DIAGNOSIS: Visually significant cataract RIGHT eye  POSTOPERATIVE DIAGNOSIS: same  PROCEDURE: Phacoemulsification with intraocular lens implantation, SN60WF 14.5 D SN [id number]  ANESTHESIA: Monitored anesthesia care and peribulbar block   COMPLICATIONS: none    Indication: Shala Caruso is a 73 year old with diagnosis of visually significant cataract, here for cataract surgery    DESCRIPTION OF THE PROCEDURE:  The patient was taken to the operative room where intravenous sedation was administered and a peribulbar block consisting of a 1:1 mixture of 2%lidocaine and 0.75% marcaine with epinephrine and wydase, was administered to the operative eye with adequate anesthesia and akinesia.    The operative eye was prepped and draped in the usual sterile surgical fashion for ophthalmic surgery, including the installation of one drop of 5% Povidone Iodine.  A sterile drape was placed over the face and body and a lid speculum was inserted.      With the use of a Supersharp blade and 0.12 forceps, a paracentesis was created at the limbus. Lidocaine 1% solution was placed in the anterior chamber. Viscoelastic was injected into the anterior chamber using a canula.  A 2.4 mm keratome was then used to construct a clear corneal incision at the 10 o'clock position.  Using Utrata forceps and cystotome needle, a continuous curvilinear capsulorrhexis was created and hydrodissection was undertaken with the use of BSS.  The nucleus was found to be freely mobile and then removed by phacoemulsification using a divide and conquer technique.  The remaining elements of cortex were then removed with irrigation/aspiration.  An IOL,was injected into the capsular bag and was rotated into a good position with a Sinskey hook. The remaining elements of viscoelastic were then removed with irrigation/aspiration. The wounds were hydrodissect and were watertight.       The lid speculum was removed.  Subconjunctival injection of Dexamethasone and Ancef were administered. The eye was cleaned with wet and dry gauze. Maxitrol ointment was placed on the eye.  A patch and Yeager shield were placed over the eye.  The patient was discharge in stable condition having tolerated the procedure well.    The surgery was assisted by Dr. Gabo Sarabia, because no qualified resident was available on the day of the surgery. Due to the delicate and complex nature of this surgery, Dr. Gabo Sarabia was required. Dr. Gabo Sarabia assisted with Cataract extraction. I was present for the entire surgery.

## 2019-01-08 NOTE — ANESTHESIA PREPROCEDURE EVALUATION
Anesthesia Pre-Procedure Evaluation    Patient: Shala Caruso   MRN:     [id number] Gender:   female   Age:    73 year old :      1945        Preoperative Diagnosis: Cataract   Procedure(s):  Right Eye Cataract Extraction with Intraocular Lens     Past Medical History:   Diagnosis Date     Achalasia      Antiphospholipid syndrome (H)      Antiplatelet or antithrombotic long-term use      Coagulation disorder (H) 1145-5393     DM (diabetes mellitus) (H)      DVT (deep venous thrombosis) (H)     and PE     Dysphagia     occasional, use Nifedipine     GERD (gastroesophageal reflux disease)      Hyperlipidemia      Lymphedema      Myopia      Neuropathy     toes bilateral     Nonsenile cataract      osteoporosis      Pericarditis      Raynaud's disease      SLE (systemic lupus erythematosus) (H)       Past Surgical History:   Procedure Laterality Date     COLONOSCOPY N/A 2016    Procedure: COLONOSCOPY;  Surgeon: Sang August MD;  Location: [facility]     CYSTOSCOPY, SLING TRANSVAGINAL N/A 2016    Procedure: CYSTOSCOPY, SLING TRANSVAGINAL;  Surgeon: Jeanmarie Pierson MD;  Location: [facility]     DISSECT LYMPH NODE AXILLA      Lt, during eval for SLE     HYSTEROSCOPIC PLACEMENT CONTRACEPTIVE DEVICE  1985     TUBAL LIGATION Bilateral           Anesthesia Evaluation     . Pt has had prior anesthetic. Type: General    No history of anesthetic complications          ROS/MED HX    ENT/Pulmonary:  - neg pulmonary ROS     Neurologic:     (+)neuropathy     Cardiovascular:     (+) hypertension----. Taking blood thinners : . . . :. .       METS/Exercise Tolerance:     Hematologic:     (+) History of blood clots pt is anticoagulated, -      Musculoskeletal:  - neg musculoskeletal ROS       GI/Hepatic:     (+) GERD       Renal/Genitourinary:     (+) chronic renal disease, type: CRI,       Endo:     (+) type II DM .      Psychiatric:  - neg psychiatric ROS       Infectious Disease:  - neg infectious  "disease ROS       Malignancy:         Other: Comment: lupus                        PHYSICAL EXAM:   Mental Status/Neuro: A/A/O   Airway: Facies: Feasible  Mallampati: I  Mouth/Opening: Full  TM distance: > 6 cm  Neck ROM: Full   Respiratory: Auscultation: CTAB     Resp. Rate: Normal     Resp. Effort: Normal      CV: Rhythm: Regular  Rate: Age appropriate  Heart: Normal Sounds   Comments:      Dental: Normal                  Lab Results   Component Value Date    WBC 3.4 (L) 11/21/2018    HGB 12.2 11/21/2018    HCT 39.8 11/21/2018     11/21/2018    CRP <2.9 04/03/2015    SED 21 04/01/2013     07/26/2018    POTASSIUM 4.0 07/26/2018    CHLORIDE 107 07/26/2018    CO2 27 07/26/2018    BUN 24 07/26/2018    CR 0.68 07/26/2018     (H) 07/26/2018    GABBY 8.5 07/26/2018    PHOS 4.0 05/27/2008    ALBUMIN 3.8 01/11/2016    PROTTOTAL 6.9 04/01/2013    ALT 27 11/21/2018    AST 19 11/21/2018    ALKPHOS 74 04/01/2013    BILITOTAL 0.4 04/01/2013    PTT 38 (H) 08/25/2011    INR 2.81 (H) 01/03/2019    TSH 0.95 07/26/2018    T4 0.85 01/11/2016       Preop Vitals  BP Readings from Last 3 Encounters:   01/08/19 134/74   12/17/18 132/80   09/06/18 112/67    Pulse Readings from Last 3 Encounters:   12/17/18 66   09/06/18 67   07/30/18 67      Resp Readings from Last 3 Encounters:   01/08/19 16   09/06/18 16   04/04/17 16    SpO2 Readings from Last 3 Encounters:   01/08/19 100%   12/17/18 99%   09/06/18 97%      Temp Readings from Last 1 Encounters:   01/08/19 36.4  C (97.5  F) (Oral)    Ht Readings from Last 1 Encounters:   01/08/19 1.67 m (5' 5.75\")      Wt Readings from Last 1 Encounters:   01/08/19 54.1 kg (119 lb 4.8 oz)    Estimated body mass index is 19.4 kg/m  as calculated from the following:    Height as of this encounter: 1.67 m (5' 5.75\").    Weight as of this encounter: 54.1 kg (119 lb 4.8 oz).     LDA:  Peripheral IV 01/08/19 Left Lower forearm (Active)   Site Assessment WDL 1/8/2019  7:27 AM   Line Status " Infusing 1/8/2019  7:27 AM   Phlebitis Scale 0-->no symptoms 1/8/2019  7:27 AM   Infiltration Scale 0 1/8/2019  7:27 AM   Extravasation? No 1/8/2019  7:27 AM   Dressing Intervention New dressing  1/8/2019  7:27 AM   Number of days: 0            Assessment:   ASA SCORE: 3    NPO Status: > 6 hours since completed Solid [employer]   Documentation: H&P complete; Preop Testing complete; Consents complete   Proceeding: Proceed without further delay  Tobacco Use:  NO Active use of Tobacco/UNKNOWN Tobacco use status     Plan:   Anes. Type:  MAC   Pre-Induction: Midazolam IV   Induction:  Not applicable   Airway: Native Airway   Access/Monitoring: PIV   Maintenance: IV; Propofol   Emergence: N/a   Logistics: Same Day Surgery     Postop Pain/Sedation Strategy:  Standard-Options: Opioids PRN     PONV Management:  Adult Risk Factors: Female, Non-Smoker, Postop Opioids  Prevention: Propofol Infusion; Ondansetron; Dexamethasone     CONSENT: Direct conversation   Plan and risks discussed with: Patient                            Matthew Lui MD, MD

## 2019-01-08 NOTE — ANESTHESIA CARE TRANSFER NOTE
Patient: Shala Caruso    Procedure(s):  Right Eye Cataract Extraction with Intraocular Lens    Diagnosis: Cataract  Diagnosis Additional Information: No value filed.    Anesthesia Type:   No value filed.     Note:  Airway :Room Air  Patient transferred to:Phase II  Comments: Uneventful transport to Phase II: VSS; IV patent; pt comfortable; Report given to RN; pt. Responds appropriately to commandsHandoff Report: Identifed the Patient, Identified the Reponsible Provider, Reviewed the pertinent medical history, Discussed the surgical course, Reviewed Intra-OP anesthesia mangement and issues during anesthesia, Set expectations for post-procedure period and Allowed opportunity for questions and acknowledgement of understanding      Vitals: (Last set prior to Anesthesia Care Transfer)    CRNA VITALS  1/8/2019 0840 - 1/8/2019 0910      1/8/2019             Resp Rate (observed):  2  (Abnormal)     Resp Rate (set):  10                Electronically Signed By: SANG Miller CRNA  January 8, 2019  9:10 AM

## 2019-01-09 ENCOUNTER — OFFICE VISIT (OUTPATIENT)
Dept: OPHTHALMOLOGY | Facility: CLINIC | Age: 74
End: 2019-01-09
Attending: OPHTHALMOLOGY
Payer: COMMERCIAL

## 2019-01-09 ENCOUNTER — ANTICOAGULATION THERAPY VISIT (OUTPATIENT)
Dept: ANTICOAGULATION | Facility: CLINIC | Age: 74
End: 2019-01-09

## 2019-01-09 DIAGNOSIS — Z48.810 AFTERCARE FOLLOWING SURGERY OF A SENSORY ORGAN: Primary | ICD-10-CM

## 2019-01-09 PROCEDURE — G0463 HOSPITAL OUTPT CLINIC VISIT: HCPCS | Mod: ZF

## 2019-01-09 ASSESSMENT — TONOMETRY
OD_IOP_MMHG: 11
OS_IOP_MMHG: 15
IOP_METHOD: ICARE

## 2019-01-09 ASSESSMENT — EXTERNAL EXAM - LEFT EYE: OS_EXAM: NORMAL

## 2019-01-09 ASSESSMENT — EXTERNAL EXAM - RIGHT EYE: OD_EXAM: NORMAL

## 2019-01-09 ASSESSMENT — VISUAL ACUITY
OD_SC: 20/70
OS_PH_SC+: +2
METHOD: SNELLEN - LINEAR
OD_PH_SC: 20/50
OD_PH_SC+: -2
OS_SC: 20/150
OS_PH_SC: 20/50

## 2019-01-09 ASSESSMENT — SLIT LAMP EXAM - LIDS
COMMENTS: NORMAL
COMMENTS: NORMAL

## 2019-01-09 ASSESSMENT — CUP TO DISC RATIO: OD_RATIO: 0.3

## 2019-01-09 NOTE — PROGRESS NOTES
Postoperative day 1 status post CE/IOL OD 1/8/19 for cataract, right eye      Slept OK; had some pain that improved with Tylenol  Retina attached  Doing well     Plan:  Position: Any  No heavy lifting   Yeager shield at all times at night for 1 week  Retina detachment and endophthalmitis precautions were discussed with the patient and was asked to return if any of the those occur     Medications to operative eye  pred forte QID OD 4 weeks  Ketorolac QID OD 4 weeks or until bottle runs out  Ofloxacin QID OD 1 week      Follow up in one week    Juwan Barboza M.D.  PGY-3, Ophthalmology       Complete documentation of historical and exam elements from today's encounter can be found in the full encounter summary report (not reduplicated in this progress note).  I personally obtained the chief complaint(s) and history of present illness.  I confirmed and edited as necessary the review of systems, past medical/surgical history, family history, social history, and examination findings as documented by others; and I examined the patient myself.  I personally reviewed the relevant tests, images, and reports as documented above.  I personally reviewed the ophthalmic test(s) associated with this encounter, agree with the interpretation(s) as documented by the resident/fellow, and have edited the corresponding report(s) as necessary.   I formulated and edited as necessary the assessment and plan and discussed the findings and management plan with the patient and family       Gabo Sarabia MD PhD  Vitreoretinal Surgery Fellow  [facility]

## 2019-01-09 NOTE — PROGRESS NOTES
CC: blurry vision OU  HPI: 73 year old F with  hx of DM type I here for diabetic eye exam.  Eyes getting slightly blurrier. Denies flashes and floaters. Last hgA1c 5  Past med hx: SLE currently on cellcept (allergic to plaq)  Past ocular hx: none     OCULAR IMAGING  Optical Coherence Tomography 1-9-19  Right eye normal foveal contour, no srf/irf  Left eye normal foveal contour,   OCT thru lesion less than 1 mm thick with drusen overlying, no srf    PHOTOS 8-9-18  Right eye   Left eye  Choroidal pigmented lesion with drusen     U/S left eye   9/14/16 C1 0.56 mm; C2 5.46 mm  8-30-17 C1 1.3 mm; C2   8-9-18 less than 1 mm ashwini lesion. 0.73mm x 5.15T x 5.03L  1-9-19    IOL CALCS 12-19-18  Done  Right eye: 25.34  Left eye: 25.59    A/P:   1) DM type I   - no evidence of DR   - good glucose control    2) Cataracts OU  - patient interested in Cataract extraction intraocular lens in Jan, 2019    3) Myopic astigmatism, OU   - updated Rx      4) Choroidal pigmented lesion, left eye  - no orange pigmentation, no subretinal fluid, + drusen   - ultrasound 08/09/18  less than 1 mm height, normal retrobulbar echo pattern  - continue to observe     5) patient with history of Lupus  Took prednisolone for 10 yrs --> patient developed Diabetes mellitus  Currently Micophenolate  No lupus retinopahty  Patient had DVT left upper leg--> patient on coumadin    - f/u in 12 months, repeat oct enhanced depth image of the choroidal peripheral lesion , macula oct, US

## 2019-01-09 NOTE — PROGRESS NOTES
Patient had cataract surgery done yesterday.  INR was 2.24.  No Factor 2 was drawn.  Patient will continue with 12.5mg Tu,Th, Rios and 10mg all other days. Next blood draw will be 4 weeks.

## 2019-01-13 NOTE — PROGRESS NOTES
Siddharth Caruso is a 73 year old pleasant female with hx of SLE, APLS, DVT, osteoporosis and type 1 diabetes presenting for f/up.     1. Type 1 diabetes    Hemoglobin A1c today is 5%, stable since her last visit here. Glucometer readings reveal that she checks her blood sugar 4-5 times daily. Average blood glucose is 89 with a standard deviation of 19 and a range variable between 54 and 128.  89.2% of the blood sugar numbers are within target and 10.8% are below target.  On average the hypoglycemic episodes occur once or twice a week, mostly before dinner, sometimes before lunch and very rarely fasting, in the morning.  Most of the hypoglycemic episodes are mild, in the 60s.  Quite frequently, she rechecks her blood sugar at the time she is hypoglycemic and has no symptoms and the second reading is frequently higher, in the normal range.  Hemoglobin A1c today was 5.5%, up from 5% at her last visit here.    Insulin to carbohydrate ratio is 1 unit per 20 g for all meals.  She administers 4 U of Lantus in the morning, and 2-3 U NovoLog for breakfast, lunch and dinner.  She continues to use an echo NovoLog pen.    Diabetes complications:   No h/o microalbuminuria.  Last eye exam -she just had cataract surgery on the right eye and she is scheduled for the second surgery, on the left eye; no DR.   Numbness and tingling sensation B/L toes - for many years but light sensation is intact. She does wear comfortable shoes/insoles. 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  She develops confusion, inability to concentrate or focus her vision and she feels extremely tired when her blood sugar is the lower 60s or 50s.  She has no prior episodes of loss of consciousness due to hypoglycemia.  When her blood sugar is in the lower 50s or 40s, she sometimes does experience tremors and/or sweating.    She continues to exercise regularly.  She runs on a bike or elliptical machine daily, for 40 minutes, 5 times a  week and she does 1 hour of yoga, 4 times a week.  She exercises in the morning, from 5:45 to 7:45 am.      2. Osteoporosis    Jeanmarie also has a history of osteoporosis, treated with Boniva for almost 3 years, followed by Forteo which was started in 4/12 - interrupted treatment from 4/2013 and restarted in 7/2013 until July 2014. After she completed the treatment with Forteo, she received one dose of Reclast, in July 2014.      She has no history of prior bone fractures. She's been off prednisone since 2013.  Her height has decreased over the years from 5'6'' to 5'1/2''. Her mother had a hip fracture in her 80s.     On the DXA scan images from 7/1/16, L1-L3 T score was - 1.  Overall, at the spine, there was a significant increase of bone mineral density since 2005 of 10.5%. Since 2015, the bone mineral density has remained stable at spine. The lowest T score at the hip level was -2.2, at the left femoral neck.  Overall, there was a significant improvement of bone mineral density at the mean hip of 8.9% since 2005, with no significant changes since 2015. While the bone mineral density at the left hip increased since baseline, at the right hip, there was a decrease of bone mineral density since baseline and also, since prior year.    On the most recent DEXA scan from 7/27/18, the lowest T score was -2.1, at the left femoral neck.  Compared with the prior scan from 2016, the bone mineral density at the hips remained stable.  At the lumbar spine, L1-L3 T score was -1.3, not significantly changed since 2016.  The current dose of calcium and vitamin D is 500 mg plus 200 unit(s) TID (oyster shell).  The dose of vitamin D was decreased in July 2018 from 1600 to 600 units daily, as the vitamin D was elevated at 80.      Current Outpatient Medications   Medication     acetaminophen (TYLENOL) 500 MG tablet     aspirin 81 MG tablet     AYR SALINE NASAL NO-DRIP GEL     blood glucose monitoring (TRUE METRIX BLOOD GLUCOSE TEST) test  strip     Blood Glucose Monitoring Suppl (TRUE METRIX METER) W/DEVICE KIT     Calcium Carbonate-Vitamin D (CALCIUM 500 + D PO)     Injection Device for insulin (NOVOPEN ECHO) RORY     Injection Device for Insulin (NOVOPEN JR, GREEN,) RORY     insulin glargine (LANTUS) 100 UNIT/ML injection     insulin pen needle (BD PAM U/F) 32G X 4 MM     ketorolac tromethamine (ACULAR-LS) 0.4 % SOLN ophthalmic solution     LANCETS ULTRA THIN 30G MISC     Multiple Vitamins-Minerals (CENTRUM SILVER PO)     mycophenolate (GENERIC EQUIVALENT) 500 MG tablet     NIFEdipine ER osmotic (NIFEDICAL XL) 30 MG 24 hr tablet     NOVOLOG PENFILL 100 UNIT/ML soln     ofloxacin (OCUFLOX) 0.3 % ophthalmic solution     prednisoLONE acetate (PRED FORTE) 1 % ophthalmic suspension     simvastatin (ZOCOR) 40 MG tablet     warfarin (COUMADIN) 5 MG tablet     No current facility-administered medications for this visit.      ROS   Systemic symptoms: weight stable  Eye symptoms: No eye symptoms.  Otolaryngeal symptoms: some cold symptoms for the last few days    Breast symptoms: No breast symptoms.  Cardiovascular symptoms: No cardiovascular symptoms.    Pulmonary symptoms: No pulmonary symptoms.  Gastrointestinal symptoms: No gastrointestinal symptoms.   Genitourinary symptoms: urinary incontinence   Endocrine symptoms: chronic cold intolerance  Hematologic symptoms: No hematologic symptoms.  Musculoskeletal symptoms: recurrent episodes of pain which affect the third to fifth left toes; bilateral knee pain; joint stiffness  Neurological symptoms: numbness and tingling sensation b/l toes   Psychological symptoms: no psychological symptoms   Skin symptoms: split lesions of the tips of her fingers/toes - for years; has seen dermatology in the past    Family Hx   Maternal grandmother DM2. First cousin with RA. Mother, maternal grandmother and aunt, cousin diagnosed with thyroid disorders (? hypothyroidism). Mother and maternal grandmother had breast  cancer.    Personal Hx   Behavioral history: No tobacco use.  Home environment: No secondhand tobacco smoke in home.  Denies smoking, drinking alcohol or using illicit drugs. , with one child. 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  Menopause at age 59. Had regular MP in the past. No h/o bone fractures or kidney stones.    PMH   SLE dx in 2000 with flare/pericarditis and tamponade on 3/5/2010 requiring high doses of steroids.  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  Osteoporosis diagnosed 2008 started on Boniva in 2010 (heartburn from oral fosamax).   Hyperlipidemia.  Severe GERD and Achalasia.  Raynaud's  Allergy to multiple meds  Vit D def.  Post thrombophlebitic syndrome with chronic LE swelling   Dyslipidemia  Chronic leukopenia on methotrexate   L arm lymphedema   Type 1 diabetes  Prolapsed uterus   Cataract   Vale Zoster     Physical Exam   Wt Readings from Last 10 Encounters:   01/08/19 54.1 kg (119 lb 4.8 oz)   07/30/18 54.1 kg (119 lb 3.2 oz)   01/29/18 54 kg (119 lb)   08/02/17 52.8 kg (116 lb 8 oz)   07/31/17 53.2 kg (117 lb 4.8 oz)   05/22/17 53.9 kg (118 lb 14.4 oz)   04/13/17 54.7 kg (120 lb 9.6 oz)   04/04/17 55 kg (121 lb 4.8 oz)   02/01/17 54.4 kg (120 lb)   01/23/17 54.5 kg (120 lb 1.6 oz)     /77   Pulse 59     General appearance: she is thin, no acute distress noted  Eyes: conjunctivae and extraocular motions are normal. Pupils are equal, round, and reactive to light. No lid lag, no stare.  HENT: oropharynx moist; neck no JVD, no bruits, no thyromegaly, no palpable nodules  Cardiovascular: regular rhythm, no murmurs, distal pulses palpable, mild L arm edema   Respiratory: chest clear, no rales, no rhonchi  Abdominal: Abdomen soft, nontender, nondistended, no organomegaly  Musculoskeletal: normal tone and strength   Neurologic: left knee reflex decreased, normal B/L bicipital reflexes, no resting tremor  Psychiatric: affect and judgment normal   Skin:  Tip of the fingers are cracked and dry   Feet:  Sensation intact to monofilament testing, mild bluish discoloration of the tips of the toes, dorsal pedis pulse palpable; onychomycosis    Lab Results  I reviewed prior lab results documented in EPIC.   Lab Results   Component Value Date    A1C 5.3 07/11/2017    A1C 5.5 06/10/2013    A1C 5.4 01/07/2013    A1C 5.5 07/02/2012    A1C 5.1 01/09/2012    HEMOGLOBINA1 5.0 07/30/2018    HEMOGLOBINA1 5.0 01/29/2018    HEMOGLOBINA1 5.0 01/23/2017    HEMOGLOBINA1 5.4 07/11/2016    HEMOGLOBINA1 5.4 01/11/2016       Hemoglobin   Date Value Ref Range Status   11/21/2018 12.2 11.7 - 15.7 g/dL Final     Hematocrit   Date Value Ref Range Status   11/21/2018 39.8 35.0 - 47.0 % Final     Cholesterol   Date Value Ref Range Status   07/26/2018 173 <200 mg/dL Final     Cholesterol/HDL Ratio   Date Value Ref Range Status   10/01/2014 1.5 0.0 - 5.0 Final     HDL Cholesterol   Date Value Ref Range Status   07/26/2018 98 >49 mg/dL Final     LDL Cholesterol Calculated   Date Value Ref Range Status   07/26/2018 64 <100 mg/dL Final     Comment:     Desirable:       <100 mg/dl     VLDL-Cholesterol   Date Value Ref Range Status   10/01/2014 7 0 - 30 mg/dL Final     Triglycerides   Date Value Ref Range Status   07/26/2018 51 <150 mg/dL Final     Comment:     Fasting specimen     Albumin Urine mg/L   Date Value Ref Range Status   07/26/2018 9 mg/L Final     TSH   Date Value Ref Range Status   07/26/2018 0.95 0.40 - 4.00 mU/L Final       Last Basic Metabolic Panel:    Sodium   Date Value Ref Range Status   07/26/2018 138 133 - 144 mmol/L Final     Potassium   Date Value Ref Range Status   07/26/2018 4.0 3.4 - 5.3 mmol/L Final     Chloride   Date Value Ref Range Status   07/26/2018 107 94 - 109 mmol/L Final     Calcium   Date Value Ref Range Status   07/26/2018 8.5 8.5 - 10.1 mg/dL Final     Carbon Dioxide   Date Value Ref Range Status   07/26/2018 27 20 - 32 mmol/L Final     Urea Nitrogen   Date Value  Ref Range Status   07/26/2018 24 7 - 30 mg/dL Final     Creatinine   Date Value Ref Range Status   07/26/2018 0.68 0.52 - 1.04 mg/dL Final     GFR Estimate   Date Value Ref Range Status   07/26/2018 85 >60 mL/min/1.7m2 Final     Comment:     Non  GFR Calc     Glucose   Date Value Ref Range Status   07/26/2018 103 (H) 70 - 99 mg/dL Final     Comment:     Fasting specimen       AST   Date Value Ref Range Status   11/21/2018 19 0 - 45 U/L Final     ALT   Date Value Ref Range Status   11/21/2018 27 0 - 50 U/L Final     Albumin   Date Value Ref Range Status   01/11/2016 3.8 3.4 - 5.0 g/dL Final      Ref. Range 7/26/2018 06:58   GFR Estimate Latest Ref Range: >60 mL/min/1.7m2 85   Calcium Latest Ref Range: 8.5 - 10.1 mg/dL 8.5   25 OH Vit D total Latest Ref Range: 20 - 75 ug/L <81 (H)     2013 PTH 33    Assessment     1. Type 1 diabetes with very tight glycemic control, complicated by partial hypoglycemia unawareness.  The hypoglycemic episodes have been less frequent and less severe and this reflects in the slightly higher A1c.  Historically, the patient prefers narrower blood sugar targets and this comes at the expense of hypoglycemia.  I reviewed with her the blood sugar targets according to the American Diabetes Association: Fasting blood sugar below 130, maximum postprandial below 180.  I suspect some of the hypoglycemic episodes measured by the meter might be inaccurate, as she does have significant cyanosis of the fingers due to Raynaud and appears to have poor perfusion of the fingertips.  This is another reason I think she would benefit from a glucose sensor.  I reviewed the use of the DEXCOM G6 and FreeStyle Flash Siddharth.  The decision was to apply for a DEXCOM G6.  If approved by her insurance, the patient is going to schedule an appointment with the diabetes educator, to review its use in detail.    2.  Osteoporosis   Risk factors for osteoporosis identified: Postmenopausal status, lupus, prior  treatment with steroids, diabetes, family history.  She was treated with Boniva for 3 years, followed by Forteo for 2 years. Received one dose of Reclast in July 2014, when she completed treatment with Forteo.  Since discontinuing Forteo, the bone mineral density has either remained stable or minimally increased.   Plan:   Schedule a f/up DXA scan in 8/2020  Recheck vitamin D level    3. Hypercholesterolemia - on simvastatin, controlled.    Return to clinic 6 months with labs:    Orders Placed This Encounter   Procedures     25 Hydroxyvitamin D2 and D3     Albumin Random Urine Quantitative with Creat Ratio     Comprehensive metabolic panel     Hematocrit     Lipid panel reflex to direct LDL Fasting     TSH with free T4 reflex     Hemoglobin A1c POCT

## 2019-01-14 ENCOUNTER — OFFICE VISIT (OUTPATIENT)
Dept: ENDOCRINOLOGY | Facility: CLINIC | Age: 74
End: 2019-01-14
Payer: COMMERCIAL

## 2019-01-14 VITALS — SYSTOLIC BLOOD PRESSURE: 126 MMHG | HEART RATE: 59 BPM | DIASTOLIC BLOOD PRESSURE: 77 MMHG

## 2019-01-14 DIAGNOSIS — E10.649 TYPE 1 DIABETES MELLITUS WITH HYPOGLYCEMIA AND WITHOUT COMA (H): Primary | ICD-10-CM

## 2019-01-14 DIAGNOSIS — E78.00 HYPERCHOLESTEROLEMIA: ICD-10-CM

## 2019-01-14 DIAGNOSIS — M81.0 OSTEOPOROSIS, POSTMENOPAUSAL: ICD-10-CM

## 2019-01-14 RX ORDER — PROCHLORPERAZINE 25 MG/1
1 SUPPOSITORY RECTAL CONTINUOUS
Qty: 1 DEVICE | Refills: 0 | Status: SHIPPED | OUTPATIENT
Start: 2019-01-14 | End: 2019-04-18

## 2019-01-14 RX ORDER — PROCHLORPERAZINE 25 MG/1
1 SUPPOSITORY RECTAL SEE ADMIN INSTRUCTIONS
Qty: 9 EACH | Refills: 3 | Status: SHIPPED | OUTPATIENT
Start: 2019-01-14 | End: 2019-04-18

## 2019-01-14 RX ORDER — PROCHLORPERAZINE 25 MG/1
1 SUPPOSITORY RECTAL
Qty: 1 EACH | Refills: 3 | Status: SHIPPED | OUTPATIENT
Start: 2019-01-14 | End: 2019-04-18

## 2019-01-14 NOTE — NURSING NOTE
Chief Complaint   Patient presents with     RECHECK     Type 1 Diabetes     Capillary puncture performed for Hemoglobin A1C test. Patient tolerated well.    Nathaly Jennings MA

## 2019-01-14 NOTE — LETTER
1/14/2019       RE: Shala Caruso  [address]  [facility] [address]     Dear Colleague,    Thank you for referring your patient, Shala Caruso, to the [facility] ENDOCRINOLOGY at [facility]. Please see a copy of my visit note below.    Siddharth Ms. Caruso is a 73 year old pleasant female with hx of SLE, APLS, DVT, osteoporosis and type 1 diabetes presenting for f/up.     1. Type 1 diabetes    Hemoglobin A1c today is 5%, stable since her last visit here. Glucometer readings reveal that she checks her blood sugar 4-5 times daily. Average blood glucose is 89 with a standard deviation of 19 and a range variable between 54 and 128.  89.2% of the blood sugar numbers are within target and 10.8% are below target.  On average the hypoglycemic episodes occur once or twice a week, mostly before dinner, sometimes before lunch and very rarely fasting, in the morning.  Most of the hypoglycemic episodes are mild, in the 60s.  Quite frequently, she rechecks her blood sugar at the time she is hypoglycemic and has no symptoms and the second reading is frequently higher, in the normal range.  Hemoglobin A1c today was 5.5%, up from 5% at her last visit here.    Insulin to carbohydrate ratio is 1 unit per 20 g for all meals.  She administers 4 U of Lantus in the morning, and 2-3 U NovoLog for breakfast, lunch and dinner.  She continues to use an echo NovoLog pen.    Diabetes complications:   No h/o microalbuminuria.  Last eye exam -she just had cataract surgery on the right eye and she is scheduled for the second surgery, on the left eye; no DR.   Numbness and tingling sensation B/L toes - for many years but light sensation is intact. She does wear comfortable shoes/insoles. 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  She develops confusion, inability to concentrate or focus her vision and she feels extremely tired when her blood sugar is the lower 60s or  50s.  She has no prior episodes of loss of consciousness due to hypoglycemia.  When her blood sugar is in the lower 50s or 40s, she sometimes does experience tremors and/or sweating.    She continues to exercise regularly.  She runs on a bike or elliptical machine daily, for 40 minutes, 5 times a week and she does 1 hour of yoga, 4 times a week.  She exercises in the morning, from 5:45 to 7:45 am.      2. Osteoporosis    Jeanmarie also has a history of osteoporosis, treated with Boniva for almost 3 years, followed by Forteo which was started in 4/12 - interrupted treatment from 4/2013 and restarted in 7/2013 until July 2014. After she completed the treatment with Forteo, she received one dose of Reclast, in July 2014.      She has no history of prior bone fractures. She's been off prednisone since 2013.  Her height has decreased over the years from 5'6'' to 5'1/2''. Her mother had a hip fracture in her 80s.     On the DXA scan images from 7/1/16, L1-L3 T score was - 1.  Overall, at the spine, there was a significant increase of bone mineral density since 2005 of 10.5%. Since 2015, the bone mineral density has remained stable at spine. The lowest T score at the hip level was -2.2, at the left femoral neck.  Overall, there was a significant improvement of bone mineral density at the mean hip of 8.9% since 2005, with no significant changes since 2015. While the bone mineral density at the left hip increased since baseline, at the right hip, there was a decrease of bone mineral density since baseline and also, since prior year.    On the most recent DEXA scan from 7/27/18, the lowest T score was -2.1, at the left femoral neck.  Compared with the prior scan from 2016, the bone mineral density at the hips remained stable.  At the lumbar spine, L1-L3 T score was -1.3, not significantly changed since 2016.  The current dose of calcium and vitamin D is 500 mg plus 200 unit(s) TID (oyster shell).  The dose of vitamin D was  decreased in July 2018 from 1600 to 600 units daily, as the vitamin D was elevated at 80.      Current Outpatient Medications   Medication     acetaminophen (TYLENOL) 500 MG tablet     aspirin 81 MG tablet     AYR SALINE NASAL NO-DRIP GEL     blood glucose monitoring (TRUE METRIX BLOOD GLUCOSE TEST) test strip     Blood Glucose Monitoring Suppl (TRUE METRIX METER) W/DEVICE KIT     Calcium Carbonate-Vitamin D (CALCIUM 500 + D PO)     Injection Device for insulin (NOVOPEN ECHO) RORY     Injection Device for Insulin (NOVOPEN JR, GREEN,) RORY     insulin glargine (LANTUS) 100 UNIT/ML injection     insulin pen needle (BD PAM U/F) 32G X 4 MM     ketorolac tromethamine (ACULAR-LS) 0.4 % SOLN ophthalmic solution     LANCETS ULTRA THIN 30G MISC     Multiple Vitamins-Minerals (CENTRUM SILVER PO)     mycophenolate (GENERIC EQUIVALENT) 500 MG tablet     NIFEdipine ER osmotic (NIFEDICAL XL) 30 MG 24 hr tablet     NOVOLOG PENFILL 100 UNIT/ML soln     ofloxacin (OCUFLOX) 0.3 % ophthalmic solution     prednisoLONE acetate (PRED FORTE) 1 % ophthalmic suspension     simvastatin (ZOCOR) 40 MG tablet     warfarin (COUMADIN) 5 MG tablet     No current facility-administered medications for this visit.      ROS   Systemic symptoms: weight stable  Eye symptoms: No eye symptoms.  Otolaryngeal symptoms: some cold symptoms for the last few days    Breast symptoms: No breast symptoms.  Cardiovascular symptoms: No cardiovascular symptoms.    Pulmonary symptoms: No pulmonary symptoms.  Gastrointestinal symptoms: No gastrointestinal symptoms.   Genitourinary symptoms: urinary incontinence   Endocrine symptoms: chronic cold intolerance  Hematologic symptoms: No hematologic symptoms.  Musculoskeletal symptoms: recurrent episodes of pain which affect the third to fifth left toes; bilateral knee pain; joint stiffness  Neurological symptoms: numbness and tingling sensation b/l toes   Psychological symptoms: no psychological symptoms   Skin symptoms:  split lesions of the tips of her fingers/toes - for years; has seen dermatology in the past    Family Hx   Maternal grandmother DM2. First cousin with RA. Mother, maternal grandmother and aunt, cousin diagnosed with thyroid disorders (? hypothyroidism). Mother and maternal grandmother had breast cancer.    Personal Hx   Behavioral history: No tobacco use.  Home environment: No secondhand tobacco smoke in home.  Denies smoking, drinking alcohol or using illicit drugs. , with one child. 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  Menopause at age 59. Had regular MP in the past. No h/o bone fractures or kidney stones.    PMH   SLE dx in 2000 with flare/pericarditis and tamponade on 3/5/2010 requiring high doses of steroids.  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  Osteoporosis diagnosed 2008 started on Boniva in 2010 (heartburn from oral fosamax).   Hyperlipidemia.  Severe GERD and Achalasia.  Raynaud's  Allergy to multiple meds  Vit D def.  Post thrombophlebitic syndrome with chronic LE swelling   Dyslipidemia  Chronic leukopenia on methotrexate   L arm lymphedema   Type 1 diabetes  Prolapsed uterus   Cataract   Vale Zoster     Physical Exam   Wt Readings from Last 10 Encounters:   01/08/19 54.1 kg (119 lb 4.8 oz)   07/30/18 54.1 kg (119 lb 3.2 oz)   01/29/18 54 kg (119 lb)   08/02/17 52.8 kg (116 lb 8 oz)   07/31/17 53.2 kg (117 lb 4.8 oz)   05/22/17 53.9 kg (118 lb 14.4 oz)   04/13/17 54.7 kg (120 lb 9.6 oz)   04/04/17 55 kg (121 lb 4.8 oz)   02/01/17 54.4 kg (120 lb)   01/23/17 54.5 kg (120 lb 1.6 oz)     /77   Pulse 59     General appearance: she is thin, no acute distress noted  Eyes: conjunctivae and extraocular motions are normal. Pupils are equal, round, and reactive to light. No lid lag, no stare.  HENT: oropharynx moist; neck no JVD, no bruits, no thyromegaly, no palpable nodules  Cardiovascular: regular rhythm, no murmurs, distal pulses palpable, mild L arm edema    Respiratory: chest clear, no rales, no rhonchi  Abdominal: Abdomen soft, nontender, nondistended, no organomegaly  Musculoskeletal: normal tone and strength   Neurologic: left knee reflex decreased, normal B/L bicipital reflexes, no resting tremor  Psychiatric: affect and judgment normal   Skin: Tip of the fingers are cracked and dry   Feet:  Sensation intact to monofilament testing, mild bluish discoloration of the tips of the toes, dorsal pedis pulse palpable; onychomycosis    Lab Results  I reviewed prior lab results documented in EPIC.   Lab Results   Component Value Date    A1C 5.3 07/11/2017    A1C 5.5 06/10/2013    A1C 5.4 01/07/2013    A1C 5.5 07/02/2012    A1C 5.1 01/09/2012    HEMOGLOBINA1 5.0 07/30/2018    HEMOGLOBINA1 5.0 01/29/2018    HEMOGLOBINA1 5.0 01/23/2017    HEMOGLOBINA1 5.4 07/11/2016    HEMOGLOBINA1 5.4 01/11/2016       Hemoglobin   Date Value Ref Range Status   11/21/2018 12.2 11.7 - 15.7 g/dL Final     Hematocrit   Date Value Ref Range Status   11/21/2018 39.8 35.0 - 47.0 % Final     Cholesterol   Date Value Ref Range Status   07/26/2018 173 <200 mg/dL Final     Cholesterol/HDL Ratio   Date Value Ref Range Status   10/01/2014 1.5 0.0 - 5.0 Final     HDL Cholesterol   Date Value Ref Range Status   07/26/2018 98 >49 mg/dL Final     LDL Cholesterol Calculated   Date Value Ref Range Status   07/26/2018 64 <100 mg/dL Final     Comment:     Desirable:       <100 mg/dl     VLDL-Cholesterol   Date Value Ref Range Status   10/01/2014 7 0 - 30 mg/dL Final     Triglycerides   Date Value Ref Range Status   07/26/2018 51 <150 mg/dL Final     Comment:     Fasting specimen     Albumin Urine mg/L   Date Value Ref Range Status   07/26/2018 9 mg/L Final     TSH   Date Value Ref Range Status   07/26/2018 0.95 0.40 - 4.00 mU/L Final       Last Basic Metabolic Panel:    Sodium   Date Value Ref Range Status   07/26/2018 138 133 - 144 mmol/L Final     Potassium   Date Value Ref Range Status   07/26/2018 4.0 3.4 -  5.3 mmol/L Final     Chloride   Date Value Ref Range Status   07/26/2018 107 94 - 109 mmol/L Final     Calcium   Date Value Ref Range Status   07/26/2018 8.5 8.5 - 10.1 mg/dL Final     Carbon Dioxide   Date Value Ref Range Status   07/26/2018 27 20 - 32 mmol/L Final     Urea Nitrogen   Date Value Ref Range Status   07/26/2018 24 7 - 30 mg/dL Final     Creatinine   Date Value Ref Range Status   07/26/2018 0.68 0.52 - 1.04 mg/dL Final     GFR Estimate   Date Value Ref Range Status   07/26/2018 85 >60 mL/min/1.7m2 Final     Comment:     Non  GFR Calc     Glucose   Date Value Ref Range Status   07/26/2018 103 (H) 70 - 99 mg/dL Final     Comment:     Fasting specimen       AST   Date Value Ref Range Status   11/21/2018 19 0 - 45 U/L Final     ALT   Date Value Ref Range Status   11/21/2018 27 0 - 50 U/L Final     Albumin   Date Value Ref Range Status   01/11/2016 3.8 3.4 - 5.0 g/dL Final      Ref. Range 7/26/2018 06:58   GFR Estimate Latest Ref Range: >60 mL/min/1.7m2 85   Calcium Latest Ref Range: 8.5 - 10.1 mg/dL 8.5   25 OH Vit D total Latest Ref Range: 20 - 75 ug/L <81 (H)     2013 PTH 33    Assessment     1. Type 1 diabetes with very tight glycemic control, complicated by partial hypoglycemia unawareness.  The hypoglycemic episodes have been less frequent and less severe and this reflects in the slightly higher A1c.  Historically, the patient prefers narrower blood sugar targets and this comes at the expense of hypoglycemia.  I reviewed with her the blood sugar targets according to the American Diabetes Association: Fasting blood sugar below 130, maximum postprandial below 180.  I suspect some of the hypoglycemic episodes measured by the meter might be inaccurate, as she does have significant cyanosis of the fingers due to Raynaud and appears to have poor perfusion of the fingertips.  This is another reason I think she would benefit from a glucose sensor.  I reviewed the use of the DEXCOM G6 and  FreeStyle Flash Siddharth.  The decision was to apply for a DEXCOM G6.  If approved by her insurance, the patient is going to schedule an appointment with the diabetes educator, to review its use in detail.    2.  Osteoporosis   Risk factors for osteoporosis identified: Postmenopausal status, lupus, prior treatment with steroids, diabetes, family history.  She was treated with Boniva for 3 years, followed by Forteo for 2 years. Received one dose of Reclast in July 2014, when she completed treatment with Forteo.  Since discontinuing Forteo, the bone mineral density has either remained stable or minimally increased.   Plan:   Schedule a f/up DXA scan in 8/2020  Recheck vitamin D level    3. Hypercholesterolemia - on simvastatin, controlled.    Return to clinic 6 months with labs:    Orders Placed This Encounter   Procedures     25 Hydroxyvitamin D2 and D3     Albumin Random Urine Quantitative with Creat Ratio     Comprehensive metabolic panel     Hematocrit     Lipid panel reflex to direct LDL Fasting     TSH with free T4 reflex     Hemoglobin A1c POCT       Again, thank you for allowing me to participate in the care of your patient.      Sincerely,    Dorita Cramer MD

## 2019-01-16 ENCOUNTER — OFFICE VISIT (OUTPATIENT)
Dept: OPHTHALMOLOGY | Facility: CLINIC | Age: 74
End: 2019-01-16
Attending: OPHTHALMOLOGY
Payer: COMMERCIAL

## 2019-01-16 ENCOUNTER — TELEPHONE (OUTPATIENT)
Dept: OPHTHALMOLOGY | Facility: CLINIC | Age: 74
End: 2019-01-16

## 2019-01-16 DIAGNOSIS — K22.0 ACHALASIA OF ESOPHAGUS: ICD-10-CM

## 2019-01-16 DIAGNOSIS — Z48.810 AFTERCARE FOLLOWING SURGERY OF A SENSORY ORGAN: ICD-10-CM

## 2019-01-16 DIAGNOSIS — H26.9 NUCLEAR CATARACT, NONSENILE: Primary | ICD-10-CM

## 2019-01-16 DIAGNOSIS — I10 HTN (HYPERTENSION): ICD-10-CM

## 2019-01-16 PROCEDURE — G0463 HOSPITAL OUTPT CLINIC VISIT: HCPCS | Mod: ZF

## 2019-01-16 RX ORDER — NIFEDIPINE 30 MG/1
30 TABLET, EXTENDED RELEASE ORAL DAILY
Qty: 90 TABLET | Refills: 2 | Status: SHIPPED | OUTPATIENT
Start: 2019-01-16 | End: 2019-10-17

## 2019-01-16 ASSESSMENT — CONF VISUAL FIELD
OD_NORMAL: 1
METHOD: COUNTING FINGERS
OS_NORMAL: 1

## 2019-01-16 ASSESSMENT — TONOMETRY
OS_IOP_MMHG: 14
IOP_METHOD: TONOPEN
OD_IOP_MMHG: 09

## 2019-01-16 ASSESSMENT — EXTERNAL EXAM - RIGHT EYE: OD_EXAM: NORMAL

## 2019-01-16 ASSESSMENT — VISUAL ACUITY
METHOD: SNELLEN - LINEAR
OS_CC: 20/40
OD_PH_SC: 20/40
OD_SC+: +2
OD_SC: 20/50

## 2019-01-16 ASSESSMENT — REFRACTION_WEARINGRX
OS_AXIS: 015
OS_ADD: +2.75
OS_CYLINDER: +0.75
OD_AXIS: 138
SPECS_TYPE: TRIFOCAL
OS_SPHERE: -5.50
OD_CYLINDER: +1.50
OD_SPHERE: -5.50
OD_ADD: +2.75

## 2019-01-16 ASSESSMENT — REFRACTION_MANIFEST
OD_SPHERE: -0.75
OD_AXIS: 150
OD_CYLINDER: +1.25

## 2019-01-16 ASSESSMENT — SLIT LAMP EXAM - LIDS
COMMENTS: NORMAL
COMMENTS: NORMAL

## 2019-01-16 ASSESSMENT — EXTERNAL EXAM - LEFT EYE: OS_EXAM: NORMAL

## 2019-01-16 ASSESSMENT — CUP TO DISC RATIO: OD_RATIO: 0.3

## 2019-01-16 NOTE — NURSING NOTE
Chief Complaints and History of Present Illnesses   Patient presents with     Post Op (Ophthalmology) Right Eye     Chief Complaint(s) and History of Present Illness(es)     Post Op (Ophthalmology) Right Eye     Laterality: right eye    Onset: gradual    Onset: weeks ago    Quality: va is clear    Frequency: constantly    Associated symptoms: Negative for headache and dryness    Treatments tried: eye drops    Pain scale: 0/10              Comments     status post CE/IOL OD 1/8/19   [place] COT 8:18 AM January 16, 2019

## 2019-01-16 NOTE — TELEPHONE ENCOUNTER
Patient is scheduled for surgery with Dr. Femrin      Spoke or left message with: patient and her     Date of Surgery: 2/5/19    Location: [facility]    Informed patient they will need an adult  Yes    Pre-op with surgeon (if applicable): [facility]    H&P: Scheduled with Dr. Joe Cross [phone] patient will call and schedule appointment. Advised to call me back if unable to see PCP and we can schedule PAC appointment.    Additional imaging/appointments: [facility]    Surgery packet: gave to patient 1/16/19    Additional comments: Advised RN will call 1 - 3 days prior with arrival time and instructions.

## 2019-01-16 NOTE — PROGRESS NOTES
Postoperative day 8 status post CE/IOL OD 1/8/19 for cataract, right eye      Slept OK; had some pain that improved with Tylenol  Retina attached  Doing well  Refraction today  -0.75 x 1.25 x 150 , VA 20/30     Plan:  Position: Any  No heavy lifting   Yeager shield at all times at night for 1 week  Retina detachment and endophthalmitis precautions were discussed with the patient and was asked to return if any of the those occur     Medications to operative eye  Taper pred forte , Ketorolac 3 times a day for one week, then two times a day for one week then once a day for one week then stop   Stop Ofloxacin        ~~~~~~~~~~~~~~~~~~~~~~~~~~~~~~~~~~   Complete documentation of historical and exam elements from today's encounter can be found in the full encounter summary report (not reduplicated in this progress note).  I personally obtained the chief complaint(s) and history of present illness.  I confirmed and edited as necessary the review of systems, past medical/surgical history, family history, social history, and examination findings as documented by others; and I examined the patient myself.  I personally reviewed the relevant tests, images, and reports as documented above.  I personally reviewed the ophthalmic test(s) associated with this encounter, agree with the interpretation(s) as documented by the resident/fellow, and have edited the corresponding report(s) as necessary.   I formulated and edited as necessary the assessment and plan and discussed the findings and management plan with the patient and family    Monika Fermin MD   of Ophthalmology.  Retina Service   Department of Ophthalmology and Visual Neurosciences   [facility]  Phone: [phone]   Fax: [phone]

## 2019-01-21 ENCOUNTER — OFFICE VISIT (OUTPATIENT)
Dept: INTERNAL MEDICINE | Facility: CLINIC | Age: 74
End: 2019-01-21
Payer: COMMERCIAL

## 2019-01-21 VITALS — HEART RATE: 63 BPM | OXYGEN SATURATION: 98 % | DIASTOLIC BLOOD PRESSURE: 70 MMHG | SYSTOLIC BLOOD PRESSURE: 118 MMHG

## 2019-01-21 DIAGNOSIS — E78.5 HYPERLIPIDEMIA, UNSPECIFIED HYPERLIPIDEMIA TYPE: ICD-10-CM

## 2019-01-21 DIAGNOSIS — E10.649 TYPE 1 DIABETES MELLITUS WITH HYPOGLYCEMIA AND WITHOUT COMA (H): ICD-10-CM

## 2019-01-21 DIAGNOSIS — E11.43 TYPE 2 DIABETES MELLITUS WITH DIABETIC AUTONOMIC NEUROPATHY, WITH LONG-TERM CURRENT USE OF INSULIN (H): Primary | ICD-10-CM

## 2019-01-21 DIAGNOSIS — Z79.4 TYPE 2 DIABETES MELLITUS WITH DIABETIC AUTONOMIC NEUROPATHY, WITH LONG-TERM CURRENT USE OF INSULIN (H): Primary | ICD-10-CM

## 2019-01-21 ASSESSMENT — ACTIVITIES OF DAILY LIVING (ADL)
IN_THE_PAST_7_DAYS,_DID_YOU_NEED_HELP_FROM_OTHERS_TO_TAKE_CARE_OF_THINGS_SUCH_AS_LAUNDRY_AND_HOUSEKEEPING,_BANKING,_SHOPPING,_USING_THE_TELEPHONE,_FOOD_PREPARATION,_TRANSPORTATION,_OR_TAKING_YOUR_OWN_MEDICATIONS?: N
IN_THE_PAST_7_DAYS,_DID_YOU_NEED_HELP_FROM_OTHERS_TO_PERFORM_EVERYDAY_ACTIVITIES_SUCH_AS_EATING,_GETTING_DRESSED,_GROOMING,_BATHING,_WALKING,_OR_USING_THE_TOILET: N

## 2019-01-21 ASSESSMENT — ENCOUNTER SYMPTOMS
DOUBLE VISION: 0
EYE WATERING: 0
EYE PAIN: 1
EYE REDNESS: 0
EYE IRRITATION: 0

## 2019-01-21 ASSESSMENT — PAIN SCALES - GENERAL: PAINLEVEL: MILD PAIN (2)

## 2019-01-21 NOTE — LETTER
1/21/2019      RE: Shala Caruso  [address]  [facility] [address]       Surgeon (please enter first and last name):  Monika Fermin MD  Location of Surgery: [facility]  Date of Surgery:  1/08/18  Procedure:  Right Eye Cataract Extraction with Intraocular Lens      HPI  73-year-old presents today for preoperative medical evaluation prior to right cataract surgery.  She has been doing some walking but no cycling or elliptical training as before.  She is not having any chest pain dyspnea dizziness lightheadedness palpitations or other symptoms associated with her exercise.  She had no problems with nausea and vomiting with the prior eye surgery.  No surgical complications from prior eye surgery.  She is maintained on Coumadin because a history of DVT and PE.  She had no bleeding complications or problems recently.  The INR is being manipulated prior to surgery by the INR clinic.  Insulin will also be adjusted.  Past Medical History:   Diagnosis Date     Achalasia      Antiphospholipid syndrome (H)      Antiplatelet or antithrombotic long-term use      Coagulation disorder (H) 1123-3044     DM (diabetes mellitus) (H)      DVT (deep venous thrombosis) (H) 2003    and PE     Dysphagia     occasional, use Nifedipine     GERD (gastroesophageal reflux disease)      Hyperlipidemia      Lymphedema      Myopia      Neuropathy     toes bilateral     Nonsenile cataract      osteoporosis      Pericarditis      Raynaud's disease      SLE (systemic lupus erythematosus) (H)      Past Surgical History:   Procedure Laterality Date     COLONOSCOPY N/A 11/28/2016    Procedure: COLONOSCOPY;  Surgeon: Sang August MD;  Location:  GI     CYSTOSCOPY, SLING TRANSVAGINAL N/A 12/20/2016    Procedure: CYSTOSCOPY, SLING TRANSVAGINAL;  Surgeon: Jeanmarie Pierson MD;  Location:  OR     DISSECT LYMPH NODE AXILLA  2004    Lt, during eval for SLE     HYSTEROSCOPIC PLACEMENT CONTRACEPTIVE DEVICE  1985      PHACOEMULSIFICATION WITH STANDARD INTRAOCULAR LENS IMPLANT Right 1/8/2019    Procedure: Right Eye Cataract Extraction with Intraocular Lens;  Surgeon: Monika Fermin MD;  Location: [facility] OR     TUBAL LIGATION Bilateral      Family History   Problem Relation Age of Onset     Cancer Other         breast     Cerebrovascular Disease Other      C.A.D. Other      Glaucoma Father      Social History     Socioeconomic History     Marital status:      Spouse name: None     Number of children: None     Years of education: None     Highest education level: None   Social Needs     Financial resource strain: None     Food insecurity - worry: None     Food insecurity - inability: None     Transportation needs - medical: None     Transportation needs - non-medical: None   Occupational History     None   Tobacco Use     Smoking status: Never Smoker     Smokeless tobacco: Never Used   Substance and Sexual Activity     Alcohol use: No     Drug use: No     Sexual activity: Yes     Partners: Male     Comment: , safe in relationship   Other Topics Concern     Parent/sibling w/ CABG, MI or angioplasty before 65F 55M? Not Asked   Social History Narrative    How much exercise per week? Walk to work every morning (2 miles), swimming 3 times a week, takes walks with , periodically works out at gym on stationary bike     How much calcium per day? Supplement 3x daily        How much caffeine per day? On rare occasion, only if she is out to eat and that is all they have    How much vitamin D per day? supplement    Do you/your family wear seatbelts?  Yes    Do you/your family use safety helmets? Yes    Do you/your family use sunscreen? Yes    Do you/your family keep firearms in the home? No    Do you/your family have a smoke detector(s)? Yes        Do you feel safe in your home? Yes    Has anyone ever touched you in an unwanted manner? No        Klever MCCORD LPN 7/18/2013                 Answers for HPI/ROS submitted by  the patient on 1/21/2019   General Symptoms: No  Skin Symptoms: No  HENT Symptoms: No  EYE SYMPTOMS: Yes  HEART SYMPTOMS: No  LUNG SYMPTOMS: No  INTESTINAL SYMPTOMS: No  URINARY SYMPTOMS: No  GYNECOLOGIC SYMPTOMS: No  BREAST SYMPTOMS: No  SKELETAL SYMPTOMS: Yes  BLOOD SYMPTOMS: No  NERVOUS SYSTEM SYMPTOMS: No  MENTAL HEALTH SYMPTOMS: No  Eye pain: Yes  Vision loss: No  Dry eyes: Yes  Watery eyes: No  Eye bulging: No  Double vision: No  Flashing of lights: No  Spots: No  Floaters: Yes  Redness: No  Crossed eyes: No  Tunnel Vision: No  Eye irritation: No      Exam:  /70   Pulse 63   SpO2 98%   Breastfeeding? No   PHYSICAL EXAMINATION:   The patient is alert, oriented with a clear sensorium.   Skin shows no lesions or rashes and good turgor.   Head is normocephalic and atraumatic.   Eyes show miotic pupils  Ears show normal TMs bilaterally.   Mouth shows clear oral mucosa.   Neck shows no nodes, thyromegaly or bruits.   Back is nontender.   Lungs are clear to percussion and auscultation.   Heart shows normal S1 and S2 without murmur or gallop.   Abdomen is soft, nontender without masses or organomegaly.   Extremities show bilateral degenerative arthritis of both knees with superficial venous varicosities and no edema and no evidence of active synovitis.  Onychomycosis changes of her fingernails and decreased pedal pulses bilaterally  Neurologic examination shows cranial nerves II-XII intact. Motor shows normal strength. Reflexes are full and symmetrical.      ASSESSMENT  1 symptomatic left cataract  2 diabetes mellitus fair control  3 hypertension well controlled  4 hyperlipidemia well controlled  5 osteoporosis  6 history of SLE with Raynaud's phenomena  7 GERD  8 peripheral neuropathy possibly related to #2 above  9 immunosuppression related to #6 above  10 bilateral osteoarthritis of the knees    Plan  Patient is stable from a medical and cardiovascular standpoint and does not require any additional imaging  or investigation prior to her planned low risk surgery.  She would be low risk for cardiovascular complications.  No further imaging or investigation is necessary prior to her planned surgery.  She will hold her short acting insulin the morning of surgery and slightly reduce her dose of Lantus insulin that morning.    This note was completed using Dragon voice recognition software.  Although reviewed after completion, some word and grammatical errors may occur.    Joe Contreras MD  General Internal Medicine  [facility]  [phone]      Surgeon: Monika Fermin MD  Location of Surgery:  OR  Date of Surgery:  2/5/2019  Procedure:  Left Eye Cataract Extraction with Intraocular Lens    Keily Perez LPN at 6:24 PM on 1/21/2019.        Joe Contreras MD

## 2019-01-22 NOTE — PROGRESS NOTES
Surgeon (please enter first and last name):  Monika Fermin MD  Location of Surgery: [facility]  Date of Surgery:  1/08/18  Procedure:  Right Eye Cataract Extraction with Intraocular Lens      HPI  73-year-old presents today for preoperative medical evaluation prior to right cataract surgery.  She has been doing some walking but no cycling or elliptical training as before.  She is not having any chest pain dyspnea dizziness lightheadedness palpitations or other symptoms associated with her exercise.  She had no problems with nausea and vomiting with the prior eye surgery.  No surgical complications from prior eye surgery.  She is maintained on Coumadin because a history of DVT and PE.  She had no bleeding complications or problems recently.  The INR is being manipulated prior to surgery by the INR clinic.  Insulin will also be adjusted.  Past Medical History:   Diagnosis Date     Achalasia      Antiphospholipid syndrome (H)      Antiplatelet or antithrombotic long-term use      Coagulation disorder (H) 2045-7009     DM (diabetes mellitus) (H)      DVT (deep venous thrombosis) (H) 2003    and PE     Dysphagia     occasional, use Nifedipine     GERD (gastroesophageal reflux disease)      Hyperlipidemia      Lymphedema      Myopia      Neuropathy     toes bilateral     Nonsenile cataract      osteoporosis      Pericarditis      Raynaud's disease      SLE (systemic lupus erythematosus) (H)      Past Surgical History:   Procedure Laterality Date     COLONOSCOPY N/A 11/28/2016    Procedure: COLONOSCOPY;  Surgeon: Sang August MD;  [facility]     CYSTOSCOPY, SLING TRANSVAGINAL N/A 12/20/2016    Procedure: CYSTOSCOPY, SLING TRANSVAGINAL;  Surgeon: Jeanmarie Pierson MD;  [facility]     DISSECT LYMPH NODE AXILLA  2004    Lt, during eval for SLE     HYSTEROSCOPIC PLACEMENT CONTRACEPTIVE DEVICE  1985     PHACOEMULSIFICATION WITH STANDARD INTRAOCULAR LENS IMPLANT Right 1/8/2019    Procedure: Right Eye  Cataract Extraction with Intraocular Lens;  Surgeon: Monika Fermin MD;  [facility]     TUBAL LIGATION Bilateral      Family History   Problem Relation Age of Onset     Cancer Other         breast     Cerebrovascular Disease Other      C.A.D. Other      Glaucoma Father      Social History     Socioeconomic History     Marital status:      Spouse name: None     Number of children: None     Years of education: None     Highest education level: None   Social Needs     Financial resource strain: None     Food insecurity - worry: None     Food insecurity - inability: None     Transportation needs - medical: None     Transportation needs - non-medical: None   Occupational History     None   Tobacco Use     Smoking status: Never Smoker     Smokeless tobacco: Never Used   Substance and Sexual Activity     Alcohol use: No     Drug use: No     Sexual activity: Yes     Partners: Male     Comment: , safe in relationship   Other Topics Concern     Parent/sibling w/ CABG, MI or angioplasty before 65F 55M? Not Asked   Social History Narrative    How much exercise per week? Walk to work every morning (2 miles), swimming 3 times a week, takes walks with , periodically works out at gym on stationary bike     How much calcium per day? Supplement 3x daily        How much caffeine per day? On rare occasion, only if she is out to eat and that is all they have    How much vitamin D per day? supplement    Do you/your family wear seatbelts?  Yes    Do you/your family use safety helmets? Yes    Do you/your family use sunscreen? Yes    Do you/your family keep firearms in the home? No    Do you/your family have a smoke detector(s)? Yes        Do you feel safe in your home? Yes    Has anyone ever touched you in an unwanted manner? No        Klever R LPN 7/18/2013                 Answers for HPI/ROS submitted by the patient on 1/21/2019   General Symptoms: No  Skin Symptoms: No  HENT Symptoms: No  EYE  SYMPTOMS: Yes  HEART SYMPTOMS: No  LUNG SYMPTOMS: No  INTESTINAL SYMPTOMS: No  URINARY SYMPTOMS: No  GYNECOLOGIC SYMPTOMS: No  BREAST SYMPTOMS: No  SKELETAL SYMPTOMS: Yes  BLOOD SYMPTOMS: No  NERVOUS SYSTEM SYMPTOMS: No  MENTAL HEALTH SYMPTOMS: No  Eye pain: Yes  Vision loss: No  Dry eyes: Yes  Watery eyes: No  Eye bulging: No  Double vision: No  Flashing of lights: No  Spots: No  Floaters: Yes  Redness: No  Crossed eyes: No  Tunnel Vision: No  Eye irritation: No      Exam:  /70   Pulse 63   SpO2 98%   Breastfeeding? No   PHYSICAL EXAMINATION:   The patient is alert, oriented with a clear sensorium.   Skin shows no lesions or rashes and good turgor.   Head is normocephalic and atraumatic.   Eyes show miotic pupils  Ears show normal TMs bilaterally.   Mouth shows clear oral mucosa.   Neck shows no nodes, thyromegaly or bruits.   Back is nontender.   Lungs are clear to percussion and auscultation.   Heart shows normal S1 and S2 without murmur or gallop.   Abdomen is soft, nontender without masses or organomegaly.   Extremities show bilateral degenerative arthritis of both knees with superficial venous varicosities and no edema and no evidence of active synovitis.  Onychomycosis changes of her fingernails and decreased pedal pulses bilaterally  Neurologic examination shows cranial nerves II-XII intact. Motor shows normal strength. Reflexes are full and symmetrical.      ASSESSMENT  1 symptomatic left cataract  2 diabetes mellitus fair control  3 hypertension well controlled  4 hyperlipidemia well controlled  5 osteoporosis  6 history of SLE with Raynaud's phenomena  7 GERD  8 peripheral neuropathy possibly related to #2 above  9 immunosuppression related to #6 above  10 bilateral osteoarthritis of the knees    Plan  Patient is stable from a medical and cardiovascular standpoint and does not require any additional imaging or investigation prior to her planned low risk surgery.  She would be low risk for  cardiovascular complications.  No further imaging or investigation is necessary prior to her planned surgery.  She will hold her short acting insulin the morning of surgery and slightly reduce her dose of Lantus insulin that morning.    This note was completed using Dragon voice recognition software.  Although reviewed after completion, some word and grammatical errors may occur.    Joe Contreras MD  General Internal Medicine  [facility]  [phone]      Surgeon: Monika Fermin MD  Location of Surgery:  OR  Date of Surgery:  2/5/2019  Procedure:  Left Eye Cataract Extraction with Intraocular Lens    Keily Perez LPN at 6:24 PM on 1/21/2019.

## 2019-01-22 NOTE — NURSING NOTE
Patient received TDAP vaccine.  See immunization list for administration details.  Patient tolerated injection well.     Keily Osullivan at 6:47 PM on 1/21/2019.

## 2019-01-22 NOTE — NURSING NOTE
Chief Complaint   Patient presents with     Pre-Op Exam     Pt is here for a pre-op exam. In Epic.      Keily Perez LPN at 6:17 PM on 1/21/2019.

## 2019-01-22 NOTE — PATIENT INSTRUCTIONS
[facility] Medication Refill Request Information:  * Please contact your pharmacy regarding ANY request for medication refills.  ** [facility] Prescription Fax = [phone]  * Please allow 3 business days for routine medication refills.  * Please allow 5 business days for controlled substance medication refills.     [facility] Test Result notification information:  *You will be notified with in 7-10 days of your appointment day regarding the results of your test.  If you are on MyChart you will be notified as soon as the provider has reviewed the results and signed off on them.    [facility]: [phone]

## 2019-01-30 DIAGNOSIS — M32.15 SYSTEMIC LUPUS ERYTHEMATOSUS WITH TUBULO-INTERSTITIAL NEPHROPATHY, UNSPECIFIED SLE TYPE (H): ICD-10-CM

## 2019-01-30 DIAGNOSIS — Z79.899 HIGH RISK MEDICATIONS (NOT ANTICOAGULANTS) LONG-TERM USE: ICD-10-CM

## 2019-01-30 DIAGNOSIS — Z79.01 LONG TERM (CURRENT) USE OF ANTICOAGULANTS: ICD-10-CM

## 2019-01-30 LAB
ALT SERPL W P-5'-P-CCNC: 22 U/L (ref 0–50)
AST SERPL W P-5'-P-CCNC: 19 U/L (ref 0–45)
ERYTHROCYTE [DISTWIDTH] IN BLOOD BY AUTOMATED COUNT: 14.9 % (ref 10–15)
HCT VFR BLD AUTO: 43.1 % (ref 35–47)
HGB BLD-MCNC: 13.3 G/DL (ref 11.7–15.7)
INR PPP: 2.62 (ref 0.86–1.14)
MCH RBC QN AUTO: 28.2 PG (ref 26.5–33)
MCHC RBC AUTO-ENTMCNC: 30.9 G/DL (ref 31.5–36.5)
MCV RBC AUTO: 92 FL (ref 78–100)
PLATELET # BLD AUTO: 230 10E9/L (ref 150–450)
RBC # BLD AUTO: 4.71 10E12/L (ref 3.8–5.2)
WBC # BLD AUTO: 2.8 10E9/L (ref 4–11)

## 2019-01-31 ENCOUNTER — ANTICOAGULATION THERAPY VISIT (OUTPATIENT)
Dept: ANTICOAGULATION | Facility: CLINIC | Age: 74
End: 2019-01-31

## 2019-01-31 LAB — PROTHROM ACT/NOR PPP: 16 % (ref 60–140)

## 2019-01-31 NOTE — PROGRESS NOTES
ANTICOAGULATION FOLLOW-UP CLINIC VISIT    Patient Name:  Shala Caruso  Date:  2019  Contact Type:  Telephone    SUBJECTIVE:     Patient Findings     Positives:   No Problem Findings           OBJECTIVE    INR   Date Value Ref Range Status   2019 2.62 (H) 0.86 - 1.14 Final     Chromogenic Factor 10   Date Value Ref Range Status   2018 16%  Final     Factor 2 Assay   Date Value Ref Range Status   2019 16 (L) 60 - 140 % Final       ASSESSMENT / PLAN  INR assessment THER    Recheck INR In: 4 DAYS    INR Location Clinic      Anticoagulation Summary  As of 2019    INR goal:   2.0-3.0   TTR:   73.8 % (2.6 y)   INR used for dosin.62 (2019)   Warfarin maintenance plan:   12.5 mg (5 mg x 2.5) every Sun, Tue, Thu; 10 mg (5 mg x 2) all other days   Full warfarin instructions:   : 10 mg; /1: 7.5 mg; 2/2: 7.5 mg; 2/3: 7.5 mg; Otherwise 12.5 mg every Sun, Tue, Thu; 10 mg all other days   Weekly warfarin total:   77.5 mg   Plan last modified:   Portia Cherry RN (2017)   Next INR check:   2019   Priority:   Factor 2   Target end date:       Indications    SLE (systemic lupus erythematosus) (H) (Resolved) [M32.9]  Long-term (current) use of anticoagulants [Z79.01] [Z79.01]             Anticoagulation Episode Summary     INR check location:   Clinic Lab    Preferred lab:       Send INR reminders to:   [facility]    Comments:   Factor 2  goal range is 15-25%  Ok to leave message on home [phone] and work voicemail, but call [email or web address] per pt request.  OK to leave message at office  May leave messages with Rodriguez Santos  DO NOT GIVE RECORDS TO EMPLOYER [employer]        Anticoagulation Care Providers     Provider Role Specialty Phone number    Mt Kiser MD Responsible Clinical Cardiac Electrophysiology [phone]            See the Encounter Report to view Anticoagulation Flowsheet and Dosing Calendar (Go to Encounters tab in chart review, and find the  Anticoagulation Therapy Visit)    Spoke with Shala today. She is having cataract surgery on 2/5/19 and her surgeon wants an INR of 2.0 (They do not consider the Factor II results)  Dose of warfarin was decreased to get to the lower target .    Shant Mcduffie [facility]

## 2019-02-04 ENCOUNTER — ANESTHESIA EVENT (OUTPATIENT)
Dept: SURGERY | Facility: AMBULATORY SURGERY CENTER | Age: 74
End: 2019-02-04

## 2019-02-04 ENCOUNTER — ANTICOAGULATION THERAPY VISIT (OUTPATIENT)
Dept: ANTICOAGULATION | Facility: CLINIC | Age: 74
End: 2019-02-04

## 2019-02-04 DIAGNOSIS — M81.0 OSTEOPOROSIS, POSTMENOPAUSAL: ICD-10-CM

## 2019-02-04 DIAGNOSIS — Z79.01 LONG TERM (CURRENT) USE OF ANTICOAGULANTS: ICD-10-CM

## 2019-02-04 LAB
FACTOR 2 ASSAY: NORMAL
INR PPP: 1.84 (ref 0.86–1.14)
PROTHROM ACT/NOR PPP: 27 % (ref 60–140)

## 2019-02-04 NOTE — PROGRESS NOTES
ANTICOAGULATION FOLLOW-UP CLINIC VISIT    Patient Name:  Shala Caruso  Date:  2019  Contact Type:  Telephone    SUBJECTIVE:     Patient Findings     Positives:   Dental/Other procedures (Upcoming procedure on , Patient INR is 1.84)    Comments:   Factor 2 is 27% today.  Shala INR is 1.84, so patient can have the procedure tomorrow per recommendation of INR of 2.0.  Patient is dosed on Factor 2 level, see previous encounter about cataract surgery tomorrow.           OBJECTIVE    INR   Date Value Ref Range Status   2019 1.84 (H) 0.86 - 1.14 Final     Chromogenic Factor 10   Date Value Ref Range Status   2018 16%  Final     Factor 2 Assay   Date Value Ref Range Status   2019 27 (L) 60 - 140 % Final       ASSESSMENT / PLAN  INR assessment SUB    Recheck INR In: 2 WEEKS    INR Location Clinic      Anticoagulation Summary  As of 2019    INR goal:   2.0-3.0   TTR:   73.8 % (2.6 y)   INR used for dosin.84! (2019)   Warfarin maintenance plan:   12.5 mg (5 mg x 2.5) every Sun, Tue, Thu; 10 mg (5 mg x 2) all other days   Full warfarin instructions:   12.5 mg every Sun, Tue, Thu; 10 mg all other days   Weekly warfarin total:   77.5 mg   No change documented:   Andre Bledsoe RN   Plan last modified:   Portia Cherry RN (2017)   Next INR check:   2019   Priority:   Factor 2   Target end date:       Indications    SLE (systemic lupus erythematosus) (H) (Resolved) [M32.9]  Long-term (current) use of anticoagulants [Z79.01] [Z79.01]             Anticoagulation Episode Summary     INR check location:   Clinic Lab    Preferred lab:       Send INR reminders to:   [facility]    Comments:   Factor 2  goal range is 15-25%  Ok to leave message on home [phone] and work voicemail, but call [email or web address] per pt request.  OK to leave message at office  May leave messages with Rodriguez Santos  DO NOT GIVE RECORDS TO EMPLOYER [employer]        Anticoagulation Care Providers      Provider Role Specialty Phone number    Mt Kiser MD Responsible Clinical Cardiac Electrophysiology [phone]            See the Encounter Report to view Anticoagulation Flowsheet and Dosing Calendar (Go to Encounters tab in chart review, and find the Anticoagulation Therapy Visit)    Left message for patient.    Andre Bledsoe, RN

## 2019-02-04 NOTE — ANESTHESIA PREPROCEDURE EVALUATION
Anesthesia Pre-Procedure Evaluation    Patient: Shala Caruso   MRN:     [id number] Gender:   female   Age:    73 year old :      1945        Preoperative Diagnosis: Retrobulbar   Procedure(s):  Left Eye Cataract Extraction with Intraocular Lens     Past Medical History:   Diagnosis Date     Achalasia      Antiphospholipid syndrome (H)      Antiplatelet or antithrombotic long-term use      Coagulation disorder (H) 6449-4154     DM (diabetes mellitus) (H)      DVT (deep venous thrombosis) (H)     and PE     Dysphagia     occasional, use Nifedipine     GERD (gastroesophageal reflux disease)      Hyperlipidemia      Lymphedema      Myopia      Neuropathy     toes bilateral     Nonsenile cataract      osteoporosis      Pericarditis      Raynaud's disease      SLE (systemic lupus erythematosus) (H)       Past Surgical History:   Procedure Laterality Date     COLONOSCOPY N/A 2016    Procedure: COLONOSCOPY;  Surgeon: Sang August MD;  [facility]     CYSTOSCOPY, SLING TRANSVAGINAL N/A 2016    Procedure: CYSTOSCOPY, SLING TRANSVAGINAL;  Surgeon: Jeanmarie Pierson MD;  [facility]     DISSECT LYMPH NODE AXILLA      Lt, during eval for SLE     HYSTEROSCOPIC PLACEMENT CONTRACEPTIVE DEVICE  1985     PHACOEMULSIFICATION WITH STANDARD INTRAOCULAR LENS IMPLANT Right 2019    Procedure: Right Eye Cataract Extraction with Intraocular Lens;  Surgeon: Monika Fermin MD;  [facility]     TUBAL LIGATION Bilateral           Anesthesia Evaluation     .             ROS/MED HX    ENT/Pulmonary: Comment: Myopia  Nonsenile cataract      Neurologic:     (+)neuropathy - toes bilateral,     Cardiovascular: Comment: Raynaud's disease  Pericarditis    (+) Dyslipidemia (Hyperlipidemia), hypertension----. Taking blood thinners : . . . :. .       METS/Exercise Tolerance:     Hematologic:     (+) History of blood clots (DVT (deep venous thrombosis) (H)) pt is anticoagulated, -       Musculoskeletal: Comment: osteoporosis        GI/Hepatic: Comment: Achalasia/Dysphagia    (+) GERD       Renal/Genitourinary:     (+) chronic renal disease (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, unspecified SLE type (H)),       Endo:     (+) type I DM, Diabetic complications: nephropathy, .      Psychiatric:  - neg psychiatric ROS       Infectious Disease:  - neg infectious disease ROS       Malignancy:      - no malignancy   Other: Comment: Antiphospholipid syndrome (H)  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, unspecified SLE type (H)  Lymphedema                        PHYSICAL EXAM:   Mental Status/Neuro: A/A/O   Airway: Facies: Feasible  Mallampati: I  Mouth/Opening: Full  TM distance: > 6 cm  Neck ROM: Full   Respiratory: Auscultation: CTAB     Resp. Rate: Normal     Resp. Effort: Normal      CV: Rhythm: Regular  Rate: Age appropriate  Heart: Normal Sounds   Comments:      Dental: Normal                  Lab Results   Component Value Date    WBC 2.8 (L) 01/30/2019    HGB 13.3 01/30/2019    HCT 43.1 01/30/2019     01/30/2019    CRP <2.9 04/03/2015    SED 21 04/01/2013     07/26/2018    POTASSIUM 4.0 07/26/2018    CHLORIDE 107 07/26/2018    CO2 27 07/26/2018    BUN 24 07/26/2018    CR 0.68 07/26/2018     (H) 07/26/2018    GABBY 8.5 07/26/2018    PHOS 4.0 05/27/2008    ALBUMIN 3.8 01/11/2016    PROTTOTAL 6.9 04/01/2013    ALT 22 01/30/2019    AST 19 01/30/2019    ALKPHOS 74 04/01/2013    BILITOTAL 0.4 04/01/2013    PTT 38 (H) 08/25/2011    INR 1.84 (H) 02/04/2019    TSH 0.95 07/26/2018    T4 0.85 01/11/2016       Preop Vitals  BP Readings from Last 3 Encounters:   01/21/19 118/70   01/14/19 126/77   01/08/19 127/69    Pulse Readings from Last 3 Encounters:   01/21/19 63   01/14/19 59   12/17/18 66      Resp Readings from Last 3 Encounters:   01/08/19 16   09/06/18 16   04/04/17 16    SpO2 Readings from Last 3 Encounters:   01/21/19 98%  "  01/08/19 100%   12/17/18 99%      Temp Readings from Last 1 Encounters:   01/08/19 36.1  C (97  F) (Temporal)    Ht Readings from Last 1 Encounters:   01/08/19 1.67 m (5' 5.75\")      Wt Readings from Last 1 Encounters:   01/08/19 54.1 kg (119 lb 4.8 oz)    Estimated body mass index is 19.4 kg/m  as calculated from the following:    Height as of 1/8/19: 1.67 m (5' 5.75\").    Weight as of 1/8/19: 54.1 kg (119 lb 4.8 oz).     LDA:            Assessment:   ASA SCORE: 3       Documentation: H&P complete; Preop Testing complete; Consents complete   Proceeding: Proceed without further delay     Plan:   Anes. Type:  MAC      Induction:  IV (Standard)   Airway: Native Airway   Access/Monitoring: PIV   Maintenance: Propofol; IV   Emergence: Procedure Site   Logistics: Same Day Surgery     Postop Pain/Sedation Strategy:  Standard-Options: Opioids PRN     PONV Management:  Adult Risk Factors: Female, Postop Opioids  Prevention: Propofol Infusion; Ondansetron     CONSENT:      Blood Products: N/a       Comments for Plan/Consent:  74 yo for  Left Eye Cataract Extraction with Intraocular Lens (Left Eye) under MAC/GA backup                         Attila Onofre MD     I have personally seen and examined this patient. The above assessment and plan is the result of our shared decision making.    Joe Flores MD    2/5/2019 8:13 AM    "

## 2019-02-05 ENCOUNTER — ANTICOAGULATION THERAPY VISIT (OUTPATIENT)
Dept: ANTICOAGULATION | Facility: CLINIC | Age: 74
End: 2019-02-05

## 2019-02-05 ENCOUNTER — TELEPHONE (OUTPATIENT)
Dept: ENDOCRINOLOGY | Facility: CLINIC | Age: 74
End: 2019-02-05

## 2019-02-05 ENCOUNTER — HOSPITAL ENCOUNTER (OUTPATIENT)
Facility: AMBULATORY SURGERY CENTER | Age: 74
End: 2019-02-05
Attending: OPHTHALMOLOGY
Payer: COMMERCIAL

## 2019-02-05 ENCOUNTER — ANESTHESIA (OUTPATIENT)
Dept: SURGERY | Facility: AMBULATORY SURGERY CENTER | Age: 74
End: 2019-02-05

## 2019-02-05 VITALS
TEMPERATURE: 98.1 F | WEIGHT: 125 LBS | BODY MASS INDEX: 20.09 KG/M2 | RESPIRATION RATE: 14 BRPM | HEIGHT: 66 IN | SYSTOLIC BLOOD PRESSURE: 114 MMHG | DIASTOLIC BLOOD PRESSURE: 69 MMHG | OXYGEN SATURATION: 100 %

## 2019-02-05 DIAGNOSIS — H26.9 CATARACT OF LEFT EYE, UNSPECIFIED CATARACT TYPE: Primary | ICD-10-CM

## 2019-02-05 DIAGNOSIS — H26.9 CATARACT OF RIGHT EYE, UNSPECIFIED CATARACT TYPE: ICD-10-CM

## 2019-02-05 LAB — GLUCOSE BLDC GLUCOMTR-MCNC: 85 MG/DL (ref 70–99)

## 2019-02-05 DEVICE — IMPLANTABLE DEVICE: Type: IMPLANTABLE DEVICE | Site: EYE | Status: FUNCTIONAL

## 2019-02-05 RX ORDER — CYCLOPENTOLATE HYDROCHLORIDE 10 MG/ML
1 SOLUTION/ DROPS OPHTHALMIC
Status: COMPLETED | OUTPATIENT
Start: 2019-02-05 | End: 2019-02-05

## 2019-02-05 RX ORDER — PREDNISOLONE ACETATE 10 MG/ML
1 SUSPENSION/ DROPS OPHTHALMIC 4 TIMES DAILY
Qty: 5 ML | Refills: 0 | Status: SHIPPED | OUTPATIENT
Start: 2019-02-05 | End: 2019-05-09

## 2019-02-05 RX ORDER — NALOXONE HYDROCHLORIDE 0.4 MG/ML
.1-.4 INJECTION, SOLUTION INTRAMUSCULAR; INTRAVENOUS; SUBCUTANEOUS
Status: DISCONTINUED | OUTPATIENT
Start: 2019-02-05 | End: 2019-02-06 | Stop reason: HOSPADM

## 2019-02-05 RX ORDER — ONDANSETRON 2 MG/ML
4 INJECTION INTRAMUSCULAR; INTRAVENOUS EVERY 30 MIN PRN
Status: DISCONTINUED | OUTPATIENT
Start: 2019-02-05 | End: 2019-02-06 | Stop reason: HOSPADM

## 2019-02-05 RX ORDER — BALANCED SALT SOLUTION 6.4; .75; .48; .3; 3.9; 1.7 MG/ML; MG/ML; MG/ML; MG/ML; MG/ML; MG/ML
SOLUTION OPHTHALMIC PRN
Status: DISCONTINUED | OUTPATIENT
Start: 2019-02-05 | End: 2019-02-05 | Stop reason: HOSPADM

## 2019-02-05 RX ORDER — OFLOXACIN 3 MG/ML
1 SOLUTION/ DROPS OPHTHALMIC 4 TIMES DAILY
Qty: 5 ML | Refills: 0 | Status: SHIPPED | OUTPATIENT
Start: 2019-02-05 | End: 2019-03-21

## 2019-02-05 RX ORDER — ONDANSETRON 2 MG/ML
INJECTION INTRAMUSCULAR; INTRAVENOUS PRN
Status: DISCONTINUED | OUTPATIENT
Start: 2019-02-05 | End: 2019-02-05

## 2019-02-05 RX ORDER — PROPARACAINE HYDROCHLORIDE 5 MG/ML
1 SOLUTION/ DROPS OPHTHALMIC ONCE
Status: DISCONTINUED | OUTPATIENT
Start: 2019-02-05 | End: 2019-02-06 | Stop reason: HOSPADM

## 2019-02-05 RX ORDER — KETOROLAC TROMETHAMINE 4 MG/ML
1 SOLUTION/ DROPS OPHTHALMIC 4 TIMES DAILY
Qty: 5 ML | Refills: 0 | Status: SHIPPED | OUTPATIENT
Start: 2019-02-05 | End: 2019-05-09

## 2019-02-05 RX ORDER — DEXAMETHASONE SODIUM PHOSPHATE 4 MG/ML
INJECTION, SOLUTION INTRA-ARTICULAR; INTRALESIONAL; INTRAMUSCULAR; INTRAVENOUS; SOFT TISSUE PRN
Status: DISCONTINUED | OUTPATIENT
Start: 2019-02-05 | End: 2019-02-05 | Stop reason: HOSPADM

## 2019-02-05 RX ORDER — TROPICAMIDE 10 MG/ML
1 SOLUTION/ DROPS OPHTHALMIC
Status: COMPLETED | OUTPATIENT
Start: 2019-02-05 | End: 2019-02-05

## 2019-02-05 RX ORDER — ONDANSETRON 4 MG/1
4 TABLET, ORALLY DISINTEGRATING ORAL EVERY 30 MIN PRN
Status: DISCONTINUED | OUTPATIENT
Start: 2019-02-05 | End: 2019-02-06 | Stop reason: HOSPADM

## 2019-02-05 RX ORDER — FENTANYL CITRATE 50 UG/ML
INJECTION, SOLUTION INTRAMUSCULAR; INTRAVENOUS PRN
Status: DISCONTINUED | OUTPATIENT
Start: 2019-02-05 | End: 2019-02-05

## 2019-02-05 RX ORDER — SODIUM CHLORIDE, SODIUM LACTATE, POTASSIUM CHLORIDE, CALCIUM CHLORIDE 600; 310; 30; 20 MG/100ML; MG/100ML; MG/100ML; MG/100ML
INJECTION, SOLUTION INTRAVENOUS CONTINUOUS
Status: DISCONTINUED | OUTPATIENT
Start: 2019-02-05 | End: 2019-02-06 | Stop reason: HOSPADM

## 2019-02-05 RX ORDER — PROPARACAINE HYDROCHLORIDE 5 MG/ML
1 SOLUTION/ DROPS OPHTHALMIC ONCE
Status: COMPLETED | OUTPATIENT
Start: 2019-02-05 | End: 2019-02-05

## 2019-02-05 RX ORDER — MEPERIDINE HYDROCHLORIDE 25 MG/ML
12.5 INJECTION INTRAMUSCULAR; INTRAVENOUS; SUBCUTANEOUS
Status: DISCONTINUED | OUTPATIENT
Start: 2019-02-05 | End: 2019-02-06 | Stop reason: HOSPADM

## 2019-02-05 RX ORDER — ACETAMINOPHEN 325 MG/1
975 TABLET ORAL ONCE
Status: COMPLETED | OUTPATIENT
Start: 2019-02-05 | End: 2019-02-05

## 2019-02-05 RX ORDER — PHENYLEPHRINE HYDROCHLORIDE 25 MG/ML
1 SOLUTION/ DROPS OPHTHALMIC
Status: COMPLETED | OUTPATIENT
Start: 2019-02-05 | End: 2019-02-05

## 2019-02-05 RX ORDER — SODIUM CHLORIDE, SODIUM LACTATE, POTASSIUM CHLORIDE, CALCIUM CHLORIDE 600; 310; 30; 20 MG/100ML; MG/100ML; MG/100ML; MG/100ML
500 INJECTION, SOLUTION INTRAVENOUS CONTINUOUS
Status: DISCONTINUED | OUTPATIENT
Start: 2019-02-05 | End: 2019-02-05 | Stop reason: HOSPADM

## 2019-02-05 RX ORDER — PROPOFOL 10 MG/ML
INJECTION, EMULSION INTRAVENOUS PRN
Status: DISCONTINUED | OUTPATIENT
Start: 2019-02-05 | End: 2019-02-05

## 2019-02-05 RX ORDER — FENTANYL CITRATE 50 UG/ML
25-50 INJECTION, SOLUTION INTRAMUSCULAR; INTRAVENOUS
Status: DISCONTINUED | OUTPATIENT
Start: 2019-02-05 | End: 2019-02-05 | Stop reason: HOSPADM

## 2019-02-05 RX ADMIN — FENTANYL CITRATE 50 MCG: 50 INJECTION, SOLUTION INTRAMUSCULAR; INTRAVENOUS at 08:05

## 2019-02-05 RX ADMIN — PROPARACAINE HYDROCHLORIDE 1 DROP: 5 SOLUTION/ DROPS OPHTHALMIC at 07:11

## 2019-02-05 RX ADMIN — PROPOFOL 20 MG: 10 INJECTION, EMULSION INTRAVENOUS at 08:17

## 2019-02-05 RX ADMIN — PHENYLEPHRINE HYDROCHLORIDE 1 DROP: 25 SOLUTION/ DROPS OPHTHALMIC at 07:21

## 2019-02-05 RX ADMIN — PHENYLEPHRINE HYDROCHLORIDE 1 DROP: 25 SOLUTION/ DROPS OPHTHALMIC at 07:30

## 2019-02-05 RX ADMIN — PHENYLEPHRINE HYDROCHLORIDE 1 DROP: 25 SOLUTION/ DROPS OPHTHALMIC at 07:13

## 2019-02-05 RX ADMIN — SODIUM CHLORIDE, SODIUM LACTATE, POTASSIUM CHLORIDE, CALCIUM CHLORIDE 500 ML: 600; 310; 30; 20 INJECTION, SOLUTION INTRAVENOUS at 07:31

## 2019-02-05 RX ADMIN — CYCLOPENTOLATE HYDROCHLORIDE 1 DROP: 10 SOLUTION/ DROPS OPHTHALMIC at 07:12

## 2019-02-05 RX ADMIN — TROPICAMIDE 1 DROP: 10 SOLUTION/ DROPS OPHTHALMIC at 07:31

## 2019-02-05 RX ADMIN — ONDANSETRON 4 MG: 2 INJECTION INTRAMUSCULAR; INTRAVENOUS at 08:05

## 2019-02-05 RX ADMIN — PROPOFOL 30 MG: 10 INJECTION, EMULSION INTRAVENOUS at 08:15

## 2019-02-05 RX ADMIN — CYCLOPENTOLATE HYDROCHLORIDE 1 DROP: 10 SOLUTION/ DROPS OPHTHALMIC at 07:20

## 2019-02-05 RX ADMIN — TROPICAMIDE 1 DROP: 10 SOLUTION/ DROPS OPHTHALMIC at 07:22

## 2019-02-05 RX ADMIN — CYCLOPENTOLATE HYDROCHLORIDE 1 DROP: 10 SOLUTION/ DROPS OPHTHALMIC at 07:29

## 2019-02-05 RX ADMIN — TROPICAMIDE 1 DROP: 10 SOLUTION/ DROPS OPHTHALMIC at 07:14

## 2019-02-05 RX ADMIN — ACETAMINOPHEN 975 MG: 325 TABLET ORAL at 07:07

## 2019-02-05 ASSESSMENT — MIFFLIN-ST. JEOR: SCORE: 1098.75

## 2019-02-05 NOTE — OP NOTE
SURGEON: Monika Fermin MD   ASSISTANT SURGEON: Gabo Sarabia MD.  PREOPERATIVE DIAGNOSIS: Visually significant cataract LEFT eye  POSTOPERATIVE DIAGNOSIS: same  PROCEDURE: Phacoemulsification with intraocular lens implantation, SN60WF 13.5 D SN [id number]  ANESTHESIA: Monitored anesthesia care and peribulbar block   COMPLICATIONS: none    Indication: Shala Caruso is a 73 year old with diagnosis of visually significant cataract, here for cataract surgery    DESCRIPTION OF THE PROCEDURE:    The patient was taken to the operative room where intravenous sedation was administered and a peribulbar block consisting of a 1:1 mixture of 2%lidocaine and 0.75% marcaine with epinephrine and wydase, was administered to the operative eye with adequate anesthesia and akinesia.    The operative eye was prepped and draped in the usual sterile surgical fashion for ophthalmic surgery, including the installation of one drop of 5% Povidone Iodine.  A sterile drape was placed over the face and body and a lid speculum was inserted.      With the use of a Supersharp blade and 0.12 forceps, a paracentesis was created at the limbus. Viscoelastic was injected into the anterior chamber using a canula.  A 2.4 mm keratome was then used to construct a clear corneal incision at the 10 o'clock position.  Using Utrata forceps and cystotome needle, a continuous curvilinear capsulorrhexis was created and hydrodissection was undertaken with the use of BSS.  The nucleus was found to be freely mobile and then removed by phacoemulsification using a divide and conquer technique.  The remaining elements of cortex were then removed with irrigation/aspiration.  An IOL,was injected into the capsular bag and was rotated into a good position with a Sinskey hook. The remaining elements of viscoelastic were then removed with irrigation/aspiration. The wounds were hydrodissect and were watertight.       The lid speculum was removed.  Subconjunctival  injection of Dexamethasone and Ancef were administered. The eye was cleaned with wet and dry gauze. Maxitrol ointment was placed on the eye.  A patch and Yeager shield were placed over the eye.  The patient was discharge in stable condition having tolerated the procedure well.    The surgery was assisted by Dr. Gabo Sarabia, because no qualified resident was available on the day of the surgery. Due to the delicate and complex nature of this surgery, Dr. Gabo Sarabia was required. Dr. Gabo Sarabia assisted with Cataract extraction intraocular lens. I was present for the entire surgery.

## 2019-02-05 NOTE — ANESTHESIA POSTPROCEDURE EVALUATION
Anesthesia POST Procedure Evaluation    Patient: Shala Caruso   MRN:     [id number] Gender:   female   Age:    73 year old :      1945        Preoperative Diagnosis: Retrobulbar   Procedure(s):  Left Eye Cataract Extraction with Intraocular Lens   Postop Comments: No value filed.       Anesthesia Type:  MAC    Reportable Event: NO     PAIN: Uncomplicated   Sign Out status: Comfortable, Well controlled pain     PONV: No PONV   Sign Out status:  No Nausea or Vomiting     Neuro/Psych: Uneventful perioperative course   Sign Out Status: Preoperative baseline; Age appropriate mentation     Airway/Resp.: Uneventful perioperative course   Sign Out Status: Non labored breathing, age appropriate RR; Resp. Status within EXPECTED Parameters     CV: Uneventful perioperative course   Sign Out status: Appropriate BP and perfusion indices; Appropriate HR/Rhythm     Disposition:   Sign Out in:  PACU  Disposition:  Phase II; Home  Recovery Course: Uneventful  Follow-Up: Not required           Last Anesthesia Record Vitals:  CRNA VITALS  2019 0809 - 2019 0909      2019             Pulse:  60    SpO2:  98 %    Resp Rate (set):  10          Last PACU/Preop Vitals:  Vitals:    19 0651 19 0840 19 0902   BP: 110/69 111/66 114/69   Resp: 16 14 14   Temp: 36.4  C (97.5  F) 36.7  C (98.1  F)    SpO2: 100% 98% 100%         Electronically Signed By: Joe Flores MD, 2019, 12:04 PM

## 2019-02-05 NOTE — OR NURSING
Dr. Fermin and Dr. Kaiser notified of 2/4's INR at 1.84, patient has continued decreased dose of coumadin with last dose being 2/4. No new orders at this time.

## 2019-02-05 NOTE — PROGRESS NOTES
ANTICOAGULATION FOLLOW-UP CLINIC VISIT    Patient Name:  Shala Caruso  Date:  2/5/2019  Contact Type:  Telephone    SUBJECTIVE:     Patient Findings     Positives:   Change in medications (Taking tylenol for pain management after procedure.)           OBJECTIVE    INR   Date Value Ref Range Status   02/04/2019 1.84 (H) 0.86 - 1.14 Final     Chromogenic Factor 10   Date Value Ref Range Status   11/21/2018 16%  Final     Factor 2 Assay   Date Value Ref Range Status   02/04/2019 27 (L) 60 - 140 % Final       ASSESSMENT / PLAN  INR assessment SUB    Recheck INR In: 4 WEEKS    INR Location Clinic      Anticoagulation Summary  As of 2/5/2019    INR goal:   2.0-3.0   TTR:   73.8 % (2.6 y)   INR used for dosing:      Warfarin maintenance plan:   12.5 mg (5 mg x 2.5) every Sun, Tue, Thu; 10 mg (5 mg x 2) all other days   Full warfarin instructions:   12.5 mg every Sun, Tue, Thu; 10 mg all other days   Weekly warfarin total:   77.5 mg   Plan last modified:   Portia Cherry RN (6/16/2017)   Next INR check:   3/11/2019   Priority:   Factor 2   Target end date:       Indications    SLE (systemic lupus erythematosus) (H) (Resolved) [M32.9]  Long-term (current) use of anticoagulants [Z79.01] [Z79.01]             Anticoagulation Episode Summary     INR check location:   Clinic Lab    Preferred lab:       Send INR reminders to:   [facility]    Comments:   Factor 2  goal range is 15-25%  Ok to leave message on home [phone] and work voicemail, but call [email or web address] per pt request.  OK to leave message at office  May leave messages with Rodriguez Santos  DO NOT GIVE RECORDS TO EMPLOYER [employer]        Anticoagulation Care Providers     Provider Role Specialty Phone number    Mt Kiser MD Responsible Clinical Cardiac Electrophysiology [phone]            See the Encounter Report to view Anticoagulation Flowsheet and Dosing Calendar (Go to Encounters tab in chart review, and find the Anticoagulation Therapy  Visit)    Spoke with patient.  Pt had cataract surgery today, and was told to restart her coumadin tonight.  She took a dose of tylenol for pain management today, and may take it again tonight or tomorrow.  She wanted to do next INR in 4 weeks.    Portia Cherry RN

## 2019-02-05 NOTE — ANESTHESIA CARE TRANSFER NOTE
Patient: Shala Caruso    Procedure(s):  Left Eye Cataract Extraction with Intraocular Lens    Diagnosis: Retrobulbar  Diagnosis Additional Information: No value filed.    Anesthesia Type:   No value filed.     Note:  Airway :Room Air  Patient transferred to:Phase II  Comments: Report to RN    111/66, 16, 61, 98%, 97.1Handoff Report: Identifed the Patient, Identified the Reponsible Provider, Reviewed the pertinent medical history, Discussed the surgical course, Reviewed Intra-OP anesthesia mangement and issues during anesthesia, Set expectations for post-procedure period and Allowed opportunity for questions and acknowledgement of understanding      Vitals: (Last set prior to Anesthesia Care Transfer)    CRNA VITALS  2/5/2019 0809 - 2/5/2019 0843      2/5/2019             Pulse:  60    SpO2:  98 %    Resp Rate (set):  10                Electronically Signed By: SANG Painting CRNA  February 5, 2019  8:43 AM

## 2019-02-05 NOTE — DISCHARGE INSTRUCTIONS
"[facility]  Home Care Following Anesthesia  For 24 hours after surgery:  1. Get plenty of rest.  A responsible adult must stay with you for at least 24 hours after you leave the surgery center.  2. Do not drive or use heavy equipment.  If you have weakness or tingling, don't drive or use heavy equipment until this feeling goes away.   3. Do not drink alcohol.   4. Avoid strenuous or risky activities.  Ask for help when climbing stairs.  5. You may feel lightheaded.  IF so, sit for a few minutes before standing.  Have someone help you get up.   6. If you have nausea (feel sick to your stomach): Drink only clear liquids such as apple juice, ginger ale, broth or 7-Up.  Rest may also help.  Be sure to drink enough fluids.  Move to a regular diet as you feel able.   7. You may have a slight fever.  Call the doctor if your fever is over 100 F (37.7 C) (taken under the tongue) or lasts longer than 24 hours.  8. You may have a dry mouth, a sore throat, muscle aches or trouble sleeping. These should go away after 24 hours.  9. Do not make important or legal decisions.        Today you received a Marcaine or bupivacaine block to numb the nerves near your surgery site.  This is a block using local anesthetic or \"numbing\" medication injected around the nerves to anesthetize or \"numb\" the area supplied by those nerves.  This block is injected into the muscle layer near your surgical site.  The medication may numb the location where you had surgery for 6-18 hours, but may last up to 24 hours.  If your surgical site is an arm or leg you should be careful with your affected limb, since it is possible to injure your limb without being aware of it due to the numbing.  Until full feeling returns, you should guard against bumping or hitting your limb, and avoid extreme hot or cold temperatures on the skin.  As the block wears off, the feeling will return as a tingling or prickly sensation near your " surgical site.  You will experience more discomfort from your incision as the feeling returns.  You may want to take a pain pill (a narcotic or Tylenol if this was prescribed by your surgeon) when you start to experience mild pain before the pain beccomes more severe.  If your pain medications do not control your pain you should notifiy your surgeon.    Tips for taking pain medications  To get the best pain relief possible, remember these points:    Take pain medications as directed, before pain becomes severe.    Pain medication can upset your stomach: taking it with food may help.    Constipation is a common side effect of pain medication. Drink plenty of  fluids.    Eat foods high in fiber. Take a stool softener if recommended by your doctor or pharmacist.    Do not drink alcohol, drive or operate machinery while taking pain medications.    Ask about other ways to control pain, such as with heat, ice or relaxation.    Tylenol/Acetaminophen Consumption  To help encourage the safe use of acetaminophen, the makers of TYLENOL  have lowered the maximum daily dose for single-ingredient Extra Strength TYLENOL  (acetaminophen) products sold in the [place] from 8 pills per day (4,000 mg) to 6 pills per day (3,000 mg). The dosing interval has also changed from 2 pills every 4-6 hours to 2 pills every 6 hours.    If you feel your pain relief is insufficient, you may take Tylenol/Acetaminophen in addition to your narcotic pain medication.     Be careful not to exceed 3,000 mg of Tylenol/Acetaminophen in a 24 hour period from all sources.    If you are taking extra strength Tylenol/acetaminophen (500 mg), the maximum dose is 6 tablets in 24 hours.    If you are taking regular strength acetaminophen (325 mg), the maximum dose is 9 tablets in 24 hours.    Call a doctor for any of the followin. Signs of infection (fever, growing tenderness at the surgery site, a large amount of drainage or bleeding, severe pain, foul-smelling  drainage, redness, swelling).  2. It has been over 8 to 10 hours since surgery and you are still not able to urinate (pass water).  3. Headache for over 24 hours.  Your doctor is:   Dr. Monika Fermin, Ophthalmology: [phone]               Or dial [phone] and ask for the resident on call for:  Ophthalmology  For emergency care, call the:  [place] [facility]:  [phone] (TTY for hearing impaired: [phone])    Dr. Monika Fermin  [facility]  [phone]  Post Operative Cataract Instructions      If you have a gauze eye patch on, please do not remove it until it is removed by your physician at your first post-operative visit.  You will start your eye drops the next day.      Wear the clear eye shield when sleeping for protection for 5 days.      Do not rub the operated eye.      Light sensitivity may be noticed. Sunglasses may be worn for comfort.      Some discomfort and irritation may be noticed. Acetaminophen (Tylenol) or Ibuprofen (Advil) may be taken for discomfort. If pain persists please call Dr. Fermin's office.      Keep the operated eye dry. You may wash your hair, bathe or shower, but keep the operated eye closed while doing so.       If you take glaucoma medications, bring them with you to the clinic on your first post operative visit.      Bring your prescribed eye drops with you to your scheduled post-operative appointment.      Use medication exactly as prescribed by your doctor. You may restart your regular home medications.       Call Dr. Fermni's office at [phone] if any of the following should occur:    - Any sudden vision changes, including decreased vision  - Nausea or severe headache  - Increase in pain not controlled  - Signs of infection (pus, increasing redness or tenderness)  - Severe sensitivity to light

## 2019-02-06 ENCOUNTER — OFFICE VISIT (OUTPATIENT)
Dept: OPHTHALMOLOGY | Facility: CLINIC | Age: 74
End: 2019-02-06
Attending: OPHTHALMOLOGY
Payer: COMMERCIAL

## 2019-02-06 DIAGNOSIS — Z48.810 AFTERCARE FOLLOWING SURGERY OF A SENSORY ORGAN: Primary | ICD-10-CM

## 2019-02-06 PROCEDURE — G0463 HOSPITAL OUTPT CLINIC VISIT: HCPCS | Mod: ZF

## 2019-02-06 ASSESSMENT — CONF VISUAL FIELD
OD_NORMAL: 1
METHOD: COUNTING FINGERS
OS_NORMAL: 1

## 2019-02-06 ASSESSMENT — CUP TO DISC RATIO
OD_RATIO: 0.3
OS_RATIO: 0.3

## 2019-02-06 ASSESSMENT — EXTERNAL EXAM - RIGHT EYE: OD_EXAM: NORMAL

## 2019-02-06 ASSESSMENT — TONOMETRY
IOP_METHOD: TONOPEN
OS_IOP_MMHG: 15
OD_IOP_MMHG: 11

## 2019-02-06 ASSESSMENT — SLIT LAMP EXAM - LIDS
COMMENTS: NORMAL
COMMENTS: NORMAL

## 2019-02-06 ASSESSMENT — VISUAL ACUITY
METHOD: SNELLEN - LINEAR
OD_SC: 20/40
OD_PH_SC: 20/30
OS_SC: 20/40

## 2019-02-06 ASSESSMENT — EXTERNAL EXAM - LEFT EYE: OS_EXAM: NORMAL

## 2019-02-06 NOTE — PROGRESS NOTES
Postoperative day 1 status post Cataract extraction intraocular lens left eye 02/05/19     Slept well  Retina attached  Doing well    Plan:  Position: face down  No aviation  No heavy lifting   Yeager shield at all times  Retina detachment and endophthalmitis precautions were discussed with the patient and was asked to return if any of the those occur    Medications to operative eye  Maxitrol (pink top) four times a day    Ketorolac 4 x daily  Ofloxacin 4 x daily     Follow up in one week  ~~~~~~~~~~~~~~~~~~~~~~~~~~~~~~~~~~   Complete documentation of historical and exam elements from today's encounter can be found in the full encounter summary report (not reduplicated in this progress note).  I personally obtained the chief complaint(s) and history of present illness.  I confirmed and edited as necessary the review of systems, past medical/surgical history, family history, social history, and examination findings as documented by others; and I examined the patient myself.  I personally reviewed the relevant tests, images, and reports as documented above.  I formulated and edited as necessary the assessment and plan and discussed the findings and management plan with the patient and family    Monika Fermin MD  .  Retina Service   Department of Ophthalmology and Visual Neurosciences   [facility]  Phone: [phone]   Fax: [phone]

## 2019-02-06 NOTE — NURSING NOTE
Chief Complaints and History of Present Illnesses   Patient presents with     Post Op (Ophthalmology) Right Eye     Chief Complaint(s) and History of Present Illness(es)     Post Op (Ophthalmology) Right Eye     Laterality: right eye    Onset: gradual    Onset: days ago    Quality: blurred vision    Frequency: constantly    Associated symptoms: Negative for headache, floaters and flashes    Pain scale: 0/10              Comments     Did not sleep but not due to pain  Soo West COT 7:52 AM February 6, 2019

## 2019-02-06 NOTE — PATIENT INSTRUCTIONS
Medications to operative eye  Maxitrol (pink top) four times a day    Ketorolac 4 x daily  Ofloxacin   No heavy lifting x 3 weeks  Wear glasses or shield at all times x 3 weeks

## 2019-02-08 LAB
DEPRECATED CALCIDIOL+CALCIFEROL SERPL-MC: <73 UG/L (ref 20–75)
VITAMIN D2 SERPL-MCNC: <5 UG/L
VITAMIN D3 SERPL-MCNC: 68 UG/L

## 2019-02-13 ENCOUNTER — OFFICE VISIT (OUTPATIENT)
Dept: OPHTHALMOLOGY | Facility: CLINIC | Age: 74
End: 2019-02-13
Attending: OPHTHALMOLOGY
Payer: COMMERCIAL

## 2019-02-13 DIAGNOSIS — Z48.810 AFTERCARE FOLLOWING SURGERY OF A SENSORY ORGAN: Primary | ICD-10-CM

## 2019-02-13 PROCEDURE — G0463 HOSPITAL OUTPT CLINIC VISIT: HCPCS | Mod: ZF

## 2019-02-13 ASSESSMENT — VISUAL ACUITY
OD_PH_SC: 20/40
METHOD: SNELLEN - LINEAR
OS_SC: 20/25
OD_SC+: +1
OD_SC: 20/50

## 2019-02-13 ASSESSMENT — REFRACTION_WEARINGRX
OD_SPHERE: -5.50
OD_ADD: +2.75
OD_AXIS: 138
OS_AXIS: 015
OS_ADD: +2.75
SPECS_TYPE: TRIFOCAL
OD_CYLINDER: +1.50
OS_CYLINDER: +0.75
OS_SPHERE: -5.50

## 2019-02-13 ASSESSMENT — EXTERNAL EXAM - RIGHT EYE: OD_EXAM: NORMAL

## 2019-02-13 ASSESSMENT — TONOMETRY
IOP_METHOD: ICARE
OD_IOP_MMHG: 13
OS_IOP_MMHG: 11

## 2019-02-13 ASSESSMENT — CONF VISUAL FIELD
OS_NORMAL: 1
METHOD: COUNTING FINGERS
OD_NORMAL: 1

## 2019-02-13 ASSESSMENT — SLIT LAMP EXAM - LIDS
COMMENTS: NORMAL
COMMENTS: NORMAL

## 2019-02-13 ASSESSMENT — EXTERNAL EXAM - LEFT EYE: OS_EXAM: NORMAL

## 2019-02-13 NOTE — NURSING NOTE
Chief Complaints and History of Present Illnesses   Patient presents with     Follow Up     1 week follow up s/p CE/IOL LE     Chief Complaint(s) and History of Present Illness(es)     Follow Up     Associated symptoms: Negative for redness and dryness    Comments: 1 week follow up s/p CE/IOL LE              Comments     Pt states vision is good since last visit. No eye pain today. Occasional floaters BE, no changes.  DM1 BS: 94 this morning.  Lab Results       Component                Value               Date                       A1C: 5.5 taken about 1 month ago per pt        A1C                      5.3                 07/11/2017                 A1C                      5.5                 06/10/2013                 A1C                      5.4                 01/07/2013                 A1C                      5.5                 07/02/2012                 A1C                      5.1                 01/09/2012              [facility] February 13, 2019 7:36 AM

## 2019-02-13 NOTE — PROGRESS NOTES
Postoperative day 8 status post Cataract extraction intraocular lens left eye 02/05/19     Retina attached  Doing well    Plan:  Retina detachment and endophthalmitis precautions were discussed with the patient and was asked to return if any of the those occur    Medications to operative eye  Prednisolone (white or pink top):  taper 1 drop:     3 times daily for 1 week, then    2 times daily for 1 week, then    once daily for 1 week, then    STOP.    Ketorolac 4 x daily  Discontinue ocuflox    Follow up in 2-3 weeks   Prescription next visit    Missy Modi MD  PGY-3 Ophthalmology    ~~~~~~~~~~~~~~~~~~~~~~~~~~~~~~~~~~   Complete documentation of historical and exam elements from today's encounter can be found in the full encounter summary report (not reduplicated in this progress note).  I personally obtained the chief complaint(s) and history of present illness.  I confirmed and edited as necessary the review of systems, past medical/surgical history, family history, social history, and examination findings as documented by others; and I examined the patient myself.  I personally reviewed the relevant tests, images, and reports as documented above.  I formulated and edited as necessary the assessment and plan and discussed the findings and management plan with the patient and family    Monika Fermin MD  .  Retina Service   Department of Ophthalmology and Visual Neurosciences   [facility]  Phone: [phone]   Fax: [phone]

## 2019-02-13 NOTE — PATIENT INSTRUCTIONS
Prednisolone (white or pink top):  taper 1 drop:     3 times daily for 1 week, then    2 times daily for 1 week, then    once daily for 1 week, then    STOP.    Ketorolac 4 x daily  Discontinue ocuflox

## 2019-03-04 DIAGNOSIS — Z79.01 LONG TERM (CURRENT) USE OF ANTICOAGULANTS: ICD-10-CM

## 2019-03-04 LAB — INR PPP: 3.58 (ref 0.86–1.14)

## 2019-03-05 ENCOUNTER — ANTICOAGULATION THERAPY VISIT (OUTPATIENT)
Dept: ANTICOAGULATION | Facility: CLINIC | Age: 74
End: 2019-03-05

## 2019-03-05 LAB — PROTHROM ACT/NOR PPP: 13 % (ref 60–140)

## 2019-03-05 NOTE — PROGRESS NOTES
ANTICOAGULATION FOLLOW-UP CLINIC VISIT    Patient Name:  Shala Caruso  Date:  3/5/2019  Contact Type:  Telephone    SUBJECTIVE:        OBJECTIVE    INR   Date Value Ref Range Status   03/04/2019 3.58 (H) 0.86 - 1.14 Final     Chromogenic Factor 10   Date Value Ref Range Status   11/21/2018 16%  Final     Factor 2 Assay   Date Value Ref Range Status   03/04/2019 13 (L) 60 - 140 % Final       ASSESSMENT / PLAN  INR assessment SUPRA    Recheck INR In: 2 WEEKS    INR Location Clinic      Anticoagulation Summary  As of 3/5/2019    INR goal:   2.0-3.0   TTR:   73.3 % (2.7 y)   INR used for dosing:      Warfarin maintenance plan:   12.5 mg (5 mg x 2.5) every Sun, Tue, Thu; 10 mg (5 mg x 2) all other days   Full warfarin instructions:   3/5: 7.5 mg; Otherwise 12.5 mg every Sun, Tue, Thu; 10 mg all other days   Weekly warfarin total:   77.5 mg   Plan last modified:   Portia Cherry RN (6/16/2017)   Next INR check:   3/19/2019   Priority:   Factor 2   Target end date:       Indications    SLE (systemic lupus erythematosus) (H) (Resolved) [M32.9]  Long-term (current) use of anticoagulants [Z79.01] [Z79.01]             Anticoagulation Episode Summary     INR check location:   Clinic Lab    Preferred lab:       Send INR reminders to:   [facility]    Comments:   Factor 2  goal range is 15-25%  Ok to leave message on home [phone] and work voicemail, but call [email or web address] per pt request.  OK to leave message at office  May leave messages with Rodriguez Santos  DO NOT GIVE RECORDS TO EMPLOYER U        Anticoagulation Care Providers     Provider Role Specialty Phone number    Mt Kiser MD Responsible Clinical Cardiac Electrophysiology [phone]            See the Encounter Report to view Anticoagulation Flowsheet and Dosing Calendar (Go to Encounters tab in chart review, and find the Anticoagulation Therapy Visit)  Left message for patient with results and dosing recommendations. Asked patient to call back  to report any missed doses, falls, signs and symptoms of bleeding or clotting, any changes in health, medication, or diet. Asked patient to call back with any questions or concerns.  Factor 2 13%.   Factor 2 goal range=15-25%    Portia Cherry RN

## 2019-03-06 ENCOUNTER — OFFICE VISIT (OUTPATIENT)
Dept: OPHTHALMOLOGY | Facility: CLINIC | Age: 74
End: 2019-03-06
Attending: OPHTHALMOLOGY
Payer: COMMERCIAL

## 2019-03-06 DIAGNOSIS — H10.13 ACUTE ATOPIC CONJUNCTIVITIS OF BOTH EYES: Primary | ICD-10-CM

## 2019-03-06 DIAGNOSIS — Z48.810 AFTERCARE FOLLOWING SURGERY OF A SENSORY ORGAN: ICD-10-CM

## 2019-03-06 DIAGNOSIS — H10.13 ALLERGIC CONJUNCTIVITIS OF BOTH EYES: ICD-10-CM

## 2019-03-06 PROCEDURE — G0463 HOSPITAL OUTPT CLINIC VISIT: HCPCS | Mod: ZF

## 2019-03-06 RX ORDER — PREDNISOLONE ACETATE 10 MG/ML
1 SUSPENSION/ DROPS OPHTHALMIC 3 TIMES DAILY
Status: DISCONTINUED | OUTPATIENT
Start: 2019-03-06 | End: 2019-07-10 | Stop reason: CLARIF

## 2019-03-06 ASSESSMENT — REFRACTION_MANIFEST
OS_AXIS: 170
OD_SPHERE: -0.25
OS_SPHERE: -0.50
OS_CYLINDER: +0.25
OD_CYLINDER: +0.50
OD_AXIS: 150
OD_ADD: +2.75
OS_ADD: +2.75

## 2019-03-06 ASSESSMENT — CUP TO DISC RATIO
OS_RATIO: 0.3
OD_RATIO: 0.3

## 2019-03-06 ASSESSMENT — TONOMETRY
OD_IOP_MMHG: 16
IOP_METHOD: ICARE
OS_IOP_MMHG: 11

## 2019-03-06 ASSESSMENT — EXTERNAL EXAM - LEFT EYE: OS_EXAM: NORMAL

## 2019-03-06 ASSESSMENT — SLIT LAMP EXAM - LIDS
COMMENTS: NORMAL
COMMENTS: NORMAL

## 2019-03-06 ASSESSMENT — VISUAL ACUITY
OS_SC: 20/30
METHOD: SNELLEN - LINEAR
OD_SC: 20/30

## 2019-03-06 ASSESSMENT — EXTERNAL EXAM - RIGHT EYE: OD_EXAM: NORMAL

## 2019-03-06 NOTE — NURSING NOTE
Patient presents for 1 mo s/p IOL LE. Since the last visit the vision has been stable. No pain or discomfort BE. The RE has increased redness, itching and tears with a FBS x's a few week after operation. The LE is stable. A few floaters in the RE, has had in the past, no flashes.  Consistent with rx eye drops, some burning with Ketorolac. Concerned about RE thinks it may be related to systemic factors? Razia Bass COT 7:54 AM March 6, 2019

## 2019-03-06 NOTE — PROGRESS NOTES
status post Cataract extraction intraocular lens left eye 02/05/19     Mild conjunctiva injection and chemosis  No infection    Possible allergic conjunctivitis  ? Allergic reaction to ketorolac?    Retina attached  Doing well    Plan:  Retina detachment and endophthalmitis precautions were discussed with the patient and was asked to return if any of the those occur    Medications to operative eye  Predforte  Three times a day right eye and once a day left eye   Follow up in 1 week  Prescription next visit    ~~~~~~~~~~~~~~~~~~~~~~~~~~~~~~~~~~   Complete documentation of historical and exam elements from today's encounter can be found in the full encounter summary report (not reduplicated in this progress note).  I personally obtained the chief complaint(s) and history of present illness.  I confirmed and edited as necessary the review of systems, past medical/surgical history, family history, social history, and examination findings as documented by others; and I examined the patient myself.  I personally reviewed the relevant tests, images, and reports as documented above.  I formulated and edited as necessary the assessment and plan and discussed the findings and management plan with the patient and family    Monika Fermin MD  .  Retina Service   Department of Ophthalmology and Visual Neurosciences   [facility]  Phone: [phone]   Fax: [phone]

## 2019-03-13 ENCOUNTER — OFFICE VISIT (OUTPATIENT)
Dept: OPHTHALMOLOGY | Facility: CLINIC | Age: 74
End: 2019-03-13
Attending: OPHTHALMOLOGY
Payer: COMMERCIAL

## 2019-03-13 DIAGNOSIS — Z48.810 AFTERCARE FOLLOWING SURGERY OF A SENSORY ORGAN: Primary | ICD-10-CM

## 2019-03-13 PROCEDURE — G0463 HOSPITAL OUTPT CLINIC VISIT: HCPCS | Mod: ZF

## 2019-03-13 ASSESSMENT — SLIT LAMP EXAM - LIDS
COMMENTS: NORMAL
COMMENTS: NORMAL

## 2019-03-13 ASSESSMENT — TONOMETRY
OS_IOP_MMHG: 12
OD_IOP_MMHG: 12
IOP_METHOD: TONOPEN

## 2019-03-13 ASSESSMENT — VISUAL ACUITY
OD_SC: 20/40
OS_SC: 20/25
OD_PH_SC: 20/25
METHOD: SNELLEN - LINEAR

## 2019-03-13 ASSESSMENT — CUP TO DISC RATIO
OD_RATIO: 0.3
OS_RATIO: 0.3

## 2019-03-13 ASSESSMENT — EXTERNAL EXAM - LEFT EYE: OS_EXAM: NORMAL

## 2019-03-13 ASSESSMENT — EXTERNAL EXAM - RIGHT EYE: OD_EXAM: NORMAL

## 2019-03-13 NOTE — PROGRESS NOTES
5 week status post Cataract extraction intraocular lens left eye 02/05/19     Mild conjunctiva injection and chemosis  No infection    Possible allergic conjunctivitis  probably Allergic reaction to ketorolac?    Retina attached  Doing well    Plan:  Retina detachment and endophthalmitis precautions were discussed with the patient and was asked to return if any of the those occur    Medications to operative eye  Predforte  Twice a day a day right eye x 1 week then once a day x 1 week and once a day left eye x 1 week then every other day x 1 week  And stop  Continue artificial tears  Without  preservatives  Follow up in 6 months     Prescription next visit    ~~~~~~~~~~~~~~~~~~~~~~~~~~~~~~~~~~   Complete documentation of historical and exam elements from today's encounter can be found in the full encounter summary report (not reduplicated in this progress note).  I personally obtained the chief complaint(s) and history of present illness.  I confirmed and edited as necessary the review of systems, past medical/surgical history, family history, social history, and examination findings as documented by others; and I examined the patient myself.  I personally reviewed the relevant tests, images, and reports as documented above.  I formulated and edited as necessary the assessment and plan and discussed the findings and management plan with the patient and family    Monika Fermin MD  .  Retina Service   Department of Ophthalmology and Visual Neurosciences   [facility]  Phone: [phone]   Fax: [phone]

## 2019-03-13 NOTE — LETTER
3/13/2019       RE: Shala Caruso  [address]  [place] [address]     Dear Colleague,    Thank you for referring your patient, Shala Caruso, [facility] Please see a copy of my visit note below.    5 week status post Cataract extraction intraocular lens left eye 02/05/19     Mild conjunctiva injection and chemosis  No infection    Possible allergic conjunctivitis  probably Allergic reaction to ketorolac?    Retina attached  Doing well    Plan:  Retina detachment and endophthalmitis precautions were discussed with the patient and was asked to return if any of the those occur    Medications to operative eye  Predforte  Twice a day a day right eye x 1 week then once a day x 1 week and once a day left eye x 1 week then every other day x 1 week  And stop  Continue artificial tears  Without  preservatives  Follow up in 6 months     Prescription next visit        ~~~~~~~~~~~~~~~~~~~~~~~~~~~~~~~~~~   Complete documentation of historical and exam elements from today's encounter can be found in the full encounter summary report (not reduplicated in this progress note).  I personally obtained the chief complaint(s) and history of present illness.  I confirmed and edited as necessary the review of systems, past medical/surgical history, family history, social history, and examination findings as documented by others; and I examined the patient myself.  I personally reviewed the relevant tests, images, and reports as documented above.  I formulated and edited as necessary the assessment and plan and discussed the findings and management plan with the patient and family. Monika Fermin MD      Again, thank you for allowing me to participate in the care of your patient.      Sincerely,    Monika Fermin MD     Retina Service   Department of Ophthalmology and Visual Neurosciences   [facility]  Phone:  [phone]   Fax:   [phone]

## 2019-03-13 NOTE — NURSING NOTE
Chief Complaints and History of Present Illnesses   Patient presents with     Post Op (Ophthalmology) Left Eye     Chief Complaint(s) and History of Present Illness(es)     Post Op (Ophthalmology) Left Eye     Laterality: left eye    Onset: gradual    Onset: months ago    Quality: va is good at dist      Frequency: constantly    Associated symptoms: floaters.  Negative for flashes    Pain scale: 0/10              Comments     BS 90  Lab Results       Component                Value               Date                       A1C                      5.3                 07/11/2017                 A1C                      5.5                 06/10/2013                 A1C                      5.4                 01/07/2013                 A1C                      5.5                 07/02/2012                 A1C                      5.1                 01/09/2012            Soo West COT 7:16 AM March 13, 2019

## 2019-03-14 DIAGNOSIS — Z79.01 LONG TERM (CURRENT) USE OF ANTICOAGULANTS: ICD-10-CM

## 2019-03-14 DIAGNOSIS — D68.61 ANTIPHOSPHOLIPID SYNDROME (H): ICD-10-CM

## 2019-03-14 DIAGNOSIS — M32.15 SYSTEMIC LUPUS ERYTHEMATOSUS WITH TUBULO-INTERSTITIAL NEPHROPATHY, UNSPECIFIED SLE TYPE (H): ICD-10-CM

## 2019-03-14 LAB
ALT SERPL W P-5'-P-CCNC: 22 U/L (ref 0–50)
AST SERPL W P-5'-P-CCNC: 20 U/L (ref 0–45)
BASOPHILS # BLD AUTO: 0 10E9/L (ref 0–0.2)
BASOPHILS NFR BLD AUTO: 1 %
DIFFERENTIAL METHOD BLD: ABNORMAL
EOSINOPHIL # BLD AUTO: 0 10E9/L (ref 0–0.7)
EOSINOPHIL NFR BLD AUTO: 1 %
ERYTHROCYTE [DISTWIDTH] IN BLOOD BY AUTOMATED COUNT: 15.9 % (ref 10–15)
HCT VFR BLD AUTO: 41.4 % (ref 35–47)
HGB BLD-MCNC: 12.5 G/DL (ref 11.7–15.7)
IMM GRANULOCYTES # BLD: 0 10E9/L (ref 0–0.4)
IMM GRANULOCYTES NFR BLD: 0.3 %
LYMPHOCYTES # BLD AUTO: 0.8 10E9/L (ref 0.8–5.3)
LYMPHOCYTES NFR BLD AUTO: 26.9 %
MCH RBC QN AUTO: 27.8 PG (ref 26.5–33)
MCHC RBC AUTO-ENTMCNC: 30.2 G/DL (ref 31.5–36.5)
MCV RBC AUTO: 92 FL (ref 78–100)
MONOCYTES # BLD AUTO: 0.3 10E9/L (ref 0–1.3)
MONOCYTES NFR BLD AUTO: 10.6 %
NEUTROPHILS # BLD AUTO: 1.8 10E9/L (ref 1.6–8.3)
NEUTROPHILS NFR BLD AUTO: 60.2 %
NRBC # BLD AUTO: 0 10*3/UL
NRBC BLD AUTO-RTO: 0 /100
PLATELET # BLD AUTO: 229 10E9/L (ref 150–450)
RBC # BLD AUTO: 4.49 10E12/L (ref 3.8–5.2)
WBC # BLD AUTO: 3 10E9/L (ref 4–11)

## 2019-03-15 LAB
C4 SERPL-MCNC: 12 MG/DL (ref 15–50)
DSDNA AB SER-ACNC: 1 IU/ML

## 2019-03-16 LAB — CH50 SERPL-ACNC: 130 CAE UNITS (ref 60–144)

## 2019-03-19 DIAGNOSIS — Z79.01 LONG TERM (CURRENT) USE OF ANTICOAGULANTS: ICD-10-CM

## 2019-03-19 LAB — INR PPP: 2.47 (ref 0.86–1.14)

## 2019-03-20 ENCOUNTER — ANTICOAGULATION THERAPY VISIT (OUTPATIENT)
Dept: ANTICOAGULATION | Facility: CLINIC | Age: 74
End: 2019-03-20

## 2019-03-20 LAB — PROTHROM ACT/NOR PPP: 21 % (ref 60–140)

## 2019-03-20 NOTE — PROGRESS NOTES
ANTICOAGULATION FOLLOW-UP CLINIC VISIT    Patient Name:  Shala Caruso  Date:  3/20/2019  Contact Type:  Telephone    SUBJECTIVE:     Patient Findings     Comments:   3/19/19:  Factor 2:  21  (range is 15 - 25)  Shala reports no changes in health, diet, medications.  She requests 4 weeks until her next factor 2.           OBJECTIVE    INR   Date Value Ref Range Status   03/19/2019 2.47 (H) 0.86 - 1.14 Final     Chromogenic Factor 10   Date Value Ref Range Status   11/21/2018 16%  Final     Factor 2 Assay   Date Value Ref Range Status   03/19/2019 21 (L) 60 - 140 % Final       ASSESSMENT / PLAN  INR assessment THER    Recheck INR In: 4 WEEKS    INR Location Clinic      Anticoagulation Summary  As of 3/20/2019    INR goal:   2.0-3.0   TTR:   72.9 % (2.8 y)   INR used for dosing:      Warfarin maintenance plan:   12.5 mg (5 mg x 2.5) every Sun, Tue, Thu; 10 mg (5 mg x 2) all other days   Full warfarin instructions:   12.5 mg every Sun, Tue, Thu; 10 mg all other days   Weekly warfarin total:   77.5 mg   No change documented:   Gwen Abel RN   Plan last modified:   Portia Cherry RN (6/16/2017)   Next INR check:   4/16/2019   Priority:   Factor 2   Target end date:       Indications    SLE (systemic lupus erythematosus) (H) (Resolved) [M32.9]  Long-term (current) use of anticoagulants [Z79.01] [Z79.01]             Anticoagulation Episode Summary     INR check location:   Clinic Lab    Preferred lab:       Send INR reminders to:   [facility]    Comments:   Factor 2  goal range is 15-25%  Ok to leave message on home [phone] and work voicemail, but call [email or web address] per pt request.  OK to leave message at office  May leave messages with Rodriguez Santos  DO NOT GIVE RECORDS TO EMPLOYER U        Anticoagulation Care Providers     Provider Role Specialty Phone number    Mt Kiser MD Responsible Clinical Cardiac Electrophysiology [phone]            See the Encounter Report to view  Anticoagulation Flowsheet and Dosing Calendar (Go to Encounters tab in chart review, and find the Anticoagulation Therapy Visit)    Spoke with Shala.  3/19/19:  Factor 2:  21  (range is 15 - 25)  Shala reports no changes in health, diet, medications.  She requests 4 weeks until her next factor 2.    Gwen Abel RN

## 2019-03-21 ENCOUNTER — OFFICE VISIT (OUTPATIENT)
Dept: RHEUMATOLOGY | Facility: CLINIC | Age: 74
End: 2019-03-21
Attending: INTERNAL MEDICINE
Payer: COMMERCIAL

## 2019-03-21 VITALS
DIASTOLIC BLOOD PRESSURE: 72 MMHG | RESPIRATION RATE: 16 BRPM | TEMPERATURE: 97.7 F | SYSTOLIC BLOOD PRESSURE: 117 MMHG | OXYGEN SATURATION: 98 % | HEART RATE: 65 BPM

## 2019-03-21 DIAGNOSIS — Z79.899 ENCOUNTER FOR LONG-TERM CURRENT USE OF MEDICATION: ICD-10-CM

## 2019-03-21 DIAGNOSIS — M32.15 SYSTEMIC LUPUS ERYTHEMATOSUS WITH TUBULO-INTERSTITIAL NEPHROPATHY, UNSPECIFIED SLE TYPE (H): Primary | ICD-10-CM

## 2019-03-21 DIAGNOSIS — D68.61 ANTIPHOSPHOLIPID SYNDROME (H): ICD-10-CM

## 2019-03-21 PROCEDURE — G0463 HOSPITAL OUTPT CLINIC VISIT: HCPCS | Mod: ZF

## 2019-03-21 RX ORDER — MYCOPHENOLATE MOFETIL 500 MG/1
1000 TABLET ORAL 2 TIMES DAILY
Qty: 360 TABLET | Refills: 3 | Status: SHIPPED | OUTPATIENT
Start: 2019-03-21 | End: 2019-09-16

## 2019-03-21 RX ORDER — MYCOPHENOLATE MOFETIL 500 MG/1
1000 TABLET ORAL 2 TIMES DAILY
Qty: 360 TABLET | Refills: 3 | Status: SHIPPED | OUTPATIENT
Start: 2019-03-21 | End: 2019-03-21

## 2019-03-21 ASSESSMENT — PAIN SCALES - GENERAL: PAINLEVEL: MILD PAIN (2)

## 2019-03-21 NOTE — LETTER
3/21/2019      RE: Shala Caruso  [address] [place] [address]       Rheumatology Visit     Shala Caruso MRN# [id number]   YOB: 1945 Age: 73 year old     Date of Visit: March 21, 2019  Primary care provider: Joe Contreras          Assessment and Plan:   1. 72 yo female with SLE, diagnosed 2000 (+CRISTAL,+dsDNA ab, arthritis, serositis): flare (3/10) with pericardial effusion/tamponade, and now off of prednisone; Cellcept was started 6/10 and tolerating well. She has no significant current symptoms of inflammation. SLE lab stable with Cellcept monitoring.     2. 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 Allergies to multiple antibiotics and Plaquenil   4. Osteoprosis on Boniva (Last DXA with some continued decrease of BMD)   5. Severe GERD, Achalasia   6. Post thrombophlebitic syndrome (chronic LE swelling), stable  7. Dyslipidemia with current excellent lipid values   8. Raynauds   9. Chronic leukopenia   10. 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 Herpes Zoster    Plan     Discussed in detail with the patient     1. Continue Cellcept 2000 mg daily   2. She does not require additional therapy at this time   3. Call if new symptoms   4. Continue lab monitoring ordered before next appointment at 3 months and soon before next appointment   5. Return to SLE Clinic in 6 months   6. Continue followup in [facility] for Diabetes; in [facility] for other concerns   7. Follow up regarding lymphedema as needed   8. flu shot  each fall    Santiago Corbett MD.           History of Present Illness:   73 yo female is seen for followup of SLE. I saw her last in February 2018. EPIC reviewed.   Problem list:   1. SLE, diagnosed 2000 (+CRISTAL,+dsDNA ab, arthritis, serositis): flare (3/10) with pericardial effusion/tamponade, on Cellcept (started 6/10)   2. APS, s/p DVT LLE, PE on warfarin in 2004   3. Allergies to multiple antibiotics and Plaquenil   4. Osteoprosis on Boniva (Last DXA 1/09, Tscore:  -1.9 L femoral neck)   5. Severe GERD, Achalasia   6. Post thrombophlebitic syndrome (chronic LE swelling)   7. Dyslipidemia   8. Raynauds   9. Chronic leukopenia on Methotrexate  10. L arm lymphedema      HISTORY CARRIED FORWARD:     She has been off Prednisone for about 3.5 years and off Methotrexate since May 2012; she is on Cellcept 2000mg/day and no longer on Plaquenil.   She has left arm lymphedema. She has hx of axillary LN dissection for enlarged nodes. This have been evaluated by OT. She was using her glove and sleeve but the glove is too tight and she is not using it regularly; this does not impede her at present.  She has had no recurrent chest pain/SOB or pleurisy since her hospitalization in March 2010 for pericarditis.       Her biggest interval problem is Herpes Zoster in L Thoracic dermatome, dx 3/23 in [facility].  She was on Valtrex and is on Neurontin.  She is having a lot of pain.  She sees them again next week.       INTERVAL HISTORY:      She again has had an overall uneventful 6 months from an SLE perspective. She is tolerating the medications without problem remaining on Mycophenolate 1 gram bid. Her labs have been stable with persistent leukopenia now at 3.0 last week; DSDNA normal at 29 for first time in July 2014 and normal last checked last week. Her complements have been stable but slightly low without change. Creatinine has been variable but again normal at 0.68 last check.    Her joints are stable with no swelling. She has osteoarthritis of the bilateral knees which is the most bothersome problem. She continues to do low impact Yoga.    She notes more foot pain and problems with wearing closed toe shoes. She may have DM neuropathy, has seen a Podiatrist and also has a Wilde's neuroma.    She denies fevers, fatigue, oral ulcers, rash. Raynauds is stable without Digital ulcers. No new rashes.  She remains on Boniva followed in Endocrine for this and Type I DM. She is followed in Coumadin  Clinic with hx of DVT and PE.      We discussed her prior therapy and options for going forward. She is looking ahead to eventual shelter. She has Diabetes so wishes to avoid Prednisone unless absolutely needed.    ROS is below.       Review of Systems:   Review Of Systems  Skin: negative  Eyes: negative  Ears/Nose/Throat: negative  Respiratory: No shortness of breath, dyspnea on exertion, cough, or hemoptysis  Cardiovascular: negative  Gastrointestinal: negative  Genitourinary: negative  Musculoskeletal: see HPI  Neurologic: negative  Psychiatric: negative  Hematologic/Lymphatic/Immunologic: on Anticoagulation  Endocrine: DM under care          Past Medical History:     Past Medical History:   Diagnosis Date     Achalasia      Antiphospholipid syndrome (H)      Antiplatelet or antithrombotic long-term use      Coagulation disorder (H) [id number]     DM (diabetes mellitus) (H)      DVT (deep venous thrombosis) (H) 2003    and PE     Dysphagia     occasional, use Nifedipine     GERD (gastroesophageal reflux disease)      Hyperlipidemia      Lymphedema      Myopia      Neuropathy     toes bilateral     Nonsenile cataract      osteoporosis      Pericarditis      Raynaud's disease      SLE (systemic lupus erythematosus) (H)        Patient Active Problem List    Diagnosis Date Noted     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, unspecified SLE type (H) 10/12/2017     Priority: Medium     Choroidal lesion 08/30/2017     Priority: Medium     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 Contreras.         Cystocele, midline 10/15/2014     Priority: Medium     Urinary urgency 10/15/2014     Priority: Medium     Urinary frequency 10/15/2014     Priority: Medium     Female stress incontinence 10/15/2014      Priority: Medium     Atrophic vaginitis 10/15/2014     Priority: Medium     Osteoporosis, postmenopausal 07/14/2014     Priority: Medium     Osteoporosis, idiopathic 07/11/2013     Priority: Medium     Type 1 diabetes mellitus (H) 07/02/2012     Priority: Medium     Encounter for long-term current use of medication 05/30/2012     Priority: Medium     Problem list name updated by automated process. Provider to review       Achalasia      Priority: Medium     Antiphospholipid syndrome (H)      Priority: Medium     DM (diabetes mellitus) (H)      Priority: Medium     GERD (gastroesophageal reflux disease)      Priority: Medium     Hyperlipidemia      Priority: Medium     HTN (hypertension)      Priority: Medium     Osteopenia      Priority: Medium     Raynaud's disease      Priority: Medium     DVT (deep venous thrombosis) (H)      Priority: Medium     and PE               Past Surgical History:     Past Surgical History:   Procedure Laterality Date     COLONOSCOPY N/A 11/28/2016    Procedure: COLONOSCOPY;  Surgeon: Sang August MD;  Location: [facility]     CYSTOSCOPY, SLING TRANSVAGINAL N/A 12/20/2016    Procedure: CYSTOSCOPY, SLING TRANSVAGINAL;  Surgeon: Jeanmarie Pierson MD;  Location: [facility]     DISSECT LYMPH NODE AXILLA  2004    Lt, during eval for SLE     HYSTEROSCOPIC PLACEMENT CONTRACEPTIVE DEVICE  1985     PHACOEMULSIFICATION WITH STANDARD INTRAOCULAR LENS IMPLANT Right 1/8/2019    Procedure: Right Eye Cataract Extraction with Intraocular Lens;  Surgeon: Monika Fermin MD;  Location: [facility]     PHACOEMULSIFICATION WITH STANDARD INTRAOCULAR LENS IMPLANT Left 2/5/2019    Procedure: Left Eye Cataract Extraction with Intraocular Lens;  Surgeon: Monika Fermin MD;  Location: [facility] OR     TUBAL LIGATION Bilateral              Social History:     Social History     Tobacco Use     Smoking status: Never Smoker     Smokeless tobacco: Never Used   Substance Use Topics     Alcohol use: No              Family History:     Family History   Problem Relation Age of Onset     Cancer Other         breast     Cerebrovascular Disease Other      C.A.D. Other      Glaucoma Father             Allergies:     Allergies   Allergen Reactions     Amaryl [Glimepiride] Swelling     Swelling of tongue     Metformin Blisters     Experienced big blisters all over body and sloughing of skin     Plaquenil [Aminoquinolines] Hives     Sulfa Drugs Other (See Comments)     Turned bright red     Amoxicillin Rash     Hydroxychloroquine Rash     Penicillins Rash             Medications:     Current Outpatient Medications   Medication Sig Dispense Refill     acetaminophen (TYLENOL) 500 MG tablet Take 1,000-1,500 mg by mouth nightly as needed        aspirin 81 MG tablet Take 81 mg by mouth At Bedtime        AYR SALINE NASAL NO-DRIP GEL Use as needed for nasal dryness 3 Tube 3     blood glucose (TRUE METRIX BLOOD GLUCOSE TEST) test strip Use to test blood sugar 5 times daily or as directed. 500 strip 3     Calcium Carbonate-Vitamin D (CALCIUM 500 + D PO) Take 1 tablet by mouth 3 times daily       Continuous Blood Gluc  (DEXCOM G6 ) RORY 1 each continuous 1 Device 0     Continuous Blood Gluc Sensor (DEXCOM G6 SENSOR) MISC Inject 1 each Subcutaneous See Admin Instructions Change sensor every 10 days 9 each 3     Continuous Blood Gluc Transmit (DEXCOM G6 TRANSMITTER) MISC 1 each every 3 months 1 each 3     Injection Device for insulin (NOVOPEN ECHO) RORY 1 each 4 times daily 1 each 0     insulin glargine (LANTUS) 100 UNIT/ML injection Inject 4 units as directed daily LANTUS SOLOSTAR PEN PLEASE 15 mL 3     insulin pen needle (BD PAM U/F) 32G X 4 MM Use 200 pen needles daily or as directed. 200 each 3     LANCETS ULTRA THIN 30G MISC Test 7-8 times daily  (Lancets that go with pt current meter ) 7203 each 3     Multiple Vitamins-Minerals (CENTRUM SILVER PO) Take 1 tablet by mouth daily.       mycophenolate (GENERIC EQUIVALENT) 500 MG  tablet Take 2 tablets (1,000 mg) by mouth 2 times daily 2 tabs in the a.m., 2 tabs in p.m. 360 tablet 3     NIFEdipine ER OSMOTIC (NIFEDICAL XL) 30 MG 24 hr tablet Take 1 tablet (30 mg) by mouth daily Call clinic to schedule MD APPT. 90 tablet 2     NOVOLOG PENFILL 100 UNIT/ML soln Inject 1 unit per 15 grams CHO with meals TID approx 15 units daily (Patient taking differently: Inject 1 unit per 20 grams CHO with meals TID approx 10 units daily) 15 mL 3     prednisoLONE acetate (PRED FORTE) 1 % ophthalmic suspension Apply 1 drop to eye 4 times daily for 10 days Instill into operative eye(s) per physician instructions. 5 mL 0     simvastatin (ZOCOR) 40 MG tablet Take 1 tablet (40 mg) by mouth At Bedtime 90 tablet 3     warfarin (COUMADIN) 5 MG tablet Take 2-2.5 tablets daily or as directed by coumadin clinic. 225 tablet 2     ketorolac tromethamine (ACULAR-LS) 0.4 % SOLN ophthalmic solution Apply 1 drop to eye 4 times daily Instill into operative eye(s) per physician instructions. (Patient not taking: Reported on 3/21/2019) 5 mL 0            Physical Exam:   Blood pressure 117/72, pulse 65, temperature 97.7  F (36.5  C), temperature source Oral, resp. rate 16, SpO2 98 %, not currently breastfeeding.  Constitutional: [id number] cooperative   Eyes: nl conjunctiva, sclera   ENT: nl external ears, nose, throat   No mucous membrane lesions, normal saliva pool   Neck: no mass or thyroid enlargement   GI: no ABD mass or tenderness, no HSM   Lymph: no cervical, supraclavicular, inguinal or epitrochlear nodes   MS: All TMJ, neck, shoulder, elbow, wrist, MCP/PIP/DIP, spine, hip, knee, ankle, and foot MTP/IP joints were examined and found without active synovitis or deformity. No dactylitis, tenosynovitis, enthesopathy. Stable mild lymphedema LUE. She has bilateral support stockings  Skin: Xerosis and fissures of the distal fingers unchanged. No nail pitting, alopecia.    Neuro: nl cranial nerves, strength  Psych: nl affect        Data:     Lab Results   Component Value Date    WBC 3.0 (L) 03/14/2019    WBC 2.8 (L) 01/30/2019    WBC 3.4 (L) 11/21/2018    HGB 12.5 03/14/2019    HGB 13.3 01/30/2019    HGB 12.2 11/21/2018    HCT 41.4 03/14/2019    HCT 43.1 01/30/2019    HCT 39.8 11/21/2018    MCV 92 03/14/2019    MCV 92 01/30/2019    MCV 93 11/21/2018     03/14/2019     01/30/2019     11/21/2018     Lab Results   Component Value Date    BUN 24 07/26/2018    BUN 27 12/14/2016    BUN 30 07/14/2014     No components found for: SEDRATE  Lab Results   Component Value Date    TSH 0.95 07/26/2018    TSH 0.64 08/05/2016    TSH 1.02 01/11/2016     Lab Results   Component Value Date    AST 20 03/14/2019    AST 19 01/30/2019    AST 19 11/21/2018    ALT 22 03/14/2019    ALT 22 01/30/2019    ALT 27 11/21/2018    ALKPHOS 74 04/01/2013    ALKPHOS 57 08/25/2011    ALKPHOS 39 (L) 08/05/2011     Reviewed Rheumatology lab flowsheet    Santiago Corbett

## 2019-03-21 NOTE — PROGRESS NOTES
Rheumatology Visit     Shala Caruso MRN# [id number]   YOB: 1945 Age: 73 year old     Date of Visit: March 21, 2019  Primary care provider: Joe Contreras          Assessment and Plan:   1. 74 yo female with SLE, diagnosed 2000 (+CRISTAL,+dsDNA ab, arthritis, serositis): flare (3/10) with pericardial effusion/tamponade, and now off of prednisone; Cellcept was started 6/10 and tolerating well. She has no significant current symptoms of inflammation. SLE lab stable with Cellcept monitoring.     2. 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 Allergies to multiple antibiotics and Plaquenil   4. Osteoprosis on Boniva (Last DXA with some continued decrease of BMD)   5. Severe GERD, Achalasia   6. Post thrombophlebitic syndrome (chronic LE swelling), stable  7. Dyslipidemia with current excellent lipid values   8. Raynauds   9. Chronic leukopenia   10. 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 Herpes Zoster    Plan     Discussed in detail with the patient     1. Continue Cellcept 2000 mg daily   2. She does not require additional therapy at this time   3. Call if new symptoms   4. Continue lab monitoring ordered before next appointment at 3 months and soon before next appointment   5. Return to SLE Clinic in 6 months   6. Continue followup in [facility] for Diabetes; in [facility] for other concerns   7. Follow up regarding lymphedema as needed   8. flu shot each fall    Santiago Corbett MD.           History of Present Illness:   71 yo female is seen for followup of SLE. I saw her last in February 2018. EPIC reviewed.   Problem list:   1. SLE, diagnosed 2000 (+CRISTAL,+dsDNA ab, arthritis, serositis): flare (3/10) with pericardial effusion/tamponade, on Cellcept (started 6/10)   2. APS, s/p DVT LLE, PE on warfarin in 2004   3. Allergies to multiple antibiotics and Plaquenil   4. Osteoprosis on Boniva (Last DXA 1/09, Tscore: -1.9 L femoral neck)   5. Severe GERD, Achalasia   6. Post thrombophlebitic  syndrome (chronic LE swelling)   7. Dyslipidemia   8. Raynauds   9. Chronic leukopenia on Methotrexate  10. L arm lymphedema      HISTORY CARRIED FORWARD:     She has been off Prednisone for about 3.5 years and off Methotrexate since May 2012; she is on Cellcept 2000mg/day and no longer on Plaquenil.   She has left arm lymphedema. She has hx of axillary LN dissection for enlarged nodes. This have been evaluated by OT. She was using her glove and sleeve but the glove is too tight and she is not using it regularly; this does not impede her at present.  She has had no recurrent chest pain/SOB or pleurisy since her hospitalization in March 2010 for pericarditis.       Her biggest interval problem is Herpes Zoster in L Thoracic dermatome, dx 3/23 in the [facility].  She was on Valtrex and is on Neurontin.  She is having a lot of pain.  She sees them again next week.       INTERVAL HISTORY:      She again has had an overall uneventful 6 months from an SLE perspective. She is tolerating the medications without problem remaining on Mycophenolate 1 gram bid. Her labs have been stable with persistent leukopenia now at 3.0 last week; DSDNA normal at 29 for first time in July 2014 and normal last checked last week. Her complements have been stable but slightly low without change. Creatinine has been variable but again normal at 0.68 last check.    Her joints are stable with no swelling. She has osteoarthritis of the bilateral knees which is the most bothersome problem. She continues to do low impact Yoga.    She notes more foot pain and problems with wearing closed toe shoes. She may have DM neuropathy, has seen a Podiatrist and also has a Wilde's neuroma.    She denies fevers, fatigue, oral ulcers, rash. Raynauds is stable without Digital ulcers. No new rashes.  She remains on Boniva followed in Endocrine for this and Type I DM. She is followed in Coumadin Clinic with hx of DVT and PE.      We discussed her prior therapy and options  for going forward. She is looking ahead to eventual alf. She has Diabetes so wishes to avoid Prednisone unless absolutely needed.    ROS is below.       Review of Systems:   Review Of Systems  Skin: negative  Eyes: negative  Ears/Nose/Throat: negative  Respiratory: No shortness of breath, dyspnea on exertion, cough, or hemoptysis  Cardiovascular: negative  Gastrointestinal: negative  Genitourinary: negative  Musculoskeletal: see HPI  Neurologic: negative  Psychiatric: negative  Hematologic/Lymphatic/Immunologic: on Anticoagulation  Endocrine: DM under care          Past Medical History:     Past Medical History:   Diagnosis Date     Achalasia      Antiphospholipid syndrome (H)      Antiplatelet or antithrombotic long-term use      Coagulation disorder (H) 2218-5822     DM (diabetes mellitus) (H)      DVT (deep venous thrombosis) (H) 2003    and PE     Dysphagia     occasional, use Nifedipine     GERD (gastroesophageal reflux disease)      Hyperlipidemia      Lymphedema      Myopia      Neuropathy     toes bilateral     Nonsenile cataract      osteoporosis      Pericarditis      Raynaud's disease      SLE (systemic lupus erythematosus) (H)        Patient Active Problem List    Diagnosis Date Noted     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, unspecified SLE type (H) 10/12/2017     Priority: Medium     Choroidal lesion 08/30/2017     Priority: Medium     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 Contreras.         Cystocele, midline 10/15/2014     Priority: Medium     Urinary urgency 10/15/2014     Priority: Medium     Urinary frequency 10/15/2014     Priority: Medium     Female stress incontinence 10/15/2014     Priority: Medium     Atrophic vaginitis 10/15/2014     Priority: Medium      Osteoporosis, postmenopausal 07/14/2014     Priority: Medium     Osteoporosis, idiopathic 07/11/2013     Priority: Medium     Type 1 diabetes mellitus (H) 07/02/2012     Priority: Medium     Encounter for long-term current use of medication 05/30/2012     Priority: Medium     Problem list name updated by automated process. Provider to review       Achalasia      Priority: Medium     Antiphospholipid syndrome (H)      Priority: Medium     DM (diabetes mellitus) (H)      Priority: Medium     GERD (gastroesophageal reflux disease)      Priority: Medium     Hyperlipidemia      Priority: Medium     HTN (hypertension)      Priority: Medium     Osteopenia      Priority: Medium     Raynaud's disease      Priority: Medium     DVT (deep venous thrombosis) (H)      Priority: Medium     and PE               Past Surgical History:     Past Surgical History:   Procedure Laterality Date     COLONOSCOPY N/A 11/28/2016    Procedure: COLONOSCOPY;  Surgeon: Sang August MD;  Location: [facility]     CYSTOSCOPY, SLING TRANSVAGINAL N/A 12/20/2016    Procedure: CYSTOSCOPY, SLING TRANSVAGINAL;  Surgeon: Jeanmarie Pierson MD;  Location: [facility]     DISSECT LYMPH NODE AXILLA  2004    Lt, during eval for SLE     HYSTEROSCOPIC PLACEMENT CONTRACEPTIVE DEVICE  1985     PHACOEMULSIFICATION WITH STANDARD INTRAOCULAR LENS IMPLANT Right 1/8/2019    Procedure: Right Eye Cataract Extraction with Intraocular Lens;  Surgeon: Monika Fermin MD;  Location: [facility]     PHACOEMULSIFICATION WITH STANDARD INTRAOCULAR LENS IMPLANT Left 2/5/2019    Procedure: Left Eye Cataract Extraction with Intraocular Lens;  Surgeon: Monika Fermin MD;  Location: [facility] OR     TUBAL LIGATION Bilateral              Social History:     Social History     Tobacco Use     Smoking status: Never Smoker     Smokeless tobacco: Never Used   Substance Use Topics     Alcohol use: No             Family History:     Family History   Problem Relation Age of Onset      Cancer Other         breast     Cerebrovascular Disease Other      C.A.D. Other      Glaucoma Father             Allergies:     Allergies   Allergen Reactions     Amaryl [Glimepiride] Swelling     Swelling of tongue     Metformin Blisters     Experienced big blisters all over body and sloughing of skin     Plaquenil [Aminoquinolines] Hives     Sulfa Drugs Other (See Comments)     Turned bright red     Amoxicillin Rash     Hydroxychloroquine Rash     Penicillins Rash             Medications:     Current Outpatient Medications   Medication Sig Dispense Refill     acetaminophen (TYLENOL) 500 MG tablet Take 1,000-1,500 mg by mouth nightly as needed        aspirin 81 MG tablet Take 81 mg by mouth At Bedtime        AYR SALINE NASAL NO-DRIP GEL Use as needed for nasal dryness 3 Tube 3     blood glucose (TRUE METRIX BLOOD GLUCOSE TEST) test strip Use to test blood sugar 5 times daily or as directed. 500 strip 3     Calcium Carbonate-Vitamin D (CALCIUM 500 + D PO) Take 1 tablet by mouth 3 times daily       Continuous Blood Gluc  (DEXCOM G6 ) RORY 1 each continuous 1 Device 0     Continuous Blood Gluc Sensor (DEXCOM G6 SENSOR) MISC Inject 1 each Subcutaneous See Admin Instructions Change sensor every 10 days 9 each 3     Continuous Blood Gluc Transmit (DEXCOM G6 TRANSMITTER) MISC 1 each every 3 months 1 each 3     Injection Device for insulin (NOVOPEN ECHO) RORY 1 each 4 times daily 1 each 0     insulin glargine (LANTUS) 100 UNIT/ML injection Inject 4 units as directed daily LANTUS SOLOSTAR PEN PLEASE 15 mL 3     insulin pen needle (BD PAM U/F) 32G X 4 MM Use 200 pen needles daily or as directed. 200 each 3     LANCETS ULTRA THIN 30G MISC Test 7-8 times daily  (Lancets that go with pt current meter ) 7203 each 3     Multiple Vitamins-Minerals (CENTRUM SILVER PO) Take 1 tablet by mouth daily.       mycophenolate (GENERIC EQUIVALENT) 500 MG tablet Take 2 tablets (1,000 mg) by mouth 2 times daily 2 tabs in the  a.m., 2 tabs in p.m. 360 tablet 3     NIFEdipine ER OSMOTIC (NIFEDICAL XL) 30 MG 24 hr tablet Take 1 tablet (30 mg) by mouth daily Call clinic to schedule MD APPT. 90 tablet 2     NOVOLOG PENFILL 100 UNIT/ML soln Inject 1 unit per 15 grams CHO with meals TID approx 15 units daily (Patient taking differently: Inject 1 unit per 20 grams CHO with meals TID approx 10 units daily) 15 mL 3     prednisoLONE acetate (PRED FORTE) 1 % ophthalmic suspension Apply 1 drop to eye 4 times daily for 10 days Instill into operative eye(s) per physician instructions. 5 mL 0     simvastatin (ZOCOR) 40 MG tablet Take 1 tablet (40 mg) by mouth At Bedtime 90 tablet 3     warfarin (COUMADIN) 5 MG tablet Take 2-2.5 tablets daily or as directed by coumadin clinic. 225 tablet 2     ketorolac tromethamine (ACULAR-LS) 0.4 % SOLN ophthalmic solution Apply 1 drop to eye 4 times daily Instill into operative eye(s) per physician instructions. (Patient not taking: Reported on 3/21/2019) 5 mL 0            Physical Exam:   Blood pressure 117/72, pulse 65, temperature 97.7  F (36.5  C), temperature source Oral, resp. rate 16, SpO2 98 %, not currently breastfeeding.  Constitutional: [id number] cooperative   Eyes: nl conjunctiva, sclera   ENT: nl external ears, nose, throat   No mucous membrane lesions, normal saliva pool   Neck: no mass or thyroid enlargement   GI: no ABD mass or tenderness, no HSM   Lymph: no cervical, supraclavicular, inguinal or epitrochlear nodes   MS: All TMJ, neck, shoulder, elbow, wrist, MCP/PIP/DIP, spine, hip, knee, ankle, and foot MTP/IP joints were examined and found without active synovitis or deformity. No dactylitis, tenosynovitis, enthesopathy. Stable mild lymphedema LUE. She has bilateral support stockings  Skin: Xerosis and fissures of the distal fingers unchanged. No nail pitting, alopecia.    Neuro: nl cranial nerves, strength  Psych: nl affect       Data:     Lab Results   Component Value Date    WBC 3.0 (L) 03/14/2019     WBC 2.8 (L) 01/30/2019    WBC 3.4 (L) 11/21/2018    HGB 12.5 03/14/2019    HGB 13.3 01/30/2019    HGB 12.2 11/21/2018    HCT 41.4 03/14/2019    HCT 43.1 01/30/2019    HCT 39.8 11/21/2018    MCV 92 03/14/2019    MCV 92 01/30/2019    MCV 93 11/21/2018     03/14/2019     01/30/2019     11/21/2018     Lab Results   Component Value Date    BUN 24 07/26/2018    BUN 27 12/14/2016    BUN 30 07/14/2014     No components found for: SEDRATE  Lab Results   Component Value Date    TSH 0.95 07/26/2018    TSH 0.64 08/05/2016    TSH 1.02 01/11/2016     Lab Results   Component Value Date    AST 20 03/14/2019    AST 19 01/30/2019    AST 19 11/21/2018    ALT 22 03/14/2019    ALT 22 01/30/2019    ALT 27 11/21/2018    ALKPHOS 74 04/01/2013    ALKPHOS 57 08/25/2011    ALKPHOS 39 (L) 08/05/2011     Reviewed Rheumatology lab flowsheet    Santiago Corbett

## 2019-03-21 NOTE — NURSING NOTE
"Chief Complaint   Patient presents with     RECHECK     SLE       Vital signs:  Temp: 97.7  F (36.5  C) Temp src: Oral BP: 117/72 Pulse: 65   Resp: 16 SpO2: 98 %       Weight: (declined)  Estimated body mass index is 20.18 kg/m  as calculated from the following:    Height as of 2/5/19: 1.676 m (5' 6\").    Weight as of 2/5/19: 56.7 kg (125 lb).          Lidia Holguin [facility]  3/21/2019 3:58 PM      "

## 2019-04-16 ENCOUNTER — ANTICOAGULATION THERAPY VISIT (OUTPATIENT)
Dept: ANTICOAGULATION | Facility: CLINIC | Age: 74
End: 2019-04-16

## 2019-04-16 DIAGNOSIS — M32.15 SYSTEMIC LUPUS ERYTHEMATOSUS WITH TUBULO-INTERSTITIAL NEPHROPATHY, UNSPECIFIED SLE TYPE (H): ICD-10-CM

## 2019-04-16 DIAGNOSIS — Z79.01 LONG TERM (CURRENT) USE OF ANTICOAGULANTS: ICD-10-CM

## 2019-04-16 DIAGNOSIS — Z79.899 HIGH RISK MEDICATIONS (NOT ANTICOAGULANTS) LONG-TERM USE: ICD-10-CM

## 2019-04-16 DIAGNOSIS — Z79.899 ENCOUNTER FOR LONG-TERM CURRENT USE OF MEDICATION: ICD-10-CM

## 2019-04-16 LAB
INR PPP: 2.64 (ref 0.86–1.14)
PROTHROM ACT/NOR PPP: 19 % (ref 60–140)

## 2019-04-16 NOTE — PROGRESS NOTES
ANTICOAGULATION FOLLOW-UP CLINIC VISIT    Patient Name:  Shala Caruso  Date:  2019  Contact Type:  Telephone    SUBJECTIVE:        OBJECTIVE    INR   Date Value Ref Range Status   2019 2.64 (H) 0.86 - 1.14 Final     Chromogenic Factor 10   Date Value Ref Range Status   2018 16%  Final     Factor 2 Assay   Date Value Ref Range Status   2019 19 (L) 60 - 140 % Final       ASSESSMENT / PLAN  INR assessment THER    Recheck INR In: 4 WEEKS    INR Location Clinic      Anticoagulation Summary  As of 2019    INR goal:   2.0-3.0   TTR:   73.7 % (2.8 y)   INR used for dosin.64 (2019)   Warfarin maintenance plan:   12.5 mg (5 mg x 2.5) every Sun, Tue, Thu; 10 mg (5 mg x 2) all other days   Full warfarin instructions:   12.5 mg every Sun, Tue, Thu; 10 mg all other days   Weekly warfarin total:   77.5 mg   Plan last modified:   Portia Cherry RN (2017)   Next INR check:   2019   Priority:   Factor 2   Target end date:       Indications    SLE (systemic lupus erythematosus) (H) (Resolved) [M32.9]  Long-term (current) use of anticoagulants [Z79.01] [Z79.01]             Anticoagulation Episode Summary     INR check location:   Clinic Lab    Preferred lab:       Send INR reminders to:   [facility]    Comments:   Factor 2  goal range is 15-25%  Ok to leave message on home [phone] and work voicemail, but call [id number] per pt request.  OK to leave message at office  May leave messages with Rodriguez Santos  DO NOT GIVE RECORDS TO EMPLOYER U        Anticoagulation Care Providers     Provider Role Specialty Phone number    Mt Kiser MD Responsible Clinical Cardiac Electrophysiology [phone]            See the Encounter Report to view Anticoagulation Flowsheet and Dosing Calendar (Go to Encounters tab in chart review, and find the Anticoagulation Therapy Visit)  Left message for patient with results and dosing recommendations. Asked patient to call back to  report any missed doses, falls, signs and symptoms of bleeding or clotting, any changes in health, medication, or diet. Asked patient to call back with any questions or concerns.  Factor 2=19%.  Goal range=15-25%.       Portia Cherry RN

## 2019-04-18 ENCOUNTER — OFFICE VISIT (OUTPATIENT)
Dept: OPHTHALMOLOGY | Facility: CLINIC | Age: 74
End: 2019-04-18
Attending: OPHTHALMOLOGY
Payer: COMMERCIAL

## 2019-04-18 DIAGNOSIS — Z48.810 AFTERCARE FOLLOWING SURGERY OF A SENSORY ORGAN: ICD-10-CM

## 2019-04-18 DIAGNOSIS — H10.13 ALLERGIC CONJUNCTIVITIS OF BOTH EYES: Primary | ICD-10-CM

## 2019-04-18 PROBLEM — K22.0 ACHALASIA: Status: ACTIVE | Noted: 2017-12-28

## 2019-04-18 PROBLEM — E11.9 DM (DIABETES MELLITUS) (H): Status: ACTIVE | Noted: 2017-12-28

## 2019-04-18 PROBLEM — K21.9 GERD (GASTROESOPHAGEAL REFLUX DISEASE): Status: ACTIVE | Noted: 2017-12-28

## 2019-04-18 PROBLEM — E78.5 HYPERLIPIDEMIA: Status: ACTIVE | Noted: 2017-12-28

## 2019-04-18 PROBLEM — D68.61 ANTIPHOSPHOLIPID SYNDROME (H): Status: ACTIVE | Noted: 2017-12-28

## 2019-04-18 PROBLEM — M85.80 OSTEOPENIA: Status: ACTIVE | Noted: 2017-12-28

## 2019-04-18 PROBLEM — I10 HTN (HYPERTENSION): Status: ACTIVE | Noted: 2017-12-28

## 2019-04-18 PROBLEM — I73.00 RAYNAUD'S DISEASE: Status: ACTIVE | Noted: 2017-12-28

## 2019-04-18 PROBLEM — I82.409 DVT (DEEP VENOUS THROMBOSIS) (H): Status: ACTIVE | Noted: 2017-12-28

## 2019-04-18 PROCEDURE — G0463 HOSPITAL OUTPT CLINIC VISIT: HCPCS | Mod: ZF

## 2019-04-18 ASSESSMENT — VISUAL ACUITY
OD_CC: 20/25
METHOD: SNELLEN - LINEAR
CORRECTION_TYPE: GLASSES
OS_CC: 20/20

## 2019-04-18 ASSESSMENT — TONOMETRY
OD_IOP_MMHG: 15
OS_IOP_MMHG: 13
IOP_METHOD: ICARE

## 2019-04-18 ASSESSMENT — CONF VISUAL FIELD
OD_NORMAL: 1
OS_NORMAL: 1

## 2019-04-18 ASSESSMENT — SLIT LAMP EXAM - LIDS
COMMENTS: NORMAL
COMMENTS: NORMAL

## 2019-04-18 ASSESSMENT — EXTERNAL EXAM - LEFT EYE: OS_EXAM: NORMAL

## 2019-04-18 ASSESSMENT — CUP TO DISC RATIO
OS_RATIO: 0.3
OD_RATIO: 0.3

## 2019-04-18 ASSESSMENT — EXTERNAL EXAM - RIGHT EYE: OD_EXAM: NORMAL

## 2019-04-18 NOTE — PROGRESS NOTES
5 week status post Cataract extraction intraocular lens left eye 02/05/19     Mild conjunctiva injection and chemosis left eye more than right eye  No infection  probably Allergic reaction to meds?  Patient with history of SLE.   Retina attached    Plan:  Retina detachment and endophthalmitis precautions were discussed with the patient and was asked to return if any of the those occur    Medications to operative eye  Patient evaluated with cornea- Dr. Thompson  Recommend to start pred-hylon three times a day   Continue artificial tears  Without  Preservatives three times a day     Follow up 1-2 weeks   ~~~~~~~~~~~~~~~~~~~~~~~~~~~~~~~~~~   Complete documentation of historical and exam elements from today's encounter can be found in the full encounter summary report (not reduplicated in this progress note).  I personally obtained the chief complaint(s) and history of present illness.  I confirmed and edited as necessary the review of systems, past medical/surgical history, family history, social history, and examination findings as documented by others; and I examined the patient myself.  I personally reviewed the relevant tests, images, and reports as documented above.  I formulated and edited as necessary the assessment and plan and discussed the findings and management plan with the patient and family    Monika Fermin MD  .  Retina Service   Department of Ophthalmology and Visual Neurosciences   [facility]  Phone: [phone]   Fax: [phone]

## 2019-04-24 DIAGNOSIS — E10.649 TYPE 1 DIABETES MELLITUS WITH HYPOGLYCEMIA AND WITHOUT COMA (H): ICD-10-CM

## 2019-04-28 DIAGNOSIS — E10.649 TYPE 1 DIABETES MELLITUS WITH HYPOGLYCEMIA AND WITHOUT COMA (H): ICD-10-CM

## 2019-04-28 NOTE — TELEPHONE ENCOUNTER
NOVOLOG PENFILL 100 UNIT/ML soln        Last Written Prescription Date:  01/29/18  Last Fill Quantity: 15mL,   # refills: 3  Last Office Visit : 01/14/19  Future Office visit:  07/15/19    Routing refill request to provider for review/approval because:  Insulin - refilled per clinic

## 2019-04-29 ENCOUNTER — MYC REFILL (OUTPATIENT)
Dept: ENDOCRINOLOGY | Facility: CLINIC | Age: 74
End: 2019-04-29

## 2019-04-29 DIAGNOSIS — E10.649 TYPE 1 DIABETES MELLITUS WITH HYPOGLYCEMIA AND WITHOUT COMA (H): ICD-10-CM

## 2019-04-29 RX ORDER — INSULIN ASPART 100 [IU]/ML
INJECTION, SOLUTION INTRAVENOUS; SUBCUTANEOUS
Qty: 15 ML | Refills: 3 | Status: SHIPPED | OUTPATIENT
Start: 2019-04-29 | End: 2020-01-13

## 2019-04-29 RX ORDER — INSULIN GLARGINE 100 [IU]/ML
INJECTION, SOLUTION SUBCUTANEOUS
Qty: 15 ML | Refills: 3 | Status: SHIPPED | OUTPATIENT
Start: 2019-04-29 | End: 2020-01-13

## 2019-05-09 ENCOUNTER — OFFICE VISIT (OUTPATIENT)
Dept: OPHTHALMOLOGY | Facility: CLINIC | Age: 74
End: 2019-05-09
Attending: OPHTHALMOLOGY
Payer: COMMERCIAL

## 2019-05-09 DIAGNOSIS — Z48.810 AFTERCARE FOLLOWING SURGERY OF A SENSORY ORGAN: Primary | ICD-10-CM

## 2019-05-09 PROCEDURE — G0463 HOSPITAL OUTPT CLINIC VISIT: HCPCS | Mod: ZF

## 2019-05-09 RX ORDER — CARBOXYMETHYLCELLULOSE SODIUM 5 MG/ML
1 SOLUTION/ DROPS OPHTHALMIC PRN
COMMUNITY
End: 2021-12-20

## 2019-05-09 ASSESSMENT — REFRACTION_MANIFEST
OS_SPHERE: -0.50
OS_SPHERE: +1.00
OS_CYLINDER: +0.25
OS_CYLINDER: +0.25
OS_AXIS: 170
OD_SPHERE: -1.00
OD_ADD: +2.50
OS_ADD: +2.50
OS_AXIS: 170
OD_CYLINDER: +1.75
OD_CYLINDER: +1.75
OD_AXIS: 150
OD_SPHERE: +0.50
OD_AXIS: 150

## 2019-05-09 ASSESSMENT — CONF VISUAL FIELD
OS_NORMAL: 1
OD_NORMAL: 1

## 2019-05-09 ASSESSMENT — REFRACTION_WEARINGRX
OD_ADD: +2.75
SPECS_TYPE: TRIFOCAL
OS_CYLINDER: +0.25
OS_AXIS: 170
OS_ADD: +2.75
OD_SPHERE: -0.25
OS_SPHERE: -0.50
OD_AXIS: 150
OD_CYLINDER: +0.50

## 2019-05-09 ASSESSMENT — VISUAL ACUITY
OD_CC: 20/30
OS_CC: 20/20
METHOD: SNELLEN - LINEAR
OD_PH_CC: 20/25
CORRECTION_TYPE: GLASSES
OS_CC+: -2
OD_CC+: +2

## 2019-05-09 ASSESSMENT — SLIT LAMP EXAM - LIDS
COMMENTS: NORMAL
COMMENTS: NORMAL

## 2019-05-09 ASSESSMENT — EXTERNAL EXAM - LEFT EYE: OS_EXAM: NORMAL

## 2019-05-09 ASSESSMENT — EXTERNAL EXAM - RIGHT EYE: OD_EXAM: NORMAL

## 2019-05-09 NOTE — NURSING NOTE
Chief Complaints and History of Present Illnesses   Patient presents with     Follow Up     Chief Complaint(s) and History of Present Illness(es)     Follow Up               Comments     Follow up bilateral post cataract surgery and allergic conjunctivitis.  The patient denies eye pain.  She states her vision is good.  FREDIS Goodman 1:34 PM 05/09/2019

## 2019-05-09 NOTE — PROGRESS NOTES
CC:   3 months status post Cataract extraction intraocular lens left eye 02/05/19     HPI: 73 year old F with  hx of DM type I. Denies flashes and floaters. Last hgA1c 5    Past med hx: SLE currently on cellcept (allergic to plaq)  Past ocular hx: none     OCULAR IMAGING  Optical Coherence Tomography 8-9-18  Right eye normal foveal contour, no srf/irf  Left eye normal foveal contour,   OCT thru lesion less than 1 mm thick with drusen overlying, no srf    PHOTOS 8-9-18  Right eye   Left eye  Choroidal pigmented lesion with drusen     U/S left eye   9/14/16 C1 0.56 mm; C2 5.46 mm  8-30-17 C1 1.3 mm; C2   8-9-18 less than 1 mm ashwini lesion. 0.73mm x 5.15T x 5.03L    A/P:   1) DM type I   - no evidence of DR   - good glucose control    2) Pseudophakia both eyes   - doing well, lens centered    3) Myopic astigmatism, OU   - updated Rx      4) Choroidal pigmented lesion, left eye  - no orange pigmentation, no subretinal fluid, + drusen   - ultrasound 08/09/18  less than 1 mm height, normal retrobulbar echo pattern  - next scheduled evaluation in 12/2019      5) patient with history of Lupus  Took prednisolone for 10 yrs --> patient developed Diabetes mellitus  Currently Micophenolate  No lupus retinopahty  Patient had DVT left upper leg--> patient on coumadin    6) Post operative ocular surface irritation   - Continue predhealon per Dr. Thompson  - Follow up in 6 weeks as scheduled with Cornea fellow Dr. Thompson    return to clinic: f/u in 4-6 months, repeat oct enhanced depth image of the choroidal peripheral lesion , macula oct, US      Kali Jensen MD  Ophthalmology Resident, PGY-3  [facility]      ~~~~~~~~~~~~~~~~~~~~~~~~~~~~~~~~~~   Complete documentation of historical and exam elements from today's encounter can be found in the full encounter summary report (not reduplicated in this progress note).  I personally obtained the chief complaint(s) and history of present illness.  I confirmed and edited as  necessary the review of systems, past medical/surgical history, family history, social history, and examination findings as documented by others; and I examined the patient myself.  I personally reviewed the relevant tests, images, and reports as documented above.  I formulated and edited as necessary the assessment and plan and discussed the findings and management plan with the patient and family    Monika Fermin MD   of Ophthalmology.  Retina Service   Department of Ophthalmology and Visual Neurosciences   [facility]  Phone: [phone]   Fax: [phone]

## 2019-05-15 ENCOUNTER — TELEPHONE (OUTPATIENT)
Dept: OPHTHALMOLOGY | Facility: CLINIC | Age: 74
End: 2019-05-15

## 2019-05-15 ENCOUNTER — ANTICOAGULATION THERAPY VISIT (OUTPATIENT)
Dept: ANTICOAGULATION | Facility: CLINIC | Age: 74
End: 2019-05-15

## 2019-05-15 DIAGNOSIS — Z79.01 LONG TERM (CURRENT) USE OF ANTICOAGULANTS: ICD-10-CM

## 2019-05-15 DIAGNOSIS — Z79.01 LONG TERM CURRENT USE OF ANTICOAGULANT THERAPY: ICD-10-CM

## 2019-05-15 LAB
FACTOR 2 ASSAY: NORMAL
INR PPP: 3.32 (ref 0.86–1.14)
PROTHROM ACT/NOR PPP: 13 % (ref 60–140)

## 2019-05-15 NOTE — PROGRESS NOTES
ANTICOAGULATION FOLLOW-UP CLINIC VISIT    Patient Name:  Shala Caruso  Date:  5/15/2019  Contact Type:  Telephone    SUBJECTIVE:  Patient Findings     Comments:   Factor II result is 13% on 5/15.        Clinical Outcomes     Comments:   Factor II result is 13% on 5/15.           OBJECTIVE    INR   Date Value Ref Range Status   05/15/2019 3.32 (H) 0.86 - 1.14 Final     Chromogenic Factor 10   Date Value Ref Range Status   11/21/2018 16%  Final     Factor 2 Assay   Date Value Ref Range Status   05/15/2019 13 (L) 60 - 140 % Final       ASSESSMENT / PLAN  INR assessment SUPRA    Recheck INR In: 2 WEEKS    INR Location Clinic      Anticoagulation Summary  As of 5/15/2019    INR goal:   2.0-3.0   TTR:   73.1 % (2.9 y)   INR used for dosing:   No new INR was available at the time of this encounter.   Warfarin maintenance plan:   12.5 mg (5 mg x 2.5) every Sun, Tue, Thu; 10 mg (5 mg x 2) all other days   Full warfarin instructions:   5/15: 7.5 mg; Otherwise 12.5 mg every Sun, Tue, Thu; 10 mg all other days   Weekly warfarin total:   77.5 mg   Plan last modified:   Portia Cherry RN (6/16/2017)   Next INR check:   5/29/2019   Priority:   Factor 2   Target end date:       Indications    SLE (systemic lupus erythematosus) (H) (Resolved) [M32.9]  Long term current use of anticoagulant therapy [Z79.01]             Anticoagulation Episode Summary     INR check location:   Clinic Lab    Preferred lab:       Send INR reminders to:   [facility]    Comments:   Factor 2  goal range is 15-25%  Ok to leave message on home [phone] and work voicemail, but call [id number] per pt request.  OK to leave message at office  May leave messages with Rodriguez Santos  DO NOT GIVE RECORDS TO EMPLOYER [employer]        Anticoagulation Care Providers     Provider Role Specialty Phone number    Mt Kiser MD Responsible Clinical Cardiac Electrophysiology [phone]            See the Encounter Report to view Anticoagulation Flowsheet  and Dosing Calendar (Go to Encounters tab in chart review, and find the Anticoagulation Therapy Visit)  Factor 2 results on 5/15 is 13%      Left message for patient with results and dosing recommendations. Asked patient to call back to report any missed doses, falls, signs and symptoms of bleeding or clotting, any changes in health, medication, or diet. Asked patient to call back with any questions or concerns.  Andre Bledsoe, RN

## 2019-05-29 ENCOUNTER — ANTICOAGULATION THERAPY VISIT (OUTPATIENT)
Dept: ANTICOAGULATION | Facility: CLINIC | Age: 74
End: 2019-05-29

## 2019-05-29 DIAGNOSIS — Z79.01 LONG TERM CURRENT USE OF ANTICOAGULANT THERAPY: ICD-10-CM

## 2019-05-29 DIAGNOSIS — M32.15 SYSTEMIC LUPUS ERYTHEMATOSUS WITH TUBULO-INTERSTITIAL NEPHROPATHY, UNSPECIFIED SLE TYPE (H): ICD-10-CM

## 2019-05-29 DIAGNOSIS — Z79.899 ENCOUNTER FOR LONG-TERM CURRENT USE OF MEDICATION: ICD-10-CM

## 2019-05-29 LAB
INR PPP: 2.25 (ref 0.86–1.14)
PROTHROM ACT/NOR PPP: 18 % (ref 60–140)

## 2019-05-29 NOTE — PROGRESS NOTES
ANTICOAGULATION FOLLOW-UP CLINIC VISIT    Patient Name:  Shala Caruso  Date:  5/29/2019  Contact Type:  Telephone/ telephone    SUBJECTIVE:  Patient Findings     Comments:   Factor 2 = 18%.          Clinical Outcomes     Comments:   Factor 2 = 18%.             OBJECTIVE    INR   Date Value Ref Range Status   05/29/2019 2.25 (H) 0.86 - 1.14 Final     Chromogenic Factor 10   Date Value Ref Range Status   11/21/2018 16%  Final     Factor 2 Assay   Date Value Ref Range Status   05/29/2019 18 (L) 60 - 140 % Final       ASSESSMENT / PLAN  INR assessment THER    Recheck INR In: 2 WEEKS    INR Location Clinic      Anticoagulation Summary  As of 5/29/2019    INR goal:   2.0-3.0   TTR:   73.1 % (3 y)   INR used for dosing:   No new INR was available at the time of this encounter.   Warfarin maintenance plan:   12.5 mg (5 mg x 2.5) every Sun, Tue, Thu; 10 mg (5 mg x 2) all other days   Full warfarin instructions:   5/30: 10 mg; Otherwise 12.5 mg every Sun, Tue, Thu; 10 mg all other days   Weekly warfarin total:   77.5 mg   Plan last modified:   Portia Cherry RN (6/16/2017)   Next INR check:   6/12/2019   Priority:   Factor 2   Target end date:       Indications    SLE (systemic lupus erythematosus) (H) (Resolved) [M32.9]  Long term current use of anticoagulant therapy [Z79.01]             Anticoagulation Episode Summary     INR check location:   Clinic Lab    Preferred lab:       Send INR reminders to:   [facility]    Comments:   Factor 2  goal range is 15-25%  Ok to leave message on home [phone] and work voicemail, but call [email or web address] per pt request.  OK to leave message at office  May leave messages with Rodriguez Santos  DO NOT GIVE RECORDS TO EMPLOYER U        Anticoagulation Care Providers     Provider Role Specialty Phone number    Mt Kiser MD Responsible Clinical Cardiac Electrophysiology [phone]            See the Encounter Report to view Anticoagulation Flowsheet and Dosing Calendar (Go  to Encounters tab in chart review, and find the Anticoagulation Therapy Visit)    Spoke with patient. Gave them their lab results and new warfarin recommendation.  No changes in health, medication, or diet. No missed doses, no falls. No signs or symptoms of bleed or clotting.      Ilana Gonzalez RN

## 2019-05-30 ENCOUNTER — TELEPHONE (OUTPATIENT)
Dept: OPHTHALMOLOGY | Facility: CLINIC | Age: 74
End: 2019-05-30

## 2019-05-31 ENCOUNTER — TELEPHONE (OUTPATIENT)
Dept: OPHTHALMOLOGY | Facility: CLINIC | Age: 74
End: 2019-05-31

## 2019-06-08 ENCOUNTER — OFFICE VISIT (OUTPATIENT)
Dept: OPHTHALMOLOGY | Facility: CLINIC | Age: 74
End: 2019-06-08
Payer: COMMERCIAL

## 2019-06-08 DIAGNOSIS — M32.15 SYSTEMIC LUPUS ERYTHEMATOSUS WITH TUBULO-INTERSTITIAL NEPHROPATHY, UNSPECIFIED SLE TYPE (H): ICD-10-CM

## 2019-06-08 DIAGNOSIS — Z96.1 PSEUDOPHAKIA OF BOTH EYES: ICD-10-CM

## 2019-06-08 DIAGNOSIS — H04.123 DRY EYES: Primary | ICD-10-CM

## 2019-06-08 ASSESSMENT — VISUAL ACUITY
METHOD: SNELLEN - LINEAR
CORRECTION_TYPE: GLASSES
OD_CC: 20/20
OS_CC: 20/20

## 2019-06-08 ASSESSMENT — EXTERNAL EXAM - LEFT EYE: OS_EXAM: NORMAL

## 2019-06-08 ASSESSMENT — TONOMETRY
OD_IOP_MMHG: 10
IOP_METHOD: ICARE
OS_IOP_MMHG: 11

## 2019-06-08 ASSESSMENT — EXTERNAL EXAM - RIGHT EYE: OD_EXAM: NORMAL

## 2019-06-08 ASSESSMENT — SLIT LAMP EXAM - LIDS
COMMENTS: NORMAL
COMMENTS: NORMAL

## 2019-06-08 NOTE — NURSING NOTE
Patient presents for cornea consult. Sees retina specialist also. The current vision is stable. Dry Eye, using Refresh for. No pain, discomfort. no redness, mild itching, no tears. Occasional floaters once in a while f/f. ATS eye drops. BE IOL sx. Razia Bass COT 7:37 AM June 8, 2019

## 2019-06-08 NOTE — PROGRESS NOTES
CC:  Dry eye follow up    HPI:  74 y/o F with hx of lupus, pseudophakia, dry eyes, here for 6 week follow up. Usually followed by Dr. Fermin but had been evaluated for bilateral redness, irritation with Dr. Thompson during a retina visit and started on Pred Healon. Here for 6 week follow up. Feeling much better, vision improved each eye. Has been off pred healon now about 18 days and feels like eyes are comfortable with no return or redness or irritation.    POH:  Prior eye surgery/laser:   -s/p CE/IOL each eye (1/8/19 right eye, 2/5/19 left eye)  -hx of pigmented lesion (choroidal) left eye  -DM type 1 - no retinopathy    CTL wearer: none  Glasses: none    Fam hx of eye disease: No Known fam hx of eye disease    Gtts:  Preservative-free Refresh tears - 1-2x/day OU    All:  Amoxicillin  Plaquenil - rash  Sulfa drugs  metformin    A/P:  1. Dry eye, each eye  -symptomatic improvement on pred healon each eye and no return of symptoms off the drops for the past 18 days  -IOP wnl  -surface much improved  -continue with preservative-free lubricating tears - recommend using at least BID each eye  -can consider use of pred healon in the future if needed for acute flare resistant to lubricating tears    2. Hx of lupus (+CRISTAL, +dsDNA)  -currently on cellcept; not on any PO pred  -previously on methotrexate and plaquenil   -with hx of DVT, on chronic warfarin treatment  -systemically, feeling well with no recent flares  -discussed role of lupus with dry eye  -treatment as above  -f/u rheum    3. Pseudophakia, each eye  -stable, monitor    4. Hx of choroidal pigmented lesion, left eye  - followed by Jeny  -monitor    5. Hx of type 1 DM  -no retinopathy on prior DFE  -goal HgA1c <7  -f/u with PCP, endo  -f/u retina as scheduled    F/u with Dr. Fermin as scheduled.    Mora Thompson MD  PGY-5, Cornea Fellow  Ophthalmology    Complete documentation of historical and exam elements from today's encounter can be found in the  full encounter summary report (not reduplicated in this progress note). I personally obtained the chief complaint(s) and history of present illness.  I confirmed and edited as necessary the review of systems, past medical/surgical history, family history, social history, and examination findings as documented by others; and I examined the patient myself. I personally reviewed the relevant tests, images, and reports as documented above. I formulated and edited as necessary the assessment and plan and discussed the findings and management plan with the patient and family.     Mora Thompson MD  Cornea Fellow

## 2019-06-12 DIAGNOSIS — Z79.899 HIGH RISK MEDICATIONS (NOT ANTICOAGULANTS) LONG-TERM USE: ICD-10-CM

## 2019-06-12 DIAGNOSIS — Z79.01 LONG TERM CURRENT USE OF ANTICOAGULANT THERAPY: ICD-10-CM

## 2019-06-12 DIAGNOSIS — M32.15 SYSTEMIC LUPUS ERYTHEMATOSUS WITH TUBULO-INTERSTITIAL NEPHROPATHY, UNSPECIFIED SLE TYPE (H): ICD-10-CM

## 2019-06-12 DIAGNOSIS — Z79.899 ENCOUNTER FOR LONG-TERM CURRENT USE OF MEDICATION: ICD-10-CM

## 2019-06-12 LAB
ALT SERPL W P-5'-P-CCNC: 25 U/L (ref 0–50)
AST SERPL W P-5'-P-CCNC: 19 U/L (ref 0–45)
CREAT SERPL-MCNC: 0.79 MG/DL (ref 0.52–1.04)
ERYTHROCYTE [DISTWIDTH] IN BLOOD BY AUTOMATED COUNT: 15.6 % (ref 10–15)
GFR SERPL CREATININE-BSD FRML MDRD: 75 ML/MIN/{1.73_M2}
HCT VFR BLD AUTO: 40.6 % (ref 35–47)
HGB BLD-MCNC: 12.7 G/DL (ref 11.7–15.7)
INR PPP: 3.65 (ref 0.86–1.14)
MCH RBC QN AUTO: 28.9 PG (ref 26.5–33)
MCHC RBC AUTO-ENTMCNC: 31.3 G/DL (ref 31.5–36.5)
MCV RBC AUTO: 93 FL (ref 78–100)
PLATELET # BLD AUTO: 218 10E9/L (ref 150–450)
RBC # BLD AUTO: 4.39 10E12/L (ref 3.8–5.2)
WBC # BLD AUTO: 3.1 10E9/L (ref 4–11)

## 2019-06-13 ENCOUNTER — ANTICOAGULATION THERAPY VISIT (OUTPATIENT)
Dept: ANTICOAGULATION | Facility: CLINIC | Age: 74
End: 2019-06-13

## 2019-06-13 DIAGNOSIS — Z79.01 LONG TERM CURRENT USE OF ANTICOAGULANT THERAPY: ICD-10-CM

## 2019-06-13 LAB
C3 SERPL-MCNC: 63 MG/DL (ref 76–169)
C4 SERPL-MCNC: 12 MG/DL (ref 15–50)
DSDNA AB SER-ACNC: 1 IU/ML
FACTOR 2 ASSAY: NORMAL
PROTHROM ACT/NOR PPP: 13 % (ref 60–140)

## 2019-06-13 NOTE — PROGRESS NOTES
ANTICOAGULATION FOLLOW-UP CLINIC VISIT    Patient Name:  Shala Caurso  Date:  6/13/2019  Contact Type:  Telephone    SUBJECTIVE:  Patient Findings     Positives:   Change in health (Patient has had a cold recently.)    Comments:   Factor 2 result on 6/12 is 13%.        Clinical Outcomes     Comments:   Factor 2 result on 6/12 is 13%.           OBJECTIVE    INR   Date Value Ref Range Status   06/12/2019 3.65 (H) 0.86 - 1.14 Final     Chromogenic Factor 10   Date Value Ref Range Status   11/21/2018 16%  Final     Factor 2 Assay   Date Value Ref Range Status   06/12/2019 13 (L) 60 - 140 % Final       ASSESSMENT / PLAN  INR assessment SUPRA    Recheck INR In: 1 WEEK    INR Location Clinic      Anticoagulation Summary  As of 6/13/2019    INR goal:      TTR:   72.8 % (3 y)   Prior goal:   2.0-3.0   INR used for dosing:   No new INR was available at the time of this encounter.   Warfarin maintenance plan:   12.5 mg (5 mg x 2.5) every Sun, Tue, Thu; 10 mg (5 mg x 2) all other days   Full warfarin instructions:   6/13: 7.5 mg; Otherwise 12.5 mg every Sun, Tue, Thu; 10 mg all other days   Weekly warfarin total:   77.5 mg   Plan last modified:   Portia Cherry RN (6/16/2017)   Next INR check:   6/20/2019   Priority:   Factor 2   Target end date:       Indications    SLE (systemic lupus erythematosus) (H) (Resolved) [M32.9]  Long term current use of anticoagulant therapy [Z79.01]             Anticoagulation Episode Summary     INR check location:   Clinic Lab    Preferred lab:       Send INR reminders to:   [facility]    Comments:   Factor 2  goal range is 15-25%  Ok to leave message on home [phone] and work voicemail, but call [id number] per pt request.  OK to leave message at office  May leave messages with Rodriguez Santos  DO NOT GIVE RECORDS TO EMPLOYER U        Anticoagulation Care Providers     Provider Role Specialty Phone number    Mt Kiser MD Responsible Clinical Cardiac Electrophysiology  "[phone]            See the Encounter Report to view Anticoagulation Flowsheet and Dosing Calendar (Go to Encounters tab in chart review, and find the Anticoagulation Therapy Visit)  Factor 2 result is 13% on 6/12.    Spoke to Shala.  Unable to pin down exact reason for elevated result on 6/12.  She stated that her Type I Diabetic labs have been \"off\" lately.  Updated calendar.  Will check labs in one week per protocol.     Discussed with Pharmacist Aakash Mcduffie [facility] for patient's new Anticoagulation recommendation.    Andre Bledsoe RN    6/14/19 ADDENDUM  Spoke to Shala.  She is unable to go into the lab until 6/21.  Gave dosing through Monday, when the [facility] will receive results.  Andre Bledsoe RN                 "

## 2019-06-21 DIAGNOSIS — Z79.01 LONG TERM CURRENT USE OF ANTICOAGULANT THERAPY: ICD-10-CM

## 2019-06-21 LAB — INR PPP: 2.55 (ref 0.86–1.14)

## 2019-06-22 LAB — PROTHROM ACT/NOR PPP: 18 % (ref 60–140)

## 2019-06-24 ENCOUNTER — ANTICOAGULATION THERAPY VISIT (OUTPATIENT)
Dept: ANTICOAGULATION | Facility: CLINIC | Age: 74
End: 2019-06-24

## 2019-06-24 DIAGNOSIS — Z79.01 LONG TERM CURRENT USE OF ANTICOAGULANT THERAPY: ICD-10-CM

## 2019-06-24 NOTE — PROGRESS NOTES
"ANTICOAGULATION FOLLOW-UP CLINIC VISIT    Patient Name:  Shala Caruos  Date:  2019  Contact Type:  Telephone    SUBJECTIVE:  Patient Findings     Positives:   Bruising (\"from labs being done so often\")             OBJECTIVE    INR   Date Value Ref Range Status   2019 2.55 (H) 0.86 - 1.14 Final     Chromogenic Factor 10   Date Value Ref Range Status   2018 16%  Final     Factor 2 Assay   Date Value Ref Range Status   2019 18 (L) 60 - 140 % Final       ASSESSMENT / PLAN  INR assessment THER    Recheck INR In: 4 WEEKS    INR Location Clinic      Anticoagulation Summary  As of 2019    INR goal:      TTR:   72.6 % (3 y)   Prior goal:   2.0-3.0   INR used for dosin.55 (2019)   Warfarin maintenance plan:   12.5 mg (5 mg x 2.5) every Sun, Tue, Thu; 10 mg (5 mg x 2) all other days   Full warfarin instructions:   12.5 mg every Sun, Tue, Thu; 10 mg all other days   Weekly warfarin total:   77.5 mg   Plan last modified:   Portia Cherry RN (2017)   Next INR check:   2019   Priority:   Factor 2   Target end date:       Indications    SLE (systemic lupus erythematosus) (H) (Resolved) [M32.9]  Long term current use of anticoagulant therapy [Z79.01]             Anticoagulation Episode Summary     INR check location:   Clinic Lab    Preferred lab:       Send INR reminders to:   [facility]    Comments:   Factor 2  goal range is 15-25%  Ok to leave message on home [phone] and work voicemail, but call [id number] per pt request.  OK to leave message at office  May leave messages with Rodriguez Santos  DO NOT GIVE RECORDS TO EMPLOYER U        Anticoagulation Care Providers     Provider Role Specialty Phone number    Mt Kiser MD Responsible Clinical Cardiac Electrophysiology [phone]            See the Encounter Report to view Anticoagulation Flowsheet and Dosing Calendar (Go to Encounters tab in chart review, and find the Anticoagulation Therapy " Visit)    Spoke with patient.    Factor 2=18%.  Goal range=15-25%    Portia Cherry RN

## 2019-07-09 ENCOUNTER — ANTICOAGULATION THERAPY VISIT (OUTPATIENT)
Dept: ANTICOAGULATION | Facility: CLINIC | Age: 74
End: 2019-07-09

## 2019-07-09 DIAGNOSIS — Z79.01 LONG TERM CURRENT USE OF ANTICOAGULANT THERAPY: ICD-10-CM

## 2019-07-09 DIAGNOSIS — E10.649 TYPE 1 DIABETES MELLITUS WITH HYPOGLYCEMIA AND WITHOUT COMA (H): ICD-10-CM

## 2019-07-09 LAB
ALBUMIN SERPL-MCNC: 3.9 G/DL (ref 3.4–5)
ALP SERPL-CCNC: 40 U/L (ref 40–150)
ALT SERPL W P-5'-P-CCNC: 23 U/L (ref 0–50)
ANION GAP SERPL CALCULATED.3IONS-SCNC: 6 MMOL/L (ref 3–14)
AST SERPL W P-5'-P-CCNC: 19 U/L (ref 0–45)
BILIRUB SERPL-MCNC: 0.4 MG/DL (ref 0.2–1.3)
BUN SERPL-MCNC: 23 MG/DL (ref 7–30)
CALCIUM SERPL-MCNC: 8.6 MG/DL (ref 8.5–10.1)
CHLORIDE SERPL-SCNC: 107 MMOL/L (ref 94–109)
CHOLEST SERPL-MCNC: 160 MG/DL
CO2 SERPL-SCNC: 28 MMOL/L (ref 20–32)
CREAT SERPL-MCNC: 0.76 MG/DL (ref 0.52–1.04)
CREAT UR-MCNC: 138 MG/DL
GFR SERPL CREATININE-BSD FRML MDRD: 79 ML/MIN/{1.73_M2}
GLUCOSE SERPL-MCNC: 93 MG/DL (ref 70–99)
HCT VFR BLD AUTO: 40.7 % (ref 35–47)
HDLC SERPL-MCNC: 102 MG/DL
INR PPP: 3.63 (ref 0.86–1.14)
LDLC SERPL CALC-MCNC: 46 MG/DL
MICROALBUMIN UR-MCNC: 10 MG/L
MICROALBUMIN/CREAT UR: 7.39 MG/G CR (ref 0–25)
NONHDLC SERPL-MCNC: 58 MG/DL
POTASSIUM SERPL-SCNC: 3.9 MMOL/L (ref 3.4–5.3)
PROT SERPL-MCNC: 6.7 G/DL (ref 6.8–8.8)
PROTHROM ACT/NOR PPP: 12 % (ref 60–140)
SODIUM SERPL-SCNC: 140 MMOL/L (ref 133–144)
TRIGL SERPL-MCNC: 59 MG/DL
TSH SERPL DL<=0.005 MIU/L-ACNC: 0.99 MU/L (ref 0.4–4)

## 2019-07-09 NOTE — PROGRESS NOTES
ANTICOAGULATION FOLLOW-UP CLINIC VISIT    Patient Name:  Shala Caruso  Date:  7/9/2019  Contact Type:  Telephone    SUBJECTIVE:         OBJECTIVE    INR   Date Value Ref Range Status   07/09/2019 3.63 (H) 0.86 - 1.14 Final     Chromogenic Factor 10   Date Value Ref Range Status   11/21/2018 16%  Final     Factor 2 Assay   Date Value Ref Range Status   07/09/2019 12 (L) 60 - 140 % Final       ASSESSMENT / PLAN  INR assessment SUPRA    Recheck INR In: 1 WEEK    INR Location Clinic      Anticoagulation Summary  As of 7/9/2019    INR goal:      TTR:   72.1 % (3.1 y)   Prior goal:   2.0-3.0   INR used for dosing:   3.63! (7/9/2019)   Warfarin maintenance plan:   12.5 mg (5 mg x 2.5) every Mon, Fri; 10 mg (5 mg x 2) all other days   Full warfarin instructions:   7/9: 7.5 mg; Otherwise 12.5 mg every Mon, Fri; 10 mg all other days   Weekly warfarin total:   75 mg   Plan last modified:   Shant Mcduffie [facility] (7/9/2019)   Next INR check:   7/17/2019   Priority:   Factor 2   Target end date:       Indications    SLE (systemic lupus erythematosus) (H) (Resolved) [M32.9]  Long term current use of anticoagulant therapy [Z79.01]             Anticoagulation Episode Summary     INR check location:   Clinic Lab    Preferred lab:       Send INR reminders to:   [facility]    Comments:   Factor 2  goal range is 15-25%  Ok to leave message on home [phone] and work voicemail, but call [id number] per pt request.  OK to leave message at office  May leave messages with Rodriguez Santos  DO NOT GIVE RECORDS TO EMPLOYER U        Anticoagulation Care Providers     Provider Role Specialty Phone number    Mt Kiser MD Responsible Clinical Cardiac Electrophysiology [phone]            See the Encounter Report to view Anticoagulation Flowsheet and Dosing Calendar (Go to Encounters tab in chart review, and find the Anticoagulation Therapy Visit)    Left message for patient with results and dosing recommendations. Asked  patient to call back to report any missed doses, falls, signs and symptoms of bleeding or clotting, any changes in health, medication, or diet. Asked patient to call back with any questions or concerns.    Shant Mcduffie [facility]

## 2019-07-10 ENCOUNTER — OFFICE VISIT (OUTPATIENT)
Dept: FAMILY MEDICINE | Facility: CLINIC | Age: 74
End: 2019-07-10
Payer: COMMERCIAL

## 2019-07-10 VITALS
BODY MASS INDEX: 20.19 KG/M2 | HEART RATE: 67 BPM | OXYGEN SATURATION: 100 % | DIASTOLIC BLOOD PRESSURE: 69 MMHG | HEIGHT: 66 IN | SYSTOLIC BLOOD PRESSURE: 112 MMHG

## 2019-07-10 DIAGNOSIS — S80.11XA HEMATOMA OF LEG, RIGHT, INITIAL ENCOUNTER: ICD-10-CM

## 2019-07-10 ASSESSMENT — PAIN SCALES - GENERAL: PAINLEVEL: MILD PAIN (3)

## 2019-07-10 NOTE — PROGRESS NOTES
[facility]  Primary Care Center   Talya Watson MD PhD  07/10/2019      Chief Complaint:   Bruising with lump on leg      History of Present Illness:   Shala Caruso is an anticoagulated 73 year old female on coumadin with a history of diabetes mellitus type I, systemic lupus erythematosus with tubulo-interstitial nephropathy, hypertension, and hyperlipidemia who presents for evaluation of bruising with lump on her leg. She had blood clots in her upper left thigh and lung in 2002 and 2003 and has been on coumadin since then. She went in for a check of her INR and Factor 2 at the coumadin clinic yesterday, and her Factor 2 came back fairly low (12) with INR of 3.63. Her Coumadin clinic provider, Shant Mcduffie, called her last night to notify her of these results and asked about bruising and she noticed a bruise on the back of her right leg which she thinks has been there for about 5 days.This morning when she touched the bruise, she felt a lump in the center of it. Her coumadin dosage was adjusted following her test results. She is not sure what caused the bruise. She is not sure if the bruise has grown. She has not fallen recently. She denies cough, wheezing, shortness of breath, chest pain, or leg swelling. She wears compression stockings every weekday.     Review of Systems:   Pertinent items are noted in HPI or as in patient entered ROS below, remainder of complete ROS is negative.    Chest pain, palpitations, pain with walking: Positive - Comment: some knee pain/osteoarthritis   Joint pain, muscle pain, swelling: Positive - Comment: Osteoarthritis, knees  Loss of strength or sensation, numbness or tingling, dizziness, headache: Positive - Comment: toes and feet (Reynaud's/ Type I diabetes)    Concerning bumps, bleeding problems: Positive - Comment: bump with bruise    Active Medications:      acetaminophen (TYLENOL) 500 MG tablet, Take 1,000-1,500 mg by mouth nightly as needed , Disp: , Rfl:       aspirin 81 MG tablet, Take 81 mg by mouth At Bedtime , Disp: , Rfl:      AYR SALINE NASAL NO-DRIP GEL, Use as needed for nasal dryness, Disp: 3 Tube, Rfl: 3     Calcium Carbonate-Vitamin D (CALCIUM 500 + D PO), Take 1 tablet by mouth 3 times daily, Disp: , Rfl:      carboxymethylcellulose PF (REFRESH PLUS) 0.5 % SOLN ophthalmic solution, Place 1 drop into both eyes as needed, Disp: , Rfl:      Injection Device for insulin (NOVOPEN ECHO) RORY, 1 each 4 times daily, Disp: 1 each, Rfl: 0     insulin glargine (LANTUS VIAL) 100 UNIT/ML vial, Inject 4 units as directed daily LANTUS SOLOSTAR PEN PLEASE, Disp: 15 mL, Rfl: 3     insulin pen needle (BD PAM U/F) 32G X 4 MM miscellaneous, Use as directed with insulin pens., Disp: 200 each, Rfl: 3     Multiple Vitamins-Minerals (CENTRUM SILVER PO), Take 1 tablet by mouth daily., Disp: , Rfl:      mycophenolate (GENERIC EQUIVALENT) 500 MG tablet, Take 2 tablets (1,000 mg) by mouth 2 times daily 2 tabs in the a.m., 2 tabs in p.m., Disp: 360 tablet, Rfl: 3     NIFEdipine ER OSMOTIC (NIFEDICAL XL) 30 MG 24 hr tablet, Take 1 tablet (30 mg) by mouth daily Call clinic to schedule MD APPT., Disp: 90 tablet, Rfl: 2     NOVOLOG PENFILL 100 UNIT/ML soln, Inject 1 unit per 15 grams CHO with meals TID approx 15 units daily, Disp: 15 mL, Rfl: 3     prednisolone 0.125% and hyaluronate in balanced salt SUSP compounded ophthalmic suspension, Place 1 drop into both eyes 3 times daily, Disp: 1 Bottle, Rfl: 3 (Pt no longer taking, reported 7/10/19)      simvastatin (ZOCOR) 40 MG tablet, Take 1 tablet (40 mg) by mouth At Bedtime, Disp: 90 tablet, Rfl: 3     warfarin (COUMADIN) 5 MG tablet, Take 2-2.5 tablets daily or as directed by coumadin clinic., Disp: 225 tablet, Rfl: 2    Allergies:   Amaryl [glimepiride]  Metformin  Plaquenil [aminoquinolines]  Sulfa drugs  Amoxicillin  Hydroxychloroquine  Penicillins      Past Medical History:  Achalasia  Antiphospholipid syndrome  Coagulation  "disorder  Diabetes mellitus type I  Deep venous thrombosis  Pulmonary embolism  Dysphagia  Gastroesophageal reflux disease (GERD)   Hyperlipidemia  Lymphedema  Myopia  Neuropathy  Nonsenile cataract  Osteoporosis  Pericarditis  Raynaud's disease  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  Hypertension  Osteopenia  Cystocele, midline  Female stress incontinence  Atrophic vaginitis  Choroidal lesion     Past Surgical History:  Cystoscopy, sling transvaginal - 12/20/2016  Dissect lymph node axilla - 2004  Hysteroscopic placement contraceptive device - 1985  Phacoemulsification with standard intraocular lens implant - 01/08/2019 (right), 02/05/2019 (left)  Tubal ligation, bilateral    Family History:   Breast cancer - other  Cerebrovascular disease - other  Coronary artery disease - other  Glaucoma - father      Social History:   The patient is , a nonsmoker, and does not consume alcohol.      Physical Exam:   /69 (BP Location: Right arm, Patient Position: Sitting, Cuff Size: Adult Regular)   Pulse 67   Ht 1.676 m (5' 5.98\")   LMP  (LMP Unknown)   SpO2 100%   Breastfeeding? No   BMI 20.19 kg/m     Gen:  Older female in NAD  Neck  Supple. Thyroid not palpable  No carotid bruits.  No LAD  CVS exam: S1, S2 normal, no murmur, click, rub or gallop, regular rate and rhythm, chest is clear without rales or wheezing, no pedal edema, no JVD, no hepatosplenomegaly.  Respiratory: Normal - Clear to auscultation without rales, rhonchi, or wheezing.  Ext:  Diminished DP and PT pulses, no lower leg edema, calves approximately the same size, feet are warm  Musculoskeletal: spine is straight, extremities good ROM, no deformity  Skin: On posterior right thigh, she has a large ecchmyosis 11 x 9 cm with central clearing and small 1 x 1 cm central, firm, mobile, non-tender mass     Assessment and Plan:  Shala Caruso is an anticoagulated 73 year old female chronically on coumadin for history of " DVT and PE in 2002/2003 with a history of diabetes mellitus type I, systemic lupus erythematosus with tubulo-interstitial nephropathy, hypertension, and hyperlipidemia who presents today for evaluation of bruising with lump.    (S80.11XA) Hematoma of leg, right, initial encounter   She appears to have a hematoma right posterior thigh with no source of trauma. I recommend treating with heat. Advised her to keep track of it (outlined circumference in clinic with pen) and if it enlarges, she should return to clinic.     Follow-up: Return if symptoms worsen or fail to improve.      Scribe Disclosure:  I, Vanessa Jefferson, am serving as a scribe to document services personally performed by Talya Watson MD PhD at this visit, based upon the provider's statements to me. All documentation has been reviewed by the aforementioned provider prior to being entered into the official medical record.     Portions of this medical record were completed by a scribe. UPON MY REVIEW AND AUTHENTICATION BY ELECTRONIC SIGNATURE, this confirms (a) I performed the applicable clinical services, and (b) the record is accurate.

## 2019-07-10 NOTE — PROGRESS NOTES
Addendum 7/10/19    Shala called to say she got our message after taking her warfarin yesterday.  She said she does Yoga every morning and has bruising a lot. She described a large bruise on the back of her thigh and knee which she has had for a few days. I advised her to see seek medical attention for the bruise/ possible hematoma.    Aakash Mcduffie [facility]    Addendum 7/11/19  Shala called  Back to say that she has a Hematoma will apply heat. She also said she had changed her diet and has been eating less greens.    Aakash Mcduffie [facility]

## 2019-07-10 NOTE — NURSING NOTE
Chief Complaint   Patient presents with     Derm Problem     Patient has palm sized black bruise on the back of her thigh, would like to get checked out        Carley Bright EMT at 11:01 AM on 7/10/2019.

## 2019-07-10 NOTE — PATIENT INSTRUCTIONS
[facility] Medication Refill Request Information:  * Please contact your pharmacy regarding ANY request for medication refills.  ** [facility] Prescription Fax = [phone]  * Please allow 3 business days for routine medication refills.  * Please allow 5 business days for controlled substance medication refills.     [facility] Test Result notification information:  *You will be notified with in 7-10 days of your appointment day regarding the results of your test.  If you are on MyChart you will be notified as soon as the provider has reviewed the results and signed off on them.    [facility]: [phone]

## 2019-07-15 ENCOUNTER — OFFICE VISIT (OUTPATIENT)
Dept: ENDOCRINOLOGY | Facility: CLINIC | Age: 74
End: 2019-07-15
Payer: COMMERCIAL

## 2019-07-15 VITALS — HEART RATE: 71 BPM | DIASTOLIC BLOOD PRESSURE: 68 MMHG | SYSTOLIC BLOOD PRESSURE: 106 MMHG

## 2019-07-15 DIAGNOSIS — M81.0 OSTEOPOROSIS, POSTMENOPAUSAL: ICD-10-CM

## 2019-07-15 DIAGNOSIS — E10.649 TYPE 1 DIABETES MELLITUS WITH HYPOGLYCEMIA AND WITHOUT COMA (H): ICD-10-CM

## 2019-07-15 DIAGNOSIS — E78.49 OTHER HYPERLIPIDEMIA: Primary | ICD-10-CM

## 2019-07-15 LAB — HBA1C MFR BLD: 5.1 % (ref 4.3–6)

## 2019-07-15 RX ORDER — SIMVASTATIN 40 MG
40 TABLET ORAL AT BEDTIME
Qty: 90 TABLET | Refills: 3 | Status: SHIPPED | OUTPATIENT
Start: 2019-07-15 | End: 2019-09-04

## 2019-07-15 NOTE — LETTER
7/15/2019       RE: Shala Caruso  [address] [place] [address]     Dear Colleague,    Thank you for referring your patient, Shala Caruso, to the [facility] ENDOCRINOLOGY at [facility]. Please see a copy of my visit note below.    Siddharth Ms. Caruso is a 73 year old pleasant female with hx of SLE, APLS, DVT, osteoporosis and type 1 diabetes presenting for f/up.     1. Type 1 diabetes    Hemoglobin A1c today is 5.1%, stable since her last visit here. Glucometer readings reveal that she checks her blood sugar 4-5 times daily. Average blood glucose is 85 with a standard deviation of 15 and a range variable between 31 and 123.  83% of the blood sugar numbers are within target and 17% are below target.  On average the hypoglycemic episodes occur once or twice a week, mostly after dinner (around 5-6 pm), sometimes after lunch (around noon) and very rarely fasting, in the morning (around 4 am).  Most of the hypoglycemic episodes are mild, in the 60s.  Quite frequently, she rechecks her blood sugar at the time she is hypoglycemic and has no symptoms and the second reading is frequently higher, in the normal range.  Hemoglobin A1c today was 5.1%, up from 5% at her last visit here.    Insulin to carbohydrate ratio is 1 unit per 15-20 g for all meals.  She administers 4 U of Lantus in the morning, and 2-3 U NovoLog for breakfast, lunch and dinner.  She continues to use an echo NovoLog pen.    Diabetes complications:   No h/o microalbuminuria.  Last eye exam - she had cataract surgery on the right eye in January 2019 and on the left eye in February 2019; no DR.   Numbness and tingling sensation B/L toes - for many years but light sensation is intact. She does wear comfortable shoes/insoles. 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  She develops confusion, inability to concentrate or focus her vision and she feels extremely tired when her blood sugar is the  lower 60s or 50s.  She has no prior episodes of loss of consciousness due to hypoglycemia.    She continues to exercise regularly.  She runs on a bike or elliptical machine daily, for 40 minutes, 5 times a week and she does 1 hour of yoga, 4 times a week.  She exercises in the morning, from 5:45 to 7:45 am.  Once or twice a week she will go for a walk after lunch.     2. Osteoporosis    Jeanmarie also has a history of osteoporosis, treated with Boniva for almost 3 years, followed by Forteo which was started in 4/12 - interrupted treatment from 4/2013 and restarted in 7/2013 until July 2014. After she completed the treatment with Forteo, she received one dose of Reclast, in July 2014.      She has no history of prior bone fractures. She did have a fall last Saturday where she tripped and fell onto a soft ottoman. She did not have any injury from that event. She's been off prednisone since 2013.  Her height has decreased over the years from 5'6'' to 5'1/2''. Her mother had a hip fracture in her 80s.     On the DXA scan images from 7/1/16, L1-L3 T score was - 1.  Overall, at the spine, there was a significant increase of bone mineral density since 2005 of 10.5%. Since 2015, the bone mineral density has remained stable at spine. The lowest T score at the hip level was -2.2, at the left femoral neck.  Overall, there was a significant improvement of bone mineral density at the mean hip of 8.9% since 2005, with no significant changes since 2015. While the bone mineral density at the left hip increased since baseline, at the right hip, there was a decrease of bone mineral density since baseline and also, since prior year.    On the most recent DEXA scan from 7/27/18, the lowest T score was -2.1, at the left femoral neck.  Compared with the prior scan from 2016, the bone mineral density at the hips remained stable.  At the lumbar spine, L1-L3 T score was -1.3, not significantly changed since 2016.  The current dose of calcium and  vitamin D is 500 mg plus 200 unit(s) TID (oyster shell).  The dose of vitamin D was decreased in July 2018 from 1600 to 600 units daily, as the vitamin D was elevated at 80.      Current Outpatient Medications   Medication     acetaminophen (TYLENOL) 500 MG tablet     aspirin 81 MG tablet     AYR SALINE NASAL NO-DRIP GEL     blood glucose (TRUE METRIX BLOOD GLUCOSE TEST) test strip     Calcium Carbonate-Vitamin D (CALCIUM 500 + D PO)     carboxymethylcellulose PF (REFRESH PLUS) 0.5 % SOLN ophthalmic solution     Injection Device for insulin (NOVOPEN ECHO) RORY     insulin glargine (LANTUS VIAL) 100 UNIT/ML vial     insulin pen needle (BD PAM U/F) 32G X 4 MM miscellaneous     LANCETS ULTRA THIN 30G MISC     Multiple Vitamins-Minerals (CENTRUM SILVER PO)     mycophenolate (GENERIC EQUIVALENT) 500 MG tablet     NIFEdipine ER OSMOTIC (NIFEDICAL XL) 30 MG 24 hr tablet     NOVOLOG PENFILL 100 UNIT/ML soln     simvastatin (ZOCOR) 40 MG tablet     warfarin (COUMADIN) 5 MG tablet     No current facility-administered medications for this visit.      ROS   Systemic symptoms: weight stable  Eye symptoms: No eye symptoms.  Otolaryngeal symptoms: No otolaryngeal symptoms  Breast symptoms: No breast symptoms.  Cardiovascular symptoms: No cardiovascular symptoms.    Pulmonary symptoms: No pulmonary symptoms.  Gastrointestinal symptoms: No gastrointestinal symptoms.   Genitourinary symptoms: no genitourinary symptoms  Endocrine symptoms: chronic cold intolerance  Hematologic symptoms: No hematologic symptoms.  Musculoskeletal symptoms: recurrent episodes of pain which affect the third to fifth left toes; bilateral knee pain; joint stiffness  Neurological symptoms: numbness and tingling sensation b/l toes   Psychological symptoms: no psychological symptoms   Skin symptoms: split lesions of the tips of her fingers/toes - for years; has seen dermatology in the past    Family Hx   Maternal grandmother DM2. First cousin with RA. Mother,  maternal grandmother and aunt, cousin diagnosed with thyroid disorders (? hypothyroidism). Mother and maternal grandmother had breast cancer.    Personal Hx   Behavioral history: No tobacco use.  Home environment: No secondhand tobacco smoke in home.  Denies smoking, drinking alcohol or using illicit drugs. , with one child. 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  Menopause at age 59. Had regular MP in the past. No h/o bone fractures or kidney stones.    PMH   SLE dx in 2000 with flare/pericarditis and tamponade on 3/5/2010 requiring high doses of steroids.  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  Osteoporosis diagnosed 2008 started on Boniva in 2010 (heartburn from oral fosamax).   Hyperlipidemia.  Severe GERD and Achalasia.  Raynaud's  Allergy to multiple meds  Vit D def.  Post thrombophlebitic syndrome with chronic LE swelling   Dyslipidemia  Chronic leukopenia on methotrexate   L arm lymphedema   Type 1 diabetes  Prolapsed uterus   Cataract   Vale Zoster     Physical Exam   Wt Readings from Last 10 Encounters:   02/05/19 56.7 kg (125 lb)   01/08/19 54.1 kg (119 lb 4.8 oz)   07/30/18 54.1 kg (119 lb 3.2 oz)   01/29/18 54 kg (119 lb)   08/02/17 52.8 kg (116 lb 8 oz)   07/31/17 53.2 kg (117 lb 4.8 oz)   05/22/17 53.9 kg (118 lb 14.4 oz)   04/13/17 54.7 kg (120 lb 9.6 oz)   04/04/17 55 kg (121 lb 4.8 oz)   02/01/17 54.4 kg (120 lb)     /68   Pulse 71   LMP  (LMP Unknown)     General appearance: she is thin, no acute distress noted  Eyes: conjunctivae and extraocular motions are normal. Pupils are equal, round, and reactive to light. No lid lag, no stare.  HENT: oropharynx moist; neck no JVD, no bruits, no thyromegaly, no palpable nodules  Cardiovascular: regular rhythm, no murmurs, distal pulses palpable, mild L arm edema   Respiratory: chest clear, no rales, no rhonchi  Abdominal: Abdomen soft, nontender, nondistended, no organomegaly  Musculoskeletal: normal tone and  strength   Neurologic: left knee reflex decreased, normal B/L bicipital reflexes, no resting tremor  Psychiatric: affect and judgment normal   Skin: Tip of the fingers are cracked and dry   Feet:  Sensation intact to monofilament testing, mild bluish discoloration of the tips of the toes, dorsal pedis pulse palpable; onychomycosis    Lab Results  I reviewed prior lab results documented in EPIC.   Lab Results   Component Value Date    A1C 5.3 07/11/2017    A1C 5.5 06/10/2013    A1C 5.4 01/07/2013    A1C 5.5 07/02/2012    A1C 5.1 01/09/2012    HEMOGLOBINA1 5.1 07/15/2019    HEMOGLOBINA1 5.0 07/30/2018    HEMOGLOBINA1 5.0 01/29/2018    HEMOGLOBINA1 5.0 01/23/2017    HEMOGLOBINA1 5.4 07/11/2016       Hemoglobin   Date Value Ref Range Status   06/12/2019 12.7 11.7 - 15.7 g/dL Final     Hematocrit   Date Value Ref Range Status   07/09/2019 40.7 35.0 - 47.0 % Final     Cholesterol   Date Value Ref Range Status   07/09/2019 160 <200 mg/dL Final     Cholesterol/HDL Ratio   Date Value Ref Range Status   10/01/2014 1.5 0.0 - 5.0 Final     HDL Cholesterol   Date Value Ref Range Status   07/09/2019 102 >49 mg/dL Final     LDL Cholesterol Calculated   Date Value Ref Range Status   07/09/2019 46 <100 mg/dL Final     Comment:     Desirable:       <100 mg/dl     VLDL-Cholesterol   Date Value Ref Range Status   10/01/2014 7 0 - 30 mg/dL Final     Triglycerides   Date Value Ref Range Status   07/09/2019 59 <150 mg/dL Final     Albumin Urine mg/L   Date Value Ref Range Status   07/09/2019 10 mg/L Final     TSH   Date Value Ref Range Status   07/09/2019 0.99 0.40 - 4.00 mU/L Final       Last Basic Metabolic Panel:    Sodium   Date Value Ref Range Status   07/09/2019 140 133 - 144 mmol/L Final     Potassium   Date Value Ref Range Status   07/09/2019 3.9 3.4 - 5.3 mmol/L Final     Chloride   Date Value Ref Range Status   07/09/2019 107 94 - 109 mmol/L Final     Calcium   Date Value Ref Range Status   07/09/2019 8.6 8.5 - 10.1 mg/dL Final      Carbon Dioxide   Date Value Ref Range Status   07/09/2019 28 20 - 32 mmol/L Final     Urea Nitrogen   Date Value Ref Range Status   07/09/2019 23 7 - 30 mg/dL Final     Creatinine   Date Value Ref Range Status   07/09/2019 0.76 0.52 - 1.04 mg/dL Final     GFR Estimate   Date Value Ref Range Status   07/09/2019 79 >60 mL/min/[1.73_m2] Final     Comment:     Non  GFR Calc  Starting 12/18/2018, serum creatinine based estimated GFR (eGFR) will be   calculated using the Chronic Kidney Disease Epidemiology Collaboration   (CKD-EPI) equation.       Glucose   Date Value Ref Range Status   07/09/2019 93 70 - 99 mg/dL Final       AST   Date Value Ref Range Status   07/09/2019 19 0 - 45 U/L Final     ALT   Date Value Ref Range Status   07/09/2019 23 0 - 50 U/L Final     Albumin   Date Value Ref Range Status   07/09/2019 3.9 3.4 - 5.0 g/dL Final      Ref. Range 7/26/2018 06:58   GFR Estimate Latest Ref Range: >60 mL/min/1.7m2 85   Calcium Latest Ref Range: 8.5 - 10.1 mg/dL 8.5   25 OH Vit D total Latest Ref Range: 20 - 75 ug/L <81 (H)     2013 PTH 33    Assessment     1. Type 1 diabetes with very tight glycemic control, complicated by partial hypoglycemia unawareness. Historically, the patient prefers narrower blood sugar targets and this comes at the expense of hypoglycemia.  I suspect some of the hypoglycemic episodes measured by the meter might be inaccurate, as she does have significant cyanosis of the fingers due to Raynaud and appears to have poor perfusion of the fingertips. The benefit of a glucose sensor was reviewed with the patient at her last visit; however, her insurance did not end up covering the meter.     We again discussed with the patient the importance of targeting higher blood glucose preprandially, of 100-120.  Discussed about potential negative consequences of long-term recurrent episodes of hypoglycemia on the cognitive function and memory.  We recommended to try to decrease the  NovoLog dose she takes with meals to a maximum dose of 2 units.  For breakfast, which is followed by physical exercise, and has an overall very limited carbohydrate content, we advised the patient not to take any NovoLog.    2.  Osteoporosis   Risk factors for osteoporosis identified: Postmenopausal status, lupus, prior treatment with steroids, diabetes, family history.  She was treated with Boniva for 3 years, followed by Forteo for 2 years. Received one dose of Reclast in July 2014, when she completed treatment with Forteo.  Since discontinuing Forteo, the bone mineral density has either remained stable or minimally increased.   Plan:   Schedule a f/up DXA scan in 8/2020    3. Hypercholesterolemia - on simvastatin, controlled.    Return to clinic 6 months.    Orders Placed This Encounter   Procedures     Hemoglobin A1c POCT     Scribe Disclosure:   I, Susan Moore, am serving as a scribe; to document services personally performed by Dorita Cramer -based on data collection and the provider's statements to me.     Provider Disclosure:  I agree with above History, Review of Systems, Physical exam and Plan.  I have reviewed the content of the documentation and have edited it as needed. I have personally performed the services documented here and the documentation accurately represents those services and the decisions I have made.      Electronically signed by:  Dorita Cramer MD

## 2019-07-15 NOTE — PROGRESS NOTES
Siddharth Caruso is a 73 year old pleasant female with hx of SLE, APLS, DVT, osteoporosis and type 1 diabetes presenting for f/up.     1. Type 1 diabetes    Hemoglobin A1c today is 5.1%, stable since her last visit here. Glucometer readings reveal that she checks her blood sugar 4-5 times daily. Average blood glucose is 85 with a standard deviation of 15 and a range variable between 31 and 123.  83% of the blood sugar numbers are within target and 17% are below target.  On average the hypoglycemic episodes occur once or twice a week, mostly after dinner (around 5-6 pm), sometimes after lunch (around noon) and very rarely fasting, in the morning (around 4 am).  Most of the hypoglycemic episodes are mild, in the 60s.  Quite frequently, she rechecks her blood sugar at the time she is hypoglycemic and has no symptoms and the second reading is frequently higher, in the normal range.  Hemoglobin A1c today was 5.1%, up from 5% at her last visit here.    Insulin to carbohydrate ratio is 1 unit per 15-20 g for all meals.  She administers 4 U of Lantus in the morning, and 2-3 U NovoLog for breakfast, lunch and dinner.  She continues to use an echo NovoLog pen.    Diabetes complications:   No h/o microalbuminuria.  Last eye exam - she had cataract surgery on the right eye in January 2019 and on the left eye in February 2019; no DR.   Numbness and tingling sensation B/L toes - for many years but light sensation is intact. She does wear comfortable shoes/insoles. 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  She develops confusion, inability to concentrate or focus her vision and she feels extremely tired when her blood sugar is the lower 60s or 50s.  She has no prior episodes of loss of consciousness due to hypoglycemia.    She continues to exercise regularly.  She runs on a bike or elliptical machine daily, for 40 minutes, 5 times a week and she does 1 hour of yoga, 4 times a week.  She exercises in the  morning, from 5:45 to 7:45 am.  Once or twice a week she will go for a walk after lunch.     2. Osteoporosis    Jeanmarie also has a history of osteoporosis, treated with Boniva for almost 3 years, followed by Forteo which was started in 4/12 - interrupted treatment from 4/2013 and restarted in 7/2013 until July 2014. After she completed the treatment with Forteo, she received one dose of Reclast, in July 2014.      She has no history of prior bone fractures. She did have a fall last Saturday where she tripped and fell onto a soft ottoman. She did not have any injury from that event. She's been off prednisone since 2013.  Her height has decreased over the years from 5'6'' to 5'1/2''. Her mother had a hip fracture in her 80s.     On the DXA scan images from 7/1/16, L1-L3 T score was - 1.  Overall, at the spine, there was a significant increase of bone mineral density since 2005 of 10.5%. Since 2015, the bone mineral density has remained stable at spine. The lowest T score at the hip level was -2.2, at the left femoral neck.  Overall, there was a significant improvement of bone mineral density at the mean hip of 8.9% since 2005, with no significant changes since 2015. While the bone mineral density at the left hip increased since baseline, at the right hip, there was a decrease of bone mineral density since baseline and also, since prior year.    On the most recent DEXA scan from 7/27/18, the lowest T score was -2.1, at the left femoral neck.  Compared with the prior scan from 2016, the bone mineral density at the hips remained stable.  At the lumbar spine, L1-L3 T score was -1.3, not significantly changed since 2016.  The current dose of calcium and vitamin D is 500 mg plus 200 unit(s) TID (oyster shell).  The dose of vitamin D was decreased in July 2018 from 1600 to 600 units daily, as the vitamin D was elevated at 80.      Current Outpatient Medications   Medication     acetaminophen (TYLENOL) 500 MG tablet     aspirin  81 MG tablet     AYR SALINE NASAL NO-DRIP GEL     blood glucose (TRUE METRIX BLOOD GLUCOSE TEST) test strip     Calcium Carbonate-Vitamin D (CALCIUM 500 + D PO)     carboxymethylcellulose PF (REFRESH PLUS) 0.5 % SOLN ophthalmic solution     Injection Device for insulin (NOVOPEN ECHO) RORY     insulin glargine (LANTUS VIAL) 100 UNIT/ML vial     insulin pen needle (BD PMA U/F) 32G X 4 MM miscellaneous     LANCETS ULTRA THIN 30G MISC     Multiple Vitamins-Minerals (CENTRUM SILVER PO)     mycophenolate (GENERIC EQUIVALENT) 500 MG tablet     NIFEdipine ER OSMOTIC (NIFEDICAL XL) 30 MG 24 hr tablet     NOVOLOG PENFILL 100 UNIT/ML soln     simvastatin (ZOCOR) 40 MG tablet     warfarin (COUMADIN) 5 MG tablet     No current facility-administered medications for this visit.      ROS   Systemic symptoms: weight stable  Eye symptoms: No eye symptoms.  Otolaryngeal symptoms: No otolaryngeal symptoms  Breast symptoms: No breast symptoms.  Cardiovascular symptoms: No cardiovascular symptoms.    Pulmonary symptoms: No pulmonary symptoms.  Gastrointestinal symptoms: No gastrointestinal symptoms.   Genitourinary symptoms: no genitourinary symptoms  Endocrine symptoms: chronic cold intolerance  Hematologic symptoms: No hematologic symptoms.  Musculoskeletal symptoms: recurrent episodes of pain which affect the third to fifth left toes; bilateral knee pain; joint stiffness  Neurological symptoms: numbness and tingling sensation b/l toes   Psychological symptoms: no psychological symptoms   Skin symptoms: split lesions of the tips of her fingers/toes - for years; has seen dermatology in the past    Family Hx   Maternal grandmother DM2. First cousin with RA. Mother, maternal grandmother and aunt, cousin diagnosed with thyroid disorders (? hypothyroidism). Mother and maternal grandmother had breast cancer.    Personal Hx   Behavioral history: No tobacco use.  Home environment: No secondhand tobacco smoke in home.  Denies smoking, drinking  alcohol or using illicit drugs. , with one child. 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  Menopause at age 59. Had regular MP in the past. No h/o bone fractures or kidney stones.    PMH   SLE dx in 2000 with flare/pericarditis and tamponade on 3/5/2010 requiring high doses of steroids.  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  Osteoporosis diagnosed 2008 started on Boniva in 2010 (heartburn from oral fosamax).   Hyperlipidemia.  Severe GERD and Achalasia.  Raynaud's  Allergy to multiple meds  Vit D def.  Post thrombophlebitic syndrome with chronic LE swelling   Dyslipidemia  Chronic leukopenia on methotrexate   L arm lymphedema   Type 1 diabetes  Prolapsed uterus   Cataract   Vale Zoster     Physical Exam   Wt Readings from Last 10 Encounters:   02/05/19 56.7 kg (125 lb)   01/08/19 54.1 kg (119 lb 4.8 oz)   07/30/18 54.1 kg (119 lb 3.2 oz)   01/29/18 54 kg (119 lb)   08/02/17 52.8 kg (116 lb 8 oz)   07/31/17 53.2 kg (117 lb 4.8 oz)   05/22/17 53.9 kg (118 lb 14.4 oz)   04/13/17 54.7 kg (120 lb 9.6 oz)   04/04/17 55 kg (121 lb 4.8 oz)   02/01/17 54.4 kg (120 lb)     /68   Pulse 71   LMP  (LMP Unknown)     General appearance: she is thin, no acute distress noted  Eyes: conjunctivae and extraocular motions are normal. Pupils are equal, round, and reactive to light. No lid lag, no stare.  HENT: oropharynx moist; neck no JVD, no bruits, no thyromegaly, no palpable nodules  Cardiovascular: regular rhythm, no murmurs, distal pulses palpable, mild L arm edema   Respiratory: chest clear, no rales, no rhonchi  Abdominal: Abdomen soft, nontender, nondistended, no organomegaly  Musculoskeletal: normal tone and strength   Neurologic: left knee reflex decreased, normal B/L bicipital reflexes, no resting tremor  Psychiatric: affect and judgment normal   Skin: Tip of the fingers are cracked and dry   Feet:  Sensation intact to monofilament testing, mild bluish discoloration of the tips of  the toes, dorsal pedis pulse palpable; onychomycosis    Lab Results  I reviewed prior lab results documented in EPIC.   Lab Results   Component Value Date    A1C 5.3 07/11/2017    A1C 5.5 06/10/2013    A1C 5.4 01/07/2013    A1C 5.5 07/02/2012    A1C 5.1 01/09/2012    HEMOGLOBINA1 5.1 07/15/2019    HEMOGLOBINA1 5.0 07/30/2018    HEMOGLOBINA1 5.0 01/29/2018    HEMOGLOBINA1 5.0 01/23/2017    HEMOGLOBINA1 5.4 07/11/2016       Hemoglobin   Date Value Ref Range Status   06/12/2019 12.7 11.7 - 15.7 g/dL Final     Hematocrit   Date Value Ref Range Status   07/09/2019 40.7 35.0 - 47.0 % Final     Cholesterol   Date Value Ref Range Status   07/09/2019 160 <200 mg/dL Final     Cholesterol/HDL Ratio   Date Value Ref Range Status   10/01/2014 1.5 0.0 - 5.0 Final     HDL Cholesterol   Date Value Ref Range Status   07/09/2019 102 >49 mg/dL Final     LDL Cholesterol Calculated   Date Value Ref Range Status   07/09/2019 46 <100 mg/dL Final     Comment:     Desirable:       <100 mg/dl     VLDL-Cholesterol   Date Value Ref Range Status   10/01/2014 7 0 - 30 mg/dL Final     Triglycerides   Date Value Ref Range Status   07/09/2019 59 <150 mg/dL Final     Albumin Urine mg/L   Date Value Ref Range Status   07/09/2019 10 mg/L Final     TSH   Date Value Ref Range Status   07/09/2019 0.99 0.40 - 4.00 mU/L Final       Last Basic Metabolic Panel:    Sodium   Date Value Ref Range Status   07/09/2019 140 133 - 144 mmol/L Final     Potassium   Date Value Ref Range Status   07/09/2019 3.9 3.4 - 5.3 mmol/L Final     Chloride   Date Value Ref Range Status   07/09/2019 107 94 - 109 mmol/L Final     Calcium   Date Value Ref Range Status   07/09/2019 8.6 8.5 - 10.1 mg/dL Final     Carbon Dioxide   Date Value Ref Range Status   07/09/2019 28 20 - 32 mmol/L Final     Urea Nitrogen   Date Value Ref Range Status   07/09/2019 23 7 - 30 mg/dL Final     Creatinine   Date Value Ref Range Status   07/09/2019 0.76 0.52 - 1.04 mg/dL Final     GFR Estimate   Date  Value Ref Range Status   07/09/2019 79 >60 mL/min/[1.73_m2] Final     Comment:     Non  GFR Calc  Starting 12/18/2018, serum creatinine based estimated GFR (eGFR) will be   calculated using the Chronic Kidney Disease Epidemiology Collaboration   (CKD-EPI) equation.       Glucose   Date Value Ref Range Status   07/09/2019 93 70 - 99 mg/dL Final       AST   Date Value Ref Range Status   07/09/2019 19 0 - 45 U/L Final     ALT   Date Value Ref Range Status   07/09/2019 23 0 - 50 U/L Final     Albumin   Date Value Ref Range Status   07/09/2019 3.9 3.4 - 5.0 g/dL Final      Ref. Range 7/26/2018 06:58   GFR Estimate Latest Ref Range: >60 mL/min/1.7m2 85   Calcium Latest Ref Range: 8.5 - 10.1 mg/dL 8.5   25 OH Vit D total Latest Ref Range: 20 - 75 ug/L <81 (H)     2013 PTH 33    Assessment     1. Type 1 diabetes with very tight glycemic control, complicated by partial hypoglycemia unawareness. Historically, the patient prefers narrower blood sugar targets and this comes at the expense of hypoglycemia.  I suspect some of the hypoglycemic episodes measured by the meter might be inaccurate, as she does have significant cyanosis of the fingers due to Raynaud and appears to have poor perfusion of the fingertips. The benefit of a glucose sensor was reviewed with the patient at her last visit; however, her insurance did not end up covering the meter.     We again discussed with the patient the importance of targeting higher blood glucose preprandially, of 100-120.  Discussed about potential negative consequences of long-term recurrent episodes of hypoglycemia on the cognitive function and memory.  We recommended to try to decrease the NovoLog dose she takes with meals to a maximum dose of 2 units.  For breakfast, which is followed by physical exercise, and has an overall very limited carbohydrate content, we advised the patient not to take any NovoLog.    2.  Osteoporosis   Risk factors for osteoporosis identified:  Postmenopausal status, lupus, prior treatment with steroids, diabetes, family history.  She was treated with Boniva for 3 years, followed by Forteo for 2 years. Received one dose of Reclast in July 2014, when she completed treatment with Forteo.  Since discontinuing Forteo, the bone mineral density has either remained stable or minimally increased.   Plan:   Schedule a f/up DXA scan in 8/2020    3. Hypercholesterolemia - on simvastatin, controlled.    Return to clinic 6 months.    Orders Placed This Encounter   Procedures     Hemoglobin A1c POCT     Scribe Disclosure:   I, Susan Moore, am serving as a scribe; to document services personally performed by Dorita Cramer -based on data collection and the provider's statements to me.     Provider Disclosure:  I agree with above History, Review of Systems, Physical exam and Plan.  I have reviewed the content of the documentation and have edited it as needed. I have personally performed the services documented here and the documentation accurately represents those services and the decisions I have made.      Electronically signed by:  Dorita Cramer MD

## 2019-07-17 ENCOUNTER — ANTICOAGULATION THERAPY VISIT (OUTPATIENT)
Dept: ANTICOAGULATION | Facility: CLINIC | Age: 74
End: 2019-07-17

## 2019-07-17 DIAGNOSIS — Z79.01 LONG TERM CURRENT USE OF ANTICOAGULANT THERAPY: ICD-10-CM

## 2019-07-17 LAB
INR PPP: 2.88 (ref 0.86–1.14)
PROTHROM ACT/NOR PPP: 17 % (ref 60–140)

## 2019-07-17 NOTE — PROGRESS NOTES
ANTICOAGULATION FOLLOW-UP CLINIC VISIT    Patient Name:  Shala Caruso  Date:  2019  Contact Type:  Telephone    SUBJECTIVE:         OBJECTIVE    INR   Date Value Ref Range Status   2019 2.88 (H) 0.86 - 1.14 Final     Chromogenic Factor 10   Date Value Ref Range Status   2018 16%  Final     Factor 2 Assay   Date Value Ref Range Status   2019 17 (L) 60 - 140 % Final       ASSESSMENT / PLAN  INR assessment THER    Recheck INR In: 2 WEEKS    INR Location Clinic      Anticoagulation Summary  As of 2019    INR goal:      TTR:   71.7 % (3.1 y)   Prior goal:   2.0-3.0   INR used for dosin.88 (2019)   Warfarin maintenance plan:   No maintenance plan   Full warfarin instructions:   : 10 mg; : 10 mg; : 10 mg; : 10 mg; : 10 mg; : 12.5 mg; : 10 mg; : 10 mg; : 10 mg; : 10 mg; : 10 mg; : 10 mg; : 12.5 mg; : 10 mg   Plan last modified:   Shant Mcduffie, [facility] (7/10/2019)   Next INR check:   2019   Priority:   Factor 2   Target end date:       Indications    SLE (systemic lupus erythematosus) (H) (Resolved) [M32.9]  Long term current use of anticoagulant therapy [Z79.01]             Anticoagulation Episode Summary     INR check location:   Clinic Lab    Preferred lab:       Send INR reminders to:   [facility]    Comments:   Factor 2  goal range is 15-25%  Ok to leave message on home [phone] and work voicemail, but call [email or web address] per pt request.  OK to leave message at office  May leave messages with Rodriguez Santos  DO NOT GIVE RECORDS TO EMPLOYER U        Anticoagulation Care Providers     Provider Role Specialty Phone number    Mt Kiser MD Responsible Clinical Cardiac Electrophysiology [phone]            See the Encounter Report to view Anticoagulation Flowsheet and Dosing Calendar (Go to Encounters tab in chart review, and find the Anticoagulation Therapy Visit)  Left message for patient with results  and dosing recommendations. Asked patient to call back to report any missed doses, falls, signs and symptoms of bleeding or clotting, any changes in health, medication, or diet. Asked patient to call back with any questions or concerns.  Factor 2=17%  Factor 2 Goal range=15-25%  Portia Cherry RN

## 2019-07-31 ENCOUNTER — ANTICOAGULATION THERAPY VISIT (OUTPATIENT)
Dept: ANTICOAGULATION | Facility: CLINIC | Age: 74
End: 2019-07-31

## 2019-07-31 DIAGNOSIS — Z79.01 LONG TERM CURRENT USE OF ANTICOAGULANT THERAPY: ICD-10-CM

## 2019-07-31 LAB
FACTOR 2 ASSAY: NORMAL
INR PPP: 2.82 (ref 0.86–1.14)
PROTHROM ACT/NOR PPP: 15 % (ref 60–140)

## 2019-07-31 NOTE — PROGRESS NOTES
ANTICOAGULATION FOLLOW-UP CLINIC VISIT    Patient Name:  hSala Caruso  Date:  7/31/2019  Contact Type:  Telephone    SUBJECTIVE:  Patient Findings     Comments:   Factor 2 is 15% on 7/31.        Clinical Outcomes     Comments:   Factor 2 is 15% on 7/31.           OBJECTIVE    INR   Date Value Ref Range Status   07/31/2019 2.82 (H) 0.86 - 1.14 Final     Chromogenic Factor 10   Date Value Ref Range Status   11/21/2018 16%  Final     Factor 2 Assay   Date Value Ref Range Status   07/31/2019 15 (L) 60 - 140 % Final       ASSESSMENT / PLAN  INR assessment THER    Recheck INR In: 2 WEEKS    INR Location Clinic      Anticoagulation Summary  As of 7/31/2019    INR goal:      TTR:   72.0 % (3.1 y)   Prior goal:   2.0-3.0   INR used for dosing:   No new INR was available at the time of this encounter.   Warfarin maintenance plan:   12.5 mg (5 mg x 2.5) every Mon; 10 mg (5 mg x 2) all other days   Full warfarin instructions:   12.5 mg every Mon; 10 mg all other days   Weekly warfarin total:   72.5 mg   Plan last modified:   Andre Bledsoe, RN (7/31/2019)   Next INR check:   8/15/2019   Priority:   Factor 2   Target end date:       Indications    SLE (systemic lupus erythematosus) (H) (Resolved) [M32.9]  Long term current use of anticoagulant therapy [Z79.01]             Anticoagulation Episode Summary     INR check location:   Clinic Lab    Preferred lab:       Send INR reminders to:   [facility]    Comments:   Factor 2  goal range is 15-25%  Ok to leave message on home [phone] and work voicemail, but call [id number] per pt request.  OK to leave message at office  May leave messages with Rodriguez Santos  DO NOT GIVE RECORDS TO EMPLOYER U        Anticoagulation Care Providers     Provider Role Specialty Phone number    Mt Kiser MD Responsible Clinical Cardiac Electrophysiology [phone]            See the Encounter Report to view Anticoagulation Flowsheet and Dosing Calendar (Go to Encounters tab in  chart review, and find the Anticoagulation Therapy Visit)    Left message for patient.    INR/CFX/F2 RESULT: Factor 2 is 15% on 7/31    ASSESSMENT:Unable to assess patient.  Left message.    DOSING ADJUSTMENT: continue current maintenance dose    NEXT INR/FACTOR X OR FACTOR II:  8/15/19    PROTOCOL FOLLOWED: Factor II    Andre Bledsoe RN

## 2019-08-15 ENCOUNTER — ANCILLARY PROCEDURE (OUTPATIENT)
Dept: MAMMOGRAPHY | Facility: CLINIC | Age: 74
End: 2019-08-15
Attending: INTERNAL MEDICINE
Payer: COMMERCIAL

## 2019-08-15 DIAGNOSIS — Z12.31 VISIT FOR SCREENING MAMMOGRAM: ICD-10-CM

## 2019-08-15 DIAGNOSIS — Z79.01 LONG TERM CURRENT USE OF ANTICOAGULANT THERAPY: ICD-10-CM

## 2019-08-15 DIAGNOSIS — M32.15 SYSTEMIC LUPUS ERYTHEMATOSUS WITH TUBULO-INTERSTITIAL NEPHROPATHY, UNSPECIFIED SLE TYPE (H): ICD-10-CM

## 2019-08-15 DIAGNOSIS — Z79.899 HIGH RISK MEDICATIONS (NOT ANTICOAGULANTS) LONG-TERM USE: ICD-10-CM

## 2019-08-15 LAB — INR PPP: 2.9 (ref 0.86–1.14)

## 2019-08-16 ENCOUNTER — ANTICOAGULATION THERAPY VISIT (OUTPATIENT)
Dept: ANTICOAGULATION | Facility: CLINIC | Age: 74
End: 2019-08-16

## 2019-08-16 DIAGNOSIS — Z79.01 LONG TERM CURRENT USE OF ANTICOAGULANT THERAPY: ICD-10-CM

## 2019-08-16 LAB — PROTHROM ACT/NOR PPP: 16 % (ref 60–140)

## 2019-08-16 NOTE — PROGRESS NOTES
ANTICOAGULATION FOLLOW-UP CLINIC VISIT    Patient Name:  Shala Caruso  Date:  2019  Contact Type:  Telephone    SUBJECTIVE:         OBJECTIVE    INR   Date Value Ref Range Status   08/15/2019 2.90 (H) 0.86 - 1.14 Final     Chromogenic Factor 10   Date Value Ref Range Status   2018 16%  Final     Factor 2 Assay   Date Value Ref Range Status   08/15/2019 16 (L) 60 - 140 % Final       ASSESSMENT / PLAN  INR assessment THER    Recheck INR In: 4 WEEKS    INR Location Clinic      Anticoagulation Summary  As of 2019    INR goal:      TTR:   72.4 % (3.2 y)   Prior goal:   2.0-3.0   INR used for dosin.90 (8/15/2019)   Warfarin maintenance plan:   12.5 mg (5 mg x 2.5) every Mon; 10 mg (5 mg x 2) all other days   Full warfarin instructions:   12.5 mg every Mon; 10 mg all other days   Weekly warfarin total:   72.5 mg   Plan last modified:   Andre Bledsoe RN (2019)   Next INR check:   2019   Priority:   Factor 2   Target end date:       Indications    SLE (systemic lupus erythematosus) (H) (Resolved) [M32.9]  Long term current use of anticoagulant therapy [Z79.01]             Anticoagulation Episode Summary     INR check location:   Clinic Lab    Preferred lab:       Send INR reminders to:   [facility]    Comments:   Factor 2  goal range is 15-25%  Ok to leave message on home [phone] and work voicemail, but call [id number] per pt request.  OK to leave message at office  May leave messages with Rodriguez Santos  DO NOT GIVE RECORDS TO EMPLOYER U        Anticoagulation Care Providers     Provider Role Specialty Phone number    Mt Kiser MD Responsible Clinical Cardiac Electrophysiology [phone]            See the Encounter Report to view Anticoagulation Flowsheet and Dosing Calendar (Go to Encounters tab in chart review, and find the Anticoagulation Therapy Visit)  Spoke with patient.  Factor 2=16%.  Goal range =15-25%.    Portia Cherry RN

## 2019-08-26 DIAGNOSIS — E78.49 OTHER HYPERLIPIDEMIA: ICD-10-CM

## 2019-08-27 DIAGNOSIS — M32.9 SYSTEMIC LUPUS ERYTHEMATOSUS (H): Primary | ICD-10-CM

## 2019-08-27 RX ORDER — WARFARIN SODIUM 5 MG/1
TABLET ORAL
Qty: 200 TABLET | Refills: 1 | Status: SHIPPED | OUTPATIENT
Start: 2019-08-27 | End: 2021-04-21

## 2019-08-27 RX ORDER — SIMVASTATIN 40 MG
TABLET ORAL
Qty: 90 TABLET | Refills: 3 | OUTPATIENT
Start: 2019-08-27

## 2019-09-04 ENCOUNTER — TELEPHONE (OUTPATIENT)
Dept: ENDOCRINOLOGY | Facility: CLINIC | Age: 74
End: 2019-09-04

## 2019-09-04 DIAGNOSIS — E78.49 OTHER HYPERLIPIDEMIA: ICD-10-CM

## 2019-09-04 RX ORDER — SIMVASTATIN 40 MG
40 TABLET ORAL AT BEDTIME
Qty: 90 TABLET | Refills: 2 | Status: SHIPPED | OUTPATIENT
Start: 2019-09-04 | End: 2020-05-28

## 2019-09-04 NOTE — TELEPHONE ENCOUNTER
[facility] Call Center    Phone Message    May a detailed message be left on voicemail: yes    Reason for Call: Medication Refill Request    Has the patient contacted the pharmacy for the refill? Yes   Name of medication being requested: simvastatin (ZOCOR) 40 MG -- Pt would like a 90 days supply  Provider who prescribed the medication: Dr. Cramer   Pharmacy:    [facility] [place] - [id number]    Date medication is needed: Pt has 2 weeks left.          Action Taken: Message routed to:  [facility] ([facility] Endo

## 2019-09-11 ENCOUNTER — OFFICE VISIT (OUTPATIENT)
Dept: OPHTHALMOLOGY | Facility: CLINIC | Age: 74
End: 2019-09-11
Attending: OPHTHALMOLOGY
Payer: COMMERCIAL

## 2019-09-11 DIAGNOSIS — H31.8 CHOROIDAL LESION: ICD-10-CM

## 2019-09-11 DIAGNOSIS — D31.32 NEVUS OF CHOROID OF LEFT EYE: ICD-10-CM

## 2019-09-11 PROCEDURE — 76512 OPH US DX B-SCAN: CPT | Mod: LT | Performed by: OPHTHALMOLOGY

## 2019-09-11 PROCEDURE — 92134 CPTRZ OPH DX IMG PST SGM RTA: CPT | Mod: ZF | Performed by: OPHTHALMOLOGY

## 2019-09-11 PROCEDURE — G0463 HOSPITAL OUTPT CLINIC VISIT: HCPCS | Mod: ZF

## 2019-09-11 ASSESSMENT — REFRACTION_WEARINGRX
OD_AXIS: 150
SPECS_TYPE: TRIFOCAL
OD_CYLINDER: +0.50
OS_CYLINDER: +0.25
OD_ADD: +2.75
OS_AXIS: 170
OS_ADD: +2.75
OD_SPHERE: -0.25
OS_SPHERE: -0.50

## 2019-09-11 ASSESSMENT — CONF VISUAL FIELD
OS_NORMAL: 1
OD_NORMAL: 1

## 2019-09-11 ASSESSMENT — CUP TO DISC RATIO
OS_RATIO: 0.3
OD_RATIO: 0.3

## 2019-09-11 ASSESSMENT — EXTERNAL EXAM - LEFT EYE: OS_EXAM: NORMAL

## 2019-09-11 ASSESSMENT — TONOMETRY
OS_IOP_MMHG: 13
IOP_METHOD: TONOPEN
OD_IOP_MMHG: 13

## 2019-09-11 ASSESSMENT — SLIT LAMP EXAM - LIDS
COMMENTS: NORMAL
COMMENTS: NORMAL

## 2019-09-11 ASSESSMENT — VISUAL ACUITY
CORRECTION_TYPE: GLASSES
OS_CC: 20/30
OD_CC: 20/20
METHOD: SNELLEN - LINEAR

## 2019-09-11 ASSESSMENT — EXTERNAL EXAM - RIGHT EYE: OD_EXAM: NORMAL

## 2019-09-11 NOTE — NURSING NOTE
Chief Complaints and History of Present Illnesses   Patient presents with     Follow Up     Chief Complaint(s) and History of Present Illness(es)     Follow Up     Laterality: left eye    Onset: 6 months ago              Comments     Choroidal pigmented lesion, left eye-  Pt. States that she is doing well.  No change in VA BE.  No pain BE.  Donna Fallon COT 1:44 PM September 11, 2019

## 2019-09-11 NOTE — PROGRESS NOTES
CC: 3 months status post Cataract extraction intraocular lens left eye 02/05/19     HPI: 73 year old F with  hx of DM type I. Denies flashes and floaters. Last hgA1c 5    Past med hx: SLE currently on cellcept (allergic to plaq)  Past ocular hx: none     OCULAR IMAGING  Optical Coherence Tomography 9-11-19  Right eye normal foveal contour, no srf/irf  Left eye normal foveal contour,   OCT thru lesion less than 1 mm thick with drusen overlying, no srf    PHOTOS 8-9-18  Right eye   Left eye  Choroidal pigmented lesion with drusen     U/S left eye   9/14/16 C1 0.56 mm; C2 5.46 mm  8-30-17 C1 1.3 mm; C2   8-9-18 less than 1 mm ashwini lesion. 0.73mm x 5.15T x 5.03L  9-11-19 Minimally elevated lesion <1 mm in height appears stable / unchanged on U/S today. Negative for posterior shadowing or patent extension / excavation.     A/P:   1) DM type I   - no evidence of DR   - good glucose control    2) Pseudophakia both eyes   - doing well, lens centered    3) Myopic astigmatism, OU   - updated Rx      4) Choroidal pigmented lesion, left eye  - no orange pigmentation, no subretinal fluid, + drusen   - ultrasound 08/09/18  less than 1 mm height, normal retrobulbar echo pattern  - stable on exam      5) patient with history of Lupus  Took prednisolone for 10 yrs --> patient developed Diabetes mellitus  Currently Micophenolate  No lupus retinopahty  Patient had DVT left upper leg--> patient on coumadin    6) history of Post operative ocular surface irritation   - resolved  - stop predhealon several months ago  - continue artificial tears  As needed     return to clinic: f/u in 12 months, repeat oct enhanced depth image of the choroidal peripheral lesion , macula Optical Coherence Tomography and  ultrasound left eye     ~~~~~~~~~~~~~~~~~~~~~~~~~~~~~~~~~~   Complete documentation of historical and exam elements from today's encounter can be found in the full encounter summary report (not reduplicated in this progress note).  I  personally obtained the chief complaint(s) and history of present illness.  I confirmed and edited as necessary the review of systems, past medical/surgical history, family history, social history, and examination findings as documented by others; and I examined the patient myself.  I personally reviewed the relevant tests, images, and reports as documented above.  I formulated and edited as necessary the assessment and plan and discussed the findings and management plan with the patient and family    Monika Fermin MD   of Ophthalmology.  Retina Service   Department of Ophthalmology and Visual Neurosciences   [facility]  Phone: [phone]   Fax: [phone]

## 2019-09-12 DIAGNOSIS — Z79.899 HIGH RISK MEDICATIONS (NOT ANTICOAGULANTS) LONG-TERM USE: ICD-10-CM

## 2019-09-12 DIAGNOSIS — M32.15 SYSTEMIC LUPUS ERYTHEMATOSUS WITH TUBULO-INTERSTITIAL NEPHROPATHY, UNSPECIFIED SLE TYPE (H): ICD-10-CM

## 2019-09-12 DIAGNOSIS — Z79.01 LONG TERM CURRENT USE OF ANTICOAGULANT THERAPY: ICD-10-CM

## 2019-09-12 LAB
ALT SERPL W P-5'-P-CCNC: 25 U/L (ref 0–50)
AST SERPL W P-5'-P-CCNC: 20 U/L (ref 0–45)
ERYTHROCYTE [DISTWIDTH] IN BLOOD BY AUTOMATED COUNT: 15.5 % (ref 10–15)
HCT VFR BLD AUTO: 41 % (ref 35–47)
HGB BLD-MCNC: 12.6 G/DL (ref 11.7–15.7)
INR PPP: 2.61 (ref 0.86–1.14)
MCH RBC QN AUTO: 28.6 PG (ref 26.5–33)
MCHC RBC AUTO-ENTMCNC: 30.7 G/DL (ref 31.5–36.5)
MCV RBC AUTO: 93 FL (ref 78–100)
PLATELET # BLD AUTO: 241 10E9/L (ref 150–450)
RBC # BLD AUTO: 4.41 10E12/L (ref 3.8–5.2)
WBC # BLD AUTO: 3.6 10E9/L (ref 4–11)

## 2019-09-13 ENCOUNTER — ANTICOAGULATION THERAPY VISIT (OUTPATIENT)
Dept: ANTICOAGULATION | Facility: CLINIC | Age: 74
End: 2019-09-13

## 2019-09-13 DIAGNOSIS — Z79.01 LONG TERM CURRENT USE OF ANTICOAGULANT THERAPY: ICD-10-CM

## 2019-09-13 LAB — PROTHROM ACT/NOR PPP: 17 % (ref 60–140)

## 2019-09-13 NOTE — PROGRESS NOTES
ANTICOAGULATION FOLLOW-UP CLINIC VISIT    Patient Name:  Shala Caruso  Date:  2019  Contact Type:  Telephone    SUBJECTIVE:         OBJECTIVE    INR   Date Value Ref Range Status   2019 2.61 (H) 0.86 - 1.14 Final     Chromogenic Factor 10   Date Value Ref Range Status   2018 16%  Final     Factor 2 Assay   Date Value Ref Range Status   2019 17 (L) 60 - 140 % Final       ASSESSMENT / PLAN  INR assessment THER    Recheck INR In: 5 WEEKS    INR Location Clinic      Anticoagulation Summary  As of 2019    INR goal:      TTR:   73.0 % (3.2 y)   Prior goal:   2.0-3.0   INR used for dosin.61 (2019)   Warfarin maintenance plan:   12.5 mg (5 mg x 2.5) every Mon; 10 mg (5 mg x 2) all other days   Full warfarin instructions:   12.5 mg every Mon; 10 mg all other days   Weekly warfarin total:   72.5 mg   Plan last modified:   Andre Bledsoe RN (2019)   Next INR check:   10/15/2019   Priority:   Factor 2   Target end date:       Indications    SLE (systemic lupus erythematosus) (H) (Resolved) [M32.9]  Long term current use of anticoagulant therapy [Z79.01]             Anticoagulation Episode Summary     INR check location:   Clinic Lab    Preferred lab:       Send INR reminders to:   [facility]    Comments:   Factor 2  goal range is 15-25%  Ok to leave message on home [phone] and work voicemail, but call [id number] per pt request.  OK to leave message at office  May leave messages with Rodriguez Santos  DO NOT GIVE RECORDS TO EMPLOYER [employer]        Anticoagulation Care Providers     Provider Role Specialty Phone number    Mt Ksier MD Responsible Clinical Cardiac Electrophysiology [phone]            See the Encounter Report to view Anticoagulation Flowsheet and Dosing Calendar (Go to Encounters tab in chart review, and find the Anticoagulation Therapy Visit)  Spoke with patient.  Factor 2=17%.  Goal range=15-25%.    Portia Cherry RN

## 2019-09-16 DIAGNOSIS — Z79.899 ENCOUNTER FOR LONG-TERM CURRENT USE OF MEDICATION: ICD-10-CM

## 2019-09-16 DIAGNOSIS — D68.61 ANTIPHOSPHOLIPID SYNDROME (H): ICD-10-CM

## 2019-09-17 RX ORDER — MYCOPHENOLATE MOFETIL 500 MG/1
TABLET ORAL
Qty: 360 TABLET | Refills: 0 | Status: SHIPPED | OUTPATIENT
Start: 2019-09-17 | End: 2019-10-15

## 2019-09-17 NOTE — TELEPHONE ENCOUNTER
Mycophenalate 1000 mg BID       Last Written Prescription Date:  3/21/2019  Last Fill Quantity: 360,   # refills: 3  Last Office Visit: 3/21/2019  Future Office visit:  10/15/2109    CBC RESULTS:   Recent Labs   Lab Test 09/12/19  1242   WBC 3.6*   RBC 4.41   HGB 12.6   HCT 41.0   MCV 93   MCH 28.6   MCHC 30.7*   RDW 15.5*          Creatinine   Date Value Ref Range Status   07/09/2019 0.76 0.52 - 1.04 mg/dL Final   ]    Liver Function Studies -   Recent Labs   Lab Test 09/12/19  1242 07/09/19  0636   PROTTOTAL  --  6.7*   ALBUMIN  --  3.9   BILITOTAL  --  0.4   ALKPHOS  --  40   AST 20 19   ALT 25 23       Routing refill request to provider for review/approval because:  DMARD

## 2019-09-19 ENCOUNTER — MYC REFILL (OUTPATIENT)
Dept: RHEUMATOLOGY | Facility: CLINIC | Age: 74
End: 2019-09-19

## 2019-09-19 DIAGNOSIS — D68.61 ANTIPHOSPHOLIPID SYNDROME (H): ICD-10-CM

## 2019-09-19 DIAGNOSIS — Z79.899 ENCOUNTER FOR LONG-TERM CURRENT USE OF MEDICATION: ICD-10-CM

## 2019-09-19 RX ORDER — MYCOPHENOLATE MOFETIL 500 MG/1
TABLET ORAL
Qty: 360 TABLET | Refills: 0 | Status: CANCELLED | OUTPATIENT
Start: 2019-09-19

## 2019-10-04 ENCOUNTER — HEALTH MAINTENANCE LETTER (OUTPATIENT)
Age: 74
End: 2019-10-04

## 2019-10-14 DIAGNOSIS — I10 HTN (HYPERTENSION): ICD-10-CM

## 2019-10-14 DIAGNOSIS — K22.0 ACHALASIA OF ESOPHAGUS: ICD-10-CM

## 2019-10-14 NOTE — PROGRESS NOTES
[facility]  Rheumatology Clinic  Tato Agarwal MD  10/15/2019     Name: Shala Caruso  MRN: [id number]  Age: 73 year old  : 1945  Referring provider: Joe Contreras     Assessment and Plan:    Systemic lupus erythematosus (+CRISTAL, +dsDNA, hypocomplementemia; Raynaud's, arthritis, serositis, tubulo-interstitial nephropathy, pericardial effusion/tamponade):  Pt relates only mild knee pain that improves with movement and stretching, as well as mild loss of sensation in her toes. The latter may be significant for Raynauds and/or diabetic neuropathy. Physical exam revealed 1+ edema of both legs (mid-tibia) and no signs of joint inflammation.  2019 CBC showed WBC of 3.6, slightly improved from previous.  No evidence of thrombocytopenia or anemia.  AST and ALT were normal.  2019 C3 was 63, slightly depressed from previous and C4 was 12, depressed but stable. dsDNA in  was 1, down from 2015.  Renal function as set by creatinine was normal in 2019.    SLE is stable by history and exam. I think that disease is well compensated on cellcept monotherapy. She has been taking cellcept (dose?) since  and reports tolerating it well without any side-effects.  I recommend that she stay on this dose unless her symptoms change. Because she hasn't had a urinalysis done sine 2016, we will repeat this today to assess renal status.    # DVT/PE, on anticoagulation  # Peripheral neuropathy    I saw this patient with the medical student, who acted as scribe for me. I agree with the findings and recommendations.    Tato Agarwal M.D.  [facility]  Pager [phone]      Follow-up: I recommend that Ms. Caruso follows up in clinic in 6mo.    HPI:   Shala Caruso is a 73 year old female with a history of DM, SLE, PE on Coumadin, and HTN who presents for evaluation of lupus.  She is a previous patient of Dr. Corbett and was last seen in 2019.  At that time she  was doing well without no significant symptoms of inflammation.  Plan was to continue Cellcept 1000 mg twice daily.     Summary of previous history:  SLE diagnosed in 2000 (+CRISTAL, +dsDNA ab, arthritis, serositis).  She has antiphospholipid syndrome and had DVT and PE in 2004, on Coumadin since then.  She had a Lupus flare in 2010 initially treated with Plaquenil however she worsened and developed pericardial effusion/tamponade - started on MTX and tapering dose of prednisone at that time (continued on about 10 mg daily).  Cellcept started 6/2010, Prednisone tapered off.  MTX stopped May 2012.    She reports mild tingling and cold sensation in her toes which she attributes to diabetic neuropathy.  She also has mild but constant soreness in both knees.  This is better with movement and she attributes this to osteoarthritis.  She does yoga every morning and feels this helps her knees.  Other than her knees, she has not had significant joint issues in the past several months.  She denies any rash, mouth sores, chest pain, shortness of breath, or new vision changes.    She also notes chronic changes of her fingernails which has not changed recently.  She has not sought treatment for this.    Review of Systems:   Pertinent items are noted in HPI or as below, remainder of complete ROS is negative.      No recent problems with hearing or vision. No swallowing problems.   No breathing difficulty, shortness of breath, coughing, or wheezing  No chest pain or palpitations  No heart burn, indigestion, abdominal pain, nausea, vomiting, diarrhea  No urination problems, no bloody, cloudy urine, no dysuria  No tingling, weakness  No headaches or confusion  No rashes. No easy bleeding or bruising.     Active Medications:      acetaminophen (TYLENOL) 500 MG tablet, Take 1,000-1,500 mg by mouth nightly as needed , Disp: , Rfl:      aspirin 81 MG tablet, Take 81 mg by mouth At Bedtime , Disp: , Rfl:      AYR SALINE NASAL NO-DRIP GEL, Use  as needed for nasal dryness, Disp: 3 Tube, Rfl: 3     Calcium Carbonate-Vitamin D (CALCIUM 500 + D PO), Take 1 tablet by mouth 3 times daily, Disp: , Rfl:      carboxymethylcellulose PF (REFRESH PLUS) 0.5 % SOLN ophthalmic solution, Place 1 drop into both eyes as needed, Disp: , Rfl:      insulin glargine (LANTUS VIAL) 100 UNIT/ML vial, Inject 4 units as directed daily LANTUS SOLOSTAR PEN PLEASE, Disp: 15 mL, Rfl: 3     Multiple Vitamins-Minerals (CENTRUM SILVER PO), Take 1 tablet by mouth daily., Disp: , Rfl:      mycophenolate (GENERIC EQUIVALENT) 500 MG tablet, TAKE 2 TABLETS(1,000 MG TOTAL) BY MOUTH TWICE DAILY IN THE MORNING AND EVENING, Disp: 360 tablet, Rfl: 0     NIFEdipine ER OSMOTIC (NIFEDICAL XL) 30 MG 24 hr tablet, Take 1 tablet (30 mg) by mouth daily Call clinic to schedule MD APPT., Disp: 90 tablet, Rfl: 2     NOVOLOG PENFILL 100 UNIT/ML soln, Inject 1 unit per 15 grams CHO with meals TID approx 15 units daily, Disp: 15 mL, Rfl: 3     simvastatin (ZOCOR) 40 MG tablet, Take 1 tablet (40 mg) by mouth At Bedtime, Disp: 90 tablet, Rfl: 2     warfarin (COUMADIN) 5 MG tablet, Take 12.5mg on Monday, and 10mg ROW, or as directed by the Medication Monitoring [facility]., Disp: 200 tablet, Rfl: 1     warfarin (COUMADIN) 5 MG tablet, Take 2-2.5 tablets daily or as directed by coumadin clinic., Disp: 225 tablet, Rfl: 2      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   Raynaud's disease  Antiphospholipid syndrome   Type 1 diabetes mellitus   Osteoporosis   Hypertension   Hyperlipidemia   Achalasia   Gastroesophageal reflux disease   Choroidal lesion  Atrophic vaginitis   Urinary urgency   Female stress incontinence   Cystocele, midline   Deep vein thrombosis   Nonsenile cataract   Myopia   Neuropathy   Lymphedema      Past Surgical  History:  Phacoemulsification clear cornea with intraocular lens implantation, right 1/8/19, left 2/5/19  Transvaginal sling 12/20/16  Axilla lymph node dissection 2004  Bilateral tubal ligation     Family History:   Glaucoma - father   Breast cancer   Stroke       Social History:   Tobacco Use: No previous or current tobacco use.   Alcohol Use: No alcohol use.   Occupation: record keeping at the [employer]  PCP: Joe Contreras      Physical Exam:   /74 (BP Location: Right arm, Patient Position: Sitting, Cuff Size: Adult Regular)   Pulse 66   Temp 97.5  F (36.4  C)   LMP  (LMP Unknown)   SpO2 98%    Wt Readings from Last 4 Encounters:   02/05/19 56.7 kg (125 lb)   01/08/19 54.1 kg (119 lb 4.8 oz)   07/30/18 54.1 kg (119 lb 3.2 oz)   01/29/18 54 kg (119 lb)     Constitutional: Well-developed, appearing stated age; cooperative  Eyes: Normal EOM, PERRLA, vision, conjunctiva, sclera  ENT: Normal external ears, nose, hearing, lips, teeth, gums, throat. No mucous membrane lesions, normal saliva pool  Neck: No mass or thyroid enlargement  Resp: Lungs clear to auscultation  CV: RRR, no murmurs, rubs or gallops. 1+ edema on the left leg to mid-tibia more so than on the right.    GI: No ABD mass or tenderness, no HSM  : Not tested  Lymph: No cervical, supraclavicular, inguinal or epitrochlear nodes  MS: The TMJ, neck, shoulder, elbow, wrist, MCP/PIP/DIP, spine, hip, knee, ankle, and foot MTP/IP joints were examined and found normal. No active synovitis or altered joint anatomy. Full joint ROM. Normal  strength. No dactylitis,  tenosynovitis, enthesopathy. No inflammatory changes in the fingers, warm and well-perfused.  Skin: No alopecia, rash, nodules or lesions. Dysmorphia and nail thickening on both thumbs, 2nd,3rd, and 5th nails on the right hand.  Dusky discoloration of the toes but good capillary refill and bounding dorsalis pedis pulses.  Neuro: Normal cranial nerves, strength, sensation.   Psych:  Normal judgement, orientation, memory, affect.     Laboratory:   RHEUM RESULTS Latest Ref Rng & Units 6/12/2019 7/9/2019 9/12/2019   COMPLEMENT C3 76 - 169 mg/dL 63(L) - -   COMPLEMENT C4 15 - 50 mg/dL 12(L) - -   DNA-DS <10 IU/mL 1 - -   SED RATE 0 - 30 mm/h - - -   CRP, INFLAMMATION 0.0 - 8.0 mg/L - - -   CYCLIC CIT PEPT IGG <5 U/mL - - -   CK TOTAL 30 - 225 U/L - - -   RHEUMATOID FACTOR 0 - 14 IU/mL - - -   CRISTAL SCREEN BY EIA 0 - 1.0 - - -   AST 0 - 45 U/L 19 19 20   ALT 0 - 50 U/L 25 23 25   ALBUMIN 3.4 - 5.0 g/dL - 3.9 -   WBC 4.0 - 11.0 10e9/L 3.1(L) - 3.6(L)   RBC 3.8 - 5.2 10e12/L 4.39 - 4.41   HGB 11.7 - 15.7 g/dL 12.7 - 12.6   HCT 35.0 - 47.0 % 40.6 40.7 41.0   MCV 78 - 100 fl 93 - 93   MCHC 31.5 - 36.5 g/dL 31.3(L) - 30.7(L)   RDW 10.0 - 15.0 % 15.6(H) - 15.5(H)    - 450 10e9/L 218 - 241   CREATININE 0.52 - 1.04 mg/dL 0.79 0.76 -   GFR ESTIMATE, IF BLACK >60 mL/min/[1.73:m2] 87 >90 -   GFR ESTIMATE >60 mL/min/[1.73:m2] 75 79 -   HEPATITIS C ANTIBODY NR - - -     Rheumatoid Factor   Date Value Ref Range Status   01/14/2009 8 0 - 14 IU/mL Final     Cyclic Citrullinated Peptide IgG   Date Value Ref Range Status   01/14/2009 <2 <5 U/mL Final     Comment:     Interpretation:  Negative     Ribonucleic Protein IgG Antibody   Date Value Ref Range Status   01/24/2007 12  Final     Comment:     Reference range: 0 to 49  Unit: Units  (Note)  REFERENCE INTERVAL: Ribonucleic Protein (VIKRAM), IgG   Less than 20 Units ........ None detected   20 - 49 Units ............. Inconclusive   50 Units or greater ....... Positive    RNP antibody is seen in % of mixed connective tissue  disease and is considered specific for this syndrome if  other antibodies are negative. RNP is also present in  20-30% of SLE and 15-25% of progressive systemic  sclerosis.  The above test was performed at: [facility],  [address] [place] [address]  [id number]  [phone]  [email or web address]     SSA (RO) Antibody IgG   Date Value Ref Range  Status   08/24/2005 221 (H)  Final     Comment:     Reference range: 0 to 49  Unit: Units  (Note)  REFERENCE INTERVALS: SSA (Ro) (VIKRAM) Ab, IgG   Less than 20 Units ....... None detected   20 - 49 Units ............ Inconclusive   50 Units or greater ...... Positive    SSA (Ro) antibody is seen in70-75% of Sjogren syndrome  cases, 30-40% of systemic lupus erythematosus (SLE) and  5-10% of progressive systemic sclerosis (PSS).  The above test was performed at: [facility],  [address] [place] [address]  [id number]  [phone]  [email or web address]     SSB (LA) Antibody IgG   Date Value Ref Range Status   08/24/2005 20  Final     Comment:     Reference range: 0 to 49  Unit: Units  (Note)  REFERENCE INTERVALS: SSB (La) (VIKRAM) Ab, IgG   Less than 20 Units ....... None detected   20 - 49 Units ............ Inconclusive   50 Units or greater ...... Positive    SSB (La) antibody is seen in 50-60% of Sjogren syndrome  cases and is specific if it is the only VIKRAM antibody  present. 15-25% of patients with systemic lupus  erythematosus (SLE) and 5-10% of patients with progressive  systemic sclerosis (PSS) also have this antibody.  The above test was performed at: [facility],  [address] [place] [address]  [id number]  [phone]  www."[email or web address]   ,  ,   CRISTAL Screen by EIA   Date Value Ref Range Status   02/16/2010 8.2 (H) 0 - 1.0 Final     Comment:     Interpretation:  Positive     DNA-ds   Date Value Ref Range Status   06/12/2019 1 <10 IU/mL Final     Comment:     Negative     Albumin Fraction   Date Value Ref Range Status   04/01/2013 3.9 3.7 - 5.1 g/dL Final     Alpha 2 Fraction   Date Value Ref Range Status   04/01/2013 0.7 0.5 - 0.9 g/dL Final     Beta Fraction   Date Value Ref Range Status   04/01/2013 0.6 0.6 - 1.0 g/dL Final     Gamma Fraction   Date Value Ref Range Status   04/01/2013 0.7 0.7 - 1.6 g/dL Final     Monoclonal Peak   Date Value Ref Range Status   04/01/2013 0.0 0.0 g/dL Final     ELP Interpretation:   Date Value  Ref Range Status   04/01/2013   Final    Essentially normal electrophoretic pattern.  No monoclonal protein seen.   Pathologic significance requires clinical correlation.  LEATHA Valencia M.D.,   Ph.D., Pathologist          Scribe Disclosure:  I, Jannette Tanner, am serving as a scribe to document services personally performed by Tato Agarwal MD at this visit, based upon the provider's statements to me. All documentation has been reviewed by the aforementioned provider prior to being entered into the official medical record.

## 2019-10-15 ENCOUNTER — OFFICE VISIT (OUTPATIENT)
Dept: RHEUMATOLOGY | Facility: CLINIC | Age: 74
End: 2019-10-15
Attending: INTERNAL MEDICINE
Payer: COMMERCIAL

## 2019-10-15 ENCOUNTER — ANTICOAGULATION THERAPY VISIT (OUTPATIENT)
Dept: ANTICOAGULATION | Facility: CLINIC | Age: 74
End: 2019-10-15

## 2019-10-15 VITALS
HEART RATE: 66 BPM | OXYGEN SATURATION: 98 % | DIASTOLIC BLOOD PRESSURE: 74 MMHG | TEMPERATURE: 97.5 F | SYSTOLIC BLOOD PRESSURE: 123 MMHG

## 2019-10-15 DIAGNOSIS — Z79.01 LONG TERM CURRENT USE OF ANTICOAGULANT THERAPY: ICD-10-CM

## 2019-10-15 DIAGNOSIS — Z79.899 ENCOUNTER FOR LONG-TERM CURRENT USE OF MEDICATION: ICD-10-CM

## 2019-10-15 DIAGNOSIS — M32.15 SYSTEMIC LUPUS ERYTHEMATOSUS WITH TUBULO-INTERSTITIAL NEPHROPATHY, UNSPECIFIED SLE TYPE (H): Primary | ICD-10-CM

## 2019-10-15 DIAGNOSIS — M32.15 SYSTEMIC LUPUS ERYTHEMATOSUS WITH TUBULO-INTERSTITIAL NEPHROPATHY, UNSPECIFIED SLE TYPE (H): ICD-10-CM

## 2019-10-15 DIAGNOSIS — D68.61 ANTIPHOSPHOLIPID SYNDROME (H): ICD-10-CM

## 2019-10-15 LAB
ALBUMIN UR-MCNC: NEGATIVE MG/DL
APPEARANCE UR: CLEAR
BACTERIA #/AREA URNS HPF: ABNORMAL /HPF
BILIRUB UR QL STRIP: NEGATIVE
COLOR UR AUTO: YELLOW
GLUCOSE UR STRIP-MCNC: NEGATIVE MG/DL
HGB UR QL STRIP: ABNORMAL
INR PPP: 2.78 (ref 0.86–1.14)
KETONES UR STRIP-MCNC: 5 MG/DL
LEUKOCYTE ESTERASE UR QL STRIP: NEGATIVE
NITRATE UR QL: NEGATIVE
PH UR STRIP: 5 PH (ref 5–7)
RBC #/AREA URNS AUTO: 5 /HPF (ref 0–2)
SOURCE: ABNORMAL
SP GR UR STRIP: 1.02 (ref 1–1.03)
UROBILINOGEN UR STRIP-MCNC: 0 MG/DL (ref 0–2)
WBC #/AREA URNS AUTO: <1 /HPF (ref 0–5)

## 2019-10-15 PROCEDURE — 81001 URINALYSIS AUTO W/SCOPE: CPT | Performed by: INTERNAL MEDICINE

## 2019-10-15 PROCEDURE — G0463 HOSPITAL OUTPT CLINIC VISIT: HCPCS | Mod: ZF

## 2019-10-15 PROCEDURE — 85610 PROTHROMBIN TIME: CPT | Performed by: INTERNAL MEDICINE

## 2019-10-15 PROCEDURE — 36415 COLL VENOUS BLD VENIPUNCTURE: CPT | Performed by: INTERNAL MEDICINE

## 2019-10-15 RX ORDER — MYCOPHENOLATE MOFETIL 500 MG/1
1000 TABLET ORAL 2 TIMES DAILY
Qty: 360 TABLET | Refills: 2 | Status: SHIPPED | OUTPATIENT
Start: 2019-10-15 | End: 2019-10-15

## 2019-10-15 ASSESSMENT — PAIN SCALES - GENERAL: PAINLEVEL: NO PAIN (0)

## 2019-10-15 NOTE — LETTER
10/15/2019      RE: Shala Caruso  [address] [place] [address]       [facility]  Rheumatology Clinic  Tato Agarwal MD  10/15/2019     Name: Shala Caruso  MRN: [id number]  Age: 73 year old  : 1945  Referring provider: Joe Contreras     Assessment and Plan:    Systemic lupus erythematosus (+CRISTAL, +dsDNA, hypocomplementemia; Raynaud's, arthritis, serositis, tubulo-interstitial nephropathy, pericardial effusion/tamponade):  Pt relates only mild knee pain that improves with movement and stretching, as well as mild loss of sensation in her toes. The latter may be significant for Raynauds and/or diabetic neuropathy. Physical exam revealed 1+ edema of both legs (mid-tibia) and no signs of joint inflammation.  2019 CBC showed WBC of 3.6, slightly improved from previous.  No evidence of thrombocytopenia or anemia.  AST and ALT were normal.  2019 C3 was 63, slightly depressed from previous and C4 was 12, depressed but stable. dsDNA in  was 1, down from 2015.  Renal function as set by creatinine was normal in 2019.    SLE is stable by history and exam. I think that disease is well compensated on cellcept monotherapy. She has been taking cellcept (dose?) since  and reports tolerating it well without any side-effects.  I recommend that she stay on this dose unless her symptoms change. Because she hasn't had a urinalysis done sine 2016, we will repeat this today to assess renal status.    # DVT/PE, on anticoagulation  # Peripheral neuropathy    I saw this patient with the medical student, who acted as scribe for me. I agree with the findings and recommendations.    Tato Agarwal M.D.  Staff Rheumatologist, [facility]  Pager [phone]      Follow-up: I recommend that Ms. Caruso follows up in clinic in 6mo.    HPI:   Shala Caruso is a 73 year old female with a history of DM, SLE, PE on Coumadin, and HTN who presents for evaluation of lupus.  She is a  previous patient of Dr. Corbett and was last seen in March 2019.  At that time she was doing well without no significant symptoms of inflammation.  Plan was to continue Cellcept 1000 mg twice daily.     Summary of previous history:  SLE diagnosed in 2000 (+CRISTAL, +dsDNA ab, arthritis, serositis).  She has antiphospholipid syndrome and had DVT and PE in 2004, on Coumadin since then.  She had a Lupus flare in 2010 initially treated with Plaquenil however she worsened and developed pericardial effusion/tamponade - started on MTX and tapering dose of prednisone at that time (continued on about 10 mg daily).  Cellcept started 6/2010, Prednisone tapered off.  MTX stopped May 2012.    She reports mild tingling and cold sensation in her toes which she attributes to diabetic neuropathy.  She also has mild but constant soreness in both knees.  This is better with movement and she attributes this to osteoarthritis.  She does yoga every morning and feels this helps her knees.  Other than her knees, she has not had significant joint issues in the past several months.  She denies any rash, mouth sores, chest pain, shortness of breath, or new vision changes.    She also notes chronic changes of her fingernails which has not changed recently.  She has not sought treatment for this.    Review of Systems:   Pertinent items are noted in HPI or as below, remainder of complete ROS is negative.      No recent problems with hearing or vision. No swallowing problems.   No breathing difficulty, shortness of breath, coughing, or wheezing  No chest pain or palpitations  No heart burn, indigestion, abdominal pain, nausea, vomiting, diarrhea  No urination problems, no bloody, cloudy urine, no dysuria  No tingling, weakness  No headaches or confusion  No rashes. No easy bleeding or bruising.     Active Medications:      acetaminophen (TYLENOL) 500 MG tablet, Take 1,000-1,500 mg by mouth nightly as needed , Disp: , Rfl:      aspirin 81 MG tablet,  Take 81 mg by mouth At Bedtime , Disp: , Rfl:      AYR SALINE NASAL NO-DRIP GEL, Use as needed for nasal dryness, Disp: 3 Tube, Rfl: 3     Calcium Carbonate-Vitamin D (CALCIUM 500 + D PO), Take 1 tablet by mouth 3 times daily, Disp: , Rfl:      carboxymethylcellulose PF (REFRESH PLUS) 0.5 % SOLN ophthalmic solution, Place 1 drop into both eyes as needed, Disp: , Rfl:      insulin glargine (LANTUS VIAL) 100 UNIT/ML vial, Inject 4 units as directed daily LANTUS SOLOSTAR PEN PLEASE, Disp: 15 mL, Rfl: 3     Multiple Vitamins-Minerals (CENTRUM SILVER PO), Take 1 tablet by mouth daily., Disp: , Rfl:      mycophenolate (GENERIC EQUIVALENT) 500 MG tablet, TAKE 2 TABLETS(1,000 MG TOTAL) BY MOUTH TWICE DAILY IN THE MORNING AND EVENING, Disp: 360 tablet, Rfl: 0     NIFEdipine ER OSMOTIC (NIFEDICAL XL) 30 MG 24 hr tablet, Take 1 tablet (30 mg) by mouth daily Call clinic to schedule MD APPT., Disp: 90 tablet, Rfl: 2     NOVOLOG PENFILL 100 UNIT/ML soln, Inject 1 unit per 15 grams CHO with meals TID approx 15 units daily, Disp: 15 mL, Rfl: 3     simvastatin (ZOCOR) 40 MG tablet, Take 1 tablet (40 mg) by mouth At Bedtime, Disp: 90 tablet, Rfl: 2     warfarin (COUMADIN) 5 MG tablet, Take 12.5mg on Monday, and 10mg ROW, or as directed by the [facility]., Disp: 200 tablet, Rfl: 1     warfarin (COUMADIN) 5 MG tablet, Take 2-2.5 tablets daily or as directed by coumadin clinic., Disp: 225 tablet, Rfl: 2      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   Raynaud's disease  Antiphospholipid syndrome   Type 1 diabetes mellitus   Osteoporosis   Hypertension   Hyperlipidemia   Achalasia   Gastroesophageal reflux disease   Choroidal lesion  Atrophic vaginitis   Urinary urgency   Female stress incontinence   Cystocele, midline   Deep vein  thrombosis   Nonsenile cataract   Myopia   Neuropathy   Lymphedema      Past Surgical History:  Phacoemulsification clear cornea with intraocular lens implantation, right 1/8/19, left 2/5/19  Transvaginal sling 12/20/16  Axilla lymph node dissection 2004  Bilateral tubal ligation     Family History:   Glaucoma - father   Breast cancer   Stroke       Social History:   Tobacco Use: No previous or current tobacco use.   Alcohol Use: No alcohol use.   Occupation: record keeping at the [employer]  PCP: Joe Contreras      Physical Exam:   /74 (BP Location: Right arm, Patient Position: Sitting, Cuff Size: Adult Regular)   Pulse 66   Temp 97.5  F (36.4  C)   LMP  (LMP Unknown)   SpO2 98%    Wt Readings from Last 4 Encounters:   02/05/19 56.7 kg (125 lb)   01/08/19 54.1 kg (119 lb 4.8 oz)   07/30/18 54.1 kg (119 lb 3.2 oz)   01/29/18 54 kg (119 lb)     Constitutional: Well-developed, appearing stated age; cooperative  Eyes: Normal EOM, PERRLA, vision, conjunctiva, sclera  ENT: Normal external ears, nose, hearing, lips, teeth, gums, throat. No mucous membrane lesions, normal saliva pool  Neck: No mass or thyroid enlargement  Resp: Lungs clear to auscultation  CV: RRR, no murmurs, rubs or gallops. 1+ edema on the left leg to mid-tibia more so than on the right.    GI: No ABD mass or tenderness, no HSM  : Not tested  Lymph: No cervical, supraclavicular, inguinal or epitrochlear nodes  MS: The TMJ, neck, shoulder, elbow, wrist, MCP/PIP/DIP, spine, hip, knee, ankle, and foot MTP/IP joints were examined and found normal. No active synovitis or altered joint anatomy. Full joint ROM. Normal  strength. No dactylitis,  tenosynovitis, enthesopathy. No inflammatory changes in the fingers, warm and well-perfused.  Skin: No alopecia, rash, nodules or lesions. Dysmorphia and nail thickening on both thumbs, 2nd,3rd, and 5th nails on the right hand.  Dusky discoloration of the toes but good capillary refill and  bounding dorsalis pedis pulses.  Neuro: Normal cranial nerves, strength, sensation.   Psych: Normal judgement, orientation, memory, affect.     Laboratory:   RHEUM RESULTS Latest Ref Rng & Units 6/12/2019 7/9/2019 9/12/2019   COMPLEMENT C3 76 - 169 mg/dL 63(L) - -   COMPLEMENT C4 15 - 50 mg/dL 12(L) - -   DNA-DS <10 IU/mL 1 - -   SED RATE 0 - 30 mm/h - - -   CRP, INFLAMMATION 0.0 - 8.0 mg/L - - -   CYCLIC CIT PEPT IGG <5 U/mL - - -   CK TOTAL 30 - 225 U/L - - -   RHEUMATOID FACTOR 0 - 14 IU/mL - - -   CRISTAL SCREEN BY EIA 0 - 1.0 - - -   AST 0 - 45 U/L 19 19 20   ALT 0 - 50 U/L 25 23 25   ALBUMIN 3.4 - 5.0 g/dL - 3.9 -   WBC 4.0 - 11.0 10e9/L 3.1(L) - 3.6(L)   RBC 3.8 - 5.2 10e12/L 4.39 - 4.41   HGB 11.7 - 15.7 g/dL 12.7 - 12.6   HCT 35.0 - 47.0 % 40.6 40.7 41.0   MCV 78 - 100 fl 93 - 93   MCHC 31.5 - 36.5 g/dL 31.3(L) - 30.7(L)   RDW 10.0 - 15.0 % 15.6(H) - 15.5(H)    - 450 10e9/L 218 - 241   CREATININE 0.52 - 1.04 mg/dL 0.79 0.76 -   GFR ESTIMATE, IF BLACK >60 mL/min/[1.73:m2] 87 >90 -   GFR ESTIMATE >60 mL/min/[1.73:m2] 75 79 -   HEPATITIS C ANTIBODY NR - - -     Rheumatoid Factor   Date Value Ref Range Status   01/14/2009 8 0 - 14 IU/mL Final     Cyclic Citrullinated Peptide IgG   Date Value Ref Range Status   01/14/2009 <2 <5 U/mL Final     Comment:     Interpretation:  Negative     Ribonucleic Protein IgG Antibody   Date Value Ref Range Status   01/24/2007 12  Final     Comment:     Reference range: 0 to 49  Unit: Units  (Note)  REFERENCE INTERVAL: Ribonucleic Protein (VIKRAM), IgG   Less than 20 Units ........ None detected   20 - 49 Units ............. Inconclusive   50 Units or greater ....... Positive    RNP antibody is seen in % of mixed connective tissue  disease and is considered specific for this syndrome if  other antibodies are negative. RNP is also present in  20-30% of SLE and 15-25% of progressive systemic  sclerosis.  The above test was performed at: [email or web address],  [address] [place] [address],  [facility]  [address]  [phone]  [email or web address]     SSA (RO) Antibody IgG   Date Value Ref Range Status   08/24/2005 221 (H)  Final     Comment:     Reference range: 0 to 49  Unit: Units  (Note)  REFERENCE INTERVALS: SSA (Ro) (VIKRAM) Ab, IgG   Less than 20 Units ....... None detected   20 - 49 Units ............ Inconclusive   50 Units or greater ...... Positive    SSA (Ro) antibody is seen in70-75% of Sjogren syndrome  cases, 30-40% of systemic lupus erythematosus (SLE) and  5-10% of progressive systemic sclerosis (PSS).  The above test was performed at: [facility],  [address] [place] [address]  [id number] [phone]  www."[email or web address]     SSB (LA) Antibody IgG   Date Value Ref Range Status   08/24/2005 20  Final     Comment:     Reference range: 0 to 49  Unit: Units  (Note)  REFERENCE INTERVALS: SSB (La) (VIKRAM) Ab, IgG   Less than 20 Units ....... None detected   20 - 49 Units ............ Inconclusive   50 Units or greater ...... Positive    SSB (La) antibody is seen in 50-60% of Sjogren syndrome  cases and is specific if it is the only VIKRAM antibody  present. 15-25% of patients with systemic lupus  erythematosus (SLE) and 5-10% of patients with progressive  systemic sclerosis (PSS) also have this antibody.  The above test was performed at: [facility],  [address] [place] [address]  [id number] [phone]  www."[email or web address]   ,  ,   CRISTAL Screen by EIA   Date Value Ref Range Status   02/16/2010 8.2 (H) 0 - 1.0 Final     Comment:     Interpretation:  Positive     DNA-ds   Date Value Ref Range Status   06/12/2019 1 <10 IU/mL Final     Comment:     Negative     Albumin Fraction   Date Value Ref Range Status   04/01/2013 3.9 3.7 - 5.1 g/dL Final     Alpha 2 Fraction   Date Value Ref Range Status   04/01/2013 0.7 0.5 - 0.9 g/dL Final     Beta Fraction   Date Value Ref Range Status   04/01/2013 0.6 0.6 - 1.0 g/dL Final     Gamma Fraction   Date Value Ref Range Status   04/01/2013 0.7 0.7 - 1.6 g/dL Final     Monoclonal Peak   Date  Value Ref Range Status   04/01/2013 0.0 0.0 g/dL Final     ELP Interpretation:   Date Value Ref Range Status   04/01/2013   Final    Essentially normal electrophoretic pattern.  No monoclonal protein seen.   Pathologic significance requires clinical correlation.  LEATHA Valencia M.D.,   Ph.D., Pathologist          Scribe Disclosure:  I, Jannette Barbagerman, am serving as a scribe to document services personally performed by Tato Agarwal MD at this visit, based upon the provider's statements to me. All documentation has been reviewed by the aforementioned provider prior to being entered into the official medical record.         Tato Agarwal MD

## 2019-10-15 NOTE — PATIENT INSTRUCTIONS
Dx  1. Systemic lupus erythematosus, controlled.    Plan:  1. Continue MMF 1000 mg twice daily.  2. Labwork every 4 months.

## 2019-10-15 NOTE — PROGRESS NOTES
ANTICOAGULATION FOLLOW-UP CLINIC VISIT    Patient Name:  Shala Caruso  Date:  10/15/2019  Contact Type:  Telephone    SUBJECTIVE:         OBJECTIVE    INR   Date Value Ref Range Status   10/15/2019 2.78 (H) 0.86 - 1.14 Final     Chromogenic Factor 10   Date Value Ref Range Status   2018 16%  Final     Factor 2 Assay   Date Value Ref Range Status   2019 17 (L) 60 - 140 % Final       ASSESSMENT / PLAN  INR assessment THER    Recheck INR In: 6 WEEKS    INR Location Clinic      Anticoagulation Summary  As of 10/15/2019    INR goal:      TTR:   73.8 % (3.3 y)   Prior goal:   2.0-3.0   INR used for dosin.78 (10/15/2019)   Warfarin maintenance plan:   12.5 mg (5 mg x 2.5) every Mon; 10 mg (5 mg x 2) all other days   Full warfarin instructions:   12.5 mg every Mon; 10 mg all other days   Weekly warfarin total:   72.5 mg   Plan last modified:   Andre Bledsoe RN (2019)   Next INR check:   2019   Priority:   Factor 2   Target end date:       Indications    SLE (systemic lupus erythematosus) (H) (Resolved) [M32.9]  Long term current use of anticoagulant therapy [Z79.01]             Anticoagulation Episode Summary     INR check location:   Clinic Lab    Preferred lab:       Send INR reminders to:   [facility]    Comments:   Factor 2  goal range is 15-25%  Ok to leave message on home [phone] and work voicemail, but call [email or web address] per pt request.  OK to leave message at office  May leave messages with Rodriguez Santos  DO NOT GIVE RECORDS TO EMPLOYER U        Anticoagulation Care Providers     Provider Role Specialty Phone number    Mt Kiser MD Responsible Clinical Cardiac Electrophysiology [phone]            See the Encounter Report to view Anticoagulation Flowsheet and Dosing Calendar (Go to Encounters tab in chart review, and find the Anticoagulation Therapy Visit)  Left message for patient with results and dosing recommendations. Asked patient to call back to report  any missed doses, falls, signs and symptoms of bleeding or clotting, any changes in health, medication, or diet. Asked patient to call back with any questions or concerns.  No Factor 2 was done, and when I called the lab, they said that it had been canceled.    Portia Cherry RN

## 2019-10-15 NOTE — LETTER
10/15/2019       RE: Shala Caruso  [address]  [place] [address]     Dear Colleague,    Thank you for referring your patient, Shala Caruso, to the [facility] RHEUMATOLOGY at [facility]. Please see a copy of my visit note below.    [facility]  Rheumatology Clinic  Tato Agarwal MD  10/15/2019     Name: Shala Caruso  MRN: [id number]  Age: 73 year old  : 1945  Referring provider: Joe Contreras     Assessment and Plan:    Systemic lupus erythematosus (+CRISTAL, +dsDNA, hypocomplementemia; Raynaud's, arthritis, serositis, tubulo-interstitial nephropathy, pericardial effusion/tamponade):  Pt relates only mild knee pain that improves with movement and stretching, as well as mild loss of sensation in her toes. The latter may be significant for Raynauds and/or diabetic neuropathy. Physical exam revealed 1+ edema of both legs (mid-tibia) and no signs of joint inflammation.  2019 CBC showed WBC of 3.6, slightly improved from previous.  No evidence of thrombocytopenia or anemia.  AST and ALT were normal.  2019 C3 was 63, slightly depressed from previous and C4 was 12, depressed but stable. dsDNA in  was 1, down from 2015.  Renal function as set by creatinine was normal in 2019.    SLE is stable by history and exam. I think that disease is well compensated on cellcept monotherapy. She has been taking cellcept (dose?) since  and reports tolerating it well without any side-effects.  I recommend that she stay on this dose unless her symptoms change. Because she hasn't had a urinalysis done sine 2016, we will repeat this today to assess renal status.    # DVT/PE, on anticoagulation  # Peripheral neuropathy    I saw this patient with the medical student, who acted as scribe for me. I agree with the findings and recommendations.    Tato Agarwal M.D.  Staff Rheumatologist, [facility]  Pager [phone]      Follow-up: I recommend  that Ms. Caruso follows up in clinic in 6mo.    HPI:   Shala Caruso is a 73 year old female with a history of DM, SLE, PE on Coumadin, and HTN who presents for evaluation of lupus.  She is a previous patient of Dr. Corbett and was last seen in March 2019.  At that time she was doing well without no significant symptoms of inflammation.  Plan was to continue Cellcept 1000 mg twice daily.     Summary of previous history:  SLE diagnosed in 2000 (+CRISTAL, +dsDNA ab, arthritis, serositis).  She has antiphospholipid syndrome and had DVT and PE in 2004, on Coumadin since then.  She had a Lupus flare in 2010 initially treated with Plaquenil however she worsened and developed pericardial effusion/tamponade - started on MTX and tapering dose of prednisone at that time (continued on about 10 mg daily).  Cellcept started 6/2010, Prednisone tapered off.  MTX stopped May 2012.    She reports mild tingling and cold sensation in her toes which she attributes to diabetic neuropathy.  She also has mild but constant soreness in both knees.  This is better with movement and she attributes this to osteoarthritis.  She does yoga every morning and feels this helps her knees.  Other than her knees, she has not had significant joint issues in the past several months.  She denies any rash, mouth sores, chest pain, shortness of breath, or new vision changes.    She also notes chronic changes of her fingernails which has not changed recently.  She has not sought treatment for this.    Review of Systems:   Pertinent items are noted in HPI or as below, remainder of complete ROS is negative.      No recent problems with hearing or vision. No swallowing problems.   No breathing difficulty, shortness of breath, coughing, or wheezing  No chest pain or palpitations  No heart burn, indigestion, abdominal pain, nausea, vomiting, diarrhea  No urination problems, no bloody, cloudy urine, no dysuria  No tingling, weakness  No headaches or confusion  No  rashes. No easy bleeding or bruising.     Active Medications:      acetaminophen (TYLENOL) 500 MG tablet, Take 1,000-1,500 mg by mouth nightly as needed , Disp: , Rfl:      aspirin 81 MG tablet, Take 81 mg by mouth At Bedtime , Disp: , Rfl:      AYR SALINE NASAL NO-DRIP GEL, Use as needed for nasal dryness, Disp: 3 Tube, Rfl: 3     Calcium Carbonate-Vitamin D (CALCIUM 500 + D PO), Take 1 tablet by mouth 3 times daily, Disp: , Rfl:      carboxymethylcellulose PF (REFRESH PLUS) 0.5 % SOLN ophthalmic solution, Place 1 drop into both eyes as needed, Disp: , Rfl:      insulin glargine (LANTUS VIAL) 100 UNIT/ML vial, Inject 4 units as directed daily LANTUS SOLOSTAR PEN PLEASE, Disp: 15 mL, Rfl: 3     Multiple Vitamins-Minerals (CENTRUM SILVER PO), Take 1 tablet by mouth daily., Disp: , Rfl:      mycophenolate (GENERIC EQUIVALENT) 500 MG tablet, TAKE 2 TABLETS(1,000 MG TOTAL) BY MOUTH TWICE DAILY IN THE MORNING AND EVENING, Disp: 360 tablet, Rfl: 0     NIFEdipine ER OSMOTIC (NIFEDICAL XL) 30 MG 24 hr tablet, Take 1 tablet (30 mg) by mouth daily Call clinic to schedule MD APPT., Disp: 90 tablet, Rfl: 2     NOVOLOG PENFILL 100 UNIT/ML soln, Inject 1 unit per 15 grams CHO with meals TID approx 15 units daily, Disp: 15 mL, Rfl: 3     simvastatin (ZOCOR) 40 MG tablet, Take 1 tablet (40 mg) by mouth At Bedtime, Disp: 90 tablet, Rfl: 2     warfarin (COUMADIN) 5 MG tablet, Take 12.5mg on Monday, and 10mg ROW, or as directed by the [facility] Monitoring [facility]., Disp: 200 tablet, Rfl: 1     warfarin (COUMADIN) 5 MG tablet, Take 2-2.5 tablets daily or as directed by coumadin clinic., Disp: 225 tablet, Rfl: 2      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   Raynaud's disease  Antiphospholipid syndrome   Type 1 diabetes mellitus   Osteoporosis    Hypertension   Hyperlipidemia   Achalasia   Gastroesophageal reflux disease   Choroidal lesion  Atrophic vaginitis   Urinary urgency   Female stress incontinence   Cystocele, midline   Deep vein thrombosis   Nonsenile cataract   Myopia   Neuropathy   Lymphedema      Past Surgical History:  Phacoemulsification clear cornea with intraocular lens implantation, right 1/8/19, left 2/5/19  Transvaginal sling 12/20/16  Axilla lymph node dissection 2004  Bilateral tubal ligation     Family History:   Glaucoma - father   Breast cancer   Stroke       Social History:   Tobacco Use: No previous or current tobacco use.   Alcohol Use: No alcohol use.   Occupation: record keeping at the [employer]  PCP: Joe Contreras      Physical Exam:   /74 (BP Location: Right arm, Patient Position: Sitting, Cuff Size: Adult Regular)   Pulse 66   Temp 97.5  F (36.4  C)   LMP  (LMP Unknown)   SpO2 98%    Wt Readings from Last 4 Encounters:   02/05/19 56.7 kg (125 lb)   01/08/19 54.1 kg (119 lb 4.8 oz)   07/30/18 54.1 kg (119 lb 3.2 oz)   01/29/18 54 kg (119 lb)     Constitutional: Well-developed, appearing stated age; cooperative  Eyes: Normal EOM, PERRLA, vision, conjunctiva, sclera  ENT: Normal external ears, nose, hearing, lips, teeth, gums, throat. No mucous membrane lesions, normal saliva pool  Neck: No mass or thyroid enlargement  Resp: Lungs clear to auscultation  CV: RRR, no murmurs, rubs or gallops. 1+ edema on the left leg to mid-tibia more so than on the right.    GI: No ABD mass or tenderness, no HSM  : Not tested  Lymph: No cervical, supraclavicular, inguinal or epitrochlear nodes  MS: The TMJ, neck, shoulder, elbow, wrist, MCP/PIP/DIP, spine, hip, knee, ankle, and foot MTP/IP joints were examined and found normal. No active synovitis or altered joint anatomy. Full joint ROM. Normal  strength. No dactylitis,  tenosynovitis, enthesopathy. No inflammatory changes in the fingers, warm and well-perfused.  Skin:  No alopecia, rash, nodules or lesions. Dysmorphia and nail thickening on both thumbs, 2nd,3rd, and 5th nails on the right hand.  Dusky discoloration of the toes but good capillary refill and bounding dorsalis pedis pulses.  Neuro: Normal cranial nerves, strength, sensation.   Psych: Normal judgement, orientation, memory, affect.     Laboratory:   RHEUM RESULTS Latest Ref Rng & Units 6/12/2019 7/9/2019 9/12/2019   COMPLEMENT C3 76 - 169 mg/dL 63(L) - -   COMPLEMENT C4 15 - 50 mg/dL 12(L) - -   DNA-DS <10 IU/mL 1 - -   SED RATE 0 - 30 mm/h - - -   CRP, INFLAMMATION 0.0 - 8.0 mg/L - - -   CYCLIC CIT PEPT IGG <5 U/mL - - -   CK TOTAL 30 - 225 U/L - - -   RHEUMATOID FACTOR 0 - 14 IU/mL - - -   CRISTAL SCREEN BY EIA 0 - 1.0 - - -   AST 0 - 45 U/L 19 19 20   ALT 0 - 50 U/L 25 23 25   ALBUMIN 3.4 - 5.0 g/dL - 3.9 -   WBC 4.0 - 11.0 10e9/L 3.1(L) - 3.6(L)   RBC 3.8 - 5.2 10e12/L 4.39 - 4.41   HGB 11.7 - 15.7 g/dL 12.7 - 12.6   HCT 35.0 - 47.0 % 40.6 40.7 41.0   MCV 78 - 100 fl 93 - 93   MCHC 31.5 - 36.5 g/dL 31.3(L) - 30.7(L)   RDW 10.0 - 15.0 % 15.6(H) - 15.5(H)    - 450 10e9/L 218 - 241   CREATININE 0.52 - 1.04 mg/dL 0.79 0.76 -   GFR ESTIMATE, IF BLACK >60 mL/min/[1.73:m2] 87 >90 -   GFR ESTIMATE >60 mL/min/[1.73:m2] 75 79 -   HEPATITIS C ANTIBODY NR - - -     Rheumatoid Factor   Date Value Ref Range Status   01/14/2009 8 0 - 14 IU/mL Final     Cyclic Citrullinated Peptide IgG   Date Value Ref Range Status   01/14/2009 <2 <5 U/mL Final     Comment:     Interpretation:  Negative     Ribonucleic Protein IgG Antibody   Date Value Ref Range Status   01/24/2007 12  Final     Comment:     Reference range: 0 to 49  Unit: Units  (Note)  REFERENCE INTERVAL: Ribonucleic Protein (VIKRAM), IgG   Less than 20 Units ........ None detected   20 - 49 Units ............. Inconclusive   50 Units or greater ....... Positive    RNP antibody is seen in % of mixed connective tissue  disease and is considered specific for this syndrome  if  other antibodies are negative. RNP is also present in  20-30% of SLE and 15-25% of progressive systemic  sclerosis.  The above test was performed at: [facility],  [address] [facility]  [id number] [phone]  [email or web address]     SSA (RO) Antibody IgG   Date Value Ref Range Status   08/24/2005 221 (H)  Final     Comment:     Reference range: 0 to 49  Unit: Units  (Note)  REFERENCE INTERVALS: SSA (Ro) (VIKRAM) Ab, IgG   Less than 20 Units ....... None detected   20 - 49 Units ............ Inconclusive   50 Units or greater ...... Positive    SSA (Ro) antibody is seen in70-75% of Sjogren syndrome  cases, 30-40% of systemic lupus erythematosus (SLE) and  5-10% of progressive systemic sclerosis (PSS).  The above test was performed at: [facility],  [address] [facility]  [id number] [phone]  [email or web address]     SSB (LA) Antibody IgG   Date Value Ref Range Status   08/24/2005 20  Final     Comment:     Reference range: 0 to 49  Unit: Units  (Note)  REFERENCE INTERVALS: SSB (La) (VIKRAM) Ab, IgG   Less than 20 Units ....... None detected   20 - 49 Units ............ Inconclusive   50 Units or greater ...... Positive    SSB (La) antibody is seen in 50-60% of Sjogren syndrome  cases and is specific if it is the only VIKRAM antibody  present. 15-25% of patients with systemic lupus  erythematosus (SLE) and 5-10% of patients with progressive  systemic sclerosis (PSS) also have this antibody.  The above test was performed at: [facility],  [address] [facility]  [id number] [phone]  [email or web address]   ,  ,   CRISTAL Screen by EIA   Date Value Ref Range Status   02/16/2010 8.2 (H) 0 - 1.0 Final     Comment:     Interpretation:  Positive     DNA-ds   Date Value Ref Range Status   06/12/2019 1 <10 IU/mL Final     Comment:     Negative     Albumin Fraction   Date Value Ref Range Status   04/01/2013 3.9 3.7 - 5.1 g/dL Final     Alpha 2 Fraction   Date Value Ref Range Status   04/01/2013 0.7 0.5 - 0.9 g/dL Final     Beta  Fraction   Date Value Ref Range Status   04/01/2013 0.6 0.6 - 1.0 g/dL Final     Gamma Fraction   Date Value Ref Range Status   04/01/2013 0.7 0.7 - 1.6 g/dL Final     Monoclonal Peak   Date Value Ref Range Status   04/01/2013 0.0 0.0 g/dL Final     ELP Interpretation:   Date Value Ref Range Status   04/01/2013   Final    Essentially normal electrophoretic pattern.  No monoclonal protein seen.   Pathologic significance requires clinical correlation.  LEATHA Valencia M.D.,   Ph.D., Pathologist          Scribe Disclosure:  I, Jannette Tanner, am serving as a scribe to document services personally performed by Tato Agarwal MD at this visit, based upon the provider's statements to me. All documentation has been reviewed by the aforementioned provider prior to being entered into the official medical record.         Again, thank you for allowing me to participate in the care of your patient.      Sincerely,    Tato Agarwal MD

## 2019-10-15 NOTE — NURSING NOTE
Chief Complaint   Patient presents with     RECHECK     Lupus follow up , former patient of Dr. Corbett         /74 (BP Location: Right arm, Patient Position: Sitting, Cuff Size: Adult Regular)   Pulse 66   Temp 97.5  F (36.4  C)   LMP  (LMP Unknown)   SpO2 98%       Jeanmarie Burks, EMT

## 2019-10-17 ENCOUNTER — MYC REFILL (OUTPATIENT)
Dept: INTERNAL MEDICINE | Facility: CLINIC | Age: 74
End: 2019-10-17

## 2019-10-17 DIAGNOSIS — K22.0 ACHALASIA OF ESOPHAGUS: ICD-10-CM

## 2019-10-17 DIAGNOSIS — I10 HTN (HYPERTENSION): ICD-10-CM

## 2019-10-17 RX ORDER — NIFEDIPINE 30 MG/1
30 TABLET, EXTENDED RELEASE ORAL DAILY
Qty: 90 TABLET | Refills: 0 | Status: SHIPPED | OUTPATIENT
Start: 2019-10-17 | End: 2020-01-06

## 2019-10-17 RX ORDER — NIFEDIPINE 30 MG/1
TABLET, EXTENDED RELEASE ORAL
Qty: 90 TABLET | Refills: 2 | OUTPATIENT
Start: 2019-10-17

## 2019-10-17 NOTE — TELEPHONE ENCOUNTER
----- Message from Siomara Wright RN sent at 9/20/2018 11:08 AM CDT -----  Regarding: [facility]  APPT  Please call to schedule WITH  EDGAR BUTCHER MD    Patient is overdue for annual clinic visit - unless otherwise indicated patient needs to be scheduled with 1st available.    Patient may need labs and or imaging - please check with RN care coordinator.     I do not need any follow up on the scheduling of this appointment unless the patient will no longer be receiving care in our clinic.  THANKS MRT   
Called patient and left a message to call back to schedule annual visit with Dr. Contreras in [facility].   
"I have cancer in my lungs"

## 2019-10-17 NOTE — TELEPHONE ENCOUNTER
NIFEDIPINE 30MG ER (XL/OSM) TABLETS       10/17/19 Sent (none) Doege, Kathleen M, RN [facility] MEDICINE         Outpatient Medication Detail      Disp Refills Start End MERLE   NIFEdipine ER OSMOTIC (NIFEDICAL XL) 30 MG 24 hr tablet 90 tablet 0 10/17/2019  No   Sig - Route: Take 1 tablet (30 mg) by mouth daily Call clinic to schedule MD APPT. Annual visit due in January - Oral   Sent to pharmacy as: NIFEdipine ER OSMOTIC (NIFEDICAL XL) 30 MG 24 hr tablet   Class: E-Prescribe   Order: [id number]   E-Prescribing Status: Receipt confirmed by pharmacy (10/17/2019 10:17 AM CDT)

## 2019-10-18 ENCOUNTER — TELEPHONE (OUTPATIENT)
Dept: RHEUMATOLOGY | Facility: CLINIC | Age: 74
End: 2019-10-18

## 2019-10-18 NOTE — TELEPHONE ENCOUNTER
Called pharmacy ([facility]) to verify Cellcept shipment.  Her last shippment was 9/20/19 and the next order is scheduled to go out in 11/18/19.    Left message for Shala to return my call regarding this shipment.     Talya Matias MSN, RN  Rheumatology RN Care Coordinator  [facility]

## 2019-10-21 NOTE — TELEPHONE ENCOUNTER
[facility]    Phone Message    May a detailed message be left on voicemail: yes    Reason for Call: Other: The pt is returning Talya's call from last week. Please call her at work - pt  Won't be at her desk from 11:L30 -0 1. Thanks.    Action Taken: Message routed to:  [facility]

## 2019-10-21 NOTE — TELEPHONE ENCOUNTER
Spoke to Shala and she states that she wanted to make sure that the pharmacy [facility] had prescription and she states that she will call in the prescriptions for her medications herself because otherwise they will get called in to soon to be refilled.     Talya Matias MSN, RN  Rheumatology RN Care Coordinator  [facility]

## 2019-10-21 NOTE — TELEPHONE ENCOUNTER
Left message for Shala to return my call.    Talya Matias MSN, RN  Rheumatology RN Care Coordinator  [facility]

## 2019-10-23 ENCOUNTER — TELEPHONE (OUTPATIENT)
Dept: RHEUMATOLOGY | Facility: CLINIC | Age: 74
End: 2019-10-23

## 2019-10-23 NOTE — TELEPHONE ENCOUNTER
Left message for Shala to return my call, also sent her mychart message.     Talya Matias MSN, RN  Rheumatology RN Care Coordinator   [facility]

## 2019-10-24 DIAGNOSIS — M32.15 SYSTEMIC LUPUS ERYTHEMATOSUS WITH TUBULO-INTERSTITIAL NEPHROPATHY, UNSPECIFIED SLE TYPE (H): ICD-10-CM

## 2019-10-24 DIAGNOSIS — Z79.899 ENCOUNTER FOR LONG-TERM CURRENT USE OF MEDICATION: ICD-10-CM

## 2019-10-24 DIAGNOSIS — Z79.01 LONG TERM CURRENT USE OF ANTICOAGULANT THERAPY: ICD-10-CM

## 2019-10-24 LAB
ALBUMIN UR-MCNC: NEGATIVE MG/DL
APPEARANCE UR: CLEAR
BACTERIA #/AREA URNS HPF: ABNORMAL /HPF
BILIRUB UR QL STRIP: NEGATIVE
COLOR UR AUTO: YELLOW
CREAT SERPL-MCNC: 0.67 MG/DL (ref 0.52–1.04)
ERYTHROCYTE [DISTWIDTH] IN BLOOD BY AUTOMATED COUNT: 15.4 % (ref 10–15)
GFR SERPL CREATININE-BSD FRML MDRD: 88 ML/MIN/{1.73_M2}
GLUCOSE UR STRIP-MCNC: NEGATIVE MG/DL
HCT VFR BLD AUTO: 41.2 % (ref 35–47)
HGB BLD-MCNC: 12.8 G/DL (ref 11.7–15.7)
HGB UR QL STRIP: NEGATIVE
KETONES UR STRIP-MCNC: NEGATIVE MG/DL
LEUKOCYTE ESTERASE UR QL STRIP: NEGATIVE
MCH RBC QN AUTO: 28.6 PG (ref 26.5–33)
MCHC RBC AUTO-ENTMCNC: 31.1 G/DL (ref 31.5–36.5)
MCV RBC AUTO: 92 FL (ref 78–100)
MUCOUS THREADS #/AREA URNS LPF: PRESENT /LPF
NITRATE UR QL: POSITIVE
PH UR STRIP: 6 PH (ref 5–7)
PLATELET # BLD AUTO: 253 10E9/L (ref 150–450)
RBC # BLD AUTO: 4.47 10E12/L (ref 3.8–5.2)
RBC #/AREA URNS AUTO: <1 /HPF (ref 0–2)
SOURCE: ABNORMAL
SP GR UR STRIP: 1.01 (ref 1–1.03)
UROBILINOGEN UR STRIP-MCNC: 0 MG/DL (ref 0–2)
WBC # BLD AUTO: 3.7 10E9/L (ref 4–11)
WBC #/AREA URNS AUTO: 2 /HPF (ref 0–5)

## 2019-10-25 LAB
BACTERIA SPEC CULT: ABNORMAL
Lab: ABNORMAL
SPECIMEN SOURCE: ABNORMAL

## 2019-11-01 ENCOUNTER — MYC MEDICAL ADVICE (OUTPATIENT)
Dept: INTERNAL MEDICINE | Facility: CLINIC | Age: 74
End: 2019-11-01

## 2019-11-01 DIAGNOSIS — R60.9 EDEMA: Primary | ICD-10-CM

## 2019-11-19 ENCOUNTER — MEDICAL CORRESPONDENCE (OUTPATIENT)
Dept: HEALTH INFORMATION MANAGEMENT | Facility: CLINIC | Age: 74
End: 2019-11-19

## 2019-11-26 ENCOUNTER — ANTICOAGULATION THERAPY VISIT (OUTPATIENT)
Dept: ANTICOAGULATION | Facility: CLINIC | Age: 74
End: 2019-11-26

## 2019-11-26 DIAGNOSIS — Z79.01 LONG TERM CURRENT USE OF ANTICOAGULANT THERAPY: ICD-10-CM

## 2019-11-26 DIAGNOSIS — Z79.01 LONG TERM (CURRENT) USE OF ANTICOAGULANTS: ICD-10-CM

## 2019-11-26 LAB — INR PPP: 3.53 (ref 0.86–1.14)

## 2019-11-26 NOTE — PROGRESS NOTES
ADDENDUM from 12/16:  Pt phones on this date to report she was diagnosed with tendonitis on her shoulder & is taking 3 Gm of tylenol/day.  The Factor 2 from 11/26 = 11.  Next Factor 2 will be drawn on 12/18 @ 4PM.  Pt is aware it will most likely be 12/19 when we get that result.  She wanted us to know of her tylenol usage.  Suki [facility]U        ANTICOAGULATION FOLLOW-UP CLINIC VISIT    Patient Name:  Shala Caruso  Date:  11/26/2019  Contact Type:  Telephone    SUBJECTIVE:         OBJECTIVE    INR   Date Value Ref Range Status   11/26/2019 3.53 (H) 0.86 - 1.14 Final     Chromogenic Factor 10   Date Value Ref Range Status   11/21/2018 16%  Final     Factor 2 Assay   Date Value Ref Range Status   09/12/2019 17 (L) 60 - 140 % Final       ASSESSMENT / PLAN  INR assessment SUPRA    Recheck INR In: 3 WEEKS    INR Location Clinic      Anticoagulation Summary  As of 11/26/2019    INR goal:      TTR:   73.3 % (1 y)   Prior goal:   2.0-3.0   INR used for dosing:   3.53! (11/26/2019)   Warfarin maintenance plan:   12.5 mg (5 mg x 2.5) every Mon; 10 mg (5 mg x 2) all other days   Full warfarin instructions:   11/26: 2.5 mg; Otherwise 12.5 mg every Mon; 10 mg all other days   Weekly warfarin total:   72.5 mg   Plan last modified:   Andre Bledsoe RN (7/31/2019)   Next INR check:   12/17/2019   Priority:   Factor 2   Target end date:       Indications    SLE (systemic lupus erythematosus) (H) (Resolved) [M32.9]  Long term current use of anticoagulant therapy [Z79.01]             Anticoagulation Episode Summary     INR check location:   Clinic Lab    Preferred lab:       Send INR reminders to:   [facility]    Comments:   Factor 2  goal range is 15-25%  Ok to leave message on home [phone] and work voicemail, but call [id number] per pt request.  OK to leave message at office  May leave messages with Rodriguez Santos  DO NOT GIVE RECORDS TO EMPLOYER U        Anticoagulation Care Providers     Provider Role Specialty  Phone number    Mt Kiser MD Responsible Clinical Cardiac Electrophysiology [phone]            See the Encounter Report to view Anticoagulation Flowsheet and Dosing Calendar (Go to Encounters tab in chart review, and find the Anticoagulation Therapy Visit)  Left message for patient with results and dosing recommendations. Asked patient to call back to report any missed doses, falls, signs and symptoms of bleeding or clotting, any changes in health, medication, or diet. Asked patient to call back with any questions or concerns.      Portia Cherry RN

## 2019-11-27 LAB — PROTHROM ACT/NOR PPP: 11 % (ref 60–140)

## 2019-12-09 DIAGNOSIS — M32.9 SYSTEMIC LUPUS ERYTHEMATOSUS (H): Primary | ICD-10-CM

## 2019-12-09 DIAGNOSIS — Z79.01 LONG TERM CURRENT USE OF ANTICOAGULANT THERAPY: ICD-10-CM

## 2019-12-09 RX ORDER — WARFARIN SODIUM 5 MG/1
TABLET ORAL
Qty: 200 TABLET | Refills: 1 | Status: SHIPPED | OUTPATIENT
Start: 2019-12-09 | End: 2020-03-11

## 2019-12-13 ENCOUNTER — TELEPHONE (OUTPATIENT)
Dept: INTERNAL MEDICINE | Facility: CLINIC | Age: 74
End: 2019-12-13

## 2019-12-13 ENCOUNTER — ANCILLARY PROCEDURE (OUTPATIENT)
Dept: GENERAL RADIOLOGY | Facility: CLINIC | Age: 74
End: 2019-12-13
Attending: FAMILY MEDICINE
Payer: COMMERCIAL

## 2019-12-13 ENCOUNTER — OFFICE VISIT (OUTPATIENT)
Dept: ORTHOPEDICS | Facility: CLINIC | Age: 74
End: 2019-12-13
Payer: COMMERCIAL

## 2019-12-13 VITALS — BODY MASS INDEX: 20.18 KG/M2 | HEIGHT: 66 IN | RESPIRATION RATE: 16 BRPM

## 2019-12-13 DIAGNOSIS — M25.511 RIGHT SHOULDER PAIN, UNSPECIFIED CHRONICITY: ICD-10-CM

## 2019-12-13 DIAGNOSIS — M25.511 ACUTE PAIN OF RIGHT SHOULDER: Primary | ICD-10-CM

## 2019-12-13 NOTE — PATIENT INSTRUCTIONS
Rotator Cuff Injury     WHAT IS A ROTATOR CUFF INJURY?    A rotator cuff injury is irritation of or damage to the group of tendons and muscles that hold your shoulder joint together. Tendons are strong bands of tissue that attach muscle to bone. You use the muscles and tendons in your shoulder joint to move your shoulder and raise your arm over your head.        WHAT IS THE CAUSE?    A rotator cuff injury can be caused by:    Overuse of your shoulder in a sport or work activity that involves repetitive overhead movement of your shoulder, like swimming, baseball (mainly pitching), football, tennis, painting, plastering, or housework.  A sudden activity that twists your shoulder or tears your tendon, such as using your arm to break a fall, falling onto your arm, or lifting a heavy object  You may be at higher risk for a rotator cuff injury if you have poor head and shoulder posture, especially if you are older.    WHAT ARE THE SYMPTOMS?    Symptoms may include:    Pain and weakness in your arm and shoulder  Loss of shoulder movement, especially when you try to raise your arm overhead    HOW IS IT DIAGNOSED?    Your healthcare provider will ask about your symptoms, activities, and medical history and examine you. You may have X-rays or other scans or procedures, such as:    An ultrasound, which uses sound waves to show pictures of your shoulder joint  An MRI, which uses a strong magnetic field and radio waves to show detailed pictures of your shoulder joint  An arthrogram, which is an X-ray or MRI taken after a dye is injected into your joint to outline its shape  Arthroscopy, which is a type of surgery done with a small scope inserted into your joint so your provider can look directly at your joint    HOW IS IT TREATED?    You will need to change or stop doing the activities that cause pain until your injury has healed. For example, avoid strenuous activity or any overhead motion that causes pain. Also, try to make  sure that you are practicing good posture and are not slouching forward.    Your healthcare provider may recommend stretching and strengthening exercises to help you heal.    If you have a bad tear, you may need to have it repaired with surgery. After surgery, your treatment plan will include physical therapy to strengthen your shoulder as it heals.    The pain often gets better within a few weeks with self-care, but some injuries may take several months or longer to heal. It s important to follow all of your healthcare provider s instructions.    HOW CAN I TAKE CARE OF MYSELF?    To keep swelling down and help relieve pain:    Put an ice pack, gel pack, or package of frozen vegetables wrapped in a cloth on the area every 3 to 4 hours for up to 20 minutes at a time.    Take nonprescription pain medicine, such as acetaminophen, ibuprofen, or naproxen. Nonsteroidal anti-inflammatory medicines (NSAIDs), such as ibuprofen and naproxen, may cause stomach bleeding and other problems. These risks increase with age. Read the label and take as directed. Unless recommended by your healthcare provider, you should not take this medicine for more than 10 days.  Moist heat may help relieve pain, relax your muscles, and make it easier to move your arm and shoulder. Put moist heat on the injured area for 10 to 15 minutes before you do warm-up and stretching exercises. Moist heat includes heat patches or moist heating pads that you can buy at most drugstores, a warm wet washcloth, or a hot shower. To prevent burns to your skin, follow directions on the package and do not lie on any type of hot pad. Don t use heat if you have swelling.    Follow your healthcare provider's instructions, including any exercises recommended by your provider. Ask your provider:    How and when you will hear your test results  How long it will take to recover  What activities you should avoid, including how much you can lift, and when you can return to  your normal activities  How to take care of yourself at home  What symptoms or problems you should watch for and what to do if you have them  Make sure you know when you should come back for a checkup.    HOW CAN I HELP PREVENT A ROTATOR CUFF INJURY?    Warm-up exercises and stretching before activities can help prevent injuries. For example, do exercises that strengthen your shoulder muscles. If your arm or shoulder hurts after exercise, putting ice on it may help keep it from getting injured.    Follow safety rules and use any protective equipment recommended for your work or sport.    Avoid activities that cause pain. For example, avoid lifting heavy objects over your head.    Developed by [employer].  Published by [employer].  Copyright  2014 [employer] and/or one of its subsidiaries. All rights reserved.    Rotator Cuff Exercises    Isometric shoulder external rotation:  a doorway with your elbow bent 90 degrees and the back of the wrist on your injured side pressed against the door frame. Try to press your hand outward into the door frame. Hold for 5 seconds. Do 2 sets of 15.    Isometric shoulder internal rotation:  a doorway with your elbow bent 90 degrees and the front of the wrist on your injured side pressed against the door frame. Try to press your palm into the door frame. Hold for 5 seconds. Do 2 sets of 15.  Wand exercise, flexion: Stand upright and hold a stick in both hands, palms down. Stretch your arms by lifting them over your head, keeping your arms straight. Hold for 5 seconds and return to the starting position. Repeat 10 times.    Wand exercise, extension: Stand upright and hold a stick in both hands behind your back. Move the stick away from your back. Hold this position for 5 seconds. Relax and return to the starting position. Repeat 10 times.  Wand exercise, external rotation: Lie on your back and hold a stick in both hands, palms up. Your upper arms should be  resting on the floor with your elbows at your sides and bent 90 degrees. Use your uninjured arm to push your injured arm out away from your body. Keep the elbow of your injured arm at your side while it is being pushed. Hold the stretch for 5 seconds. Repeat 10 times.    Wand exercise, shoulder abduction and adduction: Stand and hold a stick with both hands, palms facing away from your body. Rest the stick against the front of your thighs. Use your uninjured arm to push your injured arm out to the side and up as high as possible. Keep your arms straight. Hold for 5 seconds. Repeat 10 times.    Resisted shoulder external rotation: Stand sideways next to a door with your injured arm farther from the door. Tie a knot in the end of the tubing and shut the knot in the door at waist level (or use cable weight machine at gym). Hold the other end of the tubing with the hand of your injured arm. Rest the hand of your injured arm across your stomach. Keeping your elbow in at your side, rotate your arm outward and away from your waist. Slowly return your arm to the starting position. Make sure you keep your elbow bent 90 degrees and your forearm parallel to the floor. Repeat 10 times. Build up to 2 sets of 15.    Resisted shoulder internal rotation: Stand sideways next to a door with your injured arm closest to the door. Tie a knot in the end of the tubing and shut the knot in the door at waist level (or use cable weight machine at gym). Hold the other end of the tubing with the hand of your injured arm. Bend the elbow of your injured arm 90 degrees. Keeping your elbow in at your side, rotate your forearm across your body and then slowly back to the starting position. Make sure you keep your forearm parallel to the floor. Do 2 sets of 8 to 12.    Scaption: Stand with your arms at your sides and with your elbows straight. Slowly raise your arms to eye level. As you raise your arms, spread them apart so that they are only  "slightly in front of your body (at about a 30-degree angle to the front of your body). Point your thumbs toward the ceiling. Hold for 2 seconds and lower your arms slowly. Do 2 sets of 15. Progress to holding a soup can or light weight when you are doing the exercise and increase the weight as the exercise gets easier.    Side-lying external rotation: Lie on your uninjured side with your injured arm at your side and your elbow bent 90 degrees. Keeping your elbow against your side, raise your forearm toward the ceiling and hold for 2 seconds. Slowly lower your arm. Do 2 sets of 15. You can start doing this exercise holding a soup can or light weight and gradually increase the weight as long as there is no pain.    Horizontal abduction: Lie on your stomach on a table or the edge of a bed with the arm on your injured side hanging down over the edge. Raise your arm out to the side, with your thumb pointed toward the ceiling, until your arm is parallel to the floor. Hold for 2 seconds and then lower it slowly. Start this exercise with no weight. As you get stronger, add a light weight or hold a soup can. Do 2 sets of 15.    Push-up with a plus: Begin on the floor on your hands and knees. Keep your hands a shoulder width apart and lift your feet off the floor. Arch your back as high as possible and round your shoulders (this is the \"plus\" part or the exercise). Bend your elbows and lower your body to the floor. Return to the starting position and arch your back again. Do 2 sets of 15.            "

## 2019-12-13 NOTE — PROGRESS NOTES
SPORTS & ORTHOPEDIC WALK-IN VISIT 12/13/2019    Primary Care Physician: Dr. Contreras     Last Sunday thought she had slept on it funny. Sharp and shooting pain. Posterior   shoulder is tender and region of pain. Cannot lift arm above head.     Reason for visit:     What part of your body is injured / painful?  right shoulder    What caused the injury /pain? Unsure    How long ago did your injury occur or pain begin? Sunday 12/8/19    What are your most bothersome symptoms? Pain    How would you characterize your symptom?  aching, dull, sharp and shooting    What makes your symptoms better? Nothing    What makes your symptoms worse? Movement and Overhead motion    Have you been previously seen for this problem? No    Medical History:    Any recent changes to your medical history? No    Any new medication prescribed since last visit? No    Have you had surgery on this body part before? No    Social History:    Occupation: Retired     Handedness: Right    Exercise:     Review of Systems:    Do you have fever, chills, weight loss? No    Do you have any vision problems? No    Do you have any chest pain or edema? No    Do you have any shortness of breath or wheezing?  No    Do you have stomach problems? No    Do you have any numbness or focal weakness? No    Do you have diabetes? Yes, Type     Do you have problems with bleeding or clotting? Yes, had clot years ago, on Coumadin     Do you have an rashes or other skin lesions? No

## 2019-12-13 NOTE — TELEPHONE ENCOUNTER
Spoke to patient who states that her right shoulder has been extremely painful and certain movements hurt. Patient is wondering if she should be seen for this. Patient denies any other symptoms. Advise patient that we have the ortho walk in clinic that is open 7-7am. Patient will go to ortho walk in. Katheryn Renteria LPN 12/13/2019 10:52 AM

## 2019-12-13 NOTE — LETTER
RE: Shala Caruso  [address] [place] [address]     Dear Colleague,    Thank you for referring your patient, Shala Caruso, to the [facility] SPORTS AND ORTHOPAEDIC WALK IN CLINIC at [facility]. Please see a copy of my visit note below.          SPORTS & ORTHOPEDIC WALK-IN VISIT 12/13/2019    Primary Care Physician: Dr. Contreras     Last Sunday thought she had slept on it funny. Sharp and shooting pain. Posterior   shoulder is tender and region of pain. Cannot lift arm above head.     Reason for visit:     What part of your body is injured / painful?  right shoulder    What caused the injury /pain? Unsure    How long ago did your injury occur or pain begin? Sunday 12/8/19    What are your most bothersome symptoms? Pain    How would you characterize your symptom?  aching, dull, sharp and shooting    What makes your symptoms better? Nothing    What makes your symptoms worse? Movement and Overhead motion    Have you been previously seen for this problem? No    Medical History:    Any recent changes to your medical history? No    Any new medication prescribed since last visit? No    Have you had surgery on this body part before? No    Social History:    Occupation: Retired     Handedness: Right    Exercise:     Review of Systems:    Do you have fever, chills, weight loss? No    Do you have any vision problems? No    Do you have any chest pain or edema? No    Do you have any shortness of breath or wheezing?  No    Do you have stomach problems? No    Do you have any numbness or focal weakness? No    Do you have diabetes? Yes, Type     Do you have problems with bleeding or clotting? Yes, had clot years ago, on Coumadin     Do you have an rashes or other skin lesions? No         CHIEF COMPLAINT:  Pain of the Right Shoulder       HISTORY OF PRESENT ILLNESS  Ms. Caruso is a pleasant 74 year old year old female who presents to clinic today with acute pain of right  shoulder.  Last Sunday thought she had slept on it funny. Sharp and shooting pain at shoulder and deltoid region. Posterior shoulder is tender and region of pain. Cannot lift arm above head.        What part of your body is injured / painful?  right shoulder    What caused the injury /pain? Unsure    How long ago did your injury occur or pain begin? Sunday 12/8/19    What are your most bothersome symptoms? Pain    How would you characterize your symptom?  aching, dull, sharp and shooting    What makes your symptoms better? Nothing    What makes your symptoms worse? Movement and Overhead motion    Have you been previously seen for this problem? No    Additional history: as documented    MEDICAL HISTORY  Patient Active Problem List   Diagnosis     Achalasia     Antiphospholipid syndrome (H)     DM (diabetes mellitus) (H)     GERD (gastroesophageal reflux disease)     Hyperlipidemia     HTN (hypertension)     Osteopenia     Raynaud's disease     DVT (deep venous thrombosis) (H)     Encounter for long-term current use of medication     Type 1 diabetes mellitus (H)     Osteoporosis, idiopathic     Osteoporosis, postmenopausal     Cystocele, midline     Urinary urgency     Urinary frequency     Female stress incontinence     Atrophic vaginitis     Long term current use of anticoagulant therapy     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)     Choroidal lesion     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, unspecified SLE type (H)     Hematoma of leg, right, initial encounter       Current Outpatient Medications   Medication Sig Dispense Refill     acetaminophen (TYLENOL) 500 MG tablet Take 1,000-1,500 mg by mouth nightly as needed        aspirin 81 MG tablet Take 81 mg by mouth At Bedtime        AYR SALINE NASAL NO-DRIP GEL Use as needed for nasal dryness 3 Tube 3     blood glucose (TRUE METRIX BLOOD GLUCOSE TEST) test strip Use to test blood sugar 5 times daily or as directed. 450  strip 3     Calcium Carbonate-Vitamin D (CALCIUM 500 + D PO) Take 1 tablet by mouth 3 times daily       carboxymethylcellulose PF (REFRESH PLUS) 0.5 % SOLN ophthalmic solution Place 1 drop into both eyes as needed       Injection Device for insulin (NOVOPEN ECHO) RORY 1 each 4 times daily 1 each 0     insulin glargine (LANTUS VIAL) 100 UNIT/ML vial Inject 4 units as directed daily LANTUS SOLOSTAR PEN PLEASE 15 mL 3     insulin pen needle (BD PAM U/F) 32G X 4 MM miscellaneous Use as directed with insulin pens. 200 each 3     LANCETS ULTRA THIN 30G MISC Test 7-8 times daily  (Lancets that go with pt current meter ) 7203 each 3     Multiple Vitamins-Minerals (CENTRUM SILVER PO) Take 1 tablet by mouth daily.       NIFEdipine ER OSMOTIC (NIFEDICAL XL) 30 MG 24 hr tablet Take 1 tablet (30 mg) by mouth daily Call clinic to schedule MD APPT. Annual visit due in January 90 tablet 0     NOVOLOG PENFILL 100 UNIT/ML soln Inject 1 unit per 15 grams CHO with meals TID approx 15 units daily 15 mL 3     order for DME Equipment being ordered: compression socks, 20-30 mmHg, knee high 4 Product 0     simvastatin (ZOCOR) 40 MG tablet Take 1 tablet (40 mg) by mouth At Bedtime 90 tablet 2     warfarin (COUMADIN) 5 MG tablet Take 12.5mg on Monday, and 10mg ROW, or as directed by the Medication Monitoring Clinic. 200 tablet 1     warfarin (COUMADIN) 5 MG tablet Take 2-2.5 tablets daily or as directed by coumadin clinic. 225 tablet 2     warfarin ANTICOAGULANT (COUMADIN) 5 MG tablet Take 12.5 mg Mondays and 10 mg all other days of the week or as directed by Anticoagulation Clinic. 200 tablet 1       Allergies   Allergen Reactions     Amaryl [Glimepiride] Swelling     Swelling of tongue     Metformin Blisters     Experienced big blisters all over body and sloughing of skin     Plaquenil [Aminoquinolines] Hives     Sulfa Drugs Other (See Comments)     Turned bright red     Amoxicillin Rash     Hydroxychloroquine Rash     Penicillins Rash  "      Family History   Problem Relation Age of Onset     Cancer Other         breast     Cerebrovascular Disease Other      C.A.D. Other      Glaucoma Father      Macular Degeneration No family hx of        Additional medical/Social/Surgical histories reviewed in [facility] and updated as appropriate.     REVIEW OF SYSTEMS (12/16/2019)  A 10-point review of systems was obtained and is negative except for as noted in the HPI.      PHYSICAL EXAM  Resp 16   Ht 1.676 m (5' 6\")   LMP  (LMP Unknown)   BMI 20.18 kg/m       General  - normal appearance, in no obvious distress  Musculoskeletal - Right shoulder  - inspection: normal bone and joint alignment, no obvious deformity, no scapular winging, no AC step-off  - palpation: mildly tender RC insertion, normal clavicle, non-tender AC  - ROM:  painful and limited flexion, abduction., limited IR. ER painful endpoint grossly intact motion.  - strength: 5/5  strength, 4/5 supraspinatus, 5/5 infraspinatus, 5/5 subscapularis strength  - special tests:  (-) Speed's  (+) Neer  (+) Hawkin's  (+) Yunior's  (-) [place]'s  (-) apprehension  (-) subscap lift-off  Neuro  - no sensory or motor deficit, grossly normal coordination, normal muscle tone  Skin  - no ecchymosis, erythema, warmth, or induration, no obvious rash  Psych  - interactive, appropriate, normal mood and affect    IMAGING :XR SHOULDER RT. Final results and radiologist's interpretation, available in the [facility] health record. Images were reviewed with the patient/family members in the office today. My personal interpretation of the performed imaging is AC joint djd.  No glenohumeral arthritis or acute bony abnormality.     ASSESSMENT & PLAN  Ms. Caruso is a 74 year old year old female who presents to clinic today with onset of left shoulder pain without injury or inciting event.  Suspected subacromial bursitis of left shoulder.     Diagnosis: Acute left shoulder pain.  Subacromial bursitis of left " shoulder.    -Rehabilitation exercise program provided  -Ice to left shoulder  -Tylenol use  -Activity modifications/avoidance of overhead motion at this time.  -Consideration for subacromial injection at follow up in 4 weeks if persisting.    It was a pleasure seeing Shala today.    Maximilian Burrell DO, CAM  Primary Care Sports Medicine

## 2019-12-13 NOTE — TELEPHONE ENCOUNTER
[facility] Call Center    Phone Message    May a detailed message be left on voicemail: yes 11:30 - 1:30 unavailable     Reason for Call: Symptoms or Concerns     If patient has red-flag symptoms, warm transfer to triage line    Current symptom or concern: shoulder pain, upper chest pain, suspects sleeping on her arm could have caused it    Symptoms have been present for:  1 day(s)    Has patient previously been seen for this? No    By : n/a    Date: n/a    Are there any new or worsening symptoms? No      Action Taken: Message routed to:  [facility] ([facility] amita

## 2019-12-16 NOTE — PROGRESS NOTES
CHIEF COMPLAINT:  Pain of the Right Shoulder       HISTORY OF PRESENT ILLNESS  Ms. Caruso is a pleasant 74 year old year old female who presents to clinic today with acute pain of right shoulder.  Last Sunday thought she had slept on it funny. Sharp and shooting pain at shoulder and deltoid region. Posterior shoulder is tender and region of pain. Cannot lift arm above head.        What part of your body is injured / painful?  right shoulder    What caused the injury /pain? Unsure    How long ago did your injury occur or pain begin? Sunday 12/8/19    What are your most bothersome symptoms? Pain    How would you characterize your symptom?  aching, dull, sharp and shooting    What makes your symptoms better? Nothing    What makes your symptoms worse? Movement and Overhead motion    Have you been previously seen for this problem? No    Additional history: as documented    MEDICAL HISTORY  Patient Active Problem List   Diagnosis     Achalasia     Antiphospholipid syndrome (H)     DM (diabetes mellitus) (H)     GERD (gastroesophageal reflux disease)     Hyperlipidemia     HTN (hypertension)     Osteopenia     Raynaud's disease     DVT (deep venous thrombosis) (H)     Encounter for long-term current use of medication     Type 1 diabetes mellitus (H)     Osteoporosis, idiopathic     Osteoporosis, postmenopausal     Cystocele, midline     Urinary urgency     Urinary frequency     Female stress incontinence     Atrophic vaginitis     Long term current use of anticoagulant therapy     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)     Choroidal lesion     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, unspecified SLE type (H)     Hematoma of leg, right, initial encounter       Current Outpatient Medications   Medication Sig Dispense Refill     acetaminophen (TYLENOL) 500 MG tablet Take 1,000-1,500 mg by mouth nightly as needed        aspirin 81 MG tablet Take 81 mg by mouth At Bedtime        AYR  SALINE NASAL NO-DRIP GEL Use as needed for nasal dryness 3 Tube 3     blood glucose (TRUE METRIX BLOOD GLUCOSE TEST) test strip Use to test blood sugar 5 times daily or as directed. 450 strip 3     Calcium Carbonate-Vitamin D (CALCIUM 500 + D PO) Take 1 tablet by mouth 3 times daily       carboxymethylcellulose PF (REFRESH PLUS) 0.5 % SOLN ophthalmic solution Place 1 drop into both eyes as needed       Injection Device for insulin (NOVOPEN ECHO) RORY 1 each 4 times daily 1 each 0     insulin glargine (LANTUS VIAL) 100 UNIT/ML vial Inject 4 units as directed daily LANTUS SOLOSTAR PEN PLEASE 15 mL 3     insulin pen needle (BD PAM U/F) 32G X 4 MM miscellaneous Use as directed with insulin pens. 200 each 3     LANCETS ULTRA THIN 30G MISC Test 7-8 times daily  (Lancets that go with pt current meter ) 7203 each 3     Multiple Vitamins-Minerals (CENTRUM SILVER PO) Take 1 tablet by mouth daily.       NIFEdipine ER OSMOTIC (NIFEDICAL XL) 30 MG 24 hr tablet Take 1 tablet (30 mg) by mouth daily Call clinic to schedule MD APPT. Annual visit due in January 90 tablet 0     NOVOLOG PENFILL 100 UNIT/ML soln Inject 1 unit per 15 grams CHO with meals TID approx 15 units daily 15 mL 3     order for DME Equipment being ordered: compression socks, 20-30 mmHg, knee high 4 Product 0     simvastatin (ZOCOR) 40 MG tablet Take 1 tablet (40 mg) by mouth At Bedtime 90 tablet 2     warfarin (COUMADIN) 5 MG tablet Take 12.5mg on Monday, and 10mg ROW, or as directed by the [facility]. 200 tablet 1     warfarin (COUMADIN) 5 MG tablet Take 2-2.5 tablets daily or as directed by coumadin clinic. 225 tablet 2     warfarin ANTICOAGULANT (COUMADIN) 5 MG tablet Take 12.5 mg Mondays and 10 mg all other days of the week or as directed by Anticoagulation Clinic. 200 tablet 1       Allergies   Allergen Reactions     Amaryl [Glimepiride] Swelling     Swelling of tongue     Metformin Blisters     Experienced big blisters all over body and  "sloughing of skin     Plaquenil [Aminoquinolines] Hives     Sulfa Drugs Other (See Comments)     Turned bright red     Amoxicillin Rash     Hydroxychloroquine Rash     Penicillins Rash       Family History   Problem Relation Age of Onset     Cancer Other         breast     Cerebrovascular Disease Other      C.A.D. Other      Glaucoma Father      Macular Degeneration No family hx of        Additional medical/Social/Surgical histories reviewed in [facility] and updated as appropriate.     REVIEW OF SYSTEMS (12/16/2019)  A 10-point review of systems was obtained and is negative except for as noted in the HPI.      PHYSICAL EXAM  Resp 16   Ht 1.676 m (5' 6\")   LMP  (LMP Unknown)   BMI 20.18 kg/m      General  - normal appearance, in no obvious distress  Musculoskeletal - Right shoulder  - inspection: normal bone and joint alignment, no obvious deformity, no scapular winging, no AC step-off  - palpation: mildly tender RC insertion, normal clavicle, non-tender AC  - ROM:  painful and limited flexion, abduction., limited IR. ER painful endpoint grossly intact motion.  - strength: 5/5  strength, 4/5 supraspinatus, 5/5 infraspinatus, 5/5 subscapularis strength  - special tests:  (-) Speed's  (+) Neer  (+) Hawkin's  (+) Yunior's  (-) Providence's  (-) apprehension  (-) subscap lift-off  Neuro  - no sensory or motor deficit, grossly normal coordination, normal muscle tone  Skin  - no ecchymosis, erythema, warmth, or induration, no obvious rash  Psych  - interactive, appropriate, normal mood and affect    IMAGING :XR SHOULDER RT. Final results and radiologist's interpretation, available in the [facility] health record. Images were reviewed with the patient/family members in the office today. My personal interpretation of the performed imaging is AC joint djd.  No glenohumeral arthritis or acute bony abnormality.     ASSESSMENT & PLAN  Ms. Caruso is a 74 year old year old female who presents to clinic today with onset of left shoulder " pain without injury or inciting event.  Suspected subacromial bursitis of left shoulder.     Diagnosis: Acute left shoulder pain.  Subacromial bursitis of left shoulder.    -Rehabilitation exercise program provided  -Ice to left shoulder  -Tylenol use  -Activity modifications/avoidance of overhead motion at this time.  -Consideration for subacromial injection at follow up in 4 weeks if persisting.    It was a pleasure seeing Shala today.    Maximilian Burrell DO, CAQSM  Primary Care Sports Medicine

## 2019-12-18 DIAGNOSIS — Z79.01 LONG TERM CURRENT USE OF ANTICOAGULANT THERAPY: ICD-10-CM

## 2019-12-18 LAB — INR PPP: 4.54 (ref 0.86–1.14)

## 2019-12-19 ENCOUNTER — ANTICOAGULATION THERAPY VISIT (OUTPATIENT)
Dept: ANTICOAGULATION | Facility: CLINIC | Age: 74
End: 2019-12-19

## 2019-12-19 DIAGNOSIS — Z79.01 LONG TERM CURRENT USE OF ANTICOAGULANT THERAPY: ICD-10-CM

## 2019-12-19 LAB — PROTHROM ACT/NOR PPP: 10 % (ref 60–140)

## 2019-12-19 NOTE — PROGRESS NOTES
ANTICOAGULATION FOLLOW-UP CLINIC VISIT    Patient Name:  Shala Caruso  Date:  12/19/2019  Contact Type:  Telephone    SUBJECTIVE:  Patient Findings     Comments:   Per previous note pt called in to report that she is taking Tylenol PRN for tendonitis. Consulted with Aakash Mcduffie RPH        Clinical Outcomes     Comments:   Per previous note pt called in to report that she is taking Tylenol PRN for tendonitis. Consulted with Aakash Mcduffie RPH           OBJECTIVE    INR   Date Value Ref Range Status   12/18/2019 4.54 (H) 0.86 - 1.14 Final     Chromogenic Factor 10   Date Value Ref Range Status   11/21/2018 16%  Final     Factor 2 Assay   Date Value Ref Range Status   12/18/2019 10 (L) 60 - 140 % Final       ASSESSMENT / PLAN  INR assessment SUPRA    Recheck INR In: 1 WEEK    INR Location Clinic      Anticoagulation Summary  As of 12/19/2019    INR goal:      TTR:   67.2 % (1 y)   Prior goal:   2.0-3.0   INR used for dosing:      Warfarin maintenance plan:   12.5 mg (5 mg x 2.5) every Mon; 10 mg (5 mg x 2) all other days   Full warfarin instructions:   12/19: 5 mg; 12/20: 5 mg; 12/23: 10 mg; Otherwise 12.5 mg every Mon; 10 mg all other days   Weekly warfarin total:   72.5 mg   Plan last modified:   Andre Bledsoe RN (7/31/2019)   Next INR check:   12/26/2019   Priority:   High   Target end date:       Indications    SLE (systemic lupus erythematosus) (H) (Resolved) [M32.9]  Long term current use of anticoagulant therapy [Z79.01]             Anticoagulation Episode Summary     INR check location:   Clinic Lab    Preferred lab:       Send INR reminders to:   [facility]    Comments:   Factor 2  goal range is 15-25%  Ok to leave message on home [phone] and work voicemail, but call [email or web address] per pt request.  OK to leave message at office  May leave messages with Rodriguez Santos  DO NOT GIVE RECORDS TO EMPLOYER U        Anticoagulation Care Providers     Provider Role Specialty Phone number    Rashel,  MD [facility] Responsible Clinical Cardiac Electrophysiology [phone]            See the Encounter Report to view Anticoagulation Flowsheet and Dosing Calendar (Go to Encounters tab in chart review, and find the Anticoagulation Therapy Visit)    Left message for patient with results and dosing recommendations. Asked patient to call back to report any missed doses, falls, signs and symptoms of bleeding or clotting, any changes in health, medication, or diet. Asked patient to call back with any questions or concerns.     Melly Morrison RN

## 2019-12-26 ENCOUNTER — ANTICOAGULATION THERAPY VISIT (OUTPATIENT)
Dept: ANTICOAGULATION | Facility: CLINIC | Age: 74
End: 2019-12-26

## 2019-12-26 DIAGNOSIS — Z79.01 LONG TERM CURRENT USE OF ANTICOAGULANT THERAPY: ICD-10-CM

## 2019-12-26 NOTE — PROGRESS NOTES
ANTICOAGULATION FOLLOW-UP CLINIC VISIT    Patient Name:  Shala Caruso  Date:  12/26/2019  Contact Type:  Telephone    SUBJECTIVE:  Patient Findings     Positives:   Missed doses    Comments:   Missed dose on 12/24  - pt phones on this date to inquire how to proceed due to the missed dose.  She will get labs early on 12/27 instead of today for this reason         Clinical Outcomes     Comments:   Missed dose on 12/24  - pt phones on this date to inquire how to proceed due to the missed dose.  She will get labs early on 12/27 instead of today for this reason            OBJECTIVE    INR   Date Value Ref Range Status   12/18/2019 4.54 (H) 0.86 - 1.14 Final     Chromogenic Factor 10   Date Value Ref Range Status   11/21/2018 16%  Final     Factor 2 Assay   Date Value Ref Range Status   12/18/2019 10 (L) 60 - 140 % Final       ASSESSMENT / PLAN  No question data found.  Anticoagulation Summary  As of 12/26/2019    INR goal:      TTR:   66.6 % (11.9 mo)   Prior goal:   2.0-3.0   INR used for dosing:   No new INR was available at the time of this encounter.   Warfarin maintenance plan:   12.5 mg (5 mg x 2.5) every Mon; 10 mg (5 mg x 2) all other days   Full warfarin instructions:   12/26: 15 mg; Otherwise 12.5 mg every Mon; 10 mg all other days   Weekly warfarin total:   72.5 mg   Plan last modified:   Andre Bledsoe, RN (7/31/2019)   Next INR check:   12/27/2019   Priority:   Critical   Target end date:       Indications    SLE (systemic lupus erythematosus) (H) (Resolved) [M32.9]  Long term current use of anticoagulant therapy [Z79.01]             Anticoagulation Episode Summary     INR check location:   Clinic Lab    Preferred lab:       Send INR reminders to:   [facility]    Comments:   Factor 2  goal range is 15-25%  Ok to leave message on home [phone] and work voicemail, but call [email or web address] per pt request.  OK to leave message at office  May leave messages with Rodriguez Santos  DO NOT GIVE RECORDS TO  EMPLOYER U        Anticoagulation Care Providers     Provider Role Specialty Phone number    Mt Kiser MD Responsible Clinical Cardiac Electrophysiology [phone]            See the Encounter Report to view Anticoagulation Flowsheet and Dosing Calendar (Go to Encounters tab in chart review, and find the Anticoagulation Therapy Visit)    See above notation     Ilana Gonzalez RN

## 2019-12-27 ENCOUNTER — ANTICOAGULATION THERAPY VISIT (OUTPATIENT)
Dept: ANTICOAGULATION | Facility: CLINIC | Age: 74
End: 2019-12-27

## 2019-12-27 DIAGNOSIS — Z79.899 ENCOUNTER FOR LONG-TERM CURRENT USE OF MEDICATION: ICD-10-CM

## 2019-12-27 DIAGNOSIS — Z79.01 LONG TERM CURRENT USE OF ANTICOAGULANT THERAPY: ICD-10-CM

## 2019-12-27 DIAGNOSIS — D68.61 ANTIPHOSPHOLIPID SYNDROME (H): Primary | ICD-10-CM

## 2019-12-27 DIAGNOSIS — M32.9 SYSTEMIC LUPUS ERYTHEMATOSUS, UNSPECIFIED SLE TYPE, UNSPECIFIED ORGAN INVOLVEMENT STATUS (H): ICD-10-CM

## 2019-12-27 LAB
INR PPP: 1.79 (ref 0.86–1.14)
PROTHROM ACT/NOR PPP: 25 % (ref 60–140)

## 2019-12-27 RX ORDER — MYCOPHENOLATE MOFETIL 500 MG/1
1000 TABLET ORAL 2 TIMES DAILY
Qty: 360 TABLET | Refills: 0 | Status: SHIPPED | OUTPATIENT
Start: 2019-12-27 | End: 2020-09-13

## 2019-12-27 NOTE — PROGRESS NOTES
ANTICOAGULATION FOLLOW-UP CLINIC VISIT    Patient Name:  Shala Caruso  Date:  2019  Contact Type:  Telephone    SUBJECTIVE:  Patient Findings     Positives:   Change in medications (Pt has been on aspirin for about 2 weeks for tendonitis in the shoulder blade region.)             OBJECTIVE    INR   Date Value Ref Range Status   2019 1.79 (H) 0.86 - 1.14 Final     Chromogenic Factor 10   Date Value Ref Range Status   2018 16%  Final     Factor 2 Assay   Date Value Ref Range Status   2019 25 (L) 60 - 140 % Final       ASSESSMENT / PLAN  INR assessment THER    Recheck INR In: 3 WEEKS    INR Location Clinic      Anticoagulation Summary  As of 2019    INR goal:      TTR:   65.7 % (1 y)   Prior goal:   2.0-3.0   INR used for dosin.79! (2019)   Warfarin maintenance plan:   12.5 mg (5 mg x 2.5) every Mon; 10 mg (5 mg x 2) all other days   Full warfarin instructions:   12.5 mg every Mon; 10 mg all other days   Weekly warfarin total:   72.5 mg   Plan last modified:   Andre Bledsoe, RN (2019)   Next INR check:   2020   Priority:   Critical   Target end date:       Indications    SLE (systemic lupus erythematosus) (H) (Resolved) [M32.9]  Long term current use of anticoagulant therapy [Z79.01]             Anticoagulation Episode Summary     INR check location:   Clinic Lab    Preferred lab:       Send INR reminders to:   [facility]    Comments:   Factor 2  goal range is 15-25%  Ok to leave message on home [phone] and work voicemail, but call [email or web address] per pt request.  OK to leave message at office  May leave messages with Rodriguez Santos  DO NOT GIVE RECORDS TO EMPLOYER U        Anticoagulation Care Providers     Provider Role Specialty Phone number    Mt Kiser MD Responsible Clinical Cardiac Electrophysiology [phone]            See the Encounter Report to view Anticoagulation Flowsheet and Dosing Calendar (Go to Encounters tab in chart review,  and find the Anticoagulation Therapy Visit)  Spoke with patient.  Factor 2=15-25%.  Goal range=25%.    Portia Cherry RN

## 2019-12-27 NOTE — TELEPHONE ENCOUNTER
mycophenolate (GENERIC EQUIVALENT) 500 MG tablet      Last Written Prescription Date:  9/17/19  Last Fill Quantity: 360,   # refills: 0  Last Office Visit: 10/15/19  Future Office visit:  4/14/20      CBC RESULTS:   Recent Labs   Lab Test 10/24/19  1217   WBC 3.7*   RBC 4.47   HGB 12.8   HCT 41.2   MCV 92   MCH 28.6   MCHC 31.1*   RDW 15.4*          Creatinine   Date Value Ref Range Status   10/24/2019 0.67 0.52 - 1.04 mg/dL Final   ]    Liver Function Studies -   Recent Labs   Lab Test 09/12/19  1242 07/09/19  0636   PROTTOTAL  --  6.7*   ALBUMIN  --  3.9   BILITOTAL  --  0.4   ALKPHOS  --  40   AST 20 19   ALT 25 23       Routing refill request to provider for review/approval because: provider review required. Not on current med list.  Med end date 10/15/19,  ast/alt past due

## 2019-12-27 NOTE — TELEPHONE ENCOUNTER
[facility]    Phone Message    May a detailed message be left on voicemail: yes    Reason for Call: Medication Refill Request    Has the patient contacted the pharmacy for the refill? Yes   Name of medication being requested: mycophenolate  Provider who prescribed the medication: Dr. Corbett  Pharmacy: [facility],   [address] [address], [place] [address], phone: [phone]  Date medication is needed: 12/30/2019         Action Taken: Message routed to:  [facility]

## 2020-01-06 ENCOUNTER — DOCUMENTATION ONLY (OUTPATIENT)
Dept: CARE COORDINATION | Facility: CLINIC | Age: 75
End: 2020-01-06

## 2020-01-06 ENCOUNTER — MYC MEDICAL ADVICE (OUTPATIENT)
Dept: INTERNAL MEDICINE | Facility: CLINIC | Age: 75
End: 2020-01-06

## 2020-01-06 DIAGNOSIS — I10 HTN (HYPERTENSION): ICD-10-CM

## 2020-01-06 DIAGNOSIS — K22.0 ACHALASIA OF ESOPHAGUS: ICD-10-CM

## 2020-01-06 RX ORDER — NIFEDIPINE 30 MG/1
30 TABLET, EXTENDED RELEASE ORAL DAILY
Qty: 90 TABLET | Refills: 0 | Status: SHIPPED | OUTPATIENT
Start: 2020-01-06 | End: 2020-01-28

## 2020-01-08 RX ORDER — NIFEDIPINE 30 MG/1
TABLET, EXTENDED RELEASE ORAL
Qty: 90 TABLET | Refills: 0 | OUTPATIENT
Start: 2020-01-08

## 2020-01-13 ENCOUNTER — OFFICE VISIT (OUTPATIENT)
Dept: ENDOCRINOLOGY | Facility: CLINIC | Age: 75
End: 2020-01-13
Payer: COMMERCIAL

## 2020-01-13 ENCOUNTER — ANTICOAGULATION THERAPY VISIT (OUTPATIENT)
Dept: ANTICOAGULATION | Facility: CLINIC | Age: 75
End: 2020-01-13

## 2020-01-13 VITALS
DIASTOLIC BLOOD PRESSURE: 76 MMHG | HEIGHT: 66 IN | BODY MASS INDEX: 20.48 KG/M2 | HEART RATE: 96 BPM | SYSTOLIC BLOOD PRESSURE: 129 MMHG

## 2020-01-13 DIAGNOSIS — Z79.01 LONG TERM CURRENT USE OF ANTICOAGULANT THERAPY: ICD-10-CM

## 2020-01-13 DIAGNOSIS — E78.49 OTHER HYPERLIPIDEMIA: ICD-10-CM

## 2020-01-13 DIAGNOSIS — E10.649 TYPE 1 DIABETES MELLITUS WITH HYPOGLYCEMIA AND WITHOUT COMA (H): Primary | ICD-10-CM

## 2020-01-13 DIAGNOSIS — M81.0 OSTEOPOROSIS, POSTMENOPAUSAL: ICD-10-CM

## 2020-01-13 DIAGNOSIS — M32.15 SYSTEMIC LUPUS ERYTHEMATOSUS WITH TUBULO-INTERSTITIAL NEPHROPATHY, UNSPECIFIED SLE TYPE (H): ICD-10-CM

## 2020-01-13 LAB
ALBUMIN UR-MCNC: NEGATIVE MG/DL
APPEARANCE UR: ABNORMAL
BACTERIA #/AREA URNS HPF: ABNORMAL /HPF
BILIRUB UR QL STRIP: NEGATIVE
CAOX CRY #/AREA URNS HPF: ABNORMAL /HPF
COLOR UR AUTO: ABNORMAL
CREAT SERPL-MCNC: 0.81 MG/DL (ref 0.52–1.04)
ERYTHROCYTE [DISTWIDTH] IN BLOOD BY AUTOMATED COUNT: 16 % (ref 10–15)
GFR SERPL CREATININE-BSD FRML MDRD: 72 ML/MIN/{1.73_M2}
GLUCOSE UR STRIP-MCNC: NEGATIVE MG/DL
HBA1C MFR BLD: 5.1 % (ref 4.3–6)
HCT VFR BLD AUTO: 41.9 % (ref 35–47)
HGB BLD-MCNC: 13 G/DL (ref 11.7–15.7)
HGB UR QL STRIP: ABNORMAL
HYALINE CASTS #/AREA URNS LPF: 1 /LPF (ref 0–2)
INR PPP: 3.6 (ref 0.86–1.14)
KETONES UR STRIP-MCNC: 5 MG/DL
LEUKOCYTE ESTERASE UR QL STRIP: NEGATIVE
MCH RBC QN AUTO: 28.6 PG (ref 26.5–33)
MCHC RBC AUTO-ENTMCNC: 31 G/DL (ref 31.5–36.5)
MCV RBC AUTO: 92 FL (ref 78–100)
MUCOUS THREADS #/AREA URNS LPF: PRESENT /LPF
NITRATE UR QL: POSITIVE
PH UR STRIP: 5 PH (ref 5–7)
PLATELET # BLD AUTO: 233 10E9/L (ref 150–450)
PROTHROM ACT/NOR PPP: 12 % (ref 60–140)
RBC # BLD AUTO: 4.55 10E12/L (ref 3.8–5.2)
RBC #/AREA URNS AUTO: 4 /HPF (ref 0–2)
SOURCE: ABNORMAL
SP GR UR STRIP: 1.02 (ref 1–1.03)
SQUAMOUS #/AREA URNS AUTO: <1 /HPF (ref 0–1)
UROBILINOGEN UR STRIP-MCNC: 0 MG/DL (ref 0–2)
WBC # BLD AUTO: 3.8 10E9/L (ref 4–11)
WBC #/AREA URNS AUTO: 7 /HPF (ref 0–5)

## 2020-01-13 RX ORDER — INSULIN ASPART 100 [IU]/ML
INJECTION, SOLUTION INTRAVENOUS; SUBCUTANEOUS
Qty: 15 ML | Refills: 3 | Status: SHIPPED | OUTPATIENT
Start: 2020-01-13 | End: 2021-03-17

## 2020-01-13 ASSESSMENT — PAIN SCALES - GENERAL: PAINLEVEL: MODERATE PAIN (4)

## 2020-01-13 NOTE — PROGRESS NOTES
Siddharth Caruso is a 74 year old pleasant female with hx of SLE, APLS, DVT, osteoporosis and type 1 diabetes presenting for f/up.     1. Type 1 diabetes     Hemoglobin A1c today is 5.1%, stable since her last visit here.     Glucometer readings reveal that she checks her blood sugar 4 times daily.  Average blood glucose is 91, with a standard deviation of 15 and a range variable between 59 and 142.  Most of the hypoglycemic episodes are mild, in the 60s, and occur before lunch and before dinner.  In the last week, they have been more frequent and the patient does not provide an explanation.  Despite exercising in the morning, she does not decrease the dose of NovoLog she takes for breakfast or lunch.  She feels comfortable taking insulin for lunch even if her prelunch blood sugar is 70.    Insulin to carbohydrate ratio is 1 unit per 15-20 g for all meals.  She administers 4 U of Lantus in the morning, and 2-3 U NovoLog for breakfast, lunch and dinner.  She continues to use an echo NovoLog pen.    Diabetes complications:   No h/o microalbuminuria.  Last eye exam - she had cataract surgery on the right eye in January 2019 and on the left eye in February 2019; no DR.   Numbness and tingling sensation B/L toes - for many years but light sensation is intact. She does wear comfortable shoes/insoles. 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  She develops confusion, inability to concentrate or focus her vision and she feels extremely tired when her blood sugar is the lower 60s or 50s.  She has no prior episodes of loss of consciousness due to hypoglycemia.    She continues to exercise regularly.  She runs on a bike or elliptical machine daily, for 45 minutes, 5 times a week and she does 1 hour of yoga, 4 times a week.  She exercises in the morning, from 5:45 to 7:45 am.  Once or twice a week she will go for a walk after lunch.     2. Osteoporosis    Jeanmarie also has a history of osteoporosis, treated  with Boniva for almost 3 years, followed by Forteo which was started in 4/12 - interrupted treatment from 4/2013 and restarted in 7/2013 until July 2014. After she completed the treatment with Forteo, she received one dose of Reclast, in July 2014.      She has no history of prior bone fractures.  She's been off prednisone since 2013.  Her height has decreased over the years from 5'6'' to 5'1/2''. Her mother had a hip fracture in her 80s.     On the DXA scan images from 7/1/16, L1-L3 T score was - 1.  Overall, at the spine, there was a significant increase of bone mineral density since 2005 of 10.5%. Since 2015, the bone mineral density has remained stable at spine. The lowest T score at the hip level was -2.2, at the left femoral neck.  Overall, there was a significant improvement of bone mineral density at the mean hip of 8.9% since 2005, with no significant changes since 2015. While the bone mineral density at the left hip increased since baseline, at the right hip, there was a decrease of bone mineral density since baseline and also, since prior year.    On the most recent DEXA scan from 7/27/18, the lowest T score was -2.1, at the left femoral neck.  Compared with the prior scan from 2016, the bone mineral density at the hips remained stable.  At the lumbar spine, L1-L3 T score was -1.3, not significantly changed since 2016.  The current dose of calcium and vitamin D is 500 mg plus 200 unit(s) TID (oyster shell).  The dose of vitamin D was decreased in July 2018 from 1600 to 600 units daily, as the vitamin D was elevated at 80.      Current Outpatient Medications   Medication     acetaminophen (TYLENOL) 500 MG tablet     aspirin 81 MG tablet     AYR SALINE NASAL NO-DRIP GEL     blood glucose (TRUE METRIX BLOOD GLUCOSE TEST) test strip     Calcium Carbonate-Vitamin D (CALCIUM 500 + D PO)     carboxymethylcellulose PF (REFRESH PLUS) 0.5 % SOLN ophthalmic solution     Injection Device for insulin (NOVOPEN ECHO)  RORY     insulin glargine (LANTUS VIAL) 100 UNIT/ML vial     insulin pen needle (BD PAM U/F) 32G X 4 MM miscellaneous     LANCETS ULTRA THIN 30G MISC     Multiple Vitamins-Minerals (CENTRUM SILVER PO)     mycophenolate (GENERIC EQUIVALENT) 500 MG tablet     NIFEdipine ER OSMOTIC (NIFEDICAL XL) 30 MG 24 hr tablet     NOVOLOG PENFILL 100 UNIT/ML soln     order for DME     simvastatin (ZOCOR) 40 MG tablet     warfarin (COUMADIN) 5 MG tablet     warfarin (COUMADIN) 5 MG tablet     warfarin ANTICOAGULANT (COUMADIN) 5 MG tablet     No current facility-administered medications for this visit.      ROS   Systemic symptoms: weight stable  Eye symptoms: No eye symptoms.  Otolaryngeal symptoms: No otolaryngeal symptoms  Breast symptoms: No breast symptoms.  Cardiovascular symptoms: No cardiovascular symptoms.    Pulmonary symptoms: No pulmonary symptoms.  Gastrointestinal symptoms: No gastrointestinal symptoms.   Genitourinary symptoms: no genitourinary symptoms  Endocrine symptoms: chronic cold intolerance  Hematologic symptoms: No hematologic symptoms.  Musculoskeletal symptoms: recurrent episodes of pain which affect the third to fifth left toes; bilateral knee pain; joint stiffness  Neurological symptoms: numbness and tingling sensation b/l toes   Psychological symptoms: no psychological symptoms   Skin symptoms: split lesions of the tips of her fingers/toes - for years; has seen dermatology in the past    Family Hx   Maternal grandmother DM2. First cousin with RA. Mother, maternal grandmother and aunt, cousin diagnosed with thyroid disorders (? hypothyroidism). Mother and maternal grandmother had breast cancer.    Personal Hx   Behavioral history: No tobacco use.  Home environment: No secondhand tobacco smoke in home.  Denies smoking, drinking alcohol or using illicit drugs. , with one child. 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  Menopause at age 59. Had regular MP in the past. No h/o bone fractures or kidney  "stones.    PMH   SLE dx in 2000 with flare/pericarditis and tamponade on 3/5/2010 requiring high doses of steroids.  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  Osteoporosis diagnosed 2008 started on Boniva in 2010 (heartburn from oral fosamax).   Hyperlipidemia.  Severe GERD and Achalasia.  Raynaud's  Allergy to multiple meds  Vit D def.  Post thrombophlebitic syndrome with chronic LE swelling   Dyslipidemia  Chronic leukopenia on methotrexate   L arm lymphedema   Type 1 diabetes  Prolapsed uterus   Cataract   Vale Zoster     Physical Exam   Wt Readings from Last 10 Encounters:   02/05/19 56.7 kg (125 lb)   01/08/19 54.1 kg (119 lb 4.8 oz)   07/30/18 54.1 kg (119 lb 3.2 oz)   01/29/18 54 kg (119 lb)   08/02/17 52.8 kg (116 lb 8 oz)   07/31/17 53.2 kg (117 lb 4.8 oz)   05/22/17 53.9 kg (118 lb 14.4 oz)   04/13/17 54.7 kg (120 lb 9.6 oz)   04/04/17 55 kg (121 lb 4.8 oz)   02/01/17 54.4 kg (120 lb)     /76   Pulse 96   Ht 1.664 m (5' 5.5\")   LMP  (LMP Unknown)   BMI 20.48 kg/m      General appearance: she is thin, no acute distress noted  Eyes: conjunctivae and extraocular motions are normal. Pupils are equal, round, and reactive to light. No lid lag, no stare.  HENT: oropharynx moist; neck no JVD, no bruits, no thyromegaly, no palpable nodules  Cardiovascular: regular rhythm, no murmurs, distal pulses palpable, mild L arm edema   Respiratory: chest clear, no rales, no rhonchi  Abdominal: Abdomen soft, nontender, nondistended, no organomegaly  Musculoskeletal: normal tone and strength   Neurologic: left knee reflex decreased, normal B/L bicipital reflexes, no resting tremor  Psychiatric: affect and judgment normal   Skin: Tip of the fingers are cracked and dry, nails onychomycosis  Feet: Skin intact, sensation preserved to monofilament testing    Lab Results  I reviewed prior lab results documented in EPIC.   Lab Results   Component Value Date    A1C 5.3 07/11/2017    A1C 5.5 " 06/10/2013    A1C 5.4 01/07/2013    A1C 5.5 07/02/2012    A1C 5.1 01/09/2012    HEMOGLOBINA1 5.1 01/13/2020    HEMOGLOBINA1 5.1 07/15/2019    HEMOGLOBINA1 5.0 07/30/2018    HEMOGLOBINA1 5.0 01/29/2018    HEMOGLOBINA1 5.0 01/23/2017       Hemoglobin   Date Value Ref Range Status   10/24/2019 12.8 11.7 - 15.7 g/dL Final     Hematocrit   Date Value Ref Range Status   10/24/2019 41.2 35.0 - 47.0 % Final     Cholesterol   Date Value Ref Range Status   07/09/2019 160 <200 mg/dL Final     Cholesterol/HDL Ratio   Date Value Ref Range Status   10/01/2014 1.5 0.0 - 5.0 Final     HDL Cholesterol   Date Value Ref Range Status   07/09/2019 102 >49 mg/dL Final     LDL Cholesterol Calculated   Date Value Ref Range Status   07/09/2019 46 <100 mg/dL Final     Comment:     Desirable:       <100 mg/dl     VLDL-Cholesterol   Date Value Ref Range Status   10/01/2014 7 0 - 30 mg/dL Final     Triglycerides   Date Value Ref Range Status   07/09/2019 59 <150 mg/dL Final     Albumin Urine mg/L   Date Value Ref Range Status   07/09/2019 10 mg/L Final     TSH   Date Value Ref Range Status   07/09/2019 0.99 0.40 - 4.00 mU/L Final       Last Basic Metabolic Panel:    Sodium   Date Value Ref Range Status   07/09/2019 140 133 - 144 mmol/L Final     Potassium   Date Value Ref Range Status   07/09/2019 3.9 3.4 - 5.3 mmol/L Final     Chloride   Date Value Ref Range Status   07/09/2019 107 94 - 109 mmol/L Final     Calcium   Date Value Ref Range Status   07/09/2019 8.6 8.5 - 10.1 mg/dL Final     Carbon Dioxide   Date Value Ref Range Status   07/09/2019 28 20 - 32 mmol/L Final     Urea Nitrogen   Date Value Ref Range Status   07/09/2019 23 7 - 30 mg/dL Final     Creatinine   Date Value Ref Range Status   10/24/2019 0.67 0.52 - 1.04 mg/dL Final     GFR Estimate   Date Value Ref Range Status   10/24/2019 88 >60 mL/min/[1.73_m2] Final     Comment:     Non  GFR Calc  Starting 12/18/2018, serum creatinine based estimated GFR (eGFR) will be    calculated using the Chronic Kidney Disease Epidemiology Collaboration   (CKD-EPI) equation.       Glucose   Date Value Ref Range Status   07/09/2019 93 70 - 99 mg/dL Final       AST   Date Value Ref Range Status   09/12/2019 20 0 - 45 U/L Final     ALT   Date Value Ref Range Status   09/12/2019 25 0 - 50 U/L Final     Albumin   Date Value Ref Range Status   07/09/2019 3.9 3.4 - 5.0 g/dL Final       Assessment     1. Type 1 diabetes with very tight glycemic control, complicated by partial hypoglycemia unawareness. Historically, the patient prefers narrower blood sugar targets and this comes at the expense of hypoglycemia.  I suspect some of the hypoglycemic episodes measured by the meter might be inaccurate, as she does have significant cyanosis of the fingers due to Raynaud and appears to have poor perfusion of the fingertips.   We rediscussed again about the potential benefit of a CGM.  I advised the patient to check her blood sugar every hour in the morning, for a period of 1 week, to determine the effect of exercise on her blood sugar and decide whether or not she should have a small snack of 10 to 15 g carbohydrates after exercise or she should decrease the dose of insulin she administers prior to breakfast or prior to lunch.    2.  Osteoporosis   Risk factors for osteoporosis identified: Postmenopausal status, lupus, prior treatment with steroids, diabetes, family history.  She was treated with Boniva for 3 years, followed by Forteo for 2 years. Received one dose of Reclast in July 2014, when she completed treatment with Forteo.  Since discontinuing Forteo, the bone mineral density has either remained stable or minimally increased.   Plan:   Schedule a f/up DXA scan in 8/2020    3. Hypercholesterolemia - on simvastatin, controlled.    Return to clinic 6 months.    Orders Placed This Encounter   Procedures     DX Hip/Pelvis/Spine     Hemoglobin A1c POCT

## 2020-01-13 NOTE — LETTER
1/13/2020       RE: Shala Caruso  [address] [place] [address]     Dear Colleague,    Thank you for referring your patient, Shala Caruso, to the [facility] ENDO[facility]OLOGY at [facility]. Please see a copy of my visit note below.    Siddharth Ms. Caruso is a 74 year old pleasant female with hx of SLE, APLS, DVT, osteoporosis and type 1 diabetes presenting for f/up.     1. Type 1 diabetes     Hemoglobin A1c today is 5.1%, stable since her last visit here.     Glucometer readings reveal that she checks her blood sugar 4 times daily.  Average blood glucose is 91, with a standard deviation of 15 and a range variable between 59 and 142.  Most of the hypoglycemic episodes are mild, in the 60s, and occur before lunch and before dinner.  In the last week, they have been more frequent and the patient does not provide an explanation.  Despite exercising in the morning, she does not decrease the dose of NovoLog she takes for breakfast or lunch.  She feels comfortable taking insulin for lunch even if her prelunch blood sugar is 70.    Insulin to carbohydrate ratio is 1 unit per 15-20 g for all meals.  She administers 4 U of Lantus in the morning, and 2-3 U NovoLog for breakfast, lunch and dinner.  She continues to use an echo NovoLog pen.    Diabetes complications:   No h/o microalbuminuria.  Last eye exam - she had cataract surgery on the right eye in January 2019 and on the left eye in February 2019; no DR.   Numbness and tingling sensation B/L toes - for many years but light sensation is intact. She does wear comfortable shoes/insoles. 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  She develops confusion, inability to concentrate or focus her vision and she feels extremely tired when her blood sugar is the lower 60s or 50s.  She has no prior episodes of loss of consciousness due to hypoglycemia.    She continues to exercise regularly.  She runs on a bike or  elliptical machine daily, for 45 minutes, 5 times a week and she does 1 hour of yoga, 4 times a week.  She exercises in the morning, from 5:45 to 7:45 am.  Once or twice a week she will go for a walk after lunch.     2. Osteoporosis    Jeanmarie also has a history of osteoporosis, treated with Boniva for almost 3 years, followed by Forteo which was started in 4/12 - interrupted treatment from 4/2013 and restarted in 7/2013 until July 2014. After she completed the treatment with Forteo, she received one dose of Reclast, in July 2014.      She has no history of prior bone fractures.  She's been off prednisone since 2013.  Her height has decreased over the years from 5'6'' to 5'1/2''. Her mother had a hip fracture in her 80s.     On the DXA scan images from 7/1/16, L1-L3 T score was - 1.  Overall, at the spine, there was a significant increase of bone mineral density since 2005 of 10.5%. Since 2015, the bone mineral density has remained stable at spine. The lowest T score at the hip level was -2.2, at the left femoral neck.  Overall, there was a significant improvement of bone mineral density at the mean hip of 8.9% since 2005, with no significant changes since 2015. While the bone mineral density at the left hip increased since baseline, at the right hip, there was a decrease of bone mineral density since baseline and also, since prior year.    On the most recent DEXA scan from 7/27/18, the lowest T score was -2.1, at the left femoral neck.  Compared with the prior scan from 2016, the bone mineral density at the hips remained stable.  At the lumbar spine, L1-L3 T score was -1.3, not significantly changed since 2016.  The current dose of calcium and vitamin D is 500 mg plus 200 unit(s) TID (oyster shell).  The dose of vitamin D was decreased in July 2018 from 1600 to 600 units daily, as the vitamin D was elevated at 80.      Current Outpatient Medications   Medication     acetaminophen (TYLENOL) 500 MG tablet     aspirin 81  MG tablet     AYR SALINE NASAL NO-DRIP GEL     blood glucose (TRUE METRIX BLOOD GLUCOSE TEST) test strip     Calcium Carbonate-Vitamin D (CALCIUM 500 + D PO)     carboxymethylcellulose PF (REFRESH PLUS) 0.5 % SOLN ophthalmic solution     Injection Device for insulin (NOVOPEN ECHO) RORY     insulin glargine (LANTUS VIAL) 100 UNIT/ML vial     insulin pen needle (BD PAM U/F) 32G X 4 MM miscellaneous     LANCETS ULTRA THIN 30G MISC     Multiple Vitamins-Minerals (CENTRUM SILVER PO)     mycophenolate (GENERIC EQUIVALENT) 500 MG tablet     NIFEdipine ER OSMOTIC (NIFEDICAL XL) 30 MG 24 hr tablet     NOVOLOG PENFILL 100 UNIT/ML soln     order for DME     simvastatin (ZOCOR) 40 MG tablet     warfarin (COUMADIN) 5 MG tablet     warfarin (COUMADIN) 5 MG tablet     warfarin ANTICOAGULANT (COUMADIN) 5 MG tablet     No current facility-administered medications for this visit.      ROS   Systemic symptoms: weight stable  Eye symptoms: No eye symptoms.  Otolaryngeal symptoms: No otolaryngeal symptoms  Breast symptoms: No breast symptoms.  Cardiovascular symptoms: No cardiovascular symptoms.    Pulmonary symptoms: No pulmonary symptoms.  Gastrointestinal symptoms: No gastrointestinal symptoms.   Genitourinary symptoms: no genitourinary symptoms  Endocrine symptoms: chronic cold intolerance  Hematologic symptoms: No hematologic symptoms.  Musculoskeletal symptoms: recurrent episodes of pain which affect the third to fifth left toes; bilateral knee pain; joint stiffness  Neurological symptoms: numbness and tingling sensation b/l toes   Psychological symptoms: no psychological symptoms   Skin symptoms: split lesions of the tips of her fingers/toes - for years; has seen dermatology in the past    Family Hx   Maternal grandmother DM2. First cousin with RA. Mother, maternal grandmother and aunt, cousin diagnosed with thyroid disorders (? hypothyroidism). Mother and maternal grandmother had breast cancer.    Personal Hx   Behavioral  "history: No tobacco use.  Home environment: No secondhand tobacco smoke in home.  Denies smoking, drinking alcohol or using illicit drugs. , with one child. 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  Menopause at age 59. Had regular MP in the past. No h/o bone fractures or kidney stones.    PMH   SLE dx in 2000 with flare/pericarditis and tamponade on 3/5/2010 requiring high doses of steroids.  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  Osteoporosis diagnosed 2008 started on Boniva in 2010 (heartburn from oral fosamax).   Hyperlipidemia.  Severe GERD and Achalasia.  Raynaud's  Allergy to multiple meds  Vit D def.  Post thrombophlebitic syndrome with chronic LE swelling   Dyslipidemia  Chronic leukopenia on methotrexate   L arm lymphedema   Type 1 diabetes  Prolapsed uterus   Cataract   Vale Zoster     Physical Exam   Wt Readings from Last 10 Encounters:   02/05/19 56.7 kg (125 lb)   01/08/19 54.1 kg (119 lb 4.8 oz)   07/30/18 54.1 kg (119 lb 3.2 oz)   01/29/18 54 kg (119 lb)   08/02/17 52.8 kg (116 lb 8 oz)   07/31/17 53.2 kg (117 lb 4.8 oz)   05/22/17 53.9 kg (118 lb 14.4 oz)   04/13/17 54.7 kg (120 lb 9.6 oz)   04/04/17 55 kg (121 lb 4.8 oz)   02/01/17 54.4 kg (120 lb)     /76   Pulse 96   Ht 1.664 m (5' 5.5\")   LMP  (LMP Unknown)   BMI 20.48 kg/m       General appearance: she is thin, no acute distress noted  Eyes: conjunctivae and extraocular motions are normal. Pupils are equal, round, and reactive to light. No lid lag, no stare.  HENT: oropharynx moist; neck no JVD, no bruits, no thyromegaly, no palpable nodules  Cardiovascular: regular rhythm, no murmurs, distal pulses palpable, mild L arm edema   Respiratory: chest clear, no rales, no rhonchi  Abdominal: Abdomen soft, nontender, nondistended, no organomegaly  Musculoskeletal: normal tone and strength   Neurologic: left knee reflex decreased, normal B/L bicipital reflexes, no resting tremor  Psychiatric: affect and " judgment normal   Skin: Tip of the fingers are cracked and dry, nails onychomycosis  Feet: Skin intact, sensation preserved to monofilament testing    Lab Results  I reviewed prior lab results documented in EPIC.   Lab Results   Component Value Date    A1C 5.3 07/11/2017    A1C 5.5 06/10/2013    A1C 5.4 01/07/2013    A1C 5.5 07/02/2012    A1C 5.1 01/09/2012    HEMOGLOBINA1 5.1 01/13/2020    HEMOGLOBINA1 5.1 07/15/2019    HEMOGLOBINA1 5.0 07/30/2018    HEMOGLOBINA1 5.0 01/29/2018    HEMOGLOBINA1 5.0 01/23/2017       Hemoglobin   Date Value Ref Range Status   10/24/2019 12.8 11.7 - 15.7 g/dL Final     Hematocrit   Date Value Ref Range Status   10/24/2019 41.2 35.0 - 47.0 % Final     Cholesterol   Date Value Ref Range Status   07/09/2019 160 <200 mg/dL Final     Cholesterol/HDL Ratio   Date Value Ref Range Status   10/01/2014 1.5 0.0 - 5.0 Final     HDL Cholesterol   Date Value Ref Range Status   07/09/2019 102 >49 mg/dL Final     LDL Cholesterol Calculated   Date Value Ref Range Status   07/09/2019 46 <100 mg/dL Final     Comment:     Desirable:       <100 mg/dl     VLDL-Cholesterol   Date Value Ref Range Status   10/01/2014 7 0 - 30 mg/dL Final     Triglycerides   Date Value Ref Range Status   07/09/2019 59 <150 mg/dL Final     Albumin Urine mg/L   Date Value Ref Range Status   07/09/2019 10 mg/L Final     TSH   Date Value Ref Range Status   07/09/2019 0.99 0.40 - 4.00 mU/L Final       Last Basic Metabolic Panel:    Sodium   Date Value Ref Range Status   07/09/2019 140 133 - 144 mmol/L Final     Potassium   Date Value Ref Range Status   07/09/2019 3.9 3.4 - 5.3 mmol/L Final     Chloride   Date Value Ref Range Status   07/09/2019 107 94 - 109 mmol/L Final     Calcium   Date Value Ref Range Status   07/09/2019 8.6 8.5 - 10.1 mg/dL Final     Carbon Dioxide   Date Value Ref Range Status   07/09/2019 28 20 - 32 mmol/L Final     Urea Nitrogen   Date Value Ref Range Status   07/09/2019 23 7 - 30 mg/dL Final     Creatinine    Date Value Ref Range Status   10/24/2019 0.67 0.52 - 1.04 mg/dL Final     GFR Estimate   Date Value Ref Range Status   10/24/2019 88 >60 mL/min/[1.73_m2] Final     Comment:     Non  GFR Calc  Starting 12/18/2018, serum creatinine based estimated GFR (eGFR) will be   calculated using the Chronic Kidney Disease Epidemiology Collaboration   (CKD-EPI) equation.       Glucose   Date Value Ref Range Status   07/09/2019 93 70 - 99 mg/dL Final       AST   Date Value Ref Range Status   09/12/2019 20 0 - 45 U/L Final     ALT   Date Value Ref Range Status   09/12/2019 25 0 - 50 U/L Final     Albumin   Date Value Ref Range Status   07/09/2019 3.9 3.4 - 5.0 g/dL Final       Assessment     1. Type 1 diabetes with very tight glycemic control, complicated by partial hypoglycemia unawareness. Historically, the patient prefers narrower blood sugar targets and this comes at the expense of hypoglycemia.  I suspect some of the hypoglycemic episodes measured by the meter might be inaccurate, as she does have significant cyanosis of the fingers due to Raynaud and appears to have poor perfusion of the fingertips.   We rediscussed again about the potential benefit of a CGM.  I advised the patient to check her blood sugar every hour in the morning, for a period of 1 week, to determine the effect of exercise on her blood sugar and decide whether or not she should have a small snack of 10 to 15 g carbohydrates after exercise or she should decrease the dose of insulin she administers prior to breakfast or prior to lunch.    2.  Osteoporosis   Risk factors for osteoporosis identified: Postmenopausal status, lupus, prior treatment with steroids, diabetes, family history.  She was treated with Boniva for 3 years, followed by Forteo for 2 years. Received one dose of Reclast in July 2014, when she completed treatment with Forteo.  Since discontinuing Forteo, the bone mineral density has either remained stable or minimally  increased.   Plan:   Schedule a f/up DXA scan in 8/2020    3. Hypercholesterolemia - on simvastatin, controlled.    Return to clinic 6 months.    Orders Placed This Encounter   Procedures     DX Hip/Pelvis/Spine     Hemoglobin A1c POCT     Again, thank you for allowing me to participate in the care of your patient.      Sincerely,    Dorita Cramer MD

## 2020-01-13 NOTE — PROGRESS NOTES
ANTICOAGULATION FOLLOW-UP CLINIC VISIT    Patient Name:  Shala Caruso  Date:  1/13/2020  Contact Type:  Telephone    SUBJECTIVE:  Patient Findings     Comments:   Dosing plan discussed with [facility]  Factor 2 = 12%        Clinical Outcomes     Comments:   Dosing plan discussed with [facility]  Factor 2 = 12%           OBJECTIVE    INR   Date Value Ref Range Status   01/13/2020 3.60 (H) 0.86 - 1.14 Final     Chromogenic Factor 10   Date Value Ref Range Status   11/21/2018 16%  Final     Factor 2 Assay   Date Value Ref Range Status   01/13/2020 12 (L) 60 - 140 % Final       ASSESSMENT / PLAN  INR assessment SUPRA    Recheck INR In: 1 WEEK    INR Location Clinic      Anticoagulation Summary  As of 1/13/2020    INR goal:      TTR:   63.7 % (1 y)   Prior goal:   2.0-3.0   INR used for dosing:   No new INR was available at the time of this encounter.   Warfarin maintenance plan:   12.5 mg (5 mg x 2.5) every Mon; 10 mg (5 mg x 2) all other days   Full warfarin instructions:   1/13: 7.5 mg; Otherwise 12.5 mg every Mon; 10 mg all other days   Weekly warfarin total:   72.5 mg   Plan last modified:   Andre Bledsoe RN (7/31/2019)   Next INR check:   1/20/2020   Priority:   Critical   Target end date:       Indications    SLE (systemic lupus erythematosus) (H) (Resolved) [M32.9]  Long term current use of anticoagulant therapy [Z79.01]             Anticoagulation Episode Summary     INR check location:   Clinic Lab    Preferred lab:       Send INR reminders to:   [facility]    Comments:   Factor 2  goal range is 15-25%  Ok to leave message on home [phone] and work voicemail, but call [id number] per pt request.  OK to leave message at office  May leave messages with Rodriguez Santos  DO NOT GIVE RECORDS TO EMPLOYER [employer]        Anticoagulation Care Providers     Provider Role Specialty Phone number    Mt Kiser MD Responsible Clinical Cardiac Electrophysiology [phone]            See the Encounter Report to view  Anticoagulation Flowsheet and Dosing Calendar (Go to Encounters tab in chart review, and find the Anticoagulation Therapy Visit)    Spoke with patient. Gave them their lab results and new warfarin recommendation.  No changes in health, medication, or diet. No missed doses, no falls. No signs or symptoms of bleed or clotting.      Ilana Gonzalez RN

## 2020-01-15 LAB
BACTERIA SPEC CULT: ABNORMAL
Lab: ABNORMAL
SPECIMEN SOURCE: ABNORMAL

## 2020-01-21 ENCOUNTER — HOSPITAL ENCOUNTER (OUTPATIENT)
Facility: CLINIC | Age: 75
Setting detail: SPECIMEN
Discharge: HOME | End: 2020-01-21
Admitting: INTERNAL MEDICINE
Payer: COMMERCIAL

## 2020-01-21 DIAGNOSIS — Z79.01 LONG TERM CURRENT USE OF ANTICOAGULANT THERAPY: ICD-10-CM

## 2020-01-21 LAB — INR PPP: 3.24 (ref 0.86–1.14)

## 2020-01-21 PROCEDURE — 85210 CLOT FACTOR II PROTHROM SPEC: CPT | Performed by: INTERNAL MEDICINE

## 2020-01-21 PROCEDURE — 85610 PROTHROMBIN TIME: CPT | Performed by: INTERNAL MEDICINE

## 2020-01-21 PROCEDURE — 36415 COLL VENOUS BLD VENIPUNCTURE: CPT | Performed by: INTERNAL MEDICINE

## 2020-01-22 ENCOUNTER — ANTICOAGULATION THERAPY VISIT (OUTPATIENT)
Dept: ANTICOAGULATION | Facility: CLINIC | Age: 75
End: 2020-01-22

## 2020-01-22 DIAGNOSIS — Z79.01 LONG TERM CURRENT USE OF ANTICOAGULANT THERAPY: ICD-10-CM

## 2020-01-22 LAB — PROTHROM ACT/NOR PPP: 11 % (ref 60–140)

## 2020-01-22 NOTE — PROGRESS NOTES
ANTICOAGULATION FOLLOW-UP CLINIC VISIT    Patient Name:  Shala Caruso  Date:  1/22/2020  Contact Type:  Telephone    SUBJECTIVE:         OBJECTIVE    INR   Date Value Ref Range Status   01/21/2020 3.24 (H) 0.86 - 1.14 Final     Chromogenic Factor 10   Date Value Ref Range Status   11/21/2018 16%  Final     Factor 2 Assay   Date Value Ref Range Status   01/21/2020 11 (L) 60 - 140 % Final       ASSESSMENT / PLAN  INR assessment SUPRA    Recheck INR In: 9 DAYS    INR Location Clinic      Anticoagulation Summary  As of 1/22/2020    INR goal:      TTR:   61.3 % (1 y)   Prior goal:   2.0-3.0   INR used for dosing:   3.24! (1/21/2020)   Warfarin maintenance plan:   10 mg (5 mg x 2) every day   Full warfarin instructions:   1/22: 5 mg; 1/29: 5 mg; Otherwise 10 mg every day   Weekly warfarin total:   70 mg   Plan last modified:   Portia Cherry RN (1/22/2020)   Next INR check:   1/30/2020   Priority:   Critical   Target end date:       Indications    SLE (systemic lupus erythematosus) (H) (Resolved) [M32.9]  Long term current use of anticoagulant therapy [Z79.01]             Anticoagulation Episode Summary     INR check location:   Clinic Lab    Preferred lab:       Send INR reminders to:   [facility]    Comments:   Factor 2  goal range is 15-25%  Ok to leave message on home [phone] and work voicemail, but call [id number] per pt request.  OK to leave message at office  May leave messages with Rodriguez Santos  DO NOT GIVE RECORDS TO EMPLOYER [employer]        Anticoagulation Care Providers     Provider Role Specialty Phone number    Mt Kiser MD Responsible Clinical Cardiac Electrophysiology [phone]            See the Encounter Report to view Anticoagulation Flowsheet and Dosing Calendar (Go to Encounters tab in chart review, and find the Anticoagulation Therapy Visit)  Spoke with patient.  Factor 2=11%.  Goal range=15-25%    Portia Cherry RN

## 2020-01-28 ENCOUNTER — OFFICE VISIT (OUTPATIENT)
Dept: INTERNAL MEDICINE | Facility: CLINIC | Age: 75
End: 2020-01-28
Payer: COMMERCIAL

## 2020-01-28 VITALS — SYSTOLIC BLOOD PRESSURE: 110 MMHG | DIASTOLIC BLOOD PRESSURE: 68 MMHG | OXYGEN SATURATION: 97 % | HEART RATE: 71 BPM

## 2020-01-28 DIAGNOSIS — K22.0 ACHALASIA OF ESOPHAGUS: ICD-10-CM

## 2020-01-28 DIAGNOSIS — I89.0 LYMPHEDEMA: Primary | ICD-10-CM

## 2020-01-28 RX ORDER — NIFEDIPINE 30 MG/1
30 TABLET, EXTENDED RELEASE ORAL DAILY
Qty: 100 TABLET | Refills: 3 | Status: SHIPPED | OUTPATIENT
Start: 2020-01-28 | End: 2020-09-08

## 2020-01-28 ASSESSMENT — PAIN SCALES - GENERAL: PAINLEVEL: NO PAIN (0)

## 2020-01-28 NOTE — PATIENT INSTRUCTIONS
[facility] Medication Refill Request Information:  * Please contact your pharmacy regarding ANY request for medication refills.  ** [facility] Prescription Fax = [phone]  * Please allow 3 business days for routine medication refills.  * Please allow 5 business days for controlled substance medication refills.     [facility] Test Result notification information:  *You will be notified with in 7-10 days of your appointment day regarding the results of your test.  If you are on MyChart you will be notified as soon as the provider has reviewed the results and signed off on them.    [facility]: [phone] \

## 2020-01-28 NOTE — NURSING NOTE
Chief Complaint   Patient presents with     Physical     pt here for physical       Bg Arreola CMA, EMT at 2:25 PM on 1/28/2020.

## 2020-01-28 NOTE — PROGRESS NOTES
Surgeon (please enter first and last name):  Monika Fermin MD  Location of Surgery:  OR  Date of Surgery:  1/08/18  Procedure:  Right Eye Cataract Extraction with Intraocular Lens      HPI  74-year-old presents today for annual medical evaluation.  She has been doing some elliptical training as before along with yoga for couple hours a day at the gym..  She is not having any chest pain, dyspnea, dizziness, lightheadedness, palpitations or other symptoms associated with her exercise.   She is maintained on Coumadin because a history of DVT and PE.  She would like to take acetaminophen to help with sleep but is reluctant to do this because of her Coumadin.  We discussed the possibility of regular dosing of acetaminophen coupled with a monitoring and adjustment of her Coumadin dose.  She will discuss this with the INR clinic.  She had no bleeding complications or problems recently.  Diabetes being followed by endocrinology and is well controlled.  Her lupus is followed by rheumatology and is largely in remission.  Past Medical History:   Diagnosis Date     Achalasia      Antiphospholipid syndrome (H)      Antiplatelet or antithrombotic long-term use      Coagulation disorder (H) 6790-7803     DM (diabetes mellitus) (H)      DVT (deep venous thrombosis) (H) 2003    and PE     Dysphagia     occasional, use Nifedipine     GERD (gastroesophageal reflux disease)      Hyperlipidemia      Lymphedema      Myopia      Neuropathy     toes bilateral     Nonsenile cataract      osteoporosis      Pericarditis      Raynaud's disease      SLE (systemic lupus erythematosus) (H)      Past Surgical History:   Procedure Laterality Date     CATARACT IOL, RT/LT Left 02/2019     CATARACT IOL, RT/LT Right 01/2019     COLONOSCOPY N/A 11/28/2016    Procedure: COLONOSCOPY;  Surgeon: Sang August MD;  Location:  GI     CYSTOSCOPY, SLING TRANSVAGINAL N/A 12/20/2016    Procedure: CYSTOSCOPY, SLING TRANSVAGINAL;  Surgeon:  Jeanmarie Pierson MD;  Location: [facility] OR     DISSECT LYMPH NODE AXILLA  2004    Lt, during eval for SLE     HYSTEROSCOPIC PLACEMENT CONTRACEPTIVE DEVICE  1985     PHACOEMULSIFICATION WITH STANDARD INTRAOCULAR LENS IMPLANT Right 1/8/2019    Procedure: Right Eye Cataract Extraction with Intraocular Lens;  Surgeon: Monika Fermin MD;  Location: [facility] OR     PHACOEMULSIFICATION WITH STANDARD INTRAOCULAR LENS IMPLANT Left 2/5/2019    Procedure: Left Eye Cataract Extraction with Intraocular Lens;  Surgeon: Monika Fermin MD;  Location: [facility] OR     TUBAL LIGATION Bilateral      Family History   Problem Relation Age of Onset     Cancer Other         breast     Cerebrovascular Disease Other      C.A.D. Other      Glaucoma Father      Macular Degeneration No family hx of      Social History     Socioeconomic History     Marital status:      Spouse name: None     Number of children: None     Years of education: None     Highest education level: None   Social Needs     Financial resource strain: None     Food insecurity - worry: None     Food insecurity - inability: None     Transportation needs - medical: None     Transportation needs - non-medical: None   Occupational History     None   Tobacco Use     Smoking status: Never Smoker     Smokeless tobacco: Never Used   Substance and Sexual Activity     Alcohol use: No     Drug use: No     Sexual activity: Yes     Partners: Male     Comment: , safe in relationship   Other Topics Concern     Parent/sibling w/ CABG, MI or angioplasty before 65F 55M? Not Asked   Social History Narrative    How much exercise per week? Walk to work every morning (2 miles), swimming 3 times a week, takes walks with , periodically works out at gym on stationary bike     How much calcium per day? Supplement 3x daily        How much caffeine per day? On rare occasion, only if she is out to eat and that is all they have    How much vitamin D per day? supplement    Do  you/your family wear seatbelts?  Yes    Do you/your family use safety helmets? Yes    Do you/your family use sunscreen? Yes    Do you/your family keep firearms in the home? No    Do you/your family have a smoke detector(s)? Yes        Do you feel safe in your home? Yes    Has anyone ever touched you in an unwanted manner? No        Klever R LPN 7/18/2013                   Exam:  /68 (BP Location: Right arm, Patient Position: Sitting, Cuff Size: Adult Regular)   Pulse 71   LMP  (LMP Unknown)   SpO2 97%   PHYSICAL EXAMINATION:   The patient is alert, oriented with a clear sensorium.   Skin shows no lesions or rashes and good turgor.   Head is normocephalic and atraumatic.   Eyes show miotic pupils benign optic fundi  Ears show cerumen bilaterally.   Mouth shows clear oral mucosa.   Neck shows no nodes, thyromegaly or bruits.   Back is nontender.   Lungs are clear to percussion and auscultation.   Heart shows normal S1 and S2 without murmur or gallop.   Abdomen is soft, nontender without masses or organomegaly.   Extremities show bilateral degenerative arthritis of both knees with superficial venous varicosities and no edema and no evidence of active synovitis.  Onychomycosis changes of her fingernails and toe nails and decreased pedal pulses bilaterally.  Lymphedema of left arm  Neurologic examination shows cranial nerves II-XII intact. Motor shows normal strength. Reflexes are full and symmetrical.  Sensation intact to mono-filament    ASSESSMENT  1 venous varicosities and lymphedema  2 diabetes mellitus fair control  3 hypertension well controlled  4 hyperlipidemia well controlled  5 osteoporosis  6 history of SLE with Raynaud's phenomena  7 GERD  8 peripheral neuropathy possibly related to #2 above  9 immunosuppression related to #6 above  10 bilateral osteoarthritis of the knees    Plan  Patient is stable.  She is interested in any new treatments or appliances for her lymphedema so referred to the  lymphedema clinic.  She will check with anticoagulation clinic regarding possible regular use of acetaminophen to help her sleep.  Reinforced her diet and exercise regimens.  Refilled her nifedipine and support stockings  This note was completed using Dragon voice recognition software.  Although reviewed after completion, some word and grammatical errors may occur.    Joe Contreras MD  General Internal Medicine  [facility]  [phone]

## 2020-01-30 DIAGNOSIS — Z79.01 LONG TERM CURRENT USE OF ANTICOAGULANT THERAPY: ICD-10-CM

## 2020-01-30 LAB — INR PPP: 2.24 (ref 0.86–1.14)

## 2020-01-31 ENCOUNTER — ANTICOAGULATION THERAPY VISIT (OUTPATIENT)
Dept: ANTICOAGULATION | Facility: CLINIC | Age: 75
End: 2020-01-31

## 2020-01-31 DIAGNOSIS — Z79.01 LONG TERM CURRENT USE OF ANTICOAGULANT THERAPY: ICD-10-CM

## 2020-01-31 LAB
FACTOR 2 ASSAY: NORMAL
PROTHROM ACT/NOR PPP: 22 % (ref 60–140)

## 2020-01-31 NOTE — PROGRESS NOTES
ANTICOAGULATION FOLLOW-UP CLINIC VISIT    Patient Name:  Shala Caruso  Date:  1/31/2020  Contact Type:  Telephone    SUBJECTIVE:  Patient Findings     Comments:   Factor 2 is 22% on 1/30.  No longer drawing INR per patient request.        Clinical Outcomes     Comments:   Factor 2 is 22% on 1/30.  No longer drawing INR per patient request.           OBJECTIVE    INR   Date Value Ref Range Status   01/30/2020 2.24 (H) 0.86 - 1.14 Final     Chromogenic Factor 10   Date Value Ref Range Status   11/21/2018 16%  Final     Factor 2 Assay   Date Value Ref Range Status   01/30/2020 22 (L) 60 - 140 % Final       ASSESSMENT / PLAN  INR assessment THER    Recheck INR In: 3 WEEKS    INR Location Clinic      Anticoagulation Summary  As of 1/31/2020    INR goal:      TTR:   60.7 % (1 y)   Prior goal:   2.0-3.0   INR used for dosing:   No new INR was available at the time of this encounter.   Warfarin maintenance plan:   10 mg (5 mg x 2) every day   Full warfarin instructions:   10 mg every day   Weekly warfarin total:   70 mg   No change documented:   Andre Bledsoe RN   Plan last modified:   Portia Cherry RN (1/22/2020)   Next INR check:   2/20/2020   Priority:   Critical   Target end date:       Indications    SLE (systemic lupus erythematosus) (H) (Resolved) [M32.9]  Long term current use of anticoagulant therapy [Z79.01]             Anticoagulation Episode Summary     INR check location:   Clinic Lab    Preferred lab:       Send INR reminders to:   [facility]    Comments:   Factor 2  goal range is 15-25%, 1/31/20 Not drawing INR  Ok to leave message on home [phone] and work voicemail, but call [id number] per pt request.  OK to leave message at office  May leave messages with Rodriguez Santos  DO NOT GIVE RECORDS TO EMPLOYER U        Anticoagulation Care Providers     Provider Role Specialty Phone number    Mt Kiser MD Responsible Clinical Cardiac Electrophysiology [phone]            See the  Encounter Report to view Anticoagulation Flowsheet and Dosing Calendar (Go to Encounters tab in chart review, and find the Anticoagulation Therapy Visit)    INR/CFX/F2 RESULT: Factor 2 is 22% on 1/30    ASSESSMENT:No Problems found. No Changes in Health, Medications, or diet. No Signs of bruising, bleeding or clotting.    Patient is considering going on a diet.  Requested Shala call the [facility] beforehand to consider testing her Factor 2 more frequently.  Patient verbalized understanding.    DOSING ADJUSTMENT: Recommended 10mg daily    NEXT INR/FACTOR X OR FACTOR II: 2/20    PROTOCOL FOLLOWED:Factor 2 15-25%    Andre Bledsoe RN

## 2020-02-03 NOTE — PROGRESS NOTES
Addendum 2/27/20 Pt called today reporting she will get a Factor II done today, but wants us to be aware that she has been battling a sinus infection for two weeks. Pt is currently on Azithromycin and started this on 2/24 and will be done with this on Friday 2/28. Dr. Contreras instructed pt to take 5mg daily from 2/24-2/28. Pt is also taking PRN Tylenol and also started on Montelukast. Will look for pt's Factor II and assess pt then. Melly Morrison RN    Addendum 2/19/20  Patient calls to report that she has a cold and would like to come in next week for a blood draw. [facility]    Addendum 2/3/20 Received an in-basket message from Lorena Holbrook letting us know that we can just look at the Factor II only and don't have to test anymore INR's. INR standing lab order is cancelled. Melly Morrison RN

## 2020-02-24 ENCOUNTER — OFFICE VISIT (OUTPATIENT)
Dept: INTERNAL MEDICINE | Facility: CLINIC | Age: 75
End: 2020-02-24
Payer: COMMERCIAL

## 2020-02-24 VITALS
SYSTOLIC BLOOD PRESSURE: 142 MMHG | OXYGEN SATURATION: 98 % | DIASTOLIC BLOOD PRESSURE: 79 MMHG | HEART RATE: 111 BPM | TEMPERATURE: 99.3 F

## 2020-02-24 DIAGNOSIS — J01.90 ACUTE NON-RECURRENT SINUSITIS, UNSPECIFIED LOCATION: Primary | ICD-10-CM

## 2020-02-24 RX ORDER — AZITHROMYCIN 250 MG/1
TABLET, FILM COATED ORAL
Qty: 6 TABLET | Refills: 0 | Status: SHIPPED | OUTPATIENT
Start: 2020-02-24 | End: 2020-08-19

## 2020-02-24 RX ORDER — FLUTICASONE PROPIONATE 50 MCG
2 SPRAY, SUSPENSION (ML) NASAL DAILY
Qty: 16 G | Refills: 11 | Status: SHIPPED | OUTPATIENT
Start: 2020-02-24 | End: 2020-08-19

## 2020-02-24 RX ORDER — MONTELUKAST SODIUM 10 MG/1
10 TABLET ORAL AT BEDTIME
Qty: 30 TABLET | Refills: 3 | Status: SHIPPED | OUTPATIENT
Start: 2020-02-24 | End: 2020-08-19

## 2020-02-24 ASSESSMENT — PAIN SCALES - GENERAL: PAINLEVEL: MODERATE PAIN (5)

## 2020-02-24 NOTE — NURSING NOTE
Chief Complaint   Patient presents with     Sinus Problem     Pt is here to discus sinus problems and cold sx.     Keily Perez LPN at 5:59 PM on 2/24/2020.    
Current some day smoker

## 2020-02-25 NOTE — PROGRESS NOTES
HPI  74-year-old diabetic with systemic lupus on immunosuppression presents today with a 10-day history of sinusitis and upper respiratory infection.  She initially experienced just some upper respiratory congestion and cough and then this progressed to sinus pain congestion and headache is localized over the frontal and maxillary sinuses bilaterally.  She has had a low-grade temperature with this no chills or sweats.  She has had persistent nasal drainage and used Afrin for 5 days with some benefit.  She is recently discontinued this and has had recurrent nasal congestion and no drainage from the sinuses.  Otherwise she is feeling weak fatigued and stressed because of the symptoms.  Past Medical History:   Diagnosis Date     Achalasia      Antiphospholipid syndrome (H)      Antiplatelet or antithrombotic long-term use      Coagulation disorder (H) 6957-5658     DM (diabetes mellitus) (H)      DVT (deep venous thrombosis) (H) 2003    and PE     Dysphagia     occasional, use Nifedipine     GERD (gastroesophageal reflux disease)      Hyperlipidemia      Lymphedema      Myopia      Neuropathy     toes bilateral     Nonsenile cataract      osteoporosis      Pericarditis      Raynaud's disease      SLE (systemic lupus erythematosus) (H)      Past Surgical History:   Procedure Laterality Date     CATARACT IOL, RT/LT Left 02/2019     CATARACT IOL, RT/LT Right 01/2019     COLONOSCOPY N/A 11/28/2016    Procedure: COLONOSCOPY;  Surgeon: Sang August MD;  Location: [facility] GI     CYSTOSCOPY, SLING TRANSVAGINAL N/A 12/20/2016    Procedure: CYSTOSCOPY, SLING TRANSVAGINAL;  Surgeon: Jeanmarie Pierson MD;  [facility]     DISSECT LYMPH NODE AXILLA  2004    Lt, during eval for SLE     HYSTEROSCOPIC PLACEMENT CONTRACEPTIVE DEVICE  1985     PHACOEMULSIFICATION WITH STANDARD INTRAOCULAR LENS IMPLANT Right 1/8/2019    Procedure: Right Eye Cataract Extraction with Intraocular Lens;  Surgeon: Monika Fermin MD;   [facility]     PHACOEMULSIFICATION WITH STANDARD INTRAOCULAR LENS IMPLANT Left 2/5/2019    Procedure: Left Eye Cataract Extraction with Intraocular Lens;  Surgeon: Monika Fermin MD;  Location: [facility] OR     TUBAL LIGATION Bilateral      Family History   Problem Relation Age of Onset     Cancer Other         breast     Cerebrovascular Disease Other      C.A.D. Other      Glaucoma Father      Macular Degeneration No family hx of      Social History     Socioeconomic History     Marital status:      Spouse name: None     Number of children: None     Years of education: None     Highest education level: None   Occupational History     None   Social Needs     Financial resource strain: None     Food insecurity:     Worry: None     Inability: None     Transportation needs:     Medical: None     Non-medical: None   Tobacco Use     Smoking status: Never Smoker     Smokeless tobacco: Never Used   Substance and Sexual Activity     Alcohol use: No     Drug use: No     Sexual activity: Yes     Partners: Male     Comment: , safe in relationship   Lifestyle     Physical activity:     Days per week: None     Minutes per session: None     Stress: None   Relationships     Social connections:     Talks on phone: None     Gets together: None     Attends Oriental orthodox service: None     Active member of club or organization: None     Attends meetings of clubs or organizations: None     Relationship status: None     Intimate partner violence:     Fear of current or ex partner: None     Emotionally abused: None     Physically abused: None     Forced sexual activity: None   Other Topics Concern     Parent/sibling w/ CABG, MI or angioplasty before 65F 55M? Not Asked   Social History Narrative    How much exercise per week? Walk to work every morning (2 miles), swimming 3 times a week, takes walks with , periodically works out at gym on stationary bike     How much calcium per day? Supplement 3x daily        How  much caffeine per day? On rare occasion, only if she is out to eat and that is all they have    How much vitamin D per day? supplement    Do you/your family wear seatbelts?  Yes    Do you/your family use safety helmets? Yes    Do you/your family use sunscreen? Yes    Do you/your family keep firearms in the home? No    Do you/your family have a smoke detector(s)? Yes        Do you feel safe in your home? Yes    Has anyone ever touched you in an unwanted manner? No        Klever R LPN 7/18/2013                   Exam:  BP (!) 142/79   Pulse 111   Temp 99.3  F (37.4  C) (Oral)   LMP  (LMP Unknown)   SpO2 98%   Physical Exam   The patient is alert, oriented with a clear sensorium. Skin shows no lesions or rashes and good turgor. Head is normocephalic and atraumatic.    Ears show cerumen bilaterally    Nose shows congested nasal mucosa bilaterally.   Mouth shows clear oral mucosa.   Neck shows no nodes, thyromegaly. .   Lungs are clear to percussion and auscultation.   Heart shows normal S1 and S2 without murmur or gallop.      ASSESSMENT  1 acute sinusitis  2 diabetes mellitus  3 SLE on immunosuppression    Plan  We will treat her with azithromycin, montelukast and fluticasone nasal spray.  No improvement in the next several days she will need CT sinus imaging and CBC    This note was completed using Dragon voice recognition software.  Although reviewed after completion, some word and grammatical errors may occur.    Joe Contreras MD  General Internal Medicine  [facility]  [phone]

## 2020-02-25 NOTE — PATIENT INSTRUCTIONS
[facility] Medication Refill Request Information:  * Please contact your pharmacy regarding ANY request for medication refills.  ** [facility] Prescription Fax = [phone]  * Please allow 3 business days for routine medication refills.  * Please allow 5 business days for controlled substance medication refills.     [facility] Test Result notification information:  *You will be notified with in 7-10 days of your appointment day regarding the results of your test.  If you are on MyChart you will be notified as soon as the provider has reviewed the results and signed off on them.    [facility]: [phone]

## 2020-02-27 ENCOUNTER — MYC MEDICAL ADVICE (OUTPATIENT)
Dept: INTERNAL MEDICINE | Facility: CLINIC | Age: 75
End: 2020-02-27

## 2020-02-27 DIAGNOSIS — Z79.01 LONG TERM CURRENT USE OF ANTICOAGULANT THERAPY: ICD-10-CM

## 2020-02-28 ENCOUNTER — ANTICOAGULATION THERAPY VISIT (OUTPATIENT)
Dept: ANTICOAGULATION | Facility: CLINIC | Age: 75
End: 2020-02-28

## 2020-02-28 ENCOUNTER — MYC MEDICAL ADVICE (OUTPATIENT)
Dept: INTERNAL MEDICINE | Facility: CLINIC | Age: 75
End: 2020-02-28

## 2020-02-28 DIAGNOSIS — Z79.01 LONG TERM CURRENT USE OF ANTICOAGULANT THERAPY: ICD-10-CM

## 2020-02-28 LAB — PROTHROM ACT/NOR PPP: 12 % (ref 60–140)

## 2020-02-28 NOTE — LETTER
[facility]  [address]  [address]  [place] [address]  [phone]          March 3, 2020    RE:  Shala Caruso                                                                                                                                                       [address]  [place] [address]            To whom it may concern:    Shala Caruso has been under my professional care for acute sinusitis and was seen in clinic on February 24th for this.  She was unable to work for 8 days but now may return to work without restrictions.       Sincerely,        Joe Contreras MD

## 2020-02-28 NOTE — PROGRESS NOTES
ANTICOAGULATION FOLLOW-UP CLINIC VISIT    Patient Name:  Shala Caruso  Date:  2/28/2020  Contact Type:  Telephone    SUBJECTIVE:         OBJECTIVE    INR   Date Value Ref Range Status   01/30/2020 2.24 (H) 0.86 - 1.14 Final     Chromogenic Factor 10   Date Value Ref Range Status   11/21/2018 16%  Final     Factor 2 Assay   Date Value Ref Range Status   02/27/2020 12 (L) 60 - 140 % Final       ASSESSMENT / PLAN  INR assessment SUPRA    Recheck INR In: 2 WEEKS    INR Location Clinic      Anticoagulation Summary  As of 2/28/2020    INR goal:      TTR:   60.0 % (11.2 mo)   Prior goal:   2.0-3.0   INR used for dosing:   No new INR was available at the time of this encounter.   Warfarin maintenance plan:   10 mg (5 mg x 2) every day   Full warfarin instructions:   2/28: 5 mg; Otherwise 10 mg every day   Weekly warfarin total:   70 mg   Plan last modified:   Portia Cherry RN (1/22/2020)   Next INR check:   3/12/2020   Priority:   Critical   Target end date:       Indications    SLE (systemic lupus erythematosus) (H) (Resolved) [M32.9]  Long term current use of anticoagulant therapy [Z79.01]             Anticoagulation Episode Summary     INR check location:   Clinic Lab    Preferred lab:       Send INR reminders to:   [facility]    Comments:   Factor 2  goal range is 15-25%, 1/31/20 Not drawing INR  Ok to leave message on home [phone] and work voicemail, but call [email or web address] per pt request.  OK to leave message at office  May leave messages with Rodriguez Santos  DO NOT GIVE RECORDS TO EMPLOYER U        Anticoagulation Care Providers     Provider Role Specialty Phone number    Mt Kiser MD Responsible Clinical Cardiac Electrophysiology [phone]            See the Encounter Report to view Anticoagulation Flowsheet and Dosing Calendar (Go to Encounters tab in chart review, and find the Anticoagulation Therapy Visit)  Left message for patient with results and dosing recommendations. Asked patient  to call back to report any missed doses, falls, signs and symptoms of bleeding or clotting, any changes in health, medication, or diet. Asked patient to call back with any questions or concerns.      Portia Cherry RN

## 2020-03-11 DIAGNOSIS — M32.9 SYSTEMIC LUPUS ERYTHEMATOSUS (H): ICD-10-CM

## 2020-03-11 DIAGNOSIS — Z79.01 LONG TERM CURRENT USE OF ANTICOAGULANT THERAPY: ICD-10-CM

## 2020-03-11 RX ORDER — WARFARIN SODIUM 5 MG/1
TABLET ORAL
Qty: 200 TABLET | Refills: 1 | Status: SHIPPED | OUTPATIENT
Start: 2020-03-11 | End: 2021-04-21

## 2020-03-12 DIAGNOSIS — Z79.01 LONG TERM CURRENT USE OF ANTICOAGULANT THERAPY: ICD-10-CM

## 2020-03-13 ENCOUNTER — MYC MEDICAL ADVICE (OUTPATIENT)
Dept: ENDOCRINOLOGY | Facility: CLINIC | Age: 75
End: 2020-03-13

## 2020-03-13 ENCOUNTER — ANTICOAGULATION THERAPY VISIT (OUTPATIENT)
Dept: ANTICOAGULATION | Facility: CLINIC | Age: 75
End: 2020-03-13

## 2020-03-13 DIAGNOSIS — Z79.01 LONG TERM CURRENT USE OF ANTICOAGULANT THERAPY: ICD-10-CM

## 2020-03-13 DIAGNOSIS — E10.649 TYPE 1 DIABETES MELLITUS WITH HYPOGLYCEMIA AND WITHOUT COMA (H): Primary | ICD-10-CM

## 2020-03-13 LAB — PROTHROM ACT/NOR PPP: 13 % (ref 60–140)

## 2020-03-13 RX ORDER — IBUPROFEN 600 MG/1
1 TABLET ORAL ONCE
Qty: 1 MG | Refills: 0 | Status: SHIPPED | OUTPATIENT
Start: 2020-03-13 | End: 2020-07-09

## 2020-03-13 NOTE — TELEPHONE ENCOUNTER
Glucagon Per PT request. Sent to provider for review and signature.   Yamel Shafer RN on 3/13/2020 at 11:47 AM

## 2020-03-13 NOTE — PROGRESS NOTES
ANTICOAGULATION FOLLOW-UP CLINIC VISIT    Patient Name:  Shala Caruso  Date:  3/13/2020  Contact Type:  Telephone    SUBJECTIVE:  Patient Findings     Comments:   The dosing plan deviates from the protocol & is approved by [facility].  Factor 2 = 13% from 3/12        Clinical Outcomes     Comments:   The dosing plan deviates from the protocol & is approved by [facility].  Factor 2 = 13% from 3/12           OBJECTIVE    INR   Date Value Ref Range Status   01/30/2020 2.24 (H) 0.86 - 1.14 Final     Chromogenic Factor 10   Date Value Ref Range Status   11/21/2018 16%  Final     Factor 2 Assay   Date Value Ref Range Status   03/12/2020 13 (L) 60 - 140 % Final       ASSESSMENT / PLAN  INR assessment SUPRA    Recheck INR In: 10 DAYS    INR Location Clinic      Anticoagulation Summary  As of 3/13/2020    INR goal:      TTR:   62.1 % (10.7 mo)   Prior goal:   2.0-3.0   INR used for dosing:   No new INR was available at the time of this encounter.   Warfarin maintenance plan:   5 mg (5 mg x 1) every Mon, Fri; 10 mg (5 mg x 2) all other days   Full warfarin instructions:   5 mg every Mon, Fri; 10 mg all other days   Weekly warfarin total:   60 mg   Plan last modified:   Ilana Gonzalez, RN (3/13/2020)   Next INR check:   3/24/2020   Priority:   Critical   Target end date:       Indications    SLE (systemic lupus erythematosus) (H) (Resolved) [M32.9]  Long term current use of anticoagulant therapy [Z79.01]             Anticoagulation Episode Summary     INR check location:   Clinic Lab    Preferred lab:       Send INR reminders to:   [facility]    Comments:   Factor 2  goal range is 15-25%, 1/31/20 Not drawing INR  Ok to leave message on home [phone] and work voicemail, but call [email or web address] per pt request.  OK to leave message at office  May leave messages with Rodriguez Santos  DO NOT GIVE RECORDS TO EMPLOYER U        Anticoagulation Care Providers     Provider Role Specialty Phone number    Mt Kiser MD Responsible  Clinical Cardiac Electrophysiology [phone]            See the Encounter Report to view Anticoagulation Flowsheet and Dosing Calendar (Go to Encounters tab in chart review, and find the Anticoagulation Therapy Visit)    Spoke with the pt - see above information     Ilana Gonzalez RN

## 2020-03-24 DIAGNOSIS — Z79.01 LONG TERM CURRENT USE OF ANTICOAGULANT THERAPY: ICD-10-CM

## 2020-03-25 ENCOUNTER — ANTICOAGULATION THERAPY VISIT (OUTPATIENT)
Dept: ANTICOAGULATION | Facility: CLINIC | Age: 75
End: 2020-03-25

## 2020-03-25 DIAGNOSIS — Z79.01 LONG TERM CURRENT USE OF ANTICOAGULANT THERAPY: ICD-10-CM

## 2020-03-25 LAB
FACTOR 2 ASSAY: NORMAL
PROTHROM ACT/NOR PPP: 19 % (ref 60–140)

## 2020-03-25 NOTE — PROGRESS NOTES
ANTICOAGULATION FOLLOW-UP CLINIC VISIT    Patient Name:  Shala Caruso  Date:  3/25/2020  Contact Type:  Telephone    SUBJECTIVE:  Patient Findings     Comments:   Factor 2 result on 3/24 is 19%        Clinical Outcomes     Comments:   Factor 2 result on 3/24 is 19%           OBJECTIVE    INR   Date Value Ref Range Status   01/30/2020 2.24 (H) 0.86 - 1.14 Final     Chromogenic Factor 10   Date Value Ref Range Status   11/21/2018 16%  Final     Factor 2 Assay   Date Value Ref Range Status   03/24/2020 19 (L) 60 - 140 % Final       ASSESSMENT / PLAN  INR assessment THER    Recheck INR In: 4 WEEKS    INR Location Clinic      Anticoagulation Summary  As of 3/25/2020    INR goal:      TTR:   60.6 % (10.3 mo)   Prior goal:   2.0-3.0   INR used for dosing:   No new INR was available at the time of this encounter.   Warfarin maintenance plan:   5 mg (5 mg x 1) every Mon, Fri; 10 mg (5 mg x 2) all other days   Full warfarin instructions:   5 mg every Mon, Fri; 10 mg all other days   Weekly warfarin total:   60 mg   No change documented:   Andre Bledsoe RN   Plan last modified:   Ilana Gonzalez RN (3/13/2020)   Next INR check:   4/21/2020   Priority:   Critical   Target end date:       Indications    SLE (systemic lupus erythematosus) (H) (Resolved) [M32.9]  Long term current use of anticoagulant therapy [Z79.01]             Anticoagulation Episode Summary     INR check location:   Clinic Lab    Preferred lab:       Send INR reminders to:   [facility]    Comments:   Factor 2  goal range is 15-25%, 1/31/20 Not drawing INR  Ok to leave message on home [phone] and work voicemail, but call [email or web address] per pt request.  OK to leave message at office  May leave messages with Rodriguez aSntos  DO NOT GIVE RECORDS TO EMPLOYER U        Anticoagulation Care Providers     Provider Role Specialty Phone number    Mt Kiser MD Responsible Clinical Cardiac Electrophysiology [phone]            See the Encounter  Report to view Anticoagulation Flowsheet and Dosing Calendar (Go to Encounters tab in chart review, and find the Anticoagulation Therapy Visit)    INR/CFX/F2 RESULT: Factor 2 is 19% on 3/24    ASSESSMENT: Left message for patient with results and dosing recommendations. Asked patient to call back to report any missed doses, falls, signs and symptoms of bleeding or clotting, any changes in health, medication, or diet. Asked patient to call back with any questions or concerns.    DOSING ADJUSTMENT: continue current maintenance dose    NEXT INR/FACTOR X OR FACTOR II:4/21    PROTOCOL FOLLOWED:goal 15-25% Factor 2    Andre Bledsoe RN

## 2020-04-07 DIAGNOSIS — Z79.01 LONG TERM CURRENT USE OF ANTICOAGULANT THERAPY: ICD-10-CM

## 2020-04-09 ENCOUNTER — TELEPHONE (OUTPATIENT)
Dept: RHEUMATOLOGY | Facility: CLINIC | Age: 75
End: 2020-04-09

## 2020-04-09 DIAGNOSIS — M32.15 SYSTEMIC LUPUS ERYTHEMATOSUS WITH TUBULO-INTERSTITIAL NEPHROPATHY, UNSPECIFIED SLE TYPE (H): ICD-10-CM

## 2020-04-09 DIAGNOSIS — I73.00 RAYNAUD'S DISEASE WITHOUT GANGRENE: Primary | ICD-10-CM

## 2020-04-09 LAB
ALBUMIN UR-MCNC: NEGATIVE MG/DL
ALT SERPL W P-5'-P-CCNC: 25 U/L (ref 0–50)
APPEARANCE UR: CLEAR
AST SERPL W P-5'-P-CCNC: 18 U/L (ref 0–45)
BACTERIA #/AREA URNS HPF: ABNORMAL /HPF
BASOPHILS # BLD AUTO: 0.1 10E9/L (ref 0–0.2)
BASOPHILS NFR BLD AUTO: 1.2 %
BILIRUB UR QL STRIP: NEGATIVE
COLOR UR AUTO: YELLOW
CREAT SERPL-MCNC: 0.67 MG/DL (ref 0.52–1.04)
DIFFERENTIAL METHOD BLD: ABNORMAL
EOSINOPHIL # BLD AUTO: 0 10E9/L (ref 0–0.7)
EOSINOPHIL NFR BLD AUTO: 1 %
ERYTHROCYTE [DISTWIDTH] IN BLOOD BY AUTOMATED COUNT: 16.8 % (ref 10–15)
GFR SERPL CREATININE-BSD FRML MDRD: 88 ML/MIN/{1.73_M2}
GLUCOSE UR STRIP-MCNC: NEGATIVE MG/DL
HCT VFR BLD AUTO: 42.2 % (ref 35–47)
HGB BLD-MCNC: 13.2 G/DL (ref 11.7–15.7)
HGB UR QL STRIP: ABNORMAL
IMM GRANULOCYTES # BLD: 0 10E9/L (ref 0–0.4)
IMM GRANULOCYTES NFR BLD: 0.2 %
KETONES UR STRIP-MCNC: 5 MG/DL
LEUKOCYTE ESTERASE UR QL STRIP: NEGATIVE
LYMPHOCYTES # BLD AUTO: 1.1 10E9/L (ref 0.8–5.3)
LYMPHOCYTES NFR BLD AUTO: 26.6 %
MCH RBC QN AUTO: 28.4 PG (ref 26.5–33)
MCHC RBC AUTO-ENTMCNC: 31.3 G/DL (ref 31.5–36.5)
MCV RBC AUTO: 91 FL (ref 78–100)
MONOCYTES # BLD AUTO: 0.4 10E9/L (ref 0–1.3)
MONOCYTES NFR BLD AUTO: 10.7 %
MUCOUS THREADS #/AREA URNS LPF: PRESENT /LPF
NEUTROPHILS # BLD AUTO: 2.4 10E9/L (ref 1.6–8.3)
NEUTROPHILS NFR BLD AUTO: 60.3 %
NITRATE UR QL: NEGATIVE
NRBC # BLD AUTO: 0 10*3/UL
NRBC BLD AUTO-RTO: 0 /100
PH UR STRIP: 5 PH (ref 5–7)
PLATELET # BLD AUTO: 262 10E9/L (ref 150–450)
RBC # BLD AUTO: 4.65 10E12/L (ref 3.8–5.2)
RBC #/AREA URNS AUTO: 5 /HPF (ref 0–2)
SOURCE: ABNORMAL
SP GR UR STRIP: 1.02 (ref 1–1.03)
UROBILINOGEN UR STRIP-MCNC: 0 MG/DL (ref 0–2)
WBC # BLD AUTO: 4 10E9/L (ref 4–11)
WBC #/AREA URNS AUTO: 2 /HPF (ref 0–5)

## 2020-04-09 NOTE — TELEPHONE ENCOUNTER
Left message for Shala to return my call in regards to the message below from Dr. Agarwal; will also send VouchedFor message.       ----- Message from Tato Agarwal MD sent at 4/9/2020  3:47 PM CDT -----  Blood counts are stable. Kidney function is normal. Urine shows a scant few red blood cells; significance is not clear. I recommend that you discuss this result with Dr. Contreras. Liver function is normal.    Please let me know if you have questions.    Sincerely,    Tato Agarwal MD  Rheumatologist, [facility]

## 2020-04-14 ENCOUNTER — VIRTUAL VISIT (OUTPATIENT)
Dept: RHEUMATOLOGY | Facility: CLINIC | Age: 75
End: 2020-04-14
Attending: INTERNAL MEDICINE
Payer: COMMERCIAL

## 2020-04-14 DIAGNOSIS — M32.15 SYSTEMIC LUPUS ERYTHEMATOSUS WITH TUBULO-INTERSTITIAL NEPHROPATHY, UNSPECIFIED SLE TYPE (H): Primary | ICD-10-CM

## 2020-04-14 NOTE — PROGRESS NOTES
[facility]  Rheumatology Clinic-video  Tato Agarwal MD  2020     Name: Shala Caruso  MRN: [id number]  Age: 73 year old  : 1945  Referring provider: Joe Contreras     Assessment and Plan:    Systemic lupus erythematosus (+CRISTAL, +dsDNA, +SSA, hypocomplementemia; Raynaud's, arthritis, serositis, tubulo-interstitial nephropathy, pericardial effusion/tamponade):  Pt relates only mild knee pain that improves with movement and stretching, as well as mild loss of sensation in her toes, both stable. Raynaud's is mild and controlled. Blood work on 2020 showed a creatinine 0.67, normal transaminases, normal CBC, and a urinalysis showing 5 red blood cells per high-powered field.  SLE is stable by history and exam. I think that disease is well compensated on cellcept monotherapy. She has been taking cellcept 1000 bid and reports tolerating it well without any side-effects.  I recommend that she stay on this dose unless her symptoms change.  Plan urinalysis, creatinine, and CBC every 6 months.    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 Following factor 2 levels.  # Peripheral neuropathy  # Raynaud's: stable  # Achalasia: stable on nifedipine  #Borderline hematuria: There are no white cells and there is no proteinuria in urinalyses performed in the past 6 months.  I think we can safely monitor the very mild hematuria abnormality without concern for an active renal lesion for the present    Tato Agarwal M.D.  Staff Rheumatologist, [facility]  Pager [phone]    Follow-up: 6 mos    HPI:   Shala Caruso is a 73 year old female with a history of DM, SLE, PE on Coumadin, and HTN who presents for evaluation of lupus.  She is a previous patient of Dr. Corbett and was last seen in 10-19.  At that time she was doing well without no significant symptoms of disease.  Plan was to continue Cellcept 1000 mg twice daily.     Summary of previous history:  SLE  diagnosed in 2000 (+CRISTAL, +dsDNA ab, arthritis, serositis).  She has antiphospholipid syndrome and had DVT and PE in 2004, on Coumadin since then.  She had a Lupus flare in 2010 initially treated with Plaquenil however she worsened and developed pericardial effusion/tamponade - started on MTX and tapering dose of prednisone at that time (continued on about 10 mg daily).  Cellcept started 6/2010, Prednisone tapered off.  MTX stopped May 2012.    Prior history 10-19: She reports mild tingling and cold sensation in her toes which she attributes to diabetic neuropathy.  She also has mild but constant soreness in both knees.  This is better with movement and she attributes this to osteoarthritis.  She does yoga every morning and feels this helps her knees.  Other than her knees, she has not had significant joint issues in the past several months.  She denies any rash, mouth sores, chest pain, shortness of breath, or new vision changes.    She also notes chronic changes of her fingernails which has not changed recently.  She has not sought treatment for this.    Interval hx: 4-20:  She has been continuing yoga and walking daily. She is working from home. Her neuropathic symptoms are stable. Minimal joint pain or staiffness; knee OA symptoms are stable. Achalasia symptoms have been slightly increased recently; she does note hoarseness recently; no trouble swallowing. No abd pain or change in bowel habits.    No change in mycophenolate dosing.    Review of Systems:  Pertinent items are noted in HPI or as below, remainder of complete ROS is negative.      No recent problems with hearing or vision. No swallowing problems.   No breathing difficulty, shortness of breath, coughing, or wheezing  No chest pain or palpitations  No heart burn, indigestion, abdominal pain, nausea, vomiting, diarrhea  No urination problems, no bloody, cloudy urine, no dysuria  No tingling, weakness  No headaches or confusion  No rashes. No easy  bleeding or bruising.     Active Medications:   Current Outpatient Medications   Medication     acetaminophen (TYLENOL) 500 MG tablet     aspirin 81 MG tablet     AYR SALINE NASAL NO-DRIP GEL     blood glucose (TRUE METRIX BLOOD GLUCOSE TEST) test strip     Calcium Carbonate-Vitamin D (CALCIUM 500 + D PO)     carboxymethylcellulose PF (REFRESH PLUS) 0.5 % SOLN ophthalmic solution     glucagon (GLUCAGON EMERGENCY) 1 MG kit     Injection Device for insulin (NOVOPEN ECHO) RORY     insulin glargine (LANTUS PEN) 100 UNIT/ML pen     insulin pen needle (BD PAM U/F) 32G X 4 MM miscellaneous     LANCETS ULTRA THIN 30G MISC     Multiple Vitamins-Minerals (CENTRUM SILVER PO)     mycophenolate (GENERIC EQUIVALENT) 500 MG tablet     NIFEdipine ER OSMOTIC (NIFEDICAL XL) 30 MG 24 hr tablet     NOVOLOG PENFILL 100 UNIT/ML soln     order for DME     simvastatin (ZOCOR) 40 MG tablet     warfarin (COUMADIN) 5 MG tablet     warfarin (COUMADIN) 5 MG tablet     warfarin ANTICOAGULANT (COUMADIN) 5 MG tablet     azithromycin (ZITHROMAX) 250 MG tablet     fluticasone (FLONASE) 50 MCG/ACT nasal spray     montelukast (SINGULAIR) 10 MG tablet     No current facility-administered medications for this visit.         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   Raynaud's disease  Antiphospholipid syndrome   Type 1 diabetes mellitus   Osteoporosis   Hypertension   Hyperlipidemia   Achalasia   Gastroesophageal reflux disease   Choroidal lesion  Atrophic vaginitis   Urinary urgency   Female stress incontinence   Cystocele, midline   Deep vein thrombosis   Nonsenile cataract   Myopia   Neuropathy   Lymphedema      Past Surgical History:  Phacoemulsification clear cornea with intraocular lens implantation, right 1/8/19, left 2/5/19  Transvaginal sling  12/20/16  Axilla lymph node dissection 2004  Bilateral tubal ligation     Family History:   Glaucoma - father   Breast cancer   Stroke       Social History:   Tobacco Use: No previous or current tobacco use.   Alcohol Use: No alcohol use.   Occupation: record keeping at the [employer]  PCP: Joe Contreras      Physical Exam:   LMP  (LMP Unknown)    Wt Readings from Last 4 Encounters:   02/05/19 56.7 kg (125 lb)   01/08/19 54.1 kg (119 lb 4.8 oz)   07/30/18 54.1 kg (119 lb 3.2 oz)   01/29/18 54 kg (119 lb)     Constitutional: Well-developed, appearing stated age; cooperative  Eyes: Normal EOM, PERRLA, vision, conjunctiva, sclera  ENT: Normal external ears, nose, hearing, lips, teeth, gums  Psych: Normal judgement, orientation, memory, affect.     Laboratory:   RHEUM RESULTS Latest Ref Rng & Units 10/24/2019 1/13/2020 4/9/2020   COMPLEMENT C3 76 - 169 mg/dL - - -   COMPLEMENT C4 15 - 50 mg/dL - - -   DNA-DS <10 IU/mL - - -   SED RATE 0 - 30 mm/h - - -   CRP, INFLAMMATION 0.0 - 8.0 mg/L - - -   CYCLIC CIT PEPT IGG <5 U/mL - - -   CK TOTAL 30 - 225 U/L - - -   RHEUMATOID FACTOR 0 - 14 IU/mL - - -   CRISTAL SCREEN BY EIA 0 - 1.0 - - -   AST 0 - 45 U/L - - 18   ALT 0 - 50 U/L - - 25   ALBUMIN 3.4 - 5.0 g/dL - - -   WBC 4.0 - 11.0 10e9/L 3.7(L) 3.8(L) 4.0   RBC 3.8 - 5.2 10e12/L 4.47 4.55 4.65   HGB 11.7 - 15.7 g/dL 12.8 13.0 13.2   HCT 35.0 - 47.0 % 41.2 41.9 42.2   MCV 78 - 100 fl 92 92 91   MCHC 31.5 - 36.5 g/dL 31.1(L) 31.0(L) 31.3(L)   RDW 10.0 - 15.0 % 15.4(H) 16.0(H) 16.8(H)    - 450 10e9/L 253 233 262   CREATININE 0.52 - 1.04 mg/dL 0.67 0.81 0.67   GFR ESTIMATE, IF BLACK >60 mL/min/[1.73:m2] >90 84 >90   GFR ESTIMATE >60 mL/min/[1.73:m2] 88 72 88   HEPATITIS C ANTIBODY NR - - -     Rheumatoid Factor   Date Value Ref Range Status   01/14/2009 8 0 - 14 IU/mL Final     Cyclic Citrullinated Peptide IgG   Date Value Ref Range Status   01/14/2009 <2 <5 U/mL Final     Comment:     Interpretation:  Negative      Ribonucleic Protein IgG Antibody   Date Value Ref Range Status   01/24/2007 12  Final     Comment:     Reference range: 0 to 49  Unit: Units  (Note)  REFERENCE INTERVAL: Ribonucleic Protein (VIKRAM), IgG   Less than 20 Units ........ None detected   20 - 49 Units ............. Inconclusive   50 Units or greater ....... Positive    RNP antibody is seen in % of mixed connective tissue  disease and is considered specific for this syndrome if  other antibodies are negative. RNP is also present in  20-30% of SLE and 15-25% of progressive systemic  sclerosis.  The above test was performed at: [facility],  [address] [place], [place] [address] [place]  [id number] [phone]  [email or web address]     SSA (RO) Antibody IgG   Date Value Ref Range Status   08/24/2005 221 (H)  Final     Comment:     Reference range: 0 to 49  Unit: Units  (Note)  REFERENCE INTERVALS: SSA (Ro) (VIKRAM) Ab, IgG   Less than 20 Units ....... None detected   20 - 49 Units ............ Inconclusive   50 Units or greater ...... Positive    SSA (Ro) antibody is seen in70-75% of Sjogren syndrome  cases, 30-40% of systemic lupus erythematosus (SLE) and  5-10% of progressive systemic sclerosis (PSS).  The above test was performed at: [facility]  [facility] [employer] [facility] [place]  [id number] [phone]  [email or web address]     SSB (LA) Antibody IgG   Date Value Ref Range Status   08/24/2005 20  Final     Comment:     Reference range: 0 to 49  Unit: Units  (Note)  REFERENCE INTERVALS: SSB (La) (VIKRAM) Ab, IgG   Less than 20 Units ....... None detected   20 - 49 Units ............ Inconclusive   50 Units or greater ...... Positive    SSB (La) antibody is seen in 50-60% of Sjogren syndrome  cases and is specific if it is the only VIKRAM antibody  present. 15-25% of patients with systemic lupus  erythematosus (SLE) and 5-10% of patients with progressive  systemic sclerosis (PSS) also have this antibody.  The above test was performed at: [employer] [place] [id number]   [phone]  [email or web address]   ,  ,   CRISTAL Screen by EIA   Date Value Ref Range Status   02/16/2010 8.2 (H) 0 - 1.0 Final     Comment:     Interpretation:  Positive     DNA-ds   Date Value Ref Range Status   06/12/2019 1 <10 IU/mL Final     Comment:     Negative     Albumin Fraction   Date Value Ref Range Status   04/01/2013 3.9 3.7 - 5.1 g/dL Final     Alpha 2 Fraction   Date Value Ref Range Status   04/01/2013 0.7 0.5 - 0.9 g/dL Final     Beta Fraction   Date Value Ref Range Status   04/01/2013 0.6 0.6 - 1.0 g/dL Final     Gamma Fraction   Date Value Ref Range Status   04/01/2013 0.7 0.7 - 1.6 g/dL Final     Monoclonal Peak   Date Value Ref Range Status   04/01/2013 0.0 0.0 g/dL Final     ELP Interpretation:   Date Value Ref Range Status   04/01/2013   Final    Essentially normal electrophoretic pattern.  No monoclonal protein seen.   Pathologic significance requires clinical correlation.  LEATHA Valencia M.D.,   Ph.D., Pathologist

## 2020-04-14 NOTE — PATIENT INSTRUCTIONS
Diagnosis:  1.  Systemic lupus erythematosus, in remission.  2.  History of deep venous thrombosis and pulmonary embolism, on chronic anticoagulation.  3.  Osteoarthritis, knees: Stable    Plan:  1.  Repeat urinalysis, complete blood count, and creatinine in 4 to 5 months.  2.  Continue mycophenolate 1000 mg twice daily  3.  Continue regular weightbearing exercise for joint health  4.  Follow recommended guidelines for social distancing and personal hygiene in the time of coronavirus, but do not discontinue immunotherapy.

## 2020-04-14 NOTE — PROGRESS NOTES
"Shala Caruso is a 74 year old female who is being evaluated via a billable video visit.      The patient has been notified of following:     \"This video visit will be conducted via a call between you and your physician/provider. We have found that certain health care needs can be provided without the need for an in-person physical exam.  This service lets us provide the care you need with a video conversation.  If a prescription is necessary we can send it directly to your pharmacy.  If lab work is needed we can place an order for that and you can then stop by our lab to have the test done at a later time.    Video visits are billed at different rates depending on your insurance coverage.  Please reach out to your insurance provider with any questions.    If during the course of the call the physician/provider feels a video visit is not appropriate, you will not be charged for this service.\"    Patient has given verbal consent for Video visit? Yes    How would you like to obtain your AVS? Maliniharjarvis    Patient would like the video invitation sent by: Send to e-mail at: [email or web address]      Video Start Time: 10:51 AM      Video-Visit Details    Type of service:  Video Visit    Video End Time (time video stopped): duration 22 minutes    Originating Location (pt. Location): Home    Distant Location (provider location):  [facility] RHEUMATOLOGY     Mode of Communication:  Video Conference via [employer]      Tato Agarwal MD      "

## 2020-04-21 DIAGNOSIS — M32.15 SYSTEMIC LUPUS ERYTHEMATOSUS WITH TUBULO-INTERSTITIAL NEPHROPATHY, UNSPECIFIED SLE TYPE (H): ICD-10-CM

## 2020-04-21 DIAGNOSIS — Z79.01 LONG TERM CURRENT USE OF ANTICOAGULANT THERAPY: ICD-10-CM

## 2020-04-22 ENCOUNTER — ANTICOAGULATION THERAPY VISIT (OUTPATIENT)
Dept: ANTICOAGULATION | Facility: CLINIC | Age: 75
End: 2020-04-22

## 2020-04-22 DIAGNOSIS — Z79.01 LONG TERM CURRENT USE OF ANTICOAGULANT THERAPY: ICD-10-CM

## 2020-04-22 DIAGNOSIS — L93.0 LUPUS ERYTHEMATOSUS: Primary | ICD-10-CM

## 2020-04-22 LAB — PROTHROM ACT/NOR PPP: 18 % (ref 60–140)

## 2020-04-22 NOTE — PROGRESS NOTES
ANTICOAGULATION FOLLOW-UP CLINIC VISIT    Patient Name:  Shala Caruso  Date:  4/22/2020  Contact Type:  telephone    SUBJECTIVE:  Patient Findings     Comments:   4/21/2020:  Factor 2:  18   Goal range is 15 - 25        Clinical Outcomes     Comments:   4/21/2020:  Factor 2:  18   Goal range is 15 - 25           OBJECTIVE    INR   Date Value Ref Range Status   01/30/2020 2.24 (H) 0.86 - 1.14 Final     Chromogenic Factor 10   Date Value Ref Range Status   11/21/2018 16%  Final     Factor 2 Assay   Date Value Ref Range Status   04/21/2020 18 (L) 60 - 140 % Final       ASSESSMENT / PLAN  INR assessment THER    Recheck INR In: 4 WEEKS    INR Location Clinic      Anticoagulation Summary  As of 4/22/2020    INR goal:      TTR:   56.7 % (9.4 mo)   Prior goal:   2.0-3.0   INR used for dosing:      Warfarin maintenance plan:   5 mg (5 mg x 1) every Mon; 10 mg (5 mg x 2) all other days   Full warfarin instructions:   5 mg every Mon; 10 mg all other days   Weekly warfarin total:   65 mg   No change documented:   Gwen Abel RN   Plan last modified:   Portia Cherry RN (3/25/2020)   Next INR check:   5/20/2020   Priority:   High   Target end date:       Indications    SLE (systemic lupus erythematosus) (H) (Resolved) [M32.9]  Long term current use of anticoagulant therapy [Z79.01]             Anticoagulation Episode Summary     INR check location:   Clinic Lab    Preferred lab:       Send INR reminders to:   [facility]    Comments:   Factor 2  goal range is 15-25%, 1/31/20 Not drawing INR  Ok to leave message on home [phone] and work voicemail, but call [email or web address] per pt request.  OK to leave message at office  May leave messages with Rodriguez Santos  DO NOT GIVE RECORDS TO EMPLOYER U        Anticoagulation Care Providers     Provider Role Specialty Phone number    Mt Kiser MD Responsible Clinical Cardiac Electrophysiology [phone]            See the Encounter Report to view Anticoagulation  Flowsheet and Dosing Calendar (Go to Encounters tab in chart review, and find the Anticoagulation Therapy Visit)      4/21/2020:  Factor 2:  18        Goal range: 15 - 25  Left message for patient with results and dosing recommendations. Asked patient to call back to report any missed doses, falls, signs and symptoms of bleeding or clotting, any changes in health, medication, or diet. Asked patient to call back with any questions or concerns.  Message left on home phone.    Gwen Abel RN

## 2020-04-24 ENCOUNTER — TELEPHONE (OUTPATIENT)
Dept: CARDIOLOGY | Facility: CLINIC | Age: 75
End: 2020-04-24

## 2020-04-24 PROBLEM — L93.0 LUPUS ERYTHEMATOSUS: Status: ACTIVE | Noted: 2020-04-24

## 2020-04-24 NOTE — TELEPHONE ENCOUNTER
[facility] Call Center    Phone Message    May a detailed message be left on voicemail: yes     Reason for Call: Other: Shala is calling to request that Dr. Kiser update the orders he has placed for her to extend the expiration date on the INR and Factor 2 lab tests. Shala's orders  on 5/15/20, but her lab appointment is scheduled for 20. Please give Shala a call back at your earliest convenience when the orders have been extended or with any questions.     Action Taken: Message routed to:  [facility]  Cardio    Travel Screening: Not Applicable

## 2020-04-24 NOTE — PROGRESS NOTES
Addendum 4/24/20 INR Referral and Factor II orders put in under pt's PCP Dr. Joe Contreras. Aquilino GAGE in Episode also. Melly Morrison RN

## 2020-04-27 NOTE — TELEPHONE ENCOUNTER
[facility] Call Center    Phone Message    May a detailed message be left on voicemail: yes     Reason for Call: Other: Pt, Jeanmarie  is calling to request that Dr. Kiser update the orders he has placed for her to extend the expiration date on the INR and Factor 2 lab tests. Shala's orders  on 5/15/20, but her lab appointment is scheduled for 20. Please give Shala a call back at your earliest convenience when the orders have been extended or with any questions. Call Jeanmarie back: [phone]     Action Taken: Message routed to:  [facility] Cardiology    Travel Screening: Not Applicable

## 2020-04-29 DIAGNOSIS — E10.649 TYPE 1 DIABETES MELLITUS WITH HYPOGLYCEMIA AND WITHOUT COMA (H): ICD-10-CM

## 2020-04-29 RX ORDER — PEN NEEDLE, DIABETIC 32GX 5/32"
NEEDLE, DISPOSABLE MISCELLANEOUS
Qty: 200 EACH | Refills: 3 | Status: SHIPPED | OUTPATIENT
Start: 2020-04-29 | End: 2021-09-28

## 2020-04-29 NOTE — TELEPHONE ENCOUNTER
insulin pen needle (BD PAM U/F) 32G X 4 MM miscellaneous     Last Written Prescription Date:  4/25/2019  Last Fill Quantity: 200,   # refills: 3  Last Office Visit : 1/13/2020  Future Office visit:  7/13/2020  200 each, 3 Refills sent to pharm 4/29/2020    Dorita Valentine RN  Central Triage Red Flags/Med Refills

## 2020-05-20 ENCOUNTER — ANTICOAGULATION THERAPY VISIT (OUTPATIENT)
Dept: ANTICOAGULATION | Facility: CLINIC | Age: 75
End: 2020-05-20

## 2020-05-20 DIAGNOSIS — L93.0 LUPUS ERYTHEMATOSUS: ICD-10-CM

## 2020-05-20 DIAGNOSIS — Z79.01 LONG TERM CURRENT USE OF ANTICOAGULANT THERAPY: ICD-10-CM

## 2020-05-20 DIAGNOSIS — M32.15 SYSTEMIC LUPUS ERYTHEMATOSUS WITH TUBULO-INTERSTITIAL NEPHROPATHY, UNSPECIFIED SLE TYPE (H): ICD-10-CM

## 2020-05-20 LAB — PROTHROM ACT/NOR PPP: 15 % (ref 60–140)

## 2020-05-20 NOTE — PROGRESS NOTES
ANTICOAGULATION FOLLOW-UP CLINIC VISIT    Patient Name:  Shala Caruso  Date:  5/20/2020  Contact Type:  Telephone    SUBJECTIVE:  Patient Findings     Comments:   Factor 2=15 Goal 15-25        Clinical Outcomes     Comments:   Factor 2=15 Goal 15-25           OBJECTIVE    Recent labs: (last 7 days)     05/20/20  1209   F2 15*       ASSESSMENT / PLAN  No question data found.  Anticoagulation Summary  As of 5/20/2020    INR goal:   2.0-3.0   TTR:   58.9 % (8.5 mo)   Prior goal:   2.0-3.0   INR used for dosing:   No new INR was available at the time of this encounter.   Warfarin maintenance plan:   5 mg (5 mg x 1) every Mon; 10 mg (5 mg x 2) all other days   Full warfarin instructions:   5 mg every Mon; 10 mg all other days   Weekly warfarin total:   65 mg   No change documented:   Stacey Beal RN   Plan last modified:   Portia Cherry RN (3/25/2020)   Next INR check:   6/17/2020   Priority:   High   Target end date:   Indefinite    Indications    SLE (systemic lupus erythematosus) (H) (Resolved) [M32.9]  Long term current use of anticoagulant therapy [Z79.01]  Lupus erythematosus [L93.0]             Anticoagulation Episode Summary     INR check location:   Clinic Lab    Preferred lab:       Send INR reminders to:   [facility]    Comments:   Factor 2  goal range is 15-25%, 1/31/20 Not drawing INR  Ok to leave message on home [phone] and work voicemail, but call [email or web address] per pt request.  OK to leave message at office  May leave messages with Rodriguez Santos  DO NOT GIVE RECORDS TO EMPLOYER U        Anticoagulation Care Providers     Provider Role Specialty Phone number    Joe Contreras MD Referring Internal Medicine [phone]            See the Encounter Report to view Anticoagulation Flowsheet and Dosing Calendar (Go to Encounters tab in chart review, and find the Anticoagulation Therapy Visit)    Left message for patient with results and dosing recommendations. Asked patient  to call back to report any missed doses, falls, signs and symptoms of bleeding or clotting, any changes in health, medication, or diet. Asked patient to call back with any questions or concerns.      Stacey Beal RN

## 2020-05-28 DIAGNOSIS — E78.49 OTHER HYPERLIPIDEMIA: ICD-10-CM

## 2020-05-28 RX ORDER — SIMVASTATIN 40 MG
40 TABLET ORAL AT BEDTIME
Qty: 90 TABLET | Refills: 3 | Status: SHIPPED | OUTPATIENT
Start: 2020-05-28 | End: 2020-06-24

## 2020-05-28 NOTE — TELEPHONE ENCOUNTER
simvastatin (ZOCOR) 40 MG tablet    Last Written Prescription Date:  9/4/2019  Last Fill Quantity: 90,   # refills: 2  Last Office Visit : 1/13/2020  Future Office visit:  7/13/2020  90 Tabs, 3 Refills sent to pharm 5/28/2020    Dorita Valentine RN  Central Triage Red Flags/Med Refills          
2

## 2020-06-23 DIAGNOSIS — L93.0 LUPUS ERYTHEMATOSUS: ICD-10-CM

## 2020-06-23 DIAGNOSIS — Z79.01 LONG TERM CURRENT USE OF ANTICOAGULANT THERAPY: ICD-10-CM

## 2020-06-23 DIAGNOSIS — M32.15 SYSTEMIC LUPUS ERYTHEMATOSUS WITH TUBULO-INTERSTITIAL NEPHROPATHY, UNSPECIFIED SLE TYPE (H): ICD-10-CM

## 2020-06-23 LAB
ALBUMIN UR-MCNC: NEGATIVE MG/DL
ALT SERPL W P-5'-P-CCNC: 22 U/L (ref 0–50)
APPEARANCE UR: ABNORMAL
AST SERPL W P-5'-P-CCNC: 16 U/L (ref 0–45)
BACTERIA #/AREA URNS HPF: ABNORMAL /HPF
BASOPHILS # BLD AUTO: 0 10E9/L (ref 0–0.2)
BASOPHILS NFR BLD AUTO: 1.3 %
BILIRUB UR QL STRIP: NEGATIVE
COLOR UR AUTO: YELLOW
CREAT SERPL-MCNC: 0.65 MG/DL (ref 0.52–1.04)
DIFFERENTIAL METHOD BLD: ABNORMAL
EOSINOPHIL # BLD AUTO: 0 10E9/L (ref 0–0.7)
EOSINOPHIL NFR BLD AUTO: 1.3 %
ERYTHROCYTE [DISTWIDTH] IN BLOOD BY AUTOMATED COUNT: 15.1 % (ref 10–15)
GFR SERPL CREATININE-BSD FRML MDRD: 88 ML/MIN/{1.73_M2}
GLUCOSE UR STRIP-MCNC: NEGATIVE MG/DL
HCT VFR BLD AUTO: 41.8 % (ref 35–47)
HGB BLD-MCNC: 13.2 G/DL (ref 11.7–15.7)
HGB UR QL STRIP: NEGATIVE
IMM GRANULOCYTES # BLD: 0 10E9/L (ref 0–0.4)
IMM GRANULOCYTES NFR BLD: 0.3 %
KETONES UR STRIP-MCNC: 20 MG/DL
LEUKOCYTE ESTERASE UR QL STRIP: ABNORMAL
LYMPHOCYTES # BLD AUTO: 0.8 10E9/L (ref 0.8–5.3)
LYMPHOCYTES NFR BLD AUTO: 25.5 %
MCH RBC QN AUTO: 28.7 PG (ref 26.5–33)
MCHC RBC AUTO-ENTMCNC: 31.6 G/DL (ref 31.5–36.5)
MCV RBC AUTO: 91 FL (ref 78–100)
MONOCYTES # BLD AUTO: 0.3 10E9/L (ref 0–1.3)
MONOCYTES NFR BLD AUTO: 10.3 %
MUCOUS THREADS #/AREA URNS LPF: PRESENT /LPF
NEUTROPHILS # BLD AUTO: 1.9 10E9/L (ref 1.6–8.3)
NEUTROPHILS NFR BLD AUTO: 61.3 %
NITRATE UR QL: NEGATIVE
NRBC # BLD AUTO: 0 10*3/UL
NRBC BLD AUTO-RTO: 0 /100
PH UR STRIP: 5 PH (ref 5–7)
PLATELET # BLD AUTO: 223 10E9/L (ref 150–450)
RBC # BLD AUTO: 4.6 10E12/L (ref 3.8–5.2)
RBC #/AREA URNS AUTO: 2 /HPF (ref 0–2)
SOURCE: ABNORMAL
SP GR UR STRIP: 1.02 (ref 1–1.03)
SQUAMOUS #/AREA URNS AUTO: <1 /HPF (ref 0–1)
UROBILINOGEN UR STRIP-MCNC: 0 MG/DL (ref 0–2)
WBC # BLD AUTO: 3.1 10E9/L (ref 4–11)
WBC #/AREA URNS AUTO: 11 /HPF (ref 0–5)

## 2020-06-24 ENCOUNTER — ANTICOAGULATION THERAPY VISIT (OUTPATIENT)
Dept: ANTICOAGULATION | Facility: CLINIC | Age: 75
End: 2020-06-24

## 2020-06-24 ENCOUNTER — TELEPHONE (OUTPATIENT)
Dept: ENDOCRINOLOGY | Facility: CLINIC | Age: 75
End: 2020-06-24

## 2020-06-24 DIAGNOSIS — E78.49 OTHER HYPERLIPIDEMIA: ICD-10-CM

## 2020-06-24 DIAGNOSIS — L93.0 LUPUS ERYTHEMATOSUS: ICD-10-CM

## 2020-06-24 DIAGNOSIS — Z79.01 LONG TERM CURRENT USE OF ANTICOAGULANT THERAPY: ICD-10-CM

## 2020-06-24 LAB
BACTERIA SPEC CULT: ABNORMAL
Lab: ABNORMAL
PROTHROM ACT/NOR PPP: 10 % (ref 60–140)
SPECIMEN SOURCE: ABNORMAL

## 2020-06-24 RX ORDER — SIMVASTATIN 40 MG
40 TABLET ORAL AT BEDTIME
Qty: 90 TABLET | Refills: 3 | Status: SHIPPED | OUTPATIENT
Start: 2020-06-24 | End: 2021-04-21

## 2020-06-24 NOTE — TELEPHONE ENCOUNTER
[facility] Call Center    Phone Message    May a detailed message be left on voicemail: yes     Reason for Call: Other: Pt needing to switch pharmacy for her medication simvastatin (ZOCOR) 40 MG tablet . Pt is wanting an Rx to be sent to [facility] at [address] [place] [address] # [phone]. Please follow up when available. Thank you    Action Taken: Message routed to:  [facility] Endocrine, Diabetes    Travel Screening: Not Applicable

## 2020-06-24 NOTE — PROGRESS NOTES
Addendum 6/24/20  Plan was discussed with OLEGARIO JEREZ            ANTICOAGULATION FOLLOW-UP CLINIC VISIT    Patient Name:  Shala Caruso  Date:  6/24/2020  Contact Type:  Telephone    SUBJECTIVE:  Patient Findings     Comments:   F2=10  Goal 15-25        Clinical Outcomes     Comments:   F2=10  Goal 15-25           OBJECTIVE    Recent labs: (last 7 days)     06/23/20  1246   F2 10*       ASSESSMENT / PLAN  No question data found.  Anticoagulation Summary  As of 6/24/2020    INR goal:   2.0-3.0   TTR:   57.8 % (7.3 mo)   INR used for dosing:   No new INR was available at the time of this encounter.   Warfarin maintenance plan:   5 mg (5 mg x 1) every Mon; 10 mg (5 mg x 2) all other days   Full warfarin instructions:   6/24: Hold; Otherwise 5 mg every Mon; 10 mg all other days   Weekly warfarin total:   65 mg   Plan last modified:   Portia Cherry RN (3/25/2020)   Next INR check:   7/1/2020   Priority:   High   Target end date:   Indefinite    Indications    SLE (systemic lupus erythematosus) (H) (Resolved) [M32.9]  Long term current use of anticoagulant therapy [Z79.01]  Lupus erythematosus [L93.0]             Anticoagulation Episode Summary     INR check location:   Clinic Lab    Preferred lab:       Send INR reminders to:   [facility]    Comments:   Factor 2  goal range is 15-25%, 1/31/20 Not drawing INR  Ok to leave message on home [phone] and work voicemail, but call [email or web address] per pt request.  OK to leave message at office  May leave messages with Rodriguez Santos  DO NOT GIVE RECORDS TO EMPLOYER U        Anticoagulation Care Providers     Provider Role Specialty Phone number    Joe Contreras MD Referring Internal Medicine [phone]            See the Encounter Report to view Anticoagulation Flowsheet and Dosing Calendar (Go to Encounters tab in chart review, and find the Anticoagulation Therapy Visit)    Left message for patient with results and dosing recommendations. Asked  patient to call back to report any missed doses, falls, signs and symptoms of bleeding or clotting, any changes in health, medication, or diet. Asked patient to call back with any questions or concerns.      Stacey Beal RN

## 2020-06-25 ENCOUNTER — PATIENT OUTREACH (OUTPATIENT)
Dept: CARDIOLOGY | Facility: CLINIC | Age: 75
End: 2020-06-25

## 2020-06-25 NOTE — TELEPHONE ENCOUNTER
Patient had lab results including a urinalysis on 6/23/20 under Dr. Kiser's name entered by a Paris Valles on 5/20/20. Abnormal results. Dr Kiser has never seen patient and his office requested results be sent to primary care physician. Received response from primary care that they could not act on the results related to not knowing why urinalysis ordered. Patient was called to see if more information was available. No answer and voice message left with above information and results released to [employer].

## 2020-06-30 DIAGNOSIS — L93.0 LUPUS ERYTHEMATOSUS: ICD-10-CM

## 2020-06-30 DIAGNOSIS — Z79.01 LONG TERM CURRENT USE OF ANTICOAGULANT THERAPY: ICD-10-CM

## 2020-07-01 ENCOUNTER — ANTICOAGULATION THERAPY VISIT (OUTPATIENT)
Dept: ANTICOAGULATION | Facility: CLINIC | Age: 75
End: 2020-07-01

## 2020-07-01 DIAGNOSIS — Z79.01 LONG TERM CURRENT USE OF ANTICOAGULANT THERAPY: ICD-10-CM

## 2020-07-01 DIAGNOSIS — L93.0 LUPUS ERYTHEMATOSUS: ICD-10-CM

## 2020-07-01 LAB — PROTHROM ACT/NOR PPP: 16 % (ref 60–140)

## 2020-07-01 NOTE — PROGRESS NOTES
ANTICOAGULATION FOLLOW-UP CLINIC VISIT    Patient Name:  Shala Caruso  Date:  7/1/2020  Contact Type:  Telephone    SUBJECTIVE:  Patient Findings     Comments:   Patient was supratherapeutic on 65mg of warfarin weekly.  Will try 62.5mg for the next 2 weeks.         Clinical Outcomes     Comments:   Patient was supratherapeutic on 65mg of warfarin weekly.  Will try 62.5mg for the next 2 weeks.            OBJECTIVE    Recent labs: (last 7 days)     06/30/20  1245   F2 16*       ASSESSMENT / PLAN  No question data found.  Anticoagulation Summary  As of 7/1/2020    INR goal:   2.0-3.0   TTR:   57.8 % (7.1 mo)   INR used for dosing:   No new INR was available at the time of this encounter.   Warfarin maintenance plan:   7.5 mg (5 mg x 1.5) every Mon, Wed, Fri; 10 mg (5 mg x 2) all other days   Full warfarin instructions:   7.5 mg every Mon, Wed, Fri; 10 mg all other days   Weekly warfarin total:   62.5 mg   Plan last modified:   Stacey Beal RN (7/1/2020)   Next INR check:   7/14/2020   Priority:   High   Target end date:   Indefinite    Indications    SLE (systemic lupus erythematosus) (H) (Resolved) [M32.9]  Long term current use of anticoagulant therapy [Z79.01]  Lupus erythematosus [L93.0]             Anticoagulation Episode Summary     INR check location:   Clinic Lab    Preferred lab:       Send INR reminders to:   [facility]    Comments:   Factor 2  goal range is 15-25%, 1/31/20 Not drawing INR  Ok to leave message on home [phone] and work voicemail, but call [email or web address] per pt request.  OK to leave message at office  May leave messages with Rodriguez Santos  DO NOT GIVE RECORDS TO EMPLOYER U        Anticoagulation Care Providers     Provider Role Specialty Phone number    Joe Contreras MD Referring Internal Medicine [phone]            See the Encounter Report to view Anticoagulation Flowsheet and Dosing Calendar (Go to Encounters tab in chart review, and find the  Anticoagulation Therapy Visit)    Left message for patient with results and dosing recommendations. Asked patient to call back to report any missed doses, falls, signs and symptoms of bleeding or clotting, any changes in health, medication, or diet. Asked patient to call back with any questions or concerns.      Stacey Beal RN

## 2020-07-06 ENCOUNTER — TELEPHONE (OUTPATIENT)
Dept: ENDOCRINOLOGY | Facility: CLINIC | Age: 75
End: 2020-07-06

## 2020-07-06 DIAGNOSIS — K22.0 ACHALASIA OF ESOPHAGUS: ICD-10-CM

## 2020-07-06 DIAGNOSIS — E10.649 TYPE 1 DIABETES MELLITUS WITH HYPOGLYCEMIA AND WITHOUT COMA (H): Primary | ICD-10-CM

## 2020-07-07 NOTE — PROGRESS NOTES
"Week of__/__/__ Date  _7_/_7_  Date  _7_/6__ Date  _7_/5__ Date  _7_/_4_ Date  _7_/3__ Date  _7_/2__ Date  _7_/_1_    Time BG Time BG Time BG Time BG Time BG Time BG Time BG    4:11AM 87 4:18 80 4:40AM 70 5:41AM 51 5:40AM 86 4:21AM 93 4:32AM 102      11:41AM 81 11:41AM 86 11:40AM 67 11:44AM 73 11:41AM 94 11:33AM 90      8:40PM 70 8:48PM 82 9:40PM 67 9:22AM 72 9:22PM 86 9:53PM 94                      Week of__/__/__ Date  __/__  Date  __/__ Date  __/__ Date  __/__ Date  __/__ Date  __/__ Date  __/__    Time BG Time BG Time BG Time BG Time BG Time BG Time BG                                                                             Shala Caruso is a 74 year old female who is being evaluated via a billable video visit.      The patient has been notified of following:     \"This video visit will be conducted via a call between you and your physician/provider. We have found that certain health care needs can be provided without the need for an in-person physical exam.  This service lets us provide the care you need with a video conversation.  If a prescription is necessary we can send it directly to your pharmacy.  If lab work is needed we can place an order for that and you can then stop by our lab to have the test done at a later time.    Video visits are billed at different rates depending on your insurance coverage.  Please reach out to your insurance provider with any questions.    If during the course of the call the physician/provider feels a video visit is not appropriate, you will not be charged for this service.\"    Patient has given verbal consent for Video visit? Yes  How would you like to obtain your AVS? Mail a copy  Patient would like the video invitation sent by: Send to e-mail at: [email or web address]  Will anyone else be joining your video visit? No      Video-Visit Details    Type of service:  Video Visit    Video call duration 22 minutes.     Originating Location (pt. Location): Home  Distant Location " (provider location):  [facility]   Platform used for Video Visit: Doximity    Due to the COVID 19 pandemic this visit was converted to a video visit in order to help prevent spread of infection in this patient and the general population.     Siddharth Caruso is a 74 year old pleasant female with hx of SLE, APLS, DVT, osteoporosis and type 1 diabetes presenting for f/up.     1. Type 1 diabetes     Most recent hemoglobin A1c today was 5.1%, on 7/9/20.     Earlier this year, she dealed with a sinus infection. The patient has been eating less.  She prefers not to gain weight.  Her appetite has not been great.  She continues to exercise but not as much as prior to the pandemic.  She does yoga in the morning and she walks in the evenings.  She recently ordered an elliptical machine.       I reviewed the glucometer readings.  She continues to experience frequent hypoglycemia.  Most of the severe hypoglycemic episodes occur in the morning.    Insulin to carbohydrate ratio is 1 unit per 20 g for all meals.  She administers 4 U of Lantus in the morning, and 2-3 U NovoLog for lunch.  Quite frequently, she does not want breakfast and she intermittently has dinner.  The morning lows occur mainly when she does not have a bedtime snack.  She continues to use an echo NovoLog pen.    Diabetes complications:   No h/o microalbuminuria.  Last eye exam - she had cataract surgery on the right eye in January 2019 and on the left eye in February 2019; no DR.   Numbness and tingling sensation B/L toes - for many years but light sensation is intact. She does wear comfortable shoes/insoles. 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  She develops confusion, inability to concentrate or focus her vision and she feels extremely tired when her blood sugar is the lower 60s or 50s.  She has no prior episodes of loss of consciousness due to hypoglycemia.    2. Osteoporosis    Jeanmarie also has a history of  osteoporosis, treated with Boniva for almost 3 years, followed by Forteo which was started in 4/12 - interrupted treatment from 4/2013 and restarted in 7/2013 until July 2014. After she completed the treatment with Forteo, she received one dose of Reclast, in July 2014.      She has no history of prior bone fractures.  She's been off prednisone since 2013.  Her height has decreased over the years from 5'6'' to 5'1/2''. Her mother had a hip fracture in her 80s.     On the DXA scan images from 7/1/16, L1-L3 T score was - 1.  Overall, at the spine, there was a significant increase of bone mineral density since 2005 of 10.5%. Since 2015, the bone mineral density has remained stable at spine. The lowest T score at the hip level was -2.2, at the left femoral neck.  Overall, there was a significant improvement of bone mineral density at the mean hip of 8.9% since 2005, with no significant changes since 2015. While the bone mineral density at the left hip increased since baseline, at the right hip, there was a decrease of bone mineral density since baseline and also, since prior year.    On the most recent DEXA scan from 7/27/18, the lowest T score was -2.1, at the left femoral neck.  Compared with the prior scan from 2016, the bone mineral density at the hips remained stable.  At the lumbar spine, L1-L3 T score was -1.3, not significantly changed since 2016.  The current dose of calcium and vitamin D is 500 mg/200 U BID (oyster shell) and a daily Centrum Silver MVI which contains 1000 U vitamin D per tablet.  In addition, she reports having cheese, yogurt and spinach quite frequently.    Current Outpatient Medications   Medication     acetaminophen (TYLENOL) 500 MG tablet     aspirin 81 MG tablet     AYR SALINE NASAL NO-DRIP GEL     blood glucose (TRUE METRIX BLOOD GLUCOSE TEST) test strip     Calcium Carbonate-Vitamin D (CALCIUM 500 + D PO)     carboxymethylcellulose PF (REFRESH PLUS) 0.5 % SOLN ophthalmic solution      Injection Device for insulin (NOVOPEN ECHO) RORY     insulin glargine (LANTUS PEN) 100 UNIT/ML pen     insulin pen needle (BD PAM U/F) 32G X 4 MM miscellaneous     LANCETS ULTRA THIN 30G MISC     Multiple Vitamins-Minerals (CENTRUM SILVER PO)     mycophenolate (GENERIC EQUIVALENT) 500 MG tablet     NIFEdipine ER OSMOTIC (NIFEDICAL XL) 30 MG 24 hr tablet     NOVOLOG PENFILL 100 UNIT/ML soln     order for DME     simvastatin (ZOCOR) 40 MG tablet     warfarin (COUMADIN) 5 MG tablet     warfarin (COUMADIN) 5 MG tablet     warfarin ANTICOAGULANT (COUMADIN) 5 MG tablet     azithromycin (ZITHROMAX) 250 MG tablet     fluticasone (FLONASE) 50 MCG/ACT nasal spray     glucagon (GLUCAGON EMERGENCY) 1 MG kit     montelukast (SINGULAIR) 10 MG tablet     No current facility-administered medications for this visit.      ROS   Systemic symptoms: weight stable  Eye symptoms: No eye symptoms.  Otolaryngeal symptoms: No otolaryngeal symptoms  Breast symptoms: No breast symptoms.  Cardiovascular symptoms: No cardiovascular symptoms.    Pulmonary symptoms: No pulmonary symptoms.  Gastrointestinal symptoms: No gastrointestinal symptoms.   Genitourinary symptoms: no genitourinary symptoms  Endocrine symptoms: chronic cold intolerance  Hematologic symptoms: No hematologic symptoms.  Musculoskeletal symptoms: recurrent episodes of pain which affect the third to fifth left toes; bilateral knee pain; joint stiffness  Neurological symptoms: numbness and tingling sensation b/l toes   Psychological symptoms: no psychological symptoms   Skin symptoms: split lesions of the tips of her fingers/toes - for years; has seen dermatology in the past    Family Hx   Maternal grandmother DM2. First cousin with RA. Mother, maternal grandmother and aunt, cousin diagnosed with thyroid disorders (? hypothyroidism). Mother and maternal grandmother had breast cancer.    Personal Hx   Behavioral history: No tobacco use.  Home environment: No secondhand tobacco  smoke in home.  Denies smoking, drinking alcohol or using illicit drugs. , with one child. 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  Menopause at age 59. Had regular MP in the past. No h/o bone fractures or kidney stones.    PMH   SLE dx in 2000 with flare/pericarditis and tamponade on 3/5/2010 requiring high doses of steroids.  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  Osteoporosis diagnosed 2008 started on Boniva in 2010 (heartburn from oral fosamax).   Hyperlipidemia.  Severe GERD and Achalasia.  Raynaud's  Allergy to multiple meds  Vit D def.  Post thrombophlebitic syndrome with chronic LE swelling   Dyslipidemia  Chronic leukopenia on methotrexate   L arm lymphedema   Type 1 diabetes  Prolapsed uterus   Cataract   Vale Zoster     Physical Exam   Wt Readings from Last 10 Encounters:   02/05/19 56.7 kg (125 lb)   01/08/19 54.1 kg (119 lb 4.8 oz)   07/30/18 54.1 kg (119 lb 3.2 oz)   01/29/18 54 kg (119 lb)   08/02/17 52.8 kg (116 lb 8 oz)   07/31/17 53.2 kg (117 lb 4.8 oz)   05/22/17 53.9 kg (118 lb 14.4 oz)   04/13/17 54.7 kg (120 lb 9.6 oz)   04/04/17 55 kg (121 lb 4.8 oz)   02/01/17 54.4 kg (120 lb)     LMP  (LMP Unknown)     Physical Exam  General Appearance: alert, no distress noted   Eyes: grossly normal to inspection, conjunctivae and sclerae normal, no lid lag or stare   Respiratory: no audible wheeze, cough, or visible cyanosis.  No visible retractions or increased work of breathing.  Able to speak fully in complete sentences.  Neurological: Cranial nerves grossly intact, mentation intact and speech normal; no tremor of the outstretched hands   Skin: no lesions on exposed skin   Psychological: mentation appears normal, affect normal, judgement and insight intact, normal speech and appearance well-groomed    Lab Results  I reviewed prior lab results documented in EPIC.   Lab Results   Component Value Date    A1C 5.1 07/09/2020    A1C 5.3 07/11/2017    A1C 5.5 06/10/2013     A1C 5.4 01/07/2013    A1C 5.5 07/02/2012    HEMOGLOBINA1 5.1 01/13/2020    HEMOGLOBINA1 5.1 07/15/2019    HEMOGLOBINA1 5.0 07/30/2018    HEMOGLOBINA1 5.0 01/29/2018    HEMOGLOBINA1 5.0 01/23/2017       Hemoglobin   Date Value Ref Range Status   06/23/2020 13.2 11.7 - 15.7 g/dL Final     Hematocrit   Date Value Ref Range Status   06/23/2020 41.8 35.0 - 47.0 % Final     Cholesterol   Date Value Ref Range Status   07/09/2020 162 <200 mg/dL Final     Cholesterol/HDL Ratio   Date Value Ref Range Status   10/01/2014 1.5 0.0 - 5.0 Final     HDL Cholesterol   Date Value Ref Range Status   07/09/2020 92 >49 mg/dL Final     LDL Cholesterol Calculated   Date Value Ref Range Status   07/09/2020 59 <100 mg/dL Final     Comment:     Desirable:       <100 mg/dl     VLDL-Cholesterol   Date Value Ref Range Status   10/01/2014 7 0 - 30 mg/dL Final     Triglycerides   Date Value Ref Range Status   07/09/2020 53 <150 mg/dL Final     Albumin Urine mg/L   Date Value Ref Range Status   07/09/2020 <5 mg/L Final     TSH   Date Value Ref Range Status   07/09/2019 0.99 0.40 - 4.00 mU/L Final       Last Basic Metabolic Panel:    Sodium   Date Value Ref Range Status   07/09/2019 140 133 - 144 mmol/L Final     Potassium   Date Value Ref Range Status   07/09/2019 3.9 3.4 - 5.3 mmol/L Final     Chloride   Date Value Ref Range Status   07/09/2019 107 94 - 109 mmol/L Final     Calcium   Date Value Ref Range Status   07/09/2019 8.6 8.5 - 10.1 mg/dL Final     Carbon Dioxide   Date Value Ref Range Status   07/09/2019 28 20 - 32 mmol/L Final     Urea Nitrogen   Date Value Ref Range Status   07/09/2019 23 7 - 30 mg/dL Final     Creatinine   Date Value Ref Range Status   06/23/2020 0.65 0.52 - 1.04 mg/dL Final     GFR Estimate   Date Value Ref Range Status   06/23/2020 88 >60 mL/min/[1.73_m2] Final     Comment:     Non  GFR Calc  Starting 12/18/2018, serum creatinine based estimated GFR (eGFR) will be   calculated using the Chronic Kidney  Disease Epidemiology Collaboration   (CKD-EPI) equation.       Glucose   Date Value Ref Range Status   07/09/2019 93 70 - 99 mg/dL Final       AST   Date Value Ref Range Status   06/23/2020 16 0 - 45 U/L Final     ALT   Date Value Ref Range Status   06/23/2020 22 0 - 50 U/L Final     Albumin   Date Value Ref Range Status   07/09/2019 3.9 3.4 - 5.0 g/dL Final       Assessment     1. Type 1 diabetes with very tight glycemic control, complicated by partial hypoglycemia unawareness. Considering the frequency and the severity of the hypoglycemic episodes and the relatively small dosages of insulin she takes, discussed about the option of discontinuing Lantus, on a trial basis.  The patient agreed.  She is going to continue to check her blood sugar on a regular basis and contact us next week with the numbers.    Prescription for Baqsimi entered and patient counseled on its use.    2.  Osteoporosis   Risk factors for osteoporosis identified: Postmenopausal status, lupus, prior treatment with steroids, diabetes, family history.  She was treated with Boniva for 3 years, followed by Forteo for 2 years. Received one dose of Reclast in July 2014, when she completed treatment with Forteo.  Since discontinuing Forteo, the bone mineral density has either remained stable or minimally increased.   Plan:   F/up DXA scan     3. Hypercholesterolemia - on simvastatin, controlled.    Return to clinic 6 months.    No orders of the defined types were placed in this encounter.

## 2020-07-09 ENCOUNTER — ANTICOAGULATION THERAPY VISIT (OUTPATIENT)
Dept: ANTICOAGULATION | Facility: CLINIC | Age: 75
End: 2020-07-09

## 2020-07-09 ENCOUNTER — VIRTUAL VISIT (OUTPATIENT)
Dept: ENDOCRINOLOGY | Facility: CLINIC | Age: 75
End: 2020-07-09
Payer: COMMERCIAL

## 2020-07-09 ENCOUNTER — TELEPHONE (OUTPATIENT)
Dept: ENDOCRINOLOGY | Facility: CLINIC | Age: 75
End: 2020-07-09

## 2020-07-09 DIAGNOSIS — L93.0 LUPUS ERYTHEMATOSUS: ICD-10-CM

## 2020-07-09 DIAGNOSIS — Z79.01 LONG TERM CURRENT USE OF ANTICOAGULANT THERAPY: ICD-10-CM

## 2020-07-09 DIAGNOSIS — E10.649 TYPE 1 DIABETES MELLITUS WITH HYPOGLYCEMIA AND WITHOUT COMA (H): ICD-10-CM

## 2020-07-09 DIAGNOSIS — M81.0 OSTEOPOROSIS, POSTMENOPAUSAL: ICD-10-CM

## 2020-07-09 DIAGNOSIS — E10.649 TYPE 1 DIABETES MELLITUS WITH HYPOGLYCEMIA AND WITHOUT COMA (H): Primary | ICD-10-CM

## 2020-07-09 LAB
CHOLEST SERPL-MCNC: 162 MG/DL
CREAT UR-MCNC: 57 MG/DL
HBA1C MFR BLD: 5.1 % (ref 0–5.6)
HDLC SERPL-MCNC: 92 MG/DL
LDLC SERPL CALC-MCNC: 59 MG/DL
MICROALBUMIN UR-MCNC: <5 MG/L
MICROALBUMIN/CREAT UR: NORMAL MG/G CR (ref 0–25)
NONHDLC SERPL-MCNC: 69 MG/DL
PROTHROM ACT/NOR PPP: 22 % (ref 60–140)
TRIGL SERPL-MCNC: 53 MG/DL

## 2020-07-09 RX ORDER — NIFEDIPINE 30 MG/1
TABLET, EXTENDED RELEASE ORAL
Qty: 90 TABLET | OUTPATIENT
Start: 2020-07-09

## 2020-07-09 RX ORDER — GLUCAGON 3 MG/1
3 POWDER NASAL PRN
Qty: 1 EACH | Refills: 4 | Status: SHIPPED | OUTPATIENT
Start: 2020-07-09 | End: 2021-07-12

## 2020-07-09 NOTE — LETTER
"7/9/2020       RE: Shala Caruso  [address]  [place] [address]     Dear Colleague,    Thank you for referring your patient, Shala Caruso, to the [facility] ENDOCRINOLOGY at [facility]. Please see a copy of my visit note below.    Week of__/__/__ Date  _7_/_7_  Date  _7_/6__ Date  _7_/5__ Date  _7_/_4_ Date  _7_/3__ Date  _7_/2__ Date  _7_/_1_    Time BG Time BG Time BG Time BG Time BG Time BG Time BG    4:11AM 87 4:18 80 4:40AM 70 5:41AM 51 5:40AM 86 4:21AM 93 4:32AM 102      11:41AM 81 11:41AM 86 11:40AM 67 11:44AM 73 11:41AM 94 11:33AM 90      8:40PM 70 8:48PM 82 9:40PM 67 9:22AM 72 9:22PM 86 9:53PM 94                      Week of__/__/__ Date  __/__  Date  __/__ Date  __/__ Date  __/__ Date  __/__ Date  __/__ Date  __/__    Time BG Time BG Time BG Time BG Time BG Time BG Time BG                                                                             Shala Caruso is a 74 year old female who is being evaluated via a billable video visit.      The patient has been notified of following:     \"This video visit will be conducted via a call between you and your physician/provider. We have found that certain health care needs can be provided without the need for an in-person physical exam.  This service lets us provide the care you need with a video conversation.  If a prescription is necessary we can send it directly to your pharmacy.  If lab work is needed we can place an order for that and you can then stop by our lab to have the test done at a later time.    Video visits are billed at different rates depending on your insurance coverage.  Please reach out to your insurance provider with any questions.    If during the course of the call the physician/provider feels a video visit is not appropriate, you will not be charged for this service.\"    Patient has given verbal consent for Video visit? Yes  How would you like to obtain your AVS? Mail a copy  Patient " would like the video invitation sent by: Send to e-mail at: [email or web address]  Will anyone else be joining your video visit? No      Video-Visit Details    Type of service:  Video Visit    Video call duration 22 minutes.     Originating Location (pt. Location): Home  Distant Location (provider location):  [facility]   Platform used for Video Visit: Doximity    Due to the COVID 19 pandemic this visit was converted to a video visit in order to help prevent spread of infection in this patient and the general population.     Siddharth Ms. Caruso is a 74 year old pleasant female with hx of SLE, APLS, DVT, osteoporosis and type 1 diabetes presenting for f/up.     1. Type 1 diabetes     Most recent hemoglobin A1c today was 5.1%, on 7/9/20.     Earlier this year, she dealed with a sinus infection. The patient has been eating less.  She prefers not to gain weight.  Her appetite has not been great.  She continues to exercise but not as much as prior to the pandemic.  She does yoga in the morning and she walks in the evenings.  She recently ordered an elliptical machine.       I reviewed the glucometer readings.  She continues to experience frequent hypoglycemia.  Most of the severe hypoglycemic episodes occur in the morning.    Insulin to carbohydrate ratio is 1 unit per 20 g for all meals.  She administers 4 U of Lantus in the morning, and 2-3 U NovoLog for lunch.  Quite frequently, she does not want breakfast and she intermittently has dinner.  The morning lows occur mainly when she does not have a bedtime snack.  She continues to use an echo NovoLog pen.    Diabetes complications:   No h/o microalbuminuria.  Last eye exam - she had cataract surgery on the right eye in January 2019 and on the left eye in February 2019; no DR.   Numbness and tingling sensation B/L toes - for many years but light sensation is intact. She does wear comfortable shoes/insoles. 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  She develops confusion, inability to concentrate or focus her vision and she feels extremely tired when her blood sugar is the lower 60s or 50s.  She has no prior episodes of loss of consciousness due to hypoglycemia.    2. Osteoporosis    Jeanmarie also has a history of osteoporosis, treated with Boniva for almost 3 years, followed by Forteo which was started in 4/12 - interrupted treatment from 4/2013 and restarted in 7/2013 until July 2014. After she completed the treatment with Forteo, she received one dose of Reclast, in July 2014.      She has no history of prior bone fractures.  She's been off prednisone since 2013.  Her height has decreased over the years from 5'6'' to 5'1/2''. Her mother had a hip fracture in her 80s.     On the DXA scan images from 7/1/16, L1-L3 T score was - 1.  Overall, at the spine, there was a significant increase of bone mineral density since 2005 of 10.5%. Since 2015, the bone mineral density has remained stable at spine. The lowest T score at the hip level was -2.2, at the left femoral neck.  Overall, there was a significant improvement of bone mineral density at the mean hip of 8.9% since 2005, with no significant changes since 2015. While the bone mineral density at the left hip increased since baseline, at the right hip, there was a decrease of bone mineral density since baseline and also, since prior year.    On the most recent DEXA scan from 7/27/18, the lowest T score was -2.1, at the left femoral neck.  Compared with the prior scan from 2016, the bone mineral density at the hips remained stable.  At the lumbar spine, L1-L3 T score was -1.3, not significantly changed since 2016.  The current dose of calcium and vitamin D is 500 mg/200 U BID (oyster shell) and a daily Centrum Silver MVI which contains 1000 U vitamin D per tablet.  In addition, she reports having cheese, yogurt and spinach quite frequently.    Current Outpatient Medications   Medication     acetaminophen  (TYLENOL) 500 MG tablet     aspirin 81 MG tablet     AYR SALINE NASAL NO-DRIP GEL     blood glucose (TRUE METRIX BLOOD GLUCOSE TEST) test strip     Calcium Carbonate-Vitamin D (CALCIUM 500 + D PO)     carboxymethylcellulose PF (REFRESH PLUS) 0.5 % SOLN ophthalmic solution     Injection Device for insulin (NOVOPEN ECHO) RORY     insulin glargine (LANTUS PEN) 100 UNIT/ML pen     insulin pen needle (BD PAM U/F) 32G X 4 MM miscellaneous     LANCETS ULTRA THIN 30G MISC     Multiple Vitamins-Minerals (CENTRUM SILVER PO)     mycophenolate (GENERIC EQUIVALENT) 500 MG tablet     NIFEdipine ER OSMOTIC (NIFEDICAL XL) 30 MG 24 hr tablet     NOVOLOG PENFILL 100 UNIT/ML soln     order for DME     simvastatin (ZOCOR) 40 MG tablet     warfarin (COUMADIN) 5 MG tablet     warfarin (COUMADIN) 5 MG tablet     warfarin ANTICOAGULANT (COUMADIN) 5 MG tablet     azithromycin (ZITHROMAX) 250 MG tablet     fluticasone (FLONASE) 50 MCG/ACT nasal spray     glucagon (GLUCAGON EMERGENCY) 1 MG kit     montelukast (SINGULAIR) 10 MG tablet     No current facility-administered medications for this visit.      ROS   Systemic symptoms: weight stable  Eye symptoms: No eye symptoms.  Otolaryngeal symptoms: No otolaryngeal symptoms  Breast symptoms: No breast symptoms.  Cardiovascular symptoms: No cardiovascular symptoms.    Pulmonary symptoms: No pulmonary symptoms.  Gastrointestinal symptoms: No gastrointestinal symptoms.   Genitourinary symptoms: no genitourinary symptoms  Endocrine symptoms: chronic cold intolerance  Hematologic symptoms: No hematologic symptoms.  Musculoskeletal symptoms: recurrent episodes of pain which affect the third to fifth left toes; bilateral knee pain; joint stiffness  Neurological symptoms: numbness and tingling sensation b/l toes   Psychological symptoms: no psychological symptoms   Skin symptoms: split lesions of the tips of her fingers/toes - for years; has seen dermatology in the past    Family Hx   Maternal  grandmother DM2. First cousin with RA. Mother, maternal grandmother and aunt, cousin diagnosed with thyroid disorders (? hypothyroidism). Mother and maternal grandmother had breast cancer.    Personal Hx   Behavioral history: No tobacco use.  Home environment: No secondhand tobacco smoke in home.  Denies smoking, drinking alcohol or using illicit drugs. , with one child. 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  Menopause at age 59. Had regular MP in the past. No h/o bone fractures or kidney stones.    PMH   SLE dx in 2000 with flare/pericarditis and tamponade on 3/5/2010 requiring high doses of steroids.  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  Osteoporosis diagnosed 2008 started on Boniva in 2010 (heartburn from oral fosamax).   Hyperlipidemia.  Severe GERD and Achalasia.  Raynaud's  Allergy to multiple meds  Vit D def.  Post thrombophlebitic syndrome with chronic LE swelling   Dyslipidemia  Chronic leukopenia on methotrexate   L arm lymphedema   Type 1 diabetes  Prolapsed uterus   Cataract   Vale Zoster     Physical Exam   Wt Readings from Last 10 Encounters:   02/05/19 56.7 kg (125 lb)   01/08/19 54.1 kg (119 lb 4.8 oz)   07/30/18 54.1 kg (119 lb 3.2 oz)   01/29/18 54 kg (119 lb)   08/02/17 52.8 kg (116 lb 8 oz)   07/31/17 53.2 kg (117 lb 4.8 oz)   05/22/17 53.9 kg (118 lb 14.4 oz)   04/13/17 54.7 kg (120 lb 9.6 oz)   04/04/17 55 kg (121 lb 4.8 oz)   02/01/17 54.4 kg (120 lb)     LMP  (LMP Unknown)     Physical Exam  General Appearance: alert, no distress noted   Eyes: grossly normal to inspection, conjunctivae and sclerae normal, no lid lag or stare   Respiratory: no audible wheeze, cough, or visible cyanosis.  No visible retractions or increased work of breathing.  Able to speak fully in complete sentences.  Neurological: Cranial nerves grossly intact, mentation intact and speech normal; no tremor of the outstretched hands   Skin: no lesions on exposed skin   Psychological:  mentation appears normal, affect normal, judgement and insight intact, normal speech and appearance well-groomed    Lab Results  I reviewed prior lab results documented in EPIC.   Lab Results   Component Value Date    A1C 5.1 07/09/2020    A1C 5.3 07/11/2017    A1C 5.5 06/10/2013    A1C 5.4 01/07/2013    A1C 5.5 07/02/2012    HEMOGLOBINA1 5.1 01/13/2020    HEMOGLOBINA1 5.1 07/15/2019    HEMOGLOBINA1 5.0 07/30/2018    HEMOGLOBINA1 5.0 01/29/2018    HEMOGLOBINA1 5.0 01/23/2017       Hemoglobin   Date Value Ref Range Status   06/23/2020 13.2 11.7 - 15.7 g/dL Final     Hematocrit   Date Value Ref Range Status   06/23/2020 41.8 35.0 - 47.0 % Final     Cholesterol   Date Value Ref Range Status   07/09/2020 162 <200 mg/dL Final     Cholesterol/HDL Ratio   Date Value Ref Range Status   10/01/2014 1.5 0.0 - 5.0 Final     HDL Cholesterol   Date Value Ref Range Status   07/09/2020 92 >49 mg/dL Final     LDL Cholesterol Calculated   Date Value Ref Range Status   07/09/2020 59 <100 mg/dL Final     Comment:     Desirable:       <100 mg/dl     VLDL-Cholesterol   Date Value Ref Range Status   10/01/2014 7 0 - 30 mg/dL Final     Triglycerides   Date Value Ref Range Status   07/09/2020 53 <150 mg/dL Final     Albumin Urine mg/L   Date Value Ref Range Status   07/09/2020 <5 mg/L Final     TSH   Date Value Ref Range Status   07/09/2019 0.99 0.40 - 4.00 mU/L Final       Last Basic Metabolic Panel:    Sodium   Date Value Ref Range Status   07/09/2019 140 133 - 144 mmol/L Final     Potassium   Date Value Ref Range Status   07/09/2019 3.9 3.4 - 5.3 mmol/L Final     Chloride   Date Value Ref Range Status   07/09/2019 107 94 - 109 mmol/L Final     Calcium   Date Value Ref Range Status   07/09/2019 8.6 8.5 - 10.1 mg/dL Final     Carbon Dioxide   Date Value Ref Range Status   07/09/2019 28 20 - 32 mmol/L Final     Urea Nitrogen   Date Value Ref Range Status   07/09/2019 23 7 - 30 mg/dL Final     Creatinine   Date Value Ref Range Status    06/23/2020 0.65 0.52 - 1.04 mg/dL Final     GFR Estimate   Date Value Ref Range Status   06/23/2020 88 >60 mL/min/[1.73_m2] Final     Comment:     Non  GFR Calc  Starting 12/18/2018, serum creatinine based estimated GFR (eGFR) will be   calculated using the Chronic Kidney Disease Epidemiology Collaboration   (CKD-EPI) equation.       Glucose   Date Value Ref Range Status   07/09/2019 93 70 - 99 mg/dL Final       AST   Date Value Ref Range Status   06/23/2020 16 0 - 45 U/L Final     ALT   Date Value Ref Range Status   06/23/2020 22 0 - 50 U/L Final     Albumin   Date Value Ref Range Status   07/09/2019 3.9 3.4 - 5.0 g/dL Final       Assessment     1. Type 1 diabetes with very tight glycemic control, complicated by partial hypoglycemia unawareness. Considering the frequency and the severity of the hypoglycemic episodes and the relatively small dosages of insulin she takes, discussed about the option of discontinuing Lantus, on a trial basis.  The patient agreed.  She is going to continue to check her blood sugar on a regular basis and contact us next week with the numbers.    Prescription for Baqsimi entered and patient counseled on its use.    2.  Osteoporosis   Risk factors for osteoporosis identified: Postmenopausal status, lupus, prior treatment with steroids, diabetes, family history.  She was treated with Boniva for 3 years, followed by Forteo for 2 years. Received one dose of Reclast in July 2014, when she completed treatment with Forteo.  Since discontinuing Forteo, the bone mineral density has either remained stable or minimally increased.   Plan:   F/up DXA scan     3. Hypercholesterolemia - on simvastatin, controlled.    Return to clinic 6 months.    No orders of the defined types were placed in this encounter.      Again, thank you for allowing me to participate in the care of your patient.      Sincerely,    Dorita Cramer MD

## 2020-07-09 NOTE — PROGRESS NOTES
ANTICOAGULATION FOLLOW-UP CLINIC VISIT    Patient Name:  Shala Caruso  Date:  7/9/2020  Contact Type:  Telephone    SUBJECTIVE:  Patient Findings     Comments:   Factor II on 7/9/20 was 22%  (Goal is 15% to 25%)        Clinical Outcomes     Negatives:   Major bleeding event, Thromboembolic event, Anticoagulation-related hospital admission, Anticoagulation-related ED visit, Anticoagulation-related fatality    Comments:   Factor II on 7/9/20 was 22%  (Goal is 15% to 25%)           OBJECTIVE    Recent labs: (last 7 days)     07/09/20  0656   F2 22*       ASSESSMENT / PLAN  INR assessment THER    Recheck INR In: 2 WEEKS    INR Location Clinic      Anticoagulation Summary  As of 7/9/2020    INR goal:   2.0-3.0   TTR:   60.1 % (6.8 mo)   INR used for dosing:   No new INR was available at the time of this encounter.   Warfarin maintenance plan:   7.5 mg (5 mg x 1.5) every Mon, Wed, Fri; 10 mg (5 mg x 2) all other days   Full warfarin instructions:   7.5 mg every Mon, Wed, Fri; 10 mg all other days   Weekly warfarin total:   62.5 mg   Plan last modified:   Stacey Beal RN (7/1/2020)   Next INR check:   7/28/2020   Priority:   High   Target end date:   Indefinite    Indications    SLE (systemic lupus erythematosus) (H) (Resolved) [M32.9]  Long term current use of anticoagulant therapy [Z79.01]  Lupus erythematosus [L93.0]             Anticoagulation Episode Summary     INR check location:   Clinic Lab    Preferred lab:       Send INR reminders to:   [facility]    Comments:   Factor 2  goal range is 15-25%, 1/31/20 Not drawing INR  Ok to leave message on home [phone] and work voicemail, but call [email or web address] per pt request.  OK to leave message at office  May leave messages with Rodriguez Santos  DO NOT GIVE RECORDS TO EMPLOYER U        Anticoagulation Care Providers     Provider Role Specialty Phone number    Joe Contreras MD Referring Internal Medicine [phone]            See the Encounter  Report to view Anticoagulation Flowsheet and Dosing Calendar (Go to Encounters tab in chart review, and find the Anticoagulation Therapy Visit)    Spoke with patient. Gave them their lab results and new warfarin recommendation.  No changes in health, medication, or diet. No missed doses, no falls. No signs or symptoms of bleed or clotting.      Shant Mcduffie, [facility]

## 2020-07-13 ENCOUNTER — ANCILLARY PROCEDURE (OUTPATIENT)
Dept: BONE DENSITY | Facility: CLINIC | Age: 75
End: 2020-07-13
Attending: INTERNAL MEDICINE
Payer: COMMERCIAL

## 2020-07-13 DIAGNOSIS — M81.0 OSTEOPOROSIS, POSTMENOPAUSAL: ICD-10-CM

## 2020-07-14 RX ORDER — HEPARIN SODIUM (PORCINE) LOCK FLUSH IV SOLN 100 UNIT/ML 100 UNIT/ML
5 SOLUTION INTRAVENOUS
Status: CANCELLED | OUTPATIENT
Start: 2020-07-29

## 2020-07-14 RX ORDER — HEPARIN SODIUM,PORCINE 10 UNIT/ML
5 VIAL (ML) INTRAVENOUS
Status: CANCELLED | OUTPATIENT
Start: 2020-07-29

## 2020-07-14 RX ORDER — ZOLEDRONIC ACID 5 MG/100ML
5 INJECTION, SOLUTION INTRAVENOUS ONCE
Status: CANCELLED
Start: 2020-07-29

## 2020-07-30 ENCOUNTER — INFUSION THERAPY VISIT (OUTPATIENT)
Dept: INFUSION THERAPY | Facility: CLINIC | Age: 75
End: 2020-07-30
Attending: INTERNAL MEDICINE
Payer: COMMERCIAL

## 2020-07-30 VITALS
BODY MASS INDEX: 19.01 KG/M2 | DIASTOLIC BLOOD PRESSURE: 58 MMHG | RESPIRATION RATE: 16 BRPM | SYSTOLIC BLOOD PRESSURE: 122 MMHG | OXYGEN SATURATION: 100 % | WEIGHT: 116 LBS | TEMPERATURE: 98.1 F

## 2020-07-30 DIAGNOSIS — L93.0 LUPUS ERYTHEMATOSUS: ICD-10-CM

## 2020-07-30 DIAGNOSIS — M81.0 OSTEOPOROSIS, POSTMENOPAUSAL: ICD-10-CM

## 2020-07-30 DIAGNOSIS — M85.89 OSTEOPENIA OF MULTIPLE SITES: Primary | ICD-10-CM

## 2020-07-30 DIAGNOSIS — Z79.01 LONG TERM CURRENT USE OF ANTICOAGULANT THERAPY: ICD-10-CM

## 2020-07-30 LAB — CALCIUM SERPL-MCNC: 9.3 MG/DL (ref 8.5–10.1)

## 2020-07-30 PROCEDURE — 82310 ASSAY OF CALCIUM: CPT | Performed by: INTERNAL MEDICINE

## 2020-07-30 PROCEDURE — 36415 COLL VENOUS BLD VENIPUNCTURE: CPT

## 2020-07-30 PROCEDURE — 25000128 H RX IP 250 OP 636: Mod: ZF | Performed by: INTERNAL MEDICINE

## 2020-07-30 PROCEDURE — 96365 THER/PROPH/DIAG IV INF INIT: CPT

## 2020-07-30 PROCEDURE — 85210 CLOT FACTOR II PROTHROM SPEC: CPT | Performed by: INTERNAL MEDICINE

## 2020-07-30 RX ORDER — HEPARIN SODIUM (PORCINE) LOCK FLUSH IV SOLN 100 UNIT/ML 100 UNIT/ML
5 SOLUTION INTRAVENOUS
Status: CANCELLED | OUTPATIENT
Start: 2020-07-30

## 2020-07-30 RX ORDER — HEPARIN SODIUM,PORCINE 10 UNIT/ML
5 VIAL (ML) INTRAVENOUS
Status: CANCELLED | OUTPATIENT
Start: 2020-07-30

## 2020-07-30 RX ORDER — ZOLEDRONIC ACID 5 MG/100ML
5 INJECTION, SOLUTION INTRAVENOUS ONCE
Status: CANCELLED
Start: 2020-07-30

## 2020-07-30 RX ORDER — ZOLEDRONIC ACID 5 MG/100ML
5 INJECTION, SOLUTION INTRAVENOUS ONCE
Status: COMPLETED | OUTPATIENT
Start: 2020-07-30 | End: 2020-07-30

## 2020-07-30 RX ADMIN — ZOLEDRONIC ACID 5 MG: 0.05 INJECTION, SOLUTION INTRAVENOUS at 15:39

## 2020-07-30 NOTE — LETTER
7/30/2020         RE: Shala Caruso  [address]  [place] [address]        Dear Colleague,    Thank you for referring your patient, Shala Caruso, to the [facility]. Please see a copy of my visit note below.    Nursing Note  Shala Caruso [facility] for:   Chief Complaint   Patient presents with     Infusion     reclast     During today's [facility] appointment, orders from Dr Cramer were completed.  Frequency: once    Progress note:  Patient identification verified by name and date of birth.  Assessment completed.  Vitals recorded in Doc Flowsheets.  Patient was provided with education regarding medication/procedure and possible side effects.  Patient verbalized understanding.     present during visit today: Not Applicable.    Treatment Conditions: Ca and creat WDL per order parameters    Premedications: were not ordered.    Drug Waste Record: No    Infusion length and rate:  infusion given over approximately 15 minutes  400 ml/hr.    Labs: were drawn prior to appointment    Vascular access: peripheral IV placed today.    Post Infusion Assessment:  Patient tolerated infusion without incident.     Discharge Plan:   Follow up plan of care with: primary care provider,  Discharge instructions were reviewed with patient.  Patient/representative verbalized understanding of discharge instructions and all questions answered.  Patient discharged from [facility] in stable condition.    La Smith RN    Administrations This Visit     zoledronic Acid (RECLAST) infusion 5 mg     Admin Date  07/30/2020 Action  New Bag Dose  5 mg Rate  400 mL/hr Route  Intravenous Administered By  La Smith, SHERRON                /58   Temp 98.1  F (36.7  C) (Oral)   Resp 16   Wt 52.6 kg (116 lb)   LMP  (LMP Unknown)   SpO2 100%   BMI 19.01 kg/m           Again, thank you for allowing me to participate in the care of your patient.        Sincerely,        [facility]

## 2020-07-31 ENCOUNTER — ANTICOAGULATION THERAPY VISIT (OUTPATIENT)
Dept: ANTICOAGULATION | Facility: CLINIC | Age: 75
End: 2020-07-31

## 2020-07-31 DIAGNOSIS — L93.0 LUPUS ERYTHEMATOSUS: ICD-10-CM

## 2020-07-31 DIAGNOSIS — Z79.01 LONG TERM CURRENT USE OF ANTICOAGULANT THERAPY: ICD-10-CM

## 2020-07-31 LAB — PROTHROM ACT/NOR PPP: 11 % (ref 60–140)

## 2020-07-31 NOTE — PROGRESS NOTES
ANTICOAGULATION FOLLOW-UP CLINIC VISIT    Patient Name:  Shala Caruso  Date:  7/31/2020  Contact Type:  Telephone    SUBJECTIVE:  Patient Findings     Comments:   Factor 2 on 7/30/2020 is 11.  Goal range is 15 - 25.  Spoke with Jeanmarie. She reports she has not had changes in health, diet, medications. She does not have any signs of bleeding.  She knows to be seen urgently if she develops any signs of bleeding or if she falls.  She plans to increase her intake of green vegetables.  She has not been taking any tylenol.  No recent injuries or illness.          Clinical Outcomes     Comments:   Factor 2 on 7/30/2020 is 11.  Goal range is 15 - 25.  Spoke with Jeanmarie. She reports she has not had changes in health, diet, medications. She does not have any signs of bleeding.  She knows to be seen urgently if she develops any signs of bleeding or if she falls.  She plans to increase her intake of green vegetables.  She has not been taking any tylenol.  No recent injuries or illness.             OBJECTIVE    Recent labs: (last 7 days)     07/30/20  1511   F2 11*       ASSESSMENT / PLAN  INR assessment SUPRA    Recheck INR In: 10 DAYS    INR Location Clinic      Anticoagulation Summary  As of 7/31/2020    INR goal:   2.0-3.0   TTR:   58.7 % (6.1 mo)   INR used for dosing:   No new INR was available at the time of this encounter.   Warfarin maintenance plan:   7.5 mg (5 mg x 1.5) every Mon, Wed, Fri; 10 mg (5 mg x 2) all other days   Full warfarin instructions:   8/1: 2.5 mg; Otherwise 7.5 mg every Mon, Wed, Fri; 10 mg all other days   Weekly warfarin total:   62.5 mg   Plan last modified:   Stacey Beal RN (7/1/2020)   Next INR check:   8/11/2020   Priority:   High   Target end date:   Indefinite    Indications    SLE (systemic lupus erythematosus) (H) (Resolved) [M32.9]  Long term current use of anticoagulant therapy [Z79.01]  Lupus erythematosus [L93.0]             Anticoagulation Episode Summary     INR check  location:   Clinic Lab    Preferred lab:       Send INR reminders to:   [facility]    Comments:   Factor 2  goal range is 15-25%, 1/31/20 Not drawing INR  Ok to leave message on home [phone] and work voicemail, but call [email or web address] per pt request.  OK to leave message at office  May leave messages with Rodriguez Tom  DO NOT GIVE RECORDS TO EMPLOYER U        Anticoagulation Care Providers     Provider Role Specialty Phone number    Joe Contreras MD Referring Internal Medicine [phone]            See the Encounter Report to view Anticoagulation Flowsheet and Dosing Calendar (Go to Encounters tab in chart review, and find the Anticoagulation Therapy Visit)    Spoke with Jeanmarie. Factor 2 is 11.  Goal range is 15-25.  Uncertain why factor 2 is supra therapeutic.  Jeanmarie has already taken today's dose of warfarin, so she will take a decreased dose tomorrow, 8/1/2020.    Gwen Abel RN

## 2020-08-03 NOTE — PROGRESS NOTES
Nursing Note  Shala Caruso [facility] for:   Chief Complaint   Patient presents with     Infusion     reclast     During today's [facility] appointment, orders from Dr Cramer were completed.  Frequency: once    Progress note:  Patient identification verified by name and date of birth.  Assessment completed.  Vitals recorded in Doc Flowsheets.  Patient was provided with education regarding medication/procedure and possible side effects.  Patient verbalized understanding.     present during visit today: Not Applicable.    Treatment Conditions: Ca and creat WDL per order parameters    Premedications: were not ordered.    Drug Waste Record: No    Infusion length and rate:  infusion given over approximately 15 minutes  400 ml/hr.    Labs: were drawn prior to appointment    Vascular access: peripheral IV placed today.    Post Infusion Assessment:  Patient tolerated infusion without incident.     Discharge Plan:   Follow up plan of care with: primary care provider,  Discharge instructions were reviewed with patient.  Patient/representative verbalized understanding of discharge instructions and all questions answered.  Patient discharged from [facility] in stable condition.    La Smith RN    Administrations This Visit     zoledronic Acid (RECLAST) infusion 5 mg     Admin Date  07/30/2020 Action  New Bag Dose  5 mg Rate  400 mL/hr Route  Intravenous Administered By  La Smith RN                /58   Temp 98.1  F (36.7  C) (Oral)   Resp 16   Wt 52.6 kg (116 lb)   LMP  (LMP Unknown)   SpO2 100%   BMI 19.01 kg/m

## 2020-08-11 DIAGNOSIS — L93.0 LUPUS ERYTHEMATOSUS: ICD-10-CM

## 2020-08-11 DIAGNOSIS — Z79.01 LONG TERM CURRENT USE OF ANTICOAGULANT THERAPY: ICD-10-CM

## 2020-08-11 DIAGNOSIS — M32.15 SYSTEMIC LUPUS ERYTHEMATOSUS WITH TUBULO-INTERSTITIAL NEPHROPATHY, UNSPECIFIED SLE TYPE (H): ICD-10-CM

## 2020-08-12 ENCOUNTER — ANTICOAGULATION THERAPY VISIT (OUTPATIENT)
Dept: ANTICOAGULATION | Facility: CLINIC | Age: 75
End: 2020-08-12

## 2020-08-12 DIAGNOSIS — Z79.01 LONG TERM CURRENT USE OF ANTICOAGULANT THERAPY: ICD-10-CM

## 2020-08-12 DIAGNOSIS — L93.0 LUPUS ERYTHEMATOSUS: ICD-10-CM

## 2020-08-12 LAB — PROTHROM ACT/NOR PPP: 16 % (ref 60–140)

## 2020-08-12 NOTE — PROGRESS NOTES
8/12/20 ADDENDUM  Jeanmarie calling.  Requested next Factor 2 be draw on 9/1.  Assisted with making appt at [facility] lab for patient.  Updated calendar.  Answered patient's questions. SHERRON Pittman            ANTICOAGULATION FOLLOW-UP CLINIC VISIT    Patient Name:  Shala Caruso  Date:  8/12/2020  Contact Type:  Telephone    SUBJECTIVE:  Patient Findings     Comments:   8/11/2020:  Factor 2:  16  Goal range is 15 - 25.        Clinical Outcomes     Comments:   8/11/2020:  Factor 2:  16  Goal range is 15 - 25.           OBJECTIVE    Recent labs: (last 7 days)     08/11/20  1216   F2 16*       ASSESSMENT / PLAN  INR assessment THER    Recheck INR In: 2 WEEKS    INR Location Clinic      Anticoagulation Summary  As of 8/12/2020    INR goal:   2.0-3.0   TTR:   55.8 % (5.7 mo)   INR used for dosing:   No new INR was available at the time of this encounter.   Warfarin maintenance plan:   7.5 mg (5 mg x 1.5) every Mon, Wed, Fri; 10 mg (5 mg x 2) all other days   Full warfarin instructions:   7.5 mg every Mon, Wed, Fri; 10 mg all other days   Weekly warfarin total:   62.5 mg   No change documented:   Gwen Abel RN   Plan last modified:   Stacey Beal RN (7/1/2020)   Next INR check:   8/25/2020   Priority:   High   Target end date:   Indefinite    Indications    SLE (systemic lupus erythematosus) (H) (Resolved) [M32.9]  Long term current use of anticoagulant therapy [Z79.01]  Lupus erythematosus [L93.0]             Anticoagulation Episode Summary     INR check location:   Clinic Lab    Preferred lab:       Send INR reminders to:   [facility]    Comments:   Factor 2  goal range is 15-25%, 1/31/20 Not drawing INR  Ok to leave message on home [phone] and work voicemail, but call [email or web address] per pt request.  OK to leave message at office  May leave messages with Rodriguez Santos  DO NOT GIVE RECORDS TO EMPLOYER U        Anticoagulation Care Providers     Provider Role Specialty Phone number    Michaelchantal Joe  MD Tj Referring Internal Medicine [phone]            See the Encounter Report to view Anticoagulation Flowsheet and Dosing Calendar (Go to Encounters tab in chart review, and find the Anticoagulation Therapy Visit)      Factor 2:  16   Goal range is 15 - 25  Left message for patient with results and dosing recommendations. Asked patient to call back to report any missed doses, falls, signs and symptoms of bleeding or clotting, any changes in health, medication, or diet. Asked patient to call back with any questions or concerns.      Gwen Abel RN

## 2020-08-17 DIAGNOSIS — H31.8 CHOROIDAL LESION: Primary | ICD-10-CM

## 2020-08-19 ENCOUNTER — OFFICE VISIT (OUTPATIENT)
Dept: OPHTHALMOLOGY | Facility: CLINIC | Age: 75
End: 2020-08-19
Attending: OPHTHALMOLOGY
Payer: COMMERCIAL

## 2020-08-19 DIAGNOSIS — H31.8 CHOROIDAL LESION: ICD-10-CM

## 2020-08-19 PROCEDURE — 92015 DETERMINE REFRACTIVE STATE: CPT | Mod: ZF

## 2020-08-19 PROCEDURE — G0463 HOSPITAL OUTPT CLINIC VISIT: HCPCS | Mod: 25

## 2020-08-19 PROCEDURE — 92134 CPTRZ OPH DX IMG PST SGM RTA: CPT | Mod: ZF | Performed by: OPHTHALMOLOGY

## 2020-08-19 PROCEDURE — 76510 OPH US DX B-SCAN&QUAN A-SCAN: CPT | Mod: ZF | Performed by: OPHTHALMOLOGY

## 2020-08-19 ASSESSMENT — REFRACTION_MANIFEST
OD_ADD: +2.75
OD_CYLINDER: +1.50
OS_SPHERE: -0.50
OS_AXIS: 178
OS_ADD: +2.75
OS_CYLINDER: +0.75
OD_SPHERE: -1.00
OD_AXIS: 144

## 2020-08-19 ASSESSMENT — CUP TO DISC RATIO
OS_RATIO: 0.3
OD_RATIO: 0.3

## 2020-08-19 ASSESSMENT — REFRACTION_WEARINGRX
OD_ADD: +2.75
OS_AXIS: 170
SPECS_TYPE: TRIFOCAL
OS_ADD: +2.75
OD_SPHERE: -0.75
OD_CYLINDER: +1.50
OS_SPHERE: -0.50
OS_CYLINDER: +0.50
OD_AXIS: 150

## 2020-08-19 ASSESSMENT — VISUAL ACUITY
OS_PH_CC: 20/25
OD_CC: 20/20
OS_CC: 20/30
OS_CC: J1+
METHOD: SNELLEN - LINEAR
OD_CC: J1+

## 2020-08-19 ASSESSMENT — SLIT LAMP EXAM - LIDS
COMMENTS: NORMAL
COMMENTS: NORMAL

## 2020-08-19 ASSESSMENT — TONOMETRY
OD_IOP_MMHG: 10
IOP_METHOD: TONOPEN
OS_IOP_MMHG: 11

## 2020-08-19 ASSESSMENT — EXTERNAL EXAM - RIGHT EYE: OD_EXAM: NORMAL

## 2020-08-19 ASSESSMENT — EXTERNAL EXAM - LEFT EYE: OS_EXAM: NORMAL

## 2020-08-19 ASSESSMENT — ENCOUNTER SYMPTOMS: JOINT SWELLING: 1

## 2020-08-19 NOTE — PROGRESS NOTES
CC: DM1 annual exam       Interval: No significant changes in vision. She would like her glasses updated because her current pair is 4-5 years old.     HPI: 74 year old F with hx of DM type I. Denies flashes and floaters. Last hgA1c 5    Past med hx: SLE currently on cellcept (allergic to plaq)  Past ocular hx: none     OCULAR IMAGING  Optical Coherence Tomography 8-19-20  Right eye normal foveal contour, no srf/irf  Left eye normal foveal contour,   OCT thru lesion less than 1 mm thick with drusen overlying, no srf    PHOTOS 8-9-18  Right eye   Left eye  Choroidal pigmented lesion with drusen     U/S left eye 08/19/20   ST lesion appears stable from prior:   8-19-20 <1mm in height - stable. No posterior shadowing. 0.61 mm x 4.78T x 5.38L  9/14/16 C1 0.56 mm; C2 5.46 mm  8-30-17 C1 1.3 mm; C2   8-9-18 less than 1 mm ashwini lesion. 0.73mm x 5.15T x 5.03L  9-11-19 Minimally elevated lesion <1 mm in height appears stable / unchanged on U/S today. Negative for posterior shadowing or patent extension / excavation.       A/P:   1) DM type I    - HbA1c 5.1%    - Diet controlled and insulin for meals   - no evidence of DR     2) Pseudophakia both eyes   - doing well, lens centered    3) Myopic astigmatism, OU   - updated Rx      4) Choroidal pigmented lesion, left eye  - no orange pigmentation, no subretinal fluid, + drusen   - ultrasound 08/09/18  less than 1 mm height, normal retrobulbar echo pattern  - stable on exam      5) patient with history of Lupus   Took prednisolone for 10 yrs --> patient developed Diabetes mellitus   Currently Mycophenolate   No lupus retinopathy   Patient had DVT left upper leg--> patient on coumadin    6) history of Post operative ocular surface irritation   - resolved  - stop predhealon several months ago  - continue artificial tears  As needed     return to clinic: f/u in 12 months, repeat oct enhanced depth image of the choroidal peripheral lesion , macula Optical Coherence Tomography and   ultrasound left eye     ~~~~~~~~~~~~~~~~~~~~~~~~~~~~~~~~~~   Complete documentation of historical and exam elements from today's encounter can be found in the full encounter summary report (not reduplicated in this progress note).  I personally obtained the chief complaint(s) and history of present illness.  I confirmed and edited as necessary the review of systems, past medical/surgical history, family history, social history, and examination findings as documented by others; and I examined the patient myself.  I personally reviewed the relevant tests, images, and reports as documented above.  I formulated and edited as necessary the assessment and plan and discussed the findings and management plan with the patient and family    Monika Fermin MD   of Ophthalmology.  Retina Service   Department of Ophthalmology and Visual Neurosciences   [facility]  Phone: [phone]   Fax: [phone]

## 2020-08-19 NOTE — NURSING NOTE
Chief Complaints and History of Present Illnesses   Patient presents with     Follow Up     Chief Complaint(s) and History of Present Illness(es)     Follow Up     Laterality: left eye    Associated symptoms: dryness.  Negative for eye pain, floaters, flashes and tearing    Pain scale: 0/10              Comments      Jeanmarie is here to continue care for a Choroidal lesion LE. She feels her overall vision but her current glasses are over 3 years old and she would like a new MRx.     Shant Velázquez COT 2:26 PM August 19, 2020

## 2020-08-25 ENCOUNTER — ANCILLARY PROCEDURE (OUTPATIENT)
Dept: MAMMOGRAPHY | Facility: CLINIC | Age: 75
End: 2020-08-25
Payer: COMMERCIAL

## 2020-08-25 DIAGNOSIS — Z12.31 VISIT FOR SCREENING MAMMOGRAM: ICD-10-CM

## 2020-09-01 DIAGNOSIS — M32.15 SYSTEMIC LUPUS ERYTHEMATOSUS WITH TUBULO-INTERSTITIAL NEPHROPATHY, UNSPECIFIED SLE TYPE (H): ICD-10-CM

## 2020-09-01 DIAGNOSIS — Z79.01 LONG TERM CURRENT USE OF ANTICOAGULANT THERAPY: ICD-10-CM

## 2020-09-01 DIAGNOSIS — L93.0 LUPUS ERYTHEMATOSUS: ICD-10-CM

## 2020-09-01 LAB
ALBUMIN UR-MCNC: NEGATIVE MG/DL
ALT SERPL W P-5'-P-CCNC: 21 U/L (ref 0–50)
AMORPH CRY #/AREA URNS HPF: ABNORMAL /HPF
APPEARANCE UR: ABNORMAL
AST SERPL W P-5'-P-CCNC: 14 U/L (ref 0–45)
BACTERIA #/AREA URNS HPF: ABNORMAL /HPF
BASOPHILS # BLD AUTO: 0 10E9/L (ref 0–0.2)
BASOPHILS NFR BLD AUTO: 1.1 %
BILIRUB UR QL STRIP: NEGATIVE
COLOR UR AUTO: YELLOW
CREAT SERPL-MCNC: 0.71 MG/DL (ref 0.52–1.04)
DIFFERENTIAL METHOD BLD: ABNORMAL
EOSINOPHIL # BLD AUTO: 0.1 10E9/L (ref 0–0.7)
EOSINOPHIL NFR BLD AUTO: 1.6 %
ERYTHROCYTE [DISTWIDTH] IN BLOOD BY AUTOMATED COUNT: 16.4 % (ref 10–15)
GFR SERPL CREATININE-BSD FRML MDRD: 85 ML/MIN/{1.73_M2}
GLUCOSE UR STRIP-MCNC: NEGATIVE MG/DL
HCT VFR BLD AUTO: 37 % (ref 35–47)
HGB BLD-MCNC: 11.4 G/DL (ref 11.7–15.7)
HGB UR QL STRIP: NEGATIVE
IMM GRANULOCYTES # BLD: 0 10E9/L (ref 0–0.4)
IMM GRANULOCYTES NFR BLD: 0.3 %
KETONES UR STRIP-MCNC: NEGATIVE MG/DL
LEUKOCYTE ESTERASE UR QL STRIP: NEGATIVE
LYMPHOCYTES # BLD AUTO: 0.9 10E9/L (ref 0.8–5.3)
LYMPHOCYTES NFR BLD AUTO: 25.1 %
MCH RBC QN AUTO: 27.9 PG (ref 26.5–33)
MCHC RBC AUTO-ENTMCNC: 30.8 G/DL (ref 31.5–36.5)
MCV RBC AUTO: 91 FL (ref 78–100)
MONOCYTES # BLD AUTO: 0.4 10E9/L (ref 0–1.3)
MONOCYTES NFR BLD AUTO: 11.8 %
NEUTROPHILS # BLD AUTO: 2.3 10E9/L (ref 1.6–8.3)
NEUTROPHILS NFR BLD AUTO: 60.1 %
NITRATE UR QL: NEGATIVE
NRBC # BLD AUTO: 0 10*3/UL
NRBC BLD AUTO-RTO: 0 /100
PH UR STRIP: 7 PH (ref 5–7)
PLATELET # BLD AUTO: 240 10E9/L (ref 150–450)
RBC # BLD AUTO: 4.09 10E12/L (ref 3.8–5.2)
RBC #/AREA URNS AUTO: 8 /HPF (ref 0–2)
SOURCE: ABNORMAL
SP GR UR STRIP: 1.01 (ref 1–1.03)
UROBILINOGEN UR STRIP-MCNC: 0 MG/DL (ref 0–2)
WBC # BLD AUTO: 3.7 10E9/L (ref 4–11)
WBC #/AREA URNS AUTO: 1 /HPF (ref 0–5)

## 2020-09-02 ENCOUNTER — ANTICOAGULATION THERAPY VISIT (OUTPATIENT)
Dept: ANTICOAGULATION | Facility: CLINIC | Age: 75
End: 2020-09-02

## 2020-09-02 DIAGNOSIS — Z79.01 LONG TERM CURRENT USE OF ANTICOAGULANT THERAPY: ICD-10-CM

## 2020-09-02 DIAGNOSIS — L93.0 LUPUS ERYTHEMATOSUS: ICD-10-CM

## 2020-09-02 DIAGNOSIS — K22.0 ACHALASIA OF ESOPHAGUS: ICD-10-CM

## 2020-09-02 LAB
FACTOR 2 ASSAY: NORMAL
PROTHROM ACT/NOR PPP: 20 % (ref 60–140)

## 2020-09-02 RX ORDER — WARFARIN SODIUM 5 MG/1
TABLET ORAL
Qty: 90 TABLET | Refills: 1 | Status: SHIPPED | OUTPATIENT
Start: 2020-09-02 | End: 2021-04-21

## 2020-09-02 NOTE — PROGRESS NOTES
ANTICOAGULATION FOLLOW-UP CLINIC VISIT    Patient Name:  Shala Caruso  Date:  9/2/2020  Contact Type:  Telephone    SUBJECTIVE:  Patient Findings     Comments:   Factor 2 is 20% on 9/1.  Spoke to Jeanmarie.  Ordered 5mg Warfarin tablets with refill, sent to discharge pharmacy per patient request.        Clinical Outcomes     Comments:   Factor 2 is 20% on 9/1.  Spoke to Jeanmarie.  Ordered 5mg Warfarin tablets with refill, sent to discharge pharmacy per patient request.           OBJECTIVE    Recent labs: (last 7 days)     09/01/20  1447   F2 20*       ASSESSMENT / PLAN  INR assessment THER    Recheck INR In: 4 WEEKS    INR Location Clinic      Anticoagulation Summary  As of 9/2/2020    INR goal:   2.0-3.0   TTR:   49.6 % (5 mo)   INR used for dosing:   No new INR was available at the time of this encounter.   Warfarin maintenance plan:   7.5 mg (5 mg x 1.5) every Mon, Wed, Fri; 10 mg (5 mg x 2) all other days   Full warfarin instructions:   7.5 mg every Mon, Wed, Fri; 10 mg all other days   Weekly warfarin total:   62.5 mg   No change documented:   Andre Bledsoe RN   Plan last modified:   Stacey Beal RN (7/1/2020)   Next INR check:   9/29/2020   Priority:   High   Target end date:   Indefinite    Indications    SLE (systemic lupus erythematosus) (H) (Resolved) [M32.9]  Long term current use of anticoagulant therapy [Z79.01]  Lupus erythematosus [L93.0]             Anticoagulation Episode Summary     INR check location:   Clinic Lab    Preferred lab:       Send INR reminders to:   [facility]    Comments:   Factor 2  goal range is 15-25%, 1/31/20 Not drawing INR  Ok to leave message on home [phone] and work voicemail, but call [email or web address] per pt request.  OK to leave message at office  May leave messages with Rodriguez Santos  DO NOT GIVE RECORDS TO EMPLOYER [employer]        Anticoagulation Care Providers     Provider Role Specialty Phone number    Joe Contreras MD Referring Internal Medicine  [phone]            See the Encounter Report to view Anticoagulation Flowsheet and Dosing Calendar (Go to Encounters tab in chart review, and find the Anticoagulation Therapy Visit)    INR/CFX/F2 RESULT: Factor 2 result is 20% on 9/1    ASSESSMENT:Spoke with patient. Gave them their lab results and new warfarin recommendation.  No changes in health, medication, or diet. No missed doses, no falls. No signs or symptoms of bleed or clotting.    DOSING ADJUSTMENT: continue maintenance dose    NEXT INR/FACTOR X OR FACTOR II: 9/29    PROTOCOL FOLLOWED:Factor 2 goal 15-25%    Andre Bledsoe RN

## 2020-09-08 DIAGNOSIS — E10.649 TYPE 1 DIABETES MELLITUS WITH HYPOGLYCEMIA AND WITHOUT COMA (H): Primary | ICD-10-CM

## 2020-09-08 RX ORDER — NIFEDIPINE 30 MG/1
30 TABLET, EXTENDED RELEASE ORAL DAILY
Qty: 90 TABLET | Refills: 1 | Status: SHIPPED | OUTPATIENT
Start: 2020-09-08 | End: 2021-01-05

## 2020-09-10 DIAGNOSIS — Z79.899 ENCOUNTER FOR LONG-TERM CURRENT USE OF MEDICATION: ICD-10-CM

## 2020-09-10 DIAGNOSIS — D68.61 ANTIPHOSPHOLIPID SYNDROME (H): ICD-10-CM

## 2020-09-10 DIAGNOSIS — M32.9 SYSTEMIC LUPUS ERYTHEMATOSUS, UNSPECIFIED SLE TYPE, UNSPECIFIED ORGAN INVOLVEMENT STATUS (H): ICD-10-CM

## 2020-09-11 ENCOUNTER — MYC REFILL (OUTPATIENT)
Dept: ENDOCRINOLOGY | Facility: CLINIC | Age: 75
End: 2020-09-11

## 2020-09-11 DIAGNOSIS — E10.649 TYPE 1 DIABETES MELLITUS WITH HYPOGLYCEMIA AND WITHOUT COMA (H): Primary | ICD-10-CM

## 2020-09-11 RX ORDER — CALCIUM CITRATE/VITAMIN D3 200MG-6.25
TABLET ORAL
Qty: 500 EACH | Refills: 3 | Status: SHIPPED | OUTPATIENT
Start: 2020-09-11 | End: 2021-09-28

## 2020-09-11 RX ORDER — CALCIUM CITRATE/VITAMIN D3 200MG-6.25
TABLET ORAL
Qty: 500 STRIP | Refills: 3 | Status: SHIPPED | OUTPATIENT
Start: 2020-09-11 | End: 2021-04-23

## 2020-09-11 NOTE — TELEPHONE ENCOUNTER
[facility] Call Center    Phone Message    May a detailed message be left on voicemail: yes     Reason for Call: Other: Jeanmarie calling to follow up on the refull request for her glucose test strips.  She also sent a [employer] message with that request as she is almost out of them    Action Taken: Message routed to:  [facility] ([facility]):  Endocrine    Travel Screening: Not Applicable

## 2020-09-13 NOTE — TELEPHONE ENCOUNTER
Monitoring labs required every 8-12 weeks for medication refills.  mycophenolate (GENERIC EQUIVALENT) 500 MG tablet       Last Written Prescription Date:  12/27/19  Last Fill Quantity: 360,   # refills: 0  Last Virtual Visit: 4/14/20  Future Office visit:  None scheduled    CBC RESULTS:   Recent Labs   Lab Test 09/01/20  1447   WBC 3.7*   RBC 4.09   HGB 11.4*   HCT 37.0   MCV 91   MCH 27.9   MCHC 30.8*   RDW 16.4*          Creatinine   Date Value Ref Range Status   09/01/2020 0.71 0.52 - 1.04 mg/dL Final   ]    Liver Function Studies -   Recent Labs   Lab Test 09/01/20  1447  07/09/19  0636   PROTTOTAL  --   --  6.7*   ALBUMIN  --   --  3.9   BILITOTAL  --   --  0.4   ALKPHOS  --   --  40   AST 14   < > 19   ALT 21   < > 23    < > = values in this interval not displayed.       Routing refill request to provider for review/approval because:  Drug not on the G, P or [facility] refill protocol  Gap in therapy?

## 2020-09-14 RX ORDER — MYCOPHENOLATE MOFETIL 500 MG/1
1000 TABLET ORAL 2 TIMES DAILY
Qty: 360 TABLET | Refills: 0 | Status: SHIPPED | OUTPATIENT
Start: 2020-09-14 | End: 2020-10-06

## 2020-09-29 DIAGNOSIS — Z79.01 LONG TERM CURRENT USE OF ANTICOAGULANT THERAPY: ICD-10-CM

## 2020-09-29 DIAGNOSIS — L93.0 LUPUS ERYTHEMATOSUS: ICD-10-CM

## 2020-09-30 ENCOUNTER — ANTICOAGULATION THERAPY VISIT (OUTPATIENT)
Dept: ANTICOAGULATION | Facility: CLINIC | Age: 75
End: 2020-09-30

## 2020-09-30 DIAGNOSIS — L93.0 LUPUS ERYTHEMATOSUS: ICD-10-CM

## 2020-09-30 DIAGNOSIS — Z79.01 LONG TERM CURRENT USE OF ANTICOAGULANT THERAPY: ICD-10-CM

## 2020-09-30 LAB — PROTHROM ACT/NOR PPP: 20 % (ref 60–140)

## 2020-09-30 NOTE — PROGRESS NOTES
ANTICOAGULATION FOLLOW-UP CLINIC VISIT    Patient Name:  Shala Caruso  Date:  9/30/2020  Contact Type:  Telephone    SUBJECTIVE:  Patient Findings     Comments:   Spoke with Jeanmarie.  Factor 2 on 9/29/2020 is 20%.  She reports she has not had changes in health, diet, medications.  No signs of bleeding.        Clinical Outcomes     Comments:   Spoke with Jeanmarie.  Factor 2 on 9/29/2020 is 20%.  She reports she has not had changes in health, diet, medications.  No signs of bleeding.           OBJECTIVE    Recent labs: (last 7 days)     09/29/20  1527   F2 20*       ASSESSMENT / PLAN  INR assessment THER    Recheck INR In: 4 WEEKS    INR Location Clinic      Anticoagulation Summary  As of 9/30/2020    INR goal:   2.0-3.0   TTR:   37.9 % (4 mo)   INR used for dosing:   No new INR was available at the time of this encounter.   Warfarin maintenance plan:   7.5 mg (5 mg x 1.5) every Mon, Wed, Fri; 10 mg (5 mg x 2) all other days   Full warfarin instructions:   7.5 mg every Mon, Wed, Fri; 10 mg all other days   Weekly warfarin total:   62.5 mg   No change documented:   Gwen Abel, RN   Plan last modified:   Stacey Beal, RN (7/1/2020)   Next INR check:   10/28/2020   Priority:   High   Target end date:   Indefinite    Indications    SLE (systemic lupus erythematosus) (H) (Resolved) [M32.9]  Long term current use of anticoagulant therapy [Z79.01]  Lupus erythematosus [L93.0]             Anticoagulation Episode Summary     INR check location:   Clinic Lab    Preferred lab:       Send INR reminders to:   [facility]    Comments:   Factor 2  goal range is 15-25%, 1/31/20 Not drawing INR  Ok to leave message on home [phone] and work voicemail, but call [email or web address] per pt request.  OK to leave message at office  May leave messages with Rodriguez Santos  DO NOT GIVE RECORDS TO EMPLOYER U        Anticoagulation Care Providers     Provider Role Specialty Phone number    Joe Contreras MD Referring  Internal Medicine [phone]            See the Encounter Report to view Anticoagulation Flowsheet and Dosing Calendar (Go to Encounters tab in chart review, and find the Anticoagulation Therapy Visit)    Spoke with Juan Abel RN

## 2020-10-03 ENCOUNTER — MYC MEDICAL ADVICE (OUTPATIENT)
Dept: INTERNAL MEDICINE | Facility: CLINIC | Age: 75
End: 2020-10-03

## 2020-10-05 NOTE — PROGRESS NOTES
[facility]  Rheumatology Clinic-video  Tato Agarwal MD  10/05/2020     Name: Shala Caruso  MRN: [id number]  Age: 74 year old  : 1945  Referring provider: Joe Contreras     Assessment and Plan:    Systemic lupus erythematosus (+CRISTAL, +dsDNA, +SSA, hypocomplementemia; Raynaud's, arthritis, serositis, tubulo-interstitial nephropathy, pericardial effusion/tamponade): Pt relates stable bilateral knee and hip pain that improves with movement and stretching, as well as mild loss of sensation in her toes, both stable.  Raynaud's phenomenon is mild, and is controlled with thermal protective measures alone.  Video examination today revealed normal range of motion in the hand and finger joints without evidence of digital tapering or cyanosis in the fingertips.  Laboratory evaluation on 2020 showed creatinine, transaminases, and CBC normal except for total white count of 3.7.  Urinalysis showed RBCs 8 per high-powered field.    SLE is stable by history and remote exam.  Disease that formerly affected the kidney as well as serosal surfaces remains well compensated on cellcept monotherapy. She has been taking cellcept 1000 bid and reports tolerating it well without any side-effects.  I recommend that she stay on this dose unless her symptoms change.  Plan urinalysis, creatinine, and CBC every 6 months.     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 Following factor 2 levels.  # Peripheral neuropathy  # Raynaud's: stable  # Achalasia: stable on nifedipine  #Osteoarthritis, knees and hips: Chronic use associated pain and stiffness is modest, stable.  Patient is capable of performing physical activity for multiple hours daily.  I recommended continue weightbearing exercises and range of motion exercises daily.  #Borderline hematuria: There are no white cells and there is no proteinuria in urinalyses performed in the past 6 months.  I think we can safely monitor  "the very mild hematuria abnormality without concern for an active renal lesion for the present. I do recommend rechecking anti-DNA, complement studies, and urinalysis in 3 months time, since the most recent urinalysis showed bland, microhematuria.     # Osteoporosis: DEXA scan performed in June July 2020 revealed a T score of -2.3 at the femoral neck.  I agree with institution of bisphosphonate therapy.  Patient underwent zoledronic acid per endocrinology in July.  I agree with plans for annual repeat treatment.  Patient should continue calcium carbonate, vitamin D, and weightbearing exercise as well.    Tato Agarwal M.D.  Staff Rheumatologist, [facility]  Pager [phone]    Follow-up: 6 mos    HPI:   Shala Caruso is a 74 year old female with a history of DM, SLE, PE on Coumadin, and HTN who presents for evaluation of lupus.  She was last seen in 4-2020.  At that time she was doing well without no significant symptoms of disease.  Plan was to continue mycophenolate 1000 mg twice daily.     Summary of previous history:  SLE diagnosed in 2000 (+CRISTAL, +dsDNA ab, arthritis, serositis).  She has antiphospholipid syndrome and had DVT and PE in 2004, on Coumadin since then.  She had a Lupus flare in 2010 initially treated with Plaquenil however she worsened and developed pericardial effusion/tamponade - started on MTX and tapering dose of prednisone at that time (continued on about 10 mg daily).  Cellcept started 6/2010, Prednisone tapered off.  MTX stopped May 2012.    Interval history 10-:    Health is ok. Her knees and hips give constant pain \"tolerable\". If she sits too long, she is very stiff. Morning stiffness is brief, relieved by movement. No pain in UE joints.    Her hands are sensitive to cold. No digital ulcers.    Otherwise, health is stable.  She is exercising daily, including yoga for 2 hours every morning.  Appetite is good energy level is excellent.  She is retiring next week.    Interval hx: " "4-20:  She has been continuing yoga and walking daily. She is working from home. Her neuropathic symptoms are stable. Minimal joint pain or staiffness; knee OA symptoms are stable. Achalasia symptoms have been slightly increased recently; she does note hoarseness recently; no trouble swallowing. No abd pain or change in bowel habits.    No change in mycophenolate dosing.    Prior history 10-19: She reports mild tingling and cold sensation in her toes which she attributes to diabetic neuropathy.  She also has mild but constant soreness in both knees.  This is better with movement and she attributes this to osteoarthritis.  She does yoga every morning and feels this helps her knees.  Other than her knees, she has not had significant joint issues in the past several months.  She denies any rash, mouth sores, chest pain, shortness of breath, or new vision changes.    She also notes chronic changes of her fingernails which has not changed recently.  She has not sought treatment for this.    Review of Systems:  Pertinent items are noted in HPI or as below, remainder of complete ROS is negative.      No recent problems with hearing or vision. Eye dryness relieved by once daily Refresh; no dry mouth  No breathing difficulty, shortness of breath, coughing, or wheezing  No chest pain or palpitations  No heart burn, indigestion, abdominal pain, nausea, vomiting, diarrhea  No urination problems, no bloody, cloudy urine, no dysuria  + toe \"neuropathy\",. Symmetrical, stable.  No headaches or confusion  No rashes. No easy bleeding or bruising.     Active Medications:     Current Outpatient Medications   Medication     acetaminophen (TYLENOL) 500 MG tablet     aspirin 81 MG tablet     AYR SALINE NASAL NO-DRIP GEL     blood glucose (TRUE METRIX BLOOD GLUCOSE TEST) test strip     Calcium Carbonate-Vitamin D (CALCIUM 500 + D PO)     carboxymethylcellulose PF (REFRESH PLUS) 0.5 % SOLN ophthalmic solution     Glucagon (BAQSIMI ONE PACK) " "3 MG/DOSE POWD     Injection Device for insulin (NOVOPEN ECHO) RORY     insulin pen needle (BD PAM U/F) 32G X 4 MM miscellaneous     LANCETS ULTRA THIN 30G MISC     Multiple Vitamins-Minerals (CENTRUM SILVER PO)     mycophenolate (GENERIC EQUIVALENT) 500 MG tablet     NIFEdipine ER OSMOTIC (PROCARDIA XL) 30 MG 24 hr tablet     NOVOLOG PENFILL 100 UNIT/ML soln     simvastatin (ZOCOR) 40 MG tablet     TRUE METRIX BLOOD GLUCOSE TEST test strip     warfarin (COUMADIN) 5 MG tablet     warfarin (COUMADIN) 5 MG tablet     warfarin ANTICOAGULANT (COUMADIN) 5 MG tablet     warfarin ANTICOAGULANT (COUMADIN) 5 MG tablet     insulin glargine (LANTUS PEN) 100 UNIT/ML pen     order for DME     No current facility-administered medications for this visit.      No longer on lantus: too many \"lows\".  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 Checks BS 5X daily.  Osteoporosis   Hypertension   Hyperlipidemia   Achalasia   Gastroesophageal reflux disease   Choroidal lesion  Atrophic vaginitis   Urinary urgency   Female stress incontinence   Cystocele, midline   Deep vein thrombosis   Nonsenile cataract   Myopia   Neuropathy   Lymphedema      Past Surgical History:  Phacoemulsification clear cornea with intraocular lens implantation, right 1/8/19, left 2/5/19  Transvaginal sling 12/20/16  Axilla lymph node dissection 2004  Bilateral tubal ligation     Family History:   Glaucoma - father   Breast cancer   Stroke       Social History:   Tobacco Use: No previous or current tobacco use.   Alcohol Use: No alcohol use.   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; retired   PCP: Joe Contreras      Physical Exam:   LMP  (LMP Unknown)  "   Wt Readings from Last 4 Encounters:   07/30/20 52.6 kg (116 lb)   02/05/19 56.7 kg (125 lb)   01/08/19 54.1 kg (119 lb 4.8 oz)   07/30/18 54.1 kg (119 lb 3.2 oz)     Constitutional: Well-developed, appearing stated age; cooperative  Eyes: Normal EOM, PERRLA, vision, conjunctiva, sclera  ENT: Normal external ears, nose, hearing, lips, teeth, gums  Psych: Normal judgement, orientation, memory, affect.     Laboratory:   RHEUM RESULTS Latest Ref Rng & Units 4/9/2020 6/23/2020 9/1/2020   COMPLEMENT C3 76 - 169 mg/dL - - -   COMPLEMENT C4 15 - 50 mg/dL - - -   DNA-DS <10 IU/mL - - -   SED RATE 0 - 30 mm/h - - -   CRP, INFLAMMATION 0.0 - 8.0 mg/L - - -   CYCLIC CIT PEPT IGG <5 U/mL - - -   CK TOTAL 30 - 225 U/L - - -   RHEUMATOID FACTOR 0 - 14 IU/mL - - -   CRISTAL SCREEN BY EIA 0 - 1.0 - - -   AST 0 - 45 U/L 18 16 14   ALT 0 - 50 U/L 25 22 21   ALBUMIN 3.4 - 5.0 g/dL - - -   WBC 4.0 - 11.0 10e9/L 4.0 3.1(L) 3.7(L)   RBC 3.8 - 5.2 10e12/L 4.65 4.60 4.09   HGB 11.7 - 15.7 g/dL 13.2 13.2 11.4(L)   HCT 35.0 - 47.0 % 42.2 41.8 37.0   MCV 78 - 100 fl 91 91 91   MCHC 31.5 - 36.5 g/dL 31.3(L) 31.6 30.8(L)   RDW 10.0 - 15.0 % 16.8(H) 15.1(H) 16.4(H)    - 450 10e9/L 262 223 240   CREATININE 0.52 - 1.04 mg/dL 0.67 0.65 0.71   GFR ESTIMATE, IF BLACK >60 mL/min/[1.73:m2] >90 >90 >90   GFR ESTIMATE >60 mL/min/[1.73:m2] 88 88 85   HEPATITIS C ANTIBODY NR - - -     Rheumatoid Factor   Date Value Ref Range Status   01/14/2009 8 0 - 14 IU/mL Final     Cyclic Citrullinated Peptide IgG   Date Value Ref Range Status   01/14/2009 <2 <5 U/mL Final     Comment:     Interpretation:  Negative     Ribonucleic Protein IgG Antibody   Date Value Ref Range Status   01/24/2007 12  Final     Comment:     Reference range: 0 to 49  Unit: Units  (Note)  REFERENCE INTERVAL: Ribonucleic Protein (VIKRAM), IgG   Less than 20 Units ........ None detected   20 - 49 Units ............. Inconclusive   50 Units or greater ....... Positive    RNP antibody is seen  in % of mixed connective tissue  disease and is considered specific for this syndrome if  other antibodies are negative. RNP is also present in  20-30% of SLE and 15-25% of progressive systemic  sclerosis.  The above test was performed at: [facility],  [address] [place]  [id number] [phone]  [email or web address]     SSA (RO) Antibody IgG   Date Value Ref Range Status   08/24/2005 221 (H)  Final     Comment:     Reference range: 0 to 49  Unit: Units  (Note)  REFERENCE INTERVALS: SSA (Ro) (VIKRAM) Ab, IgG   Less than 20 Units ....... None detected   20 - 49 Units ............ Inconclusive   50 Units or greater ...... Positive    SSA (Ro) antibody is seen in70-75% of Sjogren syndrome  cases, 30-40% of systemic lupus erythematosus (SLE) and  5-10% of progressive systemic sclerosis (PSS).  The above test was performed at: [facility],  [address] [place] [address]  [id number] [phone]  [email or web address]     SSB (LA) Antibody IgG   Date Value Ref Range Status   08/24/2005 20  Final     Comment:     Reference range: 0 to 49  Unit: Units  (Note)  REFERENCE INTERVALS: SSB (La) (VIKRAM) Ab, IgG   Less than 20 Units ....... None detected   20 - 49 Units ............ Inconclusive   50 Units or greater ...... Positive    SSB (La) antibody is seen in 50-60% of Sjogren syndrome  cases and is specific if it is the only VIKRAM antibody  present. 15-25% of patients with systemic lupus  erythematosus (SLE) and 5-10% of patients with progressive  systemic sclerosis (PSS) also have this antibody.  The above test was performed at: [facility],  [address] [facility]  [id number] [phone]  [email or web address]   ,  ,   CRISTAL Screen by EIA   Date Value Ref Range Status   02/16/2010 8.2 (H) 0 - 1.0 Final     Comment:     Interpretation:  Positive     DNA-ds   Date Value Ref Range Status   06/12/2019 1 <10 IU/mL Final     Comment:     Negative     Albumin Fraction   Date Value Ref Range Status   04/01/2013 3.9 3.7 - 5.1 g/dL Final  "    Alpha 2 Fraction   Date Value Ref Range Status   04/01/2013 0.7 0.5 - 0.9 g/dL Final     Beta Fraction   Date Value Ref Range Status   04/01/2013 0.6 0.6 - 1.0 g/dL Final     Gamma Fraction   Date Value Ref Range Status   04/01/2013 0.7 0.7 - 1.6 g/dL Final     Monoclonal Peak   Date Value Ref Range Status   04/01/2013 0.0 0.0 g/dL Final     ELP Interpretation:   Date Value Ref Range Status   04/01/2013   Final    Essentially normal electrophoretic pattern.  No monoclonal protein seen.   Pathologic significance requires clinical correlation.  LEATHA Valencia M.D.,   Ph.D., Pathologist        PATIENT WILL DO DOXIMITY  PLEASE TEXT -[phone]        Shala Caruso is a 74 year old female who is being evaluated via a billable video visit.      The patient has been notified of following:     \"This video visit will be conducted via a call between you and your physician/provider. We have found that certain health care needs can be provided without the need for an in-person physical exam.  This service lets us provide the care you need with a video conversation.  If a prescription is necessary we can send it directly to your pharmacy.  If lab work is needed we can place an order for that and you can then stop by our lab to have the test done at a later time.    Video visits are billed at different rates depending on your insurance coverage.  Please reach out to your insurance provider with any questions.    If during the course of the call the physician/provider feels a video visit is not appropriate, you will not be charged for this service.\"    Patient has given verbal consent for Video visit? Yes  How would you like to obtain your AVS? MyChart    Will anyone else be joining your video visit? No        Video-Visit Details    Type of service:  Video Visit    Video Start Time: 7:10 AM  Video End Time: 7:31 AM    Originating Location (pt. Location): Home    Distant Location (provider location):  [facility] [place]     Platform used for Video Visit: Irina Agarwal MD

## 2020-10-06 ENCOUNTER — VIRTUAL VISIT (OUTPATIENT)
Dept: RHEUMATOLOGY | Facility: CLINIC | Age: 75
End: 2020-10-06
Attending: INTERNAL MEDICINE
Payer: COMMERCIAL

## 2020-10-06 DIAGNOSIS — Z79.899 ENCOUNTER FOR LONG-TERM CURRENT USE OF MEDICATION: ICD-10-CM

## 2020-10-06 DIAGNOSIS — D68.61 ANTIPHOSPHOLIPID SYNDROME (H): ICD-10-CM

## 2020-10-06 DIAGNOSIS — M32.9 SYSTEMIC LUPUS ERYTHEMATOSUS, UNSPECIFIED SLE TYPE, UNSPECIFIED ORGAN INVOLVEMENT STATUS (H): Primary | ICD-10-CM

## 2020-10-06 PROCEDURE — 99214 OFFICE O/P EST MOD 30 MIN: CPT | Mod: 95 | Performed by: INTERNAL MEDICINE

## 2020-10-06 RX ORDER — MYCOPHENOLATE MOFETIL 500 MG/1
1000 TABLET ORAL 2 TIMES DAILY
Qty: 360 TABLET | Refills: 3 | Status: SHIPPED | OUTPATIENT
Start: 2020-10-06 | End: 2021-05-07

## 2020-10-06 NOTE — LETTER
10/6/2020       RE: Shala Caruso  [address] [place] [address]     Dear Colleague,    Thank you for referring your patient, Shala Caruso, to the [facility] RHEUMATOLOGY CLINIC [place] at [facility]. Please see a copy of my visit note below.    [facility]  Rheumatology Clinic-video  Tato Agarwal MD  10/05/2020     Name: Shala Caruso  MRN: [id number]  Age: 74 year old  : 1945  Referring provider: Joe Contreras     Assessment and Plan:    Systemic lupus erythematosus (+CRISTAL, +dsDNA, +SSA, hypocomplementemia; Raynaud's, arthritis, serositis, tubulo-interstitial nephropathy, pericardial effusion/tamponade): Pt relates stable bilateral knee and hip pain that improves with movement and stretching, as well as mild loss of sensation in her toes, both stable.  Raynaud's phenomenon is mild, and is controlled with thermal protective measures alone.  Video examination today revealed normal range of motion in the hand and finger joints without evidence of digital tapering or cyanosis in the fingertips.  Laboratory evaluation on 2020 showed creatinine, transaminases, and CBC normal except for total white count of 3.7.  Urinalysis showed RBCs 8 per high-powered field.    SLE is stable by history and remote exam.  Disease that formerly affected the kidney as well as serosal surfaces remains well compensated on cellcept monotherapy. She has been taking cellcept 1000 bid and reports tolerating it well without any side-effects.  I recommend that she stay on this dose unless her symptoms change.  Plan urinalysis, creatinine, and CBC every 6 months.     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 Following factor 2 levels.  # Peripheral neuropathy  # Raynaud's: stable  # Achalasia: stable on nifedipine  #Osteoarthritis, knees and hips: Chronic use associated pain and stiffness is modest, stable.  Patient  "is capable of performing physical activity for multiple hours daily.  I recommended continue weightbearing exercises and range of motion exercises daily.  #Borderline hematuria: There are no white cells and there is no proteinuria in urinalyses performed in the past 6 months.  I think we can safely monitor the very mild hematuria abnormality without concern for an active renal lesion for the present. I do recommend rechecking anti-DNA, complement studies, and urinalysis in 3 months time, since the most recent urinalysis showed bland, microhematuria.     # Osteoporosis: DEXA scan performed in June July 2020 revealed a T score of -2.3 at the femoral neck.  I agree with institution of bisphosphonate therapy.  Patient underwent zoledronic acid per endocrinology in July.  I agree with plans for annual repeat treatment.  Patient should continue calcium carbonate, vitamin D, and weightbearing exercise as well.    Tato Agarwal M.D.  Staff Rheumatologist, [facility]  Pager [phone]    Follow-up: 6 mos    HPI:   Shala Caruso is a 74 year old female with a history of DM, SLE, PE on Coumadin, and HTN who presents for evaluation of lupus.  She was last seen in 4-2020.  At that time she was doing well without no significant symptoms of disease.  Plan was to continue mycophenolate 1000 mg twice daily.     Summary of previous history:  SLE diagnosed in 2000 (+CRISTAL, +dsDNA ab, arthritis, serositis).  She has antiphospholipid syndrome and had DVT and PE in 2004, on Coumadin since then.  She had a Lupus flare in 2010 initially treated with Plaquenil however she worsened and developed pericardial effusion/tamponade - started on MTX and tapering dose of prednisone at that time (continued on about 10 mg daily).  Cellcept started 6/2010, Prednisone tapered off.  MTX stopped May 2012.    Interval history 10-:    Health is ok. Her knees and hips give constant pain \"tolerable\". If she sits too long, she is very stiff. Morning " "stiffness is brief, relieved by movement. No pain in UE joints.    Her hands are sensitive to cold. No digital ulcers.    Otherwise, health is stable.  She is exercising daily, including yoga for 2 hours every morning.  Appetite is good energy level is excellent.  She is retiring next week.    Interval hx: 4-20:  She has been continuing yoga and walking daily. She is working from home. Her neuropathic symptoms are stable. Minimal joint pain or staiffness; knee OA symptoms are stable. Achalasia symptoms have been slightly increased recently; she does note hoarseness recently; no trouble swallowing. No abd pain or change in bowel habits.    No change in mycophenolate dosing.    Prior history 10-19: She reports mild tingling and cold sensation in her toes which she attributes to diabetic neuropathy.  She also has mild but constant soreness in both knees.  This is better with movement and she attributes this to osteoarthritis.  She does yoga every morning and feels this helps her knees.  Other than her knees, she has not had significant joint issues in the past several months.  She denies any rash, mouth sores, chest pain, shortness of breath, or new vision changes.    She also notes chronic changes of her fingernails which has not changed recently.  She has not sought treatment for this.    Review of Systems:  Pertinent items are noted in HPI or as below, remainder of complete ROS is negative.      No recent problems with hearing or vision. Eye dryness relieved by once daily Refresh; no dry mouth  No breathing difficulty, shortness of breath, coughing, or wheezing  No chest pain or palpitations  No heart burn, indigestion, abdominal pain, nausea, vomiting, diarrhea  No urination problems, no bloody, cloudy urine, no dysuria  + toe \"neuropathy\",. Symmetrical, stable.  No headaches or confusion  No rashes. No easy bleeding or bruising.     Active Medications:     Current Outpatient Medications   Medication     " "acetaminophen (TYLENOL) 500 MG tablet     aspirin 81 MG tablet     AYR SALINE NASAL NO-DRIP GEL     blood glucose (TRUE METRIX BLOOD GLUCOSE TEST) test strip     Calcium Carbonate-Vitamin D (CALCIUM 500 + D PO)     carboxymethylcellulose PF (REFRESH PLUS) 0.5 % SOLN ophthalmic solution     Glucagon (BAQSIMI ONE PACK) 3 MG/DOSE POWD     Injection Device for insulin (NOVOPEN ECHO) RORY     insulin pen needle (BD PAM U/F) 32G X 4 MM miscellaneous     LANCETS ULTRA THIN 30G MISC     Multiple Vitamins-Minerals (CENTRUM SILVER PO)     mycophenolate (GENERIC EQUIVALENT) 500 MG tablet     NIFEdipine ER OSMOTIC (PROCARDIA XL) 30 MG 24 hr tablet     NOVOLOG PENFILL 100 UNIT/ML soln     simvastatin (ZOCOR) 40 MG tablet     TRUE METRIX BLOOD GLUCOSE TEST test strip     warfarin (COUMADIN) 5 MG tablet     warfarin (COUMADIN) 5 MG tablet     warfarin ANTICOAGULANT (COUMADIN) 5 MG tablet     warfarin ANTICOAGULANT (COUMADIN) 5 MG tablet     insulin glargine (LANTUS PEN) 100 UNIT/ML pen     order for DME     No current facility-administered medications for this visit.      No longer on lantus: too many \"lows\".  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 Checks BS 5X daily.  Osteoporosis   Hypertension   Hyperlipidemia   Achalasia   Gastroesophageal reflux disease   Choroidal lesion  Atrophic vaginitis   Urinary urgency   Female stress incontinence   Cystocele, midline   Deep vein thrombosis   Nonsenile cataract   Myopia   Neuropathy   Lymphedema      Past Surgical History:  Phacoemulsification clear cornea with intraocular lens implantation, right 1/8/19, left 2/5/19  Transvaginal sling 12/20/16  Axilla lymph node dissection " 2004  Bilateral tubal ligation     Family History:   Glaucoma - father   Breast cancer   Stroke       Social History:   Tobacco Use: No previous or current tobacco use.   Alcohol Use: No alcohol use.   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     Laboratory:   RHEUM RESULTS Latest Ref Rng & Units 4/9/2020 6/23/2020 9/1/2020   COMPLEMENT C3 76 - 169 mg/dL - - -   COMPLEMENT C4 15 - 50 mg/dL - - -   DNA-DS <10 IU/mL - - -   SED RATE 0 - 30 mm/h - - -   CRP, INFLAMMATION 0.0 - 8.0 mg/L - - -   CYCLIC CIT PEPT IGG <5 U/mL - - -   CK TOTAL 30 - 225 U/L - - -   RHEUMATOID FACTOR 0 - 14 IU/mL - - -   CRISTAL SCREEN BY EIA 0 - 1.0 - - -   AST 0 - 45 U/L 18 16 14   ALT 0 - 50 U/L 25 22 21   ALBUMIN 3.4 - 5.0 g/dL - - -   WBC 4.0 - 11.0 10e9/L 4.0 3.1(L) 3.7(L)   RBC 3.8 - 5.2 10e12/L 4.65 4.60 4.09   HGB 11.7 - 15.7 g/dL 13.2 13.2 11.4(L)   HCT 35.0 - 47.0 % 42.2 41.8 37.0   MCV 78 - 100 fl 91 91 91   MCHC 31.5 - 36.5 g/dL 31.3(L) 31.6 30.8(L)   RDW 10.0 - 15.0 % 16.8(H) 15.1(H) 16.4(H)    - 450 10e9/L 262 223 240   CREATININE 0.52 - 1.04 mg/dL 0.67 0.65 0.71   GFR ESTIMATE, IF BLACK >60 mL/min/[1.73:m2] >90 >90 >90   GFR ESTIMATE >60 mL/min/[1.73:m2] 88 88 85   HEPATITIS C ANTIBODY NR - - -     Rheumatoid Factor   Date Value Ref Range Status   01/14/2009 8 0 - 14 IU/mL Final     Cyclic Citrullinated Peptide IgG   Date Value Ref Range Status   01/14/2009 <2 <5 U/mL Final     Comment:     Interpretation:  Negative     Ribonucleic Protein  IgG Antibody   Date Value Ref Range Status   01/24/2007 12  Final     Comment:     Reference range: 0 to 49  Unit: Units  (Note)  REFERENCE INTERVAL: Ribonucleic Protein (VIKRAM), IgG   Less than 20 Units ........ None detected   20 - 49 Units ............. Inconclusive   50 Units or greater ....... Positive    RNP antibody is seen in % of mixed connective tissue  disease and is considered specific for this syndrome if  other antibodies are negative. RNP is also present in  20-30% of SLE and 15-25% of progressive systemic  sclerosis.  The above test was performed at: [facility],  [address] [place] [address]  [id number] [phone]  [email or web address]     SSA (RO) Antibody IgG   Date Value Ref Range Status   08/24/2005 221 (H)  Final     Comment:     Reference range: 0 to 49  Unit: Units  (Note)  REFERENCE INTERVALS: SSA (Ro) (VIKRAM) Ab, IgG   Less than 20 Units ....... None detected   20 - 49 Units ............ Inconclusive   50 Units or greater ...... Positive    SSA (Ro) antibody is seen in70-75% of Sjogren syndrome  cases, 30-40% of systemic lupus erythematosus (SLE) and  5-10% of progressive systemic sclerosis (PSS).  The above test was performed at: [facility],  [address] [place] [address]  [id number] [phone]  [email or web address]     SSB (LA) Antibody IgG   Date Value Ref Range Status   08/24/2005 20  Final     Comment:     Reference range: 0 to 49  Unit: Units  (Note)  REFERENCE INTERVALS: SSB (La) (VIKRAM) Ab, IgG   Less than 20 Units ....... None detected   20 - 49 Units ............ Inconclusive   50 Units or greater ...... Positive    SSB (La) antibody is seen in 50-60% of Sjogren syndrome  cases and is specific if it is the only VIKRAM antibody  present. 15-25% of patients with systemic lupus  erythematosus (SLE) and 5-10% of patients with progressive  systemic sclerosis (PSS) also have this antibody.  The above test was performed at: [facility],  [address] [place] [address]  [id number] [phone]   "[email or web address]   ,  ,   CRISTAL Screen by EIA   Date Value Ref Range Status   02/16/2010 8.2 (H) 0 - 1.0 Final     Comment:     Interpretation:  Positive     DNA-ds   Date Value Ref Range Status   06/12/2019 1 <10 IU/mL Final     Comment:     Negative     Albumin Fraction   Date Value Ref Range Status   04/01/2013 3.9 3.7 - 5.1 g/dL Final     Alpha 2 Fraction   Date Value Ref Range Status   04/01/2013 0.7 0.5 - 0.9 g/dL Final     Beta Fraction   Date Value Ref Range Status   04/01/2013 0.6 0.6 - 1.0 g/dL Final     Gamma Fraction   Date Value Ref Range Status   04/01/2013 0.7 0.7 - 1.6 g/dL Final     Monoclonal Peak   Date Value Ref Range Status   04/01/2013 0.0 0.0 g/dL Final     ELP Interpretation:   Date Value Ref Range Status   04/01/2013   Final    Essentially normal electrophoretic pattern.  No monoclonal protein seen.   Pathologic significance requires clinical correlation.  LEATHA Valencia M.D.,   Ph.D., Pathologist        PATIENT WILL DO DOXIMITY  PLEASE TEXT [phone]        Shala Caruso is a 74 year old female who is being evaluated via a billable video visit.      The patient has been notified of following:     \"This video visit will be conducted via a call between you and your physician/provider. We have found that certain health care needs can be provided without the need for an in-person physical exam.  This service lets us provide the care you need with a video conversation.  If a prescription is necessary we can send it directly to your pharmacy.  If lab work is needed we can place an order for that and you can then stop by our lab to have the test done at a later time.    Video visits are billed at different rates depending on your insurance coverage.  Please reach out to your insurance provider with any questions.    If during the course of the call the physician/provider feels a video visit is not appropriate, you will not be charged for this service.\"    Patient has given verbal consent for " Video visit? Yes  How would you like to obtain your AVS? MyChart    Will anyone else be joining your video visit? No        Video-Visit Details    Type of service:  Video Visit    Video Start Time: 7:10 AM  Video End Time: 7:31 AM    Originating Location (pt. Location): Home    Distant Location (provider location):  [facility] RHEUMATOLOGY CLINIC [place]     Platform used for Video Visit: Irina Agarwal MD

## 2020-10-06 NOTE — PATIENT INSTRUCTIONS
Diagnosis:  1.  Systemic lupus erythematosus with a history of phospholipid antibodies, serositis, and nephritis: There are no symptoms or signs suggestive of active lupus.  I recommend renewing screening for lupus activity markers of complement, anti-DNA, and urinalysis in 3 months.  2.  Osteoporosis: You received definitive treatment with zoledronic acid in July.  I recommend continuing calcium and vitamin D supplements.  3.  Propensity to clot: No evidence of recurrent thrombosis.  I recommend continuing warfarin as directed through hematology.    Plan:  1.  Continue mycophenolate 1000 mg twice daily  2.  Check blood work, urinalysis every 6 months, except for planned interim analyses in 3 months.  3.  Continue calcium carbonate 1200 mill equivalents daily, and vitamin D 800 international units daily to support bone density; continue range of motion exercise but incorporate weightbearing exercise into daily routine.

## 2020-10-07 NOTE — TELEPHONE ENCOUNTER
Called and spoke with pt, she is looking for her medication monitoring labs that are done every 8-12 weeks.  [facility] so they just need to be ordered.    Standing orders have been placed.    Sirena Mcginnis RN  Rheumatology Clinic     PLAN:    INCREASE OXCARBAZEPINE  MG TWICE DAILY    DISCUSSED POSSIBLE SIDE EFFECTS INCLUDING: DIZZINESS, DROWSINESS OR RASH. NOTIFY US IF ANY SIDE EFFECTS.    CONSIDER CBD OIL (CBD WORKS-[facility])    CONSIDER CONTACTING [facility] FOR ASSISTANCE WITH RIDES    RETURN TO CLINIC IN 3 MONTHS FOR FOLLOW UP WITH DR. LA    NO DRIVING DUE TO \"SPELLS\" OF UNAWARENESS (SEIZURES). PT AGAIN EDUCATED ON [place] DMV LAWS.     CALL CLINIC ANYTIME WITH QUESTIONS, CONCERNS OR ANY NEW/WORSENING OF YOUR SYMPTOMS    SEIZURE PRECAUTIONS AS DISCUSSED: NO DRIVING FOR 3 MONTHS FOLLOWING LAST SEIZURE OR UNCONSCIOUS SPELL. MUST REPORT SEIZURE TO [place] DMV. NO SWIMMING, BATHING IN A TUB, CLIMBING TO HEIGHTS OR OPERATING MACHINERY UNTIL CLEARED BY NEUROLOGY.

## 2020-10-28 DIAGNOSIS — L93.0 LUPUS ERYTHEMATOSUS: ICD-10-CM

## 2020-10-28 DIAGNOSIS — M32.15 SYSTEMIC LUPUS ERYTHEMATOSUS WITH TUBULO-INTERSTITIAL NEPHROPATHY, UNSPECIFIED SLE TYPE (H): ICD-10-CM

## 2020-10-28 DIAGNOSIS — Z79.01 LONG TERM CURRENT USE OF ANTICOAGULANT THERAPY: ICD-10-CM

## 2020-10-28 PROCEDURE — 85210 CLOT FACTOR II PROTHROM SPEC: CPT | Mod: 90 | Performed by: PATHOLOGY

## 2020-10-28 PROCEDURE — 99000 SPECIMEN HANDLING OFFICE-LAB: CPT | Performed by: PATHOLOGY

## 2020-10-28 PROCEDURE — 36415 COLL VENOUS BLD VENIPUNCTURE: CPT | Performed by: PATHOLOGY

## 2020-10-29 ENCOUNTER — ANTICOAGULATION THERAPY VISIT (OUTPATIENT)
Dept: ANTICOAGULATION | Facility: CLINIC | Age: 75
End: 2020-10-29

## 2020-10-29 DIAGNOSIS — Z79.01 LONG TERM CURRENT USE OF ANTICOAGULANT THERAPY: ICD-10-CM

## 2020-10-29 DIAGNOSIS — L93.0 LUPUS ERYTHEMATOSUS: ICD-10-CM

## 2020-10-29 LAB — PROTHROM ACT/NOR PPP: 21 % (ref 60–140)

## 2020-10-29 NOTE — PROGRESS NOTES
ANTICOAGULATION FOLLOW-UP CLINIC VISIT    Patient Name:  Shala Caruso  Date:  10/29/2020  Contact Type:  Telephone    SUBJECTIVE:  Patient Findings     Comments:  Factor 2 on 10/28/2020:  21%  Goal range is 15 - 25.        Clinical Outcomes     Comments:  Factor 2 on 10/28/2020:  21%  Goal range is 15 - 25.           OBJECTIVE    Recent labs: (last 7 days)     10/28/20  1551   F2 21*       ASSESSMENT / PLAN  INR assessment THER    Recheck INR In: 4 WEEKS    INR Location Clinic      Anticoagulation Summary  As of 10/29/2020    INR goal:  2.0-3.0   TTR:  20.9 % (3.1 mo)   INR used for dosing:  No new INR was available at the time of this encounter.   Warfarin maintenance plan:  7.5 mg (5 mg x 1.5) every Mon, Wed, Fri; 10 mg (5 mg x 2) all other days   Full warfarin instructions:  7.5 mg every Mon, Wed, Fri; 10 mg all other days   Weekly warfarin total:  62.5 mg   No change documented:  Gwen Abel RN   Plan last modified:  Stacey Beal RN (7/1/2020)   Next INR check:  11/24/2020   Priority:  High   Target end date:  Indefinite    Indications    SLE (systemic lupus erythematosus) (H) (Resolved) [M32.9]  Long term current use of anticoagulant therapy [Z79.01]  Lupus erythematosus [L93.0]             Anticoagulation Episode Summary     INR check location:  Clinic Lab    Preferred lab:      Send INR reminders to:  [facility]    Comments:  Factor 2  goal range is 15-25%, 1/31/20 Not drawing INR  Ok to leave message on home [phone]   May leave messages with Rodriguez Santos  DO NOT GIVE RECORDS TO EMPLOYER U        Anticoagulation Care Providers     Provider Role Specialty Phone number    Joe Contreras MD Referring Internal Medicine [phone]            See the Encounter Report to view Anticoagulation Flowsheet and Dosing Calendar (Go to Encounters tab in chart review, and find the Anticoagulation Therapy Visit)    Left message for patient with results and dosing recommendations.  Asked patient to call back to report any missed doses, falls, signs and symptoms of bleeding or clotting, any changes in health, medication, or diet. Asked patient to call back with any questions or concerns.    Gwen Abel RN

## 2020-11-07 ENCOUNTER — HEALTH MAINTENANCE LETTER (OUTPATIENT)
Age: 75
End: 2020-11-07

## 2020-11-24 DIAGNOSIS — L93.0 LUPUS ERYTHEMATOSUS: ICD-10-CM

## 2020-11-24 DIAGNOSIS — Z79.01 LONG TERM CURRENT USE OF ANTICOAGULANT THERAPY: Primary | ICD-10-CM

## 2020-11-24 PROCEDURE — 85210 CLOT FACTOR II PROTHROM SPEC: CPT | Mod: 90 | Performed by: PATHOLOGY

## 2020-11-24 PROCEDURE — 99000 SPECIMEN HANDLING OFFICE-LAB: CPT | Performed by: PATHOLOGY

## 2020-11-24 PROCEDURE — 36415 COLL VENOUS BLD VENIPUNCTURE: CPT | Performed by: PATHOLOGY

## 2020-11-25 ENCOUNTER — ANTICOAGULATION THERAPY VISIT (OUTPATIENT)
Dept: ANTICOAGULATION | Facility: CLINIC | Age: 75
End: 2020-11-25

## 2020-11-25 DIAGNOSIS — L93.0 LUPUS ERYTHEMATOSUS: ICD-10-CM

## 2020-11-25 DIAGNOSIS — Z79.01 LONG TERM CURRENT USE OF ANTICOAGULANT THERAPY: ICD-10-CM

## 2020-11-25 LAB — PROTHROM ACT/NOR PPP: 18 % (ref 60–140)

## 2020-11-25 NOTE — PROGRESS NOTES
ANTICOAGULATION FOLLOW-UP CLINIC VISIT    Patient Name:  Shala Caruso  Date:  11/25/2020  Contact Type:  Telephone    SUBJECTIVE:         OBJECTIVE    Recent labs: (last 7 days)     11/24/20  1315   F2 18*       ASSESSMENT / PLAN  INR assessment THER    Recheck INR In: 4 WEEKS    INR Location Clinic      Anticoagulation Summary  As of 11/25/2020    INR goal:  2.0-3.0   TTR:  29.5 % (2.2 mo)   INR used for dosing:  No new INR was available at the time of this encounter.   Warfarin maintenance plan:  7.5 mg (5 mg x 1.5) every Mon, Wed, Fri; 10 mg (5 mg x 2) all other days   Full warfarin instructions:  7.5 mg every Mon, Wed, Fri; 10 mg all other days   Weekly warfarin total:  62.5 mg   No change documented:  Portia Cherry RN   Plan last modified:  Stacey Beal RN (7/1/2020)   Next INR check:  12/22/2020   Priority:  High   Target end date:  Indefinite    Indications    SLE (systemic lupus erythematosus) (H) (Resolved) [M32.9]  Long term current use of anticoagulant therapy [Z79.01]  Lupus erythematosus [L93.0]             Anticoagulation Episode Summary     INR check location:  Clinic Lab    Preferred lab:      Send INR reminders to:  [facility]    Comments:  Factor 2  goal range is 15-25%, 1/31/20 Not drawing INR  Ok to leave message on home [phone]   May leave messages with Rodriguez Santos  DO NOT GIVE RECORDS TO EMPLOYER U        Anticoagulation Care Providers     Provider Role Specialty Phone number    Joe Contreras MD Referring Internal Medicine [phone]            See the Encounter Report to view Anticoagulation Flowsheet and Dosing Calendar (Go to Encounters tab in chart review, and find the Anticoagulation Therapy Visit)  Left message for patient with results and dosing recommendations. Asked patient to call back to report any missed doses, falls, signs and symptoms of bleeding or clotting, any changes in health, medication, or diet. Asked patient to call back with  any questions or concerns.  Factor 2=18%.  Goal range =15-25%  Portia Cherry RN

## 2020-12-22 DIAGNOSIS — L93.0 LUPUS ERYTHEMATOSUS: ICD-10-CM

## 2020-12-22 DIAGNOSIS — M32.9 SYSTEMIC LUPUS ERYTHEMATOSUS, UNSPECIFIED SLE TYPE, UNSPECIFIED ORGAN INVOLVEMENT STATUS (H): Primary | ICD-10-CM

## 2020-12-22 DIAGNOSIS — Z79.01 LONG TERM CURRENT USE OF ANTICOAGULANT THERAPY: ICD-10-CM

## 2020-12-22 DIAGNOSIS — M32.15 SYSTEMIC LUPUS ERYTHEMATOSUS WITH TUBULO-INTERSTITIAL NEPHROPATHY, UNSPECIFIED SLE TYPE (H): ICD-10-CM

## 2020-12-22 LAB
ALBUMIN SERPL-MCNC: 3.8 G/DL (ref 3.4–5)
ALBUMIN UR-MCNC: NEGATIVE MG/DL
ALT SERPL W P-5'-P-CCNC: 23 U/L (ref 0–50)
APPEARANCE UR: CLEAR
AST SERPL W P-5'-P-CCNC: 14 U/L (ref 0–45)
BACTERIA #/AREA URNS HPF: ABNORMAL /HPF
BASOPHILS # BLD AUTO: 0 10E9/L (ref 0–0.2)
BASOPHILS NFR BLD AUTO: 1 %
BILIRUB UR QL STRIP: NEGATIVE
COLOR UR AUTO: YELLOW
CREAT SERPL-MCNC: 0.7 MG/DL (ref 0.52–1.04)
DIFFERENTIAL METHOD BLD: ABNORMAL
EOSINOPHIL # BLD AUTO: 0.1 10E9/L (ref 0–0.7)
EOSINOPHIL NFR BLD AUTO: 1.6 %
ERYTHROCYTE [DISTWIDTH] IN BLOOD BY AUTOMATED COUNT: 14.8 % (ref 10–15)
GFR SERPL CREATININE-BSD FRML MDRD: 86 ML/MIN/{1.73_M2}
GLUCOSE UR STRIP-MCNC: NEGATIVE MG/DL
HCT VFR BLD AUTO: 41.1 % (ref 35–47)
HGB BLD-MCNC: 12.8 G/DL (ref 11.7–15.7)
HGB UR QL STRIP: ABNORMAL
IMM GRANULOCYTES # BLD: 0 10E9/L (ref 0–0.4)
IMM GRANULOCYTES NFR BLD: 0.3 %
KETONES UR STRIP-MCNC: NEGATIVE MG/DL
LEUKOCYTE ESTERASE UR QL STRIP: ABNORMAL
LYMPHOCYTES # BLD AUTO: 1.1 10E9/L (ref 0.8–5.3)
LYMPHOCYTES NFR BLD AUTO: 29.7 %
MCH RBC QN AUTO: 28.3 PG (ref 26.5–33)
MCHC RBC AUTO-ENTMCNC: 31.1 G/DL (ref 31.5–36.5)
MCV RBC AUTO: 91 FL (ref 78–100)
MONOCYTES # BLD AUTO: 0.4 10E9/L (ref 0–1.3)
MONOCYTES NFR BLD AUTO: 11.5 %
NEUTROPHILS # BLD AUTO: 2.2 10E9/L (ref 1.6–8.3)
NEUTROPHILS NFR BLD AUTO: 55.9 %
NITRATE UR QL: NEGATIVE
NRBC # BLD AUTO: 0 10*3/UL
NRBC BLD AUTO-RTO: 0 /100
PH UR STRIP: 5 PH (ref 5–7)
PLATELET # BLD AUTO: 243 10E9/L (ref 150–450)
RBC # BLD AUTO: 4.53 10E12/L (ref 3.8–5.2)
RBC #/AREA URNS AUTO: 5 /HPF (ref 0–2)
SOURCE: ABNORMAL
SP GR UR STRIP: 1.02 (ref 1–1.03)
UROBILINOGEN UR STRIP-MCNC: 0 MG/DL (ref 0–2)
WBC # BLD AUTO: 3.8 10E9/L (ref 4–11)
WBC #/AREA URNS AUTO: 8 /HPF (ref 0–5)

## 2020-12-22 PROCEDURE — 82565 ASSAY OF CREATININE: CPT | Performed by: PATHOLOGY

## 2020-12-22 PROCEDURE — 85025 COMPLETE CBC W/AUTO DIFF WBC: CPT | Performed by: PATHOLOGY

## 2020-12-22 PROCEDURE — 85210 CLOT FACTOR II PROTHROM SPEC: CPT | Mod: 90 | Performed by: PATHOLOGY

## 2020-12-22 PROCEDURE — 84460 ALANINE AMINO (ALT) (SGPT): CPT | Performed by: PATHOLOGY

## 2020-12-22 PROCEDURE — 82040 ASSAY OF SERUM ALBUMIN: CPT | Performed by: PATHOLOGY

## 2020-12-22 PROCEDURE — 86225 DNA ANTIBODY NATIVE: CPT | Mod: 90 | Performed by: PATHOLOGY

## 2020-12-22 PROCEDURE — 81001 URINALYSIS AUTO W/SCOPE: CPT | Performed by: PATHOLOGY

## 2020-12-22 PROCEDURE — 36415 COLL VENOUS BLD VENIPUNCTURE: CPT | Performed by: PATHOLOGY

## 2020-12-22 PROCEDURE — 84450 TRANSFERASE (AST) (SGOT): CPT | Performed by: PATHOLOGY

## 2020-12-22 PROCEDURE — 86160 COMPLEMENT ANTIGEN: CPT | Mod: 90 | Performed by: PATHOLOGY

## 2020-12-23 ENCOUNTER — ANTICOAGULATION THERAPY VISIT (OUTPATIENT)
Dept: ANTICOAGULATION | Facility: CLINIC | Age: 75
End: 2020-12-23

## 2020-12-23 DIAGNOSIS — Z79.01 LONG TERM CURRENT USE OF ANTICOAGULANT THERAPY: ICD-10-CM

## 2020-12-23 DIAGNOSIS — L93.0 LUPUS ERYTHEMATOSUS: ICD-10-CM

## 2020-12-23 LAB
C3 SERPL-MCNC: 95 MG/DL (ref 81–157)
C4 SERPL-MCNC: 16 MG/DL (ref 13–39)
PROTHROM ACT/NOR PPP: 25 % (ref 60–140)

## 2020-12-23 NOTE — PROGRESS NOTES
ANTICOAGULATION FOLLOW-UP CLINIC VISIT    Patient Name:  Shala Caruso  Date:  12/23/2020  Contact Type:  Telephone    SUBJECTIVE:         OBJECTIVE    Recent labs: (last 7 days)     12/22/20  1317   F2 25*       ASSESSMENT / PLAN  INR assessment THER    Recheck INR In: 4 WEEKS    INR Location Clinic      Anticoagulation Summary  As of 12/23/2020    INR goal:  2.0-3.0   TTR:  50.1 % (1.2 mo)   INR used for dosing:  No new INR was available at the time of this encounter.   Warfarin maintenance plan:  7.5 mg (5 mg x 1.5) every Mon, Wed, Fri; 10 mg (5 mg x 2) all other days   Full warfarin instructions:  12/23: 10 mg; Otherwise 7.5 mg every Mon, Wed, Fri; 10 mg all other days   Weekly warfarin total:  62.5 mg   Plan last modified:  Stacey Beal, RN (7/1/2020)   Next INR check:  1/20/2021   Priority:  High   Target end date:  Indefinite    Indications    SLE (systemic lupus erythematosus) (H) (Resolved) [M32.9]  Long term current use of anticoagulant therapy [Z79.01]  Lupus erythematosus [L93.0]             Anticoagulation Episode Summary     INR check location:  Clinic Lab    Preferred lab:      Send INR reminders to:  [facility]    Comments:  Factor 2  goal range is 15-25%, 1/31/20 Not drawing INR  Ok to leave message on home [phone]   May leave messages with Rodriguez Santos  DO NOT GIVE RECORDS TO EMPLOYER U        Anticoagulation Care Providers     Provider Role Specialty Phone number    Joe Contreras MD Referring Internal Medicine [phone]            See the Encounter Report to view Anticoagulation Flowsheet and Dosing Calendar (Go to Encounters tab in chart review, and find the Anticoagulation Therapy Visit)  Left message for patient with results and dosing recommendations. Asked patient to call back to report any missed doses, falls, signs and symptoms of bleeding or clotting, any changes in health, medication, or diet. Asked patient to call back with any questions or  concerns.  Factor 2=25%.  Goal range =15-25%    Portia Cherry RN

## 2020-12-24 LAB — DSDNA AB SER-ACNC: 1 IU/ML

## 2020-12-31 DIAGNOSIS — K22.0 ACHALASIA OF ESOPHAGUS: ICD-10-CM

## 2021-01-05 ENCOUNTER — MYC MEDICAL ADVICE (OUTPATIENT)
Dept: ENDOCRINOLOGY | Facility: CLINIC | Age: 76
End: 2021-01-05

## 2021-01-05 RX ORDER — NIFEDIPINE 30 MG/1
30 TABLET, EXTENDED RELEASE ORAL DAILY
Qty: 90 TABLET | Refills: 0 | Status: SHIPPED | OUTPATIENT
Start: 2021-01-05 | End: 2021-03-25

## 2021-01-05 NOTE — PATIENT INSTRUCTIONS
We appreciate your assistance in coordinating your healthcare.     Please upload your insulin pump, blood sugar meter and/or continuous glucose monitor at home 1-2 days before your next diabetes-related appointment.   This will allow your provider to review your  data before your scheduled virtual visit.    To ask a question to your Endocrine care team, please send them a On The Bill message, or reach them by phone at [phone]     To expedite your medication refill(s), please contact your pharmacy and have them   fax a refill request to: [phone].  *Please allow 3 business days for routine medication refills.  *Please allow 5 business days for controlled substance medication refills.    For after-hours urgent Endocrine issues, that do not require 751, please dial [phone], and ask to speak with the Endocrinologist On-Call

## 2021-01-05 NOTE — PROGRESS NOTES
Outcome for 01/05/21 10:19 AM :Left Voicemail for patient to call back- [employer]  Outcome for 01/05/21 3:45 PM :Reached patient but they declined to share any data because she was not at home will send via [employer]   Shala Caruso is a 75 year old female who is being evaluated via a billable telephone visit.      What phone number would you like to be contacted at? [phone]   How would you like to obtain your AVS? [employer]

## 2021-01-07 ENCOUNTER — VIRTUAL VISIT (OUTPATIENT)
Dept: ENDOCRINOLOGY | Facility: CLINIC | Age: 76
End: 2021-01-07
Payer: COMMERCIAL

## 2021-01-07 DIAGNOSIS — E78.00 HYPERCHOLESTEROLEMIA: ICD-10-CM

## 2021-01-07 DIAGNOSIS — M81.0 OSTEOPOROSIS, POSTMENOPAUSAL: ICD-10-CM

## 2021-01-07 DIAGNOSIS — E10.649 TYPE 1 DIABETES MELLITUS WITH HYPOGLYCEMIA AND WITHOUT COMA (H): Primary | ICD-10-CM

## 2021-01-07 PROCEDURE — 99214 OFFICE O/P EST MOD 30 MIN: CPT | Mod: 95 | Performed by: INTERNAL MEDICINE

## 2021-01-07 NOTE — PROGRESS NOTES
Due to the COVID 19 pandemic this visit was converted to a telephone visit in order to help prevent spread of infection in this patient and the general population.     Phone call duration 17 minutes  Phone call started 9:44 am.     Due to the COVID 19 pandemic this visit was converted to a video visit in order to help prevent spread of infection in this patient and the general population.     Siddharth Caruso is a 75 year old pleasant female with hx of SLE, APLS, DVT, osteoporosis and type 1 diabetes presenting for f/up.      1. Type 1 diabetes     Most recent hemoglobin A1c today was 5.1%, on 7/9/20.   The patient is scheduled to have lab work done on January 20th.     At her last appointment, Lantus was discontinued.  The patient reports taking an approximately total daily dose of NovoLog 6 units.  The recommended insulin to carbohydrate ratio is 1 unit per 20 g.  She continues to use an echo NovoLog pen.    I reviewed the glucometer readings:    1/6/2021  104    89  1/5/2021   85      73       83      86  1/4/2021  113     91       77       79  1/3/2021     85     94                  94  1/2/2021  110    112    96        71  1/1/2021  119     87      111      94  12/31/2020   89   114    102    76  12/30/2020    137  102    100    83  12/29/2020    85     106    92      93  12/28/2020     131    102   88     64  12/27/2020     106    108    105   82  12/26/2020     106       99   104    FORGOT  12/25/2020     93       90      102   71  12/24/2020    112  116      92      77  12/23/2020     86      81      89      90    Diabetes complications:   No h/o microalbuminuria.  Last eye exam - August 2020; no DR. S/P cataract surgery  Numbness and tingling sensation B/L toes - for many years but light sensation is intact. She does wear comfortable shoes/insoles. The neuropathy is stable, per patient. 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  She develops confusion, inability to concentrate or  focus her vision and she feels extremely tired when her blood sugar is the lower 60s or 50s.  She has no prior episodes of loss of consciousness due to hypoglycemia.  Baqsimi was prescribed at her last visit here.    2. Osteoporosis    Jeanmarie also has a history of osteoporosis, treated with Boniva for almost 3 years, followed by Forteo which was started in 4/12 - interrupted treatment from 4/2013 and restarted in 7/2013 until July 2014. After she completed the treatment with Forteo, she received one dose of Reclast, in July 2014.      She has no history of prior bone fractures.  She's been off prednisone since 2013.  Her height has decreased over the years from 5'6'' to 5'1/2''. Her mother had a hip fracture in her 80s.     On the DXA scan images from 7/1/16, L1-L3 T score was - 1.  Overall, at the spine, there was a significant increase of bone mineral density since 2005 of 10.5%. Since 2015, the bone mineral density has remained stable at spine. The lowest T score at the hip level was -2.2, at the left femoral neck.  Overall, there was a significant improvement of bone mineral density at the mean hip of 8.9% since 2005, with no significant changes since 2015. While the bone mineral density at the left hip increased since baseline, at the right hip, there was a decrease of bone mineral density since baseline and also, since prior year.    On the DEXA scan from 7/27/18, the lowest T score was -2.1, at the left femoral neck.  Compared with the prior scan from 2016, the bone mineral density at the hips remained stable.  At the lumbar spine, L1-L3 T score was -1.3, not significantly changed since 2016.    I reviewed the most recent DEXA scan images from January 2020. L2-L4 T score was -1.5. At the hips, the lowest score was -2.3, at the left femoral neck. Compared with the prior DEXA scan from 2018, there was a significant decrease in bone mineral density of the lumbar spine, left total hip and right total hip by 3.1,  4.7 and 3.5%, respectively.  The patient received a zoledronic acid infusion on 7/30/2020.    The current dose of calcium and vitamin D is 500 mg/200 U TID (oyster shell) and a daily Centrum Silver MVI which contains 1000 U vitamin D per tablet.  The patient has increase the dose of calcium by herself to 3 tablets daily.  She continues to have green vegetables in her diet.  In terms of dairy products, she does have a yogurt daily and small amounts of cheese.    Physical activity consists of morning yoga and walking, 30 to 60 minutes daily.     Current Outpatient Medications   Medication     acetaminophen (TYLENOL) 500 MG tablet     aspirin 81 MG tablet     AYR SALINE NASAL NO-DRIP GEL     Calcium Carbonate-Vitamin D (CALCIUM 500 + D PO)     carboxymethylcellulose PF (REFRESH PLUS) 0.5 % SOLN ophthalmic solution     Glucagon (BAQSIMI ONE PACK) 3 MG/DOSE POWD     Injection Device for insulin (NOVOPEN ECHO) RORY     Multiple Vitamins-Minerals (CENTRUM SILVER PO)     mycophenolate (GENERIC EQUIVALENT) 500 MG tablet     NIFEdipine ER OSMOTIC (PROCARDIA XL) 30 MG 24 hr tablet     simvastatin (ZOCOR) 40 MG tablet     warfarin (COUMADIN) 5 MG tablet     warfarin (COUMADIN) 5 MG tablet     warfarin ANTICOAGULANT (COUMADIN) 5 MG tablet     warfarin ANTICOAGULANT (COUMADIN) 5 MG tablet     blood glucose (TRUE METRIX BLOOD GLUCOSE TEST) test strip     insulin glargine (LANTUS PEN) 100 UNIT/ML pen     insulin pen needle (BD PAM U/F) 32G X 4 MM miscellaneous     LANCETS ULTRA THIN 30G MISC     NOVOLOG PENFILL 100 UNIT/ML soln     order for DME     TRUE METRIX BLOOD GLUCOSE TEST test strip     No current facility-administered medications for this visit.      ROS   Systemic symptoms: weight fairly stable  Musculoskeletal symptoms: recurrent episodes of pain which affect the third to fifth left toes; bilateral knee pain; joint stiffness  Neurological symptoms: numbness and tingling sensation b/l toes   Skin symptoms: split lesions  of the tips of her fingers/toes - for years; has seen dermatology in the past    Family Hx   Maternal grandmother DM2. First cousin with RA. Mother, maternal grandmother and aunt, cousin diagnosed with thyroid disorders (? hypothyroidism). Mother and maternal grandmother had breast cancer.    Personal Hx   Behavioral history: No tobacco use.  Home environment: No secondhand tobacco smoke in home.  Denies smoking, drinking alcohol or using illicit drugs. , with one child. 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  Menopause at age 59. Had regular MP in the past. No h/o bone fractures or kidney stones.    PMH   SLE dx in 2000 with flare/pericarditis and tamponade on 3/5/2010 requiring high doses of steroids.  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  Osteoporosis diagnosed 2008 started on Boniva in 2010 (heartburn from oral fosamax).   Hyperlipidemia.  Severe GERD and Achalasia.  Raynaud's  Allergy to multiple meds  Vit D def.  Post thrombophlebitic syndrome with chronic LE swelling   Dyslipidemia  Chronic leukopenia on methotrexate   L arm lymphedema   Type 1 diabetes  Prolapsed uterus   Cataract   Vale Zoster     Physical Exam   Wt Readings from Last 10 Encounters:   07/30/20 52.6 kg (116 lb)   02/05/19 56.7 kg (125 lb)   01/08/19 54.1 kg (119 lb 4.8 oz)   07/30/18 54.1 kg (119 lb 3.2 oz)   01/29/18 54 kg (119 lb)   08/02/17 52.8 kg (116 lb 8 oz)   07/31/17 53.2 kg (117 lb 4.8 oz)   05/22/17 53.9 kg (118 lb 14.4 oz)   04/13/17 54.7 kg (120 lb 9.6 oz)   04/04/17 55 kg (121 lb 4.8 oz)     LMP  (LMP Unknown)     Objective:  Psych: Alert and oriented times 3; coherent speech, normal   rate and volume. The affect is normal.    Lab Results  I reviewed prior lab results documented in EPIC.   Lab Results   Component Value Date    A1C 5.1 07/09/2020    A1C 5.3 07/11/2017    A1C 5.5 06/10/2013    A1C 5.4 01/07/2013    A1C 5.5 07/02/2012    HEMOGLOBINA1 5.1 01/13/2020     HEMOGLOBINA1 5.1 07/15/2019    HEMOGLOBINA1 5.0 07/30/2018    HEMOGLOBINA1 5.0 01/29/2018    HEMOGLOBINA1 5.0 01/23/2017       Hemoglobin   Date Value Ref Range Status   12/22/2020 12.8 11.7 - 15.7 g/dL Final     Hematocrit   Date Value Ref Range Status   12/22/2020 41.1 35.0 - 47.0 % Final     Cholesterol   Date Value Ref Range Status   07/09/2020 162 <200 mg/dL Final     Cholesterol/HDL Ratio   Date Value Ref Range Status   10/01/2014 1.5 0.0 - 5.0 Final     HDL Cholesterol   Date Value Ref Range Status   07/09/2020 92 >49 mg/dL Final     LDL Cholesterol Calculated   Date Value Ref Range Status   07/09/2020 59 <100 mg/dL Final     Comment:     Desirable:       <100 mg/dl     VLDL-Cholesterol   Date Value Ref Range Status   10/01/2014 7 0 - 30 mg/dL Final     Triglycerides   Date Value Ref Range Status   07/09/2020 53 <150 mg/dL Final     Albumin Urine mg/L   Date Value Ref Range Status   07/09/2020 <5 mg/L Final     TSH   Date Value Ref Range Status   07/09/2019 0.99 0.40 - 4.00 mU/L Final       Last Basic Metabolic Panel:    Sodium   Date Value Ref Range Status   07/09/2019 140 133 - 144 mmol/L Final     Potassium   Date Value Ref Range Status   07/09/2019 3.9 3.4 - 5.3 mmol/L Final     Chloride   Date Value Ref Range Status   07/09/2019 107 94 - 109 mmol/L Final     Calcium   Date Value Ref Range Status   07/30/2020 9.3 8.5 - 10.1 mg/dL Final     Carbon Dioxide   Date Value Ref Range Status   07/09/2019 28 20 - 32 mmol/L Final     Urea Nitrogen   Date Value Ref Range Status   07/09/2019 23 7 - 30 mg/dL Final     Creatinine   Date Value Ref Range Status   12/22/2020 0.70 0.52 - 1.04 mg/dL Final     GFR Estimate   Date Value Ref Range Status   12/22/2020 86 >60 mL/min/[1.73_m2] Final     Comment:     Non  GFR Calc  Starting 12/18/2018, serum creatinine based estimated GFR (eGFR) will be   calculated using the Chronic Kidney Disease Epidemiology Collaboration   (CKD-EPI) equation.       Glucose    Date Value Ref Range Status   07/09/2019 93 70 - 99 mg/dL Final       AST   Date Value Ref Range Status   12/22/2020 14 0 - 45 U/L Final     ALT   Date Value Ref Range Status   12/22/2020 23 0 - 50 U/L Final     Albumin   Date Value Ref Range Status   12/22/2020 3.8 3.4 - 5.0 g/dL Final       Assessment     1. Type 1 diabetes with very good glycemic control, complicated by partial hypoglycemia unawareness.  Also, the hypoglycemic episodes have significantly improved since the discontinuation of Lantus.  They are now much more rare and mild.  I recommended to continue to take the NovoLog as instructed and try to aim for higher blood sugar numbers.  I reassured the patient blood sugar numbers between 100-120 are at goal.   Follow-up urine microalbumin, A1c, CMP.    2.  Osteoporosis   Risk factors for osteoporosis identified: Postmenopausal status, lupus, prior treatment with steroids, diabetes, family history.  She was treated with Boniva for 3 years, followed by Forteo for 2 years. Received one dose of Reclast in July 2014, when she completed treatment with Forteo, and a second dose in July 2020, when the DEXA scan revealed some loss of bone mineral density.  The plan is to schedule a second dose of Reclast in July 2021.  I am going to order this at her next appointment, in 6 months.  Follow-up DEXA scan to be done in July 2022.   I also recommended to decrease the dose of calcium to 2 tablets daily.  Follow-up calcium, PTH and albumin level.    3. Hypercholesterolemia - on 40 mg simvastatin.  Follow-up lipid panel    Return to clinic 6 months.    Orders Placed This Encounter   Procedures     Albumin Random Urine Quantitative with Creat Ratio     Comprehensive metabolic panel     Lipid panel reflex to direct LDL Fasting     Hemoglobin A1c     Hemoglobin A1c     Parathyroid Hormone Intact     25 Hydroxyvitamin D2 and D3

## 2021-01-07 NOTE — LETTER
1/7/2021       RE: Shala Caruso  [address]  [place] [address]     Dear Colleague,    Thank you for referring your patient, Shala Caruso, to the [facility] ENDOCRINOLOGY CLINIC [place] at [facility]. Please see a copy of my visit note below.    Outcome for 01/05/21 10:19 AM :Left Voicemail for patient to call back- soj  Outcome for 01/05/21 3:45 PM :Reached patient but they declined to share any data because she was not at home will send via [employer]   Shala Caruso is a 75 year old female who is being evaluated via a billable telephone visit.      What phone number would you like to be contacted at? [phone]   How would you like to obtain your AVS? Fl[employer]          Due to the COVID 19 pandemic this visit was converted to a telephone visit in order to help prevent spread of infection in this patient and the general population.     Phone call duration 17 minutes  Phone call started 9:44 am.     Due to the COVID 19 pandemic this visit was converted to a video visit in order to help prevent spread of infection in this patient and the general population.     Siddharth Ms. Caruso is a 75 year old pleasant female with hx of SLE, APLS, DVT, osteoporosis and type 1 diabetes presenting for f/up.      1. Type 1 diabetes     Most recent hemoglobin A1c today was 5.1%, on 7/9/20.   The patient is scheduled to have lab work done on January 20th.     At her last appointment, Lantus was discontinued.  The patient reports taking an approximately total daily dose of NovoLog 6 units.  The recommended insulin to carbohydrate ratio is 1 unit per 20 g.  She continues to use an echo NovoLog pen.    I reviewed the glucometer readings:    1/6/2021  104    89  1/5/2021   85      73       83      86  1/4/2021  113     91       77       79  1/3/2021     85     94                  94  1/2/2021  110    112    96        71  1/1/2021  119     87      111      94  12/31/2020    89   114    102    76  12/30/2020    137  102    100    83  12/29/2020    85     106    92      93  12/28/2020     131    102   88     64  12/27/2020     106    108    105   82  12/26/2020     106       99   104    FORGOT  12/25/2020     93       90      102   71  12/24/2020    112  116      92      77  12/23/2020     86      81      89      90    Diabetes complications:   No h/o microalbuminuria.  Last eye exam - August 2020; no DR. S/P cataract surgery  Numbness and tingling sensation B/L toes - for many years but light sensation is intact. She does wear comfortable shoes/insoles. The neuropathy is stable, per patient. 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  She develops confusion, inability to concentrate or focus her vision and she feels extremely tired when her blood sugar is the lower 60s or 50s.  She has no prior episodes of loss of consciousness due to hypoglycemia.  Baqsimi was prescribed at her last visit here.    2. Osteoporosis    Jeanmarie also has a history of osteoporosis, treated with Boniva for almost 3 years, followed by Forteo which was started in 4/12 - interrupted treatment from 4/2013 and restarted in 7/2013 until July 2014. After she completed the treatment with Forteo, she received one dose of Reclast, in July 2014.      She has no history of prior bone fractures.  She's been off prednisone since 2013.  Her height has decreased over the years from 5'6'' to 5'1/2''. Her mother had a hip fracture in her 80s.     On the DXA scan images from 7/1/16, L1-L3 T score was - 1.  Overall, at the spine, there was a significant increase of bone mineral density since 2005 of 10.5%. Since 2015, the bone mineral density has remained stable at spine. The lowest T score at the hip level was -2.2, at the left femoral neck.  Overall, there was a significant improvement of bone mineral density at the mean hip of 8.9% since 2005, with no significant changes since 2015. While the bone mineral  density at the left hip increased since baseline, at the right hip, there was a decrease of bone mineral density since baseline and also, since prior year.    On the DEXA scan from 7/27/18, the lowest T score was -2.1, at the left femoral neck.  Compared with the prior scan from 2016, the bone mineral density at the hips remained stable.  At the lumbar spine, L1-L3 T score was -1.3, not significantly changed since 2016.    I reviewed the most recent DEXA scan images from January 2020. L2-L4 T score was -1.5. At the hips, the lowest score was -2.3, at the left femoral neck. Compared with the prior DEXA scan from 2018, there was a significant decrease in bone mineral density of the lumbar spine, left total hip and right total hip by 3.1, 4.7 and 3.5%, respectively.  The patient received a zoledronic acid infusion on 7/30/2020.    The current dose of calcium and vitamin D is 500 mg/200 U TID (oyster shell) and a daily Centrum Silver MVI which contains 1000 U vitamin D per tablet.  The patient has increase the dose of calcium by herself to 3 tablets daily.  She continues to have green vegetables in her diet.  In terms of dairy products, she does have a yogurt daily and small amounts of cheese.    Physical activity consists of morning yoga and walking, 30 to 60 minutes daily.     Current Outpatient Medications   Medication     acetaminophen (TYLENOL) 500 MG tablet     aspirin 81 MG tablet     AYR SALINE NASAL NO-DRIP GEL     Calcium Carbonate-Vitamin D (CALCIUM 500 + D PO)     carboxymethylcellulose PF (REFRESH PLUS) 0.5 % SOLN ophthalmic solution     Glucagon (BAQSIMI ONE PACK) 3 MG/DOSE POWD     Injection Device for insulin (NOVOPEN ECHO) RORY     Multiple Vitamins-Minerals (CENTRUM SILVER PO)     mycophenolate (GENERIC EQUIVALENT) 500 MG tablet     NIFEdipine ER OSMOTIC (PROCARDIA XL) 30 MG 24 hr tablet     simvastatin (ZOCOR) 40 MG tablet     warfarin (COUMADIN) 5 MG tablet     warfarin (COUMADIN) 5 MG tablet      warfarin ANTICOAGULANT (COUMADIN) 5 MG tablet     warfarin ANTICOAGULANT (COUMADIN) 5 MG tablet     blood glucose (TRUE METRIX BLOOD GLUCOSE TEST) test strip     insulin glargine (LANTUS PEN) 100 UNIT/ML pen     insulin pen needle (BD PAM U/F) 32G X 4 MM miscellaneous     LANCETS ULTRA THIN 30G [facility]     NOVOLOG PENFILL 100 UNIT/ML soln     order for DME     TRUE METRIX BLOOD GLUCOSE TEST test strip     No current facility-administered medications for this visit.      ROS   Systemic symptoms: weight fairly stable  Musculoskeletal symptoms: recurrent episodes of pain which affect the third to fifth left toes; bilateral knee pain; joint stiffness  Neurological symptoms: numbness and tingling sensation b/l toes   Skin symptoms: split lesions of the tips of her fingers/toes - for years; has seen dermatology in the past    Family Hx   Maternal grandmother DM2. First cousin with RA. Mother, maternal grandmother and aunt, cousin diagnosed with thyroid disorders (? hypothyroidism). Mother and maternal grandmother had breast cancer.    Personal Hx   Behavioral history: No tobacco use.  Home environment: No secondhand tobacco smoke in home.  Denies smoking, drinking alcohol or using illicit drugs. , with one child. 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  Menopause at age 59. Had regular MP in the past. No h/o bone fractures or kidney stones.    PMH   SLE dx in 2000 with flare/pericarditis and tamponade on 3/5/2010 requiring high doses of steroids.  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  Osteoporosis diagnosed 2008 started on Boniva in 2010 (heartburn from oral fosamax).   Hyperlipidemia.  Severe GERD and Achalasia.  Raynaud's  Allergy to multiple meds  Vit D def.  Post thrombophlebitic syndrome with chronic LE swelling   Dyslipidemia  Chronic leukopenia on methotrexate   L arm lymphedema   Type 1 diabetes  Prolapsed uterus   Cataract   Vale Zoster     Physical  Exam   Wt Readings from Last 10 Encounters:   07/30/20 52.6 kg (116 lb)   02/05/19 56.7 kg (125 lb)   01/08/19 54.1 kg (119 lb 4.8 oz)   07/30/18 54.1 kg (119 lb 3.2 oz)   01/29/18 54 kg (119 lb)   08/02/17 52.8 kg (116 lb 8 oz)   07/31/17 53.2 kg (117 lb 4.8 oz)   05/22/17 53.9 kg (118 lb 14.4 oz)   04/13/17 54.7 kg (120 lb 9.6 oz)   04/04/17 55 kg (121 lb 4.8 oz)     LMP  (LMP Unknown)     Objective:  Psych: Alert and oriented times 3; coherent speech, normal   rate and volume. The affect is normal.    Lab Results  I reviewed prior lab results documented in EPIC.   Lab Results   Component Value Date    A1C 5.1 07/09/2020    A1C 5.3 07/11/2017    A1C 5.5 06/10/2013    A1C 5.4 01/07/2013    A1C 5.5 07/02/2012    HEMOGLOBINA1 5.1 01/13/2020    HEMOGLOBINA1 5.1 07/15/2019    HEMOGLOBINA1 5.0 07/30/2018    HEMOGLOBINA1 5.0 01/29/2018    HEMOGLOBINA1 5.0 01/23/2017       Hemoglobin   Date Value Ref Range Status   12/22/2020 12.8 11.7 - 15.7 g/dL Final     Hematocrit   Date Value Ref Range Status   12/22/2020 41.1 35.0 - 47.0 % Final     Cholesterol   Date Value Ref Range Status   07/09/2020 162 <200 mg/dL Final     Cholesterol/HDL Ratio   Date Value Ref Range Status   10/01/2014 1.5 0.0 - 5.0 Final     HDL Cholesterol   Date Value Ref Range Status   07/09/2020 92 >49 mg/dL Final     LDL Cholesterol Calculated   Date Value Ref Range Status   07/09/2020 59 <100 mg/dL Final     Comment:     Desirable:       <100 mg/dl     VLDL-Cholesterol   Date Value Ref Range Status   10/01/2014 7 0 - 30 mg/dL Final     Triglycerides   Date Value Ref Range Status   07/09/2020 53 <150 mg/dL Final     Albumin Urine mg/L   Date Value Ref Range Status   07/09/2020 <5 mg/L Final     TSH   Date Value Ref Range Status   07/09/2019 0.99 0.40 - 4.00 mU/L Final       Last Basic Metabolic Panel:    Sodium   Date Value Ref Range Status   07/09/2019 140 133 - 144 mmol/L Final     Potassium   Date Value Ref Range Status   07/09/2019 3.9 3.4 - 5.3  mmol/L Final     Chloride   Date Value Ref Range Status   07/09/2019 107 94 - 109 mmol/L Final     Calcium   Date Value Ref Range Status   07/30/2020 9.3 8.5 - 10.1 mg/dL Final     Carbon Dioxide   Date Value Ref Range Status   07/09/2019 28 20 - 32 mmol/L Final     Urea Nitrogen   Date Value Ref Range Status   07/09/2019 23 7 - 30 mg/dL Final     Creatinine   Date Value Ref Range Status   12/22/2020 0.70 0.52 - 1.04 mg/dL Final     GFR Estimate   Date Value Ref Range Status   12/22/2020 86 >60 mL/min/[1.73_m2] Final     Comment:     Non  GFR Calc  Starting 12/18/2018, serum creatinine based estimated GFR (eGFR) will be   calculated using the Chronic Kidney Disease Epidemiology Collaboration   (CKD-EPI) equation.       Glucose   Date Value Ref Range Status   07/09/2019 93 70 - 99 mg/dL Final       AST   Date Value Ref Range Status   12/22/2020 14 0 - 45 U/L Final     ALT   Date Value Ref Range Status   12/22/2020 23 0 - 50 U/L Final     Albumin   Date Value Ref Range Status   12/22/2020 3.8 3.4 - 5.0 g/dL Final       Assessment     1. Type 1 diabetes with very good glycemic control, complicated by partial hypoglycemia unawareness.  Also, the hypoglycemic episodes have significantly improved since the discontinuation of Lantus.  They are now much more rare and mild.  I recommended to continue to take the NovoLog as instructed and try to aim for higher blood sugar numbers.  I reassured the patient blood sugar numbers between 100-120 are at goal.   Follow-up urine microalbumin, A1c, CMP.    2.  Osteoporosis   Risk factors for osteoporosis identified: Postmenopausal status, lupus, prior treatment with steroids, diabetes, family history.  She was treated with Boniva for 3 years, followed by Forteo for 2 years. Received one dose of Reclast in July 2014, when she completed treatment with Forteo, and a second dose in July 2020, when the DEXA scan revealed some loss of bone mineral density.  The plan is to  schedule a second dose of Reclast in July 2021.  I am going to order this at her next appointment, in 6 months.  Follow-up DEXA scan to be done in July 2022.   I also recommended to decrease the dose of calcium to 2 tablets daily.  Follow-up calcium, PTH and albumin level.    3. Hypercholesterolemia - on 40 mg simvastatin.  Follow-up lipid panel    Return to clinic 6 months.    Orders Placed This Encounter   Procedures     Albumin Random Urine Quantitative with Creat Ratio     Comprehensive metabolic panel     Lipid panel reflex to direct LDL Fasting     Hemoglobin A1c     Hemoglobin A1c     Parathyroid Hormone Intact     25 Hydroxyvitamin D2 and D3       Again, thank you for allowing me to participate in the care of your patient.      Sincerely,    Dorita Cramer MD

## 2021-01-11 DIAGNOSIS — D68.61 ANTIPHOSPHOLIPID SYNDROME (H): Primary | ICD-10-CM

## 2021-01-11 DIAGNOSIS — I82.4Y9 DEEP VEIN THROMBOSIS (DVT) OF PROXIMAL LOWER EXTREMITY, UNSPECIFIED CHRONICITY, UNSPECIFIED LATERALITY (H): ICD-10-CM

## 2021-01-11 RX ORDER — WARFARIN SODIUM 5 MG/1
TABLET ORAL
Qty: 180 TABLET | Refills: 1 | Status: SHIPPED | OUTPATIENT
Start: 2021-01-11 | End: 2021-04-21

## 2021-01-15 ENCOUNTER — HEALTH MAINTENANCE LETTER (OUTPATIENT)
Age: 76
End: 2021-01-15

## 2021-01-20 DIAGNOSIS — Z79.01 LONG TERM CURRENT USE OF ANTICOAGULANT THERAPY: ICD-10-CM

## 2021-01-20 DIAGNOSIS — E10.649 TYPE 1 DIABETES MELLITUS WITH HYPOGLYCEMIA AND WITHOUT COMA (H): ICD-10-CM

## 2021-01-20 DIAGNOSIS — L93.0 LUPUS ERYTHEMATOSUS: ICD-10-CM

## 2021-01-20 LAB — HBA1C MFR BLD: 5.6 % (ref 0–5.6)

## 2021-01-20 PROCEDURE — 36415 COLL VENOUS BLD VENIPUNCTURE: CPT | Performed by: PATHOLOGY

## 2021-01-20 PROCEDURE — 85210 CLOT FACTOR II PROTHROM SPEC: CPT | Mod: 90 | Performed by: PATHOLOGY

## 2021-01-20 PROCEDURE — 83036 HEMOGLOBIN GLYCOSYLATED A1C: CPT | Performed by: PATHOLOGY

## 2021-01-20 PROCEDURE — 99000 SPECIMEN HANDLING OFFICE-LAB: CPT | Performed by: PATHOLOGY

## 2021-01-21 ENCOUNTER — ANTICOAGULATION THERAPY VISIT (OUTPATIENT)
Dept: ANTICOAGULATION | Facility: CLINIC | Age: 76
End: 2021-01-21

## 2021-01-21 DIAGNOSIS — Z79.01 LONG TERM CURRENT USE OF ANTICOAGULANT THERAPY: ICD-10-CM

## 2021-01-21 DIAGNOSIS — L93.0 LUPUS ERYTHEMATOSUS: ICD-10-CM

## 2021-01-21 LAB — PROTHROM ACT/NOR PPP: 43 % (ref 60–140)

## 2021-01-21 NOTE — PROGRESS NOTES
ANTICOAGULATION FOLLOW-UP CLINIC VISIT    Patient Name:  Shala Caruso  Date:  1/21/2021  Contact Type:  Telephone    SUBJECTIVE:  Patient Findings     Comments:  Factor 2=43  Goal 15-25        Clinical Outcomes     Comments:  Factor 2=43  Goal 15-25           OBJECTIVE    Recent labs: (last 7 days)     01/20/21  1335   F2 43*       ASSESSMENT / PLAN  No question data found.  Anticoagulation Summary  As of 1/21/2021    INR goal:  2.0-3.0   TTR:  78.9 % (1.1 wk)   INR used for dosing:  No new INR was available at the time of this encounter.   Warfarin maintenance plan:  10 mg (5 mg x 2) every day   Full warfarin instructions:  10 mg every day   Weekly warfarin total:  70 mg   Plan last modified:  Stacey Beal RN (1/21/2021)   Next INR check:  1/28/2021   Priority:  High   Target end date:  Indefinite    Indications    SLE (systemic lupus erythematosus) (H) (Resolved) [M32.9]  Long term current use of anticoagulant therapy [Z79.01]  Lupus erythematosus [L93.0]             Anticoagulation Episode Summary     INR check location:  Clinic Lab    Preferred lab:      Send INR reminders to:  [facility]    Comments:  Factor 2  goal range is 15-25%, 1/31/20 Not drawing INR  Ok to leave message on home [phone]   May leave messages with Rodriguez Santos  DO NOT GIVE RECORDS TO EMPLOYER U        Anticoagulation Care Providers     Provider Role Specialty Phone number    Joe Contreras MD Referring Internal Medicine [phone]            See the Encounter Report to view Anticoagulation Flowsheet and Dosing Calendar (Go to Encounters tab in chart review, and find the Anticoagulation Therapy Visit)    Left message for patient with results and dosing recommendations. Asked patient to call back to report any missed doses, falls, signs and symptoms of bleeding or clotting, any changes in health, medication, or diet. Asked patient to call back with any questions or concerns.      Stacey Beal RN

## 2021-01-28 DIAGNOSIS — L93.0 LUPUS ERYTHEMATOSUS: ICD-10-CM

## 2021-01-28 DIAGNOSIS — Z79.01 LONG TERM CURRENT USE OF ANTICOAGULANT THERAPY: ICD-10-CM

## 2021-01-28 PROCEDURE — 99000 SPECIMEN HANDLING OFFICE-LAB: CPT | Performed by: PATHOLOGY

## 2021-01-28 PROCEDURE — 36415 COLL VENOUS BLD VENIPUNCTURE: CPT | Performed by: PATHOLOGY

## 2021-01-28 PROCEDURE — 85210 CLOT FACTOR II PROTHROM SPEC: CPT | Mod: 90 | Performed by: PATHOLOGY

## 2021-01-29 ENCOUNTER — ANTICOAGULATION THERAPY VISIT (OUTPATIENT)
Dept: ANTICOAGULATION | Facility: CLINIC | Age: 76
End: 2021-01-29

## 2021-01-29 DIAGNOSIS — L93.0 LUPUS ERYTHEMATOSUS: ICD-10-CM

## 2021-01-29 DIAGNOSIS — Z79.01 LONG TERM CURRENT USE OF ANTICOAGULANT THERAPY: ICD-10-CM

## 2021-01-29 LAB
FACTOR 2 ASSAY: NORMAL
PROTHROM ACT/NOR PPP: 35 % (ref 60–140)

## 2021-01-29 NOTE — PROGRESS NOTES
ANTICOAGULATION FOLLOW-UP CLINIC VISIT    Patient Name:  Shala Caruso  Date:  1/29/2021  Contact Type:  Telephone    SUBJECTIVE:  Patient Findings     Comments:  Factor 2 result is 35% on 1/28.  Spoke with patient. She had mistakenly taken less Coumadin, and has remedied her dosing by filling her pill box with this writer on the phone.        Clinical Outcomes     Comments:  Factor 2 result is 35% on 1/28.  Spoke with patient. She had mistakenly taken less Coumadin, and has remedied her dosing by filling her pill box with this writer on the phone.           OBJECTIVE    Recent labs: (last 7 days)     01/28/21  1337   F2 35*       ASSESSMENT / PLAN  INR assessment SUB    Recheck INR In: 2 WEEKS    INR Location Clinic      Anticoagulation Summary  As of 1/29/2021    INR goal:  2.0-3.0   TTR:  --   INR used for dosing:  No new INR was available at the time of this encounter.   Warfarin maintenance plan:  12.5 mg (5 mg x 2.5) every Fri; 10 mg (5 mg x 2) all other days   Full warfarin instructions:  12.5 mg every Fri; 10 mg all other days   Weekly warfarin total:  72.5 mg   Plan last modified:  Andre Bledsoe, RN (1/29/2021)   Next INR check:  2/11/2021   Priority:  High   Target end date:  Indefinite    Indications    SLE (systemic lupus erythematosus) (H) (Resolved) [M32.9]  Long term current use of anticoagulant therapy [Z79.01]  Lupus erythematosus [L93.0]             Anticoagulation Episode Summary     INR check location:  Clinic Lab    Preferred lab:      Send INR reminders to:  [facility]    Comments:  Factor 2  goal range is 15-25%, 1/31/20 Not drawing INR  Ok to leave message on home [phone]   May leave messages with Rodriguez Santos  DO NOT GIVE RECORDS TO EMPLOYER U        Anticoagulation Care Providers     Provider Role Specialty Phone number    Joe Contreras MD Referring Internal Medicine [phone]            See the Encounter Report to view Anticoagulation Flowsheet and  Dosing Calendar (Go to Encounters tab in chart review, and find the Anticoagulation Therapy Visit)    INR/CFX/F2 RESULT:F2 result on 1/28 is 35%    ASSESSMENT:Spoke with patient. Gave them their lab results and new warfarin recommendation.  No changes in health, medication, or diet. No missed doses, no falls. No signs or symptoms of bleed or clotting.    DOSING ADJUSTMENT: recommended 12.5mg every Friday, 10mg all other days    NEXT INR/FACTOR X OR FACTOR II:2/11    PROTOCOL FOLLOWED:Factor 2 goal 15-25%      Andre Bledsoe RN

## 2021-02-11 DIAGNOSIS — L93.0 LUPUS ERYTHEMATOSUS: ICD-10-CM

## 2021-02-11 DIAGNOSIS — Z79.01 LONG TERM CURRENT USE OF ANTICOAGULANT THERAPY: ICD-10-CM

## 2021-02-11 PROCEDURE — 36415 COLL VENOUS BLD VENIPUNCTURE: CPT | Performed by: PATHOLOGY

## 2021-02-11 PROCEDURE — 99000 SPECIMEN HANDLING OFFICE-LAB: CPT | Performed by: PATHOLOGY

## 2021-02-11 PROCEDURE — 85210 CLOT FACTOR II PROTHROM SPEC: CPT | Mod: 90 | Performed by: PATHOLOGY

## 2021-02-12 ENCOUNTER — ANTICOAGULATION THERAPY VISIT (OUTPATIENT)
Dept: ANTICOAGULATION | Facility: CLINIC | Age: 76
End: 2021-02-12

## 2021-02-12 DIAGNOSIS — Z79.01 LONG TERM CURRENT USE OF ANTICOAGULANT THERAPY: ICD-10-CM

## 2021-02-12 DIAGNOSIS — L93.0 LUPUS ERYTHEMATOSUS: ICD-10-CM

## 2021-02-12 LAB — PROTHROM ACT/NOR PPP: 15 % (ref 60–140)

## 2021-02-25 DIAGNOSIS — Z79.01 LONG TERM CURRENT USE OF ANTICOAGULANT THERAPY: ICD-10-CM

## 2021-02-25 DIAGNOSIS — L93.0 LUPUS ERYTHEMATOSUS: ICD-10-CM

## 2021-02-25 PROCEDURE — 99000 SPECIMEN HANDLING OFFICE-LAB: CPT | Performed by: PATHOLOGY

## 2021-02-25 PROCEDURE — 85210 CLOT FACTOR II PROTHROM SPEC: CPT | Mod: 90 | Performed by: PATHOLOGY

## 2021-02-25 PROCEDURE — 36415 COLL VENOUS BLD VENIPUNCTURE: CPT | Performed by: PATHOLOGY

## 2021-02-26 ENCOUNTER — ANTICOAGULATION THERAPY VISIT (OUTPATIENT)
Dept: ANTICOAGULATION | Facility: CLINIC | Age: 76
End: 2021-02-26

## 2021-02-26 DIAGNOSIS — L93.0 LUPUS ERYTHEMATOSUS: ICD-10-CM

## 2021-02-26 DIAGNOSIS — Z79.01 LONG TERM CURRENT USE OF ANTICOAGULANT THERAPY: ICD-10-CM

## 2021-02-26 LAB — PROTHROM ACT/NOR PPP: 16 % (ref 60–140)

## 2021-02-26 NOTE — PROGRESS NOTES
ANTICOAGULATION FOLLOW-UP CLINIC VISIT    Patient Name:  Shala Caruso  Date:  2/26/2021  Contact Type:  Telephone    SUBJECTIVE:  Patient Findings     Comments:  Spoke with patient. Gave them their lab results and new warfarin recommendation.  No changes in health, medication, or diet. No missed doses, no falls. No signs or symptoms of bleed or clotting.           Clinical Outcomes     Negatives:  Major bleeding event, Thromboembolic event, Anticoagulation-related hospital admission, Anticoagulation-related ED visit, Anticoagulation-related fatality    Comments:  Spoke with patient. Gave them their lab results and new warfarin recommendation.  No changes in health, medication, or diet. No missed doses, no falls. No signs or symptoms of bleed or clotting.              OBJECTIVE    Recent labs: (last 7 days)     02/25/21  1327   F2 16*       ASSESSMENT / PLAN  INR assessment THER Factor 2   Recheck INR In: 3 WEEKS    INR Location Clinic      Anticoagulation Summary  As of 2/26/2021    INR goal:  2.0-3.0   TTR:  --   INR used for dosing:  No new INR was available at the time of this encounter.   Warfarin maintenance plan:  10 mg (5 mg x 2) every day   Full warfarin instructions:  10 mg every day   Weekly warfarin total:  70 mg   No change documented:  Kemi May, RN   Plan last modified:  Gwen Abel, RN (2/12/2021)   Next INR check:  3/18/2021   Priority:  Maintenance   Target end date:  Indefinite    Indications    SLE (systemic lupus erythematosus) (H) (Resolved) [M32.9]  Long term current use of anticoagulant therapy [Z79.01]  Lupus erythematosus [L93.0]             Anticoagulation Episode Summary     INR check location:  Clinic Lab    Preferred lab:      Send INR reminders to:  [facility]    Comments:  Factor 2  goal range is 15-25%, 1/31/20 Not drawing INR  Ok to leave message on home [phone]   May leave messages with Rodriguez Santos  DO NOT GIVE RECORDS TO EMPLOYER U         Anticoagulation Care Providers     Provider Role Specialty Phone number    Joe Contreras MD Referring Internal Medicine [phone]            See the Encounter Report to view Anticoagulation Flowsheet and Dosing Calendar (Go to Encounters tab in chart review, and find the Anticoagulation Therapy Visit)    INR/CFX/F2 Result:  Factor 2 = 16%  Goal Range: 15-25%  Assessment: negative for changes  Dosing Adjustment: none made  Next INR/CFX/F2: 3 weeks  Protocol Followed: Factor 2 within goal range    KYRA BRANTLEY RN

## 2021-03-09 ENCOUNTER — AMBULATORY - HEALTHEAST (OUTPATIENT)
Dept: NURSING | Facility: CLINIC | Age: 76
End: 2021-03-09

## 2021-03-10 ENCOUNTER — MYC MEDICAL ADVICE (OUTPATIENT)
Dept: INTERNAL MEDICINE | Facility: CLINIC | Age: 76
End: 2021-03-10

## 2021-03-17 DIAGNOSIS — E10.649 TYPE 1 DIABETES MELLITUS WITH HYPOGLYCEMIA AND WITHOUT COMA (H): ICD-10-CM

## 2021-03-17 RX ORDER — INSULIN ASPART 100 [IU]/ML
INJECTION, SOLUTION INTRAVENOUS; SUBCUTANEOUS
Qty: 15 ML | Refills: 3 | Status: SHIPPED | OUTPATIENT
Start: 2021-03-17 | End: 2021-12-20

## 2021-03-17 NOTE — TELEPHONE ENCOUNTER
NOVOLOG PENFILL 100 UNIT/ML   Last Written Prescription Date:  1/13/20  Last Fill Quantity: 15  ML,   # refills: 3  Last Office Visit : 1/7/21  Future Office visit:  7/12/21  Routing refill request to provider for review/approval because:  Insulin - refilled per clinic

## 2021-03-18 DIAGNOSIS — L93.0 LUPUS ERYTHEMATOSUS: ICD-10-CM

## 2021-03-18 DIAGNOSIS — Z79.01 LONG TERM CURRENT USE OF ANTICOAGULANT THERAPY: ICD-10-CM

## 2021-03-18 PROCEDURE — 99000 SPECIMEN HANDLING OFFICE-LAB: CPT | Performed by: PATHOLOGY

## 2021-03-18 PROCEDURE — 85210 CLOT FACTOR II PROTHROM SPEC: CPT | Mod: 90 | Performed by: PATHOLOGY

## 2021-03-18 PROCEDURE — 36415 COLL VENOUS BLD VENIPUNCTURE: CPT | Performed by: PATHOLOGY

## 2021-03-19 ENCOUNTER — MYC MEDICAL ADVICE (OUTPATIENT)
Dept: RHEUMATOLOGY | Facility: CLINIC | Age: 76
End: 2021-03-19

## 2021-03-19 ENCOUNTER — ANTICOAGULATION THERAPY VISIT (OUTPATIENT)
Dept: ANTICOAGULATION | Facility: CLINIC | Age: 76
End: 2021-03-19

## 2021-03-19 DIAGNOSIS — L93.0 LUPUS ERYTHEMATOSUS: ICD-10-CM

## 2021-03-19 DIAGNOSIS — Z79.01 LONG TERM CURRENT USE OF ANTICOAGULANT THERAPY: ICD-10-CM

## 2021-03-19 LAB — PROTHROM ACT/NOR PPP: 22 % (ref 60–140)

## 2021-03-19 NOTE — PROGRESS NOTES
ANTICOAGULATION FOLLOW-UP CLINIC VISIT    Patient Name:  Shala Caruso  Date:  3/19/2021  Contact Type:  Telephone    SUBJECTIVE:  Patient Findings     Comments:  Factor 2=22 Goal 15-25        Clinical Outcomes     Comments:  Factor 2=22 Goal 15-25           OBJECTIVE    Recent labs: (last 7 days)     03/18/21  1343   F2 22*       ASSESSMENT / PLAN  No question data found.  Anticoagulation Summary  As of 3/19/2021    INR goal:  2.0-3.0   TTR:  --   INR used for dosing:  No new INR was available at the time of this encounter.   Warfarin maintenance plan:  10 mg (5 mg x 2) every day   Full warfarin instructions:  10 mg every day   Weekly warfarin total:  70 mg   No change documented:  Stacey Beal RN   Plan last modified:  Gwen Abel RN (2/12/2021)   Next INR check:  4/15/2021   Priority:  Maintenance   Target end date:  Indefinite    Indications    SLE (systemic lupus erythematosus) (H) (Resolved) [M32.9]  Long term current use of anticoagulant therapy [Z79.01]  Lupus erythematosus [L93.0]             Anticoagulation Episode Summary     INR check location:  Clinic Lab    Preferred lab:      Send INR reminders to:  [facility]    Comments:  Factor 2  goal range is 15-25%, 1/31/20 Not drawing INR  Ok to leave message on home [phone]   May leave messages with Rodriguez Santos  DO NOT GIVE RECORDS TO EMPLOYER U        Anticoagulation Care Providers     Provider Role Specialty Phone number    Joe Contreras MD Referring Internal Medicine [phone]            See the Encounter Report to view Anticoagulation Flowsheet and Dosing Calendar (Go to Encounters tab in chart review, and find the Anticoagulation Therapy Visit)    Left message for patient with results and dosing recommendations. Asked patient to call back to report any missed doses, falls, signs and symptoms of bleeding or clotting, any changes in health, medication, or diet. Asked patient to call back with any questions or  concerns.    Stacey Beal RN

## 2021-03-22 DIAGNOSIS — K22.0 ACHALASIA OF ESOPHAGUS: ICD-10-CM

## 2021-03-25 RX ORDER — NIFEDIPINE 30 MG/1
30 TABLET, EXTENDED RELEASE ORAL DAILY
Qty: 90 TABLET | Refills: 0 | Status: SHIPPED | OUTPATIENT
Start: 2021-03-25 | End: 2021-04-21

## 2021-03-25 NOTE — TELEPHONE ENCOUNTER
Last Clinic Visit: 2/24/20, no upcoming appointments.  90 day refill provided, routed to clinic scheduling for follow up

## 2021-03-25 NOTE — TELEPHONE ENCOUNTER
Left message with Primary Care clinic number requesting patient to call back to schedule annual appointment with PCP as last visit was 2/24/2020.    Solange Parsons, Clinic Coordinator, March 25, 2021 at 12:30 PM

## 2021-03-27 ENCOUNTER — HEALTH MAINTENANCE LETTER (OUTPATIENT)
Age: 76
End: 2021-03-27

## 2021-03-30 ENCOUNTER — AMBULATORY - HEALTHEAST (OUTPATIENT)
Dept: NURSING | Facility: CLINIC | Age: 76
End: 2021-03-30

## 2021-04-08 DIAGNOSIS — Z79.01 LONG TERM CURRENT USE OF ANTICOAGULANT THERAPY: ICD-10-CM

## 2021-04-08 DIAGNOSIS — L93.0 LUPUS ERYTHEMATOSUS: ICD-10-CM

## 2021-04-08 PROCEDURE — 85210 CLOT FACTOR II PROTHROM SPEC: CPT | Mod: 90 | Performed by: PATHOLOGY

## 2021-04-08 PROCEDURE — 36415 COLL VENOUS BLD VENIPUNCTURE: CPT | Performed by: PATHOLOGY

## 2021-04-08 PROCEDURE — 99000 SPECIMEN HANDLING OFFICE-LAB: CPT | Performed by: PATHOLOGY

## 2021-04-09 ENCOUNTER — ANTICOAGULATION THERAPY VISIT (OUTPATIENT)
Dept: ANTICOAGULATION | Facility: CLINIC | Age: 76
End: 2021-04-09

## 2021-04-09 DIAGNOSIS — Z79.01 LONG TERM CURRENT USE OF ANTICOAGULANT THERAPY: ICD-10-CM

## 2021-04-09 DIAGNOSIS — L93.0 LUPUS ERYTHEMATOSUS: ICD-10-CM

## 2021-04-09 LAB — PROTHROM ACT/NOR PPP: 19 % (ref 60–140)

## 2021-04-09 NOTE — PROGRESS NOTES
ANTICOAGULATION FOLLOW-UP CLINIC VISIT    Patient Name:  Shala Caruso  Date:  4/9/2021  Contact Type:  Telephone    SUBJECTIVE:         OBJECTIVE    Recent labs: (last 7 days)     04/08/21  1348   F2 19*       ASSESSMENT / PLAN  INR assessment THER    Recheck INR In: 4 WEEKS    INR Location Clinic      Anticoagulation Summary  As of 4/9/2021    INR goal:  2.0-3.0   TTR:  --   INR used for dosing:  No new INR was available at the time of this encounter.   Warfarin maintenance plan:  10 mg (5 mg x 2) every day   Full warfarin instructions:  10 mg every day   Weekly warfarin total:  70 mg   Plan last modified:  Gwen Abel RN (2/12/2021)   Next INR check:  5/6/2021   Priority:  Maintenance   Target end date:  Indefinite    Indications    SLE (systemic lupus erythematosus) (H) (Resolved) [M32.9]  Long term current use of anticoagulant therapy [Z79.01]  Lupus erythematosus [L93.0]             Anticoagulation Episode Summary     INR check location:  Clinic Lab    Preferred lab:      Send INR reminders to:  [facility]    Comments:  Factor 2  goal range is 15-25%, 1/31/20 Not drawing INR  Ok to leave message on home [phone]   May leave messages with Rodriguez Santos  DO NOT GIVE RECORDS TO EMPLOYER U        Anticoagulation Care Providers     Provider Role Specialty Phone number    Joe Contreras MD Referring Internal Medicine [phone]            See the Encounter Report to view Anticoagulation Flowsheet and Dosing Calendar (Go to Encounters tab in chart review, and find the Anticoagulation Therapy Visit)  Spoke with patient.  Factor 2=19%  Goal range =15-25%    Portia Cherry RN

## 2021-04-12 DIAGNOSIS — D68.61 ANTIPHOSPHOLIPID SYNDROME (H): Primary | ICD-10-CM

## 2021-04-12 DIAGNOSIS — I82.4Y9 DEEP VEIN THROMBOSIS (DVT) OF PROXIMAL LOWER EXTREMITY, UNSPECIFIED CHRONICITY, UNSPECIFIED LATERALITY (H): ICD-10-CM

## 2021-04-12 RX ORDER — WARFARIN SODIUM 5 MG/1
TABLET ORAL
Qty: 180 TABLET | Refills: 1 | Status: SHIPPED | OUTPATIENT
Start: 2021-04-12 | End: 2021-04-21

## 2021-04-16 ASSESSMENT — ENCOUNTER SYMPTOMS
STIFFNESS: 1
MUSCLE CRAMPS: 0
JOINT SWELLING: 0
NECK PAIN: 0
MUSCLE WEAKNESS: 0
MYALGIAS: 0
ARTHRALGIAS: 1
BACK PAIN: 0

## 2021-04-16 ASSESSMENT — ACTIVITIES OF DAILY LIVING (ADL)
IN_THE_PAST_7_DAYS,_DID_YOU_NEED_HELP_FROM_OTHERS_TO_PERFORM_EVERYDAY_ACTIVITIES_SUCH_AS_EATING,_GETTING_DRESSED,_GROOMING,_BATHING,_WALKING,_OR_USING_THE_TOILET: N
IN_THE_PAST_7_DAYS,_DID_YOU_NEED_HELP_FROM_OTHERS_TO_TAKE_CARE_OF_THINGS_SUCH_AS_LAUNDRY_AND_HOUSEKEEPING,_BANKING,_SHOPPING,_USING_THE_TELEPHONE,_FOOD_PREPARATION,_TRANSPORTATION,_OR_TAKING_YOUR_OWN_MEDICATIONS?: N

## 2021-04-19 NOTE — TELEPHONE ENCOUNTER
[facility] Call Center    Phone Message    May a detailed message be left on voicemail: yes     Reason for Call: Other: pt stated there was a pop up on mychart of labs she is needing done, does  want pt to have any labs done prior to her appt ? please call misha back thanks     Action Taken: Message routed to:  [facility] endocrine    Travel Screening: Not Applicable                                                                       Abnormal as indicated, otherwise normal...

## 2021-04-21 ENCOUNTER — OFFICE VISIT (OUTPATIENT)
Dept: INTERNAL MEDICINE | Facility: CLINIC | Age: 76
End: 2021-04-21
Payer: COMMERCIAL

## 2021-04-21 VITALS — SYSTOLIC BLOOD PRESSURE: 105 MMHG | OXYGEN SATURATION: 100 % | DIASTOLIC BLOOD PRESSURE: 65 MMHG | HEART RATE: 66 BPM

## 2021-04-21 DIAGNOSIS — E11.43 TYPE 2 DIABETES MELLITUS WITH DIABETIC AUTONOMIC NEUROPATHY, WITH LONG-TERM CURRENT USE OF INSULIN (H): Primary | ICD-10-CM

## 2021-04-21 DIAGNOSIS — D68.61 ANTIPHOSPHOLIPID SYNDROME (H): ICD-10-CM

## 2021-04-21 DIAGNOSIS — Z79.4 TYPE 2 DIABETES MELLITUS WITH DIABETIC AUTONOMIC NEUROPATHY, WITH LONG-TERM CURRENT USE OF INSULIN (H): Primary | ICD-10-CM

## 2021-04-21 DIAGNOSIS — Z79.01 LONG TERM CURRENT USE OF ANTICOAGULANT THERAPY: ICD-10-CM

## 2021-04-21 DIAGNOSIS — E78.49 OTHER HYPERLIPIDEMIA: ICD-10-CM

## 2021-04-21 DIAGNOSIS — Z79.4 TYPE 2 DIABETES MELLITUS WITH DIABETIC AUTONOMIC NEUROPATHY, WITH LONG-TERM CURRENT USE OF INSULIN (H): ICD-10-CM

## 2021-04-21 DIAGNOSIS — E11.43 TYPE 2 DIABETES MELLITUS WITH DIABETIC AUTONOMIC NEUROPATHY, WITH LONG-TERM CURRENT USE OF INSULIN (H): ICD-10-CM

## 2021-04-21 DIAGNOSIS — L93.0 LUPUS ERYTHEMATOSUS: ICD-10-CM

## 2021-04-21 DIAGNOSIS — K22.0 ACHALASIA OF ESOPHAGUS: ICD-10-CM

## 2021-04-21 DIAGNOSIS — M32.9 SYSTEMIC LUPUS ERYTHEMATOSUS, UNSPECIFIED SLE TYPE, UNSPECIFIED ORGAN INVOLVEMENT STATUS (H): ICD-10-CM

## 2021-04-21 DIAGNOSIS — M32.9 SYSTEMIC LUPUS ERYTHEMATOSUS (H): ICD-10-CM

## 2021-04-21 DIAGNOSIS — H61.22 IMPACTED CERUMEN OF LEFT EAR: ICD-10-CM

## 2021-04-21 LAB
ALBUMIN SERPL-MCNC: 4 G/DL (ref 3.4–5)
ALBUMIN UR-MCNC: NEGATIVE MG/DL
ALP SERPL-CCNC: 59 U/L (ref 40–150)
ALT SERPL W P-5'-P-CCNC: 26 U/L (ref 0–50)
ANION GAP SERPL CALCULATED.3IONS-SCNC: 6 MMOL/L (ref 3–14)
APPEARANCE UR: ABNORMAL
AST SERPL W P-5'-P-CCNC: 16 U/L (ref 0–45)
BACTERIA #/AREA URNS HPF: ABNORMAL /HPF
BASOPHILS # BLD AUTO: 0 10E9/L (ref 0–0.2)
BASOPHILS NFR BLD AUTO: 1.1 %
BILIRUB SERPL-MCNC: 0.3 MG/DL (ref 0.2–1.3)
BILIRUB UR QL STRIP: NEGATIVE
BUN SERPL-MCNC: 30 MG/DL (ref 7–30)
CALCIUM SERPL-MCNC: 9.5 MG/DL (ref 8.5–10.1)
CHLORIDE SERPL-SCNC: 108 MMOL/L (ref 94–109)
CHOLEST SERPL-MCNC: 188 MG/DL
CO2 SERPL-SCNC: 28 MMOL/L (ref 20–32)
COLOR UR AUTO: YELLOW
CREAT SERPL-MCNC: 0.73 MG/DL (ref 0.52–1.04)
DIFFERENTIAL METHOD BLD: ABNORMAL
EOSINOPHIL # BLD AUTO: 0.1 10E9/L (ref 0–0.7)
EOSINOPHIL NFR BLD AUTO: 2.1 %
ERYTHROCYTE [DISTWIDTH] IN BLOOD BY AUTOMATED COUNT: 15.6 % (ref 10–15)
GFR SERPL CREATININE-BSD FRML MDRD: 82 ML/MIN/{1.73_M2}
GLUCOSE SERPL-MCNC: 102 MG/DL (ref 70–99)
GLUCOSE UR STRIP-MCNC: NEGATIVE MG/DL
HBA1C MFR BLD: 5.2 % (ref 0–5.6)
HCT VFR BLD AUTO: 43.3 % (ref 35–47)
HDLC SERPL-MCNC: 98 MG/DL
HGB BLD-MCNC: 13.4 G/DL (ref 11.7–15.7)
HGB UR QL STRIP: NEGATIVE
HYALINE CASTS #/AREA URNS LPF: 2 /LPF (ref 0–2)
IMM GRANULOCYTES # BLD: 0 10E9/L (ref 0–0.4)
IMM GRANULOCYTES NFR BLD: 0.3 %
KETONES UR STRIP-MCNC: 5 MG/DL
LDLC SERPL CALC-MCNC: 53 MG/DL
LEUKOCYTE ESTERASE UR QL STRIP: NEGATIVE
LYMPHOCYTES # BLD AUTO: 0.9 10E9/L (ref 0.8–5.3)
LYMPHOCYTES NFR BLD AUTO: 24.9 %
MCH RBC QN AUTO: 28.5 PG (ref 26.5–33)
MCHC RBC AUTO-ENTMCNC: 30.9 G/DL (ref 31.5–36.5)
MCV RBC AUTO: 92 FL (ref 78–100)
MONOCYTES # BLD AUTO: 0.4 10E9/L (ref 0–1.3)
MONOCYTES NFR BLD AUTO: 11.4 %
MUCOUS THREADS #/AREA URNS LPF: PRESENT /LPF
NEUTROPHILS # BLD AUTO: 2.3 10E9/L (ref 1.6–8.3)
NEUTROPHILS NFR BLD AUTO: 60.2 %
NITRATE UR QL: POSITIVE
NONHDLC SERPL-MCNC: 91 MG/DL
NRBC # BLD AUTO: 0 10*3/UL
NRBC BLD AUTO-RTO: 0 /100
PH UR STRIP: 5 PH (ref 5–7)
PLATELET # BLD AUTO: 252 10E9/L (ref 150–450)
POTASSIUM SERPL-SCNC: 3.7 MMOL/L (ref 3.4–5.3)
PROT SERPL-MCNC: 7 G/DL (ref 6.8–8.8)
RBC # BLD AUTO: 4.71 10E12/L (ref 3.8–5.2)
RBC #/AREA URNS AUTO: 2 /HPF (ref 0–2)
SODIUM SERPL-SCNC: 142 MMOL/L (ref 133–144)
SOURCE: ABNORMAL
SP GR UR STRIP: 1.02 (ref 1–1.03)
SQUAMOUS #/AREA URNS AUTO: <1 /HPF (ref 0–1)
TRIGL SERPL-MCNC: 189 MG/DL
UROBILINOGEN UR STRIP-MCNC: 0 MG/DL (ref 0–2)
WBC # BLD AUTO: 3.8 10E9/L (ref 4–11)
WBC #/AREA URNS AUTO: 2 /HPF (ref 0–5)

## 2021-04-21 PROCEDURE — 36415 COLL VENOUS BLD VENIPUNCTURE: CPT | Performed by: PATHOLOGY

## 2021-04-21 PROCEDURE — 99397 PER PM REEVAL EST PAT 65+ YR: CPT | Performed by: INTERNAL MEDICINE

## 2021-04-21 PROCEDURE — 86225 DNA ANTIBODY NATIVE: CPT | Mod: 90 | Performed by: PATHOLOGY

## 2021-04-21 PROCEDURE — 85025 COMPLETE CBC W/AUTO DIFF WBC: CPT | Performed by: PATHOLOGY

## 2021-04-21 PROCEDURE — 81001 URINALYSIS AUTO W/SCOPE: CPT | Performed by: PATHOLOGY

## 2021-04-21 PROCEDURE — 86160 COMPLEMENT ANTIGEN: CPT | Mod: 90 | Performed by: PATHOLOGY

## 2021-04-21 PROCEDURE — 87088 URINE BACTERIA CULTURE: CPT | Mod: 90 | Performed by: PATHOLOGY

## 2021-04-21 PROCEDURE — 87086 URINE CULTURE/COLONY COUNT: CPT | Mod: 90 | Performed by: PATHOLOGY

## 2021-04-21 PROCEDURE — 83036 HEMOGLOBIN GLYCOSYLATED A1C: CPT | Performed by: PATHOLOGY

## 2021-04-21 PROCEDURE — 80061 LIPID PANEL: CPT | Performed by: PATHOLOGY

## 2021-04-21 PROCEDURE — 87186 SC STD MICRODIL/AGAR DIL: CPT | Mod: 90 | Performed by: PATHOLOGY

## 2021-04-21 PROCEDURE — 85210 CLOT FACTOR II PROTHROM SPEC: CPT | Mod: 90 | Performed by: PATHOLOGY

## 2021-04-21 PROCEDURE — 80053 COMPREHEN METABOLIC PANEL: CPT | Performed by: PATHOLOGY

## 2021-04-21 RX ORDER — NIFEDIPINE 30 MG/1
30 TABLET, EXTENDED RELEASE ORAL DAILY
Qty: 90 TABLET | Refills: 3 | Status: SHIPPED | OUTPATIENT
Start: 2021-04-21 | End: 2021-04-29

## 2021-04-21 RX ORDER — WARFARIN SODIUM 5 MG/1
TABLET ORAL
Qty: 200 TABLET | Refills: 3 | Status: SHIPPED | OUTPATIENT
Start: 2021-04-21 | End: 2021-11-05

## 2021-04-21 RX ORDER — SIMVASTATIN 40 MG
40 TABLET ORAL AT BEDTIME
Qty: 90 TABLET | Refills: 3 | Status: SHIPPED | OUTPATIENT
Start: 2021-04-21 | End: 2021-04-29

## 2021-04-21 NOTE — NURSING NOTE
Chief Complaint   Patient presents with     Physical     annual physical and med refills     Arthritis     recheck arthritis       NATALYA Webster at 2:10 PM on 4/21/2021

## 2021-04-21 NOTE — PROGRESS NOTES
HPI  75-year-old with history of lupus presents today for several issues.  She is having ongoing problems yet with her joints.  She is a lot of stiffness and soreness predominantly in the knees and the hips this is worse in the morning is improved by doing yoga.  She is following with rheumatology regarding this.  She has had no problems on her present medications she is on the nifedipine simvastatin and warfarin.  She needs refills on these.  She said ongoing problems with onychomycosis of the fingers in the past she has responded to oral medication but recurred when she was on the prednisone.  We discussed the potential use of Lamisil for this and she will consider this and potentially call back regarding this.  Her blood sugars have been under excellent control we reviewed these and they are typically running under 100.  She has had no hypoglycemic episodes or other problems.  She is recently seen in the [facility] clinic and felt to have a possible cervical lymph node and white spot in the throat.  She deferred further evaluation in this.  Past Medical History:   Diagnosis Date     Achalasia      Antiphospholipid syndrome (H)      Antiplatelet or antithrombotic long-term use      Coagulation disorder (H) 9061-5789     DM (diabetes mellitus) (H)      DVT (deep venous thrombosis) (H) 2003    and PE     Dysphagia     occasional, use Nifedipine     GERD (gastroesophageal reflux disease)      Hyperlipidemia      Lymphedema      Myopia      Neuropathy     toes bilateral     Nonsenile cataract      osteoporosis      Pericarditis      Raynaud's disease      SLE (systemic lupus erythematosus) (H)      Past Surgical History:   Procedure Laterality Date     CATARACT IOL, RT/LT Left 02/2019     CATARACT IOL, RT/LT Right 01/2019     COLONOSCOPY N/A 11/28/2016    Procedure: COLONOSCOPY;  Surgeon: Sang August MD;  Location:  GI     CYSTOSCOPY, SLING TRANSVAGINAL N/A 12/20/2016    Procedure:  CYSTOSCOPY, SLING TRANSVAGINAL;  Surgeon: Jeanmarie Pierson MD;  Location: [facility]     DISSECT LYMPH NODE AXILLA  2004    Lt, during eval for SLE     HYSTEROSCOPIC PLACEMENT CONTRACEPTIVE DEVICE  1985     PHACOEMULSIFICATION WITH STANDARD INTRAOCULAR LENS IMPLANT Right 1/8/2019    Procedure: Right Eye Cataract Extraction with Intraocular Lens;  Surgeon: Monika Fermin MD;  Location: [facility]     PHACOEMULSIFICATION WITH STANDARD INTRAOCULAR LENS IMPLANT Left 2/5/2019    Procedure: Left Eye Cataract Extraction with Intraocular Lens;  Surgeon: Monika Fermin MD;  Location: [facility]     TUBAL LIGATION Bilateral      Family History   Problem Relation Age of Onset     Cancer Other         breast     Cerebrovascular Disease Other      C.A.D. Other      Glaucoma Father      Macular Degeneration No family hx of      Social History     Socioeconomic History     Marital status:      Spouse name: None     Number of children: None     Years of education: None     Highest education level: None   Occupational History     None   Social Needs     Financial resource strain: None     Food insecurity     Worry: None     Inability: None     Transportation needs     Medical: None     Non-medical: None   Tobacco Use     Smoking status: Never Smoker     Smokeless tobacco: Never Used   Substance and Sexual Activity     Alcohol use: No     Drug use: No     Sexual activity: Yes     Partners: Male     Comment: , safe in relationship   Lifestyle     Physical activity     Days per week: None     Minutes per session: None     Stress: None   Relationships     Social connections     Talks on phone: None     Gets together: None     Attends Buddhist service: None     Active member of club or organization: None     Attends meetings of clubs or organizations: None     Relationship status: None     Intimate partner violence     Fear of current or ex partner: None     Emotionally abused: None     Physically abused: None      Forced sexual activity: None   Other Topics Concern     Parent/sibling w/ CABG, MI or angioplasty before 65F 55M? Not Asked   Social History Narrative    How much exercise per week? Walk to work every morning (2 miles), swimming 3 times a week, takes walks with , periodically works out at gym on stationary bike     How much calcium per day? Supplement 3x daily        How much caffeine per day? On rare occasion, only if she is out to eat and that is all they have    How much vitamin D per day? supplement    Do you/your family wear seatbelts?  Yes    Do you/your family use safety helmets? Yes    Do you/your family use sunscreen? Yes    Do you/your family keep firearms in the home? No    Do you/your family have a smoke detector(s)? Yes        Do you feel safe in your home? Yes    Has anyone ever touched you in an unwanted manner? No        Klever R LPN 7/18/2013                 Answers for HPI/ROS submitted by the patient on 4/16/2021   General Symptoms: No  Skin Symptoms: No  HENT Symptoms: No  EYE SYMPTOMS: No  HEART SYMPTOMS: No  LUNG SYMPTOMS: No  INTESTINAL SYMPTOMS: No  URINARY SYMPTOMS: No  GYNECOLOGIC SYMPTOMS: No  BREAST SYMPTOMS: No  SKELETAL SYMPTOMS: Yes  BLOOD SYMPTOMS: No  NERVOUS SYSTEM SYMPTOMS: No  MENTAL HEALTH SYMPTOMS: No  Back pain: No  Muscle aches: No  Neck pain: No  Swollen joints: No  Joint pain: Yes  Bone pain: No  Muscle cramps: No  Muscle weakness: No  Joint stiffness: Yes  Bone fracture: No    Exam:  /65   Pulse 66   LMP  (LMP Unknown)   SpO2 100%   Breastfeeding No   PHYSICAL EXAMINATION:   The patient is alert, oriented with a clear sensorium.   Skin shows no lesions or rashes and good turgor.   Head is normocephalic and atraumatic.   Eyes show PERRLA. Fundi are benign.   Ears show normal right TMs impacted cerumen on the left.   Mouth shows clear oral mucosa.   Neck shows no nodes, thyromegaly or bruits.   Back is nontender.   Lungs are clear to percussion and  auscultation.   Heart shows normal S1 and S2 without murmur or gallop.   Abdomen is soft, nontender without masses or organomegaly.   Extremities show diffuse onychomycosis, no edema and no evidence of active synovitis.  Degenerative changes of both knees decreased range of motion of the hips  Neurologic examination shows cranial nerves II-XII intact. Motor shows normal strength. Reflexes are full and symmetrical.         ASSESSMENT  1 antiphospholipid antibodies on warfarin  2 diabetes mellitus  3 hypertension well controlled  4 hyperlipidemia well controlled  5 osteoporosis  6 history of SLE with Raynaud's phenomena on immunosuppression   7 GERD  8 peripheral neuropathy   9 bilateral osteoarthritis of the knees  10 diffuse onychomycosis  11 Left sided cerumen    Plan  We will reassess her labs today irrigate her left ear and refill her medications.  This note was completed using Dragon voice recognition software.      Joe Contreras MD  General Internal Medicine  [facility]  [phone]

## 2021-04-22 ENCOUNTER — ANTICOAGULATION THERAPY VISIT (OUTPATIENT)
Dept: ANTICOAGULATION | Facility: CLINIC | Age: 76
End: 2021-04-22

## 2021-04-22 DIAGNOSIS — Z79.01 LONG TERM CURRENT USE OF ANTICOAGULANT THERAPY: ICD-10-CM

## 2021-04-22 DIAGNOSIS — L93.0 LUPUS ERYTHEMATOSUS: ICD-10-CM

## 2021-04-22 LAB
BACTERIA SPEC CULT: ABNORMAL
C3 SERPL-MCNC: 90 MG/DL (ref 81–157)
C4 SERPL-MCNC: 14 MG/DL (ref 13–39)
DSDNA AB SER-ACNC: 1 IU/ML
Lab: ABNORMAL
PROTHROM ACT/NOR PPP: 18 % (ref 60–140)
SPECIMEN SOURCE: ABNORMAL

## 2021-04-22 NOTE — PROGRESS NOTES
ANTICOAGULATION FOLLOW-UP CLINIC VISIT    Patient Name:  Shala Caruso  Date:  4/22/2021  Contact Type:  Telephone    SUBJECTIVE:  Patient Findings     Comments:  F2 results=18  Goal 20-40        Clinical Outcomes     Comments:  F2 results=18  Goal 20-40           OBJECTIVE    Recent labs: (last 7 days)     04/21/21  1616   F2 18*       ASSESSMENT / PLAN  No question data found.  Anticoagulation Summary  As of 4/22/2021    INR goal:  2.0-3.0   TTR:  --   INR used for dosing:  No new INR was available at the time of this encounter.   Warfarin maintenance plan:  10 mg (5 mg x 2) every day   Full warfarin instructions:  10 mg every day   Weekly warfarin total:  70 mg   No change documented:  Stacey Beal RN   Plan last modified:  Gwen Abel RN (2/12/2021)   Next INR check:  5/20/2021   Priority:  Maintenance   Target end date:  Indefinite    Indications    SLE (systemic lupus erythematosus) (H) (Resolved) [M32.9]  Long term current use of anticoagulant therapy [Z79.01]  Lupus erythematosus [L93.0]             Anticoagulation Episode Summary     INR check location:  Clinic Lab    Preferred lab:      Send INR reminders to:  [facility]    Comments:  Factor 2  goal range is 15-25%, 1/31/20 Not drawing INR  Ok to leave message on home [phone]   May leave messages with Rodriguez Santos  DO NOT GIVE RECORDS TO EMPLOYER [employer]        Anticoagulation Care Providers     Provider Role Specialty Phone number    Joe Contreras MD Referring Internal Medicine [phone]            See the Encounter Report to view Anticoagulation Flowsheet and Dosing Calendar (Go to Encounters tab in chart review, and find the Anticoagulation Therapy Visit)    Left message for patient with results and dosing recommendations. Asked patient to call back to report any missed doses, falls, signs and symptoms of bleeding or clotting, any changes in health, medication, or diet. Asked patient to call back with any  questions or concerns.    Stacey Beal, RN

## 2021-04-24 ENCOUNTER — MYC MEDICAL ADVICE (OUTPATIENT)
Dept: RHEUMATOLOGY | Facility: CLINIC | Age: 76
End: 2021-04-24

## 2021-04-25 NOTE — MR AVS SNAPSHOT
After Visit Summary   8/30/2017    Shala aCruso    MRN: [id number]           Patient Information     Date Of Birth          1945        Visit Information        Provider Department      8/30/2017 7:45 AM [facility] Fermin MD [facility]        Today's Diagnoses     Myopia of both eyes    -  1    Choroidal lesion        Senile nuclear sclerosis, bilateral        Type 1 diabetes mellitus without retinopathy (H)           Follow-ups after your visit        Follow-up notes from your care team     Return in about 1 year (around 8/30/2018) for choroidal nevus, T1DM, oct macula, B scan, optos photos.      Your next 10 appointments already scheduled     Sep 21, 2017  1:00 PM CDT   LAB with  LAB   [facility] Lab ([facility])    [address] [place] [id number] [phone]           Patient must bring picture ID. Patient should be prepared to give a urine specimen  Please do not eat 10-12 hours before your appointment if you are coming in fasting for labs on lipids, cholesterol, or glucose (sugar). Pregnant women should follow their Care Team instructions. Water with medications is okay. Do not drink coffee or other fluids. If you have concerns about taking  your medications, please ask at office or if scheduling via [employer], send a message by clicking on Secure Messaging, Message Your Care Team.            Oct 12, 2017  3:00 PM CDT   (Arrive by 2:45 PM)   RETURN LUPUS with Santiago Corbett MD   [facility] Rheumatology ([facility])    [address] [place] [id number] [phone]            Jan 29, 2018  7:30 AM CST   (Arrive by 7:15 AM)   RETURN DIABETES with Dorita Cramer MD   [facility] Endocrinology ([facility])    [address] [place] [id number] [phone]              Who to contact     Please call your clinic at  [phone] to:    Ask questions about your health    Make or cancel appointments    Discuss your medicines    Learn about your test results    Speak to your doctor   If you have compliments or concerns about an experience at your clinic, or if you wish to file a complaint, please contact [facility] Patient Relations at [phone] or email us at [email or web address]         Additional Information About Your Visit        [employer] Information     [employer] gives you secure access to your electronic health record. If you see a primary care provider, you can also send messages to your care team and make appointments. If you have questions, please call your primary care clinic.  If you do not have a primary care provider, please call [phone] and they will assist you.      [employer] is an electronic gateway that provides easy, online access to your medical records. With [employer], you can request a clinic appointment, read your test results, renew a prescription or communicate with your care team.     To access your existing account, please contact your [facility] or call [phone] for assistance.        Care EveryWhere ID     This is your Care EveryWhere ID. This could be used by other organizations to access your [place] medical records  [id number]         Blood Pressure from Last 3 Encounters:   08/02/17 105/56   07/31/17 110/68   07/20/17 95/59    Weight from Last 3 Encounters:   08/02/17 52.8 kg (116 lb 8 oz)   07/31/17 53.2 kg (117 lb 4.8 oz)   05/22/17 53.9 kg (118 lb 14.4 oz)              We Performed the Following     Fundus Photos OU (both eyes)     OCT Retina Spectralis OU (both eyes)     US B-scan and A-scan OS (left eye)        Primary Care Provider Office Phone # Fax #    Joe Contreras [phone]       [address] [place] [id number]        Equal Access to Services     MARVIN REES: Erlinda  jorge l Rivera, wareji luqpapito, qaybta kaanum alonso, carmelita makaylain hayaashwetha delgadorodriguez tamminishi lanegrashwetha chaz [facility] [phone].    ATENCIÓN: Si habla juanita, tiene a hurtado disposición servicios gratuitos de asistencia lingüística. Christopher al [phone].    We comply with applicable federal civil rights laws and [place] laws. We do not discriminate on the basis of race, color, national origin, age, disability sex, sexual orientation or gender identity.            Thank you!     Thank you for choosing [facility]  for your care. Our goal is always to provide you with excellent care. Hearing back from our patients is one way we can continue to improve our services. Please take a few minutes to complete the written survey that you may receive in the mail after your visit with us. Thank you!             Your Updated Medication List - Protect others around you: Learn how to safely use, store and throw away your medicines at www.disposemymeds.org.          This list is accurate as of: 8/30/17 10:41 AM.  Always use your most recent med list.                   Brand Name Dispense Instructions for use Diagnosis    aspirin 81 MG tablet      Take 81 mg by mouth At Bedtime        AYR SALINE NASAL NO-DRIP Gel     3 Tube    Use as needed for nasal dryness    Nasal ulcer       blood glucose monitoring lancets     7203 each    Test 7-8 times daily  (Lancets that go with pt current meter )    Diabetes mellitus (H)       blood glucose monitoring test strip    TRUE METRIX BLOOD GLUCOSE TEST    4 Box    Use to test blood sugar 4 times daily or as directed.    Type I diabetes mellitus, well controlled (H)       calcium + D 600-200 MG-UNIT Tabs   Generic drug:  calcium carbonate-vitamin D      Take 1 tablet by mouth 3 times daily        CENTRUM SILVER PO      Take 1 tablet by mouth daily.        estradiol 0.1 MG/GM cream    ESTRACE VAGINAL    42.5 g    Use 2 g vaginally twice per week.    Atrophic vaginitis       glucagon 1 MG kit     GLUCAGON EMERGENCY    3 each    Inject 1 mg subcutaneously or intramuscularly for severe hypoglycemic episodes.    Type 1 diabetes, HbA1c goal < 7% (H)       insulin glargine 100 UNIT/ML injection    LANTUS    15 mL    Inject 4 units as directed daily LANTUS SOLOSTAR PEN PLEASE    Diabetes mellitus type 1 (H)       * insulin pen needle 32G X 4 MM    BD PAM U/F    800 each    Use   Up to 8 daily    Diabetes mellitus, type 1       * insulin pen needle 32G X 4 MM     400 each    Use 4 pen needles daily or as directed.        lidocaine 5 % Patch    LIDODERM    30 patch    Apply up to 3 patches to painful area at once for up to 12 h within a 24 h period.  Remove after 12 hours.    Post-herpetic polyneuropathy       mycophenolate 500 MG tablet    GENERIC EQUIVALENT    360 tablet    Take 2 tablets (1,000 mg) by mouth 2 times daily 2 tabs in the a.m., 2 tabs in p.m.    Systemic lupus erythematosus (H), Antiphospholipid syndrome (H), Encounter for long-term current use of medication       NIFEdipine ER osmotic 30 MG 24 hr tablet    NIFEDICAL XL    90 tablet    Take 1 tablet (30 mg) by mouth daily    Achalasia of esophagus, HTN (hypertension)       NovoLOG PENFILL 100 UNIT/ML injection   Generic drug:  insulin aspart     15 mL    Inject 1 unit per 15 grams CHO with meals TID approx 15 units daily    Controlled type 1 diabetes mellitus (H)       * Injection Device for insulin Priscila    NOVOPEN ECHO    1 each    1 each 4 times daily    Type 1 diabetes mellitus with hypoglycemia and without coma (H)       * NOVOPEN JR (GREEN) Priscila   Generic drug:  Injection Device for insulin      Use as directed.        omega-3 fatty acids 1200 MG capsule      Take 1 capsule by mouth daily.    Systemic lupus erythematosus (H), Antiphospholipid syndrome (H), Encounter for long-term (current) use of other medications       simvastatin 40 MG tablet    ZOCOR    90 tablet    Take 1 tablet (40 mg) by mouth At Bedtime    Other hyperlipidemia       TRUE  METRIX METER W/DEVICE Kit     1 kit    1 each 4 times daily    Type I diabetes mellitus, well controlled (H)       TYLENOL 500 MG tablet   Generic drug:  acetaminophen      Take 500 mg by mouth At Bedtime Takes 6 tablets (500 mg ) .Christa Acharya LPN 2:40 PM on 3/23/2017        VITAMIN D (CHOLECALCIFEROL) PO      Take 2,000 Units by mouth daily (with lunch)        warfarin 5 MG tablet    COUMADIN    215 tablet    Take 10mgs on Mondays and Thursdays and 12.5mgs on all other days or as directed by the Medication Monitoring Clinic at the [facility].    Systemic lupus erythematosus (H)       * Notice:  This list has 4 medication(s) that are the same as other medications prescribed for you. Read the directions carefully, and ask your doctor or other care provider to review them with you.       Simple: Patient demonstrates quick and easy understanding/Verbalized Understanding

## 2021-04-28 ENCOUNTER — MYC MEDICAL ADVICE (OUTPATIENT)
Dept: INTERNAL MEDICINE | Facility: CLINIC | Age: 76
End: 2021-04-28

## 2021-04-28 DIAGNOSIS — K22.0 ACHALASIA OF ESOPHAGUS: ICD-10-CM

## 2021-04-28 DIAGNOSIS — E78.49 OTHER HYPERLIPIDEMIA: ICD-10-CM

## 2021-04-29 DIAGNOSIS — M32.9 SYSTEMIC LUPUS ERYTHEMATOSUS, UNSPECIFIED SLE TYPE, UNSPECIFIED ORGAN INVOLVEMENT STATUS (H): ICD-10-CM

## 2021-04-29 LAB
ALBUMIN SERPL-MCNC: 4 G/DL (ref 3.4–5)
ALBUMIN UR-MCNC: NEGATIVE MG/DL
ALT SERPL W P-5'-P-CCNC: 27 U/L (ref 0–50)
APPEARANCE UR: ABNORMAL
AST SERPL W P-5'-P-CCNC: 16 U/L (ref 0–45)
BACTERIA #/AREA URNS HPF: ABNORMAL /HPF
BILIRUB UR QL STRIP: NEGATIVE
COLOR UR AUTO: YELLOW
CREAT SERPL-MCNC: 0.76 MG/DL (ref 0.52–1.04)
ERYTHROCYTE [DISTWIDTH] IN BLOOD BY AUTOMATED COUNT: 15.5 % (ref 10–15)
GFR SERPL CREATININE-BSD FRML MDRD: 78 ML/MIN/{1.73_M2}
GLUCOSE UR STRIP-MCNC: NEGATIVE MG/DL
HCT VFR BLD AUTO: 41.5 % (ref 35–47)
HGB BLD-MCNC: 12.9 G/DL (ref 11.7–15.7)
HGB UR QL STRIP: NEGATIVE
KETONES UR STRIP-MCNC: NEGATIVE MG/DL
LEUKOCYTE ESTERASE UR QL STRIP: ABNORMAL
MCH RBC QN AUTO: 29 PG (ref 26.5–33)
MCHC RBC AUTO-ENTMCNC: 31.1 G/DL (ref 31.5–36.5)
MCV RBC AUTO: 93 FL (ref 78–100)
MUCOUS THREADS #/AREA URNS LPF: PRESENT /LPF
NITRATE UR QL: POSITIVE
PH UR STRIP: 5 PH (ref 5–7)
PLATELET # BLD AUTO: 236 10E9/L (ref 150–450)
RBC # BLD AUTO: 4.45 10E12/L (ref 3.8–5.2)
RBC #/AREA URNS AUTO: 3 /HPF (ref 0–2)
SOURCE: ABNORMAL
SP GR UR STRIP: 1.02 (ref 1–1.03)
UROBILINOGEN UR STRIP-MCNC: 0 MG/DL (ref 0–2)
WBC # BLD AUTO: 3.7 10E9/L (ref 4–11)
WBC #/AREA URNS AUTO: 8 /HPF (ref 0–5)

## 2021-04-29 PROCEDURE — 36415 COLL VENOUS BLD VENIPUNCTURE: CPT | Performed by: PATHOLOGY

## 2021-04-29 PROCEDURE — 84460 ALANINE AMINO (ALT) (SGPT): CPT | Performed by: PATHOLOGY

## 2021-04-29 PROCEDURE — 86160 COMPLEMENT ANTIGEN: CPT | Mod: 90 | Performed by: PATHOLOGY

## 2021-04-29 PROCEDURE — 84450 TRANSFERASE (AST) (SGOT): CPT | Performed by: PATHOLOGY

## 2021-04-29 PROCEDURE — 87086 URINE CULTURE/COLONY COUNT: CPT | Mod: 90 | Performed by: PATHOLOGY

## 2021-04-29 PROCEDURE — 87088 URINE BACTERIA CULTURE: CPT | Mod: 90 | Performed by: PATHOLOGY

## 2021-04-29 PROCEDURE — 86225 DNA ANTIBODY NATIVE: CPT | Mod: 90 | Performed by: PATHOLOGY

## 2021-04-29 PROCEDURE — 85027 COMPLETE CBC AUTOMATED: CPT | Performed by: PATHOLOGY

## 2021-04-29 PROCEDURE — 87186 SC STD MICRODIL/AGAR DIL: CPT | Mod: 90 | Performed by: PATHOLOGY

## 2021-04-29 PROCEDURE — 82040 ASSAY OF SERUM ALBUMIN: CPT | Performed by: PATHOLOGY

## 2021-04-29 PROCEDURE — 81001 URINALYSIS AUTO W/SCOPE: CPT | Performed by: PATHOLOGY

## 2021-04-29 PROCEDURE — 82565 ASSAY OF CREATININE: CPT | Performed by: PATHOLOGY

## 2021-04-29 RX ORDER — SIMVASTATIN 40 MG
40 TABLET ORAL AT BEDTIME
Qty: 90 TABLET | Refills: 3 | Status: SHIPPED | OUTPATIENT
Start: 2021-04-29 | End: 2021-12-20

## 2021-04-29 RX ORDER — NIFEDIPINE 30 MG/1
30 TABLET, EXTENDED RELEASE ORAL DAILY
Qty: 90 TABLET | Refills: 3 | Status: SHIPPED | OUTPATIENT
Start: 2021-04-29 | End: 2021-08-03

## 2021-04-30 LAB
BACTERIA SPEC CULT: ABNORMAL
C3 SERPL-MCNC: 90 MG/DL (ref 81–157)
C4 SERPL-MCNC: 15 MG/DL (ref 13–39)
DSDNA AB SER-ACNC: 1 IU/ML
Lab: ABNORMAL
SPECIMEN SOURCE: ABNORMAL

## 2021-05-03 ENCOUNTER — MYC MEDICAL ADVICE (OUTPATIENT)
Dept: RHEUMATOLOGY | Facility: CLINIC | Age: 76
End: 2021-05-03

## 2021-05-03 NOTE — TELEPHONE ENCOUNTER
Left message for Jeanmarie to return my call in regards to lab results but sent mychart message as well.    Talya Matias MSN, RN  Rheumatology RN Care Coordinator  [facility]

## 2021-05-03 NOTE — TELEPHONE ENCOUNTER
Spoke to Jeanmarie and provided the below message from Dr. Agarwal regarding recent labs:    Testing for immune activation associated with autoimmune disease is negative. Urine shows probable bladder infection. I recommend contacting your primary provider about this if you have urinary symptoms. If you have no symptoms, I recommend simply repeating urinalysis in several weeks.     Jeanmarie says she is not having any symptoms:  No fever  No pain  No painful urination    She will just repeat UA in a few weeks.    Talya Matias MSN, RN  Rheumatology RN Care Coordinator  [facility]

## 2021-05-06 NOTE — PROGRESS NOTES
"[facility]  Rheumatology Clinic-video  Tato Agarwal MD  2021     Name: Shala Caruso  MRN: [id number]  Age: 75 year old  : 1945  Referring provider: Joe Contreras     Assessment and Plan:    Systemic lupus erythematosus (+CRISTAL, +dsDNA, +SSA, hypocomplementemia; Raynaud's, arthritis, serositis, tubulo-interstitial nephropathy, pericardial effusion/tamponade): Pt relates stable bilateral knee and hip pain that improves with movement and stretching, as well as mild loss of sensation in her toes, also stable.  Raynaud's phenomenon is mild, and is controlled with thermal protective measures alone.  No urinary symptoms.  Video examination today revealed normal range of motion in the hand and finger joints without evidence of digital tapering or cyanosis.    Laboratory evaluation on 2021 showed creatinine, transaminases, albumin, and CBC all normal except for minimally decreased white count of 3.7, unchanged from .  Complement C3 and C4 and double-stranded DNA titers were normal/negative urinalysis showed 8 white cells and 3 red cells per high-powered field with positive nitrites.  Culture showed greater than 100,000 colonies of E. coli.    SLE is stable by history and remote exam.  Disease that formerly affected the kidney as well as serosal surfaces remains well compensated on cellcept monotherapy now used continuously since .. She has been taking cellcept 1000 bid and reports tolerating it well without any side-effects.  We discussed my recommendation that she slowly and cautiously taper CellCept to find the minimum effective dose.  Plan urinalysis, creatinine, and CBC every 6 months.  Repeat urinalysis to follow-up on \"dirty\" UA obtained on , even though no symptoms of cystitis are present.    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 Following factor 2 levels.  # Peripheral neuropathy  # Raynaud's: stable  # Achalasia: " "stable on nifedipine  #Osteoarthritis, knees and hips: Chronic use associated pain and stiffness is modest, stable.  Patient is capable of performing physical activity for multiple hours daily.  I recommended continue weightbearing exercises and range of motion exercises daily.    # Osteoporosis: DEXA scan performed in June July 2020 revealed a T score of -2.3 at the femoral neck.  I agree with institution of bisphosphonate therapy.  Patient underwent zoledronic acid per endocrinology in July 2020.  I agree with plans for annual repeat treatment.  Patient should continue calcium carbonate, vitamin D, and weightbearing exercise as well.    Tato Agarwal M.D.  Staff Rheumatologist, [facility]  Pager [phone]    Follow-up: 6 mos    HPI:   Shala Caruso has a history of DM, SLE, PE on Coumadin, and HTN who presents for evaluation of lupus.  She was last seen in .  At that time she was doing well without no significant symptoms of autoimmune disease.  Plan was to continue mycophenolate 1000 mg twice daily.     Summary of previous history:  SLE diagnosed in 2000 (+CRISTAL, +dsDNA ab, arthritis, serositis).  She has antiphospholipid syndrome and had DVT and PE in 2004, on Coumadin since then.  She had a Lupus flare in 2010 initially treated with Plaquenil however she worsened and developed pericardial effusion/tamponade - started on MTX and tapering dose of prednisone at that time (continued on about 10 mg daily).  Cellcept started 6/2010, Prednisone tapered off.  MTX stopped May 2012.    Interval history 05-:    Getting along \"ok\". She is unchanged in that Her knees and hips give constant pain \"tolerable\". If she sits too long, she is very stiff. Morning stiffness is brief, relieved by movement. No pain in UE joints. She is walking every day; she plans to start biking this summer. She does daily yoga with a lot of stretching; she tends to feel better with it.    Her hands are sensitive to cold with " "ongoing Raynaud's. No digital ulcers.    No F/C/S, chest pain.  No urinary symptoms.      Hip pain is in the groin, made worse with long walking.    Interval history 10-:    Health is ok. Her knees and hips give constant pain \"tolerable\". If she sits too long, she is very stiff. Morning stiffness is brief, relieved by movement. No pain in UE joints.    Her hands are sensitive to cold. No digital ulcers.    Otherwise, health is stable.  She is exercising daily, including yoga for 2 hours every morning.  Appetite is good energy level is excellent.  She is retiring next week.    Interval hx: 4-20:  She has been continuing yoga and walking daily. She is working from home. Her neuropathic symptoms are stable. Minimal joint pain or staiffness; knee OA symptoms are stable. Achalasia symptoms have been slightly increased recently; she does note hoarseness recently; no trouble swallowing. No abd pain or change in bowel habits.    No change in mycophenolate dosing.    Prior history 10-19: She reports mild tingling and cold sensation in her toes which she attributes to diabetic neuropathy.  She also has mild but constant soreness in both knees.  This is better with movement and she attributes this to osteoarthritis.  She does yoga every morning and feels this helps her knees.  Other than her knees, she has not had significant joint issues in the past several months.  She denies any rash, mouth sores, chest pain, shortness of breath, or new vision changes.    She also notes chronic changes of her fingernails which has not changed recently.  She has not sought treatment for this.    Review of Systems:  Pertinent items are noted in HPI or as below, remainder of complete ROS is negative.      No recent problems with hearing or vision. Eye dryness relieved by once daily Refresh; no dry mouth  No breathing difficulty, shortness of breath, coughing, or wheezing  No chest pain or palpitations  No heart burn, indigestion, " "abdominal pain, nausea, vomiting, diarrhea  No urination problems, no bloody, cloudy urine, no dysuria  + toe \"neuropathy\",. Symmetrical, stable.  No headaches or confusion  No rashes. No easy bleeding or bruising.     Active Medications:     Current Outpatient Medications   Medication     acetaminophen (TYLENOL) 500 MG tablet     aspirin 81 MG tablet     AYR SALINE NASAL NO-DRIP GEL     Calcium Carbonate-Vitamin D (CALCIUM 500 + D PO)     carboxymethylcellulose PF (REFRESH PLUS) 0.5 % SOLN ophthalmic solution     Glucagon (BAQSIMI ONE PACK) 3 MG/DOSE POWD     Injection Device for insulin (NOVOPEN ECHO) RORY     insulin aspart (NOVOLOG PENFILL) 100 UNIT/ML cartridge     insulin glargine (LANTUS PEN) 100 UNIT/ML pen     insulin pen needle (BD PAM U/F) 32G X 4 MM miscellaneous     LANCETS ULTRA THIN 30G MISC     Multiple Vitamins-Minerals (CENTRUM SILVER PO)     mycophenolate (GENERIC EQUIVALENT) 500 MG tablet     NIFEdipine ER OSMOTIC (PROCARDIA XL) 30 MG 24 hr tablet     order for DME     simvastatin (ZOCOR) 40 MG tablet     TRUE METRIX BLOOD GLUCOSE TEST test strip     warfarin ANTICOAGULANT (COUMADIN) 5 MG tablet     No current facility-administered medications for this visit.      No longer on lantus: too many \"lows\".  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 Checks BS 5X daily.  Osteoporosis   Hypertension   Hyperlipidemia   Achalasia   Gastroesophageal reflux disease   Choroidal lesion  Atrophic vaginitis   Urinary urgency   Female stress incontinence   Cystocele, midline   Deep vein thrombosis   Nonsenile cataract   Myopia   Neuropathy   Lymphedema      Past Surgical History:  Phacoemulsification clear cornea with " intraocular lens implantation, right 1/8/19, left 2/5/19  Transvaginal sling 12/20/16  Axilla lymph node dissection 2004  Bilateral tubal ligation     Family History:   Glaucoma - father   Breast cancer   Stroke       Social History:   Tobacco Use: No previous or current tobacco use.   Alcohol Use: No alcohol use.   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  Fist formation is normal.  Psych: Normal judgement, orientation, memory, affect.     Laboratory:   RHEUM RESULTS Latest Ref Rng & Units 12/22/2020 4/21/2021 4/29/2021   COMPLEMENT C3 81 - 157 mg/dL 95 90 90   COMPLEMENT C4 13 - 39 mg/dL 16 14 15   DNA-DS <10 IU/mL 1 1 1   SED RATE 0 - 30 mm/h - - -   CRP, INFLAMMATION 0.0 - 8.0 mg/L - - -   CYCLIC CIT PEPT IGG <5 U/mL - - -   CK TOTAL 30 - 225 U/L - - -   RHEUMATOID FACTOR 0 - 14 IU/mL - - -   CRISTAL SCREEN BY EIA 0 - 1.0 - - -   AST 0 - 45 U/L 14 16 16   ALT 0 - 50 U/L 23 26 27   ALBUMIN 3.4 - 5.0 g/dL 3.8 4.0 4.0   WBC 4.0 - 11.0 10e9/L 3.8(L) 3.8(L) 3.7(L)   RBC 3.8 - 5.2 10e12/L 4.53 4.71 4.45   HGB 11.7 - 15.7 g/dL 12.8 13.4 12.9   HCT 35.0 - 47.0 % 41.1 43.3 41.5   MCV 78 - 100 fl 91 92 93   MCHC 31.5 - 36.5 g/dL 31.1(L) 30.9(L) 31.1(L)   RDW 10.0 - 15.0 % 14.8 15.6(H) 15.5(H)    - 450 10e9/L 243 252 236   CREATININE 0.52 - 1.04 mg/dL 0.70 0.73 0.76   GFR ESTIMATE, IF BLACK >60 mL/min/[1.73:m2] >90 >90 >90   GFR ESTIMATE >60 mL/min/[1.73:m2] 86 82 78   HEPATITIS C ANTIBODY NR - - -     Rheumatoid  Factor   Date Value Ref Range Status   01/14/2009 8 0 - 14 IU/mL Final     Cyclic Citrullinated Peptide IgG   Date Value Ref Range Status   01/14/2009 <2 <5 U/mL Final     Comment:     Interpretation:  Negative     Ribonucleic Protein IgG Antibody   Date Value Ref Range Status   01/24/2007 12  Final     Comment:     Reference range: 0 to 49  Unit: Units  (Note)  REFERENCE INTERVAL: Ribonucleic Protein (VIKRAM), IgG   Less than 20 Units ........ None detected   20 - 49 Units ............. Inconclusive   50 Units or greater ....... Positive    RNP antibody is seen in % of mixed connective tissue  disease and is considered specific for this syndrome if  other antibodies are negative. RNP is also present in  20-30% of SLE and 15-25% of progressive systemic  sclerosis.  The above test was performed at: [facility],  [address] [place] [address]  [id number] [phone]  [email or web address]     SSA (RO) Antibody IgG   Date Value Ref Range Status   08/24/2005 221 (H)  Final     Comment:     Reference range: 0 to 49  Unit: Units  (Note)  REFERENCE INTERVALS: SSA (Ro) (VIKRAM) Ab, IgG   Less than 20 Units ....... None detected   20 - 49 Units ............ Inconclusive   50 Units or greater ...... Positive    SSA (Ro) antibody is seen in70-75% of Sjogren syndrome  cases, 30-40% of systemic lupus erythematosus (SLE) and  5-10% of progressive systemic sclerosis (PSS).  The above test was performed at: [facility],  [address] [place] [address]  [id number] [phone]  [email or web address]     SSB (LA) Antibody IgG   Date Value Ref Range Status   08/24/2005 20  Final     Comment:     Reference range: 0 to 49  Unit: Units  (Note)  REFERENCE INTERVALS: SSB (La) (VIKRAM) Ab, IgG   Less than 20 Units ....... None detected   20 - 49 Units ............ Inconclusive   50 Units or greater ...... Positive    SSB (La) antibody is seen in 50-60% of Sjogren syndrome  cases and is specific if it is the only VIKRAM antibody  present. 15-25% of patients with  systemic lupus  erythematosus (SLE) and 5-10% of patients with progressive  systemic sclerosis (PSS) also have this antibody.  The above test was performed at: [facility],  [address] [place]  [address]  [phone]  [email or web address]   ,  ,   CRISTAL Screen by EIA   Date Value Ref Range Status   02/16/2010 8.2 (H) 0 - 1.0 Final     Comment:     Interpretation:  Positive     DNA-ds   Date Value Ref Range Status   06/12/2019 1 <10 IU/mL Final     Comment:     Negative     Albumin Fraction   Date Value Ref Range Status   04/01/2013 3.9 3.7 - 5.1 g/dL Final     Alpha 2 Fraction   Date Value Ref Range Status   04/01/2013 0.7 0.5 - 0.9 g/dL Final     Beta Fraction   Date Value Ref Range Status   04/01/2013 0.6 0.6 - 1.0 g/dL Final     Gamma Fraction   Date Value Ref Range Status   04/01/2013 0.7 0.7 - 1.6 g/dL Final     Monoclonal Peak   Date Value Ref Range Status   04/01/2013 0.0 0.0 g/dL Final     ELP Interpretation:   Date Value Ref Range Status   04/01/2013   Final    Essentially normal electrophoretic pattern.  No monoclonal protein seen.   Pathologic significance requires clinical correlation.  LEATHA Valencia M.D.,   Ph.D., Pathologist       Jeanmarie is a 75 year old who is being evaluated via a billable video visit.      How would you like to obtain your AVS? MyChart    Will anyone else be joining your video visit? No      Video Start Time: 12:32 PM  Video-Visit Details    Type of service:  Video Visit    Video End Time:1:00 PM    Originating Location (pt. Location): Home    Distant Location (provider location):  [facility] RHEUMATOLOGY [facility]IC [place]     Platform used for Video Visit: [employer]

## 2021-05-07 ENCOUNTER — VIRTUAL VISIT (OUTPATIENT)
Dept: RHEUMATOLOGY | Facility: CLINIC | Age: 76
End: 2021-05-07
Attending: INTERNAL MEDICINE
Payer: COMMERCIAL

## 2021-05-07 DIAGNOSIS — M32.9 SYSTEMIC LUPUS ERYTHEMATOSUS, UNSPECIFIED SLE TYPE, UNSPECIFIED ORGAN INVOLVEMENT STATUS (H): Primary | ICD-10-CM

## 2021-05-07 DIAGNOSIS — D68.61 ANTIPHOSPHOLIPID SYNDROME (H): ICD-10-CM

## 2021-05-07 DIAGNOSIS — Z79.899 ENCOUNTER FOR LONG-TERM CURRENT USE OF MEDICATION: ICD-10-CM

## 2021-05-07 PROCEDURE — 99214 OFFICE O/P EST MOD 30 MIN: CPT | Mod: 95 | Performed by: INTERNAL MEDICINE

## 2021-05-07 RX ORDER — MYCOPHENOLATE MOFETIL 500 MG/1
1000 TABLET ORAL 2 TIMES DAILY
Qty: 360 TABLET | Refills: 3 | Status: SHIPPED | OUTPATIENT
Start: 2021-05-07 | End: 2021-11-05

## 2021-05-07 NOTE — LETTER
2021       RE: Shala Caruso  [address]  [place] [address]     Dear Colleague,    Thank you for referring your patient, Shala Caruso, to the [facility] [place] at [facility]. Please see a copy of my visit note below.    [facility]  Rheumatology Clinic-video  Tato Agarwal MD  2021     Name: Shala Caruso  MRN: [id number]  Age: 75 year old  : 1945  Referring provider: Joe Contreras     Assessment and Plan:    Systemic lupus erythematosus (+CRISTAL, +dsDNA, +SSA, hypocomplementemia; Raynaud's, arthritis, serositis, tubulo-interstitial nephropathy, pericardial effusion/tamponade): Pt relates stable bilateral knee and hip pain that improves with movement and stretching, as well as mild loss of sensation in her toes, also stable.  Raynaud's phenomenon is mild, and is controlled with thermal protective measures alone.  No urinary symptoms.  Video examination today revealed normal range of motion in the hand and finger joints without evidence of digital tapering or cyanosis.    Laboratory evaluation on 2021 showed creatinine, transaminases, albumin, and CBC all normal except for minimally decreased white count of 3.7, unchanged from .  Complement C3 and C4 and double-stranded DNA titers were normal/negative urinalysis showed 8 white cells and 3 red cells per high-powered field with positive nitrites.  Culture showed greater than 100,000 colonies of E. coli.    SLE is stable by history and remote exam.  Disease that formerly affected the kidney as well as serosal surfaces remains well compensated on cellcept monotherapy now used continuously since .. She has been taking cellcept 1000 bid and reports tolerating it well without any side-effects.  We discussed my recommendation that she slowly and cautiously taper CellCept to find the minimum effective dose.  Plan urinalysis,  "creatinine, and CBC every 6 months.  Repeat urinalysis to follow-up on \"dirty\" UA obtained on April 29, even though no symptoms of cystitis are present.    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 Following factor 2 levels.  # Peripheral neuropathy  # Raynaud's: stable  # Achalasia: stable on nifedipine  #Osteoarthritis, knees and hips: Chronic use associated pain and stiffness is modest, stable.  Patient is capable of performing physical activity for multiple hours daily.  I recommended continue weightbearing exercises and range of motion exercises daily.    # Osteoporosis: DEXA scan performed in June July 2020 revealed a T score of -2.3 at the femoral neck.  I agree with institution of bisphosphonate therapy.  Patient underwent zoledronic acid per endocrinology in July 2020.  I agree with plans for annual repeat treatment.  Patient should continue calcium carbonate, vitamin D, and weightbearing exercise as well.    Tato Agarwal M.D.  Staff Rheumatologist, [facility]  Pager [phone]    Follow-up: 6 mos    HPI:   Shala Caruso has a history of DM, SLE, PE on Coumadin, and HTN who presents for evaluation of lupus.  She was last seen in .  At that time she was doing well without no significant symptoms of autoimmune disease.  Plan was to continue mycophenolate 1000 mg twice daily.     Summary of previous history:  SLE diagnosed in 2000 (+CRISTAL, +dsDNA ab, arthritis, serositis).  She has antiphospholipid syndrome and had DVT and PE in 2004, on Coumadin since then.  She had a Lupus flare in 2010 initially treated with Plaquenil however she worsened and developed pericardial effusion/tamponade - started on MTX and tapering dose of prednisone at that time (continued on about 10 mg daily).  Cellcept started 6/2010, Prednisone tapered off.  MTX stopped May 2012.    Interval history 05-:    Getting along \"ok\". She is unchanged in that Her knees and hips give constant " "pain \"tolerable\". If she sits too long, she is very stiff. Morning stiffness is brief, relieved by movement. No pain in UE joints. She is walking every day; she plans to start biking this summer. She does daily yoga with a lot of stretching; she tends to feel better with it.    Her hands are sensitive to cold with ongoing Raynaud's. No digital ulcers.    No F/C/S, chest pain.  No urinary symptoms.      Hip pain is in the groin, made worse with long walking.    Interval history 10-:    Health is ok. Her knees and hips give constant pain \"tolerable\". If she sits too long, she is very stiff. Morning stiffness is brief, relieved by movement. No pain in UE joints.    Her hands are sensitive to cold. No digital ulcers.    Otherwise, health is stable.  She is exercising daily, including yoga for 2 hours every morning.  Appetite is good energy level is excellent.  She is retiring next week.    Interval hx: 4-20:  She has been continuing yoga and walking daily. She is working from home. Her neuropathic symptoms are stable. Minimal joint pain or staiffness; knee OA symptoms are stable. Achalasia symptoms have been slightly increased recently; she does note hoarseness recently; no trouble swallowing. No abd pain or change in bowel habits.    No change in mycophenolate dosing.    Prior history 10-19: She reports mild tingling and cold sensation in her toes which she attributes to diabetic neuropathy.  She also has mild but constant soreness in both knees.  This is better with movement and she attributes this to osteoarthritis.  She does yoga every morning and feels this helps her knees.  Other than her knees, she has not had significant joint issues in the past several months.  She denies any rash, mouth sores, chest pain, shortness of breath, or new vision changes.    She also notes chronic changes of her fingernails which has not changed recently.  She has not sought treatment for this.    Review of Systems:  Pertinent " "items are noted in HPI or as below, remainder of complete ROS is negative.      No recent problems with hearing or vision. Eye dryness relieved by once daily Refresh; no dry mouth  No breathing difficulty, shortness of breath, coughing, or wheezing  No chest pain or palpitations  No heart burn, indigestion, abdominal pain, nausea, vomiting, diarrhea  No urination problems, no bloody, cloudy urine, no dysuria  + toe \"neuropathy\",. Symmetrical, stable.  No headaches or confusion  No rashes. No easy bleeding or bruising.     Active Medications:     Current Outpatient Medications   Medication     acetaminophen (TYLENOL) 500 MG tablet     aspirin 81 MG tablet     AYR SALINE NASAL NO-DRIP GEL     Calcium Carbonate-Vitamin D (CALCIUM 500 + D PO)     carboxymethylcellulose PF (REFRESH PLUS) 0.5 % SOLN ophthalmic solution     Glucagon (BAQSIMI ONE PACK) 3 MG/DOSE POWD     Injection Device for insulin (NOVOPEN ECHO) RORY     insulin aspart (NOVOLOG PENFILL) 100 UNIT/ML cartridge     insulin glargine (LANTUS PEN) 100 UNIT/ML pen     insulin pen needle (BD PAM U/F) 32G X 4 MM miscellaneous     LANCETS ULTRA THIN 30G MISC     Multiple Vitamins-Minerals (CENTRUM SILVER PO)     mycophenolate (GENERIC EQUIVALENT) 500 MG tablet     NIFEdipine ER OSMOTIC (PROCARDIA XL) 30 MG 24 hr tablet     order for DME     simvastatin (ZOCOR) 40 MG tablet     TRUE METRIX BLOOD GLUCOSE TEST test strip     warfarin ANTICOAGULANT (COUMADIN) 5 MG tablet     No current facility-administered medications for this visit.      No longer on lantus: too many \"lows\".  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 Checks BS 5X daily.  Osteoporosis   Hypertension "   Hyperlipidemia   Achalasia   Gastroesophageal reflux disease   Choroidal lesion  Atrophic vaginitis   Urinary urgency   Female stress incontinence   Cystocele, midline   Deep vein thrombosis   Nonsenile cataract   Myopia   Neuropathy   Lymphedema      Past Surgical History:  Phacoemulsification clear cornea with intraocular lens implantation, right 1/8/19, left 2/5/19  Transvaginal sling 12/20/16  Axilla lymph node dissection 2004  Bilateral tubal ligation     Family History:   Glaucoma - father   Breast cancer   Stroke       Social History:   Tobacco Use: No previous or current tobacco use.   Alcohol Use: No alcohol use.   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  Fist formation is normal.  Psych: Normal judgement, orientation, memory, affect.     Laboratory:   RHEUM RESULTS Latest Ref Rng & Units 12/22/2020 4/21/2021 4/29/2021   COMPLEMENT C3 81 - 157 mg/dL 95 90 90   COMPLEMENT C4 13 - 39 mg/dL 16 14 15   DNA-DS <10 IU/mL 1 1 1   SED RATE 0 - 30 mm/h - - -   CRP, INFLAMMATION 0.0 - 8.0 mg/L - - -   CYCLIC CIT PEPT IGG <5 U/mL - - -   CK TOTAL 30 - 225 U/L - - -   RHEUMATOID FACTOR 0 - 14 IU/mL - - -   CRISTAL SCREEN BY EIA 0 - 1.0 - - -   AST 0 - 45 U/L 14 16 16   ALT 0 - 50 U/L 23 26 27   ALBUMIN 3.4 - 5.0 g/dL 3.8 4.0 4.0   WBC 4.0 - 11.0 10e9/L 3.8(L) 3.8(L) 3.7(L)   RBC 3.8 - 5.2 10e12/L 4.53 4.71 4.45   HGB 11.7 - 15.7 g/dL 12.8 13.4 12.9   HCT 35.0 - 47.0 % 41.1 43.3 41.5   MCV 78 - 100 fl 91 92 93    MCHC 31.5 - 36.5 g/dL 31.1(L) 30.9(L) 31.1(L)   RDW 10.0 - 15.0 % 14.8 15.6(H) 15.5(H)    - 450 10e9/L 243 252 236   CREATININE 0.52 - 1.04 mg/dL 0.70 0.73 0.76   GFR ESTIMATE, IF BLACK >60 mL/min/[1.73:m2] >90 >90 >90   GFR ESTIMATE >60 mL/min/[1.73:m2] 86 82 78   HEPATITIS C ANTIBODY NR - - -     Rheumatoid Factor   Date Value Ref Range Status   01/14/2009 8 0 - 14 IU/mL Final     Cyclic Citrullinated Peptide IgG   Date Value Ref Range Status   01/14/2009 <2 <5 U/mL Final     Comment:     Interpretation:  Negative     Ribonucleic Protein IgG Antibody   Date Value Ref Range Status   01/24/2007 12  Final     Comment:     Reference range: 0 to 49  Unit: Units  (Note)  REFERENCE INTERVAL: Ribonucleic Protein (VIKRAM), IgG   Less than 20 Units ........ None detected   20 - 49 Units ............. Inconclusive   50 Units or greater ....... Positive    RNP antibody is seen in % of mixed connective tissue  disease and is considered specific for this syndrome if  other antibodies are negative. RNP is also present in  20-30% of SLE and 15-25% of progressive systemic  sclerosis.  The above test was performed at: [facility],  [facility]  [address]  [phone]  [email or web address]     SSA (RO) Antibody IgG   Date Value Ref Range Status   08/24/2005 221 (H)  Final     Comment:     Reference range: 0 to 49  Unit: Units  (Note)  REFERENCE INTERVALS: SSA (Ro) (VIKRAM) Ab, IgG   Less than 20 Units ....... None detected   20 - 49 Units ............ Inconclusive   50 Units or greater ...... Positive    SSA (Ro) antibody is seen in70-75% of Sjogren syndrome  cases, 30-40% of systemic lupus erythematosus (SLE) and  5-10% of progressive systemic sclerosis (PSS).  The above test was performed at: [facility],  [address] [place] [address] [place]  [id number]  [phone]  [email or web address]     SSB (LA) Antibody IgG   Date Value Ref Range Status   08/24/2005 20  Final     Comment:     Reference range: 0 to 49  Unit:  Units  (Note)  REFERENCE INTERVALS: SSB (La) (VIKRAM) Ab, IgG   Less than 20 Units ....... None detected   20 - 49 Units ............ Inconclusive   50 Units or greater ...... Positive    SSB (La) antibody is seen in 50-60% of Sjogren syndrome  cases and is specific if it is the only VIKRAM antibody  present. 15-25% of patients with systemic lupus  erythematosus (SLE) and 5-10% of patients with progressive  systemic sclerosis (PSS) also have this antibody.  The above test was performed at: [facility],  [address] [place] [address]  [address]  [phone]  [email or web address]   ,  ,   CRISTAL Screen by EIA   Date Value Ref Range Status   02/16/2010 8.2 (H) 0 - 1.0 Final     Comment:     Interpretation:  Positive     DNA-ds   Date Value Ref Range Status   06/12/2019 1 <10 IU/mL Final     Comment:     Negative     Albumin Fraction   Date Value Ref Range Status   04/01/2013 3.9 3.7 - 5.1 g/dL Final     Alpha 2 Fraction   Date Value Ref Range Status   04/01/2013 0.7 0.5 - 0.9 g/dL Final     Beta Fraction   Date Value Ref Range Status   04/01/2013 0.6 0.6 - 1.0 g/dL Final     Gamma Fraction   Date Value Ref Range Status   04/01/2013 0.7 0.7 - 1.6 g/dL Final     Monoclonal Peak   Date Value Ref Range Status   04/01/2013 0.0 0.0 g/dL Final     ELP Interpretation:   Date Value Ref Range Status   04/01/2013   Final    Essentially normal electrophoretic pattern.  No monoclonal protein seen.   Pathologic significance requires clinical correlation.  LEATHA Valencia M.D.,   Ph.D., Pathologist       Jeanmarie is a 75 year old who is being evaluated via a billable video visit.      How would you like to obtain your AVS? MyChart    Will anyone else be joining your video visit? No      Video Start Time: 12:32 PM  Video-Visit Details    Type of service:  Video Visit    Video End Time:1:00 PM    Originating Location (pt. Location): Home    Distant Location (provider location):  [facility] RHEUMATOLOGY [facility]     Platform used for Video  Visit: Irina        Again, thank you for allowing me to participate in the care of your patient.      Sincerely,    Tato Agarwal MD

## 2021-05-07 NOTE — PATIENT INSTRUCTIONS
Diagnosis:  1.  Systemic lupus erythematosus with a history of phospholipid antibodies, serositis, and nephritis: There are no symptoms or signs suggestive of active lupus.  I recommend renewing screening for lupus activity markers of complement, anti-DNA, and urinalysis in 6 months.  Given the stability of lupus symptoms for many years, I recommend slow taper of mycophenolate.  2.  Osteoporosis: You received definitive treatment with zoledronic acid in July 2020.  I recommend continuing calcium and vitamin D supplements.  3.  Propensity to clot: No evidence of recurrent thrombosis.  I recommend continuing warfarin as directed through [facility].    Plan:  1.  Continue mycophenolate 1000 mg twice daily by prescription; in practice, reduce to 1000 mg twice daily every other day and 1500 mg total every other day.   2.  Check blood work, urinalysis every 6 months, except for planned interim analyses in 3 months; recheck urinalysis in follow-up of the abnormal urinalysis and positive urine culture that was recorded on April 29.    3.  Continue calcium carbonate 1200 mill equivalents daily, and vitamin D 800 international units daily to support bone density; continue range of motion exercise but incorporate weightbearing exercise into daily routine.  4.  Okay to use Voltaren 1% gel on sore knees once or twice daily.  Avoid using the gel more often or regularly because of concomitant Coumadin use.   Pulmonary/Critical Care Consultation    Consulting physician: Dr. 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, sacral decubitus ulcers (Stage II)    Patient Active Problem List   Diagnosis   • Hypercholesterolemia   • Major depression   • Anxiety disorder   • Essential hypertension, benign   • CAD (coronary artery disease)   • Murmur   • Pulmonary hypertension (CMS/HCC)   • Elevated liver enzymes   • Restless legs syndrome (RLS)   • BPPV (benign paroxysmal positional vertigo)   • Allergic rhinitis, cause unspecified   • Lipoma   • Unspecified vitamin D deficiency   • 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)   • Ascites   • PAF (paroxysmal atrial fibrillation) (CMS/HCC)   • Uremia   • Anticoagulant long-term use - Warfarin   • High risk medication use - Sotalol   • Acute renal insufficiency   • Encephalopathy, hepatic (CMS/HCC)   • CKD (chronic kidney disease) stage 3; keep creat ~1.8-2 to decrease ascitic reaccumulation   • Hyperammonemia (CMS/HCC)   • Hyponatremia   • Elevated lipase   • Leukocytosis   • Intractable abdominal pain   • SBP (spontaneous bacterial peritonitis) (CMS/HCC)   • Tremor of unknown origin   • Herpes zoster without complication   • Abnormal gait   • Right leg weakness due to prior fracture and hardware   • ESRD (end stage renal disease) on dialysis (CMS/HCC)   • Generalized weakness   • Thrombocytopenia (CMS/HCC)   • Fecal occult blood test positive   • Postmenopausal bleeding   • Anemia of chronic kidney failure     History of Present Illness:      Patient is a 74 year old white female who presented to the emergency department with hypoglycemia.  The patient complained of headache, low blood pressure, abdominal pain, chronic diarrhea (On chronic treatment with laculose), and back pain.  The patient's  reports that the patient has decreased blood pressure  when she receives hemodialysis; her last hemodialysis was prior to coming to the hospital.  The patient has no history of diabetes and takes no hypoglycemic medications.  The patient reports she has felt poorly for the past 2 days prior to admission.  No cough, no sputum production.    The patient is status post left arm AV fistula placement about a month ago; her fistula has not matured sufficiently to be used yet.  At baseline, the patient produces very little urine.    PMH:   has a past medical history of Allergy; Anemia; Anxiety; Arthritis; At risk for falls; Atypical chest pain; Bilateral kidney stones; CAD (coronary artery disease); CKD (chronic kidney disease); Congestive cardiac failure (CMS/HCC); Depression; Fracture of lower extremity (2000); Gastro-oesophageal reflux disease; Heart murmur; History of suicide attempt; HTN (hypertension); Hyperlipidemia; Impaired mobility; Increased endometrial stripe thickness; Liver disease; Malignant ascites (11/4/2015); Myocardial infarction (CMS/HCC) (1985); NATARAJAN (nonalcoholic steatohepatitis); Pre-liver transplant, listed; Renal artery stenosis (CMS/[facility]); Restless leg syndrome; Right ovarian cyst; Sinus bradycardia; Sinusitis, chronic; Skin cancer (2007); Transfusion history (10/2015); Ulcerative colitis (CMS/[facility]); Urinary tract infection; VRE (vancomycin resistant enterococcus) culture positive (9/16/15, 6/12/15, 5/23/15); and Wears reading eyeglasses. She also has no past medical history of Diabetes mellitus (CMS/HCC); Emphysema of lung (CMS/HCC); RAD (reactive airway disease); Seizures (CMS/HCC); Stroke (CMS/[facility]); or Thyroid disease.    PSH:   has a past surgical history that includes Appendectomy (1958); Tubal ligation (1973); Breast surgery (Right); skin biopsy (Right); Echo M-Mode/2D/Doppler (Routine) (5/5/2015); Cardiac surgery (1985); Colonoscopy (09/17/2015); Esophagogastroduodenoscopy (07/02/2014); Esophagogastroduodenoscopy (05/09/2015); Cardiac  catheterization (12/18/2015); Esophagogastroduodenoscopy (09/15/2016); Ankle fracture surgery (Right, 2000); and AV fistula placement (Left, 07/19/2017).    ALLERGIES:   Allergen Reactions   • Levaquin [Levofloxacin] PRURITUS   • Penicillins HIVES   • Cephalexin ERYTHEMA     Warmth and redness on toes and face on 5/25/15.   • Ciprofloxacin DIARRHEA   • Morphine Other (See Comments)     Causes patient to become confused.   • Zoloft HEADACHES and NAUSEA       Family History   Problem Relation Age of Onset   • Heart disease Mother    • Heart disease Father    • Cancer Father 70     prostate cancer   • Heart disease Brother    • Cancer Brother    • Cancer Maternal Aunt      breast cancer, post menopausal     Social History   reports that she quit smoking about 37 years ago. Her smoking use included Cigarettes. She has a 12.50 pack-year smoking history. She has never used smokeless tobacco. She reports that she does not drink alcohol or use drugs.    Review of Systems: As outlined in history of present illness.  The patient has had no fever or chills prior to coming to the emergency department.  No nasal or sinus congestion, no chest pain.  The patient does complain of non-specific abdominal discomfort.  The patient has chronic diarrhea secondary to treatment with lactulose.  The patient also complains of back pain and cephalgia    Rest of review of systems are negative or non-contributory.      Current Facility-Administered Medications   Medication Dose Route Frequency Provider Last Rate Last Dose   • norepinephrine (LEVOPHED) 4 mg in sodium chloride 0.9% 250 mL infusion   Intravenous Continuous Negro Bell MD 37.5 mL/hr at 08/26/17 0251 10 mcg/min at 08/26/17 0251   • midodrine (PROAMATINE) tablet 15 mg  15 mg Oral TID AC Negro Bell MD       • oxyCODONE (IMM REL) (ROXICODONE) tablet 5 mg  5 mg Oral Q8H PRN Negro Bell MD       • acetaminophen (TYLENOL) tablet 650 mg  650 mg Oral Q4H PRN Negro MENDOZA  MD Arabella       • gabapentin (NEURONTIN) capsule 200 mg  200 mg Oral 2 times per day Negro Bell MD       • mirtazapine (REMERON) tablet 7.5 mg  7.5 mg Oral Nightly Negro Bell MD       • venlafaxine (EFFEXOR) tablet 37.5 mg  37.5 mg Oral Daily Negro Bell MD       • lactulose (CHRONULAC) 10 GM/15ML syrup 20 g  20 g Oral Q Evening Negro Bell MD       • diphenhydrAMINE (BENADRYL) capsule 25 mg  25 mg Oral Nightly PRN Negro Bell MD       • aspirin chewable 81 mg  81 mg Oral Daily Negro Bell MD       • sodium chloride (PF) 0.9 % injection 2 mL  2 mL Injection 2 times per day Negro Bell MD       • sodium chloride (PF) 0.9 % injection 2 mL  2 mL Injection PRN Negro Bell MD       • sodium chloride (NORMAL SALINE) 0.9 % bolus 500 mL  500 mL Intravenous PRN Negro Bell MD       • nitroGLYcerin (NITROSTAT) sublingual tablet 0.4 mg  0.4 mg Sublingual Q5 Min PRN Negro Bell MD       • potassium chloride (K-DUR,KLOR-CON) CR tablet 20 mEq  20 mEq Oral Q4H PRN Negro Bell MD       • potassium chloride (KLOR-CON) packet 20 mEq  20 mEq Per NG tube Q4H PRN Negro Bell MD       • potassium chloride 20 mEq/100mL IVPB premix  20 mEq Intravenous Q4H PRN Negro Bell MD       • potassium chloride (K-DUR,KLOR-CON) CR tablet 40 mEq  40 mEq Oral Q4H PRN Negro Bell MD       • potassium chloride (KLOR-CON) packet 40 mEq  40 mEq Per NG tube Q4H PRN Negro Bell MD       • potassium chloride 20 mEq/100mL IVPB premix  40 mEq Intravenous Q4H PRN Negro Bell MD       • chlorhexidine gluconate (PERIDEX) 0.12 % solution 15 mL  15 mL Swish & Spit 2 times per day Negro Bell MD        And   • chlorhexidine gluconate (PERIDEX) 0.12 % solution 15 mL  15 mL Swish & Spit PRN Negro Bell MD       • sodium chloride 0.9% infusion   Intravenous Continuous PRN Negro Bell MD       • sodium chloride 0.9% infusion   Intravenous Continuous PRN  Negro Bell MD       • enoxaparin (LOVENOX) injection 30 mg  30 mg Subcutaneous Daily Negro Bell MD       • magnesium sulfate 1 g in dextrose 5% 100 mL IVPB premix  1 g Intravenous Daily PRN Negro Bell MD       • potassium phosphate/sodium phosphate (K PHOS NEUTRAL) tablet 1 tablet  250 mg Oral BID PRN Negro Bell MD       • calcium gluconate 2 g in dextrose 5 % 70 mL total volume IVPB  2 g Intravenous PRN Negro Bell MD       • albuterol (VENTOLIN) nebulizer 2.5 mg  2.5 mg Nebulization Q4H Resp PRN Negro Bell MD       • famotidine (PEPCID) tablet 20 mg  20 mg Oral Daily Negro Bell MD       • ondansetron (ZOFRAN) injection 4 mg  4 mg Intravenous BID PRN Negro Bell MD       • metroNIDAZOLE (FLAGYL) IVPB 500 mg  500 mg Intravenous 3 times per day Negro Bell MD       • VANCOMYCIN - PHARMACIST MONITORED   Does not apply See Admin Instructions Negro Bell MD       • aztreonam (AZACTAM) 1,000 mg in sodium chloride 0.9 % 100 mL IVPB  1,000 mg Intravenous 2 times per day Negro Bell MD       • vancomycin (VANCOCIN) 500 mg in sodium chloride 0.9 % 100 mL IVPB  500 mg Intravenous Once per day on Mon Wed Fri Negro Bell MD         Facility-Administered Medications Ordered in Other Encounters   Medication Dose Route Frequency Provider Last Rate Last Dose   • ALBUMIN HUMAN 25 % IV SOLN Pyxis Override              Examination:    Visit Vitals  BP 98/40   Pulse 68   Temp 96.9 °F (36.1 °C) (Oral)   Resp 17   Ht 5' 3\" (1.6 m)   Wt 65.4 kg   SpO2 97%   BMI 25.54 kg/m²     Vital Sign Min/Max (last 24 hours)     Value Min Max   Temp 96.9 °F (36.1 °C) 97.6 °F (36.4 °C)   Pulse 60 84   Resp 14 34   BP: Systolic 65 113   BP: Diastolic 32 55   SpO2 78 % 100 %     General: Elderly white female resting quietly in bed on nasal cannula oxygen.  The patient is sleepy but arousable.  In no respiratory distress  HEENT: WNL; mucus membranes are moist; the patient is s/p  2 liters of fluid administration in the emergency department.  Neck: Supple, no JVD  Lungs: Left basilar crackles heard  Heart: RRR  Abdomen: Distended, soft to palpation.  No masses or organomegally palpated  Two small stage II decubiti noted on patient's coccyx   Extremity: Warm, pitting edema bilaterally.   Neurologic: Patient is very sleepy; briefly awakens with tactile and loud verbal stimuli, but rapidly returns to sleep.  Does answer appropriately when awakened    LABS:    Results for PATY PHILIP (MRN [id number]) as of 8/26/2017 02:34   Ref. Range 8/12/2017 03:26 8/12/2017 10:35 8/25/2017 21:29 8/25/2017 21:33 8/25/2017 21:44   Sodium Latest Ref Range: 135 - 145 mmol/L  138      Potassium Latest Ref Range: 3.4 - 5.1 mmol/L  4.5      Chloride Latest Ref Range: 98 - 107 mmol/L  100      CO2 Latest Ref Range: 21 - 32 mmol/L  22      ANION GAP Latest Ref Range: 10 - 20 mmol/L  21 (H)      Albumin Latest Ref Range: 3.6 - 5.1 g/dL   1.8 (L)     ALK PHOSPHATASE Latest Ref Range: 45 - 117 Units/L   211 (H)     ALT/SGPT Latest Ref Range: <79 Units/L   28     AST/SGOT Latest Ref Range: <38 Units/L   57 (H)     BUN Latest Ref Range: 6 - 20 mg/dL  45 (H)      BUN/CREATININE RATIO Latest Ref Range: 7 - 25   9      CALCIUM Latest Ref Range: 8.4 - 10.2 mg/dL  8.9      Creatinine Latest Ref Range: 0.51 - 0.95 mg/dL  5.22 (H)      DIRECT BILIRUBIN Latest Ref Range: 0.0 - 0.2 mg/dL   0.7 (H)     GFR Estimate,  Unknown  9      GFR Estimate, Non  Unknown  8      Glucose Latest Ref Range: 65 - 99 mg/dL  106 (H)      Lipase Latest Ref Range: 73 - 393 Units/L   507 (H)     MAGNESIUM Latest Ref Range: 1.7 - 2.4 mg/dL   1.8     TOTAL BILIRUBIN Latest Ref Range: 0.2 - 1.0 mg/dL   2.3 (H)     TOTAL PROTEIN Latest Ref Range: 6.4 - 8.2 g/dL   4.3 (L)     B-TYPE NATRIURETIC PEPTIDE Latest Ref Range: <100 pg/mL   126 (H)     Troponin I POC Latest Ref Range: <0.10 ng/mL    <0.10    AMMONIA Latest Ref Range:  <34 mcmol/L     15   Lactic Acid Venous Latest Ref Range: 0 - 2.0 mmol/L 2.8 (HH)       PROCALCITONIN Latest Ref Range: <0.10 ng/mL   0.58 (H)     WBC Latest Ref Range: 4.2 - 11.0 K/mcL   16.3 (H)     RBC Latest Ref Range: 4.00 - 5.20 mil/mcL   3.36 (L)     HGB Latest Ref Range: 12.0 - 15.5 g/dL   9.1 (L)     HCT Latest Ref Range: 36.0 - 46.5 %   29.3 (L)     MCV Latest Ref Range: 78.0 - 100.0 fl   87.2     MCH Latest Ref Range: 26.0 - 34.0 pg   27.1     MCHC Latest Ref Range: 32.0 - 36.5 g/dL   31.1 (L)     RDW-CV Latest Ref Range: 11.0 - 15.0 %   21.1 (H)     PLT Latest Ref Range: 140 - 450 K/mcL   87 (L)     DIFFERENTIAL TYPE Unknown   MANUAL DIFFERENTIAL     SEG Latest Units: %   71     LYMPH Latest Units: %   10     MONO Latest Units: %   6     EOSIN Latest Units: %   0     BASO Latest Units: %   0     BAND Latest Ref Range: 0 - 10 %   12 (H)     REACTIVE LYMPH Latest Ref Range: 0 - 5 %   1     NUCLEATED RBC'S Latest Ref Range: 0 /100 WBC   1 (H)     Absolute Neut Latest Ref Range: 1.8 - 7.7 K/mcL   13.5 (H)     Absolute Lymph Latest Ref Range: 1.0 - 4.0 K/mcL   1.8     Absolute Mono Latest Ref Range: 0.3 - 0.9 K/mcL   1.0 (H)     Absolute Eos Latest Ref Range: 0.1 - 0.5 K/mcL   0.0 (L)     Absolute Baso Latest Ref Range: 0.0 - 0.3 K/mcL   0.0     Hypochromia Unknown   FEW     Polychromasia Unknown   FEW     Toxic Granulation Unknown   PRESENT     PLATELETS APPEAR Latest Ref Range: NORMAL    NORMAL     INR Unknown   1.5     PROTIME Latest Ref Range: 9.7 - 11.8 sec   16.5 (H)     PTT Latest Ref Range: 22 - 30 sec   46 (H)     CROSSMATCH  Unknown   2017           RADIOLOGY:    XR CHEST AP OR PA 2017     FINDINGS:  Interval placement of a left-sided internal jugular catheter with tip  projecting over the mid SVC. Stable right-sided dialysis catheter with tip  projecting over the cavoatrial junction. Sternotomy wires and mediastinal  surgical clips. Overlying EKG leads.  Stable appearance of the  cardiomediastinal silhouette. The pulmonary  vasculature appears within normal limits. No focal right-sided  consolidation. Persistent strandy left basilar opacities likely  representative of atelectasis. No pleural effusion or pneumothorax.     IMPRESSION:       1.  Interval placement of left-sided central catheter with tip projecting  over the mid SVC.  2.  Persistent ill-defined left basilar opacities likely representative of  atelectasis.    ASSESSMENT/PLAN:    1) Shock - Patient is s/p fluid resuscitation in the emergency department; remains hypotensive - Started on levophed to support blood pressure.    2) Left basilar infiltrate r/o secondary to atelectasis vs pneumonia - Patient has been started on broad-spectrum IV antibiotic treatment; will check follow-up labs and chest radiograph    3) Lactic acidosis - Will continue to monitor lactic acid until lactate less than 2.0    4) Hypotension secondary to #1 above.    5) Chronic renal failure - Last hemodialysis was on Friday    6) Encephalopathy - secondary to sepsis and hypotension.  Continue to closely monitor.  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  Will continue to closely monitor and treat in critical care setting for now.  The patient was seen and evaluated throughout the night, and discussed with the critical care team.  Labs and radiographs were reviewed.      ACCS Attestation       Ms. Nunes is critically ill as documented above. I saw and evaluated the patient and reviewed imaging and laboratory data. Critical care services I provided [id number] > 60 minutes not including time  allocated for procedures.    Admission Requirements and Anticipated Length of Stay:  The patient is expected to require a two-midnight stay in the hospital.  Admission is required to provide services and treatment only available in the hospital.  The decision to admit the patient is supported by the followin.  The documented complex medical factors and comorbidities.  2.  The severity of signs and symptoms.  3.  The current and anticipated ongoing medical needs.  4.  The potential risk for an adverse event or outcome.      Critical Care 60 minutes, not including procedure time      Negro Bell MD

## 2021-05-19 ENCOUNTER — DOCUMENTATION ONLY (OUTPATIENT)
Dept: ANTICOAGULATION | Facility: CLINIC | Age: 76
End: 2021-05-19

## 2021-05-19 DIAGNOSIS — Z79.01 LONG TERM CURRENT USE OF ANTICOAGULANT THERAPY: ICD-10-CM

## 2021-05-19 DIAGNOSIS — L93.0 LUPUS ERYTHEMATOSUS: ICD-10-CM

## 2021-05-19 DIAGNOSIS — L93.0 LUPUS ERYTHEMATOSUS: Primary | ICD-10-CM

## 2021-05-19 DIAGNOSIS — M32.9 SYSTEMIC LUPUS ERYTHEMATOSUS, UNSPECIFIED SLE TYPE, UNSPECIFIED ORGAN INVOLVEMENT STATUS (H): ICD-10-CM

## 2021-05-19 DIAGNOSIS — M32.9 SYSTEMIC LUPUS ERYTHEMATOSUS (H): ICD-10-CM

## 2021-05-19 LAB
ALBUMIN UR-MCNC: NEGATIVE MG/DL
APPEARANCE UR: ABNORMAL
BACTERIA #/AREA URNS HPF: ABNORMAL /HPF
BILIRUB UR QL STRIP: NEGATIVE
COLOR UR AUTO: YELLOW
GLUCOSE UR STRIP-MCNC: NEGATIVE MG/DL
HGB UR QL STRIP: NEGATIVE
KETONES UR STRIP-MCNC: 5 MG/DL
LEUKOCYTE ESTERASE UR QL STRIP: NEGATIVE
MUCOUS THREADS #/AREA URNS LPF: PRESENT /LPF
NITRATE UR QL: POSITIVE
PH UR STRIP: 5 PH (ref 5–7)
RBC #/AREA URNS AUTO: 2 /HPF (ref 0–2)
SOURCE: ABNORMAL
SP GR UR STRIP: 1.02 (ref 1–1.03)
UROBILINOGEN UR STRIP-MCNC: 0 MG/DL (ref 0–2)
WBC #/AREA URNS AUTO: 2 /HPF (ref 0–5)

## 2021-05-19 PROCEDURE — 85210 CLOT FACTOR II PROTHROM SPEC: CPT | Mod: 90 | Performed by: PATHOLOGY

## 2021-05-19 PROCEDURE — 99000 SPECIMEN HANDLING OFFICE-LAB: CPT | Performed by: PATHOLOGY

## 2021-05-19 PROCEDURE — 36415 COLL VENOUS BLD VENIPUNCTURE: CPT | Performed by: PATHOLOGY

## 2021-05-19 PROCEDURE — 81001 URINALYSIS AUTO W/SCOPE: CPT | Performed by: PATHOLOGY

## 2021-05-19 NOTE — PROGRESS NOTES
Received a message from lab that order has .  Standing factor 2 assay order entered.  Gwen Abel RN

## 2021-05-20 ENCOUNTER — DOCUMENTATION ONLY (OUTPATIENT)
Dept: ANTICOAGULATION | Facility: CLINIC | Age: 76
End: 2021-05-20

## 2021-05-20 ENCOUNTER — ANTICOAGULATION THERAPY VISIT (OUTPATIENT)
Dept: ANTICOAGULATION | Facility: CLINIC | Age: 76
End: 2021-05-20

## 2021-05-20 DIAGNOSIS — Z79.01 LONG TERM CURRENT USE OF ANTICOAGULANT THERAPY: ICD-10-CM

## 2021-05-20 DIAGNOSIS — L93.0 LUPUS ERYTHEMATOSUS: ICD-10-CM

## 2021-05-20 DIAGNOSIS — D68.61 ANTIPHOSPHOLIPID SYNDROME (H): Primary | ICD-10-CM

## 2021-05-20 LAB — PROTHROM ACT/NOR PPP: 15 % (ref 60–140)

## 2021-05-20 NOTE — PROGRESS NOTES
ANTICOAGULATION MANAGEMENT      Shala Caruso due for annual renewal of referral to anticoagulation monitoring. Order pended for your review and signature.      ANTICOAGULATION SUMMARY      Warfarin indication(s)     Lupus erythmatosus    Heart valve present?  NO       Current goal range   Factor II: 25-15%     Goal appropriate for indication? Goal of 15-25% verified with Dr. Contreras , on 4/21/21 due to Rhuematology     Current duration of therapy Indefinite/long term therapy   Time in Therapeutic Range (TTR)  (Goal > 60%) NA      Office visit with referring provider's group within last year yes on 4/21/21       Andre Ashraf, RN

## 2021-05-20 NOTE — PROGRESS NOTES
ANTICOAGULATION FOLLOW-UP CLINIC VISIT    Patient Name:  Shala Caruso  Date:  5/20/2021  Contact Type:  Telephone    SUBJECTIVE:  Patient Findings     Comments:  Factor 2 result is 15% on 5/19/21. Dismissed BPA for renewal.  Pending Factor 2 orders sent to Dr. Contreras for electronic signature.        Clinical Outcomes     Comments:  Factor 2 result is 15% on 5/19/21. Dismissed BPA for renewal.  Pending Factor 2 orders sent to Dr. Contreras for electronic signature.           OBJECTIVE    Recent labs: (last 7 days)     05/19/21  1340   F2 15*       ASSESSMENT / PLAN  INR assessment THER    Recheck INR In: 2 WEEKS    INR Location Clinic      Anticoagulation Summary  As of 5/20/2021    INR goal:  2.0-3.0   TTR:  --   INR used for dosing:  No new INR was available at the time of this encounter.   Warfarin maintenance plan:  10 mg (5 mg x 2) every day   Full warfarin instructions:  10 mg every day   Weekly warfarin total:  70 mg   No change documented:  Andre Ashraf RN   Plan last modified:  Gwen Abel RN (2/12/2021)   Next INR check:  6/3/2021   Priority:  Maintenance   Target end date:  Indefinite    Indications    SLE (systemic lupus erythematosus) (H) (Resolved) [M32.9]  Long term current use of anticoagulant therapy [Z79.01]  Lupus erythematosus [L93.0]             Anticoagulation Episode Summary     INR check location:  Clinic Lab    Preferred lab:      Send INR reminders to:  [facility]    Comments:  Factor 2  goal range is 15-25%, 1/31/20 Not drawing INR  Ok to leave message on home [phone]   May leave messages with Rodriguez Santos  DO NOT GIVE RECORDS TO EMPLOYER U        Anticoagulation Care Providers     Provider Role Specialty Phone number    Joe Contreras MD Referring Internal Medicine [phone]            See the Encounter Report to view Anticoagulation Flowsheet and Dosing Calendar (Go to Encounters tab in chart review, and find the Anticoagulation Therapy  Visit)    INR/CFX/F2 RESULT: Factor 2 is 15% on 5/19    ASSESSMENT:Left message for patient with results and dosing recommendations. Asked patient to call back to report any missed doses, falls, signs and symptoms of bleeding or clotting, any changes in health, medication, or diet. Asked patient to call back with any questions or concerns.    DOSING ADJUSTMENT: continue 10mg daily    NEXT INR/FACTOR X OR FACTOR II:6/2    PROTOCOL FOLLOWED:Factor 2 goal 15-25%    Andre Ashraf RN

## 2021-06-03 DIAGNOSIS — M32.9 SYSTEMIC LUPUS ERYTHEMATOSUS (H): ICD-10-CM

## 2021-06-03 DIAGNOSIS — Z79.01 LONG TERM CURRENT USE OF ANTICOAGULANT THERAPY: ICD-10-CM

## 2021-06-03 DIAGNOSIS — L93.0 LUPUS ERYTHEMATOSUS: ICD-10-CM

## 2021-06-03 PROCEDURE — 36415 COLL VENOUS BLD VENIPUNCTURE: CPT | Performed by: PATHOLOGY

## 2021-06-03 PROCEDURE — 85210 CLOT FACTOR II PROTHROM SPEC: CPT | Mod: 90 | Performed by: PATHOLOGY

## 2021-06-03 PROCEDURE — 99000 SPECIMEN HANDLING OFFICE-LAB: CPT | Performed by: PATHOLOGY

## 2021-06-04 ENCOUNTER — ANTICOAGULATION THERAPY VISIT (OUTPATIENT)
Dept: ANTICOAGULATION | Facility: CLINIC | Age: 76
End: 2021-06-04

## 2021-06-04 DIAGNOSIS — Z79.01 LONG TERM CURRENT USE OF ANTICOAGULANT THERAPY: ICD-10-CM

## 2021-06-04 DIAGNOSIS — D68.61 ANTIPHOSPHOLIPID SYNDROME (H): ICD-10-CM

## 2021-06-04 DIAGNOSIS — L93.0 LUPUS ERYTHEMATOSUS: ICD-10-CM

## 2021-06-04 LAB
FACTOR 2 ASSAY: NORMAL
PROTHROM ACT/NOR PPP: 16 % (ref 60–140)

## 2021-06-04 RX ORDER — WARFARIN SODIUM 5 MG/1
TABLET ORAL
Qty: 90 TABLET | Refills: 1 | Status: SHIPPED | OUTPATIENT
Start: 2021-06-04 | End: 2022-01-24

## 2021-06-04 NOTE — PROGRESS NOTES
ANTICOAGULATION FOLLOW-UP CLINIC VISIT    Patient Name:  Shala Caruso  Date:  6/4/2021  Contact Type:  Telephone    SUBJECTIVE:  Patient Findings     Comments:  Factor 2 results on 6/3 are 16%.  Sent e-script for 5mg tablets to the discharge pharmacy per patient request.        Clinical Outcomes     Comments:  Factor 2 results on 6/3 are 16%.  Sent e-script for 5mg tablets to the discharge pharmacy per patient request.           OBJECTIVE    Recent labs: (last 7 days)     06/03/21  1352   F2 16*       ASSESSMENT / PLAN  INR assessment THER    Recheck INR In: 4 WEEKS    INR Location Clinic      Anticoagulation Summary  As of 6/4/2021    INR goal:  2.0-3.0   TTR:  --   INR used for dosing:  No new INR was available at the time of this encounter.   Warfarin maintenance plan:  10 mg (5 mg x 2) every day   Full warfarin instructions:  10 mg every day   Weekly warfarin total:  70 mg   No change documented:  Andre Ashraf RN   Plan last modified:  Gwen Abel RN (2/12/2021)   Next INR check:  7/1/2021   Priority:  Maintenance   Target end date:  Indefinite    Indications    SLE (systemic lupus erythematosus) (H) (Resolved) [M32.9]  Long term current use of anticoagulant therapy [Z79.01]  Lupus erythematosus [L93.0]  Antiphospholipid syndrome (H) [D68.61]             Anticoagulation Episode Summary     INR check location:  Clinic Lab    Preferred lab:      Send INR reminders to:  [facility]    Comments:  Factor 2  goal range is 15-25%, 1/31/20 Not drawing INR  Ok to leave message on home [phone]   May leave messages with Rodriguez Santos  DO NOT GIVE RECORDS TO EMPLOYER [employer]        Anticoagulation Care Providers     Provider Role Specialty Phone number    Joe Contreras MD Referring Internal Medicine [phone]            See the Encounter Report to view Anticoagulation Flowsheet and Dosing Calendar (Go to Encounters tab in chart review, and find the Anticoagulation Therapy  Visit)    INR/CFX/F2 RESULT:Factor 2 result on 6/3 is 16%    ASSESSMENT:Spoke with patient. Gave them their lab results and new warfarin recommendation.  No changes in health, medication, or diet. No missed doses, no falls. No signs or symptoms of bleed or clotting.    DOSING ADJUSTMENT:continue 10mg daily    NEXT INR/FACTOR X OR FACTOR II:7/1    PROTOCOL FOLLOWED:Factor 2 goal 15-25%    Andre Ashraf, RN

## 2021-06-11 DIAGNOSIS — E78.49 OTHER HYPERLIPIDEMIA: ICD-10-CM

## 2021-06-14 RX ORDER — SIMVASTATIN 40 MG
40 TABLET ORAL AT BEDTIME
Qty: 90 TABLET | Refills: 3 | OUTPATIENT
Start: 2021-06-14

## 2021-07-01 DIAGNOSIS — Z79.01 LONG TERM CURRENT USE OF ANTICOAGULANT THERAPY: ICD-10-CM

## 2021-07-01 DIAGNOSIS — D68.61 ANTIPHOSPHOLIPID SYNDROME (H): ICD-10-CM

## 2021-07-01 PROCEDURE — 99000 SPECIMEN HANDLING OFFICE-LAB: CPT | Performed by: PATHOLOGY

## 2021-07-01 PROCEDURE — 36415 COLL VENOUS BLD VENIPUNCTURE: CPT | Performed by: PATHOLOGY

## 2021-07-01 PROCEDURE — 85210 CLOT FACTOR II PROTHROM SPEC: CPT | Mod: 90 | Performed by: PATHOLOGY

## 2021-07-02 ENCOUNTER — ANTICOAGULATION THERAPY VISIT (OUTPATIENT)
Dept: ANTICOAGULATION | Facility: CLINIC | Age: 76
End: 2021-07-02

## 2021-07-02 DIAGNOSIS — L93.0 LUPUS ERYTHEMATOSUS: ICD-10-CM

## 2021-07-02 DIAGNOSIS — Z79.01 LONG TERM CURRENT USE OF ANTICOAGULANT THERAPY: ICD-10-CM

## 2021-07-02 DIAGNOSIS — D68.61 ANTIPHOSPHOLIPID SYNDROME (H): ICD-10-CM

## 2021-07-02 LAB — PROTHROM ACT/NOR PPP: 13 % (ref 60–140)

## 2021-07-02 NOTE — PROGRESS NOTES
7/2/21 Addendum:  Jeanmarie called back.  She has not been ill, no tylenol use.  The only thing she can think of is the heat--maybe she isn't quite as hydrated as she should be.  She will draw another factor 2 next week when she has other labs scheduled.  Gwen Abel RN

## 2021-07-02 NOTE — PROGRESS NOTES
ANTICOAGULATION FOLLOW-UP CLINIC VISIT    Patient Name:  Shala Caruso  Date:  7/2/2021  Contact Type:  Telephone    SUBJECTIVE:  Patient Findings     Comments:  7/1/21 Factor 2:  13  Goal range is 15 - 25        Clinical Outcomes     Comments:  7/1/21 Factor 2:  13  Goal range is 15 - 25           OBJECTIVE    Recent labs: (last 7 days)     07/01/21  1347   F2 13*       ASSESSMENT / PLAN  INR assessment SUPRA    Recheck INR In: 1 WEEK    INR Location Clinic      Anticoagulation Summary  As of 7/2/2021    INR goal:  2.0-3.0   TTR:  --   INR used for dosing:  No new INR was available at the time of this encounter.   Warfarin maintenance plan:  10 mg (5 mg x 2) every day   Full warfarin instructions:  7/2: 2.5 mg; 7/3: 7.5 mg; Otherwise 10 mg every day   Weekly warfarin total:  70 mg   Plan last modified:  Gwen Abel RN (2/12/2021)   Next INR check:  7/8/2021   Priority:  Maintenance   Target end date:  Indefinite    Indications    SLE (systemic lupus erythematosus) (H) (Resolved) [M32.9]  Long term current use of anticoagulant therapy [Z79.01]  Lupus erythematosus [L93.0]  Antiphospholipid syndrome (H) [D68.61]             Anticoagulation Episode Summary     INR check location:  Clinic Lab    Preferred lab:      Send INR reminders to:  [facility]    Comments:  Factor 2  goal range is 15-25%, 1/31/20 Not drawing INR  Ok to leave message on home [phone]   May leave messages with Rodriguez Santos  DO NOT GIVE RECORDS TO EMPLOYER [employer]        Anticoagulation Care Providers     Provider Role Specialty Phone number    Joe Contreras MD Referring Internal Medicine [phone]            See the Encounter Report to view Anticoagulation Flowsheet and Dosing Calendar (Go to Encounters tab in chart review, and find the Anticoagulation Therapy Visit)    Left message for patient with results and dosing recommendations. Asked patient to call back to report any missed doses, falls, signs and symptoms  of bleeding or clotting, any changes in health, medication, or diet. Asked patient to call back with any questions or concerns.  Factor 2:  13  Goal range is 15 - 25    Gwen Abel RN

## 2021-07-07 ENCOUNTER — ANTICOAGULATION THERAPY VISIT (OUTPATIENT)
Dept: ANTICOAGULATION | Facility: CLINIC | Age: 76
End: 2021-07-07

## 2021-07-07 DIAGNOSIS — D68.61 ANTIPHOSPHOLIPID SYNDROME (H): ICD-10-CM

## 2021-07-07 DIAGNOSIS — M32.9 SYSTEMIC LUPUS ERYTHEMATOSUS, UNSPECIFIED SLE TYPE, UNSPECIFIED ORGAN INVOLVEMENT STATUS (H): ICD-10-CM

## 2021-07-07 DIAGNOSIS — M81.0 OSTEOPOROSIS, POSTMENOPAUSAL: ICD-10-CM

## 2021-07-07 DIAGNOSIS — Z79.01 LONG TERM CURRENT USE OF ANTICOAGULANT THERAPY: ICD-10-CM

## 2021-07-07 DIAGNOSIS — E10.649 TYPE 1 DIABETES MELLITUS WITH HYPOGLYCEMIA AND WITHOUT COMA (H): ICD-10-CM

## 2021-07-07 DIAGNOSIS — L93.0 LUPUS ERYTHEMATOSUS: ICD-10-CM

## 2021-07-07 LAB
ALBUMIN SERPL-MCNC: 4 G/DL (ref 3.4–5)
ALP SERPL-CCNC: 42 U/L (ref 40–150)
ALT SERPL W P-5'-P-CCNC: 23 U/L (ref 0–50)
ANION GAP SERPL CALCULATED.3IONS-SCNC: 8 MMOL/L (ref 3–14)
AST SERPL W P-5'-P-CCNC: 14 U/L (ref 0–45)
BILIRUB SERPL-MCNC: 0.6 MG/DL (ref 0.2–1.3)
BUN SERPL-MCNC: 21 MG/DL (ref 7–30)
CALCIUM SERPL-MCNC: 9.1 MG/DL (ref 8.5–10.1)
CHLORIDE SERPL-SCNC: 106 MMOL/L (ref 94–109)
CHOLEST SERPL-MCNC: 199 MG/DL
CO2 SERPL-SCNC: 27 MMOL/L (ref 20–32)
CREAT SERPL-MCNC: 0.71 MG/DL (ref 0.52–1.04)
CREAT UR-MCNC: 92 MG/DL
GFR SERPL CREATININE-BSD FRML MDRD: 84 ML/MIN/{1.73_M2}
GLUCOSE SERPL-MCNC: 110 MG/DL (ref 70–99)
HBA1C MFR BLD: 5.4 % (ref 0–5.6)
HDLC SERPL-MCNC: 108 MG/DL
LDLC SERPL CALC-MCNC: 79 MG/DL
MICROALBUMIN UR-MCNC: 9 MG/L
MICROALBUMIN/CREAT UR: 9.35 MG/G CR (ref 0–25)
NONHDLC SERPL-MCNC: 91 MG/DL
POTASSIUM SERPL-SCNC: 4 MMOL/L (ref 3.4–5.3)
PROT SERPL-MCNC: 7 G/DL (ref 6.8–8.8)
PROTHROM ACT/NOR PPP: 26 % (ref 60–140)
PTH-INTACT SERPL-MCNC: 34 PG/ML (ref 18–80)
SODIUM SERPL-SCNC: 141 MMOL/L (ref 133–144)
TRIGL SERPL-MCNC: 58 MG/DL

## 2021-07-07 PROCEDURE — 82043 UR ALBUMIN QUANTITATIVE: CPT | Performed by: PATHOLOGY

## 2021-07-07 PROCEDURE — 83036 HEMOGLOBIN GLYCOSYLATED A1C: CPT | Performed by: PATHOLOGY

## 2021-07-07 PROCEDURE — 82306 VITAMIN D 25 HYDROXY: CPT | Mod: 90 | Performed by: PATHOLOGY

## 2021-07-07 PROCEDURE — 36415 COLL VENOUS BLD VENIPUNCTURE: CPT | Performed by: PATHOLOGY

## 2021-07-07 PROCEDURE — 80061 LIPID PANEL: CPT | Performed by: PATHOLOGY

## 2021-07-07 PROCEDURE — 86225 DNA ANTIBODY NATIVE: CPT | Mod: 90 | Performed by: PATHOLOGY

## 2021-07-07 PROCEDURE — 80053 COMPREHEN METABOLIC PANEL: CPT | Performed by: PATHOLOGY

## 2021-07-07 PROCEDURE — 83970 ASSAY OF PARATHORMONE: CPT | Mod: 90 | Performed by: PATHOLOGY

## 2021-07-07 PROCEDURE — 85210 CLOT FACTOR II PROTHROM SPEC: CPT | Mod: 90 | Performed by: PATHOLOGY

## 2021-07-07 PROCEDURE — 99000 SPECIMEN HANDLING OFFICE-LAB: CPT | Performed by: PATHOLOGY

## 2021-07-07 NOTE — PROGRESS NOTES
ANTICOAGULATION MANAGEMENT     Shala Caruso 75 year old female is on warfarin with subtherapeutic INR result. (Goal INR Factor 2 goal 15-25%)    No results for input(s): INR in the last 168 hours. Factor 2= 26%    ASSESSMENT     Source(s): Chart Review and Patient/Caregiver Call       Warfarin doses taken: Warfarin taken as instructed    Diet: Increased intake of fluids to prevent dehydration    New illness, injury, or hospitalization: No    Medication/supplement changes: None noted    Signs or symptoms of bleeding or clotting: No    Previous INR: Supratherapeutic    Additional findings: None     PLAN     Recommended plan for temporary change(s) affecting INR     Dosing Instructions: Continue your current warfarin dose 10 mg every day with next INR in 2 weeks   Patient refused to recheck in one week       Telephone call with Shala who verbalizes understanding and agrees to plan and who agrees to plan and repeated back plan correctly    Patient offered & declined to schedule next visit    Education provided: Please call back if any changes to your diet, medications or how you've been taking warfarin and Contact [phone] with any changes, questions or concerns.     Plan made per [facility] anticoagulation protocol    KEMI BRANTLEY RN  Anticoagulation Clinic  7/7/2021    _______________________________________________________________________     Anticoagulation Summary  As of 7/7/2021    INR goal:  2.0-3.0   TTR:  --   INR used for dosing:  No new INR was available at the time of this encounter.   Warfarin maintenance plan:  10 mg (5 mg x 2) every day   Full warfarin instructions:  10 mg every day   Weekly warfarin total:  70 mg   No change documented:  Kemi Brantley RN   Plan last modified:  Gwen Abel RN (2/12/2021)   Next INR check:  7/21/2021   Priority:  High   Target end date:  Indefinite    Indications    SLE (systemic lupus erythematosus) (H) (Resolved) [M32.9]  Long term current use of  anticoagulant therapy [Z79.01]  Lupus erythematosus [L93.0]  Antiphospholipid syndrome (H) [D68.61]             Anticoagulation Episode Summary     INR check location:  Clinic Lab    Preferred lab:      Send INR reminders to:  [facility]    Comments:  Factor 2  goal range is 15-25%, 1/31/20 Not drawing INR  Ok to leave message on home [phone]   May leave messages with Rodriguez Santos  DO NOT GIVE RECORDS TO EMPLOYER [employer]        Anticoagulation Care Providers     Provider Role Specialty Phone number    Joe Contreras MD Referring Internal Medicine [phone]

## 2021-07-08 LAB — DSDNA AB SER-ACNC: <1 IU/ML

## 2021-07-09 LAB
DEPRECATED CALCIDIOL+CALCIFEROL SERPL-MC: <66 UG/L (ref 20–75)
VITAMIN D2 SERPL-MCNC: <5 UG/L
VITAMIN D3 SERPL-MCNC: 61 UG/L

## 2021-07-09 NOTE — PROGRESS NOTES
Shala Caruso  is being evaluated via a billable video visit.      How would you like to obtain your AVS? [employer]     For the video visit, send the invitation by: Text to cell phone: [phone]  (if unable to connect via video please call)    Will anyone else be joining your video visit? No    Nathaly LUCIA MA    Outcome for 07/09/21 10:34 AM :Glucose data obtained via Phone and Located in Table Below      07/09/2021  5:31 am: 101  7:33 am: 140  9:30 am: 80    07/08/2021  4:30 am: 93  7:31 am: 143  11:39 am: 109  5:24 pm: 99  9:13 pm: 99    07/07/2021  4:53 am: 98  7:16 am: 124  9:35 am: 93  11:33 am: 96  5:35 pm: 102  7:06 pm: 62  9:00 pm: 127    07/06/2021  4:07 am: 87  12:10 pm: 118  5:15 pm: 111  10:34 pm: 89    07/05/2021  5:32 am: 113  11:56 am: 83  5:43 pm: 114  9:13 pm: 82    07/04/2021  5:32 am: 99  11:42 am: 106  5:32 pm: 104  9:58 pm: 87    07/03/2021  5:58 am: 99  2:06 pm: 82  5:45 pm: 92  10:11 pm: 100    07/02/2021  5:07 am: 100  11:42 am: 94  5:25 pm: 101  9:07 pm: 84    07/01/2021  4:23 am: 106  11:44 am: 99  5:41 pm: 92  10:00 pm: 91

## 2021-07-09 NOTE — PATIENT INSTRUCTIONS
We appreciate your assistance in coordinating your healthcare.     Please upload your insulin pump, blood sugar meter and/or continuous glucose monitor at home 1-2 days before your next diabetes-related appointment.   This will allow your provider to review your  data before your scheduled virtual visit.    To ask a question to your Endocrine care team, please send them a [employer] message, or reach them by phone at [phone]     To expedite your medication refill(s), please contact your pharmacy and have them   fax a refill request to: [phone].  *Please allow 3 business days for routine medication refills.  *Please allow 5 business days for controlled substance medication refills.    For after-hours urgent Endocrine issues, that do not require 321, please dial [phone], and ask to speak with the Endocrinologist On-Call

## 2021-07-12 ENCOUNTER — VIRTUAL VISIT (OUTPATIENT)
Dept: ENDOCRINOLOGY | Facility: CLINIC | Age: 76
End: 2021-07-12
Payer: COMMERCIAL

## 2021-07-12 DIAGNOSIS — E10.649 TYPE 1 DIABETES MELLITUS WITH HYPOGLYCEMIA AND WITHOUT COMA (H): Primary | ICD-10-CM

## 2021-07-12 DIAGNOSIS — E78.00 HYPERCHOLESTEROLEMIA: ICD-10-CM

## 2021-07-12 DIAGNOSIS — M81.0 OSTEOPOROSIS, POSTMENOPAUSAL: ICD-10-CM

## 2021-07-12 PROCEDURE — 99214 OFFICE O/P EST MOD 30 MIN: CPT | Mod: 95 | Performed by: INTERNAL MEDICINE

## 2021-07-12 RX ORDER — METHYLPREDNISOLONE SODIUM SUCCINATE 125 MG/2ML
125 INJECTION, POWDER, LYOPHILIZED, FOR SOLUTION INTRAMUSCULAR; INTRAVENOUS
Status: CANCELLED
Start: 2021-07-21

## 2021-07-12 RX ORDER — GLUCAGON 3 MG/1
3 POWDER NASAL PRN
Qty: 1 EACH | Refills: 4 | Status: SHIPPED | OUTPATIENT
Start: 2021-07-12 | End: 2024-05-17

## 2021-07-12 RX ORDER — MEPERIDINE HYDROCHLORIDE 25 MG/ML
25 INJECTION INTRAMUSCULAR; INTRAVENOUS; SUBCUTANEOUS EVERY 30 MIN PRN
Status: CANCELLED | OUTPATIENT
Start: 2021-07-21

## 2021-07-12 RX ORDER — NALOXONE HYDROCHLORIDE 0.4 MG/ML
0.2 INJECTION, SOLUTION INTRAMUSCULAR; INTRAVENOUS; SUBCUTANEOUS
Status: CANCELLED | OUTPATIENT
Start: 2021-07-21

## 2021-07-12 RX ORDER — ALBUTEROL SULFATE 90 UG/1
1-2 AEROSOL, METERED RESPIRATORY (INHALATION)
Status: CANCELLED
Start: 2021-07-21

## 2021-07-12 RX ORDER — DIPHENHYDRAMINE HYDROCHLORIDE 50 MG/ML
50 INJECTION INTRAMUSCULAR; INTRAVENOUS
Status: CANCELLED
Start: 2021-07-21

## 2021-07-12 RX ORDER — EPINEPHRINE 1 MG/ML
0.3 INJECTION, SOLUTION, CONCENTRATE INTRAVENOUS EVERY 5 MIN PRN
Status: CANCELLED | OUTPATIENT
Start: 2021-07-21

## 2021-07-12 RX ORDER — ZOLEDRONIC ACID 5 MG/100ML
5 INJECTION, SOLUTION INTRAVENOUS ONCE
Status: CANCELLED
Start: 2021-07-21 | End: 2021-07-21

## 2021-07-12 RX ORDER — HEPARIN SODIUM (PORCINE) LOCK FLUSH IV SOLN 100 UNIT/ML 100 UNIT/ML
5 SOLUTION INTRAVENOUS
Status: CANCELLED | OUTPATIENT
Start: 2021-07-21

## 2021-07-12 RX ORDER — HEPARIN SODIUM,PORCINE 10 UNIT/ML
5 VIAL (ML) INTRAVENOUS
Status: CANCELLED | OUTPATIENT
Start: 2021-07-21

## 2021-07-12 RX ORDER — ALBUTEROL SULFATE 0.83 MG/ML
2.5 SOLUTION RESPIRATORY (INHALATION)
Status: CANCELLED | OUTPATIENT
Start: 2021-07-21

## 2021-07-12 NOTE — LETTER
7/12/2021       RE: Shala Caruso  [address]  [place] [address]     Dear Colleague,    Thank you for referring your patient, Shala Caruso, to the [facility] [place] at [facility]. Please see a copy of my visit note below.    Shala Caruso  is being evaluated via a billable video visit.      How would you like to obtain your AVS? MyChart     For the video visit, send the invitation by: Text to cell phone: [phone]  (if unable to connect via video please call)    Will anyone else be joining your video visit? No    Nathaly LUCIA MA    Outcome for 07/09/21 10:34 AM :Glucose data obtained via Phone and Located in Table Below      07/09/2021  5:31 am: 101  7:33 am: 140  9:30 am: 80    07/08/2021  4:30 am: 93  7:31 am: 143  11:39 am: 109  5:24 pm: 99  9:13 pm: 99    07/07/2021  4:53 am: 98  7:16 am: 124  9:35 am: 93  11:33 am: 96  5:35 pm: 102  7:06 pm: 62  9:00 pm: 127    07/06/2021  4:07 am: 87  12:10 pm: 118  5:15 pm: 111  10:34 pm: 89    07/05/2021  5:32 am: 113  11:56 am: 83  5:43 pm: 114  9:13 pm: 82    07/04/2021  5:32 am: 99  11:42 am: 106  5:32 pm: 104  9:58 pm: 87    07/03/2021  5:58 am: 99  2:06 pm: 82  5:45 pm: 92  10:11 pm: 100    07/02/2021  5:07 am: 100  11:42 am: 94  5:25 pm: 101  9:07 pm: 84    07/01/2021  4:23 am: 106  11:44 am: 99  5:41 pm: 92  10:00 pm: 91    Video-Visit Details    Type of service:  Video Visit    Video call duration:  Start: 07/12/2021 07:37 am  Stop: 07/12/2021 08:04 am    Originating Location (pt. Location): Home  Distant Location (provider location):  [facility]   Platform used for Video Visit: [employer]    Due to the COVID 19 pandemic this visit was converted to a video visit in order to help prevent spread of infection in this patient and the general population.     Siddharth Ms. Caruso is a 75 year old pleasant female with hx of SLE, APLS, DVT, osteoporosis and type 1  diabetes presenting for f/up.      1. Type 1 diabetes     Most recent hemoglobin A1c today was 5.4%, on 7/7/21.      Lantus was discontinued in 2020.  The recommended insulin to carbohydrate ratio is 1 unit per 20 g.  She continues to use an echo NovoLog pen.      The patient has noticed that her morning blood sugar spontaneously increases while exercising, although she does not eat.  She does mainly yoga stretching exercises and she might walk on the elliptical machine, for 1 mile.  In the evening, she is more active and she enjoys going for a walk.  Total daily NovoLog dose is variable between 9 and 12 units.  For breakfast and lunch, she takes higher dosages, sometimes using an insulin to carbohydrate ratio of 1 unit per 15.  For dinner, she generally takes a little less NovoLog.    I reviewed the glucometer readings:      07/09/2021  5:31 am: 101  7:33 am: 140  9:30 am: 80    07/08/2021  4:30 am: 93  7:31 am: 143  11:39 am: 109  5:24 pm: 99  9:13 pm: 99    07/07/2021  4:53 am: 98  7:16 am: 124  9:35 am: 93  11:33 am: 96  5:35 pm: 102  7:06 pm: 62  9:00 pm: 127    07/06/2021  4:07 am: 87  12:10 pm: 118  5:15 pm: 111  10:34 pm: 89    07/05/2021  5:32 am: 113  11:56 am: 83  5:43 pm: 114  9:13 pm: 82    07/04/2021  5:32 am: 99  11:42 am: 106  5:32 pm: 104  9:58 pm: 87    07/03/2021  5:58 am: 99  2:06 pm: 82  5:45 pm: 92  10:11 pm: 100    07/02/2021  5:07 am: 100  11:42 am: 94  5:25 pm: 101  9:07 pm: 84    07/01/2021  4:23 am: 106  11:44 am: 99  5:41 pm: 92  10:00 pm: 91    Diabetes complications:   No h/o microalbuminuria.  Last eye exam - August 2020; no DR. S/P cataract surgery  Numbness and tingling sensation B/L toes - for many years but light sensation is intact. She does wear comfortable shoes/insoles. The neuropathy is stable, per patient. 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  She develops confusion, inability to concentrate or focus her vision and she feels extremely tired when  her blood sugar is the 60s or 50s.  She has no prior episodes of loss of consciousness due to hypoglycemia.  Baqsimi was prescribed at her last appointment but she did not pick it up.  She thinks she might still have a glucagon injection.    2. Osteoporosis    Jeanmarie also has a history of osteoporosis, treated with Boniva for almost 3 years, followed by Forteo which was started in 4/12 - interrupted treatment from 4/2013 and restarted in 7/2013 until July 2014. After she completed the treatment with Forteo, she received one dose of Reclast, in July 2014.      She has no history of prior bone fractures.  She's been off prednisone since 2013.  Her height has decreased over the years from 5'6'' to 5'1/2''. Her mother had a hip fracture in her 80s.     On the DXA scan images from 7/1/16, L1-L3 T score was - 1.  Overall, at the spine, there was a significant increase of bone mineral density since 2005 of 10.5%. Since 2015, the bone mineral density has remained stable at spine. The lowest T score at the hip level was -2.2, at the left femoral neck.  Overall, there was a significant improvement of bone mineral density at the mean hip of 8.9% since 2005, with no significant changes since 2015. While the bone mineral density at the left hip increased since baseline, at the right hip, there was a decrease of bone mineral density since baseline and also, since prior year.    On the DEXA scan from 7/27/18, the lowest T score was -2.1, at the left femoral neck.  Compared with the prior scan from 2016, the bone mineral density at the hips remained stable.  At the lumbar spine, L1-L3 T score was -1.3, not significantly changed since 2016.    On the most recent DEXA scan images from January 2020, L2-L4 T score was -1.5. At the hips, the lowest score was -2.3, at the left femoral neck. Compared with the prior DEXA scan from 2018, there was a significant decrease in bone mineral density of the lumbar spine, left total hip and right  total hip by 3.1, 4.7 and 3.5%, respectively.  The patient received a zoledronic acid infusion on 7/30/2020.    The current dose of calcium and vitamin D is 500 mg/200 U BID (oyster shell) and a daily Centrum Silver MVI which contains 1000 U vitamin D per tablet. In terms of dairy products, she does have a 2-3 oz yogurt and cheese, daily.      I reviewed the most recent lab results from 7/7/2021.  Vitamin D level was 66, PTH level was 34, GFR 84, calcium 9.1, albumin 4.    Current Outpatient Medications   Medication     acetaminophen (TYLENOL) 500 MG tablet     aspirin 81 MG tablet     AYR SALINE NASAL NO-DRIP GEL     Calcium Carbonate-Vitamin D (CALCIUM 500 + D PO)     carboxymethylcellulose PF (REFRESH PLUS) 0.5 % SOLN ophthalmic solution     Glucagon (BAQSIMI ONE PACK) 3 MG/DOSE POWD     Injection Device for insulin (NOVOPEN ECHO) RORY     insulin aspart (NOVOLOG PENFILL) 100 UNIT/ML cartridge     insulin glargine (LANTUS PEN) 100 UNIT/ML pen     insulin pen needle (BD PAM U/F) 32G X 4 MM miscellaneous     LANCETS ULTRA THIN 30G MISC     Multiple Vitamins-Minerals (CENTRUM SILVER PO)     mycophenolate (GENERIC EQUIVALENT) 500 MG tablet     NIFEdipine ER OSMOTIC (PROCARDIA XL) 30 MG 24 hr tablet     order for DME     simvastatin (ZOCOR) 40 MG tablet     TRUE METRIX BLOOD GLUCOSE TEST test strip     warfarin ANTICOAGULANT (COUMADIN) 5 MG tablet     warfarin ANTICOAGULANT (COUMADIN) 5 MG tablet     No current facility-administered medications for this visit.     ROS   Systemic symptoms: weight fairly stable  Musculoskeletal symptoms: recurrent episodes of pain which affect the third to fifth left toes; bilateral knee pain; joint stiffness  Neurological symptoms: numbness and tingling sensation b/l toes   Skin symptoms: split lesions of the tips of her fingers/toes - for years; has seen dermatology in the past    Family Hx   Maternal grandmother DM2. First cousin with RA. Mother, maternal grandmother and aunt,  cousin diagnosed with thyroid disorders (? hypothyroidism). Mother and maternal grandmother had breast cancer.    Personal Hx   Behavioral history: No tobacco use.  Home environment: No secondhand tobacco smoke in home.  Denies smoking, drinking alcohol or using illicit drugs. , with one child. 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  Menopause at age 59. Had regular MP in the past. No h/o bone fractures or kidney stones.    PMH   SLE dx in 2000 with flare/pericarditis and tamponade on 3/5/2010 requiring high doses of steroids.  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  Osteoporosis diagnosed 2008 started on Boniva in 2010 (heartburn from oral fosamax).   Hyperlipidemia.  Severe GERD and Achalasia.  Raynaud's  Allergy to multiple meds  Vit D def.  Post thrombophlebitic syndrome with chronic LE swelling   Dyslipidemia  Chronic leukopenia on methotrexate   L arm lymphedema   Type 1 diabetes  Prolapsed uterus   Cataract   Vale Zoster     Physical Exam   Wt Readings from Last 10 Encounters:   07/30/20 52.6 kg (116 lb)   02/05/19 56.7 kg (125 lb)   01/08/19 54.1 kg (119 lb 4.8 oz)   07/30/18 54.1 kg (119 lb 3.2 oz)   01/29/18 54 kg (119 lb)   08/02/17 52.8 kg (116 lb 8 oz)   07/31/17 53.2 kg (117 lb 4.8 oz)   05/22/17 53.9 kg (118 lb 14.4 oz)   04/13/17 54.7 kg (120 lb 9.6 oz)   04/04/17 55 kg (121 lb 4.8 oz)     LMP  (LMP Unknown)     Physical Exam  General Appearance: alert, no distress noted   Eyes: grossly normal to inspection, conjunctivae and sclerae normal, no lid lag or stare   Respiratory: no audible wheeze, cough, or visible cyanosis.  No visible retractions or increased work of breathing.  Able to speak fully in complete sentences.  Neurological: Cranial nerves grossly intact, mentation intact and speech normal; no tremor of the hands   Skin: no lesions on exposed skin   Psychological: mentation appears normal, affect normal, judgement and insight  intact, normal speech and appearance well-groomed    Lab Results  I reviewed prior lab results documented in EPIC.   Lab Results   Component Value Date    A1C 5.4 07/07/2021    A1C 5.2 04/21/2021    A1C 5.6 01/20/2021    A1C 5.1 07/09/2020    A1C 5.3 07/11/2017    HEMOGLOBINA1 5.1 01/13/2020    HEMOGLOBINA1 5.1 07/15/2019    HEMOGLOBINA1 5.0 07/30/2018    HEMOGLOBINA1 5.0 01/29/2018    HEMOGLOBINA1 5.0 01/23/2017       Hemoglobin   Date Value Ref Range Status   04/29/2021 12.9 11.7 - 15.7 g/dL Final     Hematocrit   Date Value Ref Range Status   04/29/2021 41.5 35.0 - 47.0 % Final     Cholesterol   Date Value Ref Range Status   07/07/2021 199 <200 mg/dL Final     Comment:     Desirable:       <200 mg/dl     Cholesterol/HDL Ratio   Date Value Ref Range Status   10/01/2014 1.5 0.0 - 5.0 Final     HDL Cholesterol   Date Value Ref Range Status   07/07/2021 108 >49 mg/dL Final     LDL Cholesterol Calculated   Date Value Ref Range Status   07/07/2021 79 <100 mg/dL Final     Comment:     Desirable:       <100 mg/dl     VLDL-Cholesterol   Date Value Ref Range Status   10/01/2014 7 0 - 30 mg/dL Final     Triglycerides   Date Value Ref Range Status   07/07/2021 58 <150 mg/dL Final     Albumin Urine mg/L   Date Value Ref Range Status   07/07/2021 9 mg/L Final     TSH   Date Value Ref Range Status   07/09/2019 0.99 0.40 - 4.00 mU/L Final       Last Basic Metabolic Panel:    Sodium   Date Value Ref Range Status   07/07/2021 141 133 - 144 mmol/L Final     Potassium   Date Value Ref Range Status   07/07/2021 4.0 3.4 - 5.3 mmol/L Final     Chloride   Date Value Ref Range Status   07/07/2021 106 94 - 109 mmol/L Final     Calcium   Date Value Ref Range Status   07/07/2021 9.1 8.5 - 10.1 mg/dL Final     Carbon Dioxide   Date Value Ref Range Status   07/07/2021 27 20 - 32 mmol/L Final     Urea Nitrogen   Date Value Ref Range Status   07/07/2021 21 7 - 30 mg/dL Final     Creatinine   Date Value Ref Range Status   07/07/2021 0.71 0.52 -  1.04 mg/dL Final     GFR Estimate   Date Value Ref Range Status   07/07/2021 84 >60 mL/min/[1.73_m2] Final     Comment:     Non  GFR Calc  Starting 12/18/2018, serum creatinine based estimated GFR (eGFR) will be   calculated using the Chronic Kidney Disease Epidemiology Collaboration   (CKD-EPI) equation.       Glucose   Date Value Ref Range Status   07/07/2021 110 (H) 70 - 99 mg/dL Final       AST   Date Value Ref Range Status   07/07/2021 14 0 - 45 U/L Final     ALT   Date Value Ref Range Status   07/07/2021 23 0 - 50 U/L Final     Albumin   Date Value Ref Range Status   07/07/2021 4.0 3.4 - 5.0 g/dL Final       Assessment     1. Type 1 diabetes with very good glycemic control  The episodes of hypoglycemia have significantly improved following the discontinuation of Lantus.  I reassured the patient that, overall, she maintains a very good glycemic control.  Sometimes, there are hormonal changes triggered by physical activity and exercise which can raise the blood sugar, transiently.  However, the increase in the blood sugar is not of concern.  Follow-up A1c in 6 months.    2.  Osteoporosis   Risk factors for osteoporosis identified: Postmenopausal status, lupus, prior treatment with steroids, diabetes, family history.  She was treated with Boniva for 3 years, followed by Forteo for 2 years. Received one dose of Reclast in July 2014, when she completed treatment with Forteo, and a second dose in July 2020, when the DEXA scan revealed some loss of bone mineral density, although the lowest T score remained in the osteopenic range.   The plan is to schedule another dose of Reclast and a f/up DEXA scan in July 2022.   She should continue to take the same dose of calcium and vitamin D.    3. Hypercholesterolemia - on 40 mg simvastatin, controlled.  Consider changing to atorvastatin in the future.    Orders Placed This Encounter   Procedures     Dexa hip/pelvis/spine     Dexa Wrist/Heel     Hemoglobin A1c      Again, thank you for allowing me to participate in the care of your patient.      Sincerely,    Dorita Cramer MD

## 2021-07-12 NOTE — PROGRESS NOTES
Video-Visit Details    Type of service:  Video Visit    Video call duration:  Start: 07/12/2021 07:37 am  Stop: 07/12/2021 08:04 am    Originating Location (pt. Location): Home  Distant Location (provider location):  [facility]   Platform used for Video Visit: AmWell    Due to the COVID 19 pandemic this visit was converted to a video visit in order to help prevent spread of infection in this patient and the general population.     Siddharth Caruso is a 75 year old pleasant female with hx of SLE, APLS, DVT, osteoporosis and type 1 diabetes presenting for f/up.      1. Type 1 diabetes     Most recent hemoglobin A1c today was 5.4%, on 7/7/21.      Lantus was discontinued in 2020.  The recommended insulin to carbohydrate ratio is 1 unit per 20 g.  She continues to use an echo NovoLog pen.      The patient has noticed that her morning blood sugar spontaneously increases while exercising, although she does not eat.  She does mainly yoga stretching exercises and she might walk on the elliptical machine, for 1 mile.  In the evening, she is more active and she enjoys going for a walk.  Total daily NovoLog dose is variable between 9 and 12 units.  For breakfast and lunch, she takes higher dosages, sometimes using an insulin to carbohydrate ratio of 1 unit per 15.  For dinner, she generally takes a little less NovoLog.    I reviewed the glucometer readings:      07/09/2021  5:31 am: 101  7:33 am: 140  9:30 am: 80    07/08/2021  4:30 am: 93  7:31 am: 143  11:39 am: 109  5:24 pm: 99  9:13 pm: 99    07/07/2021  4:53 am: 98  7:16 am: 124  9:35 am: 93  11:33 am: 96  5:35 pm: 102  7:06 pm: 62  9:00 pm: 127    07/06/2021  4:07 am: 87  12:10 pm: 118  5:15 pm: 111  10:34 pm: 89    07/05/2021  5:32 am: 113  11:56 am: 83  5:43 pm: 114  9:13 pm: 82    07/04/2021  5:32 am: 99  11:42 am: 106  5:32 pm: 104  9:58 pm: 87    07/03/2021  5:58 am: 99  2:06 pm: 82  5:45 pm: 92  10:11 pm: 100    07/02/2021  5:07 am: 100  11:42  am: 94  5:25 pm: 101  9:07 pm: 84    07/01/2021  4:23 am: 106  11:44 am: 99  5:41 pm: 92  10:00 pm: 91    Diabetes complications:   No h/o microalbuminuria.  Last eye exam - August 2020; no DR. S/P cataract surgery  Numbness and tingling sensation B/L toes - for many years but light sensation is intact. She does wear comfortable shoes/insoles. The neuropathy is stable, per patient. 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  She develops confusion, inability to concentrate or focus her vision and she feels extremely tired when her blood sugar is the 60s or 50s.  She has no prior episodes of loss of consciousness due to hypoglycemia.  Baqsimi was prescribed at her last appointment but she did not pick it up.  She thinks she might still have a glucagon injection.    2. Osteoporosis    Jeanmarie also has a history of osteoporosis, treated with Boniva for almost 3 years, followed by Forteo which was started in 4/12 - interrupted treatment from 4/2013 and restarted in 7/2013 until July 2014. After she completed the treatment with Forteo, she received one dose of Reclast, in July 2014.      She has no history of prior bone fractures.  She's been off prednisone since 2013.  Her height has decreased over the years from 5'6'' to 5'1/2''. Her mother had a hip fracture in her 80s.     On the DXA scan images from 7/1/16, L1-L3 T score was - 1.  Overall, at the spine, there was a significant increase of bone mineral density since 2005 of 10.5%. Since 2015, the bone mineral density has remained stable at spine. The lowest T score at the hip level was -2.2, at the left femoral neck.  Overall, there was a significant improvement of bone mineral density at the mean hip of 8.9% since 2005, with no significant changes since 2015. While the bone mineral density at the left hip increased since baseline, at the right hip, there was a decrease of bone mineral density since baseline and also, since prior year.    On the  DEXA scan from 7/27/18, the lowest T score was -2.1, at the left femoral neck.  Compared with the prior scan from 2016, the bone mineral density at the hips remained stable.  At the lumbar spine, L1-L3 T score was -1.3, not significantly changed since 2016.    On the most recent DEXA scan images from January 2020, L2-L4 T score was -1.5. At the hips, the lowest score was -2.3, at the left femoral neck. Compared with the prior DEXA scan from 2018, there was a significant decrease in bone mineral density of the lumbar spine, left total hip and right total hip by 3.1, 4.7 and 3.5%, respectively.  The patient received a zoledronic acid infusion on 7/30/2020.    The current dose of calcium and vitamin D is 500 mg/200 U BID (oyster shell) and a daily Centrum Silver MVI which contains 1000 U vitamin D per tablet. In terms of dairy products, she does have a 2-3 oz yogurt and cheese, daily.      I reviewed the most recent lab results from 7/7/2021.  Vitamin D level was 66, PTH level was 34, GFR 84, calcium 9.1, albumin 4.    Current Outpatient Medications   Medication     acetaminophen (TYLENOL) 500 MG tablet     aspirin 81 MG tablet     AYR SALINE NASAL NO-DRIP GEL     Calcium Carbonate-Vitamin D (CALCIUM 500 + D PO)     carboxymethylcellulose PF (REFRESH PLUS) 0.5 % SOLN ophthalmic solution     Glucagon (BAQSIMI ONE PACK) 3 MG/DOSE POWD     Injection Device for insulin (NOVOPEN ECHO) RORY     insulin aspart (NOVOLOG PENFILL) 100 UNIT/ML cartridge     insulin glargine (LANTUS PEN) 100 UNIT/ML pen     insulin pen needle (BD PAM U/F) 32G X 4 MM miscellaneous     LANCETS ULTRA THIN 30G MISC     Multiple Vitamins-Minerals (CENTRUM SILVER PO)     mycophenolate (GENERIC EQUIVALENT) 500 MG tablet     NIFEdipine ER OSMOTIC (PROCARDIA XL) 30 MG 24 hr tablet     order for DME     simvastatin (ZOCOR) 40 MG tablet     TRUE METRIX BLOOD GLUCOSE TEST test strip     warfarin ANTICOAGULANT (COUMADIN) 5 MG tablet     warfarin ANTICOAGULANT  (COUMADIN) 5 MG tablet     No current facility-administered medications for this visit.     ROS   Systemic symptoms: weight fairly stable  Musculoskeletal symptoms: recurrent episodes of pain which affect the third to fifth left toes; bilateral knee pain; joint stiffness  Neurological symptoms: numbness and tingling sensation b/l toes   Skin symptoms: split lesions of the tips of her fingers/toes - for years; has seen dermatology in the past    Family Hx   Maternal grandmother DM2. First cousin with RA. Mother, maternal grandmother and aunt, cousin diagnosed with thyroid disorders (? hypothyroidism). Mother and maternal grandmother had breast cancer.    Personal Hx   Behavioral history: No tobacco use.  Home environment: No secondhand tobacco smoke in home.  Denies smoking, drinking alcohol or using illicit drugs. , with one child. 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  Menopause at age 59. Had regular MP in the past. No h/o bone fractures or kidney stones.    PMH   SLE dx in 2000 with flare/pericarditis and tamponade on 3/5/2010 requiring high doses of steroids.  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  Osteoporosis diagnosed 2008 started on Boniva in 2010 (heartburn from oral fosamax).   Hyperlipidemia.  Severe GERD and Achalasia.  Raynaud's  Allergy to multiple meds  Vit D def.  Post thrombophlebitic syndrome with chronic LE swelling   Dyslipidemia  Chronic leukopenia on methotrexate   L arm lymphedema   Type 1 diabetes  Prolapsed uterus   Cataract   Vale Zoster     Physical Exam   Wt Readings from Last 10 Encounters:   07/30/20 52.6 kg (116 lb)   02/05/19 56.7 kg (125 lb)   01/08/19 54.1 kg (119 lb 4.8 oz)   07/30/18 54.1 kg (119 lb 3.2 oz)   01/29/18 54 kg (119 lb)   08/02/17 52.8 kg (116 lb 8 oz)   07/31/17 53.2 kg (117 lb 4.8 oz)   05/22/17 53.9 kg (118 lb 14.4 oz)   04/13/17 54.7 kg (120 lb 9.6 oz)   04/04/17 55 kg (121 lb 4.8 oz)     LMP  (LMP  Unknown)     Physical Exam  General Appearance: alert, no distress noted   Eyes: grossly normal to inspection, conjunctivae and sclerae normal, no lid lag or stare   Respiratory: no audible wheeze, cough, or visible cyanosis.  No visible retractions or increased work of breathing.  Able to speak fully in complete sentences.  Neurological: Cranial nerves grossly intact, mentation intact and speech normal; no tremor of the hands   Skin: no lesions on exposed skin   Psychological: mentation appears normal, affect normal, judgement and insight intact, normal speech and appearance well-groomed    Lab Results  I reviewed prior lab results documented in EPIC.   Lab Results   Component Value Date    A1C 5.4 07/07/2021    A1C 5.2 04/21/2021    A1C 5.6 01/20/2021    A1C 5.1 07/09/2020    A1C 5.3 07/11/2017    HEMOGLOBINA1 5.1 01/13/2020    HEMOGLOBINA1 5.1 07/15/2019    HEMOGLOBINA1 5.0 07/30/2018    HEMOGLOBINA1 5.0 01/29/2018    HEMOGLOBINA1 5.0 01/23/2017       Hemoglobin   Date Value Ref Range Status   04/29/2021 12.9 11.7 - 15.7 g/dL Final     Hematocrit   Date Value Ref Range Status   04/29/2021 41.5 35.0 - 47.0 % Final     Cholesterol   Date Value Ref Range Status   07/07/2021 199 <200 mg/dL Final     Comment:     Desirable:       <200 mg/dl     Cholesterol/HDL Ratio   Date Value Ref Range Status   10/01/2014 1.5 0.0 - 5.0 Final     HDL Cholesterol   Date Value Ref Range Status   07/07/2021 108 >49 mg/dL Final     LDL Cholesterol Calculated   Date Value Ref Range Status   07/07/2021 79 <100 mg/dL Final     Comment:     Desirable:       <100 mg/dl     VLDL-Cholesterol   Date Value Ref Range Status   10/01/2014 7 0 - 30 mg/dL Final     Triglycerides   Date Value Ref Range Status   07/07/2021 58 <150 mg/dL Final     Albumin Urine mg/L   Date Value Ref Range Status   07/07/2021 9 mg/L Final     TSH   Date Value Ref Range Status   07/09/2019 0.99 0.40 - 4.00 mU/L Final       Last Basic Metabolic Panel:    Sodium   Date  Value Ref Range Status   07/07/2021 141 133 - 144 mmol/L Final     Potassium   Date Value Ref Range Status   07/07/2021 4.0 3.4 - 5.3 mmol/L Final     Chloride   Date Value Ref Range Status   07/07/2021 106 94 - 109 mmol/L Final     Calcium   Date Value Ref Range Status   07/07/2021 9.1 8.5 - 10.1 mg/dL Final     Carbon Dioxide   Date Value Ref Range Status   07/07/2021 27 20 - 32 mmol/L Final     Urea Nitrogen   Date Value Ref Range Status   07/07/2021 21 7 - 30 mg/dL Final     Creatinine   Date Value Ref Range Status   07/07/2021 0.71 0.52 - 1.04 mg/dL Final     GFR Estimate   Date Value Ref Range Status   07/07/2021 84 >60 mL/min/[1.73_m2] Final     Comment:     Non  GFR Calc  Starting 12/18/2018, serum creatinine based estimated GFR (eGFR) will be   calculated using the Chronic Kidney Disease Epidemiology Collaboration   (CKD-EPI) equation.       Glucose   Date Value Ref Range Status   07/07/2021 110 (H) 70 - 99 mg/dL Final       AST   Date Value Ref Range Status   07/07/2021 14 0 - 45 U/L Final     ALT   Date Value Ref Range Status   07/07/2021 23 0 - 50 U/L Final     Albumin   Date Value Ref Range Status   07/07/2021 4.0 3.4 - 5.0 g/dL Final       Assessment     1. Type 1 diabetes with very good glycemic control  The episodes of hypoglycemia have significantly improved following the discontinuation of Lantus.  I reassured the patient that, overall, she maintains a very good glycemic control.  Sometimes, there are hormonal changes triggered by physical activity and exercise which can raise the blood sugar, transiently.  However, the increase in the blood sugar is not of concern.  Follow-up A1c in 6 months.    2.  Osteoporosis   Risk factors for osteoporosis identified: Postmenopausal status, lupus, prior treatment with steroids, diabetes, family history.  She was treated with Boniva for 3 years, followed by Forteo for 2 years. Received one dose of Reclast in July 2014, when she completed  treatment with Forteo, and a second dose in July 2020, when the DEXA scan revealed some loss of bone mineral density, although the lowest T score remained in the osteopenic range.   The plan is to schedule another dose of Reclast and a f/up DEXA scan in July 2022.   She should continue to take the same dose of calcium and vitamin D.    3. Hypercholesterolemia - on 40 mg simvastatin, controlled.  Consider changing to atorvastatin in the future.    Orders Placed This Encounter   Procedures     Dexa hip/pelvis/spine     Dexa Wrist/Heel     Hemoglobin A1c

## 2021-07-22 ENCOUNTER — LAB (OUTPATIENT)
Dept: LAB | Facility: CLINIC | Age: 76
End: 2021-07-22
Payer: COMMERCIAL

## 2021-07-22 DIAGNOSIS — Z79.01 LONG TERM CURRENT USE OF ANTICOAGULANT THERAPY: ICD-10-CM

## 2021-07-22 DIAGNOSIS — D68.61 ANTIPHOSPHOLIPID SYNDROME (H): ICD-10-CM

## 2021-07-22 PROCEDURE — 36415 COLL VENOUS BLD VENIPUNCTURE: CPT | Performed by: PATHOLOGY

## 2021-07-22 PROCEDURE — 85210 CLOT FACTOR II PROTHROM SPEC: CPT | Mod: 90 | Performed by: PATHOLOGY

## 2021-07-23 ENCOUNTER — LAB (OUTPATIENT)
Dept: LAB | Facility: CLINIC | Age: 76
End: 2021-07-23
Payer: COMMERCIAL

## 2021-07-23 ENCOUNTER — ANTICOAGULATION THERAPY VISIT (OUTPATIENT)
Dept: ANTICOAGULATION | Facility: CLINIC | Age: 76
End: 2021-07-23

## 2021-07-23 DIAGNOSIS — Z79.01 LONG TERM CURRENT USE OF ANTICOAGULANT THERAPY: Primary | ICD-10-CM

## 2021-07-23 DIAGNOSIS — D68.61 ANTIPHOSPHOLIPID SYNDROME (H): ICD-10-CM

## 2021-07-23 DIAGNOSIS — L93.0 LUPUS ERYTHEMATOSUS: ICD-10-CM

## 2021-07-23 DIAGNOSIS — M81.0 OSTEOPOROSIS, POSTMENOPAUSAL: ICD-10-CM

## 2021-07-23 LAB — PROTHROM ACT/NOR PPP: 18 % (ref 60–140)

## 2021-07-23 PROCEDURE — U0003 INFECTIOUS AGENT DETECTION BY NUCLEIC ACID (DNA OR RNA); SEVERE ACUTE RESPIRATORY SYNDROME CORONAVIRUS 2 (SARS-COV-2) (CORONAVIRUS DISEASE [COVID-19]), AMPLIFIED PROBE TECHNIQUE, MAKING USE OF HIGH THROUGHPUT TECHNOLOGIES AS DESCRIBED BY CMS-2020-01-R: HCPCS

## 2021-07-23 PROCEDURE — U0005 INFEC AGEN DETEC AMPLI PROBE: HCPCS

## 2021-07-23 NOTE — PROGRESS NOTES
ANTICOAGULATION MANAGEMENT     Shala Caruso 75 year old female is on warfarin with therapeutic INR result. (Goal INR Factor II Goal Range 15-25%)    Factor II 18%    No results for input(s): INR in the last 168 hours.    ASSESSMENT     Source(s): Patient/Caregiver Call       Warfarin doses taken: Warfarin taken as instructed    Diet: No new diet changes identified    New illness, injury, or hospitalization: No    Medication/supplement changes: None noted    Signs or symptoms of bleeding or clotting: No    Previous INR: Subtherapeutic    Additional findings: None     PLAN     Recommended plan for no diet, medication or health factor changes affecting INR     Dosing Instructions: Continue your current warfarin dose with next INR in 3 weeks       Summary  As of 7/23/2021    Full warfarin instructions:  10 mg every day   Next INR check:               Telephone call with Shala who verbalizes understanding and agrees to plan and who agrees to plan and repeated back plan correctly    Patient offered & declined to schedule next visit    Education provided: None required    Plan made per [facility] anticoagulation protocol    Melly Morrison, RN  Anticoagulation Clinic  7/23/2021    _______________________________________________________________________     Anticoagulation Episode Summary     Current INR goal:  2.0-3.0   TTR:  --   Target end date:  Indefinite   Send INR reminders to:   ANTICOAG CLINIC    Indications    SLE (systemic lupus erythematosus) (H) (Resolved) [M32.9]  Long term current use of anticoagulant therapy [Z79.01]  Lupus erythematosus [L93.0]  Antiphospholipid syndrome (H) [D68.61]           Comments:  Factor 2  goal range is 15-25%, 1/31/20 Not drawing INR  Ok to leave message on home [phone]   May leave messages with Rodriguez Santos  DO NOT GIVE RECORDS TO EMPLOYER [employer]           Anticoagulation Care Providers     Provider Role Specialty Phone number    Joe Contreras MD Referring Internal  Medicine [phone]

## 2021-07-24 LAB — SARS-COV-2 RNA RESP QL NAA+PROBE: NEGATIVE

## 2021-07-27 ENCOUNTER — INFUSION THERAPY VISIT (OUTPATIENT)
Dept: INFUSION THERAPY | Facility: CLINIC | Age: 76
End: 2021-07-27
Attending: INTERNAL MEDICINE
Payer: COMMERCIAL

## 2021-07-27 VITALS
DIASTOLIC BLOOD PRESSURE: 74 MMHG | SYSTOLIC BLOOD PRESSURE: 121 MMHG | RESPIRATION RATE: 16 BRPM | OXYGEN SATURATION: 100 % | HEART RATE: 68 BPM | TEMPERATURE: 98.9 F

## 2021-07-27 DIAGNOSIS — M81.0 OSTEOPOROSIS, POSTMENOPAUSAL: Primary | ICD-10-CM

## 2021-07-27 PROCEDURE — 250N000011 HC RX IP 250 OP 636: Performed by: INTERNAL MEDICINE

## 2021-07-27 PROCEDURE — 96365 THER/PROPH/DIAG IV INF INIT: CPT

## 2021-07-27 RX ORDER — ALBUTEROL SULFATE 90 UG/1
1-2 AEROSOL, METERED RESPIRATORY (INHALATION)
Status: CANCELLED
Start: 2021-07-27

## 2021-07-27 RX ORDER — METHYLPREDNISOLONE SODIUM SUCCINATE 125 MG/2ML
125 INJECTION, POWDER, LYOPHILIZED, FOR SOLUTION INTRAMUSCULAR; INTRAVENOUS
Status: CANCELLED
Start: 2021-07-27

## 2021-07-27 RX ORDER — NALOXONE HYDROCHLORIDE 0.4 MG/ML
0.2 INJECTION, SOLUTION INTRAMUSCULAR; INTRAVENOUS; SUBCUTANEOUS
Status: CANCELLED | OUTPATIENT
Start: 2021-07-27

## 2021-07-27 RX ORDER — ZOLEDRONIC ACID 5 MG/100ML
5 INJECTION, SOLUTION INTRAVENOUS ONCE
Status: CANCELLED
Start: 2021-07-27 | End: 2021-07-27

## 2021-07-27 RX ORDER — ALBUTEROL SULFATE 0.83 MG/ML
2.5 SOLUTION RESPIRATORY (INHALATION)
Status: CANCELLED | OUTPATIENT
Start: 2021-07-27

## 2021-07-27 RX ORDER — MEPERIDINE HYDROCHLORIDE 25 MG/ML
25 INJECTION INTRAMUSCULAR; INTRAVENOUS; SUBCUTANEOUS EVERY 30 MIN PRN
Status: CANCELLED | OUTPATIENT
Start: 2021-07-27

## 2021-07-27 RX ORDER — HEPARIN SODIUM,PORCINE 10 UNIT/ML
5 VIAL (ML) INTRAVENOUS
Status: CANCELLED | OUTPATIENT
Start: 2021-07-27

## 2021-07-27 RX ORDER — ZOLEDRONIC ACID 5 MG/100ML
5 INJECTION, SOLUTION INTRAVENOUS ONCE
Status: COMPLETED | OUTPATIENT
Start: 2021-07-27 | End: 2021-07-27

## 2021-07-27 RX ORDER — DIPHENHYDRAMINE HYDROCHLORIDE 50 MG/ML
50 INJECTION INTRAMUSCULAR; INTRAVENOUS
Status: CANCELLED
Start: 2021-07-27

## 2021-07-27 RX ORDER — EPINEPHRINE 1 MG/ML
0.3 INJECTION, SOLUTION INTRAMUSCULAR; SUBCUTANEOUS EVERY 5 MIN PRN
Status: CANCELLED | OUTPATIENT
Start: 2021-07-27

## 2021-07-27 RX ORDER — HEPARIN SODIUM (PORCINE) LOCK FLUSH IV SOLN 100 UNIT/ML 100 UNIT/ML
5 SOLUTION INTRAVENOUS
Status: CANCELLED | OUTPATIENT
Start: 2021-07-27

## 2021-07-27 RX ADMIN — ZOLEDRONIC ACID 5 MG: 0.05 INJECTION, SOLUTION INTRAVENOUS at 14:45

## 2021-07-27 NOTE — PROGRESS NOTES
Infusion Nursing Note:  Shala Caruso presents today for Reclast infusion.    Patient seen by provider today: No   present during visit today: Not Applicable.    Note:   Infused over approximately 30 minutes.     Patient did not meet criteria for an asymptomatic covid-19 PCR test in infusion today. Patient declined the covid-19 test.    Intravenous Access:  Peripheral IV placed.    Treatment Conditions:  Results reviewed, labs MET treatment parameters, ok to proceed with treatment.    Post Infusion Assessment:  Patient tolerated infusion without incident.  Blood return noted pre and post infusion.  Site patent and intact, free from redness, edema or discomfort.  No evidence of extravasations.  Access discontinued per protocol.     Discharge Plan:   Discharge instructions reviewed with: Patient.  AVS to patient via [employer].  Patient will return to PCP for next appointment.   Patient discharged in stable condition accompanied by: self.  Departure Mode: Ambulatory.    Kemi Chong RN    /73   Pulse 68   Temp 98.9  F (37.2  C) (Oral)   Resp 16   LMP  (LMP Unknown)   SpO2 100%     Administrations This Visit     zoledronic Acid (RECLAST) infusion 5 mg     Admin Date  07/27/2021 Action  New Bag Dose  5 mg Rate  200 mL/hr Route  Intravenous Administered By  Kemi Chong, RN

## 2021-07-27 NOTE — PATIENT INSTRUCTIONS
Dear Shala Caruso    Thank you for choosing [facility] ([facility]) for your infusion.  The following information is a summary of our appointment as well as important reminders.      We look forward in seeing you on your next appointment here at [facility] ([facility]).  Please don t hesitate to call us at [phone] to reschedule any of your appointments or to speak with one of the [facility] registered nurses.  It was a pleasure taking care of you today.    Sincerely,    [facility]  [address] [place] [address]  [id number]  Phone:  [phone]  Patient Education     Reclast Injection 0.05 mg/mL  Uses  This medicine is used for the following purposes:    bone disease    bone strength  Instructions  This is an IV medicine. It is given through a sterile tube directly into the vein by a healthcare provider.  This medicine is given gradually through the IV line.  Please ask your doctor, nurse, or pharmacist how to discard unused medicines safely.  This medicine should be given by a trained health care provider.  Drink plenty of water while on this medicine.  Drink at least 2 glasses of water before treatment, unless instructed otherwise.  It may take several weeks for this medicine to fully work.  It is important that you keep taking each dose of this medicine on time even if you are feeling well.  If you miss a dose, contact your doctor for instructions.  Please tell your doctor and pharmacist about all the medicines you take. Include both prescription and over-the-counter medicines. Also tell them about any vitamins, herbal medicines, or anything else you take for your health.  Talk to your doctor before taking other medicines, including aspirins and ibuprofen containing products. Speak to your doctor about which medicines are safe to use while you are on this  medicine.  Do not suddenly stop taking this medicine. Check with your doctor before stopping.  This medicine may affect the strength of your bones. If you have or are at increased risk for developing osteoporosis (weakening of the bones), your doctor may recommend adding foods containing calcium and vitamin D while on this medicine. Please talk to your doctor for more information.  You may need vitamin and mineral supplements while on this medicine. Please speak with your doctor or pharmacist.  Visit your dentist regularly. Proper care of your teeth is very important while taking this medicine. Brush your teeth and floss regularly.  It is very important that you follow your doctor's instructions for all blood tests.  It is very important that you keep all appointments for medical exams and tests while on this medicine.  Cautions  Tell your doctor and pharmacist if you ever had an allergic reaction to a medicine. Symptoms of an allergic reaction can include trouble breathing, skin rash, itching, swelling, or severe dizziness.  Some patients taking this medicine have experienced serious side effects. Please speak with your doctor to understand the risks and benefits associated with this medicine.  Do not use the medication any more than instructed.  This medicine may cause dizziness or fainting, especially after exercising or in hot weather. Be very careful when standing or sitting up quickly.  Your ability to stay alert or to react quickly may be impaired by this medicine. Do not drive or operate machinery until you know how this medicine will affect you.  Please check with your doctor before drinking alcohol while on this medicine.  Avoid smoking while on this medicine. Smoking may increase your risk for bone fractures.  If possible, avoid using with marijuana or other medicines that can cause dizziness or drowsiness. These include allergy/cold products, muscle relaxers, sleep aids, and pain relievers.  Contact your  doctor if you notice a change in the amount or darkening of your urine.  Tell the doctor or pharmacist if you are pregnant, planning to be pregnant, or breastfeeding.  Do not use this medicine if you are pregnant. If you become pregnant while on this medicine, contact your doctor immediately.  This medicine can hurt a new baby in the womb. If you become pregnant while on this medicine, tell your doctor immediately. Your doctor may switch you to a different medicine.  Ask your pharmacist if this medicine can interact with any of your other medicines. Be sure to tell them about all the medicines you take.  Please tell all your doctors and dentists that you are on this medicine before they provide care.  Do not start or stop any other medicines without first speaking to your doctor or pharmacist.  This medicine can cause serious side effects in some patients. Important information from the [place] Food and Drug Administration (FDA) is available from your pharmacist. Please review it carefully with your pharmacist to understand the risks associated with this medicine.  Side Effects  The following is a list of some common side effects from this medicine. Please speak with your doctor about what you should do if you experience these or other side effects.    dizziness    lack of energy and tiredness    flu-like symptoms    headaches    reaction at the area of the injection (pain, redness, swelling)    nausea  Call your doctor or get medical help right away if you notice any of these more serious side effects:    bone pain    fever or chills    high fever    fast or irregular heart beats    jaw pain    pain in the joints    kidney problems    mouth sores or irritation    muscle cramps    muscle pain    tight or rigid muscles    muscle weakness    feeling of numbness or tingling in your hands and feet    eye pain or swelling    skin irritation such as redness, itching, rash, or burning    redness of  eyes    seizures    sensitivity to light    shortness of breath    skin tingling, burning or prickly feeling    unusual or unexplained tiredness or weakness    urinating less often    dark urine    blurring or changes of vision    weakness  A few people may have an allergic reactions to this medicine. Symptoms can include difficulty breathing, skin rash, itching, swelling, or severe dizziness. If you notice any of these symptoms, seek medical help quickly.  Extra  Please speak with your doctor, nurse, or pharmacist if you have any questions about this medicine.  [email or web address]  IMPORTANT NOTE: This document tells you briefly how to take your medicine, but it does not tell you all there is to know about it.Your doctor or pharmacist may give you other documents about your medicine. Please talk to them if you have any questions.Always follow their advice. There is a more complete description of this medicine available in English.Scan this code on your smartphone or tablet or use the web address below. You can also ask your pharmacist for a printout. If you have any questions, please ask your pharmacist.     2021 [employer].

## 2021-07-27 NOTE — LETTER
7/27/2021         RE: Shala Caruso  [address]  [place] [address]        Dear Colleague,    Thank you for referring your patient, Shala Caruso, to the [facility]. Please see a copy of my visit note below.    Infusion Nursing Note:  Shala Caruso presents today for Reclast infusion.    Patient seen by provider today: No   present during visit today: Not Applicable.    Note:   Infused over approximately 30 minutes.     Patient did not meet criteria for an asymptomatic covid-19 PCR test in infusion today. Patient declined the covid-19 test.    Intravenous Access:  Peripheral IV placed.    Treatment Conditions:  Results reviewed, labs MET treatment parameters, ok to proceed with treatment.    Post Infusion Assessment:  Patient tolerated infusion without incident.  Blood return noted pre and post infusion.  Site patent and intact, free from redness, edema or discomfort.  No evidence of extravasations.  Access discontinued per protocol.     Discharge Plan:   Discharge instructions reviewed with: Patient.  AVS to patient via [employer].  Patient will return to PCP for next appointment.   Patient discharged in stable condition accompanied by: self.  Departure Mode: Ambulatory.    Kemi Chong RN    /73   Pulse 68   Temp 98.9  F (37.2  C) (Oral)   Resp 16   LMP  (LMP Unknown)   SpO2 100%     Administrations This Visit     zoledronic Acid (RECLAST) infusion 5 mg     Admin Date  07/27/2021 Action  New Bag Dose  5 mg Rate  200 mL/hr Route  Intravenous Administered By  Kemi Chong, RN                            Again, thank you for allowing me to participate in the care of your patient.        Sincerely,        [facility]

## 2021-08-02 ENCOUNTER — MYC MEDICAL ADVICE (OUTPATIENT)
Dept: INTERNAL MEDICINE | Facility: CLINIC | Age: 76
End: 2021-08-02

## 2021-08-02 DIAGNOSIS — K22.0 ACHALASIA OF ESOPHAGUS: ICD-10-CM

## 2021-08-03 RX ORDER — NIFEDIPINE 30 MG/1
30 TABLET, EXTENDED RELEASE ORAL DAILY
Qty: 90 TABLET | Refills: 3 | Status: SHIPPED | OUTPATIENT
Start: 2021-08-03 | End: 2022-04-18

## 2021-08-09 DIAGNOSIS — H31.8 CHOROIDAL LESION: Primary | ICD-10-CM

## 2021-08-12 ENCOUNTER — LAB (OUTPATIENT)
Dept: LAB | Facility: CLINIC | Age: 76
End: 2021-08-12
Payer: COMMERCIAL

## 2021-08-12 DIAGNOSIS — D68.61 ANTIPHOSPHOLIPID SYNDROME (H): ICD-10-CM

## 2021-08-12 DIAGNOSIS — Z79.01 LONG TERM CURRENT USE OF ANTICOAGULANT THERAPY: ICD-10-CM

## 2021-08-12 PROCEDURE — 85210 CLOT FACTOR II PROTHROM SPEC: CPT | Mod: 90 | Performed by: PATHOLOGY

## 2021-08-12 PROCEDURE — 36415 COLL VENOUS BLD VENIPUNCTURE: CPT | Performed by: PATHOLOGY

## 2021-08-13 ENCOUNTER — ANTICOAGULATION THERAPY VISIT (OUTPATIENT)
Dept: ANTICOAGULATION | Facility: CLINIC | Age: 76
End: 2021-08-13

## 2021-08-13 DIAGNOSIS — Z79.01 LONG TERM CURRENT USE OF ANTICOAGULANT THERAPY: Primary | ICD-10-CM

## 2021-08-13 DIAGNOSIS — D68.61 ANTIPHOSPHOLIPID SYNDROME (H): ICD-10-CM

## 2021-08-13 DIAGNOSIS — L93.0 LUPUS ERYTHEMATOSUS: ICD-10-CM

## 2021-08-13 LAB — PROTHROM ACT/NOR PPP: 14 % (ref 60–140)

## 2021-08-13 NOTE — PROGRESS NOTES
ANTICOAGULATION MANAGEMENT     Shala Caruso 75 year old female is on warfarin with supratherapeutic INR result. (Goal INR 2.0-3.0)  Factor 2=14%.  Goal range =15-25%    No results for input(s): INR in the last 168 hours.    ASSESSMENT     Source(s): Chart Review and Patient/Caregiver Call       Warfarin doses taken: Warfarin taken as instructed    Diet: No new diet changes identified    New illness, injury, or hospitalization: No    Medication/supplement changes: None noted    Signs or symptoms of bleeding or clotting: No    Previous INR: Supratherapeutic    Additional findings: None     PLAN     Recommended plan for no diet, medication or health factor changes affecting INR     Dosing Instructions:  Decrease your warfarin dose (3.5% change) with next INR in 3 weeks       Summary  As of 8/13/2021    Full warfarin instructions:  8/13: 7.5 mg; Otherwise 10 mg every day   Next INR check:  9/2/2021             Telephone call with Shala who verbalizes understanding and agrees to plan    Patient elected to schedule next visit 9/2    Education provided: None required    Plan made per [facility] anticoagulation protocol    Portia Cherry, RN  Anticoagulation Clinic  8/13/2021    _______________________________________________________________________     Anticoagulation Episode Summary     Current INR goal:  2.0-3.0   TTR:  --   Target end date:  Indefinite   Send INR reminders to:   [facility]    Indications    SLE (systemic lupus erythematosus) (H) (Resolved) [M32.9]  Long term current use of anticoagulant therapy [Z79.01]  Lupus erythematosus [L93.0]  Antiphospholipid syndrome (H) [D68.61]           Comments:  Factor 2  goal range is 15-25%, 1/31/20 Not drawing INR  Ok to leave message on home [phone]   May leave messages with Rodriguez Santos  DO NOT GIVE RECORDS TO EMPLOYER U           Anticoagulation Care Providers     Provider Role Specialty Phone number    Joe Contreras MD Referring Internal  Medicine [phone]

## 2021-08-19 ENCOUNTER — OFFICE VISIT (OUTPATIENT)
Dept: OPHTHALMOLOGY | Facility: CLINIC | Age: 76
End: 2021-08-19
Attending: OPHTHALMOLOGY
Payer: COMMERCIAL

## 2021-08-19 DIAGNOSIS — H31.8 CHOROIDAL LESION: ICD-10-CM

## 2021-08-19 PROCEDURE — 92134 CPTRZ OPH DX IMG PST SGM RTA: CPT | Performed by: OPHTHALMOLOGY

## 2021-08-19 PROCEDURE — G0463 HOSPITAL OUTPT CLINIC VISIT: HCPCS

## 2021-08-19 PROCEDURE — 76512 OPH US DX B-SCAN: CPT | Performed by: OPHTHALMOLOGY

## 2021-08-19 PROCEDURE — 99214 OFFICE O/P EST MOD 30 MIN: CPT | Performed by: OPHTHALMOLOGY

## 2021-08-19 ASSESSMENT — EXTERNAL EXAM - LEFT EYE: OS_EXAM: NORMAL

## 2021-08-19 ASSESSMENT — CONF VISUAL FIELD
OD_NORMAL: 1
OS_NORMAL: 1

## 2021-08-19 ASSESSMENT — REFRACTION_WEARINGRX
OD_CYLINDER: +1.50
OD_ADD: +2.75
OS_AXIS: 178
OD_SPHERE: -1.00
OS_CYLINDER: +0.75
OS_ADD: +2.75
OS_SPHERE: -0.50
OD_AXIS: 144

## 2021-08-19 ASSESSMENT — VISUAL ACUITY
CORRECTION_TYPE: GLASSES
OS_CC: 20/20
METHOD: SNELLEN - LINEAR
OD_CC: 20/20

## 2021-08-19 ASSESSMENT — TONOMETRY
IOP_METHOD: TONOPEN
OS_IOP_MMHG: 14
OD_IOP_MMHG: 14

## 2021-08-19 ASSESSMENT — CUP TO DISC RATIO
OD_RATIO: 0.3
OS_RATIO: 0.3

## 2021-08-19 ASSESSMENT — EXTERNAL EXAM - RIGHT EYE: OD_EXAM: NORMAL

## 2021-08-19 ASSESSMENT — SLIT LAMP EXAM - LIDS
COMMENTS: NORMAL
COMMENTS: NORMAL

## 2021-08-19 NOTE — PROGRESS NOTES
CC: DM1 annual exam and choroidal lesion follow up   Interval: No significant changes in vision. No flashes and floaters   HPI: 75 year old F with hx of DM type I. Denies flashes and floaters.    Past med hx: SLE currently on cellcept (allergic to plaq)  Past ocular hx: none     OCULAR IMAGING  Optical Coherence Tomography 8-19-21  Right eye normal foveal contour, no srf/irf  Left eye normal foveal contour,   OCT thru lesion less than 1 mm thick with drusen overlying, no srf    PHOTOS 8-9-18  Right eye   Left eye  Choroidal pigmented lesion with drusen     U/S left eye 08/19/21   ST lesion appears stable from prior: 08/19/21 <1mm in height   8-19-20 <1mm in height - stable. No posterior shadowing. 0.61 mm x 4.78T x 5.38L  9/14/16 C1 0.56 mm; C2 5.46 mm  8-30-17 C1 1.3 mm; C2   8-9-18 less than 1 mm ashwini lesion. 0.73mm x 5.15T x 5.03L  9-11-19 Minimally elevated lesion <1 mm in height appears stable / unchanged on U/S today. Negative for posterior shadowing or patent extension / excavation.     A/P:   # DM type I    - HbA1c 5.1%    - Diet controlled and insulin for meals   - no evidence of DR     # Pseudophakia both eyes   - doing well, lens centered    # Myopic astigmatism, OU   - updated Rx      # Choroidal pigmented lesion, left eye  - superior temporal periphery  - no orange pigmentation, no subretinal fluid, + drusen   - ultrasound 08/09/18  less than 1 mm height, normal retrobulbar echo pattern  - stable on exam      # patient with history of Lupus   Took prednisolone for 10 yrs --> patient developed Diabetes mellitus   Currently Mycophenolate   No lupus retinopathy   Patient had DVT left upper leg--> patient on coumadin    return to clinic: f/u in 12 months, repeat oct enhanced depth image of the choroidal peripheral lesion , macula Optical Coherence Tomography    ~~~~~~~~~~~~~~~~~~~~~~~~~~~~~~~~~~   Complete documentation of historical and exam elements from today's encounter can be found in the full  encounter summary report (not reduplicated in this progress note).  I personally obtained the chief complaint(s) and history of present illness.  I confirmed and edited as necessary the review of systems, past medical/surgical history, family history, social history, and examination findings as documented by others; and I examined the patient myself.  I personally reviewed the relevant tests, images, and reports as documented above.  I formulated and edited as necessary the assessment and plan and discussed the findings and management plan with the patient and family    Monika Fermin MD   of Ophthalmology.  Retina Service   Department of Ophthalmology and Visual Neurosciences   [facility]  Phone: [phone]   Fax: [phone]

## 2021-08-19 NOTE — NURSING NOTE
Chief Complaints and History of Present Illnesses   Patient presents with     Follow Up     Chief Complaint(s) and History of Present Illness(es)     Follow Up     Laterality: both eyes    Associated symptoms: floaters.  Negative for flashes, eye pain and pain with eye movement    Treatments tried: artificial tears    Pain scale: 0/10              Comments     Choroidal lesion follow up  Blood sugar 99 this am  A1C 5.6 taken 6-2021  Art tears prn  Got new glasses doing good  Juliet MCNEAL 2:18 PM August 19, 2021

## 2021-08-19 NOTE — Clinical Note
Who did ultrasound? I think they label eye incorrectly on axis. Can you ask to change to correct left eye ?

## 2021-08-25 DIAGNOSIS — Z12.31 VISIT FOR SCREENING MAMMOGRAM: ICD-10-CM

## 2021-08-25 PROCEDURE — 77067 SCR MAMMO BI INCL CAD: CPT | Mod: GC | Performed by: STUDENT IN AN ORGANIZED HEALTH CARE EDUCATION/TRAINING PROGRAM

## 2021-08-25 PROCEDURE — 77063 BREAST TOMOSYNTHESIS BI: CPT | Mod: GC | Performed by: STUDENT IN AN ORGANIZED HEALTH CARE EDUCATION/TRAINING PROGRAM

## 2021-09-02 ENCOUNTER — LAB (OUTPATIENT)
Dept: LAB | Facility: CLINIC | Age: 76
End: 2021-09-02
Attending: INTERNAL MEDICINE
Payer: COMMERCIAL

## 2021-09-02 DIAGNOSIS — D68.61 ANTIPHOSPHOLIPID SYNDROME (H): ICD-10-CM

## 2021-09-02 DIAGNOSIS — M32.9 SYSTEMIC LUPUS ERYTHEMATOSUS, UNSPECIFIED SLE TYPE, UNSPECIFIED ORGAN INVOLVEMENT STATUS (H): ICD-10-CM

## 2021-09-02 DIAGNOSIS — Z79.01 LONG TERM CURRENT USE OF ANTICOAGULANT THERAPY: ICD-10-CM

## 2021-09-02 LAB
ALBUMIN SERPL-MCNC: 4 G/DL (ref 3.4–5)
ALBUMIN UR-MCNC: NEGATIVE MG/DL
ALT SERPL W P-5'-P-CCNC: 22 U/L (ref 0–50)
APPEARANCE UR: CLEAR
AST SERPL W P-5'-P-CCNC: 15 U/L (ref 0–45)
BACTERIA #/AREA URNS HPF: ABNORMAL /HPF
BILIRUB UR QL STRIP: NEGATIVE
COLOR UR AUTO: YELLOW
CREAT SERPL-MCNC: 0.65 MG/DL (ref 0.52–1.04)
ERYTHROCYTE [DISTWIDTH] IN BLOOD BY AUTOMATED COUNT: 15.7 % (ref 10–15)
GFR SERPL CREATININE-BSD FRML MDRD: 88 ML/MIN/1.73M2
GLUCOSE UR STRIP-MCNC: NEGATIVE MG/DL
HCT VFR BLD AUTO: 41.5 % (ref 35–47)
HGB BLD-MCNC: 12.7 G/DL (ref 11.7–15.7)
HGB UR QL STRIP: NEGATIVE
KETONES UR STRIP-MCNC: NEGATIVE MG/DL
LEUKOCYTE ESTERASE UR QL STRIP: NEGATIVE
MCH RBC QN AUTO: 28.1 PG (ref 26.5–33)
MCHC RBC AUTO-ENTMCNC: 30.6 G/DL (ref 31.5–36.5)
MCV RBC AUTO: 92 FL (ref 78–100)
NITRATE UR QL: POSITIVE
PH UR STRIP: 5 [PH] (ref 5–7)
PLATELET # BLD AUTO: 279 10E3/UL (ref 150–450)
RBC # BLD AUTO: 4.52 10E6/UL (ref 3.8–5.2)
RBC URINE: <1 /HPF
SP GR UR STRIP: 1.01 (ref 1–1.03)
UROBILINOGEN UR STRIP-MCNC: NORMAL MG/DL
WBC # BLD AUTO: 3.1 10E3/UL (ref 4–11)
WBC URINE: 1 /HPF

## 2021-09-02 PROCEDURE — 82565 ASSAY OF CREATININE: CPT | Performed by: PATHOLOGY

## 2021-09-02 PROCEDURE — 84460 ALANINE AMINO (ALT) (SGPT): CPT | Performed by: PATHOLOGY

## 2021-09-02 PROCEDURE — 87086 URINE CULTURE/COLONY COUNT: CPT | Mod: 90 | Performed by: PATHOLOGY

## 2021-09-02 PROCEDURE — 36415 COLL VENOUS BLD VENIPUNCTURE: CPT | Performed by: PATHOLOGY

## 2021-09-02 PROCEDURE — 85027 COMPLETE CBC AUTOMATED: CPT | Performed by: PATHOLOGY

## 2021-09-02 PROCEDURE — 85210 CLOT FACTOR II PROTHROM SPEC: CPT | Mod: 90 | Performed by: PATHOLOGY

## 2021-09-02 PROCEDURE — 82040 ASSAY OF SERUM ALBUMIN: CPT | Performed by: PATHOLOGY

## 2021-09-02 PROCEDURE — 86160 COMPLEMENT ANTIGEN: CPT | Mod: 90 | Performed by: PATHOLOGY

## 2021-09-02 PROCEDURE — 87186 SC STD MICRODIL/AGAR DIL: CPT | Mod: 90 | Performed by: PATHOLOGY

## 2021-09-02 PROCEDURE — 84450 TRANSFERASE (AST) (SGOT): CPT | Performed by: PATHOLOGY

## 2021-09-02 PROCEDURE — 86225 DNA ANTIBODY NATIVE: CPT | Mod: 90 | Performed by: PATHOLOGY

## 2021-09-02 PROCEDURE — 81001 URINALYSIS AUTO W/SCOPE: CPT | Performed by: PATHOLOGY

## 2021-09-03 ENCOUNTER — ANTICOAGULATION THERAPY VISIT (OUTPATIENT)
Dept: ANTICOAGULATION | Facility: CLINIC | Age: 76
End: 2021-09-03

## 2021-09-03 DIAGNOSIS — L93.0 LUPUS ERYTHEMATOSUS: ICD-10-CM

## 2021-09-03 DIAGNOSIS — Z79.01 LONG TERM CURRENT USE OF ANTICOAGULANT THERAPY: Primary | ICD-10-CM

## 2021-09-03 DIAGNOSIS — D68.61 ANTIPHOSPHOLIPID SYNDROME (H): ICD-10-CM

## 2021-09-03 LAB
C3 SERPL-MCNC: 95 MG/DL (ref 81–157)
C4 SERPL-MCNC: 15 MG/DL (ref 13–39)
DSDNA AB SER-ACNC: <0.6 IU/ML
PROTHROM ACT/NOR PPP: 14 % (ref 60–140)

## 2021-09-03 NOTE — PROGRESS NOTES
ANTICOAGULATION MANAGEMENT     Shala Caruso 75 year old female is on warfarin with supratherapeutic result. (Goal Factor II: 25-15%)    Recent labs: (last 7 days)     09/02/21  1214   F2 14*       ASSESSMENT     Source(s): Chart Review       Warfarin doses taken: Reviewed in chart    Diet: Unable to assess     New illness, injury, or hospitalization: No    Medication/supplement changes: None noted    Signs or symptoms of bleeding or clotting: No    Previous result: Supratherapeutic    Additional findings: None     PLAN     Recommended plan for no diet, medication or health factor changes affecting result    Dosing Instructions:  Decrease your warfarin dose 7.5mg MWF and 10mg ROW (10.7% change) with next Factor II in 1 week       Detailed voice message left for Shala with dosing instructions and follow up date.     Contact [phone] to schedule and with any changes, questions or concerns.     Education provided: Please call back if any changes to your diet, medications or how you've been taking warfarin and Contact [phone] with any changes, questions or concerns.     Plan made per [facility] anticoagulation protocol    Melly Morrison RN  Anticoagulation Clinic  9/3/2021    _______________________________________________________________________     Anticoagulation Episode Summary     Current INR goal:  2.0-3.0   TTR:  --   Target end date:  Indefinite   Send INR reminders to:  [facility]    Indications    SLE (systemic lupus erythematosus) (H) (Resolved) [M32.9]  Long term current use of anticoagulant therapy [Z79.01]  Lupus erythematosus [L93.0]  Antiphospholipid syndrome (H) [D68.61]           Comments:  Factor 2  goal range is 15-25%, 1/31/20 Not drawing INR  Ok to leave message on home [phone]   May leave messages with Rodriguez Santos  DO NOT GIVE RECORDS TO EMPLOYER [employer]           Anticoagulation Care Providers     Provider Role Specialty Phone number    Joe Contreras MD Referring  Internal Medicine [phone]

## 2021-09-04 LAB — BACTERIA UR CULT: ABNORMAL

## 2021-09-09 ENCOUNTER — LAB (OUTPATIENT)
Dept: LAB | Facility: CLINIC | Age: 76
End: 2021-09-09
Payer: COMMERCIAL

## 2021-09-09 DIAGNOSIS — D68.61 ANTIPHOSPHOLIPID SYNDROME (H): ICD-10-CM

## 2021-09-09 DIAGNOSIS — Z79.01 LONG TERM CURRENT USE OF ANTICOAGULANT THERAPY: ICD-10-CM

## 2021-09-09 PROCEDURE — 85210 CLOT FACTOR II PROTHROM SPEC: CPT | Mod: 90 | Performed by: PATHOLOGY

## 2021-09-09 PROCEDURE — 36415 COLL VENOUS BLD VENIPUNCTURE: CPT | Performed by: PATHOLOGY

## 2021-09-10 ENCOUNTER — ANTICOAGULATION THERAPY VISIT (OUTPATIENT)
Dept: ANTICOAGULATION | Facility: CLINIC | Age: 76
End: 2021-09-10

## 2021-09-10 DIAGNOSIS — Z79.01 LONG TERM CURRENT USE OF ANTICOAGULANT THERAPY: Primary | ICD-10-CM

## 2021-09-10 DIAGNOSIS — D68.61 ANTIPHOSPHOLIPID SYNDROME (H): ICD-10-CM

## 2021-09-10 DIAGNOSIS — L93.0 LUPUS ERYTHEMATOSUS: ICD-10-CM

## 2021-09-10 LAB — PROTHROM ACT/NOR PPP: 19 % (ref 60–140)

## 2021-09-10 NOTE — PROGRESS NOTES
ANTICOAGULATION MANAGEMENT     Shala Caruso 75 year old female is on warfarin with therapeutic result. (Goal Factor II: 25-15%)    Recent labs: (last 7 days)     09/09/21  1230   F2 19*       ASSESSMENT     Source(s): Chart Review and Patient/Caregiver Call       Warfarin doses taken: Warfarin taken as instructed    Diet: No new diet changes identified    New illness, injury, or hospitalization: No    Medication/supplement changes: None noted    Signs or symptoms of bleeding or clotting: No    Previous result: Supratherapeutic    Additional findings: None     PLAN     Recommended plan for no diet, medication or health factor changes affecting result    Dosing Instructions: Continue your current warfarin dose 7.5mg MWF, 10mg all other days with next Factor II in 4 weeks       Telephone call with Shala who agrees to plan and repeated back plan correctly    Lab visit scheduled    Education provided: None required    Plan made per [facility] anticoagulation protocol    Andre Ashraf, RN  Anticoagulation Clinic  9/10/2021    _______________________________________________________________________     Anticoagulation Episode Summary     Current INR goal:  2.0-3.0   TTR:  --   Target end date:  Indefinite   Send INR reminders to:   [facility]    Indications    SLE (systemic lupus erythematosus) (H) (Resolved) [M32.9]  Long term current use of anticoagulant therapy [Z79.01]  Lupus erythematosus [L93.0]  Antiphospholipid syndrome (H) [D68.61]           Comments:  Factor 2  goal range is 15-25%, 1/31/20 Not drawing INR  Ok to leave message on home [phone]   May leave messages with Rodriguez Santos  DO NOT GIVE RECORDS TO EMPLOYER [employer]           Anticoagulation Care Providers     Provider Role Specialty Phone number    Joe Contreras MD Referring Internal Medicine [phone]

## 2021-09-11 ENCOUNTER — HEALTH MAINTENANCE LETTER (OUTPATIENT)
Age: 76
End: 2021-09-11

## 2021-09-12 ENCOUNTER — MYC MEDICAL ADVICE (OUTPATIENT)
Dept: INTERNAL MEDICINE | Facility: CLINIC | Age: 76
End: 2021-09-12

## 2021-09-15 ENCOUNTER — IMMUNIZATION (OUTPATIENT)
Dept: NURSING | Facility: CLINIC | Age: 76
End: 2021-09-15
Payer: COMMERCIAL

## 2021-09-15 PROCEDURE — 0003A PR COVID VAC PFIZER DIL RECON 30 MCG/0.3 ML IM: CPT | Performed by: FAMILY MEDICINE

## 2021-09-15 PROCEDURE — 91300 PR COVID VAC PFIZER DIL RECON 30 MCG/0.3 ML IM: CPT | Performed by: FAMILY MEDICINE

## 2021-09-28 ENCOUNTER — MYC MEDICAL ADVICE (OUTPATIENT)
Dept: ENDOCRINOLOGY | Facility: CLINIC | Age: 76
End: 2021-09-28

## 2021-09-28 DIAGNOSIS — E10.649 TYPE 1 DIABETES MELLITUS WITH HYPOGLYCEMIA AND WITHOUT COMA (H): ICD-10-CM

## 2021-09-28 RX ORDER — CALCIUM CITRATE/VITAMIN D3 200MG-6.25
TABLET ORAL
Qty: 500 STRIP | Refills: 3 | Status: SHIPPED | OUTPATIENT
Start: 2021-09-28 | End: 2022-12-26

## 2021-09-28 RX ORDER — PEN NEEDLE, DIABETIC 32GX 5/32"
NEEDLE, DISPOSABLE MISCELLANEOUS
Qty: 200 EACH | Refills: 3 | Status: SHIPPED | OUTPATIENT
Start: 2021-09-28 | End: 2021-12-20

## 2021-10-05 ENCOUNTER — LAB (OUTPATIENT)
Dept: LAB | Facility: CLINIC | Age: 76
End: 2021-10-05
Payer: COMMERCIAL

## 2021-10-05 DIAGNOSIS — D68.61 ANTIPHOSPHOLIPID SYNDROME (H): ICD-10-CM

## 2021-10-05 DIAGNOSIS — Z79.01 LONG TERM CURRENT USE OF ANTICOAGULANT THERAPY: ICD-10-CM

## 2021-10-05 PROCEDURE — 36415 COLL VENOUS BLD VENIPUNCTURE: CPT | Performed by: PATHOLOGY

## 2021-10-05 PROCEDURE — 85210 CLOT FACTOR II PROTHROM SPEC: CPT | Mod: 90 | Performed by: PATHOLOGY

## 2021-10-06 ENCOUNTER — ANTICOAGULATION THERAPY VISIT (OUTPATIENT)
Dept: ANTICOAGULATION | Facility: CLINIC | Age: 76
End: 2021-10-06

## 2021-10-06 DIAGNOSIS — Z79.01 LONG TERM CURRENT USE OF ANTICOAGULANT THERAPY: Primary | ICD-10-CM

## 2021-10-06 DIAGNOSIS — L93.0 LUPUS ERYTHEMATOSUS: ICD-10-CM

## 2021-10-06 DIAGNOSIS — D68.61 ANTIPHOSPHOLIPID SYNDROME (H): ICD-10-CM

## 2021-10-06 LAB — PROTHROM ACT/NOR PPP: 19 % (ref 60–140)

## 2021-10-06 NOTE — PROGRESS NOTES
ANTICOAGULATION MANAGEMENT     Shala Caruso 75 year old female is on warfarin with therapeutic result. (Goal Factor II: 25-15%)    Recent labs: (last 7 days)     10/05/21  1300   F2 19*       ASSESSMENT     Source(s): Patient/Caregiver Call       Warfarin doses taken: Warfarin taken as instructed    Diet: Pt reports that her intake has been decreased and this has been going on for a while.     New illness, injury, or hospitalization: No    Medication/supplement changes: None noted    Signs or symptoms of bleeding or clotting: No    Previous result: Therapeutic last visit; previously outside of goal range    Additional findings: None     PLAN     Recommended plan for no diet, medication or health factor changes affecting result    Dosing Instructions: Continue your current warfarin dose 7.5mg MWF and 10mg ROW with next Chromogenic Factor X in 4 weeks       Telephone call with Shala who verbalizes understanding and agrees to plan and who agrees to plan and repeated back plan correctly    Patient offered & declined to schedule next visit    Education provided: None required    Plan made per [facility] anticoagulation protocol    Melly Morrison, RN  Anticoagulation Clinic  10/6/2021    _______________________________________________________________________     Anticoagulation Episode Summary     Current INR goal:  2.0-3.0   TTR:  --   Target end date:  Indefinite   Send INR reminders to:  [facility]    Indications    SLE (systemic lupus erythematosus) (H) (Resolved) [M32.9]  Long term current use of anticoagulant therapy [Z79.01]  Lupus erythematosus [L93.0]  Antiphospholipid syndrome (H) [D68.61]           Comments:  Factor 2  goal range is 15-25%, 1/31/20 Not drawing INR  Ok to leave message on home [phone]   May leave messages with Rodriguez Santos  DO NOT GIVE RECORDS TO EMPLOYER U           Anticoagulation Care Providers     Provider Role Specialty Phone number    Joe Contreras MD Referring  Internal Medicine [phone]

## 2021-10-12 ENCOUNTER — IMMUNIZATION (OUTPATIENT)
Dept: FAMILY MEDICINE | Facility: CLINIC | Age: 76
End: 2021-10-12
Payer: COMMERCIAL

## 2021-10-12 PROCEDURE — 90471 IMMUNIZATION ADMIN: CPT

## 2021-10-12 PROCEDURE — 90662 IIV NO PRSV INCREASED AG IM: CPT

## 2021-10-17 DIAGNOSIS — E10.649 TYPE 1 DIABETES MELLITUS WITH HYPOGLYCEMIA AND WITHOUT COMA (H): ICD-10-CM

## 2021-10-20 ENCOUNTER — ALLIED HEALTH/NURSE VISIT (OUTPATIENT)
Dept: INTERNAL MEDICINE | Facility: CLINIC | Age: 76
End: 2021-10-20
Payer: COMMERCIAL

## 2021-10-20 DIAGNOSIS — Z23 NEED FOR SHINGLES VACCINE: Primary | ICD-10-CM

## 2021-10-20 PROCEDURE — 90750 HZV VACC RECOMBINANT IM: CPT

## 2021-10-20 PROCEDURE — 90471 IMMUNIZATION ADMIN: CPT

## 2021-10-20 NOTE — PROGRESS NOTES
Shala Caruso comes into clinic today at the request of first dose of shingrix    This service provided today was under the direct supervision of Dr. Reyes, who was available if needed.    Erica Cheung, EMT at 2:38 PM on 10/20/2021     good minus

## 2021-10-20 NOTE — NURSING NOTE
Patient received first dose of shingles vaccine. Vaccine was given under the supervision of Dr. Reyes. See immunization list for administration details. Pt has no history of reaction to vaccines. Pt was advised to remain in [facility] lobby for 15 minutes in case of an averse reaction.     Given by NATALYA Pak at 2:37 PM on 10/20/2021

## 2021-10-25 ENCOUNTER — MYC MEDICAL ADVICE (OUTPATIENT)
Dept: INTERNAL MEDICINE | Facility: CLINIC | Age: 76
End: 2021-10-25

## 2021-11-02 NOTE — PROGRESS NOTES
[facility]  Rheumatology Clinic  Tato Agarwal MD  2021     Name: Shala Caruso  MRN: [id number]  Age: 76 year old  : 1945  Referring provider: Joe Contreras     Assessment and Plan:    Systemic lupus erythematosus (+CRISTAL, +dsDNA, +SSA, hypocomplementemia; Raynaud's, arthritis, serositis, tubulo-interstitial nephropathy, pericardial effusion/tamponade): Pt relates stable bilateral knee and hip pain that improves with movement and stretching, as well as mild loss of sensation in her toes, also stable.  Raynaud's phenomenon is mild, and is controlled with thermal protective measures alone.  No urinary symptoms.  No skin, inflammatory joint, or pulmonary symptoms.  Physical examination reveals onychomycosis in multiple upper extremity fingernails, but no inflammatory joint changes.  Lungs are clear. Laboratory evaluation on 2021 showed creatinine, transaminases, and CBC normal except for mild decrease in white count of 3.1 stable for the last 3 years..  Urinalysis was clear.  C3 and C4 and antidouble-stranded DNA titers were negative or normal.    SLE is stable/quiescent.  Disease that formerly affected the kidneys as well as serosal surfaces remains inactive on cellcept monotherapy, now used continuously since . since the last visit in May 2021, CellCept dose decreased to 1500 mg daily has been well-tolerated.  We discussed my recommendation that she continue to slowly and cautiously taper CellCept to find the minimum effective dose.  I recommend reducing CellCept from 1500 mg to 1000 mg daily as of now.  Plan urinalysis, creatinine, and CBC, as well as complements and antidouble-stranded DNA again in early .  If symptoms remained well controlled and if laboratory values are unchanged, I recommend reducing CellCept to 500 mg after .    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 Following factor 2 levels.  # Peripheral  neuropathy  # Raynaud's: stable  # Achalasia: stable on nifedipine  #Osteoarthritis, knees and hips: Chronic use associated pain and stiffness is modest, stable.  Patient is capable of performing physical activity for multiple hours daily.  I recommended continue weightbearing exercises and range of motion exercises daily.    # Osteoporosis: DEXA scan performed in June July 2020 revealed a T score of -2.3 at the femoral neck.  I agree with planned continuation of bisphosphonate therapy.  Patient underwent zoledronic acid per endocrinology in July 2020.  I agree with plans for annual repeat treatment.  Patient should continue calcium carbonate, vitamin D, and weightbearing exercise as well.    Tato Agarwal M.D.  Staff Rheumatologist, [facility]  Pager [phone]    Follow-up: 4 mos    HPI:   Shala Caruso has a history of DM, SLE, PE on Coumadin, and HTN who presents for evaluation of lupus.  She was last seen in 5-21.  At that time she was doing well without no significant symptoms of autoimmune disease.  Plan was to taper mycophenolate from 1000 mg twice daily.     Summary of previous history:  SLE diagnosed in 2000 (+CRISTAL, +dsDNA ab, arthritis, serositis).  She has antiphospholipid syndrome and had DVT and PE in 2004, on Coumadin since then.  She had a Lupus flare in 2010 initially treated with Plaquenil however she worsened and developed pericardial effusion/tamponade - started on MTX and tapering dose of prednisone at that time (continued on about 10 mg daily).  Cellcept started 6/2010, Prednisone tapered off.  MTX stopped May 2012.    Interval history 11-5-2021:  No flareups in joints, skin, lungs since last visit. Energy level is low, but stable. No SOB. No exacerbation of Raynaud's.  No cough, shortness of breath, fevers.  Walking at least an hour per day.  She tapered mmf since last visit to 1500 mg daily.  She has noticed no change in chronic musculoskeletal symptoms most prominent in the knees, since  "lowering the dose.    Interval history 05-:    Getting along \"ok\". She is unchanged in that Her knees and hips give constant pain \"tolerable\". If she sits too long, she is very stiff. Morning stiffness is brief, relieved by movement. No pain in UE joints. She is walking every day; she plans to start biking this summer. She does daily yoga with a lot of stretching; she tends to feel better with it.    Her hands are sensitive to cold with ongoing Raynaud's. No digital ulcers.    No F/C/S, chest pain.  No urinary symptoms.      Hip pain is in the groin, made worse with long walking.    Review of Systems:  Pertinent items are noted in HPI or as below, remainder of complete ROS is negative.      No recent problems with hearing or vision. Eye dryness relieved by once daily Refresh; no dry mouth  No breathing difficulty, shortness of breath, coughing, or wheezing  No chest pain or palpitations  No heart burn, indigestion, abdominal pain, nausea, vomiting, diarrhea  No urination problems, no bloody, cloudy urine, no dysuria  + toe \"neuropathy\",. Symmetrical, stable.  No headaches or confusion  No rashes. No easy bleeding or bruising.     Active Medications:     Current Outpatient Medications   Medication Sig     acetaminophen (TYLENOL) 500 MG tablet Take 1,000-1,500 mg by mouth nightly as needed      aspirin 81 MG tablet Take 81 mg by mouth At Bedtime      AYR SALINE NASAL NO-DRIP GEL Use as needed for nasal dryness     blood glucose (TRUE METRIX BLOOD GLUCOSE TEST) test strip USE TO TEST BLOOD SUGAR 5 TIMES DAILY OR AS DIRECTED     Calcium Carbonate-Vitamin D (CALCIUM 500 + D PO) Take 1 tablet by mouth 3 times daily     carboxymethylcellulose PF (REFRESH PLUS) 0.5 % SOLN ophthalmic solution Place 1 drop into both eyes as needed     Glucagon (BAQSIMI ONE PACK) 3 MG/DOSE POWD Spray 3 mg in nostril as needed (to be used for severe hypoglycemic episodes)     Injection Device for insulin (NOVOPEN ECHO) RORY 1 each 4 " "times daily     insulin aspart (NOVOLOG PENFILL) 100 UNIT/ML cartridge INJECT subcu 1 UNIT PER 15G CHO WITH MEALS 3x A DAY. APPROX 15 UNITS DAILY. Lab draw needed. Call  to schedule.     insulin glargine (LANTUS PEN) 100 UNIT/ML pen Inject 4 Units Subcutaneous daily     insulin pen needle (BD PAM U/F) 32G X 4 MM miscellaneous Use as directed with insulin pens.     LANCETS ULTRA THIN 30G MISC Test 7-8 times daily  (Lancets that go with pt current meter )     Multiple Vitamins-Minerals (CENTRUM SILVER PO) Take 1 tablet by mouth daily.     mycophenolate (GENERIC EQUIVALENT) 500 MG tablet Take 2 tablets (1,000 mg) by mouth 2 times daily     NIFEdipine ER OSMOTIC (PROCARDIA XL) 30 MG 24 hr tablet Take 1 tablet (30 mg) by mouth daily For additional refills, please schedule annual appointment at [phone]     order for DME Equipment being ordered: compression socks, 20-30 mmHg, knee high     simvastatin (ZOCOR) 40 MG tablet Take 1 tablet (40 mg) by mouth At Bedtime     warfarin ANTICOAGULANT (COUMADIN) 5 MG tablet Take 2 tablets (10mg) by mouth daily or as directed by the Coumadin clinic     warfarin ANTICOAGULANT (COUMADIN) 5 MG tablet Take 10mgs daily or as directed  or as directed by Anticoagulation Clinic.     No current facility-administered medications for this visit.       No longer on lantus: too many \"lows\".  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 Checks BS 5X daily.  Osteoporosis   Hypertension   Hyperlipidemia   Achalasia   Gastroesophageal reflux disease   Choroidal lesion  Atrophic vaginitis   Urinary urgency   Female stress incontinence   Cystocele, midline   Deep vein thrombosis   Nonsenile " cataract   Myopia   Neuropathy   Lymphedema      Past Surgical History:  Phacoemulsification clear cornea with intraocular lens implantation, right 1/8/19, left 2/5/19  Transvaginal sling 12/20/16  Axilla lymph node dissection 2004  Bilateral tubal ligation     Family History:   Glaucoma - father   Breast cancer   Stroke       Social History:   Tobacco Use: No previous or current tobacco use.   Alcohol Use: No alcohol use.   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  Cardiac: Regular rate and rhythm  MS:  Fist formation is normal.  Hands show multiple changes of Heberden's nodes and Susan's nodes, but no inflammatory changes.  Onychomycosis and multiple fingernails.  Psych: Normal judgement, orientation, memory, affect.     Laboratory:   RHEUM RESULTS Latest Ref Rng & Units 1/10/2005 1/14/2005 1/31/2005   ALBUMIN 3.4 - 5.0 g/dL - - -   ALT 0 - 50 U/L - - -   AST 0 - 45 U/L - - -   COMPLEMENT C3 81 - 157 mg/dL - 101 -   COMPLEMENT C4 13 - 39 mg/dL - - -   CK TOTAL 30 - 225 U/L - - -   CREATININE 0.52 - 1.04 mg/dL - - 0.79   CRP 0.0 - 8.0 mg/L 4.46(H) - -   DNA <10.0 IU/mL - - -   DIRK 0 - 1.0 - - -   GFR ESTIMATE, IF BLACK >60 mL/min/[1.73:m2] - - >80   GFR ESTIMATE >60 mL/min/1.73m2 - - 80   HEMATOCRIT 35.0 - 47.0 % 36.9 33.1(L) 37.3   HEMOGLOBIN 11.7 - 15.7 g/dL 12.3 11.0(L) 12.3   HCVAB NR - - -   WBC 4.0 - 11.0 10e3/uL 5.3 2.2(L) 3.9(L)   RBC 3.80 - 5.20 10e6/uL 4.55 4.11 4.58   RDW  10.0 - 15.0 % 18.2(H) 17.9(H) 19.9(H)   MCHC 31.5 - 36.5 g/dL 33.2 33.3 32.9   MCV 78 - 100 fL 81 81 82   PLATELET COUNT 150 - 450 10e3/uL 292 284 322   RHEUMATOID FACTOR 0 - 14 IU/mL - - -   ESR 0 - 30 mm/h 38(H) - -     Rheumatoid Factor   Date Value Ref Range Status   01/14/2009 8 0 - 14 IU/mL Final     Cyclic Citrullinated Peptide IgG   Date Value Ref Range Status   01/14/2009 <2 <5 U/mL Final     Comment:     Interpretation:  Negative     Ribonucleic Protein IgG Antibody   Date Value Ref Range Status   01/24/2007 12  Final     Comment:     Reference range: 0 to 49  Unit: Units  (Note)  REFERENCE INTERVAL: Ribonucleic Protein (VIKRAM), IgG   Less than 20 Units ........ None detected   20 - 49 Units ............. Inconclusive   50 Units or greater ....... Positive    RNP antibody is seen in % of mixed connective tissue  disease and is considered specific for this syndrome if  other antibodies are negative. RNP is also present in  20-30% of SLE and 15-25% of progressive systemic  sclerosis.  The above test was performed at: [facility]  [address] [place] [address]  [id number] [phone]  [email or web address]     SSA (RO) Antibody IgG   Date Value Ref Range Status   08/24/2005 221 (H)  Final     Comment:     Reference range: 0 to 49  Unit: Units  (Note)  REFERENCE INTERVALS: SSA (Ro) (VIKRAM) Ab, IgG   Less than 20 Units ....... None detected   20 - 49 Units ............ Inconclusive   50 Units or greater ...... Positive    SSA (Ro) antibody is seen in70-75% of Sjogren syndrome  cases, 30-40% of systemic lupus erythematosus (SLE) and  5-10% of progressive systemic sclerosis (PSS).  The above test was performed at: [facility]  [address] [place] [address]  [id number] [phone]  [email or web address]     SSB (LA) Antibody IgG   Date Value Ref Range Status   08/24/2005 20  Final     Comment:     Reference range: 0 to 49  Unit: Units  (Note)  REFERENCE INTERVALS: SSB (La) (VIKRAM) Ab, IgG   Less than 20 Units ....... None  detected   20 - 49 Units ............ Inconclusive   50 Units or greater ...... Positive    SSB (La) antibody is seen in 50-60% of Sjogren syndrome  cases and is specific if it is the only VIKRAM antibody  present. 15-25% of patients with systemic lupus  erythematosus (SLE) and 5-10% of patients with progressive  systemic sclerosis (PSS) also have this antibody.  The above test was performed at: [employer] [facility]  [address] [place] [address]  [id number]  [phone]  [email or web address]   ,  ,   CRISTAL Screen by [facility]   Date Value Ref Range Status   02/16/2010 8.2 (H) 0 - 1.0 Final     Comment:     Interpretation:  Positive     DNA-ds   Date Value Ref Range Status   06/12/2019 1 <10 IU/mL Final     Comment:     Negative     Albumin Fraction   Date Value Ref Range Status   04/01/2013 3.9 3.7 - 5.1 g/dL Final     Alpha 2 Fraction   Date Value Ref Range Status   04/01/2013 0.7 0.5 - 0.9 g/dL Final     Beta Fraction   Date Value Ref Range Status   04/01/2013 0.6 0.6 - 1.0 g/dL Final     Gamma Fraction   Date Value Ref Range Status   04/01/2013 0.7 0.7 - 1.6 g/dL Final     Monoclonal Peak   Date Value Ref Range Status   04/01/2013 0.0 0.0 g/dL Final     ELP Interpretation:   Date Value Ref Range Status   04/01/2013   Final    Essentially normal electrophoretic pattern.  No monoclonal protein seen.   Pathologic significance requires clinical correlation.  LEATHA Valencia M.D.,   Ph.D., Pathologist

## 2021-11-05 ENCOUNTER — OFFICE VISIT (OUTPATIENT)
Dept: RHEUMATOLOGY | Facility: CLINIC | Age: 76
End: 2021-11-05
Attending: INTERNAL MEDICINE
Payer: COMMERCIAL

## 2021-11-05 VITALS — OXYGEN SATURATION: 94 % | DIASTOLIC BLOOD PRESSURE: 66 MMHG | HEART RATE: 66 BPM | SYSTOLIC BLOOD PRESSURE: 101 MMHG

## 2021-11-05 DIAGNOSIS — D68.61 ANTIPHOSPHOLIPID SYNDROME (H): ICD-10-CM

## 2021-11-05 DIAGNOSIS — Z79.899 ENCOUNTER FOR LONG-TERM CURRENT USE OF MEDICATION: ICD-10-CM

## 2021-11-05 DIAGNOSIS — M32.9 SYSTEMIC LUPUS ERYTHEMATOSUS, UNSPECIFIED SLE TYPE, UNSPECIFIED ORGAN INVOLVEMENT STATUS (H): ICD-10-CM

## 2021-11-05 PROCEDURE — 99214 OFFICE O/P EST MOD 30 MIN: CPT | Performed by: INTERNAL MEDICINE

## 2021-11-05 RX ORDER — MYCOPHENOLATE MOFETIL 500 MG/1
TABLET ORAL
Qty: 240 TABLET | Refills: 2 | Status: SHIPPED | OUTPATIENT
Start: 2021-11-05 | End: 2022-03-04

## 2021-11-05 ASSESSMENT — PAIN SCALES - GENERAL: PAINLEVEL: NO PAIN (0)

## 2021-11-05 NOTE — LETTER
2021       RE: Shala Caruso  [address]  [place] [address]     Dear Colleague,    Thank you for referring your patient, Shala Caruso, to the [facility] RHEUMATOLOGY CLINIC [place] at [facility]. Please see a copy of my visit note below.    [facility]  Rheumatology Clinic  Tato Agarwal MD  2021     Name: Shala Caruso  MRN: [id number]  Age: 76 year old  : 1945  Referring provider: Joe Contreras     Assessment and Plan:    Systemic lupus erythematosus (+CRISTAL, +dsDNA, +SSA, hypocomplementemia; Raynaud's, arthritis, serositis, tubulo-interstitial nephropathy, pericardial effusion/tamponade): Pt relates stable bilateral knee and hip pain that improves with movement and stretching, as well as mild loss of sensation in her toes, also stable.  Raynaud's phenomenon is mild, and is controlled with thermal protective measures alone.  No urinary symptoms.  No skin, inflammatory joint, or pulmonary symptoms.  Physical examination reveals onychomycosis in multiple upper extremity fingernails, but no inflammatory joint changes.  Lungs are clear. Laboratory evaluation on 2021 showed creatinine, transaminases, and CBC normal except for mild decrease in white count of 3.1 stable for the last 3 years..  Urinalysis was clear.  C3 and C4 and antidouble-stranded DNA titers were negative or normal.    SLE is stable/quiescent.  Disease that formerly affected the kidneys as well as serosal surfaces remains inactive on cellcept monotherapy, now used continuously since . since the last visit in May 2021, CellCept dose decreased to 1500 mg daily has been well-tolerated.  We discussed my recommendation that she continue to slowly and cautiously taper CellCept to find the minimum effective dose.  I recommend reducing CellCept from 1500 mg to 1000 mg daily as of now.  Plan urinalysis, creatinine, and CBC, as well as  complements and antidouble-stranded DNA again in early 2022.  If symptoms remained well controlled and if laboratory values are unchanged, I recommend reducing CellCept to 500 mg after January 1.    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 Following factor 2 levels.  # Peripheral neuropathy  # Raynaud's: stable  # Achalasia: stable on nifedipine  #Osteoarthritis, knees and hips: Chronic use associated pain and stiffness is modest, stable.  Patient is capable of performing physical activity for multiple hours daily.  I recommended continue weightbearing exercises and range of motion exercises daily.    # Osteoporosis: DEXA scan performed in June July 2020 revealed a T score of -2.3 at the femoral neck.  I agree with planned continuation of bisphosphonate therapy.  Patient underwent zoledronic acid per endocrinology in July 2020.  I agree with plans for annual repeat treatment.  Patient should continue calcium carbonate, vitamin D, and weightbearing exercise as well.    Tato Agarwal M.D.  Staff Rheumatologist, [facility]  Pager [phone]    Follow-up: 4 mos    HPI:   Shala Caruso has a history of DM, SLE, PE on Coumadin, and HTN who presents for evaluation of lupus.  She was last seen in 5-21.  At that time she was doing well without no significant symptoms of autoimmune disease.  Plan was to taper mycophenolate from 1000 mg twice daily.     Summary of previous history:  SLE diagnosed in 2000 (+CRISTAL, +dsDNA ab, arthritis, serositis).  She has antiphospholipid syndrome and had DVT and PE in 2004, on Coumadin since then.  She had a Lupus flare in 2010 initially treated with Plaquenil however she worsened and developed pericardial effusion/tamponade - started on MTX and tapering dose of prednisone at that time (continued on about 10 mg daily).  Cellcept started 6/2010, Prednisone tapered off.  MTX stopped May 2012.    Interval history 11-5-2021:  No flareups in joints, skin,  "lungs since last visit. Energy level is low, but stable. No SOB. No exacerbation of Raynaud's.  No cough, shortness of breath, fevers.  Walking at least an hour per day.  She tapered mmf since last visit to 1500 mg daily.  She has noticed no change in chronic musculoskeletal symptoms most prominent in the knees, since lowering the dose.    Interval history 05-:    Getting along \"ok\". She is unchanged in that Her knees and hips give constant pain \"tolerable\". If she sits too long, she is very stiff. Morning stiffness is brief, relieved by movement. No pain in UE joints. She is walking every day; she plans to start biking this summer. She does daily yoga with a lot of stretching; she tends to feel better with it.    Her hands are sensitive to cold with ongoing Raynaud's. No digital ulcers.    No F/C/S, chest pain.  No urinary symptoms.      Hip pain is in the groin, made worse with long walking.    Review of Systems:  Pertinent items are noted in HPI or as below, remainder of complete ROS is negative.      No recent problems with hearing or vision. Eye dryness relieved by once daily Refresh; no dry mouth  No breathing difficulty, shortness of breath, coughing, or wheezing  No chest pain or palpitations  No heart burn, indigestion, abdominal pain, nausea, vomiting, diarrhea  No urination problems, no bloody, cloudy urine, no dysuria  + toe \"neuropathy\",. Symmetrical, stable.  No headaches or confusion  No rashes. No easy bleeding or bruising.     Active Medications:     Current Outpatient Medications   Medication Sig     acetaminophen (TYLENOL) 500 MG tablet Take 1,000-1,500 mg by mouth nightly as needed      aspirin 81 MG tablet Take 81 mg by mouth At Bedtime      AYR SALINE NASAL NO-DRIP GEL Use as needed for nasal dryness     blood glucose (TRUE METRIX BLOOD GLUCOSE TEST) test strip USE TO TEST BLOOD SUGAR 5 TIMES DAILY OR AS DIRECTED     Calcium Carbonate-Vitamin D (CALCIUM 500 + D PO) Take 1 tablet by mouth " "3 times daily     carboxymethylcellulose PF (REFRESH PLUS) 0.5 % SOLN ophthalmic solution Place 1 drop into both eyes as needed     Glucagon (BAQSIMI ONE PACK) 3 MG/DOSE POWD Spray 3 mg in nostril as needed (to be used for severe hypoglycemic episodes)     Injection Device for insulin (NOVOPEN ECHO) RORY 1 each 4 times daily     insulin aspart (NOVOLOG PENFILL) 100 UNIT/ML cartridge INJECT subcu 1 UNIT PER 15G CHO WITH MEALS 3x A DAY. APPROX 15 UNITS DAILY. Lab draw needed. Call  to schedule.     insulin glargine (LANTUS PEN) 100 UNIT/ML pen Inject 4 Units Subcutaneous daily     insulin pen needle (BD PAM U/F) 32G X 4 MM miscellaneous Use as directed with insulin pens.     LANCETS ULTRA THIN 30G MISC Test 7-8 times daily  (Lancets that go with pt current meter )     Multiple Vitamins-Minerals (CENTRUM SILVER PO) Take 1 tablet by mouth daily.     mycophenolate (GENERIC EQUIVALENT) 500 MG tablet Take 2 tablets (1,000 mg) by mouth 2 times daily     NIFEdipine ER OSMOTIC (PROCARDIA XL) 30 MG 24 hr tablet Take 1 tablet (30 mg) by mouth daily For additional refills, please schedule annual appointment at [phone]     order for DME Equipment being ordered: compression socks, 20-30 mmHg, knee high     simvastatin (ZOCOR) 40 MG tablet Take 1 tablet (40 mg) by mouth At Bedtime     warfarin ANTICOAGULANT (COUMADIN) 5 MG tablet Take 2 tablets (10mg) by mouth daily or as directed by the Coumadin clinic     warfarin ANTICOAGULANT (COUMADIN) 5 MG tablet Take 10mgs daily or as directed  or as directed by Anticoagulation Clinic.     No current facility-administered medications for this visit.       No longer on lantus: too many \"lows\".  Allergies:   Amaryl [glimepiride], Metformin, Plaquenil [aminoquinolines], Sulfa drugs, Amoxicillin, Hydroxychloroquine, and Penicillins      Past Medical History:  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   Raynaud's disease  Antiphospholipid syndrome   Type " 1 diabetes mellitus--started after prednisone therapy. Checks BS 5X daily.  Osteoporosis   Hypertension   Hyperlipidemia   Achalasia   Gastroesophageal reflux disease   Choroidal lesion  Atrophic vaginitis   Urinary urgency   Female stress incontinence   Cystocele, midline   Deep vein thrombosis   Nonsenile cataract   Myopia   Neuropathy   Lymphedema      Past Surgical History:  Phacoemulsification clear cornea with intraocular lens implantation, right 1/8/19, left 2/5/19  Transvaginal sling 12/20/16  Axilla lymph node dissection 2004  Bilateral tubal ligation     Family History:   Glaucoma - father   Breast cancer   Stroke       Social History:   Tobacco Use: No previous or current tobacco use.   Alcohol Use: No alcohol use.   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  Cardiac: Regular rate and rhythm  MS:  Fist formation is normal.  Hands show multiple changes of Heberden's nodes and Susan's nodes, but no inflammatory changes.  Onychomycosis and multiple fingernails.  Psych: Normal judgement, orientation, memory, affect.     Laboratory:   RHEUM RESULTS Latest Ref Rng & Units 1/10/2005 1/14/2005 1/31/2005   ALBUMIN 3.4 - 5.0 g/dL - - -   ALT 0 - 50 U/L - - -   AST 0 - 45 U/L - - -   COMPLEMENT C3 81 - 157 mg/dL - 101 -   COMPLEMENT C4 13 - 39 mg/dL - - -   CK TOTAL 30 - 225 U/L - - -   CREATININE 0.52 - 1.04 mg/dL - - 0.79   CRP 0.0 - 8.0 mg/L 4.46(H) - -   DNA <10.0 IU/mL - - -   DIRK 0  - 1.0 - - -   GFR ESTIMATE, IF BLACK >60 mL/min/[1.73:m2] - - >80   GFR ESTIMATE >60 mL/min/1.73m2 - - 80   HEMATOCRIT 35.0 - 47.0 % 36.9 33.1(L) 37.3   HEMOGLOBIN 11.7 - 15.7 g/dL 12.3 11.0(L) 12.3   HCVAB NR - - -   WBC 4.0 - 11.0 10e3/uL 5.3 2.2(L) 3.9(L)   RBC 3.80 - 5.20 10e6/uL 4.55 4.11 4.58   RDW 10.0 - 15.0 % 18.2(H) 17.9(H) 19.9(H)   MCHC 31.5 - 36.5 g/dL 33.2 33.3 32.9   MCV 78 - 100 fL 81 81 82   PLATELET COUNT 150 - 450 10e3/uL 292 284 322   RHEUMATOID FACTOR 0 - 14 IU/mL - - -   ESR 0 - 30 mm/h 38(H) - -     Rheumatoid Factor   Date Value Ref Range Status   01/14/2009 8 0 - 14 IU/mL Final     Cyclic Citrullinated Peptide IgG   Date Value Ref Range Status   01/14/2009 <2 <5 U/mL Final     Comment:     Interpretation:  Negative     Ribonucleic Protein IgG Antibody   Date Value Ref Range Status   01/24/2007 12  Final     Comment:     Reference range: 0 to 49  Unit: Units  (Note)  REFERENCE INTERVAL: Ribonucleic Protein (VIKRAM), IgG   Less than 20 Units ........ None detected   20 - 49 Units ............. Inconclusive   50 Units or greater ....... Positive    RNP antibody is seen in % of mixed connective tissue  disease and is considered specific for this syndrome if  other antibodies are negative. RNP is also present in  20-30% of SLE and 15-25% of progressive systemic  sclerosis.  The above test was performed at: [email or web address],  [address] [place] [address]  [id number]  [phone]  [email or web address]     SSA (RO) Antibody IgG   Date Value Ref Range Status   08/24/2005 221 (H)  Final     Comment:     Reference range: 0 to 49  Unit: Units  (Note)  REFERENCE INTERVALS: SSA (Ro) (VIKRAM) Ab, IgG   Less than 20 Units ....... None detected   20 - 49 Units ............ Inconclusive   50 Units or greater ...... Positive    SSA (Ro) antibody is seen in70-75% of Sjogren syndrome  cases, 30-40% of systemic lupus erythematosus (SLE) and  5-10% of progressive systemic sclerosis (PSS).  The above test was performed at: [facility]  [employer],  [address] [place] [address]  [id number] [phone]  [email or web address]     SSB (LA) Antibody IgG   Date Value Ref Range Status   08/24/2005 20  Final     Comment:     Reference range: 0 to 49  Unit: Units  (Note)  REFERENCE INTERVALS: SSB (La) (VIKRAM) Ab, IgG   Less than 20 Units ....... None detected   20 - 49 Units ............ Inconclusive   50 Units or greater ...... Positive    SSB (La) antibody is seen in 50-60% of Sjogren syndrome  cases and is specific if it is the only VIKRAM antibody  present. 15-25% of patients with systemic lupus  erythematosus (SLE) and 5-10% of patients with progressive  systemic sclerosis (PSS) also have this antibody.  The above test was performed at: "[employer]  [address] [place] [address]  [address]  [phone]  [email or web address]   ,  ,   CRISTAL Screen by EIA   Date Value Ref Range Status   02/16/2010 8.2 (H) 0 - 1.0 Final     Comment:     Interpretation:  Positive     DNA-ds   Date Value Ref Range Status   06/12/2019 1 <10 IU/mL Final     Comment:     Negative     Albumin Fraction   Date Value Ref Range Status   04/01/2013 3.9 3.7 - 5.1 g/dL Final     Alpha 2 Fraction   Date Value Ref Range Status   04/01/2013 0.7 0.5 - 0.9 g/dL Final     Beta Fraction   Date Value Ref Range Status   04/01/2013 0.6 0.6 - 1.0 g/dL Final     Gamma Fraction   Date Value Ref Range Status   04/01/2013 0.7 0.7 - 1.6 g/dL Final     Monoclonal Peak   Date Value Ref Range Status   04/01/2013 0.0 0.0 g/dL Final     ELP Interpretation:   Date Value Ref Range Status   04/01/2013   Final    Essentially normal electrophoretic pattern.  No monoclonal protein seen.   Pathologic significance requires clinical correlation.  LEATHA Valencia M.D.,   Ph.D., Pathologist       Again, thank you for allowing me to participate in the care of your patient.      Sincerely,    Tato Agarwal MD

## 2021-11-05 NOTE — PATIENT INSTRUCTIONS
Diagnosis:  1.  Systemic lupus erythematosus with a history of phospholipid antibodies, serositis, and nephritis: symptoms or signs that would suggest active lupus are not present.  Systemic lupus erythematosus remains quiescent.  I recommend renewing screening for lupus activity markers of complement, anti-DNA, and urinalysis in 2 months months.  Given the stability of lupus symptoms for many years, I recommend continued taper of mycophenolate.  2.  Osteoporosis: definitive treatment with zoledronic acid given in July 2020.  I recommend continuing calcium and vitamin D supplements.  3.  Propensity to clot: No evidence of recurrent thrombosis.  I recommend continuing warfarin as directed through [facility].    Plan:    1.  Reduce mycophenolate from 3 tablets (1500 mg) to 2 tablets daily.  If you continue to feel well, and if laboratory evaluation in early January 2022 shows no lupus activation, taper to 1 tablet daily until next visit.  2.  Blood work and urinalysis in early January 2022.  3.  Continue calcium carbonate 1200 mill equivalents daily, and vitamin D 800 international units daily to support bone density; continue range of motion exercise but incorporate weightbearing exercise into daily routine.  4.  Okay to use Voltaren 1% gel on sore knees once or twice daily.  Avoid using the gel more often or regularly because of concomitant Coumadin use.

## 2021-11-06 ENCOUNTER — LAB (OUTPATIENT)
Dept: LAB | Facility: CLINIC | Age: 76
End: 2021-11-06
Payer: COMMERCIAL

## 2021-11-06 DIAGNOSIS — D68.61 ANTIPHOSPHOLIPID SYNDROME (H): ICD-10-CM

## 2021-11-06 DIAGNOSIS — Z79.01 LONG TERM CURRENT USE OF ANTICOAGULANT THERAPY: ICD-10-CM

## 2021-11-06 LAB — PROTHROM ACT/NOR PPP: 15 % (ref 60–140)

## 2021-11-06 PROCEDURE — 85210 CLOT FACTOR II PROTHROM SPEC: CPT | Mod: 90 | Performed by: PATHOLOGY

## 2021-11-06 PROCEDURE — 99000 SPECIMEN HANDLING OFFICE-LAB: CPT | Performed by: PATHOLOGY

## 2021-11-06 PROCEDURE — 36415 COLL VENOUS BLD VENIPUNCTURE: CPT | Performed by: PATHOLOGY

## 2021-11-08 ENCOUNTER — ANTICOAGULATION THERAPY VISIT (OUTPATIENT)
Dept: ANTICOAGULATION | Facility: CLINIC | Age: 76
End: 2021-11-08
Payer: COMMERCIAL

## 2021-11-08 DIAGNOSIS — L93.0 LUPUS ERYTHEMATOSUS: ICD-10-CM

## 2021-11-08 DIAGNOSIS — Z79.01 LONG TERM CURRENT USE OF ANTICOAGULANT THERAPY: Primary | ICD-10-CM

## 2021-11-08 DIAGNOSIS — D68.61 ANTIPHOSPHOLIPID SYNDROME (H): ICD-10-CM

## 2021-11-08 NOTE — PROGRESS NOTES
ANTICOAGULATION MANAGEMENT     Shala Caruso 76 year old female is on warfarin with therapeutic result. (Goal Factor II: 25-15%)    Recent labs: (last 7 days)     11/06/21  0830   F2 15*       ASSESSMENT     Source(s): Patient/Caregiver Call       Warfarin doses taken: Warfarin taken as instructed    Diet: No new diet changes identified    New illness, injury, or hospitalization: No    Medication/supplement changes: Mycophenolate decreased to 500mg Daily    Signs or symptoms of bleeding or clotting: No    Previous result: Therapeutic last 2(+) visits    Additional findings: None     PLAN     Recommended plan for no diet, medication or health factor changes affecting result    Dosing Instructions: Continue your current warfarin dose 7.5mg MWF and 10mg all other days  with next Factor II in 4 weeks       Telephone call with Shala who verbalizes understanding and agrees to plan and who agrees to plan and repeated back plan correctly    Patient offered & declined to schedule next visit    Education provided: None required    Plan made per [facility] anticoagulation protocol    Melly Morrison, RN  Anticoagulation Clinic  11/8/2021    _______________________________________________________________________     Anticoagulation Episode Summary     Current INR goal:  2.0-3.0   TTR:  --   Target end date:  Indefinite   Send INR reminders to:   ANTICOAG CLINIC    Indications    SLE (systemic lupus erythematosus) (H) (Resolved) [M32.9]  Long term current use of anticoagulant therapy [Z79.01]  Lupus erythematosus [L93.0]  Antiphospholipid syndrome (H) [D68.61]           Comments:  Factor 2  goal range is 15-25%, 1/31/20 Not drawing INR  Ok to leave message on home [phone]   May leave messages with Rodriguez Santos  DO NOT GIVE RECORDS TO EMPLOYER [employer]           Anticoagulation Care Providers     Provider Role Specialty Phone number    Joe Contreras MD Referring Internal Medicine [phone]

## 2021-11-09 ENCOUNTER — TELEPHONE (OUTPATIENT)
Dept: ANTICOAGULATION | Facility: CLINIC | Age: 76
End: 2021-11-09
Payer: COMMERCIAL

## 2021-11-09 DIAGNOSIS — D68.61 ANTIPHOSPHOLIPID SYNDROME (H): ICD-10-CM

## 2021-11-09 DIAGNOSIS — L93.0 LUPUS ERYTHEMATOSUS: ICD-10-CM

## 2021-11-09 DIAGNOSIS — Z79.01 LONG TERM CURRENT USE OF ANTICOAGULANT THERAPY: Primary | ICD-10-CM

## 2021-11-09 NOTE — TELEPHONE ENCOUNTER
"Incoming call received from Jeanmarei. She did not mention that she bumped one side of her forehead and has a \"goose egg\" there. She denies pain, headache, dizziness, falling, bleeding. Her Factor 2 yesterday was at the high end of her goal range at 15%. Patient advised to hold her Warfarin today and recheck her Factor 2 earlier than 4 weeks. Lab appt scheduled for patient.    KYRA BRANTLEY RN-BC, [facility]  Anticoagulation Clinic  [phone]    "

## 2021-11-10 ENCOUNTER — OFFICE VISIT (OUTPATIENT)
Dept: URGENT CARE | Facility: URGENT CARE | Age: 76
End: 2021-11-10
Payer: COMMERCIAL

## 2021-11-10 VITALS
HEART RATE: 77 BPM | OXYGEN SATURATION: 97 % | SYSTOLIC BLOOD PRESSURE: 116 MMHG | DIASTOLIC BLOOD PRESSURE: 73 MMHG | TEMPERATURE: 98.1 F

## 2021-11-10 DIAGNOSIS — S00.12XA PERIORBITAL ECCHYMOSIS OF LEFT EYE, INITIAL ENCOUNTER: ICD-10-CM

## 2021-11-10 DIAGNOSIS — Z79.01 CHRONIC ANTICOAGULATION: Primary | ICD-10-CM

## 2021-11-10 DIAGNOSIS — S00.03XA CONTUSION OF SCALP, INITIAL ENCOUNTER: ICD-10-CM

## 2021-11-10 PROCEDURE — 99214 OFFICE O/P EST MOD 30 MIN: CPT | Performed by: INTERNAL MEDICINE

## 2021-11-10 ASSESSMENT — ENCOUNTER SYMPTOMS
NECK PAIN: 0
CONFUSION: 0
HEADACHES: 0
FACIAL ASYMMETRY: 0
SPEECH DIFFICULTY: 0
LIGHT-HEADEDNESS: 0
NUMBNESS: 0
PARESTHESIAS: 0
DECREASED CONCENTRATION: 0
FATIGUE: 0
SLEEP DISTURBANCE: 0
DIZZINESS: 0

## 2021-11-10 NOTE — PROGRESS NOTES
"ASSESSMENT AND PLAN:      ICD-10-CM    1. Chronic anticoagulation  Z79.01    2. Contusion of scalp, initial encounter  S00.03XA    3. Periorbital ecchymosis of left eye, initial encounter  S00.12XA      Serious Comorbid Conditions: Pain, lupus, antiphospholipid syndrome.  On chronic anticoagulation      PLAN:      Patient Instructions   Jeanmarie  There is always concern with head injury while on blood thinners.  Of greatest concern would be head bleed/internal headache injury.  On your exam you only have tenderness at the 1 cm area of swelling.  Remainder of firm palpation of head exam is nontender.  The bruising that you have noticed on your left forehead and around your eye is gravity dependent drainage from your initial swelling and bruise from a few days ago.  The examination by firm palpation of your forehead, orbital and sinus bones are all nontender.  Normally we refer you to the emergency room with a closed head injury while on blood thinners.  At this point though, your exam is nontender and you look good.  You do not need head imaging today.  The evolving bruise noted with dependent drainage around your eye will take 1 or so weeks to completely resolve.    If you have any symptoms of head injury, such as confusion, nausea and vomiting,  headache, then go to the emergency room.        Return if symptoms worsen or fail to improve.        Katya Barrow MD  [facility] URGENT CARE    Subjective     Shala Caruso is a 76 year old who presents for Patient presents with:  Urgent Care  Bleeding/Bruising: c/o bruised on face over 1 week    a new patient of [facility].    Anticoagulation nurse at telephone visit from yesterday:  Incoming call received from Jeanmarie. She did not mention that she bumped one side of her forehead and has a \"goose egg\" there. She denies pain, headache, dizziness, falling, bleeding. Her Factor 2 yesterday was at the high end of her goal range at 15%. Patient advised to hold her " Warfarin today and recheck her Factor 2 earlier than 4 weeks. Lab appt scheduled for patient.     KYRA BRANTLEY RN-BC, [facility]  Anticoagulation Clinic  [phone]        Onset of symptoms was 4-5 day(s) ago.    Ran into kitchen cabinet  last week sustained injury above left eye.  While emptying .  Injury near left upper scalp/forehead  Over the next few days  Yesterday noted fluid around eye.   concerned eye fluid should be drained  denies pain.  Sore to touch    Treatment measures tried include: ice  Relief from treatment: yes  Significant past medical history: On chronic anticoagulation with warfarin due to antiphospholipid syndrome  More likely on  otherwise feels normal     Review of Systems   Constitutional: Negative for fatigue.   Musculoskeletal: Negative for neck pain.   Neurological: Negative for dizziness, syncope, facial asymmetry, speech difficulty, light-headedness, numbness, headaches and paresthesias.   Psychiatric/Behavioral: Negative for confusion, decreased concentration and sleep disturbance.           Objective    /73   Pulse 77   Temp 98.1  F (36.7  C) (Tympanic)   LMP  (LMP Unknown)   SpO2 97%   Physical Exam  Vitals reviewed.   Constitutional:       General: She is not in acute distress.     Appearance: Normal appearance. She is not ill-appearing or diaphoretic.   Eyes:      Extraocular Movements: Extraocular movements intact.      Pupils: Pupils are equal, round, and reactive to light.   Musculoskeletal:      Comments: Less than 1 cm nodule noted at left upper scalp area.  With firm palpation mild tenderness  Full palpation around area is nontender.  No step-off lesions noted.  No crepitus  There is no other swelling noted on her face.  Large amount of green ecchymotic changes noted left side of forehead.  She also has ecchymotic changes noted left periorbital area  Firm palpation of full forehead except for 1 cm nodule, periorbital areas and sinus bones are all  nontender.    Range of motion of neck is normal   Neurological:      General: No focal deficit present.      Mental Status: She is alert and oriented to person, place, and time.   Psychiatric:         Mood and Affect: Mood normal.         Behavior: Behavior normal.         Thought Content: Thought content normal.         Judgment: Judgment normal.

## 2021-11-10 NOTE — PATIENT INSTRUCTIONS
Jeanmarie  There is always concern with head injury while on blood thinners.  Of greatest concern would be head bleed/internal headache injury.  On your exam you only have tenderness at the 1 cm area of swelling.  Remainder of firm palpation of head exam is nontender.  The bruising that you have noticed on your left forehead and around your eye is gravity dependent drainage from your initial swelling and bruise from a few days ago.  The examination by firm palpation of your forehead, orbital and sinus bones are all nontender.  Normally we refer you to the emergency room with a closed head injury while on blood thinners.  At this point though, your exam is nontender and you look good.  You do not need head imaging today.  The evolving bruise noted with dependent drainage around your eye will take 1 or so weeks to completely resolve.    If you have any symptoms of head injury, such as confusion, nausea and vomiting,  headache, then go to the emergency room.

## 2021-11-18 ENCOUNTER — LAB (OUTPATIENT)
Dept: LAB | Facility: CLINIC | Age: 76
End: 2021-11-18
Payer: COMMERCIAL

## 2021-11-18 DIAGNOSIS — D68.61 ANTIPHOSPHOLIPID SYNDROME (H): ICD-10-CM

## 2021-11-18 DIAGNOSIS — Z79.01 LONG TERM CURRENT USE OF ANTICOAGULANT THERAPY: ICD-10-CM

## 2021-11-18 PROCEDURE — 85210 CLOT FACTOR II PROTHROM SPEC: CPT | Mod: 90 | Performed by: PATHOLOGY

## 2021-11-18 PROCEDURE — 36415 COLL VENOUS BLD VENIPUNCTURE: CPT | Performed by: PATHOLOGY

## 2021-11-18 PROCEDURE — 99000 SPECIMEN HANDLING OFFICE-LAB: CPT | Performed by: PATHOLOGY

## 2021-11-19 ENCOUNTER — ANTICOAGULATION THERAPY VISIT (OUTPATIENT)
Dept: ANTICOAGULATION | Facility: CLINIC | Age: 76
End: 2021-11-19
Payer: COMMERCIAL

## 2021-11-19 DIAGNOSIS — L93.0 LUPUS ERYTHEMATOSUS: ICD-10-CM

## 2021-11-19 DIAGNOSIS — D68.61 ANTIPHOSPHOLIPID SYNDROME (H): ICD-10-CM

## 2021-11-19 DIAGNOSIS — Z79.01 LONG TERM CURRENT USE OF ANTICOAGULANT THERAPY: Primary | ICD-10-CM

## 2021-11-19 LAB — PROTHROM ACT/NOR PPP: 23 % (ref 60–140)

## 2021-11-19 NOTE — PROGRESS NOTES
ANTICOAGULATION MANAGEMENT     Shala Caruso 76 year old female is on warfarin with therapeutic result. (Goal Factor II: 25-15%)    Recent labs: (last 7 days)     11/18/21  1256   F2 23*       ASSESSMENT     Source(s): Chart Review and Patient/Caregiver Call       Warfarin doses taken: Reviewed in chart    Diet: No new diet changes identified    New illness, injury, or hospitalization: Yes: Urgent Care visit last week after contusion to head/eye one week earlier. Patient reported contusion/bruise multiple days after incident-did not go into ER for contusion.     Medication/supplement changes: None noted    Signs or symptoms of bleeding or clotting: Yes: contusion/bruise healing/resolving/decreasing in size.     Previous result: Therapeutic last 2(+) visits    Additional findings: None     PLAN     Recommended plan for no diet, medication or health factor changes affecting result    Dosing Instructions: Continue your current warfarin dose 7.5 mg every Mon, Wed, Fri; 10 mg all other days with next Factor II in 2 weeks       Telephone call with Shala who verbalizes understanding and agrees to plan and who agrees to plan and repeated back plan correctly    Lab visit scheduled    Education provided: Goal range and significance of current result, Monitoring for bleeding signs and symptoms, Importance of notifying clinic for changes in medications; a sooner lab recheck maybe needed., Importance of notifying clinic for diarrhea, nausea/vomiting, reduced intake, and/or illness; a sooner lab recheck maybe needed., Importance of notifying clinic of upcoming surgeries and procedures 2 weeks in advance and Contact [phone] with any changes, questions or concerns.     Plan made per [facility] anticoagulation protocol    KYRA BRANTLEY RN  Anticoagulation Clinic  11/19/2021    _______________________________________________________________________     Anticoagulation Episode Summary     Current INR goal:  2.0-3.0   TTR:  --    Target end date:  Indefinite   Send INR reminders to:  [facility]    Indications    SLE (systemic lupus erythematosus) (H) (Resolved) [M32.9]  Long term current use of anticoagulant therapy [Z79.01]  Lupus erythematosus [L93.0]  Antiphospholipid syndrome (H) [D68.61]           Comments:  Factor 2  goal range is 15-25%, 1/31/20 Not drawing INR  Ok to leave message on home [phone]   May leave messages with Rodriguez Santos  DO NOT GIVE RECORDS TO EMPLOYER [employer]           Anticoagulation Care Providers     Provider Role Specialty Phone number    Joe Contreras MD Referring Internal Medicine [phone]

## 2021-11-30 ENCOUNTER — LAB (OUTPATIENT)
Dept: LAB | Facility: CLINIC | Age: 76
End: 2021-11-30
Payer: COMMERCIAL

## 2021-11-30 DIAGNOSIS — Z79.01 LONG TERM CURRENT USE OF ANTICOAGULANT THERAPY: ICD-10-CM

## 2021-11-30 DIAGNOSIS — D68.61 ANTIPHOSPHOLIPID SYNDROME (H): ICD-10-CM

## 2021-11-30 PROCEDURE — 36415 COLL VENOUS BLD VENIPUNCTURE: CPT | Performed by: PATHOLOGY

## 2021-11-30 PROCEDURE — 99000 SPECIMEN HANDLING OFFICE-LAB: CPT | Performed by: PATHOLOGY

## 2021-11-30 PROCEDURE — 85210 CLOT FACTOR II PROTHROM SPEC: CPT | Mod: 90 | Performed by: PATHOLOGY

## 2021-12-01 ENCOUNTER — ANTICOAGULATION THERAPY VISIT (OUTPATIENT)
Dept: ANTICOAGULATION | Facility: CLINIC | Age: 76
End: 2021-12-01

## 2021-12-01 DIAGNOSIS — Z79.01 LONG TERM CURRENT USE OF ANTICOAGULANT THERAPY: Primary | ICD-10-CM

## 2021-12-01 DIAGNOSIS — D68.61 ANTIPHOSPHOLIPID SYNDROME (H): ICD-10-CM

## 2021-12-01 DIAGNOSIS — L93.0 LUPUS ERYTHEMATOSUS: ICD-10-CM

## 2021-12-01 LAB — PROTHROM ACT/NOR PPP: 13 % (ref 60–140)

## 2021-12-01 NOTE — PROGRESS NOTES
ANTICOAGULATION MANAGEMENT     Shala Caruso 76 year old female is on warfarin with supratherapeutic result. (Goal Factor II: 25-15%)    Recent labs: (last 7 days)     11/30/21  1254   F2 13*       ASSESSMENT     Source(s): Chart Review       Warfarin doses taken: Reviewed in chart    Diet: No new diet changes identified    New illness, injury, or hospitalization: No    Medication/supplement changes: None noted    Signs or symptoms of bleeding or clotting: No    Previous result: Therapeutic last 2(+) visits    Additional findings: None     PLAN     Recommended plan for no diet, medication or health factor changes affecting result    Dosing Instructions: Hold dose then continue your current warfarin dose 7.5mg MWF and 10mg all other days.  with next Factor II in 1 week       Detailed voice message left for Shala with dosing instructions and follow up date.     Contact [phone] to schedule and with any changes, questions or concerns.     Education provided: Please call back if any changes to your diet, medications or how you've been taking warfarin and Contact [phone] with any changes, questions or concerns.     Plan made per [facility] protocol    Stacey Beal RN  Anticoagulation Clinic  12/1/2021    _______________________________________________________________________     Anticoagulation Episode Summary     Current INR goal:  2.0-3.0   TTR:  --   Target end date:  Indefinite   Send INR reminders to:   [facility]    Indications    SLE (systemic lupus erythematosus) (H) (Resolved) [M32.9]  Long term current use of anticoagulant therapy [Z79.01]  Lupus erythematosus [L93.0]  Antiphospholipid syndrome (H) [D68.61]           Comments:  Factor 2  goal range is 15-25%, 1/31/20 Not drawing INR  Ok to leave message on home [phone]   May leave messages with Rodriguez Santos  DO NOT GIVE RECORDS TO EMPLOYER [employer]           Anticoagulation Care Providers     Provider Role Specialty Phone  number    Joe Contreras MD [facility] Internal Medicine [phone]

## 2021-12-02 NOTE — PROGRESS NOTES
12/2/21 Addendum:  Jeanmarie called back.  She held warfarin 12/1/21.  She has not had changes in health. Her mycofenalate dose was decreased.  She does not have any signs of bleeding. She thinks she is probably eating less greens than she usually does and she doesn't know if this will continue or not.  She will recheck factor 2 on 12/8/21 and thinks decreasing warfarin dose to 7.5 mg four times/week and 10 mg three times/week might be what needs to be done.  She will not decrease this weeks dose because she held 12/1. Gwen Abel RN

## 2021-12-08 ENCOUNTER — LAB (OUTPATIENT)
Dept: LAB | Facility: CLINIC | Age: 76
End: 2021-12-08
Payer: COMMERCIAL

## 2021-12-08 DIAGNOSIS — Z79.01 LONG TERM CURRENT USE OF ANTICOAGULANT THERAPY: ICD-10-CM

## 2021-12-08 DIAGNOSIS — D68.61 ANTIPHOSPHOLIPID SYNDROME (H): ICD-10-CM

## 2021-12-08 PROCEDURE — 85210 CLOT FACTOR II PROTHROM SPEC: CPT | Mod: 90 | Performed by: PATHOLOGY

## 2021-12-08 PROCEDURE — 36415 COLL VENOUS BLD VENIPUNCTURE: CPT | Performed by: PATHOLOGY

## 2021-12-08 PROCEDURE — 99000 SPECIMEN HANDLING OFFICE-LAB: CPT | Performed by: PATHOLOGY

## 2021-12-09 ENCOUNTER — ANTICOAGULATION THERAPY VISIT (OUTPATIENT)
Dept: ANTICOAGULATION | Facility: CLINIC | Age: 76
End: 2021-12-09
Payer: COMMERCIAL

## 2021-12-09 DIAGNOSIS — L93.0 LUPUS ERYTHEMATOSUS: ICD-10-CM

## 2021-12-09 DIAGNOSIS — Z79.01 LONG TERM CURRENT USE OF ANTICOAGULANT THERAPY: Primary | ICD-10-CM

## 2021-12-09 DIAGNOSIS — D68.61 ANTIPHOSPHOLIPID SYNDROME (H): ICD-10-CM

## 2021-12-09 LAB — PROTHROM ACT/NOR PPP: 23 % (ref 60–140)

## 2021-12-09 NOTE — PROGRESS NOTES
ANTICOAGULATION MANAGEMENT     Shala Caruso 76 year old female is on warfarin with therapeutic result. (Goal Factor II: 25-15%)    Recent labs: (last 7 days)     12/08/21  1330   F2 23*       ASSESSMENT     Source(s): Chart Review       Warfarin doses taken: Reviewed in chart    Diet: According to previous addendum note patient is not eating as many greens and feels warfarin dose should be decreased.     New illness, injury, or hospitalization: No    Medication/supplement changes: None noted    Signs or symptoms of bleeding or clotting: No    Previous result: Supratherapeutic    Additional findings: None     PLAN     Recommended plan for no diet, medication or health factor changes affecting result    Dosing Instructions: Decrease the warfarin dose to 10mg TuThSu and 7.5mg all other days with next Factor II in 2 weeks       Detailed voice message left for Shala with dosing instructions and follow up date.     Contact [phone] to schedule and with any changes, questions or concerns.     Education provided: Please call back if any changes to your diet, medications or how you've been taking warfarin and Contact [phone] with any changes, questions or concerns.     Plan made per [facility] anticoagulation protocol    Stacey Beal, RN  Anticoagulation Clinic  12/9/2021    _______________________________________________________________________     Anticoagulation Episode Summary     Current INR goal:  2.0-3.0   TTR:  --   Target end date:  Indefinite   Send INR reminders to:  [facility]    Indications    SLE (systemic lupus erythematosus) (H) (Resolved) [M32.9]  Long term current use of anticoagulant therapy [Z79.01]  Lupus erythematosus [L93.0]  Antiphospholipid syndrome (H) [D68.61]           Comments:  Factor 2  goal range is 15-25%, 1/31/20 Not drawing INR  Ok to leave message on home [phone]   May leave messages with Rodriguez Santos  DO NOT GIVE RECORDS TO EMPLOYER [employer]           Anticoagulation  Care Providers     Provider Role Specialty Phone number    Joe Contreras MD Referring Internal Medicine [phone]

## 2021-12-17 NOTE — PROGRESS NOTES
Jeanmarie is a 76 year old who is being evaluated via a billable video visit.      How would you like to obtain your AVS? [employer]  If the video visit is dropped, the invitation should be resent by: Send to e-mail at: [email or web address]  Will anyone else be joining your video visit? No    Outcome for 12/17/21 9:09 AM: Left Voicemail    Outcome for 12/17/21 12:47 PM: spoke with patient. she is checking her blood sugar 4 times daily. pt will send reading via [employer].    Outcome for 12/17/21 3:36 PM: Glucose Readings sent via [employer].    Video-Visit Details    Type of service:  Video Visit    Video call duration:  Started 7:32 am   Ended: 7:58 am     Originating Location (pt. Location): Home  Distant Location (provider location):  [facility]   Platform used for Video Visit: [employer]    Due to the COVID 19 pandemic this visit was converted to a video visit in order to help prevent spread of infection in this patient and the general population.     Siddharth Ms. Caruso is a 76 year old pleasant female with hx of SLE, APLS, DVT, osteoporosis and type 1 diabetes presenting for f/up.      1. Type 1 diabetes     Most recent hemoglobin A1c today was 5.4%, on 7/7/21.     Lantus was discontinued in 2020.  The recommended insulin to carbohydrate ratio is 1 unit per 20 g.  She continues to use an echo NovoLog pen.    She does mainly yoga stretching exercises and she might walk on the elliptical machine, for 1 mile.  In the evening, she is more active and she enjoys going for a walk.      I reviewed the glucometer readings:  12/16   4:55  104      11:36   105     5:37   81   9:29  101  12/15   4:49    89      11:46     69     5:24   77   9:18   115  12/17   5:37    87      12:28    94                79             102   12/18     85                           127              100             81   12/19     97                            83                75         In the last 2 months, she has been experiencing a couple of  hypoglycemic episodes, occurring mostly at bedtime.  The lowest documented blood sugar by glucometer is 60 and the patient attributes it to miss estimating the carbohydrate intake. In general, she has been administering 2 units NovoLog for breakfast, 3 to 4 units for lunch (the largest meal of the day) and 1 to 2 units for dinner (frequently a salad).    Diabetes complications:   No h/o microalbuminuria.  Last eye exam - August 2021; no DR. S/P cataract surgery  Numbness and tingling sensation B/L toes - for many years but light sensation is intact. She does wear comfortable shoes/insoles. The neuropathy is stable, per patient. 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  She develops confusion, inability to concentrate or focus her vision and she feels extremely tired when her blood sugar is the 60s or 50s.  She has no prior episodes of loss of consciousness due to hypoglycemia.  She does have Baqsimi at home.     Weight has been stable - around 118 to 120 lbs.     2. Osteoporosis    Jeanmarie also has a history of osteoporosis, treated with Boniva for almost 3 years, followed by Forteo which was started in 4/12 - interrupted treatment from 4/2013 and restarted in 7/2013 until July 2014. After she completed the treatment with Forteo, she received one dose of Reclast, in July 2014.      She has no history of prior bone fractures.  She's been off prednisone since 2013.  Her height has decreased over the years from 5'6'' to 5'1/2''. Her mother had a hip fracture in her 80s.     On the DXA scan images from 7/1/16, L1-L3 T score was - 1.  Overall, at the spine, there was a significant increase of bone mineral density since 2005 of 10.5%. Since 2015, the bone mineral density has remained stable at spine. The lowest T score at the hip level was -2.2, at the left femoral neck.  Overall, there was a significant improvement of bone mineral density at the mean hip of 8.9% since 2005, with no significant  changes since 2015. While the bone mineral density at the left hip increased since baseline, at the right hip, there was a decrease of bone mineral density since baseline and also, since prior year.    On the DEXA scan from 7/27/18, the lowest T score was -2.1, at the left femoral neck.  Compared with the prior scan from 2016, the bone mineral density at the hips remained stable.  At the lumbar spine, L1-L3 T score was -1.3, not significantly changed since 2016.    On the most recent DEXA scan images from January 2020, L2-L4 T score was -1.5. At the hips, the lowest score was -2.3, at the left femoral neck. Compared with the prior DEXA scan from 2018, there was a significant decrease in bone mineral density of the lumbar spine, left total hip and right total hip by 3.1, 4.7 and 3.5%, respectively.  The patient received zoledronic acid infusion on 7/30/2020 and 7/27/21.    The current dose of calcium and vitamin D is 500 mg/200 U BID (oyster shell) and a daily Centrum Silver MVI which contains 1000 U vitamin D per tablet.     In terms of dairy products, she does have a 2-3 oz yogurt and cheese, daily.      On the most recent lab results from 7/7/2021, vitamin D level was 66, PTH level was 34, GFR 84, calcium 9.1, albumin 4.    Current Outpatient Medications   Medication     acetaminophen (TYLENOL) 500 MG tablet     aspirin 81 MG tablet     AYR SALINE NASAL NO-DRIP GEL     blood glucose (TRUE METRIX BLOOD GLUCOSE TEST) test strip     Calcium Carbonate-Vitamin D (CALCIUM 500 + D PO)     carboxymethylcellulose PF (REFRESH PLUS) 0.5 % SOLN ophthalmic solution     Glucagon (BAQSIMI ONE PACK) 3 MG/DOSE POWD     Injection Device for insulin (NOVOPEN ECHO) RORY     insulin aspart (NOVOLOG PENFILL) 100 UNIT/ML cartridge     insulin glargine (LANTUS PEN) 100 UNIT/ML pen     insulin pen needle (BD PAM U/F) 32G X 4 MM miscellaneous     LANCETS ULTRA THIN 30G MISC     Multiple Vitamins-Minerals (CENTRUM SILVER PO)      mycophenolate (GENERIC EQUIVALENT) 500 MG tablet     NIFEdipine ER OSMOTIC (PROCARDIA XL) 30 MG 24 hr tablet     order for DME     simvastatin (ZOCOR) 40 MG tablet     warfarin ANTICOAGULANT (COUMADIN) 5 MG tablet     No current facility-administered medications for this visit.     OMARI Murry complains of increased hair loss.  She denies facial hair, acne.     Family Hx   Maternal grandmother DM2. First cousin with RA. Mother, maternal grandmother and aunt, cousin diagnosed with thyroid disorders (? hypothyroidism). Mother and maternal grandmother had breast cancer.    Personal Hx   Behavioral history: No tobacco use.  Home environment: No secondhand tobacco smoke in home.  Denies smoking, drinking alcohol or using illicit drugs. , with one child. 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  Menopause at age 59. Had regular MP in the past. No h/o bone fractures or kidney stones.    PMH   SLE dx in 2000 with flare/pericarditis and tamponade on 3/5/2010 requiring high doses of steroids.  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  Osteoporosis diagnosed 2008 started on Boniva in 2010 (heartburn from oral fosamax).   Hyperlipidemia.  Severe GERD and Achalasia.  Raynaud's  Allergy to multiple meds  Vit D def.  Post thrombophlebitic syndrome with chronic LE swelling   Dyslipidemia  Chronic leukopenia on methotrexate   L arm lymphedema   Type 1 diabetes  Prolapsed uterus   Cataract   Vale Zoster     Physical Exam   Wt Readings from Last 10 Encounters:   07/30/20 52.6 kg (116 lb)   02/05/19 56.7 kg (125 lb)   01/08/19 54.1 kg (119 lb 4.8 oz)   07/30/18 54.1 kg (119 lb 3.2 oz)   01/29/18 54 kg (119 lb)   08/02/17 52.8 kg (116 lb 8 oz)   07/31/17 53.2 kg (117 lb 4.8 oz)   05/22/17 53.9 kg (118 lb 14.4 oz)   04/13/17 54.7 kg (120 lb 9.6 oz)   04/04/17 55 kg (121 lb 4.8 oz)     LMP  (LMP Unknown)     Physical Exam  General Appearance: alert, no distress noted   Eyes:  grossly normal to inspection, conjunctivae and sclerae normal, no lid lag or stare   Respiratory: no audible wheeze, cough, or visible cyanosis.  No visible retractions or increased work of breathing.  Able to speak fully in complete sentences.  Neurological: Cranial nerves grossly intact, mentation intact and speech normal; no tremor of the hands   Skin: no lesions on exposed skin   Psychological: mentation appears normal, affect normal, judgement and insight intact, normal speech and appearance well-groomed    Lab Results  I reviewed prior lab results documented in EPIC.   Lab Results   Component Value Date    A1C 5.4 07/07/2021    A1C 5.2 04/21/2021    A1C 5.6 01/20/2021    A1C 5.1 07/09/2020    A1C 5.3 07/11/2017    HEMOGLOBINA1 5.1 01/13/2020    HEMOGLOBINA1 5.1 07/15/2019    HEMOGLOBINA1 5.0 07/30/2018    HEMOGLOBINA1 5.0 01/29/2018    HEMOGLOBINA1 5.0 01/23/2017       Hemoglobin   Date Value Ref Range Status   09/02/2021 12.7 11.7 - 15.7 g/dL Final   04/29/2021 12.9 11.7 - 15.7 g/dL Final     Hematocrit   Date Value Ref Range Status   09/02/2021 41.5 35.0 - 47.0 % Final   04/29/2021 41.5 35.0 - 47.0 % Final     Cholesterol   Date Value Ref Range Status   07/07/2021 199 <200 mg/dL Final     Comment:     Desirable:       <200 mg/dl     Cholesterol/HDL Ratio   Date Value Ref Range Status   10/01/2014 1.5 0.0 - 5.0 Final     HDL Cholesterol   Date Value Ref Range Status   07/07/2021 108 >49 mg/dL Final     LDL Cholesterol Calculated   Date Value Ref Range Status   07/07/2021 79 <100 mg/dL Final     Comment:     Desirable:       <100 mg/dl     VLDL-Cholesterol   Date Value Ref Range Status   10/01/2014 7 0 - 30 mg/dL Final     Triglycerides   Date Value Ref Range Status   07/07/2021 58 <150 mg/dL Final     Albumin Urine mg/L   Date Value Ref Range Status   07/07/2021 9 mg/L Final     TSH   Date Value Ref Range Status   07/09/2019 0.99 0.40 - 4.00 mU/L Final       Last Basic Metabolic Panel:    Sodium   Date Value  Ref Range Status   07/07/2021 141 133 - 144 mmol/L Final     Potassium   Date Value Ref Range Status   07/07/2021 4.0 3.4 - 5.3 mmol/L Final     Chloride   Date Value Ref Range Status   07/07/2021 106 94 - 109 mmol/L Final     Calcium   Date Value Ref Range Status   07/07/2021 9.1 8.5 - 10.1 mg/dL Final     Carbon Dioxide   Date Value Ref Range Status   07/07/2021 27 20 - 32 mmol/L Final     Urea Nitrogen   Date Value Ref Range Status   07/07/2021 21 7 - 30 mg/dL Final     Creatinine   Date Value Ref Range Status   09/02/2021 0.65 0.52 - 1.04 mg/dL Final   07/07/2021 0.71 0.52 - 1.04 mg/dL Final     GFR Estimate   Date Value Ref Range Status   09/02/2021 88 >60 mL/min/1.73m2 Final     Comment:     As of July 11, 2021, eGFR is calculated by the CKD-EPI creatinine equation, without race adjustment. eGFR can be influenced by muscle mass, exercise, and diet. The reported eGFR is an estimation only and is only applicable if the renal function is stable.   07/07/2021 84 >60 mL/min/[1.73_m2] Final     Comment:     Non  GFR Calc  Starting 12/18/2018, serum creatinine based estimated GFR (eGFR) will be   calculated using the Chronic Kidney Disease Epidemiology Collaboration   (CKD-EPI) equation.       Glucose   Date Value Ref Range Status   07/07/2021 110 (H) 70 - 99 mg/dL Final       AST   Date Value Ref Range Status   09/02/2021 15 0 - 45 U/L Final   07/07/2021 14 0 - 45 U/L Final     ALT   Date Value Ref Range Status   09/02/2021 22 0 - 50 U/L Final   07/07/2021 23 0 - 50 U/L Final     Albumin   Date Value Ref Range Status   09/02/2021 4.0 3.4 - 5.0 g/dL Final   07/07/2021 4.0 3.4 - 5.0 g/dL Final       Assessment     1. Type 1 diabetes with very good glycemic control  The patient is going to have an A1c checked at her convenience.  Schedule a follow-up appointment in 6 months, with lab work: CMP, lipid panel, hematocrit, urine microalbumin and A1c    2.  Osteoporosis   Risk factors for osteoporosis  identified: Postmenopausal status, lupus, prior treatment with steroids, diabetes, family history.  She was treated with Boniva for 3 years, followed by Forteo for 2 years. Received one dose of Reclast in July 2014, when she completed treatment with Forteo.  Treatment with Reclast was resumed in July 2020, when the DEXA scan revealed some loss of bone mineral density, although the lowest T score remained in the osteopenic range.  She received another dose of Reclast in July 2021.  On lab work, calcium, phosphorus, vitamin D and PTH levels have been normal.  Plan: Schedule a follow-up DEXA scan in July 2022    3. Hypercholesterolemia - on 40 mg simvastatin  Follow-up lipid panel.  Consider changing to atorvastatin in the future.    4.  Hair loss  Follow-up ferritin, reflex TSH    Orders Placed This Encounter   Procedures     Dexa hip/pelvis/spine     Dexa Wrist/Heel     Hemoglobin A1c     Comprehensive metabolic panel     Lipid panel reflex to direct LDL Fasting     Hematocrit     Albumin Random Urine Quantitative with Creat Ratio     Hemoglobin A1c     Ferritin     TSH with free T4 reflex     35 minutes spent on the date of the encounter doing chart review, history and exam, documentation and further activities as noted above.

## 2021-12-19 ASSESSMENT — ENCOUNTER SYMPTOMS
NECK PAIN: 0
DOUBLE VISION: 0
JOINT SWELLING: 0
EYE IRRITATION: 0
ARTHRALGIAS: 1
STIFFNESS: 1
BACK PAIN: 0
EYE REDNESS: 0
MYALGIAS: 0
EYE PAIN: 0
MUSCLE WEAKNESS: 0
EYE WATERING: 1
MUSCLE CRAMPS: 0

## 2021-12-20 ENCOUNTER — VIRTUAL VISIT (OUTPATIENT)
Dept: ENDOCRINOLOGY | Facility: CLINIC | Age: 76
End: 2021-12-20
Payer: COMMERCIAL

## 2021-12-20 DIAGNOSIS — E10.649 TYPE 1 DIABETES MELLITUS WITH HYPOGLYCEMIA AND WITHOUT COMA (H): Primary | ICD-10-CM

## 2021-12-20 DIAGNOSIS — L65.9 HAIR LOSS: ICD-10-CM

## 2021-12-20 DIAGNOSIS — M81.0 OSTEOPOROSIS, POSTMENOPAUSAL: ICD-10-CM

## 2021-12-20 DIAGNOSIS — E78.49 OTHER HYPERLIPIDEMIA: ICD-10-CM

## 2021-12-20 PROCEDURE — 99214 OFFICE O/P EST MOD 30 MIN: CPT | Mod: 95 | Performed by: INTERNAL MEDICINE

## 2021-12-20 RX ORDER — INSULIN ASPART 100 [IU]/ML
INJECTION, SOLUTION INTRAVENOUS; SUBCUTANEOUS
Qty: 15 ML | Refills: 3 | Status: SHIPPED | OUTPATIENT
Start: 2021-12-20 | End: 2023-05-08

## 2021-12-20 RX ORDER — SIMVASTATIN 40 MG
40 TABLET ORAL AT BEDTIME
Qty: 90 TABLET | Refills: 3 | Status: SHIPPED | OUTPATIENT
Start: 2021-12-20 | End: 2022-04-18

## 2021-12-20 RX ORDER — PEN NEEDLE, DIABETIC 32GX 5/32"
NEEDLE, DISPOSABLE MISCELLANEOUS
Qty: 200 EACH | Refills: 3 | Status: SHIPPED | OUTPATIENT
Start: 2021-12-20 | End: 2022-08-31

## 2021-12-20 NOTE — LETTER
12/20/2021       RE: Shala Caruso  [address]  [place] [address]     Dear Colleague,    Thank you for referring your patient, hSala Caruso, to the [facility] ENDOCRINOLOG[facility] [place] [facility]. Please see a copy of my visit note below.    Jeanmarie is a 76 year old who is being evaluated via a billable video visit.      How would you like to obtain your AVS? PowerCard[employer]  If the video visit is dropped, the invitation should be resent by: Send to e-mail at: [email or web address]  Will anyone else be joining your video visit? No    Outcome for 12/17/21 9:09 AM: Left Voicemail    Outcome for 12/17/21 12:47 PM: spoke with patient. she is checking her blood sugar 4 times daily. pt will send reading via [employer].    Outcome for 12/17/21 3:36 PM: Glucose Readings sent via [employer].    Video-Visit Details    Type of service:  Video Visit    Video call duration:  Started 7:32 am   Ended: 7:58 am     Originating Location (pt. Location): Home  Distant Location (provider location):  [facility]   Platform used for Video Visit: AmWell    Due to the COVID 19 pandemic this visit was converted to a video visit in order to help prevent spread of infection in this patient and the general population.     Siddharth Ms. Caruso is a 76 year old pleasant female with hx of SLE, APLS, DVT, osteoporosis and type 1 diabetes presenting for f/up.      1. Type 1 diabetes     Most recent hemoglobin A1c today was 5.4%, on 7/7/21.     Lantus was discontinued in 2020.  The recommended insulin to carbohydrate ratio is 1 unit per 20 g.  She continues to use an echo NovoLog pen.    She does mainly yoga stretching exercises and she might walk on the elliptical machine, for 1 mile.  In the evening, she is more active and she enjoys going for a walk.      I reviewed the glucometer readings:  12/16   4:55  104      11:36   105     5:37   81   9:29  101  12/15   4:49    89      11:46      69     5:24   77   9:18   115  12/17   5:37    87      12:28    94                79             102   12/18     85                           127              100             81   12/19     97                            83                75         In the last 2 months, she has been experiencing a couple of hypoglycemic episodes, occurring mostly at bedtime.  The lowest documented blood sugar by glucometer is 60 and the patient attributes it to miss estimating the carbohydrate intake. In general, she has been administering 2 units NovoLog for breakfast, 3 to 4 units for lunch (the largest meal of the day) and 1 to 2 units for dinner (frequently a salad).    Diabetes complications:   No h/o microalbuminuria.  Last eye exam - August 2021; no DR. S/P cataract surgery  Numbness and tingling sensation B/L toes - for many years but light sensation is intact. She does wear comfortable shoes/insoles. The neuropathy is stable, per patient. 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  She develops confusion, inability to concentrate or focus her vision and she feels extremely tired when her blood sugar is the 60s or 50s.  She has no prior episodes of loss of consciousness due to hypoglycemia.  She does have Baqsimi at home.     Weight has been stable - around 118 to 120 lbs.     2. Osteoporosis    Jeanmarie also has a history of osteoporosis, treated with Boniva for almost 3 years, followed by Forteo which was started in 4/12 - interrupted treatment from 4/2013 and restarted in 7/2013 until July 2014. After she completed the treatment with Forteo, she received one dose of Reclast, in July 2014.      She has no history of prior bone fractures.  She's been off prednisone since 2013.  Her height has decreased over the years from 5'6'' to 5'1/2''. Her mother had a hip fracture in her 80s.     On the DXA scan images from 7/1/16, L1-L3 T score was - 1.  Overall, at the spine, there was a significant increase of  bone mineral density since 2005 of 10.5%. Since 2015, the bone mineral density has remained stable at spine. The lowest T score at the hip level was -2.2, at the left femoral neck.  Overall, there was a significant improvement of bone mineral density at the mean hip of 8.9% since 2005, with no significant changes since 2015. While the bone mineral density at the left hip increased since baseline, at the right hip, there was a decrease of bone mineral density since baseline and also, since prior year.    On the DEXA scan from 7/27/18, the lowest T score was -2.1, at the left femoral neck.  Compared with the prior scan from 2016, the bone mineral density at the hips remained stable.  At the lumbar spine, L1-L3 T score was -1.3, not significantly changed since 2016.    On the most recent DEXA scan images from January 2020, L2-L4 T score was -1.5. At the hips, the lowest score was -2.3, at the left femoral neck. Compared with the prior DEXA scan from 2018, there was a significant decrease in bone mineral density of the lumbar spine, left total hip and right total hip by 3.1, 4.7 and 3.5%, respectively.  The patient received zoledronic acid infusion on 7/30/2020 and 7/27/21.    The current dose of calcium and vitamin D is 500 mg/200 U BID (oyster shell) and a daily Centrum Silver MVI which contains 1000 U vitamin D per tablet.     In terms of dairy products, she does have a 2-3 oz yogurt and cheese, daily.      On the most recent lab results from 7/7/2021, vitamin D level was 66, PTH level was 34, GFR 84, calcium 9.1, albumin 4.    Current Outpatient Medications   Medication     acetaminophen (TYLENOL) 500 MG tablet     aspirin 81 MG tablet     AYR SALINE NASAL NO-DRIP GEL     blood glucose (TRUE METRIX BLOOD GLUCOSE TEST) test strip     Calcium Carbonate-Vitamin D (CALCIUM 500 + D PO)     carboxymethylcellulose PF (REFRESH PLUS) 0.5 % SOLN ophthalmic solution     Glucagon (BAQSIMI ONE PACK) 3 MG/DOSE POWD     Injection  Device for insulin (NOVOPEN ECHO) RORY     insulin aspart (NOVOLOG PENFILL) 100 UNIT/ML cartridge     insulin glargine (LANTUS PEN) 100 UNIT/ML pen     insulin pen needle (BD PAM U/F) 32G X 4 MM miscellaneous     LANCETS ULTRA THIN 30G MISC     Multiple Vitamins-Minerals (CENTRUM SILVER PO)     mycophenolate (GENERIC EQUIVALENT) 500 MG tablet     NIFEdipine ER OSMOTIC (PROCARDIA XL) 30 MG 24 hr tablet     order for DME     simvastatin (ZOCOR) 40 MG tablet     warfarin ANTICOAGULANT (COUMADIN) 5 MG tablet     No current facility-administered medications for this visit.     OMARI Murry complains of increased hair loss.  She denies facial hair, acne.     Family Hx   Maternal grandmother DM2. First cousin with RA. Mother, maternal grandmother and aunt, cousin diagnosed with thyroid disorders (? hypothyroidism). Mother and maternal grandmother had breast cancer.    Personal Hx   Behavioral history: No tobacco use.  Home environment: No secondhand tobacco smoke in home.  Denies smoking, drinking alcohol or using illicit drugs. , with one child. 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  Menopause at age 59. Had regular MP in the past. No h/o bone fractures or kidney stones.    PMH   SLE dx in 2000 with flare/pericarditis and tamponade on 3/5/2010 requiring high doses of steroids.  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  Osteoporosis diagnosed 2008 started on Boniva in 2010 (heartburn from oral fosamax).   Hyperlipidemia.  Severe GERD and Achalasia.  Raynaud's  Allergy to multiple meds  Vit D def.  Post thrombophlebitic syndrome with chronic LE swelling   Dyslipidemia  Chronic leukopenia on methotrexate   L arm lymphedema   Type 1 diabetes  Prolapsed uterus   Cataract   Vale Zoster     Physical Exam   Wt Readings from Last 10 Encounters:   07/30/20 52.6 kg (116 lb)   02/05/19 56.7 kg (125 lb)   01/08/19 54.1 kg (119 lb 4.8 oz)   07/30/18 54.1 kg (119 lb 3.2 oz)    01/29/18 54 kg (119 lb)   08/02/17 52.8 kg (116 lb 8 oz)   07/31/17 53.2 kg (117 lb 4.8 oz)   05/22/17 53.9 kg (118 lb 14.4 oz)   04/13/17 54.7 kg (120 lb 9.6 oz)   04/04/17 55 kg (121 lb 4.8 oz)     LMP  (LMP Unknown)     Physical Exam  General Appearance: alert, no distress noted   Eyes: grossly normal to inspection, conjunctivae and sclerae normal, no lid lag or stare   Respiratory: no audible wheeze, cough, or visible cyanosis.  No visible retractions or increased work of breathing.  Able to speak fully in complete sentences.  Neurological: Cranial nerves grossly intact, mentation intact and speech normal; no tremor of the hands   Skin: no lesions on exposed skin   Psychological: mentation appears normal, affect normal, judgement and insight intact, normal speech and appearance well-groomed    Lab Results  I reviewed prior lab results documented in EPIC.   Lab Results   Component Value Date    A1C 5.4 07/07/2021    A1C 5.2 04/21/2021    A1C 5.6 01/20/2021    A1C 5.1 07/09/2020    A1C 5.3 07/11/2017    HEMOGLOBINA1 5.1 01/13/2020    HEMOGLOBINA1 5.1 07/15/2019    HEMOGLOBINA1 5.0 07/30/2018    HEMOGLOBINA1 5.0 01/29/2018    HEMOGLOBINA1 5.0 01/23/2017       Hemoglobin   Date Value Ref Range Status   09/02/2021 12.7 11.7 - 15.7 g/dL Final   04/29/2021 12.9 11.7 - 15.7 g/dL Final     Hematocrit   Date Value Ref Range Status   09/02/2021 41.5 35.0 - 47.0 % Final   04/29/2021 41.5 35.0 - 47.0 % Final     Cholesterol   Date Value Ref Range Status   07/07/2021 199 <200 mg/dL Final     Comment:     Desirable:       <200 mg/dl     Cholesterol/HDL Ratio   Date Value Ref Range Status   10/01/2014 1.5 0.0 - 5.0 Final     HDL Cholesterol   Date Value Ref Range Status   07/07/2021 108 >49 mg/dL Final     LDL Cholesterol Calculated   Date Value Ref Range Status   07/07/2021 79 <100 mg/dL Final     Comment:     Desirable:       <100 mg/dl     VLDL-Cholesterol   Date Value Ref Range Status   10/01/2014 7 0 - 30 mg/dL Final      Triglycerides   Date Value Ref Range Status   07/07/2021 58 <150 mg/dL Final     Albumin Urine mg/L   Date Value Ref Range Status   07/07/2021 9 mg/L Final     TSH   Date Value Ref Range Status   07/09/2019 0.99 0.40 - 4.00 mU/L Final       Last Basic Metabolic Panel:    Sodium   Date Value Ref Range Status   07/07/2021 141 133 - 144 mmol/L Final     Potassium   Date Value Ref Range Status   07/07/2021 4.0 3.4 - 5.3 mmol/L Final     Chloride   Date Value Ref Range Status   07/07/2021 106 94 - 109 mmol/L Final     Calcium   Date Value Ref Range Status   07/07/2021 9.1 8.5 - 10.1 mg/dL Final     Carbon Dioxide   Date Value Ref Range Status   07/07/2021 27 20 - 32 mmol/L Final     Urea Nitrogen   Date Value Ref Range Status   07/07/2021 21 7 - 30 mg/dL Final     Creatinine   Date Value Ref Range Status   09/02/2021 0.65 0.52 - 1.04 mg/dL Final   07/07/2021 0.71 0.52 - 1.04 mg/dL Final     GFR Estimate   Date Value Ref Range Status   09/02/2021 88 >60 mL/min/1.73m2 Final     Comment:     As of July 11, 2021, eGFR is calculated by the CKD-EPI creatinine equation, without race adjustment. eGFR can be influenced by muscle mass, exercise, and diet. The reported eGFR is an estimation only and is only applicable if the renal function is stable.   07/07/2021 84 >60 mL/min/[1.73_m2] Final     Comment:     Non  GFR Calc  Starting 12/18/2018, serum creatinine based estimated GFR (eGFR) will be   calculated using the Chronic Kidney Disease Epidemiology Collaboration   (CKD-EPI) equation.       Glucose   Date Value Ref Range Status   07/07/2021 110 (H) 70 - 99 mg/dL Final       AST   Date Value Ref Range Status   09/02/2021 15 0 - 45 U/L Final   07/07/2021 14 0 - 45 U/L Final     ALT   Date Value Ref Range Status   09/02/2021 22 0 - 50 U/L Final   07/07/2021 23 0 - 50 U/L Final     Albumin   Date Value Ref Range Status   09/02/2021 4.0 3.4 - 5.0 g/dL Final   07/07/2021 4.0 3.4 - 5.0 g/dL Final       Assessment      1. Type 1 diabetes with very good glycemic control  The patient is going to have an A1c checked at her convenience.  Schedule a follow-up appointment in 6 months, with lab work: CMP, lipid panel, hematocrit, urine microalbumin and A1c    2.  Osteoporosis   Risk factors for osteoporosis identified: Postmenopausal status, lupus, prior treatment with steroids, diabetes, family history.  She was treated with Boniva for 3 years, followed by Forteo for 2 years. Received one dose of Reclast in July 2014, when she completed treatment with Forteo.  Treatment with Reclast was resumed in July 2020, when the DEXA scan revealed some loss of bone mineral density, although the lowest T score remained in the osteopenic range.  She received another dose of Reclast in July 2021.  On lab work, calcium, phosphorus, vitamin D and PTH levels have been normal.  Plan: Schedule a follow-up DEXA scan in July 2022    3. Hypercholesterolemia - on 40 mg simvastatin  Follow-up lipid panel.  Consider changing to atorvastatin in the future.    4.  Hair loss  Follow-up ferritin, reflex TSH    Orders Placed This Encounter   Procedures     Dexa hip/pelvis/spine     Dexa Wrist/Heel     Hemoglobin A1c     Comprehensive metabolic panel     Lipid panel reflex to direct LDL Fasting     Hematocrit     Albumin Random Urine Quantitative with Creat Ratio     Hemoglobin A1c     Ferritin     TSH with free T4 reflex     35 minutes spent on the date of the encounter doing chart review, history and exam, documentation and further activities as noted above.    Dorita Cramer MD

## 2021-12-22 ENCOUNTER — LAB (OUTPATIENT)
Dept: LAB | Facility: CLINIC | Age: 76
End: 2021-12-22
Payer: COMMERCIAL

## 2021-12-22 DIAGNOSIS — E10.649 TYPE 1 DIABETES MELLITUS WITH HYPOGLYCEMIA AND WITHOUT COMA (H): ICD-10-CM

## 2021-12-22 DIAGNOSIS — L65.9 HAIR LOSS: ICD-10-CM

## 2021-12-22 DIAGNOSIS — D68.61 ANTIPHOSPHOLIPID SYNDROME (H): ICD-10-CM

## 2021-12-22 DIAGNOSIS — Z79.01 LONG TERM CURRENT USE OF ANTICOAGULANT THERAPY: ICD-10-CM

## 2021-12-22 DIAGNOSIS — M32.9 SYSTEMIC LUPUS ERYTHEMATOSUS, UNSPECIFIED SLE TYPE, UNSPECIFIED ORGAN INVOLVEMENT STATUS (H): ICD-10-CM

## 2021-12-22 LAB
AST SERPL W P-5'-P-CCNC: 17 U/L (ref 0–45)
FERRITIN SERPL-MCNC: 26 NG/ML (ref 8–252)
HBA1C MFR BLD: 5.6 % (ref 0–5.6)
TSH SERPL DL<=0.005 MIU/L-ACNC: 0.89 MU/L (ref 0.4–4)

## 2021-12-22 PROCEDURE — 99000 SPECIMEN HANDLING OFFICE-LAB: CPT | Performed by: PATHOLOGY

## 2021-12-22 PROCEDURE — 86225 DNA ANTIBODY NATIVE: CPT | Mod: 90 | Performed by: PATHOLOGY

## 2021-12-22 PROCEDURE — 85210 CLOT FACTOR II PROTHROM SPEC: CPT | Mod: 90 | Performed by: PATHOLOGY

## 2021-12-22 PROCEDURE — 36415 COLL VENOUS BLD VENIPUNCTURE: CPT | Performed by: PATHOLOGY

## 2021-12-22 PROCEDURE — 84443 ASSAY THYROID STIM HORMONE: CPT | Performed by: PATHOLOGY

## 2021-12-22 PROCEDURE — 84450 TRANSFERASE (AST) (SGOT): CPT | Performed by: PATHOLOGY

## 2021-12-22 PROCEDURE — 82728 ASSAY OF FERRITIN: CPT | Performed by: PATHOLOGY

## 2021-12-22 PROCEDURE — 83036 HEMOGLOBIN GLYCOSYLATED A1C: CPT | Performed by: PATHOLOGY

## 2021-12-23 ENCOUNTER — ANTICOAGULATION THERAPY VISIT (OUTPATIENT)
Dept: ANTICOAGULATION | Facility: CLINIC | Age: 76
End: 2021-12-23
Payer: COMMERCIAL

## 2021-12-23 DIAGNOSIS — Z79.01 LONG TERM CURRENT USE OF ANTICOAGULANT THERAPY: Primary | ICD-10-CM

## 2021-12-23 DIAGNOSIS — L93.0 LUPUS ERYTHEMATOSUS: ICD-10-CM

## 2021-12-23 DIAGNOSIS — D68.61 ANTIPHOSPHOLIPID SYNDROME (H): ICD-10-CM

## 2021-12-23 LAB
DSDNA AB SER-ACNC: 0.9 IU/ML
PROTHROM ACT/NOR PPP: 15 % (ref 60–140)

## 2021-12-23 NOTE — PROGRESS NOTES
ANTICOAGULATION MANAGEMENT     Shala Caruso 76 year old female is on warfarin with therapeutic result. (Goal Factor II: 25-15%)    Recent labs: (last 7 days)     12/22/21  1305   F2 15*       ASSESSMENT     Source(s): Chart Review and Patient/Caregiver Call       Warfarin doses taken: Warfarin taken as instructed    Diet: Jeanmarie states her diet has changed the last few months.  She is eating less in the morning and evening; and eating less greens.    New illness, injury, or hospitalization: No    Medication/supplement changes: None noted    Signs or symptoms of bleeding or clotting: No    Previous result: Therapeutic last visit; previously outside of goal range    Additional findings: Jeanamrie would like to be more in the middle of her range and would like to decrease warfarin dose slightly today     PLAN     Recommended plan for ongoing change(s) affecting result    Dosing Instructions:  Decrease your warfarin dose to 10 mg TuF and 7.5 mg all other days of the week (4.2% change) with next Factor II in 3 weeks       Telephone call with Shala who verbalizes understanding, but wants to recheck factor 2 in 3 weeks instead of the recommended 2 weeks    Lab visit scheduled    Education provided: Please call back if any changes to your diet, medications or how you've been taking warfarin and Contact [phone] with any changes, questions or concerns.     Plan made per [facility] anticoagulation protocol    Gwen Abel RN  Anticoagulation Clinic  12/23/2021    _______________________________________________________________________     Anticoagulation Episode Summary     Current INR goal:  2.0-3.0   TTR:  --   Target end date:  Indefinite   Send INR reminders to:   ANTICOAG CLINIC    Indications    SLE (systemic lupus erythematosus) (H) (Resolved) [M32.9]  Long term current use of anticoagulant therapy [Z79.01]  Lupus erythematosus [L93.0]  Antiphospholipid syndrome (H) [D68.61]           Comments:  Factor 2  goal  range is 15-25%, 1/31/20 Not drawing INR  Ok to leave message on home [phone]   May leave messages with Rodriguez Santos  DO NOT GIVE RECORDS TO EMPLOYER U           Anticoagulation Care Providers     Provider Role Specialty Phone number    Joe Contreras MD Referring Internal Medicine [phone]

## 2022-01-04 ENCOUNTER — ALLIED HEALTH/NURSE VISIT (OUTPATIENT)
Dept: FAMILY MEDICINE | Facility: CLINIC | Age: 77
End: 2022-01-04
Payer: COMMERCIAL

## 2022-01-04 DIAGNOSIS — Z23 NEED FOR ZOSTER VACCINATION: Primary | ICD-10-CM

## 2022-01-04 PROCEDURE — 99207 PR NO CHARGE NURSE ONLY: CPT | Performed by: NURSE PRACTITIONER

## 2022-01-04 PROCEDURE — 90750 HZV VACC RECOMBINANT IM: CPT | Performed by: NURSE PRACTITIONER

## 2022-01-04 PROCEDURE — 90471 IMMUNIZATION ADMIN: CPT | Performed by: NURSE PRACTITIONER

## 2022-01-04 NOTE — NURSING NOTE
Shala Caruso comes into clinic today at the request of Dr. Joe Contreras, Ordering Provider for an immunization/s.    I gave the shingrix immunization/s while the patient was in clinic. They received an informational sheet and I explained the common side effects of the injection. The patient tolerated the procedure well and had no complications while here in clinic.     This service provided today was under the supervising provider of the day Evelyn Hyatt, who was available if needed.    Carley Bright, EMT at 1:01 PM on 1/4/2022.

## 2022-01-04 NOTE — NURSING NOTE
Chief Complaint   Patient presents with     Recheck Medication     2nd shingles vaccine       Carley Bright EMT at 1:00 PM on 1/4/2022.

## 2022-01-12 ENCOUNTER — LAB (OUTPATIENT)
Dept: LAB | Facility: CLINIC | Age: 77
End: 2022-01-12
Payer: COMMERCIAL

## 2022-01-12 DIAGNOSIS — Z79.01 LONG TERM CURRENT USE OF ANTICOAGULANT THERAPY: ICD-10-CM

## 2022-01-12 DIAGNOSIS — D68.61 ANTIPHOSPHOLIPID SYNDROME (H): ICD-10-CM

## 2022-01-12 PROCEDURE — 85210 CLOT FACTOR II PROTHROM SPEC: CPT | Mod: 90 | Performed by: PATHOLOGY

## 2022-01-12 PROCEDURE — 36415 COLL VENOUS BLD VENIPUNCTURE: CPT | Performed by: PATHOLOGY

## 2022-01-12 PROCEDURE — 99000 SPECIMEN HANDLING OFFICE-LAB: CPT | Performed by: PATHOLOGY

## 2022-01-13 ENCOUNTER — ANTICOAGULATION THERAPY VISIT (OUTPATIENT)
Dept: ANTICOAGULATION | Facility: CLINIC | Age: 77
End: 2022-01-13
Payer: COMMERCIAL

## 2022-01-13 DIAGNOSIS — D68.61 ANTIPHOSPHOLIPID SYNDROME (H): ICD-10-CM

## 2022-01-13 DIAGNOSIS — Z79.01 LONG TERM CURRENT USE OF ANTICOAGULANT THERAPY: Primary | ICD-10-CM

## 2022-01-13 DIAGNOSIS — L93.0 LUPUS ERYTHEMATOSUS: ICD-10-CM

## 2022-01-13 LAB — PROTHROM ACT/NOR PPP: 17 % (ref 60–140)

## 2022-01-13 NOTE — PROGRESS NOTES
Addendum 2/9/22  Jeanmarie called to report that she thinks that she actually has been taking 7.5mg twice a week and 10mg the other 5 days.  Jeanmarie reports that this was her dose for many years but totally forgot about the new dosing. Patient said not to be alarmed if F2 comes back very low. [facility]              ANTICOAGULATION MANAGEMENT     Shala Caruso 76 year old female is on warfarin with therapeutic result. (Goal Factor II: 25-15%)    Recent labs: (last 7 days)     01/12/22  1302   F2 17*       ASSESSMENT     Source(s): Chart Review       Warfarin doses taken: Reviewed in chart    Diet: No new diet changes identified    New illness, injury, or hospitalization: No    Medication/supplement changes: None noted    Signs or symptoms of bleeding or clotting: No    Previous result: Therapeutic last visit; previously outside of goal range    Additional findings: None     PLAN     Recommended plan for no diet, medication or health factor changes affecting result    Dosing Instructions: Continue your current warfarin dose 10 mg TuF and 7.5 mg all other days of the week with next Factor II in 3 - 4 weeks       Detailed voice message left for Shala with dosing instructions and follow up date.     Contact [phone] to schedule and with any changes, questions or concerns.     Education provided: Please call back if any changes to your diet, medications or how you've been taking warfarin and Importance of notifying clinic for changes in medications; a sooner lab recheck maybe needed.    Plan made per [facility] anticoagulation protocol    Gwen Abel RN  Anticoagulation Clinic  1/13/2022    _______________________________________________________________________     Anticoagulation Episode Summary     Current INR goal:  2.0-3.0   TTR:  --   Target end date:  Indefinite   Send INR reminders to:   ANTICO CLINIC    Indications    SLE (systemic lupus erythematosus) (H) (Resolved) [M32.9]  Long term current use of anticoagulant  therapy [Z79.01]  Lupus erythematosus [L93.0]  Antiphospholipid syndrome (H) [D68.61]           Comments:  Factor 2  goal range is 15-25%, 1/31/20 Not drawing INR  Ok to leave message on home [phone]   May leave messages with Rodriguez Santos  DO NOT GIVE RECORDS TO EMPLOYER [employer]           Anticoagulation Care Providers     Provider Role Specialty Phone number    Joe Contreras MD Referring Internal Medicine [phone]

## 2022-01-24 ENCOUNTER — OFFICE VISIT (OUTPATIENT)
Dept: INTERNAL MEDICINE | Facility: CLINIC | Age: 77
End: 2022-01-24
Payer: COMMERCIAL

## 2022-01-24 ENCOUNTER — LAB (OUTPATIENT)
Dept: LAB | Facility: CLINIC | Age: 77
End: 2022-01-24
Payer: COMMERCIAL

## 2022-01-24 VITALS
DIASTOLIC BLOOD PRESSURE: 75 MMHG | HEART RATE: 67 BPM | RESPIRATION RATE: 16 BRPM | OXYGEN SATURATION: 100 % | SYSTOLIC BLOOD PRESSURE: 126 MMHG | TEMPERATURE: 97.8 F | HEIGHT: 66 IN | BODY MASS INDEX: 18.64 KG/M2 | WEIGHT: 116 LBS

## 2022-01-24 DIAGNOSIS — E78.5 HYPERLIPIDEMIA, UNSPECIFIED HYPERLIPIDEMIA TYPE: ICD-10-CM

## 2022-01-24 DIAGNOSIS — Z79.01 LONG TERM CURRENT USE OF ANTICOAGULANT THERAPY: ICD-10-CM

## 2022-01-24 DIAGNOSIS — D68.61 ANTIPHOSPHOLIPID SYNDROME (H): ICD-10-CM

## 2022-01-24 DIAGNOSIS — N39.0 URINARY TRACT INFECTION WITHOUT HEMATURIA, SITE UNSPECIFIED: ICD-10-CM

## 2022-01-24 DIAGNOSIS — Z79.4 TYPE 2 DIABETES MELLITUS WITH DIABETIC AUTONOMIC NEUROPATHY, WITH LONG-TERM CURRENT USE OF INSULIN (H): Primary | ICD-10-CM

## 2022-01-24 DIAGNOSIS — R82.90 ABNORMAL URINE ODOR: ICD-10-CM

## 2022-01-24 DIAGNOSIS — Z79.4 TYPE 2 DIABETES MELLITUS WITH DIABETIC AUTONOMIC NEUROPATHY, WITH LONG-TERM CURRENT USE OF INSULIN (H): ICD-10-CM

## 2022-01-24 DIAGNOSIS — E11.43 TYPE 2 DIABETES MELLITUS WITH DIABETIC AUTONOMIC NEUROPATHY, WITH LONG-TERM CURRENT USE OF INSULIN (H): Primary | ICD-10-CM

## 2022-01-24 DIAGNOSIS — E11.43 TYPE 2 DIABETES MELLITUS WITH DIABETIC AUTONOMIC NEUROPATHY, WITH LONG-TERM CURRENT USE OF INSULIN (H): ICD-10-CM

## 2022-01-24 DIAGNOSIS — I82.4Y9 DEEP VEIN THROMBOSIS (DVT) OF PROXIMAL LOWER EXTREMITY, UNSPECIFIED CHRONICITY, UNSPECIFIED LATERALITY (H): ICD-10-CM

## 2022-01-24 LAB
ALBUMIN UR-MCNC: NEGATIVE MG/DL
APPEARANCE UR: ABNORMAL
BACTERIA #/AREA URNS HPF: ABNORMAL /HPF
BILIRUB UR QL STRIP: NEGATIVE
COLOR UR AUTO: YELLOW
GLUCOSE UR STRIP-MCNC: NEGATIVE MG/DL
HGB UR QL STRIP: NEGATIVE
HYALINE CASTS: 7 /LPF
KETONES UR STRIP-MCNC: NEGATIVE MG/DL
LEUKOCYTE ESTERASE UR QL STRIP: ABNORMAL
MUCOUS THREADS #/AREA URNS LPF: PRESENT /LPF
NITRATE UR QL: POSITIVE
PH UR STRIP: 5 [PH] (ref 5–7)
RBC URINE: 2 /HPF
SP GR UR STRIP: 1.02 (ref 1–1.03)
SQUAMOUS EPITHELIAL: <1 /HPF
UROBILINOGEN UR STRIP-MCNC: NORMAL MG/DL
WBC URINE: 8 /HPF

## 2022-01-24 PROCEDURE — 99214 OFFICE O/P EST MOD 30 MIN: CPT | Performed by: INTERNAL MEDICINE

## 2022-01-24 PROCEDURE — 87186 SC STD MICRODIL/AGAR DIL: CPT | Mod: 90 | Performed by: PATHOLOGY

## 2022-01-24 PROCEDURE — 81001 URINALYSIS AUTO W/SCOPE: CPT | Performed by: PATHOLOGY

## 2022-01-24 PROCEDURE — 99000 SPECIMEN HANDLING OFFICE-LAB: CPT | Performed by: PATHOLOGY

## 2022-01-24 PROCEDURE — 87086 URINE CULTURE/COLONY COUNT: CPT | Mod: 90 | Performed by: PATHOLOGY

## 2022-01-24 RX ORDER — WARFARIN SODIUM 5 MG/1
TABLET ORAL
Qty: 90 TABLET | Refills: 3 | Status: SHIPPED | OUTPATIENT
Start: 2022-01-24 | End: 2022-09-20

## 2022-01-24 RX ORDER — CALCIUM CARBONATE 500(1250)
1 TABLET ORAL 2 TIMES DAILY WITH MEALS
COMMUNITY
End: 2023-02-09

## 2022-01-24 ASSESSMENT — MIFFLIN-ST. JEOR: SCORE: 1034.98

## 2022-01-24 ASSESSMENT — PAIN SCALES - GENERAL: PAINLEVEL: NO PAIN (0)

## 2022-01-24 NOTE — NURSING NOTE
Chief Complaint   Patient presents with     Recheck Medication     Patient comes in for medication refills.          Javier Estrada MA on 1/24/2022 at 7:28 AM

## 2022-01-24 NOTE — PROGRESS NOTES
HPI  76-year-old returns today for reevaluation and medication refill.  Her blood sugars are excellent.  Reviewed her blood sugars now off of Lantus insulin and they are consistently running in the 120 range or less.  She said no hypoglycemic episodes that she is aware of.  She has had no problems on the anticoagulation and is tolerating this well.  She has been followed by rheumatology and they feel that her lupus is largely in remission at the present time.  She will be due for a fourth Covid injection in March and she is notified of this fact.  She has had some recent change in urine odor although she has had no urgency frequency or dysuria.  She is concerned about the possibility of a UTI.  Past Medical History:   Diagnosis Date     Achalasia      Antiphospholipid syndrome (H)      Antiplatelet or antithrombotic long-term use      Coagulation disorder (H) 8319-9087     DM (diabetes mellitus) (H)      DVT (deep venous thrombosis) (H) 2003    and PE     Dysphagia     occasional, use Nifedipine     GERD (gastroesophageal reflux disease)      Hyperlipidemia      Lymphedema      Myopia      Neuropathy     toes bilateral     Nonsenile cataract      osteoporosis      Pericarditis      Raynaud's disease      SLE (systemic lupus erythematosus) (H)      Past Surgical History:   Procedure Laterality Date     CATARACT IOL, RT/LT Left 02/2019     CATARACT IOL, RT/LT Right 01/2019     COLONOSCOPY N/A 11/28/2016    Procedure: COLONOSCOPY;  Surgeon: Sang August MD;  Location: [facility]     CYSTOSCOPY, SLING TRANSVAGINAL N/A 12/20/2016    Procedure: CYSTOSCOPY, SLING TRANSVAGINAL;  Surgeon: Jeanmarie Pierson MD;  Location: [facility]     DISSECT LYMPH NODE AXILLA  2004    Lt, during eval for SLE     HYSTEROSCOPIC PLACEMENT CONTRACEPTIVE DEVICE  1985     PHACOEMULSIFICATION WITH STANDARD INTRAOCULAR LENS IMPLANT Right 1/8/2019    Procedure: Right Eye Cataract Extraction with Intraocular Lens;  Surgeon: Monika Fermin,  "MD;  Location: [facility] OR     PHACOEMULSIFICATION WITH STANDARD INTRAOCULAR LENS IMPLANT Left 2/5/2019    Procedure: Left Eye Cataract Extraction with Intraocular Lens;  Surgeon: Monika Fermin MD;  Location: [facility] OR     TUBAL LIGATION Bilateral      Family History   Problem Relation Age of Onset     Cancer Other         breast     Cerebrovascular Disease Other      C.A.D. Other      Glaucoma Father      Macular Degeneration No family hx of          Exam:  /75   Pulse 67   Temp 97.8  F (36.6  C) (Oral)   Resp 16   Ht 1.664 m (5' 5.5\")   Wt 52.6 kg (116 lb)   LMP  (LMP Unknown)   SpO2 100%   BMI 19.01 kg/m    116 lbs 0 oz  The patient is alert, oriented with a clear sensorium.   Skin shows no lesions or rashes and good turgor.   Head is normocephalic and atraumatic.    Neck shows no nodes, thyromegaly.     Lungs are clear.   Heart shows normal S1 and S2 without murmur or gallop.    Extremities show no edema.  Labs reviewed:  Results for orders placed or performed in visit on 01/24/22   UA with Micro reflex to Culture     Status: Abnormal    Specimen: Urine, Midstream   Result Value Ref Range    Color Urine Yellow Colorless, Straw, Light Yellow, Yellow    Appearance Urine Slightly Cloudy (A) Clear    Glucose Urine Negative Negative mg/dL    Bilirubin Urine Negative Negative    Ketones Urine Negative Negative mg/dL    Specific Gravity Urine 1.020 1.003 - 1.035    Blood Urine Negative Negative    pH Urine 5.0 5.0 - 7.0    Protein Albumin Urine Negative Negative mg/dL    Urobilinogen Urine Normal Normal, 2.0 mg/dL    Nitrite Urine Positive (A) Negative    Leukocyte Esterase Urine Trace (A) Negative    Bacteria Urine Few (A) None Seen /HPF    Mucus Urine Present (A) None Seen /LPF    RBC Urine 2 <=2 /HPF    WBC Urine 8 (H) <=5 /HPF    Squamous Epithelials Urine <1 <=1 /HPF    Hyaline Casts Urine 7 (H) <=2 /LPF    Narrative    Urine Culture ordered based on laboratory criteria   Urine Culture     Status: " Abnormal (Preliminary result)    Specimen: Urine, Midstream   Result Value Ref Range    Culture >100,000 CFU/mL Escherichia coli (A)            ASSESSMENT  1 UTI  2 diabetes mellitus well controlled off Lantus  3 hypertension well controlled  4 hyperlipidemia well controlled  5 osteoporosis on Reclast  6 history of stable SLE with Raynaud's phenomena on immunosuppression and antiphospholipid antibodies on warfarin  7 GERD  8 peripheral neuropathy   9 bilateral osteoarthritis of the knees  10 Achalasia on nifedipine  11 Urine smell ? UTI    Plan  I will check her urine today and have her follow-up with fasting lipids.  She will be due for an additional Covid booster on March 15 and was encouraged to call and schedule this.  We refilled her warfarin.  Follow-up for routine reassessment in a year.  Over 30 minutes spent on the day of service in chart review, patient contact, record completion and review and notification of lab reports    This note was completed using Dragon voice recognition software.      Joe Contreras MD  General Internal Medicine  [facility]  [phone]

## 2022-01-25 LAB — BACTERIA UR CULT: ABNORMAL

## 2022-01-25 RX ORDER — NITROFURANTOIN 25; 75 MG/1; MG/1
100 CAPSULE ORAL 2 TIMES DAILY
Qty: 10 CAPSULE | Refills: 0 | Status: SHIPPED | OUTPATIENT
Start: 2022-01-25 | End: 2022-01-30

## 2022-01-29 ENCOUNTER — LAB (OUTPATIENT)
Dept: LAB | Facility: CLINIC | Age: 77
End: 2022-01-29
Payer: COMMERCIAL

## 2022-01-29 DIAGNOSIS — M32.9 SYSTEMIC LUPUS ERYTHEMATOSUS, UNSPECIFIED SLE TYPE, UNSPECIFIED ORGAN INVOLVEMENT STATUS (H): ICD-10-CM

## 2022-01-29 DIAGNOSIS — E11.43 TYPE 2 DIABETES MELLITUS WITH DIABETIC AUTONOMIC NEUROPATHY, WITH LONG-TERM CURRENT USE OF INSULIN (H): ICD-10-CM

## 2022-01-29 DIAGNOSIS — E78.5 HYPERLIPIDEMIA, UNSPECIFIED HYPERLIPIDEMIA TYPE: ICD-10-CM

## 2022-01-29 DIAGNOSIS — Z79.4 TYPE 2 DIABETES MELLITUS WITH DIABETIC AUTONOMIC NEUROPATHY, WITH LONG-TERM CURRENT USE OF INSULIN (H): ICD-10-CM

## 2022-01-29 DIAGNOSIS — R82.90 ABNORMAL URINE ODOR: ICD-10-CM

## 2022-01-29 LAB
CHOLEST SERPL-MCNC: 169 MG/DL
FASTING STATUS PATIENT QL REPORTED: NORMAL
HDLC SERPL-MCNC: 81 MG/DL
LDLC SERPL CALC-MCNC: 72 MG/DL
NONHDLC SERPL-MCNC: 88 MG/DL
TRIGL SERPL-MCNC: 81 MG/DL

## 2022-01-29 PROCEDURE — 80061 LIPID PANEL: CPT | Performed by: PATHOLOGY

## 2022-01-29 PROCEDURE — 36415 COLL VENOUS BLD VENIPUNCTURE: CPT | Performed by: PATHOLOGY

## 2022-02-09 ENCOUNTER — LAB (OUTPATIENT)
Dept: LAB | Facility: CLINIC | Age: 77
End: 2022-02-09
Attending: INTERNAL MEDICINE
Payer: COMMERCIAL

## 2022-02-09 DIAGNOSIS — Z79.01 LONG TERM CURRENT USE OF ANTICOAGULANT THERAPY: ICD-10-CM

## 2022-02-09 DIAGNOSIS — M32.9 SYSTEMIC LUPUS ERYTHEMATOSUS, UNSPECIFIED SLE TYPE, UNSPECIFIED ORGAN INVOLVEMENT STATUS (H): ICD-10-CM

## 2022-02-09 DIAGNOSIS — D68.61 ANTIPHOSPHOLIPID SYNDROME (H): ICD-10-CM

## 2022-02-09 PROCEDURE — 85210 CLOT FACTOR II PROTHROM SPEC: CPT | Mod: 90 | Performed by: PATHOLOGY

## 2022-02-09 PROCEDURE — 36415 COLL VENOUS BLD VENIPUNCTURE: CPT | Performed by: PATHOLOGY

## 2022-02-09 PROCEDURE — 99000 SPECIMEN HANDLING OFFICE-LAB: CPT | Performed by: PATHOLOGY

## 2022-02-10 ENCOUNTER — ANTICOAGULATION THERAPY VISIT (OUTPATIENT)
Dept: ANTICOAGULATION | Facility: CLINIC | Age: 77
End: 2022-02-10
Payer: COMMERCIAL

## 2022-02-10 DIAGNOSIS — D68.61 ANTIPHOSPHOLIPID SYNDROME (H): ICD-10-CM

## 2022-02-10 DIAGNOSIS — L93.0 LUPUS ERYTHEMATOSUS: ICD-10-CM

## 2022-02-10 DIAGNOSIS — Z79.01 LONG TERM CURRENT USE OF ANTICOAGULANT THERAPY: Primary | ICD-10-CM

## 2022-02-10 LAB — PROTHROM ACT/NOR PPP: 19 % (ref 60–140)

## 2022-02-10 NOTE — PROGRESS NOTES
ANTICOAGULATION MANAGEMENT     Shala Caruso 76 year old female is on warfarin with therapeutic result. (Goal Factor II: 25-15%)    Recent labs: (last 7 days)     02/09/22  1305   F2 19*       ASSESSMENT     Source(s): Chart Review and Patient/Caregiver Call       Warfarin doses taken: Jeanmarie was not sure if she has been taking 7.5 mg twice a week and 10 mg ROW or if she was taking the recommended 10 mg TuF and 7.5 mg ROW.    Diet: No new diet changes identified    New illness, injury, or hospitalization: No    Medication/supplement changes: None noted    Signs or symptoms of bleeding or clotting: No    Previous result: Therapeutic last 2(+) visits    Additional findings: None     PLAN     Recommended plan for no diet, medication or health factor changes affecting result    Dosing Instructions: Continue your current warfarin dose 10 mg TuF and 7.5 mg ROW with next Chromogenic Factor X in 2 weeks.  Since Jeanmarie is not sure which dose she has been taking, she will take the maintenance dose and recheck factor 2 in two weeks.       Telephone call with Shala who agrees to plan and repeated back plan correctly    Patient offered & declined to schedule next visit    Education provided: Please call back if any changes to your diet, medications or how you've been taking warfarin and Contact [phone] with any changes, questions or concerns.     Plan made per [facility] anticoagulation protocol    Gwen Abel RN  Anticoagulation Clinic  2/10/2022    _______________________________________________________________________     Anticoagulation Episode Summary     Current INR goal:  2.0-3.0   TTR:  --   Target end date:  Indefinite   Send INR reminders to:   [facility]    Indications    SLE (systemic lupus erythematosus) (H) (Resolved) [M32.9]  Long term current use of anticoagulant therapy [Z79.01]  Lupus erythematosus [L93.0]  Antiphospholipid syndrome (H) [D68.61]           Comments:  Factor 2  goal range is 15-25%,  1/31/20 Not drawing INR  Ok to leave message on home [phone]   May leave messages with Rodriguez Santos  DO NOT GIVE RECORDS TO EMPLOYER U           Anticoagulation Care Providers     Provider Role Specialty Phone number    Joe Contreras MD Referring Internal Medicine [phone]

## 2022-02-23 ENCOUNTER — LAB (OUTPATIENT)
Dept: LAB | Facility: CLINIC | Age: 77
End: 2022-02-23
Payer: COMMERCIAL

## 2022-02-23 DIAGNOSIS — D68.61 ANTIPHOSPHOLIPID SYNDROME (H): ICD-10-CM

## 2022-02-23 DIAGNOSIS — Z79.01 LONG TERM CURRENT USE OF ANTICOAGULANT THERAPY: ICD-10-CM

## 2022-02-23 DIAGNOSIS — M32.9 SYSTEMIC LUPUS ERYTHEMATOSUS, UNSPECIFIED SLE TYPE, UNSPECIFIED ORGAN INVOLVEMENT STATUS (H): ICD-10-CM

## 2022-02-23 DIAGNOSIS — E10.649 TYPE 1 DIABETES MELLITUS WITH HYPOGLYCEMIA AND WITHOUT COMA (H): ICD-10-CM

## 2022-02-23 LAB
ALBUMIN SERPL-MCNC: 3.7 G/DL (ref 3.4–5)
ALBUMIN UR-MCNC: NEGATIVE MG/DL
ALT SERPL W P-5'-P-CCNC: 24 U/L (ref 0–50)
APPEARANCE UR: CLEAR
BACTERIA #/AREA URNS HPF: ABNORMAL /HPF
BILIRUB UR QL STRIP: NEGATIVE
CAOX CRY #/AREA URNS HPF: ABNORMAL /HPF
COLOR UR AUTO: YELLOW
CREAT SERPL-MCNC: 0.9 MG/DL (ref 0.52–1.04)
ERYTHROCYTE [DISTWIDTH] IN BLOOD BY AUTOMATED COUNT: 15.5 % (ref 10–15)
GFR SERPL CREATININE-BSD FRML MDRD: 67 ML/MIN/1.73M2
GLUCOSE UR STRIP-MCNC: NEGATIVE MG/DL
HBA1C MFR BLD: 5.7 % (ref 0–5.6)
HCT VFR BLD AUTO: 42.3 % (ref 35–47)
HGB BLD-MCNC: 13.3 G/DL (ref 11.7–15.7)
HGB UR QL STRIP: NEGATIVE
KETONES UR STRIP-MCNC: 5 MG/DL
LEUKOCYTE ESTERASE UR QL STRIP: NEGATIVE
MCH RBC QN AUTO: 27.7 PG (ref 26.5–33)
MCHC RBC AUTO-ENTMCNC: 31.4 G/DL (ref 31.5–36.5)
MCV RBC AUTO: 88 FL (ref 78–100)
MUCOUS THREADS #/AREA URNS LPF: PRESENT /LPF
NITRATE UR QL: NEGATIVE
PH UR STRIP: 6 [PH] (ref 5–7)
PLATELET # BLD AUTO: 262 10E3/UL (ref 150–450)
RBC # BLD AUTO: 4.8 10E6/UL (ref 3.8–5.2)
RBC URINE: 5 /HPF
SP GR UR STRIP: 1.03 (ref 1–1.03)
UROBILINOGEN UR STRIP-MCNC: NORMAL MG/DL
WBC # BLD AUTO: 4.1 10E3/UL (ref 4–11)
WBC URINE: 4 /HPF

## 2022-02-23 PROCEDURE — 99000 SPECIMEN HANDLING OFFICE-LAB: CPT | Performed by: PATHOLOGY

## 2022-02-23 PROCEDURE — 86160 COMPLEMENT ANTIGEN: CPT | Mod: 90 | Performed by: PATHOLOGY

## 2022-02-23 PROCEDURE — 36415 COLL VENOUS BLD VENIPUNCTURE: CPT | Performed by: PATHOLOGY

## 2022-02-23 PROCEDURE — 86225 DNA ANTIBODY NATIVE: CPT | Mod: 90 | Performed by: PATHOLOGY

## 2022-02-23 PROCEDURE — 84460 ALANINE AMINO (ALT) (SGPT): CPT | Performed by: PATHOLOGY

## 2022-02-23 PROCEDURE — 81001 URINALYSIS AUTO W/SCOPE: CPT | Performed by: PATHOLOGY

## 2022-02-23 PROCEDURE — 82040 ASSAY OF SERUM ALBUMIN: CPT | Performed by: PATHOLOGY

## 2022-02-23 PROCEDURE — 82565 ASSAY OF CREATININE: CPT | Performed by: PATHOLOGY

## 2022-02-23 PROCEDURE — 85210 CLOT FACTOR II PROTHROM SPEC: CPT | Mod: 90 | Performed by: PATHOLOGY

## 2022-02-23 PROCEDURE — 85027 COMPLETE CBC AUTOMATED: CPT | Performed by: PATHOLOGY

## 2022-02-23 PROCEDURE — 83036 HEMOGLOBIN GLYCOSYLATED A1C: CPT | Performed by: PATHOLOGY

## 2022-02-24 ENCOUNTER — ANTICOAGULATION THERAPY VISIT (OUTPATIENT)
Dept: ANTICOAGULATION | Facility: CLINIC | Age: 77
End: 2022-02-24
Payer: COMMERCIAL

## 2022-02-24 DIAGNOSIS — L93.0 LUPUS ERYTHEMATOSUS: ICD-10-CM

## 2022-02-24 DIAGNOSIS — D68.61 ANTIPHOSPHOLIPID SYNDROME (H): ICD-10-CM

## 2022-02-24 DIAGNOSIS — Z79.01 LONG TERM CURRENT USE OF ANTICOAGULANT THERAPY: Primary | ICD-10-CM

## 2022-02-24 LAB
C3 SERPL-MCNC: 99 MG/DL (ref 81–157)
C4 SERPL-MCNC: 16 MG/DL (ref 13–39)
DSDNA AB SER-ACNC: 1 IU/ML
PROTHROM ACT/NOR PPP: 21 % (ref 60–140)

## 2022-02-24 NOTE — PROGRESS NOTES
ANTICOAGULATION MANAGEMENT     Shala Caruso 76 year old female is on warfarin with therapeutic result. (Goal Factor II: 25-15%)    Recent labs: (last 7 days)     02/23/22  1415   F2 21*       ASSESSMENT     Source(s): Chart Review       Warfarin doses taken: Reviewed in chart    Diet: No new diet changes identified    New illness, injury, or hospitalization: No    Medication/supplement changes: None noted    Signs or symptoms of bleeding or clotting: No    Previous result: Therapeutic last 2(+) visits    Additional findings: None     PLAN     Recommended plan for no diet, medication or health factor changes affecting result    Dosing Instructions: Continue your current warfarin dose 10mg every Tue, Friday, 7.5mg all other days with next Factor II in 4 weeks       Detailed voice message left for Shala with dosing instructions and follow up date.     Contact [phone] to schedule and with any changes, questions or concerns.     Education provided: Please call back if any changes to your diet, medications or how you've been taking warfarin    Plan made per [facility] anticoagulation protocol    Andre Ashraf, RN  Anticoagulation Clinic  2/24/2022    _______________________________________________________________________     Anticoagulation Episode Summary     Current INR goal:  2.0-3.0   TTR:  --   Target end date:  Indefinite   Send INR reminders to:   [facility]    Indications    SLE (systemic lupus erythematosus) (H) (Resolved) [M32.9]  Long term current use of anticoagulant therapy [Z79.01]  Lupus erythematosus [L93.0]  Antiphospholipid syndrome (H) [D68.61]           Comments:  Factor 2  goal range is 15-25%, 1/31/20 Not drawing INR  Ok to leave message on home [phone]   May leave messages with Rodriguez Santos  DO NOT GIVE RECORDS TO EMPLOYER U           Anticoagulation Care Providers     Provider Role Specialty Phone number    Joe Contreras MD Referring Internal Medicine [phone]

## 2022-03-02 NOTE — PROGRESS NOTES
[facility]  Rheumatology Clinic  Tato Agarwal MD  2022     Name: Shala Caruso  MRN: [id number]  76 year old  : 1945  Referring provider: Joe Contreras     Assessment and Plan:    Systemic lupus erythematosus (+CRISTAL, +dsDNA, +SSA, hypocomplementemia; Raynaud's, arthritis, serositis, tubulo-interstitial nephropathy, pericardial effusion/tamponade): Pt relates stable bilateral knee and hip pain that improves with movement and stretching, as well as mild loss of sensation in her toes, also stable.  Raynaud's phenomenon is mild, and is controlled with thermal protective measures alone.  No urinary symptoms.  Examination today revealed normal range of motion in the hand and finger joints without evidence of digital tapering or cyanosis.    Blood work from 2022 showed creatinine, ALT, albumin, and CBC all normal.  Hemoglobin A1c was minimally elevated at 5.7.  Urinalysis showed scant red blood cells per high-powered field.  Complement C3 and C4 and double-stranded DNA were normal or negative.  Anti-DNA antibodies were undetectable.    SLE is stable by history, examination, and laboratory evaluation.  Disease that formerly affected the kidney as well as serosal surfaces remains well compensated on tapering cellcept monotherapy, now used continuously since .  She has successfully tapered CellCept to 500 mg twice daily.  I now recommend reducing CellCept to 500 mg a day.  If no new symptoms by , she may discontinue the medication altogether.  Repeat complements, anti-DNA, creatinine, CBC with differential, and liver function 1 month after discontinuing mycophenolate.    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 Following factor 2 levels.  # Peripheral neuropathy  # Raynaud's: stable  # Achalasia: stable on nifedipine  #Osteoarthritis, knees and hips: Chronic use associated pain and stiffness is modest, stable.  Patient is capable of  performing physical activity for multiple hours daily.  I recommended continue weightbearing exercises and range of motion exercises daily.    # Osteoporosis: DEXA scan performed in June July 2020 revealed a T score of -2.3 at the femoral neck.  I agree with continued bisphosphonate therapy.  Patient underwent zoledronic acid per endocrinology in July 2020.  I agree with plans for annual repeat treatment.  Patient should continue calcium carbonate, vitamin D, and weightbearing exercise as well.    Plan:  1.  Reduce mycophenolate from 2 tablets to 1 tablet now.  If all continues to go well on July 1, stop the medicine altogether.  2.  Recheck blood work and urinalysis in 1 month after stopping mycophenolate.  3.  Continue calcium carbonate 1200 millequivalents daily, and vitamin D 800 international units daily to support bone density; continue range of motion exercise but incorporate weightbearing exercise into daily routine.  4.  Okay to use Voltaren 1% gel on sore knees once or twice daily.  Avoid using the gel more often or regularly because of concomitant Coumadin use.  5.  Monitor blood pressure once or twice weekly, and bring recordings of blood pressure at home along to primary care or rheumatology visits.    Tato Agarwal M.D.  Staff Rheumatologist, [facility]  Pager [phone]    Follow-up: 5-6 mos    HPI:   Shala Caruso has a history of DM, SLE, PE on Coumadin, and HTN who presents for evaluation of lupus.  She was last seen in 5-21.  At that time she was doing well without no significant symptoms of autoimmune disease.  Plan was to taper mycophenolate from 1000 mg twice daily.     Summary of previous history:  SLE diagnosed in 2000 (+CRISTAL, +dsDNA ab, arthritis, serositis).  She has antiphospholipid syndrome and had DVT and PE in 2004, on Coumadin since then.  She had a Lupus flare in 2010 initially treated with Plaquenil however she worsened and developed pericardial effusion/tamponade - started on  "MTX and tapering dose of prednisone at that time (continued on about 10 mg daily).  Cellcept started 6/2010, Prednisone tapered off.  MTX stopped May 2012.  Mycophenolate tapered from 2 g daily to off from November 2021 through July 2022.    Interval history March 4, 2022    Plan:  1.  Reduce mycophenolate from 2 tablets to 1 tablet now.  If all continues to go well on July 1, stop the medicine altogether.  2.  Recheck blood work and urinalysis in 1 month after stopping mycophenolate.  3.  Continue calcium carbonate 1200 millequivalents daily, and vitamin D 800 international units daily to support bone density; continue range of motion exercise but incorporate weightbearing exercise into daily routine.  4.  Okay to use Voltaren 1% gel on sore knees once or twice daily.  Avoid using the gel more often or regularly because of concomitant Coumadin use.  5.  Monitor blood pressure once or twice weekly, and bring recordings of blood pressure at home along to primary care or rheumatology visits.    Interval history 05-:    Getting along \"ok\". She is unchanged in that Her knees and hips give constant pain \"tolerable\". If she sits too long, she is very stiff. Morning stiffness is brief, relieved by movement. No pain in UE joints. She is walking every day; she plans to start biking this summer. She does daily yoga with a lot of stretching; she tends to feel better with it.    Her hands are sensitive to cold with ongoing Raynaud's. No digital ulcers.    No F/C/S, chest pain.  No urinary symptoms.      Hip pain is in the groin, made worse with long walking.      Review of Systems:  Pertinent items are noted in HPI or as below, remainder of complete ROS is negative.      No recent problems with hearing or vision. Eye dryness relieved by once daily Refresh; no dry mouth  No breathing difficulty, shortness of breath, coughing, or wheezing  No chest pain or palpitations  No heart burn, indigestion, abdominal pain, nausea, " "vomiting, diarrhea  No urination problems, no bloody, cloudy urine, no dysuria  + toe \"neuropathy\",. Symmetrical, stable.  No headaches or confusion  No rashes. No easy bleeding or bruising.     Active Medications:     Current Outpatient Medications   Medication Sig     acetaminophen (TYLENOL) 500 MG tablet Take 1,000-1,500 mg by mouth nightly as needed      aspirin 81 MG tablet Take 81 mg by mouth At Bedtime      AYR SALINE NASAL NO-DRIP GEL Use as needed for nasal dryness     blood glucose (TRUE METRIX BLOOD GLUCOSE TEST) test strip USE TO TEST BLOOD SUGAR 5 TIMES DAILY OR AS DIRECTED     calcium carbonate (OS-GABBY) 500 MG TABS Take 1 tablet by mouth 2 times daily (with meals)     cholecalciferol (VITAMIN D3) 25 mcg (1000 units) capsule Take 1 capsule by mouth daily     Glucagon (BAQSIMI ONE PACK) 3 MG/DOSE POWD Spray 3 mg in nostril as needed (to be used for severe hypoglycemic episodes)     Injection Device for insulin (NOVOPEN ECHO) RORY 1 each 4 times daily     insulin aspart (NOVOLOG PENFILL) 100 UNIT/ML cartridge INJECT subcu 1 UNIT PER 15G CHO WITH MEALS 3x A DAY. APPROX 15 UNITS DAILY.     insulin pen needle (BD PAM U/F) 32G X 4 MM miscellaneous Use as directed with insulin pens.     LANCETS ULTRA THIN 30G MISC Test 7-8 times daily  (Lancets that go with pt current meter )     Multiple Vitamins-Minerals (CENTRUM SILVER PO) Take 1 tablet by mouth daily.     mycophenolate (GENERIC EQUIVALENT) 500 MG tablet Take 1 tablet twice a day until January 1, then take 1 tablet daily     NIFEdipine ER OSMOTIC (PROCARDIA XL) 30 MG 24 hr tablet Take 1 tablet (30 mg) by mouth daily For additional refills, please schedule annual appointment at [phone]     order for DME Equipment being ordered: compression socks, 20-30 mmHg, knee high     simvastatin (ZOCOR) 40 MG tablet Take 1 tablet (40 mg) by mouth At Bedtime     warfarin ANTICOAGULANT (COUMADIN) 5 MG tablet Take 2 tablets (10mg) by mouth daily or as directed by the " "Coumadin clinic     No current facility-administered medications for this visit.       No longer on lantus: too many \"lows\".  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 Checks BS 5X daily.  Osteoporosis   Hypertension   Hyperlipidemia   Achalasia   Gastroesophageal reflux disease   Choroidal lesion  Atrophic vaginitis   Urinary urgency   Female stress incontinence   Cystocele, midline   Deep vein thrombosis   Nonsenile cataract   Myopia   Neuropathy   Lymphedema      Past Surgical History:  Phacoemulsification clear cornea with intraocular lens implantation, right 1/8/19, left 2/5/19  Transvaginal sling 12/20/16  Axilla lymph node dissection 2004  Bilateral tubal ligation     Family History:   Glaucoma - father   Breast cancer   Stroke       Social History:   Tobacco Use: No previous or current tobacco use.   Alcohol Use: No alcohol use.   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  Wt Readings from Last 4 Encounters:   01/24/22 52.6 kg (116 lb)   07/30/20 52.6 kg (116 lb)   02/05/19 56.7 kg (125 lb)   01/08/19 54.1 kg (119 lb 4.8 oz)       Constitutional: well-developed, appearing stated age; cooperative  Eyes: nl EOM, PERRLA, vision, conjunctiva, sclera  ENT: nl external ears, nose, hearing, lips, teeth, gums, throat  No mucous membrane lesions, normal saliva pool  Neck: no mass or thyroid enlargement  Resp: lungs clear to auscultation, nl to palpation  CV: RRR, no murmurs, rubs or gallops, no edema  GI: no ABD mass or tenderness, no HSM  : not " tested  Lymph: no cervical, supraclavicular, inguinal or epitrochlear nodes  MS: Osteoarthritis changes in the hands; knees without effusion or joint line tenderness.  Skin: Onychomycosis in multiple fingernails with some paronychial fissuring.  No fingertip pulp tenderness or skin ulceration.  Neuro: nl cranial nerves, strength, sensation, DTRs.   Psych: nl judgement, orientation, memory, affect.      Laboratory:   RHEUM RESULTS Latest Ref Rng & Units 1/10/2005 1/14/2005 1/31/2005   ALBUMIN 3.4 - 5.0 g/dL - - -   ALT 0 - 50 U/L - - -   AST 0 - 45 U/L - - -   COMPLEMENT C3 81 - 157 mg/dL - 101 -   COMPLEMENT C4 13 - 39 mg/dL - - -   CK TOTAL 30 - 225 U/L - - -   CREATININE 0.52 - 1.04 mg/dL - - 0.79   CRP 0.0 - 8.0 mg/L 4.46(H) - -   DNA <10.0 IU/mL - - -   DIRK 0 - 1.0 - - -   GFR ESTIMATE, IF BLACK >60 mL/min/[1.73:m2] - - >80   GFR ESTIMATE >60 mL/min/1.73m2 - - 80   HEMATOCRIT 35.0 - 47.0 % 36.9 33.1(L) 37.3   HEMOGLOBIN 11.7 - 15.7 g/dL 12.3 11.0(L) 12.3   HCVAB NR - - -   WBC 4.0 - 11.0 10e3/uL 5.3 2.2(L) 3.9(L)   RBC 3.80 - 5.20 10e6/uL 4.55 4.11 4.58   RDW 10.0 - 15.0 % 18.2(H) 17.9(H) 19.9(H)   MCHC 31.5 - 36.5 g/dL 33.2 33.3 32.9   MCV 78 - 100 fL 81 81 82   PLATELET COUNT 150 - 450 10e3/uL 292 284 322   RHEUMATOID FACTOR 0 - 14 IU/mL - - -   ESR 0 - 30 mm/h 38(H) - -     Rheumatoid Factor   Date Value Ref Range Status   01/14/2009 8 0 - 14 IU/mL Final     Cyclic Citrullinated Peptide IgG   Date Value Ref Range Status   01/14/2009 <2 <5 U/mL Final     Comment:     Interpretation:  Negative     Ribonucleic Protein IgG Antibody   Date Value Ref Range Status   01/24/2007 12  Final     Comment:     Reference range: 0 to 49  Unit: Units  (Note)  REFERENCE INTERVAL: Ribonucleic Protein (VIKRAM), IgG   Less than 20 Units ........ None detected   20 - 49 Units ............. Inconclusive   50 Units or greater ....... Positive    RNP antibody is seen in % of mixed connective tissue  disease and is considered  specific for this syndrome if  other antibodies are negative. RNP is also present in  20-30% of SLE and 15-25% of progressive systemic  sclerosis.  The above test was performed at: "[facility]  [address]  [id number] [phone]  [email or web address]     SSA (RO) Antibody IgG   Date Value Ref Range Status   08/24/2005 221 (H)  Final     Comment:     Reference range: 0 to 49  Unit: Units  (Note)  REFERENCE INTERVALS: SSA (Ro) (VIKRAM) Ab, IgG   Less than 20 Units ....... None detected   20 - 49 Units ............ Inconclusive   50 Units or greater ...... Positive    SSA (Ro) antibody is seen in70-75% of Sjogren syndrome  cases, 30-40% of systemic lupus erythematosus (SLE) and  5-10% of progressive systemic sclerosis (PSS).  The above test was performed at: "[facility]  [address] [place] [address]  [id number] [phone]  [email or web address]     SSB (LA) Antibody IgG   Date Value Ref Range Status   08/24/2005 20  Final     Comment:     Reference range: 0 to 49  Unit: Units  (Note)  REFERENCE INTERVALS: SSB (La) (VIKRAM) Ab, IgG   Less than 20 Units ....... None detected   20 - 49 Units ............ Inconclusive   50 Units or greater ...... Positive    SSB (La) antibody is seen in 50-60% of Sjogren syndrome  cases and is specific if it is the only VIKRAM antibody  present. 15-25% of patients with systemic lupus  erythematosus (SLE) and 5-10% of patients with progressive  systemic sclerosis (PSS) also have this antibody.  The above test was performed at: "[facility]  [address] [place]  [id number] [phone]  [email or web address]   ,  ,   CRISTAL Screen by EIA   Date Value Ref Range Status   02/16/2010 8.2 (H) 0 - 1.0 Final     Comment:     Interpretation:  Positive     DNA-ds   Date Value Ref Range Status   06/12/2019 1 <10 IU/mL Final     Comment:     Negative     Albumin Fraction   Date Value Ref Range Status   04/01/2013 3.9 3.7 - 5.1 g/dL Final     Alpha 2 Fraction   Date Value Ref Range Status   04/01/2013 0.7 0.5  - 0.9 g/dL Final     Beta Fraction   Date Value Ref Range Status   04/01/2013 0.6 0.6 - 1.0 g/dL Final     Gamma Fraction   Date Value Ref Range Status   04/01/2013 0.7 0.7 - 1.6 g/dL Final     Monoclonal Peak   Date Value Ref Range Status   04/01/2013 0.0 0.0 g/dL Final     ELP Interpretation:   Date Value Ref Range Status   04/01/2013   Final    Essentially normal electrophoretic pattern.  No monoclonal protein seen.   Pathologic significance requires clinical correlation.  LEATHA Valencia M.D.,   Ph.D., Pathologist

## 2022-03-04 ENCOUNTER — OFFICE VISIT (OUTPATIENT)
Dept: RHEUMATOLOGY | Facility: CLINIC | Age: 77
End: 2022-03-04
Attending: INTERNAL MEDICINE
Payer: COMMERCIAL

## 2022-03-04 VITALS
HEIGHT: 66 IN | OXYGEN SATURATION: 98 % | DIASTOLIC BLOOD PRESSURE: 68 MMHG | SYSTOLIC BLOOD PRESSURE: 150 MMHG | HEART RATE: 59 BPM | BODY MASS INDEX: 19.01 KG/M2

## 2022-03-04 DIAGNOSIS — D68.61 ANTIPHOSPHOLIPID SYNDROME (H): ICD-10-CM

## 2022-03-04 DIAGNOSIS — M32.9 SYSTEMIC LUPUS ERYTHEMATOSUS, UNSPECIFIED SLE TYPE, UNSPECIFIED ORGAN INVOLVEMENT STATUS (H): ICD-10-CM

## 2022-03-04 DIAGNOSIS — Z79.899 ENCOUNTER FOR LONG-TERM CURRENT USE OF MEDICATION: ICD-10-CM

## 2022-03-04 PROCEDURE — G0463 HOSPITAL OUTPT CLINIC VISIT: HCPCS

## 2022-03-04 PROCEDURE — 99214 OFFICE O/P EST MOD 30 MIN: CPT | Performed by: INTERNAL MEDICINE

## 2022-03-04 RX ORDER — MYCOPHENOLATE MOFETIL 500 MG/1
TABLET ORAL
Qty: 90 TABLET | Refills: 2 | Status: SHIPPED | OUTPATIENT
Start: 2022-03-04 | End: 2023-02-06

## 2022-03-04 ASSESSMENT — PAIN SCALES - GENERAL: PAINLEVEL: NO PAIN (0)

## 2022-03-04 NOTE — LETTER
3/4/2022       RE: Shala Carsuo  [address]  [place] [address]     Dear Colleague,    Thank you for referring your patient, Shala Caruso, to the [facility] RHEUMATOLOGY CLINIC [place] at [facility]. Please see a copy of my visit note below.    [facility]  Rheumatology Clinic  Tato Agarwal MD  2022     Name: Shala Caruso  MRN: [id number]  76 year old  : 1945  Referring provider: Joe Contreras     Assessment and Plan:    Systemic lupus erythematosus (+CRISTAL, +dsDNA, +SSA, hypocomplementemia; Raynaud's, arthritis, serositis, tubulo-interstitial nephropathy, pericardial effusion/tamponade): Pt relates stable bilateral knee and hip pain that improves with movement and stretching, as well as mild loss of sensation in her toes, also stable.  Raynaud's phenomenon is mild, and is controlled with thermal protective measures alone.  No urinary symptoms.  Examination today revealed normal range of motion in the hand and finger joints without evidence of digital tapering or cyanosis.    Blood work from 2022 showed creatinine, ALT, albumin, and CBC all normal.  Hemoglobin A1c was minimally elevated at 5.7.  Urinalysis showed scant red blood cells per high-powered field.  Complement C3 and C4 and double-stranded DNA were normal or negative.  Anti-DNA antibodies were undetectable.    SLE is stable by history, examination, and laboratory evaluation.  Disease that formerly affected the kidney as well as serosal surfaces remains well compensated on tapering cellcept monotherapy, now used continuously since .  She has successfully tapered CellCept to 500 mg twice daily.  I now recommend reducing CellCept to 500 mg a day.  If no new symptoms by , she may discontinue the medication altogether.  Repeat complements, anti-DNA, creatinine, CBC with differential, and liver function 1 month after discontinuing  mycophenolate.    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 Following factor 2 levels.  # Peripheral neuropathy  # Raynaud's: stable  # Achalasia: stable on nifedipine  #Osteoarthritis, knees and hips: Chronic use associated pain and stiffness is modest, stable.  Patient is capable of performing physical activity for multiple hours daily.  I recommended continue weightbearing exercises and range of motion exercises daily.    # Osteoporosis: DEXA scan performed in June July 2020 revealed a T score of -2.3 at the femoral neck.  I agree with continued bisphosphonate therapy.  Patient underwent zoledronic acid per endocrinology in July 2020.  I agree with plans for annual repeat treatment.  Patient should continue calcium carbonate, vitamin D, and weightbearing exercise as well.    Plan:  1.  Reduce mycophenolate from 2 tablets to 1 tablet now.  If all continues to go well on July 1, stop the medicine altogether.  2.  Recheck blood work and urinalysis in 1 month after stopping mycophenolate.  3.  Continue calcium carbonate 1200 millequivalents daily, and vitamin D 800 international units daily to support bone density; continue range of motion exercise but incorporate weightbearing exercise into daily routine.  4.  Okay to use Voltaren 1% gel on sore knees once or twice daily.  Avoid using the gel more often or regularly because of concomitant Coumadin use.  5.  Monitor blood pressure once or twice weekly, and bring recordings of blood pressure at home along to primary care or rheumatology visits.    Tato Agarwal M.D.  Staff Rheumatologist, [facility]  Pager [phone]    Follow-up: 5-6 mos    HPI:   Shala Caruso has a history of DM, SLE, PE on Coumadin, and HTN who presents for evaluation of lupus.  She was last seen in 5-21.  At that time she was doing well without no significant symptoms of autoimmune disease.  Plan was to taper mycophenolate from 1000 mg twice daily.    "  Summary of previous history:  SLE diagnosed in 2000 (+CRISTAL, +dsDNA ab, arthritis, serositis).  She has antiphospholipid syndrome and had DVT and PE in 2004, on Coumadin since then.  She had a Lupus flare in 2010 initially treated with Plaquenil however she worsened and developed pericardial effusion/tamponade - started on MTX and tapering dose of prednisone at that time (continued on about 10 mg daily).  Cellcept started 6/2010, Prednisone tapered off.  MTX stopped May 2012.  Mycophenolate tapered from 2 g daily to off from November 2021 through July 2022.    Interval history March 4, 2022    Plan:  1.  Reduce mycophenolate from 2 tablets to 1 tablet now.  If all continues to go well on July 1, stop the medicine altogether.  2.  Recheck blood work and urinalysis in 1 month after stopping mycophenolate.  3.  Continue calcium carbonate 1200 millequivalents daily, and vitamin D 800 international units daily to support bone density; continue range of motion exercise but incorporate weightbearing exercise into daily routine.  4.  Okay to use Voltaren 1% gel on sore knees once or twice daily.  Avoid using the gel more often or regularly because of concomitant Coumadin use.  5.  Monitor blood pressure once or twice weekly, and bring recordings of blood pressure at home along to primary care or rheumatology visits.    Interval history 05-:    Getting along \"ok\". She is unchanged in that Her knees and hips give constant pain \"tolerable\". If she sits too long, she is very stiff. Morning stiffness is brief, relieved by movement. No pain in UE joints. She is walking every day; she plans to start biking this summer. She does daily yoga with a lot of stretching; she tends to feel better with it.    Her hands are sensitive to cold with ongoing Raynaud's. No digital ulcers.    No F/C/S, chest pain.  No urinary symptoms.      Hip pain is in the groin, made worse with long walking.      Review of Systems:  Pertinent items are " "noted in HPI or as below, remainder of complete ROS is negative.      No recent problems with hearing or vision. Eye dryness relieved by once daily Refresh; no dry mouth  No breathing difficulty, shortness of breath, coughing, or wheezing  No chest pain or palpitations  No heart burn, indigestion, abdominal pain, nausea, vomiting, diarrhea  No urination problems, no bloody, cloudy urine, no dysuria  + toe \"neuropathy\",. Symmetrical, stable.  No headaches or confusion  No rashes. No easy bleeding or bruising.     Active Medications:     Current Outpatient Medications   Medication Sig     acetaminophen (TYLENOL) 500 MG tablet Take 1,000-1,500 mg by mouth nightly as needed      aspirin 81 MG tablet Take 81 mg by mouth At Bedtime      AYR SALINE NASAL NO-DRIP GEL Use as needed for nasal dryness     blood glucose (TRUE METRIX BLOOD GLUCOSE TEST) test strip USE TO TEST BLOOD SUGAR 5 TIMES DAILY OR AS DIRECTED     calcium carbonate (OS-GABBY) 500 MG TABS Take 1 tablet by mouth 2 times daily (with meals)     cholecalciferol (VITAMIN D3) 25 mcg (1000 units) capsule Take 1 capsule by mouth daily     Glucagon (BAQSIMI ONE PACK) 3 MG/DOSE POWD Spray 3 mg in nostril as needed (to be used for severe hypoglycemic episodes)     Injection Device for insulin (NOVOPEN ECHO) RORY 1 each 4 times daily     insulin aspart (NOVOLOG PENFILL) 100 UNIT/ML cartridge INJECT subcu 1 UNIT PER 15G CHO WITH MEALS 3x A DAY. APPROX 15 UNITS DAILY.     insulin pen needle (BD PAM U/F) 32G X 4 MM miscellaneous Use as directed with insulin pens.     LANCETS ULTRA THIN 30G MISC Test 7-8 times daily  (Lancets that go with pt current meter )     Multiple Vitamins-Minerals (CENTRUM SILVER PO) Take 1 tablet by mouth daily.     mycophenolate (GENERIC EQUIVALENT) 500 MG tablet Take 1 tablet twice a day until January 1, then take 1 tablet daily     NIFEdipine ER OSMOTIC (PROCARDIA XL) 30 MG 24 hr tablet Take 1 tablet (30 mg) by mouth daily For additional refills, " "please schedule annual appointment at [phone]     order for DME Equipment being ordered: compression socks, 20-30 mmHg, knee high     simvastatin (ZOCOR) 40 MG tablet Take 1 tablet (40 mg) by mouth At Bedtime     warfarin ANTICOAGULANT (COUMADIN) 5 MG tablet Take 2 tablets (10mg) by mouth daily or as directed by the Coumadin clinic     No current facility-administered medications for this visit.       No longer on lantus: too many \"lows\".  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 Checks BS 5X daily.  Osteoporosis   Hypertension   Hyperlipidemia   Achalasia   Gastroesophageal reflux disease   Choroidal lesion  Atrophic vaginitis   Urinary urgency   Female stress incontinence   Cystocele, midline   Deep vein thrombosis   Nonsenile cataract   Myopia   Neuropathy   Lymphedema      Past Surgical History:  Phacoemulsification clear cornea with intraocular lens implantation, right 1/8/19, left 2/5/19  Transvaginal sling 12/20/16  Axilla lymph node dissection 2004  Bilateral tubal ligation     Family History:   Glaucoma - father   Breast cancer   Stroke       Social History:   Tobacco Use: No previous or current tobacco use.   Alcohol Use: No alcohol use.   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  Wt Readings from Last 4 Encounters:   01/24/22 52.6 kg (116 lb)   07/30/20 52.6 kg (116 lb)   02/05/19 56.7 kg (125 lb)   01/08/19 54.1 kg (119 lb 4.8 oz)       Constitutional: well-developed, appearing stated age; cooperative  Eyes: nl EOM, PERRLA, vision, " conjunctiva, sclera  ENT: nl external ears, nose, hearing, lips, teeth, gums, throat  No mucous membrane lesions, normal saliva pool  Neck: no mass or thyroid enlargement  Resp: lungs clear to auscultation, nl to palpation  CV: RRR, no murmurs, rubs or gallops, no edema  GI: no ABD mass or tenderness, no HSM  : not tested  Lymph: no cervical, supraclavicular, inguinal or epitrochlear nodes  MS: Osteoarthritis changes in the hands; knees without effusion or joint line tenderness.  Skin: Onychomycosis in multiple fingernails with some paronychial fissuring.  No fingertip pulp tenderness or skin ulceration.  Neuro: nl cranial nerves, strength, sensation, DTRs.   Psych: nl judgement, orientation, memory, affect.      Laboratory:   RHEUM RESULTS Latest Ref Rng & Units 1/10/2005 1/14/2005 1/31/2005   ALBUMIN 3.4 - 5.0 g/dL - - -   ALT 0 - 50 U/L - - -   AST 0 - 45 U/L - - -   COMPLEMENT C3 81 - 157 mg/dL - 101 -   COMPLEMENT C4 13 - 39 mg/dL - - -   CK TOTAL 30 - 225 U/L - - -   CREATININE 0.52 - 1.04 mg/dL - - 0.79   CRP 0.0 - 8.0 mg/L 4.46(H) - -   DNA <10.0 IU/mL - - -   DIRK 0 - 1.0 - - -   GFR ESTIMATE, IF BLACK >60 mL/min/[1.73:m2] - - >80   GFR ESTIMATE >60 mL/min/1.73m2 - - 80   HEMATOCRIT 35.0 - 47.0 % 36.9 33.1(L) 37.3   HEMOGLOBIN 11.7 - 15.7 g/dL 12.3 11.0(L) 12.3   HCVAB NR - - -   WBC 4.0 - 11.0 10e3/uL 5.3 2.2(L) 3.9(L)   RBC 3.80 - 5.20 10e6/uL 4.55 4.11 4.58   RDW 10.0 - 15.0 % 18.2(H) 17.9(H) 19.9(H)   MCHC 31.5 - 36.5 g/dL 33.2 33.3 32.9   MCV 78 - 100 fL 81 81 82   PLATELET COUNT 150 - 450 10e3/uL 292 284 322   RHEUMATOID FACTOR 0 - 14 IU/mL - - -   ESR 0 - 30 mm/h 38(H) - -     Rheumatoid Factor   Date Value Ref Range Status   01/14/2009 8 0 - 14 IU/mL Final     Cyclic Citrullinated Peptide IgG   Date Value Ref Range Status   01/14/2009 <2 <5 U/mL Final     Comment:     Interpretation:  Negative     Ribonucleic Protein IgG Antibody   Date Value Ref Range Status   01/24/2007 12  Final     Comment:      Reference range: 0 to 49  Unit: Units  (Note)  REFERENCE INTERVAL: Ribonucleic Protein (VIKRAM), IgG   Less than 20 Units ........ None detected   20 - 49 Units ............. Inconclusive   50 Units or greater ....... Positive    RNP antibody is seen in % of mixed connective tissue  disease and is considered specific for this syndrome if  other antibodies are negative. RNP is also present in  20-30% of SLE and 15-25% of progressive systemic  sclerosis.  The above test was performed at: [facility],  [address] [place] [address]  [id number] [phone]  [email or web address]     SSA (RO) Antibody IgG   Date Value Ref Range Status   08/24/2005 221 (H)  Final     Comment:     Reference range: 0 to 49  Unit: Units  (Note)  REFERENCE INTERVALS: SSA (Ro) (VIKRAM) Ab, IgG   Less than 20 Units ....... None detected   20 - 49 Units ............ Inconclusive   50 Units or greater ...... Positive    SSA (Ro) antibody is seen in70-75% of Sjogren syndrome  cases, 30-40% of systemic lupus erythematosus (SLE) and  5-10% of progressive systemic sclerosis (PSS).  The above test was performed at: [facility]  [phone]  [email or web address]     SSB (LA) Antibody IgG   Date Value Ref Range Status   08/24/2005 20  Final     Comment:     Reference range: 0 to 49  Unit: Units  (Note)  REFERENCE INTERVALS: SSB (La) (VIKRAM) Ab, IgG   Less than 20 Units ....... None detected   20 - 49 Units ............ Inconclusive   50 Units or greater ...... Positive    SSB (La) antibody is seen in 50-60% of Sjogren syndrome  cases and is specific if it is the only VIKRAM antibody  present. 15-25% of patients with systemic lupus  erythematosus (SLE) and 5-10% of patients with progressive  systemic sclerosis (PSS) also have this antibody.  The above test was performed at: [facility],  [address] [place] [address]  [phone]  [email or web address]   ,  ,   CRISTAL Screen by EIA   Date Value Ref Range Status   02/16/2010 8.2 (H) 0  - 1.0 Final     Comment:     Interpretation:  Positive     DNA-ds   Date Value Ref Range Status   06/12/2019 1 <10 IU/mL Final     Comment:     Negative     Albumin Fraction   Date Value Ref Range Status   04/01/2013 3.9 3.7 - 5.1 g/dL Final     Alpha 2 Fraction   Date Value Ref Range Status   04/01/2013 0.7 0.5 - 0.9 g/dL Final     Beta Fraction   Date Value Ref Range Status   04/01/2013 0.6 0.6 - 1.0 g/dL Final     Gamma Fraction   Date Value Ref Range Status   04/01/2013 0.7 0.7 - 1.6 g/dL Final     Monoclonal Peak   Date Value Ref Range Status   04/01/2013 0.0 0.0 g/dL Final     ELP Interpretation:   Date Value Ref Range Status   04/01/2013   Final    Essentially normal electrophoretic pattern.  No monoclonal protein seen.   Pathologic significance requires clinical correlation.  LEATHA Valencia M.D.,   Ph.D., Pathologist       Again, thank you for allowing me to participate in the care of your patient.      Sincerely,    Tato Agarwal MD

## 2022-03-04 NOTE — PATIENT INSTRUCTIONS
Diagnosis:  1.  Systemic lupus erythematosus with a history of phospholipid antibodies, Raynaud's, serositis, and nephritis: symptoms or signs that would suggest active lupus are not present.  Systemic lupus erythematosus remains quiescent.  I recommend reducing the dose of mycophenolate once again, and rechecking lupus activity markers 1 month after you stop the drug.  2.  Osteoporosis: definitive treatment with zoledronic acid given in July 2020.  I recommend continuing calcium and vitamin D supplements, follow-up with endocrinology.  3.  Propensity to clot: No evidence of recurrent thrombosis.  I recommend continuing warfarin as directed through [facility].  4. Osteoarthritis, knees and hips.    Plan:  1.  Reduce mycophenolate from 2 tablets to 1 tablet now.  If all continues to go well on July 1, stop the medicine altogether.  2.  Recheck blood work and urinalysis in 1 month after stopping mycophenolate.  3.  Continue calcium carbonate 1200 millequivalents daily, and vitamin D 800 international units daily to support bone density; continue range of motion exercise but incorporate weightbearing exercise into daily routine.  4.  Okay to use Voltaren 1% gel on sore knees once or twice daily.  Avoid using the gel more often or regularly because of concomitant Coumadin use.  5.  Monitor blood pressure once or twice weekly, and bring recordings of blood pressure at home along to primary care or rheumatology visits.

## 2022-03-04 NOTE — LETTER
3/4/2022      RE: Shala Caruso  [address] [place] [address]       [facility]  Rheumatology Clinic  Tato Agarwal MD  2022     Name: Shala Caruso  MRN: [id number]  76 year old  : 1945  Referring provider: Joe Contreras     Assessment and Plan:    Systemic lupus erythematosus (+CRISTAL, +dsDNA, +SSA, hypocomplementemia; Raynaud's, arthritis, serositis, tubulo-interstitial nephropathy, pericardial effusion/tamponade): Pt relates stable bilateral knee and hip pain that improves with movement and stretching, as well as mild loss of sensation in her toes, also stable.  Raynaud's phenomenon is mild, and is controlled with thermal protective measures alone.  No urinary symptoms.  Examination today revealed normal range of motion in the hand and finger joints without evidence of digital tapering or cyanosis.    Blood work from 2022 showed creatinine, ALT, albumin, and CBC all normal.  Hemoglobin A1c was minimally elevated at 5.7.  Urinalysis showed scant red blood cells per high-powered field.  Complement C3 and C4 and double-stranded DNA were normal or negative.  Anti-DNA antibodies were undetectable.    SLE is stable by history, examination, and laboratory evaluation.  Disease that formerly affected the kidney as well as serosal surfaces remains well compensated on tapering cellcept monotherapy, now used continuously since .  She has successfully tapered CellCept to 500 mg twice daily.  I now recommend reducing CellCept to 500 mg a day.  If no new symptoms by , she may discontinue the medication altogether.  Repeat complements, anti-DNA, creatinine, CBC with differential, and liver function 1 month after discontinuing mycophenolate.    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 Following factor 2 levels.  # Peripheral neuropathy  # Raynaud's: stable  # Achalasia: stable on nifedipine  #Osteoarthritis, knees and hips: Chronic  use associated pain and stiffness is modest, stable.  Patient is capable of performing physical activity for multiple hours daily.  I recommended continue weightbearing exercises and range of motion exercises daily.    # Osteoporosis: DEXA scan performed in June July 2020 revealed a T score of -2.3 at the femoral neck.  I agree with continued bisphosphonate therapy.  Patient underwent zoledronic acid per endocrinology in July 2020.  I agree with plans for annual repeat treatment.  Patient should continue calcium carbonate, vitamin D, and weightbearing exercise as well.    Plan:  1.  Reduce mycophenolate from 2 tablets to 1 tablet now.  If all continues to go well on July 1, stop the medicine altogether.  2.  Recheck blood work and urinalysis in 1 month after stopping mycophenolate.  3.  Continue calcium carbonate 1200 millequivalents daily, and vitamin D 800 international units daily to support bone density; continue range of motion exercise but incorporate weightbearing exercise into daily routine.  4.  Okay to use Voltaren 1% gel on sore knees once or twice daily.  Avoid using the gel more often or regularly because of concomitant Coumadin use.  5.  Monitor blood pressure once or twice weekly, and bring recordings of blood pressure at home along to primary care or rheumatology visits.    Tato Agarwal M.D.  Staff Rheumatologist, [facility]  Pager [phone]    Follow-up: 5-6 mos    HPI:   Shala Caruso has a history of DM, SLE, PE on Coumadin, and HTN who presents for evaluation of lupus.  She was last seen in 5-21.  At that time she was doing well without no significant symptoms of autoimmune disease.  Plan was to taper mycophenolate from 1000 mg twice daily.     Summary of previous history:  SLE diagnosed in 2000 (+CRISTAL, +dsDNA ab, arthritis, serositis).  She has antiphospholipid syndrome and had DVT and PE in 2004, on Coumadin since then.  She had a Lupus flare in 2010 initially treated with Plaquenil  "however she worsened and developed pericardial effusion/tamponade - started on MTX and tapering dose of prednisone at that time (continued on about 10 mg daily).  Cellcept started 6/2010, Prednisone tapered off.  MTX stopped May 2012.  Mycophenolate tapered from 2 g daily to off from November 2021 through July 2022.    Interval history March 4, 2022    Plan:  1.  Reduce mycophenolate from 2 tablets to 1 tablet now.  If all continues to go well on July 1, stop the medicine altogether.  2.  Recheck blood work and urinalysis in 1 month after stopping mycophenolate.  3.  Continue calcium carbonate 1200 millequivalents daily, and vitamin D 800 international units daily to support bone density; continue range of motion exercise but incorporate weightbearing exercise into daily routine.  4.  Okay to use Voltaren 1% gel on sore knees once or twice daily.  Avoid using the gel more often or regularly because of concomitant Coumadin use.  5.  Monitor blood pressure once or twice weekly, and bring recordings of blood pressure at home along to primary care or rheumatology visits.    Interval history 05-:    Getting along \"ok\". She is unchanged in that Her knees and hips give constant pain \"tolerable\". If she sits too long, she is very stiff. Morning stiffness is brief, relieved by movement. No pain in UE joints. She is walking every day; she plans to start biking this summer. She does daily yoga with a lot of stretching; she tends to feel better with it.    Her hands are sensitive to cold with ongoing Raynaud's. No digital ulcers.    No F/C/S, chest pain.  No urinary symptoms.      Hip pain is in the groin, made worse with long walking.      Review of Systems:  Pertinent items are noted in HPI or as below, remainder of complete ROS is negative.      No recent problems with hearing or vision. Eye dryness relieved by once daily Refresh; no dry mouth  No breathing difficulty, shortness of breath, coughing, or wheezing  No " "chest pain or palpitations  No heart burn, indigestion, abdominal pain, nausea, vomiting, diarrhea  No urination problems, no bloody, cloudy urine, no dysuria  + toe \"neuropathy\",. Symmetrical, stable.  No headaches or confusion  No rashes. No easy bleeding or bruising.     Active Medications:     Current Outpatient Medications   Medication Sig     acetaminophen (TYLENOL) 500 MG tablet Take 1,000-1,500 mg by mouth nightly as needed      aspirin 81 MG tablet Take 81 mg by mouth At Bedtime      AYR SALINE NASAL NO-DRIP GEL Use as needed for nasal dryness     blood glucose (TRUE METRIX BLOOD GLUCOSE TEST) test strip USE TO TEST BLOOD SUGAR 5 TIMES DAILY OR AS DIRECTED     calcium carbonate (OS-GABBY) 500 MG TABS Take 1 tablet by mouth 2 times daily (with meals)     cholecalciferol (VITAMIN D3) 25 mcg (1000 units) capsule Take 1 capsule by mouth daily     Glucagon (BAQSIMI ONE PACK) 3 MG/DOSE POWD Spray 3 mg in nostril as needed (to be used for severe hypoglycemic episodes)     Injection Device for insulin (NOVOPEN ECHO) RORY 1 each 4 times daily     insulin aspart (NOVOLOG PENFILL) 100 UNIT/ML cartridge INJECT subcu 1 UNIT PER 15G CHO WITH MEALS 3x A DAY. APPROX 15 UNITS DAILY.     insulin pen needle (BD PAM U/F) 32G X 4 MM miscellaneous Use as directed with insulin pens.     LANCETS ULTRA THIN 30G MISC Test 7-8 times daily  (Lancets that go with pt current meter )     Multiple Vitamins-Minerals (CENTRUM SILVER PO) Take 1 tablet by mouth daily.     mycophenolate (GENERIC EQUIVALENT) 500 MG tablet Take 1 tablet twice a day until January 1, then take 1 tablet daily     NIFEdipine ER OSMOTIC (PROCARDIA XL) 30 MG 24 hr tablet Take 1 tablet (30 mg) by mouth daily For additional refills, please schedule annual appointment at [phone]     order for DME Equipment being ordered: compression socks, 20-30 mmHg, knee high     simvastatin (ZOCOR) 40 MG tablet Take 1 tablet (40 mg) by mouth At Bedtime     warfarin ANTICOAGULANT " "(COUMADIN) 5 MG tablet Take 2 tablets (10mg) by mouth daily or as directed by the Coumadin clinic     No current facility-administered medications for this visit.       No longer on lantus: too many \"lows\".  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 Checks BS 5X daily.  Osteoporosis   Hypertension   Hyperlipidemia   Achalasia   Gastroesophageal reflux disease   Choroidal lesion  Atrophic vaginitis   Urinary urgency   Female stress incontinence   Cystocele, midline   Deep vein thrombosis   Nonsenile cataract   Myopia   Neuropathy   Lymphedema      Past Surgical History:  Phacoemulsification clear cornea with intraocular lens implantation, right 1/8/19, left 2/5/19  Transvaginal sling 12/20/16  Axilla lymph node dissection 2004  Bilateral tubal ligation     Family History:   Glaucoma - father   Breast cancer   Stroke       Social History:   Tobacco Use: No previous or current tobacco use.   Alcohol Use: No alcohol use.   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  Wt Readings from Last 4 Encounters:   01/24/22 52.6 kg (116 lb)   07/30/20 52.6 kg (116 lb)   02/05/19 56.7 kg (125 lb)   01/08/19 54.1 kg (119 lb 4.8 oz)       Constitutional: well-developed, appearing stated age; cooperative  Eyes: nl EOM, PERRLA, vision, conjunctiva, sclera  ENT: nl external ears, nose, hearing, lips, teeth, gums, throat  No mucous membrane lesions, normal saliva pool  Neck: no mass or thyroid enlargement  Resp: lungs clear to auscultation, nl to palpation  CV: RRR, no murmurs, " rubs or gallops, no edema  GI: no ABD mass or tenderness, no HSM  : not tested  Lymph: no cervical, supraclavicular, inguinal or epitrochlear nodes  MS: Osteoarthritis changes in the hands; knees without effusion or joint line tenderness.  Skin: Onychomycosis in multiple fingernails with some paronychial fissuring.  No fingertip pulp tenderness or skin ulceration.  Neuro: nl cranial nerves, strength, sensation, DTRs.   Psych: nl judgement, orientation, memory, affect.      Laboratory:   RHEUM RESULTS Latest Ref Rng & Units 1/10/2005 1/14/2005 1/31/2005   ALBUMIN 3.4 - 5.0 g/dL - - -   ALT 0 - 50 U/L - - -   AST 0 - 45 U/L - - -   COMPLEMENT C3 81 - 157 mg/dL - 101 -   COMPLEMENT C4 13 - 39 mg/dL - - -   CK TOTAL 30 - 225 U/L - - -   CREATININE 0.52 - 1.04 mg/dL - - 0.79   CRP 0.0 - 8.0 mg/L 4.46(H) - -   DNA <10.0 IU/mL - - -   DIRK 0 - 1.0 - - -   GFR ESTIMATE, IF BLACK >60 mL/min/[1.73:m2] - - >80   GFR ESTIMATE >60 mL/min/1.73m2 - - 80   HEMATOCRIT 35.0 - 47.0 % 36.9 33.1(L) 37.3   HEMOGLOBIN 11.7 - 15.7 g/dL 12.3 11.0(L) 12.3   HCVAB NR - - -   WBC 4.0 - 11.0 10e3/uL 5.3 2.2(L) 3.9(L)   RBC 3.80 - 5.20 10e6/uL 4.55 4.11 4.58   RDW 10.0 - 15.0 % 18.2(H) 17.9(H) 19.9(H)   MCHC 31.5 - 36.5 g/dL 33.2 33.3 32.9   MCV 78 - 100 fL 81 81 82   PLATELET COUNT 150 - 450 10e3/uL 292 284 322   RHEUMATOID FACTOR 0 - 14 IU/mL - - -   ESR 0 - 30 mm/h 38(H) - -     Rheumatoid Factor   Date Value Ref Range Status   01/14/2009 8 0 - 14 IU/mL Final     Cyclic Citrullinated Peptide IgG   Date Value Ref Range Status   01/14/2009 <2 <5 U/mL Final     Comment:     Interpretation:  Negative     Ribonucleic Protein IgG Antibody   Date Value Ref Range Status   01/24/2007 12  Final     Comment:     Reference range: 0 to 49  Unit: Units  (Note)  REFERENCE INTERVAL: Ribonucleic Protein (VIKRAM), IgG   Less than 20 Units ........ None detected   20 - 49 Units ............. Inconclusive   50 Units or greater ....... Positive    RNP antibody is  seen in % of mixed connective tissue  disease and is considered specific for this syndrome if  other antibodies are negative. RNP is also present in  20-30% of SLE and 15-25% of progressive systemic  sclerosis.  The above test was performed at: [facility],  [address] [place]  [id number] [phone]  [email or web address]     SSA (RO) Antibody IgG   Date Value Ref Range Status   08/24/2005 221 (H)  Final     Comment:     Reference range: 0 to 49  Unit: Units  (Note)  REFERENCE INTERVALS: SSA (Ro) (VIKRAM) Ab, IgG   Less than 20 Units ....... None detected   20 - 49 Units ............ Inconclusive   50 Units or greater ...... Positive    SSA (Ro) antibody is seen in70-75% of Sjogren syndrome  cases, 30-40% of systemic lupus erythematosus (SLE) and  5-10% of progressive systemic sclerosis (PSS).  The above test was performed at: [facility],  [address] [place] [address]  [id number] [phone]  [email or web address]     SSB (LA) Antibody IgG   Date Value Ref Range Status   08/24/2005 20  Final     Comment:     Reference range: 0 to 49  Unit: Units  (Note)  REFERENCE INTERVALS: SSB (La) (VIKRAM) Ab, IgG   Less than 20 Units ....... None detected   20 - 49 Units ............ Inconclusive   50 Units or greater ...... Positive    SSB (La) antibody is seen in 50-60% of Sjogren syndrome  cases and is specific if it is the only VIKRAM antibody  present. 15-25% of patients with systemic lupus  erythematosus (SLE) and 5-10% of patients with progressive  systemic sclerosis (PSS) also have this antibody.  The above test was performed at: [facility],  [address] [place] [address]  [id number] [phone]  [email or web address]   ,  ,   CRISTAL Screen by EIA   Date Value Ref Range Status   02/16/2010 8.2 (H) 0 - 1.0 Final     Comment:     Interpretation:  Positive     DNA-ds   Date Value Ref Range Status   06/12/2019 1 <10 IU/mL Final     Comment:     Negative     Albumin Fraction   Date Value Ref Range Status   04/01/2013 3.9 3.7 - 5.1 g/dL Final      Alpha 2 Fraction   Date Value Ref Range Status   04/01/2013 0.7 0.5 - 0.9 g/dL Final     Beta Fraction   Date Value Ref Range Status   04/01/2013 0.6 0.6 - 1.0 g/dL Final     Gamma Fraction   Date Value Ref Range Status   04/01/2013 0.7 0.7 - 1.6 g/dL Final     Monoclonal Peak   Date Value Ref Range Status   04/01/2013 0.0 0.0 g/dL Final     ELP Interpretation:   Date Value Ref Range Status   04/01/2013   Final    Essentially normal electrophoretic pattern.  No monoclonal protein seen.   Pathologic significance requires clinical correlation.  LEATHA Valencia M.D.,   Ph.D., Pathologist       Tato Agarwal MD

## 2022-03-16 ENCOUNTER — IMMUNIZATION (OUTPATIENT)
Dept: NURSING | Facility: CLINIC | Age: 77
End: 2022-03-16
Payer: COMMERCIAL

## 2022-03-16 PROCEDURE — 0064A COVID-19,PF,MODERNA (18+ YRS BOOSTER .25ML): CPT

## 2022-03-16 PROCEDURE — 91306 COVID-19,PF,MODERNA (18+ YRS BOOSTER .25ML): CPT

## 2022-03-23 ENCOUNTER — LAB (OUTPATIENT)
Dept: LAB | Facility: CLINIC | Age: 77
End: 2022-03-23
Attending: INTERNAL MEDICINE
Payer: COMMERCIAL

## 2022-03-23 DIAGNOSIS — Z79.01 LONG TERM CURRENT USE OF ANTICOAGULANT THERAPY: ICD-10-CM

## 2022-03-23 DIAGNOSIS — D68.61 ANTIPHOSPHOLIPID SYNDROME (H): ICD-10-CM

## 2022-03-23 PROCEDURE — 99000 SPECIMEN HANDLING OFFICE-LAB: CPT | Performed by: PATHOLOGY

## 2022-03-23 PROCEDURE — 36415 COLL VENOUS BLD VENIPUNCTURE: CPT | Performed by: PATHOLOGY

## 2022-03-23 PROCEDURE — 85210 CLOT FACTOR II PROTHROM SPEC: CPT | Mod: 90 | Performed by: PATHOLOGY

## 2022-03-24 ENCOUNTER — ANTICOAGULATION THERAPY VISIT (OUTPATIENT)
Dept: ANTICOAGULATION | Facility: CLINIC | Age: 77
End: 2022-03-24
Payer: COMMERCIAL

## 2022-03-24 DIAGNOSIS — D68.61 ANTIPHOSPHOLIPID SYNDROME (H): ICD-10-CM

## 2022-03-24 DIAGNOSIS — L93.0 LUPUS ERYTHEMATOSUS: ICD-10-CM

## 2022-03-24 DIAGNOSIS — Z79.01 LONG TERM CURRENT USE OF ANTICOAGULANT THERAPY: Primary | ICD-10-CM

## 2022-03-24 LAB — PROTHROM ACT/NOR PPP: 15 % (ref 60–140)

## 2022-03-24 NOTE — PROGRESS NOTES
"ANTICOAGULATION MANAGEMENT     Shala Caruso 76 year old female is on warfarin with therapeutic result. (Goal Factor II: 25-15%)    Recent labs: (last 7 days)     03/23/22  1310   F2 15*       ASSESSMENT     Source(s): Chart Review       Warfarin doses taken: Reviewed in chart    Diet: No new diet changes identified    New illness, injury, or hospitalization: No    Medication/supplement changes: None noted    Signs or symptoms of bleeding or clotting: No    Previous result: Therapeutic last 2(+) visits    Additional findings: Prefer to have patients results in the \"middle\" of the goal range.  Left message for Jeanmarie to take 10mg ONCE a week going forward.  Requested next lab draw in 2 weeks.       PLAN     Recommended plan for no diet, medication or health factor changes affecting result    Dosing Instructions:  Decrease your warfarin dose 10mg on Tues; 7.5mg all other days (4.3% change) with next Factor II in 2 weeks       Detailed voice message left for Shala with dosing instructions and follow up date.     Contact [phone] to schedule and with any changes, questions or concerns.     Education provided: Please call back if any changes to your diet, medications or how you've been taking warfarin    Plan made per [facility] anticoagulation protocol    "

## 2022-04-06 ENCOUNTER — LAB (OUTPATIENT)
Dept: LAB | Facility: CLINIC | Age: 77
End: 2022-04-06
Payer: COMMERCIAL

## 2022-04-06 DIAGNOSIS — Z79.01 LONG TERM CURRENT USE OF ANTICOAGULANT THERAPY: ICD-10-CM

## 2022-04-06 DIAGNOSIS — D68.61 ANTIPHOSPHOLIPID SYNDROME (H): ICD-10-CM

## 2022-04-06 PROCEDURE — 99000 SPECIMEN HANDLING OFFICE-LAB: CPT | Performed by: PATHOLOGY

## 2022-04-06 PROCEDURE — 36415 COLL VENOUS BLD VENIPUNCTURE: CPT | Performed by: PATHOLOGY

## 2022-04-06 PROCEDURE — 85210 CLOT FACTOR II PROTHROM SPEC: CPT | Mod: 90 | Performed by: PATHOLOGY

## 2022-04-07 ENCOUNTER — ANTICOAGULATION THERAPY VISIT (OUTPATIENT)
Dept: ANTICOAGULATION | Facility: CLINIC | Age: 77
End: 2022-04-07
Payer: COMMERCIAL

## 2022-04-07 DIAGNOSIS — D68.61 ANTIPHOSPHOLIPID SYNDROME (H): ICD-10-CM

## 2022-04-07 DIAGNOSIS — Z79.01 LONG TERM CURRENT USE OF ANTICOAGULANT THERAPY: Primary | ICD-10-CM

## 2022-04-07 DIAGNOSIS — L93.0 LUPUS ERYTHEMATOSUS: ICD-10-CM

## 2022-04-07 LAB — PROTHROM ACT/NOR PPP: 22 % (ref 60–140)

## 2022-04-07 NOTE — PROGRESS NOTES
ANTICOAGULATION MANAGEMENT     Shala Caruso 76 year old female is on warfarin with therapeutic result. (Goal Factor II: 25-15%)    Recent labs: (last 7 days)     04/06/22  1335   F2 22*       ASSESSMENT     Source(s): Chart Review       Warfarin doses taken: Reviewed in chart    Diet: No new diet changes identified    New illness, injury, or hospitalization: No    Medication/supplement changes: None noted    Signs or symptoms of bleeding or clotting: No    Previous result: Therapeutic last 2(+) visits    Additional findings:Last lab draw writer and Jeanmarie discussed Decrease your warfarin dose 10mg on Tues; 7.5mg all other days (4.3% change) with next Factor II in 2 weeks        Requested in Voicemail that Jeanmarie call the [facility] if she has not been taking Warfarin as discussed.     PLAN     Recommended plan for no diet, medication or health factor changes affecting result    Dosing Instructions: Continue your current warfarin dose 10mg on Tues, 7.5mg all other days with next Factor II in 4 weeks       Detailed voice message left for Jeanmarie with dosing instructions and follow up date.     Contact [phone] to schedule and with any changes, questions or concerns.     Education provided: Please call back if any changes to your diet, medications or how you've been taking warfarin    Plan made per [facility] anticoagulation protocol

## 2022-04-11 ENCOUNTER — TELEPHONE (OUTPATIENT)
Dept: INTERNAL MEDICINE | Facility: CLINIC | Age: 77
End: 2022-04-11
Payer: COMMERCIAL

## 2022-04-11 DIAGNOSIS — K22.0 ACHALASIA OF ESOPHAGUS: ICD-10-CM

## 2022-04-11 DIAGNOSIS — E78.49 OTHER HYPERLIPIDEMIA: ICD-10-CM

## 2022-04-11 NOTE — TELEPHONE ENCOUNTER
rf requests from [facility] was auto generated ,not pt initated.    NIFEdipine ER OSMOTIC (PROCARDIA XL) 30 MG 24 hr tablet has rfs on file and will process request.    simvastatin (ZOCOR) 40 MG tablet has rfs at [facility] discharge. informed pt via .

## 2022-04-11 NOTE — TELEPHONE ENCOUNTER
[facility] Call Center    Phone Message    May a detailed message be left on voicemail: yes     Reason for Call: Medication Refill Request    Has the patient contacted the pharmacy for the refill? Yes   Name of medication being requested: simvastatin (ZOCOR) 40 MG tablet, NIFEdipine ER OSMOTIC (PROCARDIA XL) 30 MG 24 hr tablet    Provider who prescribed the medication: [facility]    Pharmacy:      [facility] ([facility]) [facility], [place] - [address] AT [address]      Date medication is needed: As soon as possible       Action Taken: Message routed to:  [facility]    Travel Screening: Not Applicable

## 2022-04-18 RX ORDER — NIFEDIPINE 30 MG/1
30 TABLET, EXTENDED RELEASE ORAL DAILY
Qty: 90 TABLET | Refills: 1 | Status: SHIPPED | OUTPATIENT
Start: 2022-04-18 | End: 2022-12-12

## 2022-04-18 RX ORDER — SIMVASTATIN 40 MG
40 TABLET ORAL AT BEDTIME
Qty: 90 TABLET | Refills: 1 | Status: SHIPPED | OUTPATIENT
Start: 2022-04-18 | End: 2022-10-05

## 2022-04-18 NOTE — TELEPHONE ENCOUNTER
NIFEdipine ER OSMOTIC (PROCARDIA XL) 30 MG 24 hr tablet    simvastatin (ZOCOR) 40 MG tablet    Remaining refills sent to requested pharmacy.per WO. Per pt call.

## 2022-04-18 NOTE — TELEPHONE ENCOUNTER
[facility] Call Center    Phone Message    May a detailed message be left on voicemail: yes     Reason for Call: Medication Refill Request    Has the patient contacted the pharmacy for the refill? Yes   Name of Medication: simvastatin (ZOCOR) 40 MG tablet, NIFEdipine ER OSMOTIC (PROCARDIA XL) 30 MG 24 hr tablet  Prescribing Provider: Dr. Contreras   Pharmacy: [facility] ([facility]IL [facility]) [facility] [place], [place] - [address] AT [facility]   What on the order needs clarification? Patient saw Dr. Contreras at virtual visit in January that was supposed to fulfill her need to be seen to renew pills. Patient is requesting to have prescription on record at the pharmacy. Patient said that that they need to have prescription on fill for refill purposes.      Action Taken: Message routed to:  [facility]    Travel Screening: Not Applicable

## 2022-05-04 ENCOUNTER — LAB (OUTPATIENT)
Dept: LAB | Facility: CLINIC | Age: 77
End: 2022-05-04
Payer: COMMERCIAL

## 2022-05-04 DIAGNOSIS — Z79.01 LONG TERM CURRENT USE OF ANTICOAGULANT THERAPY: ICD-10-CM

## 2022-05-04 DIAGNOSIS — L93.0 LUPUS ERYTHEMATOSUS: ICD-10-CM

## 2022-05-04 DIAGNOSIS — D68.61 ANTIPHOSPHOLIPID SYNDROME (H): ICD-10-CM

## 2022-05-04 PROCEDURE — 99000 SPECIMEN HANDLING OFFICE-LAB: CPT | Performed by: PATHOLOGY

## 2022-05-04 PROCEDURE — 36415 COLL VENOUS BLD VENIPUNCTURE: CPT | Performed by: PATHOLOGY

## 2022-05-04 PROCEDURE — 85210 CLOT FACTOR II PROTHROM SPEC: CPT | Mod: 90 | Performed by: PATHOLOGY

## 2022-05-05 ENCOUNTER — ANTICOAGULATION THERAPY VISIT (OUTPATIENT)
Dept: ANTICOAGULATION | Facility: CLINIC | Age: 77
End: 2022-05-05
Payer: COMMERCIAL

## 2022-05-05 DIAGNOSIS — Z79.01 LONG TERM CURRENT USE OF ANTICOAGULANT THERAPY: Primary | ICD-10-CM

## 2022-05-05 DIAGNOSIS — D68.61 ANTIPHOSPHOLIPID SYNDROME (H): ICD-10-CM

## 2022-05-05 DIAGNOSIS — L93.0 LUPUS ERYTHEMATOSUS: ICD-10-CM

## 2022-05-05 LAB — PROTHROM ACT/NOR PPP: 17 % (ref 60–140)

## 2022-05-05 NOTE — PROGRESS NOTES
ANTICOAGULATION MANAGEMENT     Shala Caruso 76 year old female is on warfarin with therapeutic result. (Goal Factor II: 25-15%)    Recent labs: (last 7 days)     05/04/22  1308   F2 17*       ASSESSMENT     Source(s): Chart Review and Patient/Caregiver Call       Warfarin doses taken: Reviewed in chart    Diet: No new diet changes identified    New illness, injury, or hospitalization: No    Medication/supplement changes: None noted    Signs or symptoms of bleeding or clotting: No    Previous result: Therapeutic last 2(+) visits    Additional findings: None       PLAN     Recommended plan for no diet, medication or health factor changes affecting result    Dosing Instructions: Continue your current warfarin dose 10mg Tue and 7.5mg all other days with next Factor II in 4 weeks       Detailed voice message left for Jeanmarie with dosing instructions and follow up date.     Contact [phone] to schedule and with any changes, questions or concerns.     Education provided: Please call back if any changes to your diet, medications or how you've been taking warfarin and Contact [phone] with any changes, questions or concerns.     Plan made per [facility] anticoagulation protocol

## 2022-06-01 ENCOUNTER — LAB (OUTPATIENT)
Dept: LAB | Facility: CLINIC | Age: 77
End: 2022-06-01
Payer: COMMERCIAL

## 2022-06-01 DIAGNOSIS — L93.0 LUPUS ERYTHEMATOSUS: ICD-10-CM

## 2022-06-01 DIAGNOSIS — Z79.01 LONG TERM CURRENT USE OF ANTICOAGULANT THERAPY: ICD-10-CM

## 2022-06-01 DIAGNOSIS — D68.61 ANTIPHOSPHOLIPID SYNDROME (H): ICD-10-CM

## 2022-06-01 PROCEDURE — 36415 COLL VENOUS BLD VENIPUNCTURE: CPT | Performed by: PATHOLOGY

## 2022-06-01 PROCEDURE — 85210 CLOT FACTOR II PROTHROM SPEC: CPT | Mod: 90 | Performed by: PATHOLOGY

## 2022-06-01 PROCEDURE — 99000 SPECIMEN HANDLING OFFICE-LAB: CPT | Performed by: PATHOLOGY

## 2022-06-02 ENCOUNTER — ANTICOAGULATION THERAPY VISIT (OUTPATIENT)
Dept: ANTICOAGULATION | Facility: CLINIC | Age: 77
End: 2022-06-02
Payer: COMMERCIAL

## 2022-06-02 DIAGNOSIS — D68.61 ANTIPHOSPHOLIPID SYNDROME (H): ICD-10-CM

## 2022-06-02 DIAGNOSIS — Z79.01 LONG TERM CURRENT USE OF ANTICOAGULANT THERAPY: Primary | ICD-10-CM

## 2022-06-02 DIAGNOSIS — L93.0 LUPUS ERYTHEMATOSUS: ICD-10-CM

## 2022-06-02 LAB — PROTHROM ACT/NOR PPP: 25 % (ref 60–140)

## 2022-06-02 NOTE — PROGRESS NOTES
ANTICOAGULATION MANAGEMENT     Shala Caruso 76 year old female is on warfarin with therapeutic result. (Goal Factor II: 25-15%)    Recent labs: (last 7 days)     06/01/22  1305   F2 25*       ASSESSMENT       Source(s): Chart Review    Previous result was Therapeutic last 2(+) visits    Medication, diet, health changes since last result: chart reviewed; none identified           PLAN     Recommended plan for no diet, medication or health factor changes affecting result    Dosing Instructions:  Increase your warfarin dose 10mg every Tues, Fri; 7.5mg all other days (4.5% change) with next Factor II in 2 weeks       Detailed voice message left for Jeanmarie with dosing instructions and follow up date.     Check at provider office visit    Education provided: Please call back if any changes to your diet, medications or how you've been taking warfarin    Plan made per [facility] anticoagulation protocol

## 2022-06-16 ENCOUNTER — LAB (OUTPATIENT)
Dept: LAB | Facility: CLINIC | Age: 77
End: 2022-06-16

## 2022-06-16 ENCOUNTER — ANCILLARY PROCEDURE (OUTPATIENT)
Dept: BONE DENSITY | Facility: CLINIC | Age: 77
End: 2022-06-16
Attending: INTERNAL MEDICINE
Payer: COMMERCIAL

## 2022-06-16 DIAGNOSIS — M81.0 OSTEOPOROSIS, POSTMENOPAUSAL: ICD-10-CM

## 2022-06-16 DIAGNOSIS — L93.0 LUPUS ERYTHEMATOSUS: ICD-10-CM

## 2022-06-16 DIAGNOSIS — Z79.01 LONG TERM CURRENT USE OF ANTICOAGULANT THERAPY: ICD-10-CM

## 2022-06-16 DIAGNOSIS — E10.649 TYPE 1 DIABETES MELLITUS WITH HYPOGLYCEMIA AND WITHOUT COMA (H): ICD-10-CM

## 2022-06-16 DIAGNOSIS — D68.61 ANTIPHOSPHOLIPID SYNDROME (H): ICD-10-CM

## 2022-06-16 LAB
ALBUMIN SERPL-MCNC: 3.8 G/DL (ref 3.4–5)
ALP SERPL-CCNC: 51 U/L (ref 40–150)
ALT SERPL W P-5'-P-CCNC: 24 U/L (ref 0–50)
ANION GAP SERPL CALCULATED.3IONS-SCNC: 2 MMOL/L (ref 3–14)
AST SERPL W P-5'-P-CCNC: 17 U/L (ref 0–45)
BILIRUB SERPL-MCNC: 0.4 MG/DL (ref 0.2–1.3)
BUN SERPL-MCNC: 27 MG/DL (ref 7–30)
CALCIUM SERPL-MCNC: 9.8 MG/DL (ref 8.5–10.1)
CHLORIDE BLD-SCNC: 106 MMOL/L (ref 94–109)
CHOLEST SERPL-MCNC: 143 MG/DL
CO2 SERPL-SCNC: 28 MMOL/L (ref 20–32)
CREAT SERPL-MCNC: 0.8 MG/DL (ref 0.52–1.04)
CREAT UR-MCNC: 122 MG/DL
FASTING STATUS PATIENT QL REPORTED: NO
GFR SERPL CREATININE-BSD FRML MDRD: 77 ML/MIN/1.73M2
GLUCOSE BLD-MCNC: 125 MG/DL (ref 70–99)
HBA1C MFR BLD: 5.5 % (ref 0–5.6)
HCT VFR BLD AUTO: 43.8 % (ref 35–47)
HDLC SERPL-MCNC: 77 MG/DL
LDLC SERPL CALC-MCNC: 54 MG/DL
MICROALBUMIN UR-MCNC: 16 MG/L
MICROALBUMIN/CREAT UR: 13.11 MG/G CR (ref 0–25)
NONHDLC SERPL-MCNC: 66 MG/DL
POTASSIUM BLD-SCNC: 4.2 MMOL/L (ref 3.4–5.3)
PROT SERPL-MCNC: 6.9 G/DL (ref 6.8–8.8)
SODIUM SERPL-SCNC: 136 MMOL/L (ref 133–144)
TRIGL SERPL-MCNC: 62 MG/DL

## 2022-06-16 PROCEDURE — 82043 UR ALBUMIN QUANTITATIVE: CPT | Performed by: PATHOLOGY

## 2022-06-16 PROCEDURE — 36415 COLL VENOUS BLD VENIPUNCTURE: CPT | Performed by: PATHOLOGY

## 2022-06-16 PROCEDURE — 85210 CLOT FACTOR II PROTHROM SPEC: CPT | Mod: 90 | Performed by: PATHOLOGY

## 2022-06-16 PROCEDURE — 99000 SPECIMEN HANDLING OFFICE-LAB: CPT | Performed by: PATHOLOGY

## 2022-06-16 PROCEDURE — 77080 DXA BONE DENSITY AXIAL: CPT | Performed by: INTERNAL MEDICINE

## 2022-06-16 PROCEDURE — 83036 HEMOGLOBIN GLYCOSYLATED A1C: CPT | Performed by: PATHOLOGY

## 2022-06-16 PROCEDURE — 85014 HEMATOCRIT: CPT | Performed by: PATHOLOGY

## 2022-06-16 PROCEDURE — 80061 LIPID PANEL: CPT | Performed by: PATHOLOGY

## 2022-06-16 PROCEDURE — 80053 COMPREHEN METABOLIC PANEL: CPT | Performed by: PATHOLOGY

## 2022-06-17 ENCOUNTER — ANTICOAGULATION THERAPY VISIT (OUTPATIENT)
Dept: ANTICOAGULATION | Facility: CLINIC | Age: 77
End: 2022-06-17
Payer: COMMERCIAL

## 2022-06-17 DIAGNOSIS — Z79.01 LONG TERM CURRENT USE OF ANTICOAGULANT THERAPY: Primary | ICD-10-CM

## 2022-06-17 DIAGNOSIS — D68.61 ANTIPHOSPHOLIPID SYNDROME (H): ICD-10-CM

## 2022-06-17 DIAGNOSIS — L93.0 LUPUS ERYTHEMATOSUS: ICD-10-CM

## 2022-06-17 LAB — PROTHROM ACT/NOR PPP: 22 % (ref 60–140)

## 2022-06-17 NOTE — PROGRESS NOTES
ANTICOAGULATION MANAGEMENT     Shala Caruso 76 year old female is on warfarin with therapeutic result. (Goal Factor II: 25-15%)    Recent labs: (last 7 days)     06/16/22  1243   F2 22*       ASSESSMENT       Source(s): Chart Review    Previous result was Therapeutic last 2(+) visits    Medication, diet, health changes since last result: chart reviewed; none identified           PLAN     Recommended plan for no diet, medication or health factor changes affecting result    Dosing Instructions: Continue your current warfarin dose 10mg Tue, Fri and 7.5mg all other days with next Factor II in 4 weeks       Detailed voice message left for Jeanmarie with dosing instructions and follow up date.     Contact [phone] to schedule and with any changes, questions or concerns.     Education provided: Please call back if any changes to your diet, medications or how you've been taking warfarin and Contact [phone] with any changes, questions or concerns.     Plan made per [facility] anticoagulation protocol

## 2022-06-18 ENCOUNTER — HEALTH MAINTENANCE LETTER (OUTPATIENT)
Age: 77
End: 2022-06-18

## 2022-06-27 ENCOUNTER — LAB (OUTPATIENT)
Dept: LAB | Facility: CLINIC | Age: 77
End: 2022-06-27
Attending: FAMILY MEDICINE
Payer: COMMERCIAL

## 2022-06-27 DIAGNOSIS — Z20.822 CLOSE EXPOSURE TO 2019 NOVEL CORONAVIRUS: ICD-10-CM

## 2022-06-27 LAB — SARS-COV-2 RNA RESP QL NAA+PROBE: NEGATIVE

## 2022-06-27 PROCEDURE — U0005 INFEC AGEN DETEC AMPLI PROBE: HCPCS

## 2022-06-27 PROCEDURE — U0003 INFECTIOUS AGENT DETECTION BY NUCLEIC ACID (DNA OR RNA); SEVERE ACUTE RESPIRATORY SYNDROME CORONAVIRUS 2 (SARS-COV-2) (CORONAVIRUS DISEASE [COVID-19]), AMPLIFIED PROBE TECHNIQUE, MAKING USE OF HIGH THROUGHPUT TECHNOLOGIES AS DESCRIBED BY CMS-2020-01-R: HCPCS

## 2022-07-01 NOTE — RESULT ENCOUNTER NOTE
The bone mineral density has increased at the spine and has remained stable at the hips.  Overall, it is in the osteopenic range.  We are going to discuss in detail at your upcoming appointment.

## 2022-07-13 ENCOUNTER — LAB (OUTPATIENT)
Dept: LAB | Facility: CLINIC | Age: 77
End: 2022-07-13
Payer: COMMERCIAL

## 2022-07-13 DIAGNOSIS — D68.61 ANTIPHOSPHOLIPID SYNDROME (H): ICD-10-CM

## 2022-07-13 DIAGNOSIS — Z79.01 LONG TERM CURRENT USE OF ANTICOAGULANT THERAPY: ICD-10-CM

## 2022-07-13 DIAGNOSIS — L93.0 LUPUS ERYTHEMATOSUS: ICD-10-CM

## 2022-07-13 PROCEDURE — 85210 CLOT FACTOR II PROTHROM SPEC: CPT | Mod: 90 | Performed by: PATHOLOGY

## 2022-07-13 PROCEDURE — 36415 COLL VENOUS BLD VENIPUNCTURE: CPT | Performed by: PATHOLOGY

## 2022-07-13 PROCEDURE — 99000 SPECIMEN HANDLING OFFICE-LAB: CPT | Performed by: PATHOLOGY

## 2022-07-14 ENCOUNTER — ANTICOAGULATION THERAPY VISIT (OUTPATIENT)
Dept: ANTICOAGULATION | Facility: CLINIC | Age: 77
End: 2022-07-14

## 2022-07-14 DIAGNOSIS — L93.0 LUPUS ERYTHEMATOSUS: ICD-10-CM

## 2022-07-14 DIAGNOSIS — Z79.01 LONG TERM CURRENT USE OF ANTICOAGULANT THERAPY: Primary | ICD-10-CM

## 2022-07-14 DIAGNOSIS — D68.61 ANTIPHOSPHOLIPID SYNDROME (H): ICD-10-CM

## 2022-07-14 LAB — PROTHROM ACT/NOR PPP: 14 % (ref 60–140)

## 2022-07-14 NOTE — PROGRESS NOTES
Bruise on knee from tubANTICOAGULATION MANAGEMENT     Shala Caruso 76 year old female is on warfarin with supratherapeutic result. (Goal Factor II: 25-15%)    Recent labs: (last 7 days)     07/13/22  1242   F2 14*       ASSESSMENT     Source(s): Chart Review and Patient/Caregiver Call       Warfarin doses taken: Warfarin taken as instructed    Diet: No new diet changes identified    New illness, injury, or hospitalization: No    Medication/supplement changes: None noted    Signs or symptoms of bleeding or clotting: Yes: bruise from hitting knee on side of bath tub-no concerns regarding bruise    Previous result: Therapeutic last 2(+) visits    Additional findings: None       PLAN     Recommended plan for no diet, medication or health factor changes affecting result    Dosing Instructions: hold dose then decrease your warfarin dose (4.3% change) 10 mg every Tues; 7.5 mg all other days  with next Factor II in 5 days       Telephone call with Jeanmarie who verbalizes understanding and agrees to plan and who agrees to plan and repeated back plan correctly    Check at provider office visit    Education provided: Goal range and significance of current result, Monitoring for bleeding signs and symptoms and Contact [phone] with any changes, questions or concerns.     Plan made per [facility] anticoagulation protocol

## 2022-07-18 ENCOUNTER — TELEPHONE (OUTPATIENT)
Dept: ENDOCRINOLOGY | Facility: CLINIC | Age: 77
End: 2022-07-18

## 2022-07-19 ENCOUNTER — LAB (OUTPATIENT)
Dept: LAB | Facility: CLINIC | Age: 77
End: 2022-07-19
Payer: COMMERCIAL

## 2022-07-19 DIAGNOSIS — D68.61 ANTIPHOSPHOLIPID SYNDROME (H): ICD-10-CM

## 2022-07-19 DIAGNOSIS — M32.9 SYSTEMIC LUPUS ERYTHEMATOSUS, UNSPECIFIED SLE TYPE, UNSPECIFIED ORGAN INVOLVEMENT STATUS (H): ICD-10-CM

## 2022-07-19 DIAGNOSIS — L93.0 LUPUS ERYTHEMATOSUS: ICD-10-CM

## 2022-07-19 DIAGNOSIS — Z79.01 LONG TERM CURRENT USE OF ANTICOAGULANT THERAPY: ICD-10-CM

## 2022-07-19 LAB
ALBUMIN SERPL-MCNC: 3.6 G/DL (ref 3.4–5)
ALBUMIN UR-MCNC: NEGATIVE MG/DL
ALP SERPL-CCNC: 48 U/L (ref 40–150)
ALT SERPL W P-5'-P-CCNC: 21 U/L (ref 0–50)
ANION GAP SERPL CALCULATED.3IONS-SCNC: 6 MMOL/L (ref 3–14)
APPEARANCE UR: CLEAR
AST SERPL W P-5'-P-CCNC: 18 U/L (ref 0–45)
BACTERIA #/AREA URNS HPF: ABNORMAL /HPF
BASOPHILS # BLD AUTO: 0.1 10E3/UL (ref 0–0.2)
BASOPHILS NFR BLD AUTO: 1 %
BILIRUB SERPL-MCNC: 0.5 MG/DL (ref 0.2–1.3)
BILIRUB UR QL STRIP: NEGATIVE
BUN SERPL-MCNC: 22 MG/DL (ref 7–30)
CALCIUM SERPL-MCNC: 10.2 MG/DL (ref 8.5–10.1)
CAOX CRY #/AREA URNS HPF: ABNORMAL /HPF
CHLORIDE BLD-SCNC: 108 MMOL/L (ref 94–109)
CO2 SERPL-SCNC: 28 MMOL/L (ref 20–32)
COLOR UR AUTO: YELLOW
CREAT SERPL-MCNC: 0.77 MG/DL (ref 0.52–1.04)
EOSINOPHIL # BLD AUTO: 0.1 10E3/UL (ref 0–0.7)
EOSINOPHIL NFR BLD AUTO: 3 %
ERYTHROCYTE [DISTWIDTH] IN BLOOD BY AUTOMATED COUNT: 15.3 % (ref 10–15)
GFR SERPL CREATININE-BSD FRML MDRD: 80 ML/MIN/1.73M2
GLUCOSE BLD-MCNC: 109 MG/DL (ref 70–99)
GLUCOSE UR STRIP-MCNC: NEGATIVE MG/DL
HCT VFR BLD AUTO: 40.6 % (ref 35–47)
HGB BLD-MCNC: 13.3 G/DL (ref 11.7–15.7)
HGB UR QL STRIP: NEGATIVE
HYALINE CASTS: 11 /LPF
IMM GRANULOCYTES # BLD: 0 10E3/UL
IMM GRANULOCYTES NFR BLD: 0 %
KETONES UR STRIP-MCNC: NEGATIVE MG/DL
LEUKOCYTE ESTERASE UR QL STRIP: ABNORMAL
LYMPHOCYTES # BLD AUTO: 1.1 10E3/UL (ref 0.8–5.3)
LYMPHOCYTES NFR BLD AUTO: 30 %
MCH RBC QN AUTO: 28.7 PG (ref 26.5–33)
MCHC RBC AUTO-ENTMCNC: 32.8 G/DL (ref 31.5–36.5)
MCV RBC AUTO: 88 FL (ref 78–100)
MONOCYTES # BLD AUTO: 0.3 10E3/UL (ref 0–1.3)
MONOCYTES NFR BLD AUTO: 9 %
MUCOUS THREADS #/AREA URNS LPF: PRESENT /LPF
NEUTROPHILS # BLD AUTO: 2 10E3/UL (ref 1.6–8.3)
NEUTROPHILS NFR BLD AUTO: 57 %
NITRATE UR QL: POSITIVE
NRBC # BLD AUTO: 0 10E3/UL
NRBC BLD AUTO-RTO: 0 /100
PH UR STRIP: 5 [PH] (ref 5–7)
PLATELET # BLD AUTO: 233 10E3/UL (ref 150–450)
POTASSIUM BLD-SCNC: 4.2 MMOL/L (ref 3.4–5.3)
PROT SERPL-MCNC: 6.8 G/DL (ref 6.8–8.8)
RBC # BLD AUTO: 4.64 10E6/UL (ref 3.8–5.2)
RBC URINE: 6 /HPF
SODIUM SERPL-SCNC: 142 MMOL/L (ref 133–144)
SP GR UR STRIP: 1.01 (ref 1–1.03)
UROBILINOGEN UR STRIP-MCNC: NORMAL MG/DL
WBC # BLD AUTO: 3.7 10E3/UL (ref 4–11)
WBC URINE: 71 /HPF

## 2022-07-19 PROCEDURE — 85210 CLOT FACTOR II PROTHROM SPEC: CPT | Mod: 90 | Performed by: PATHOLOGY

## 2022-07-19 PROCEDURE — 85025 COMPLETE CBC W/AUTO DIFF WBC: CPT | Performed by: PATHOLOGY

## 2022-07-19 PROCEDURE — 99000 SPECIMEN HANDLING OFFICE-LAB: CPT | Performed by: PATHOLOGY

## 2022-07-19 PROCEDURE — 86160 COMPLEMENT ANTIGEN: CPT | Mod: 90 | Performed by: PATHOLOGY

## 2022-07-19 PROCEDURE — 81001 URINALYSIS AUTO W/SCOPE: CPT | Performed by: PATHOLOGY

## 2022-07-19 PROCEDURE — 36415 COLL VENOUS BLD VENIPUNCTURE: CPT | Performed by: PATHOLOGY

## 2022-07-19 PROCEDURE — 87086 URINE CULTURE/COLONY COUNT: CPT | Mod: 90 | Performed by: PATHOLOGY

## 2022-07-19 PROCEDURE — 80053 COMPREHEN METABOLIC PANEL: CPT | Performed by: PATHOLOGY

## 2022-07-19 PROCEDURE — 86225 DNA ANTIBODY NATIVE: CPT | Mod: 90 | Performed by: PATHOLOGY

## 2022-07-20 ENCOUNTER — ANTICOAGULATION THERAPY VISIT (OUTPATIENT)
Dept: ANTICOAGULATION | Facility: CLINIC | Age: 77
End: 2022-07-20

## 2022-07-20 DIAGNOSIS — L93.0 LUPUS ERYTHEMATOSUS: ICD-10-CM

## 2022-07-20 DIAGNOSIS — Z79.01 LONG TERM CURRENT USE OF ANTICOAGULANT THERAPY: Primary | ICD-10-CM

## 2022-07-20 DIAGNOSIS — D68.61 ANTIPHOSPHOLIPID SYNDROME (H): ICD-10-CM

## 2022-07-20 LAB
C3 SERPL-MCNC: 82 MG/DL (ref 81–157)
C4 SERPL-MCNC: 12 MG/DL (ref 13–39)
DSDNA AB SER-ACNC: 1.3 IU/ML
PROTHROM ACT/NOR PPP: 31 % (ref 60–140)

## 2022-07-20 NOTE — PROGRESS NOTES
ANTICOAGULATION MANAGEMENT     Shala Caruso 76 year old female is on warfarin with subtherapeutic result. (Goal Factor II: 25-15%)    Recent labs: (last 7 days)     07/19/22  1307   F2 31*       ASSESSMENT     Source(s): Chart Review       Warfarin doses taken: Reviewed in chart    Diet: No new diet changes identified    New illness, injury, or hospitalization: No    Medication/supplement changes: None    Signs or symptoms of bleeding or clotting: No    Previous result: Supratherapeutic    Additional findings: Writer does not think enough time has passed to evaluate new dosing.  Likely seeing response of held dose.         PLAN     Recommended plan for no diet, medication or health factor changes affecting result    Dosing Instructions: Continue your current warfarin dose 10mg Tue and 7.5mg all other days.  with next Factor II in 2 weeks       Detailed voice message left for Jeanmarie with dosing instructions and follow up date.     Contact [phone] to schedule and with any changes, questions or concerns.     Education provided: Please call back if any changes to your diet, medications or how you've been taking warfarin and Contact [phone] with any changes, questions or concerns.     Plan made per [facility] anticoagulation protocol

## 2022-07-21 LAB — BACTERIA UR CULT: NO GROWTH

## 2022-07-25 ENCOUNTER — LAB (OUTPATIENT)
Dept: FAMILY MEDICINE | Facility: CLINIC | Age: 77
End: 2022-07-25
Payer: COMMERCIAL

## 2022-07-25 DIAGNOSIS — Z20.822 ENCOUNTER FOR LABORATORY TESTING FOR COVID-19 VIRUS: ICD-10-CM

## 2022-07-25 LAB — SARS-COV-2 RNA RESP QL NAA+PROBE: NEGATIVE

## 2022-07-25 PROCEDURE — U0005 INFEC AGEN DETEC AMPLI PROBE: HCPCS

## 2022-07-25 PROCEDURE — U0003 INFECTIOUS AGENT DETECTION BY NUCLEIC ACID (DNA OR RNA); SEVERE ACUTE RESPIRATORY SYNDROME CORONAVIRUS 2 (SARS-COV-2) (CORONAVIRUS DISEASE [COVID-19]), AMPLIFIED PROBE TECHNIQUE, MAKING USE OF HIGH THROUGHPUT TECHNOLOGIES AS DESCRIBED BY CMS-2020-01-R: HCPCS

## 2022-08-01 ENCOUNTER — LAB (OUTPATIENT)
Dept: LAB | Facility: CLINIC | Age: 77
End: 2022-08-01
Payer: COMMERCIAL

## 2022-08-01 ENCOUNTER — OFFICE VISIT (OUTPATIENT)
Dept: ENDOCRINOLOGY | Facility: CLINIC | Age: 77
End: 2022-08-01
Payer: COMMERCIAL

## 2022-08-01 VITALS
BODY MASS INDEX: 20.61 KG/M2 | SYSTOLIC BLOOD PRESSURE: 117 MMHG | DIASTOLIC BLOOD PRESSURE: 62 MMHG | HEART RATE: 77 BPM | HEIGHT: 65 IN | WEIGHT: 123.7 LBS

## 2022-08-01 DIAGNOSIS — M81.0 OSTEOPOROSIS, POSTMENOPAUSAL: ICD-10-CM

## 2022-08-01 DIAGNOSIS — E10.649 TYPE 1 DIABETES MELLITUS WITH HYPOGLYCEMIA AND WITHOUT COMA (H): Primary | ICD-10-CM

## 2022-08-01 LAB
ALBUMIN SERPL-MCNC: 3.5 G/DL (ref 3.4–5)
CALCIUM SERPL-MCNC: 9.2 MG/DL (ref 8.5–10.1)
PHOSPHATE SERPL-MCNC: 3.6 MG/DL (ref 2.5–4.5)
TSH SERPL DL<=0.005 MIU/L-ACNC: 1.34 MU/L (ref 0.4–4)

## 2022-08-01 PROCEDURE — 82040 ASSAY OF SERUM ALBUMIN: CPT | Performed by: PATHOLOGY

## 2022-08-01 PROCEDURE — 84100 ASSAY OF PHOSPHORUS: CPT | Performed by: PATHOLOGY

## 2022-08-01 PROCEDURE — 99000 SPECIMEN HANDLING OFFICE-LAB: CPT | Performed by: PATHOLOGY

## 2022-08-01 PROCEDURE — 36415 COLL VENOUS BLD VENIPUNCTURE: CPT | Performed by: PATHOLOGY

## 2022-08-01 PROCEDURE — 84443 ASSAY THYROID STIM HORMONE: CPT | Performed by: PATHOLOGY

## 2022-08-01 PROCEDURE — 82306 VITAMIN D 25 HYDROXY: CPT | Mod: 90 | Performed by: PATHOLOGY

## 2022-08-01 PROCEDURE — 82310 ASSAY OF CALCIUM: CPT | Performed by: PATHOLOGY

## 2022-08-01 PROCEDURE — 99214 OFFICE O/P EST MOD 30 MIN: CPT | Mod: GC | Performed by: INTERNAL MEDICINE

## 2022-08-01 ASSESSMENT — PAIN SCALES - GENERAL: PAINLEVEL: NO PAIN (0)

## 2022-08-01 NOTE — PROGRESS NOTES
Date Time Reading Time  Reading  Time  Reading  Time  Reading    8/1 4am 94         7/31 10pm 99 4pm 89 11am 88 5am 103   7/30 9pm 74 5pm 88 12pm 80 6am 99   7/29 10pm 93 4pm 80 11am 94 5am 97   7/28 9pm 76 5pm 85 11am 78 4am 86   7/27 9pm 73 5pm 87 11am 77 8am 125   7/26 9pm 82 5pm 119 11am 104 5am 90

## 2022-08-01 NOTE — PROGRESS NOTES
Endocrinology Follow-up Clinic Note    Shala Caruso MRN:[id number] YOB: 1945  Primary care provider: Joe Contreras     Reason for visit: diabetes follow up     Brief summary:  Jeanmarie is a 76 year old pleasant female with hx of SLE, APLS, DVT, osteoporosis and type 1 diabetes presenting for f/up.     Interval history:    Patient reports she feels mostly high in the morning, anything over 110 is high to her while fasting. This happens occasionally on 7/27 was 125 in the morning.     Continues to not use any long acting/basal (Lantus was stopped in 2020). Generally in the morning gives herself two units of insulin. Then at lunch gives herself 4, then at dinner she can give herslef anywhere between 2 and 3. She varies her insulin usage on carbs, gives 1 unit per 15 carbs, and if she were to go out she errs on the side of giving herself more if unfamiliar with the food.     Reports that the last time she had a low was possibly last week, her lows are in the 60s, and that she does not generally have too many lows from what she can remember. Felt fine but noticed it on her glucometer. Her symptoms are that her mind feels muggy and she feels tired.     Diabetes complications:   No h/o microalbuminuria.  Last eye exam - August 2021 (next one scheduled for September); no DR. S/P cataract surgery  Numbness and tingling sensation B/L toes - for many years but light sensation is intact. She does wear comfortable shoes/insoles. The neuropathy is stable, per patient. 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  She develops confusion, inability to concentrate or focus her vision and she feels extremely tired when her blood sugar is the 60s or 50s.  She has no prior episodes of loss of consciousness due to hypoglycemia.  She does have Baqsimi at home.    For her osteoporosis (carried over from last clinic note)    Jeanmarie also has a history of osteoporosis, treated with Boniva for  almost 3 years, followed by Forteo which was started in 4/12 - interrupted treatment from 4/2013 and restarted in 7/2013 until July 2014. After she completed the treatment with Forteo, she received one dose of Reclast, in July 2014.       She has no history of prior bone fractures.  She's been off prednisone since 2013.  Her height has decreased over the years from 5'6'' to 5'1/2''. Her mother had a hip fracture in her 80s.      On the DXA scan images from 7/1/16, L1-L3 T score was - 1.  Overall, at the spine, there was a significant increase of bone mineral density since 2005 of 10.5%. Since 2015, the bone mineral density has remained stable at spine. The lowest T score at the hip level was -2.2, at the left femoral neck.  Overall, there was a significant improvement of bone mineral density at the mean hip of 8.9% since 2005, with no significant changes since 2015. While the bone mineral density at the left hip increased since baseline, at the right hip, there was a decrease of bone mineral density since baseline and also, since prior year.     On the DEXA scan from 7/27/18, the lowest T score was -2.1, at the left femoral neck.  Compared with the prior scan from 2016, the bone mineral density at the hips remained stable.  At the lumbar spine, L1-L3 T score was -1.3, not significantly changed since 2016.     On DEXA scan images from January 2020, L2-L4 T score was -1.5. At the hips, the lowest score was -2.3, at the left femoral neck. Compared with the prior DEXA scan from 2018, there was a significant decrease in bone mineral density of the lumbar spine, left total hip and right total hip by 3.1, 4.7 and 3.5%, respectively.  The patient received zoledronic acid infusion on 7/30/2020 and 7/27/21.     Most recent DEXA from 6/16/2022 showed the following T score: -0.9 at L1 L4, -0.3 at 33% distal radius, -1.7 at both right and left femoral necks, -1.8 at the left total hip and -1.9 at the right total hip.  At the  radius and average hip, the bone mineral density has remained stable since 2020.  At the spine, there is a significant increase of 4.5%.     The current dose of calcium and vitamin D is 500 mg/200 U BID (oyster shell), Vit D 1000mg daily, an additional daily Centrum Silver MVI which contains 1000 U vitamin D per tablet.      In terms of dairy products, she does have yogurt and cottage cheese daily.      Review Of Systems  12 point ROS negative unless noted above    Past Medical History  Past Medical History:   Diagnosis Date     Achalasia      Antiphospholipid syndrome (H)      Antiplatelet or antithrombotic long-term use      Coagulation disorder (H) 8989-0091     DM (diabetes mellitus) (H)      DVT (deep venous thrombosis) (H) 2003    and PE     Dysphagia     occasional, use Nifedipine     GERD (gastroesophageal reflux disease)      Hyperlipidemia      Lymphedema      Myopia      Neuropathy     toes bilateral     Nonsenile cataract      osteoporosis      Pericarditis      Raynaud's disease      SLE (systemic lupus erythematosus) (H)        Past Surgical History  Past Surgical History:   Procedure Laterality Date     CATARACT IOL, RT/LT Left 02/2019     CATARACT IOL, RT/LT Right 01/2019     COLONOSCOPY N/A 11/28/2016    Procedure: COLONOSCOPY;  Surgeon: Sang August MD;  Location: [facility]     CYSTOSCOPY, SLING TRANSVAGINAL N/A 12/20/2016    Procedure: CYSTOSCOPY, SLING TRANSVAGINAL;  Surgeon: Jeanmarie Pierson MD;  Location: [facility]     DISSECT LYMPH NODE AXILLA  2004    Lt, during eval for SLE     HYSTEROSCOPIC PLACEMENT CONTRACEPTIVE DEVICE  1985     PHACOEMULSIFICATION WITH STANDARD INTRAOCULAR LENS IMPLANT Right 1/8/2019    Procedure: Right Eye Cataract Extraction with Intraocular Lens;  Surgeon: Monika Fermin MD;  Location: [facility]     PHACOEMULSIFICATION WITH STANDARD INTRAOCULAR LENS IMPLANT Left 2/5/2019    Procedure: Left Eye Cataract Extraction with Intraocular Lens;  Surgeon: Jeny  Monika Dobson MD;  Location: [facility]     TUBAL LIGATION Bilateral         Medications  Current Outpatient Medications   Medication Sig Dispense Refill     acetaminophen (TYLENOL) 500 MG tablet Take 1,000-1,500 mg by mouth nightly as needed        aspirin 81 MG tablet Take 81 mg by mouth At Bedtime        AYR SALINE NASAL NO-DRIP GEL Use as needed for nasal dryness 3 Tube 3     blood glucose (TRUE METRIX BLOOD GLUCOSE TEST) test strip USE TO TEST BLOOD SUGAR 5 TIMES DAILY OR AS DIRECTED 500 strip 3     calcium carbonate (OS-GABBY) 500 MG TABS Take 1 tablet by mouth 2 times daily (with meals)       cholecalciferol (VITAMIN D3) 25 mcg (1000 units) capsule Take 1 capsule by mouth daily       Glucagon (BAQSIMI ONE PACK) 3 MG/DOSE POWD Spray 3 mg in nostril as needed (to be used for severe hypoglycemic episodes) 1 each 4     Injection Device for insulin (NOVOPEN ECHO) RORY 1 each 4 times daily 1 each 0     insulin aspart (NOVOLOG PENFILL) 100 UNIT/ML cartridge INJECT subcu 1 UNIT PER 15G CHO WITH MEALS 3x A DAY. APPROX 15 UNITS DAILY. 15 mL 3     insulin pen needle (BD PAM U/F) 32G X 4 MM miscellaneous Use as directed with insulin pens. 200 each 3     LANCETS ULTRA THIN 30G MISC Test 7-8 times daily  (Lancets that go with pt current meter ) 7203 each 3     Multiple Vitamins-Minerals (CENTRUM SILVER PO) Take 1 tablet by mouth daily.       NIFEdipine ER OSMOTIC (PROCARDIA XL) 30 MG 24 hr tablet Take 1 tablet (30 mg) by mouth daily 90 tablet 1     order for DME Equipment being ordered: compression socks, 20-30 mmHg, knee high 8 Piece 4     simvastatin (ZOCOR) 40 MG tablet Take 1 tablet (40 mg) by mouth At Bedtime 90 tablet 1     warfarin ANTICOAGULANT (COUMADIN) 5 MG tablet Take 2 tablets (10mg) by mouth daily or as directed by the Coumadin clinic 90 tablet 3     mycophenolate (GENERIC EQUIVALENT) 500 MG tablet Take 1 tablet daily. (Patient not taking: Reported on 8/1/2022) 90 tablet 2       Physical Examination:  General  appearance: seated comfortably in the chair during assessment. Not in any acute distress  HEENT: PEERLA. Oral cavity is moist and clear.   Lungs: bilateral air entry equal. Clear to auscultation. No rhonchi or crepitations heard  Heart: S1 S2 normal. Pulse: regular rate and rhythm, good volume  Abdomen: soft, nontender, nondistended  Neurological: conscious and oriented. Speech: normal. Moving all four extremities equally  Extremities: mild edema. Dorsalis pedis 2+ bilaterally. No ulcers noted. No tremor is noted over her outstretched hands  Psychiatric: normal mood and affect. Normal judgment    Endocrine Labs:    Lab Results   Component Value Date    A1C 5.5 06/16/2022    A1C 5.7 02/23/2022    A1C 5.6 12/22/2021    A1C 5.4 07/07/2021    A1C 5.2 04/21/2021    A1C 5.6 01/20/2021    A1C 5.1 07/09/2020    A1C 5.3 07/11/2017     Lab Results   Component Value Date    CHOL 143 06/16/2022    CHOL 199 07/07/2021     Lab Results   Component Value Date    HDL 77 06/16/2022     07/07/2021     Lab Results   Component Value Date    LDL 54 06/16/2022    LDL 79 07/07/2021     Lab Results   Component Value Date    TRIG 62 06/16/2022    TRIG 58 07/07/2021     Lab Results   Component Value Date    CHOLHDLRATIO 1.5 10/01/2014                Assessment and Plan:    Jeanmarie is a 76 year old pleasant female with hx of SLE, APLS, DVT, osteoporosis and type 1 diabetes presenting for f/up    1) Type 1 diabetes, formally diagnosed in 2010 while being evaluated for steroid-induced hyperglycemia.  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  The hypoglycemic episodes have significantly decreased following the discontinuation of long-acting insulin.  Currently, her diabetes is managed with small dosages of short acting insulin based on the carbohydrate intake.  Patient continues to have good control of her diabetes, with tight glycemic control. Will follow up in 6 months with labs.     2) osteoporosis now  osteopenia  Hypercalcemia  Patient history of osteoporosis that is now more of osteopenia picture. Risk factors include postmenopausal status, lupus, prior treatment with steroids, diabetes, fam history.   She was treated with Boniva for 3 years, followed by Forteo for 2 years. Received one dose of Reclast in July 2014, when she completed treatment with Forteo.  Treatment with Reclast was resumed in July 2020, when the DEXA scan revealed some loss of bone mineral density, although the lowest T score remained in the osteopenic range.  She received another dose of Reclast in July 2021. Most recent DEXA showed improvement in bone mineral density, with the lowest T-scores at bilateral femoral necks. Lab work notable for elevated calcium, unclear why this might be, suspect could be related to her high usage of vitamin D.  - Will check lab work including phos, TSH, calcium, albumin and 25 hydroxy Vit. D level  - can hold off on further DEXA for another 2 years    Return to clinic in 6 months    The patient was seen, examined and discussed with MD Mariusz Hughes MD  [facility]  Internal Medicine PGY-2    I, Dorita Cramer, was present with the resident who participated in the service and in the documentation of the note.  I have verified the history and personally performed the physical exam and medical decision making.  I agree with the assessment and plan of care as documented in the note.     Dorita Cramer MD

## 2022-08-01 NOTE — NURSING NOTE
"Chief Complaint   Patient presents with     Diabetes     Vital signs:      BP: 117/62 Pulse: 77           Height: 165.1 cm (5' 5\") Weight: 56.1 kg (123 lb 11.2 oz)  Estimated body mass index is 20.58 kg/m  as calculated from the following:    Height as of this encounter: 1.651 m (5' 5\").    Weight as of this encounter: 56.1 kg (123 lb 11.2 oz).          "

## 2022-08-01 NOTE — LETTER
8/1/2022       RE: Shala Caruso  [address]  [place] [address]     Dear Colleague,    Thank you for referring your patient, Shala Caruso, to the [facility]. Please see a copy of my visit note below.    Date Time Reading Time  Reading  Time  Reading  Time  Reading    8/1 4am 94         7/31 10pm 99 4pm 89 11am 88 5am 103   7/30 9pm 74 5pm 88 12pm 80 6am 99   7/29 10pm 93 4pm 80 11am 94 5am 97   7/28 9pm 76 5pm 85 11am 78 4am 86   7/27 9pm 73 5pm 87 11am 77 8am 125   7/26 9pm 82 5pm 119 11am 104 5am 90           Endocrinology Follow-up Clinic Note    Shala Caruso MRN:[id number] YOB: 1945  Primary care provider: Joe Contreras     Reason for visit: diabetes follow up     Brief summary:  Jeanmarie is a 76 year old pleasant female with hx of SLE, APLS, DVT, osteoporosis and type 1 diabetes presenting for f/up.     Interval history:    Patient reports she feels mostly high in the morning, anything over 110 is high to her while fasting. This happens occasionally on 7/27 was 125 in the morning.     Continues to not use any long acting/basal (Lantus was stopped in 2020). Generally in the morning gives herself two units of insulin. Then at lunch gives herself 4, then at dinner she can give herslef anywhere between 2 and 3. She varies her insulin usage on carbs, gives 1 unit per 15 carbs, and if she were to go out she errs on the side of giving herself more if unfamiliar with the food.     Reports that the last time she had a low was possibly last week, her lows are in the 60s, and that she does not generally have too many lows from what she can remember. Felt fine but noticed it on her glucometer. Her symptoms are that her mind feels muggy and she feels tired.     Diabetes complications:   No h/o microalbuminuria.  Last eye exam - August 2021 (next one scheduled for September); no   S/P cataract surgery  Numbness and tingling sensation B/L toes - for many years but light sensation is intact. She does wear comfortable shoes/insoles. The neuropathy is stable, per patient. 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  She develops confusion, inability to concentrate or focus her vision and she feels extremely tired when her blood sugar is the 60s or 50s.  She has no prior episodes of loss of consciousness due to hypoglycemia.  She does have Baqsimi at home.    For her osteoporosis (carried over from last clinic note)    Jeanmarie also has a history of osteoporosis, treated with Boniva for almost 3 years, followed by Forteo which was started in 4/12 - interrupted treatment from 4/2013 and restarted in 7/2013 until July 2014. After she completed the treatment with Forteo, she received one dose of Reclast, in July 2014.       She has no history of prior bone fractures.  She's been off prednisone since 2013.  Her height has decreased over the years from 5'6'' to 5'1/2''. Her mother had a hip fracture in her 80s.      On the DXA scan images from 7/1/16, L1-L3 T score was - 1.  Overall, at the spine, there was a significant increase of bone mineral density since 2005 of 10.5%. Since 2015, the bone mineral density has remained stable at spine. The lowest T score at the hip level was -2.2, at the left femoral neck.  Overall, there was a significant improvement of bone mineral density at the mean hip of 8.9% since 2005, with no significant changes since 2015. While the bone mineral density at the left hip increased since baseline, at the right hip, there was a decrease of bone mineral density since baseline and also, since prior year.     On the DEXA scan from 7/27/18, the lowest T score was -2.1, at the left femoral neck.  Compared with the prior scan from 2016, the bone mineral density at the hips remained stable.  At the lumbar spine, L1-L3 T score was -1.3, not significantly changed  since 2016.     On DEXA scan images from January 2020, L2-L4 T score was -1.5. At the hips, the lowest score was -2.3, at the left femoral neck. Compared with the prior DEXA scan from 2018, there was a significant decrease in bone mineral density of the lumbar spine, left total hip and right total hip by 3.1, 4.7 and 3.5%, respectively.  The patient received zoledronic acid infusion on 7/30/2020 and 7/27/21.     Most recent DEXA from 6/16/2022 showed the following T score: -0.9 at L1 L4, -0.3 at 33% distal radius, -1.7 at both right and left femoral necks, -1.8 at the left total hip and -1.9 at the right total hip.  At the radius and average hip, the bone mineral density has remained stable since 2020.  At the spine, there is a significant increase of 4.5%.     The current dose of calcium and vitamin D is 500 mg/200 U BID (oyster shell), Vit D 1000mg daily, an additional daily Centrum Silver MVI which contains 1000 U vitamin D per tablet.      In terms of dairy products, she does have yogurt and cottage cheese daily.      Review Of Systems  12 point ROS negative unless noted above    Past Medical History  Past Medical History:   Diagnosis Date     Achalasia      Antiphospholipid syndrome (H)      Antiplatelet or antithrombotic long-term use      Coagulation disorder (H) 5665-6844     DM (diabetes mellitus) (H)      DVT (deep venous thrombosis) (H) 2003    and PE     Dysphagia     occasional, use Nifedipine     GERD (gastroesophageal reflux disease)      Hyperlipidemia      Lymphedema      Myopia      Neuropathy     toes bilateral     Nonsenile cataract      osteoporosis      Pericarditis      Raynaud's disease      SLE (systemic lupus erythematosus) (H)        Past Surgical History  Past Surgical History:   Procedure Laterality Date     CATARACT IOL, RT/LT Left 02/2019     CATARACT IOL, RT/LT Right 01/2019     COLONOSCOPY N/A 11/28/2016    Procedure: COLONOSCOPY;  Surgeon: Sang August MD;  Location: [facility]      CYSTOSCOPY, SLING TRANSVAGINAL N/A 12/20/2016    Procedure: CYSTOSCOPY, SLING TRANSVAGINAL;  Surgeon: Jeanmarie Pierson MD;  Location: [facility]     DISSECT LYMPH NODE AXILLA  2004    Lt, during eval for SLE     HYSTEROSCOPIC PLACEMENT CONTRACEPTIVE DEVICE  1985     PHACOEMULSIFICATION WITH STANDARD INTRAOCULAR LENS IMPLANT Right 1/8/2019    Procedure: Right Eye Cataract Extraction with Intraocular Lens;  Surgeon: Monika Fermin MD;  Location: [facility]     PHACOEMULSIFICATION WITH STANDARD INTRAOCULAR LENS IMPLANT Left 2/5/2019    Procedure: Left Eye Cataract Extraction with Intraocular Lens;  Surgeon: Monika Fermin MD;  Location: [facility]     TUBAL LIGATION Bilateral         Medications  Current Outpatient Medications   Medication Sig Dispense Refill     acetaminophen (TYLENOL) 500 MG tablet Take 1,000-1,500 mg by mouth nightly as needed        aspirin 81 MG tablet Take 81 mg by mouth At Bedtime        AYR SALINE NASAL NO-DRIP GEL Use as needed for nasal dryness 3 Tube 3     blood glucose (TRUE METRIX BLOOD GLUCOSE TEST) test strip USE TO TEST BLOOD SUGAR 5 TIMES DAILY OR AS DIRECTED 500 strip 3     calcium carbonate (OS-GABBY) 500 MG TABS Take 1 tablet by mouth 2 times daily (with meals)       cholecalciferol (VITAMIN D3) 25 mcg (1000 units) capsule Take 1 capsule by mouth daily       Glucagon (BAQSIMI ONE PACK) 3 MG/DOSE POWD Spray 3 mg in nostril as needed (to be used for severe hypoglycemic episodes) 1 each 4     Injection Device for insulin (NOVOPEN ECHO) RORY 1 each 4 times daily 1 each 0     insulin aspart (NOVOLOG PENFILL) 100 UNIT/ML cartridge INJECT subcu 1 UNIT PER 15G CHO WITH MEALS 3x A DAY. APPROX 15 UNITS DAILY. 15 mL 3     insulin pen needle (BD PAM U/F) 32G X 4 MM miscellaneous Use as directed with insulin pens. 200 each 3     LANCETS ULTRA THIN 30G MISC Test 7-8 times daily  (Lancets that go with pt current meter ) 7203 each 3     Multiple Vitamins-Minerals (CENTRUM SILVER PO) Take  1 tablet by mouth daily.       NIFEdipine ER OSMOTIC (PROCARDIA XL) 30 MG 24 hr tablet Take 1 tablet (30 mg) by mouth daily 90 tablet 1     order for DME Equipment being ordered: compression socks, 20-30 mmHg, knee high 8 Piece 4     simvastatin (ZOCOR) 40 MG tablet Take 1 tablet (40 mg) by mouth At Bedtime 90 tablet 1     warfarin ANTICOAGULANT (COUMADIN) 5 MG tablet Take 2 tablets (10mg) by mouth daily or as directed by the Coumadin clinic 90 tablet 3     mycophenolate (GENERIC EQUIVALENT) 500 MG tablet Take 1 tablet daily. (Patient not taking: Reported on 8/1/2022) 90 tablet 2       Physical Examination:  General appearance: seated comfortably in the chair during assessment. Not in any acute distress  HEENT: PEERLA. Oral cavity is moist and clear.   Lungs: bilateral air entry equal. Clear to auscultation. No rhonchi or crepitations heard  Heart: S1 S2 normal. Pulse: regular rate and rhythm, good volume  Abdomen: soft, nontender, nondistended  Neurological: conscious and oriented. Speech: normal. Moving all four extremities equally  Extremities: mild edema. Dorsalis pedis 2+ bilaterally. No ulcers noted. No tremor is noted over her outstretched hands  Psychiatric: normal mood and affect. Normal judgment    Endocrine Labs:    Lab Results   Component Value Date    A1C 5.5 06/16/2022    A1C 5.7 02/23/2022    A1C 5.6 12/22/2021    A1C 5.4 07/07/2021    A1C 5.2 04/21/2021    A1C 5.6 01/20/2021    A1C 5.1 07/09/2020    A1C 5.3 07/11/2017     Lab Results   Component Value Date    CHOL 143 06/16/2022    CHOL 199 07/07/2021     Lab Results   Component Value Date    HDL 77 06/16/2022     07/07/2021     Lab Results   Component Value Date    LDL 54 06/16/2022    LDL 79 07/07/2021     Lab Results   Component Value Date    TRIG 62 06/16/2022    TRIG 58 07/07/2021     Lab Results   Component Value Date    CHOLHDLRATIO 1.5 10/01/2014                Assessment and Plan:    Jeanmarie is a 76 year old pleasant female with hx of  SLE, APLS, DVT, osteoporosis and type 1 diabetes presenting for f/up    1) Type 1 diabetes, formally diagnosed in 2010 while being evaluated for steroid-induced hyperglycemia.  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  The hypoglycemic episodes have significantly decreased following the discontinuation of long-acting insulin.  Currently, her diabetes is managed with small dosages of short acting insulin based on the carbohydrate intake.  Patient continues to have good control of her diabetes, with tight glycemic control. Will follow up in 6 months with labs.     2) osteoporosis now osteopenia  Hypercalcemia  Patient history of osteoporosis that is now more of osteopenia picture. Risk factors include postmenopausal status, lupus, prior treatment with steroids, diabetes, fam history.   She was treated with Boniva for 3 years, followed by Forteo for 2 years. Received one dose of Reclast in July 2014, when she completed treatment with Forteo.  Treatment with Reclast was resumed in July 2020, when the DEXA scan revealed some loss of bone mineral density, although the lowest T score remained in the osteopenic range.  She received another dose of Reclast in July 2021. Most recent DEXA showed improvement in bone mineral density, with the lowest T-scores at bilateral femoral necks. Lab work notable for elevated calcium, unclear why this might be, suspect could be related to her high usage of vitamin D.  - Will check lab work including phos, TSH, calcium, albumin and 25 hydroxy Vit. D level  - can hold off on further DEXA for another 2 years    Return to clinic in 6 months    The patient was seen, examined and discussed with MD Mariusz Hughes MD  [facility]  Internal Medicine PGY-2    I, Dorita Cramer, was present with the resident who participated in the service and in the documentation of the note.  I have verified the history and personally performed the  physical exam and medical decision making.  I agree with the assessment and plan of care as documented in the note.     Dorita Cramer MD

## 2022-08-02 ENCOUNTER — LAB (OUTPATIENT)
Dept: LAB | Facility: CLINIC | Age: 77
End: 2022-08-02
Payer: COMMERCIAL

## 2022-08-02 DIAGNOSIS — L93.0 LUPUS ERYTHEMATOSUS: ICD-10-CM

## 2022-08-02 DIAGNOSIS — Z79.01 LONG TERM CURRENT USE OF ANTICOAGULANT THERAPY: ICD-10-CM

## 2022-08-02 DIAGNOSIS — D68.61 ANTIPHOSPHOLIPID SYNDROME (H): ICD-10-CM

## 2022-08-02 PROCEDURE — 99000 SPECIMEN HANDLING OFFICE-LAB: CPT | Performed by: PATHOLOGY

## 2022-08-02 PROCEDURE — 36415 COLL VENOUS BLD VENIPUNCTURE: CPT | Performed by: PATHOLOGY

## 2022-08-02 PROCEDURE — 85210 CLOT FACTOR II PROTHROM SPEC: CPT | Mod: 90 | Performed by: PATHOLOGY

## 2022-08-03 ENCOUNTER — ANTICOAGULATION THERAPY VISIT (OUTPATIENT)
Dept: ANTICOAGULATION | Facility: CLINIC | Age: 77
End: 2022-08-03

## 2022-08-03 DIAGNOSIS — D68.61 ANTIPHOSPHOLIPID SYNDROME (H): ICD-10-CM

## 2022-08-03 DIAGNOSIS — L93.0 LUPUS ERYTHEMATOSUS: ICD-10-CM

## 2022-08-03 DIAGNOSIS — Z79.01 LONG TERM CURRENT USE OF ANTICOAGULANT THERAPY: Primary | ICD-10-CM

## 2022-08-03 LAB — PROTHROM ACT/NOR PPP: 23 % (ref 60–140)

## 2022-08-03 NOTE — PROGRESS NOTES
ANTICOAGULATION MANAGEMENT     Shala Caruso 76 year old female is on warfarin with therapeutic result. (Goal Factor II: 25-15%)    Recent labs: (last 7 days)     08/02/22  1310   F2 23*       ASSESSMENT     Source(s): Chart Review and Patient/Caregiver Call       Warfarin doses taken: Warfarin taken as instructed    Diet: No new diet changes identified    New illness, injury, or hospitalization: No    Medication/supplement changes: None noted    Signs or symptoms of bleeding or clotting: No    Previous result: Subtherapeutic    Additional findings: None       PLAN     Recommended plan for no diet, medication or health factor changes affecting result    Dosing Instructions: Continue your current warfarin dose 10mg Tu and 7.5mg all other days.  with next Factor II in 3 weeks       Detailed voice message left for Jeanmarie with dosing instructions and follow up date.     Contact [phone] to schedule and with any changes, questions or concerns.     Education provided: Please call back if any changes to your diet, medications or how you've been taking warfarin and Contact [phone] with any changes, questions or concerns.     Plan made per [facility] anticoagulation protocol

## 2022-08-05 LAB
DEPRECATED CALCIDIOL+CALCIFEROL SERPL-MC: <74 UG/L (ref 20–75)
VITAMIN D2 SERPL-MCNC: <5 UG/L
VITAMIN D3 SERPL-MCNC: 69 UG/L

## 2022-08-05 NOTE — RESULT ENCOUNTER NOTE
The calcium level normalized.  The vitamin D continues to lackey towards the upper end of normal range.  I think we should consider minimally decreasing the dose.  Would you be able to tell me how much vitamin D you have been taking on a daily basis, from all supplements?   The minimally elevated calcium level on prior lab work was something transient, maybe due to dehydration or lab error.

## 2022-08-18 NOTE — PROGRESS NOTES
[facility]  Rheumatology Clinic  Tato Agarwal MD  2022     Name: Shala Caruso  MRN: [id number]  76 year old  : 1945  Referring provider: Joe Contreras     Assessment and Plan:    Systemic lupus erythematosus (+CRISTAL, +dsDNA, +SSA, hypocomplementemia; Raynaud's, arthritis, serositis, tubulo-interstitial nephropathy, pericardial effusion/tamponade): Pt relates stable bilateral knee and hip pain that improves with movement and stretching, as well as mild loss of sensation in her toes, also stable.  Raynaud's phenomenon is mild, and is controlled with thermal protective measures alone.  No urinary or cutaneous symptoms.  Examination today revealed normal range of motion in the hand and finger joints without evidence of digital tapering or cyanosis. +onychomycosis.    Blood work on 2022 showed calcium, phosphorus, albumin, TSH, CBC all normal except for a low white count of 3.7.  Creatinine and transaminases were normal in 2022.    Discussion:   SLE is in remission low side under that should okay remain on this.  GFR by history, examination, and laboratory evaluation.  Disease that formerly affected the kidney as well as serosal surfaces remains quiescent, despite discontinuation of mmf (used [id number]).  I recommend continuing off immunomodulatory therapy (patient is allergic to sulfa containing molecules, and specifically to hydroxychloroquine and methotrexate), monitor carefully for recurrence of symptoms formerly associated with lupus, and checking creatinine, urinalysis, and CBC once every 6 months.    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 Following factor 2 levels.  # Peripheral neuropathy  # Raynaud's: stable  # Achalasia: stable on nifedipine  #Osteoarthritis, knees and hips: Chronic use associated pain and stiffness is modest, stable.  Patient is capable of performing physical activity for multiple hours daily.  I recommended  "continue weightbearing exercises and range of motion exercises daily.    # Osteoporosis: DEXA scan performed in June July 2020 revealed a T score of -2.3 at the femoral neck.  I agree with continued bisphosphonate therapy.  Patient underwent zoledronic acid per endocrinology in July 2020.  I agree with plans for annual repeat treatment.  Patient should continue calcium carbonate, vitamin D, and weightbearing exercise as well.    Plan:  1.  Remain off mycophenolate  2.  Recheck blood work and urinalysis in 4 months.  3.  Continue calcium carbonate 1200 millequivalents daily, and vitamin D 800 international units daily to support bone density; continue range of motion exercise and weight bearing exercise.  4 Monitor blood pressure once or twice weekly, and monitor for recurrence of SLE symptoms.  5. Derm referral for fungal infection, fingernails.    Tato Agarwal M.D.  Staff Rheumatologist,  Health  Pager [phone]    Follow-up: 12 mos    HPI:   Shala Caruso has a history of DM, SLE, PE on Coumadin, and HTN who presents for evaluation of lupus.  She was last seen 3-22. At that time she was doing well without no significant symptoms of autoimmune disease.  Plan was to taper mycophenolate to 500 mg twice daily, and then off by June 2022.     Summary of previous history:  SLE diagnosed in 2000 (+CRISTAL, +dsDNA ab, arthritis, serositis).  She has antiphospholipid syndrome and had DVT and PE in 2004, on Coumadin since then.  She had a Lupus flare in 2010 initially treated with Plaquenil however she worsened and developed pericardial effusion/tamponade - started on MTX and tapering dose of prednisone at that time (continued on about 10 mg daily).  Cellcept started 6/2010, Prednisone tapered off.  MTX stopped May 2012.  Mycophenolate tapered from 2 g daily to off from November 2021 through July 2022.    Interval history August 19, 2022    Her knees and hips give constant pain, but \"tolerable\". If she sits too long, " "she is very stiff. Hard to get into and out of a car. Morning stiffness is brief, relieved by movement. She can walk 1-2 hours daily, using a walking stick. No pain in UE joints. She does daily yoga with a lot of stretching; she tends to feel better with it. Not using volatern because of the coumadin.     Her hands are sensitive to cold with ongoing Raynaud's. No digital ulcers. Biggest problem is when she is washing food under cold water. Hard time looking for BG with fingerstick.    She had last COVID booster (4th) in June.    She stopped her cellcept in June. She notes no sx of flare.    Stable numbness in feet for \"years\"; she does have balance issues, especially with stairs.    Interval history 05-:    Getting along \"ok\". She is unchanged in that Her knees and hips give constant pain \"tolerable\". If she sits too long, she is very stiff. Morning stiffness is brief, relieved by movement. No pain in UE joints. She is walking every day; she plans to start biking this summer. She does daily yoga with a lot of stretching; she tends to feel better with it.    Her hands are sensitive to cold with ongoing Raynaud's. No digital ulcers.    No F/C/S, chest pain.  No urinary symptoms.      Hip pain is in the groin, made worse with long walking.      Review of Systems:  Pertinent items are noted in HPI or as below, remainder of complete ROS is negative.      No recent problems with hearing or vision. Eye dryness relieved by once daily Refresh; no dry mouth  No breathing difficulty, shortness of breath, coughing, or wheezing  No chest pain or palpitations  No heart burn, indigestion, abdominal pain, nausea, vomiting, diarrhea  No urination problems, no bloody, cloudy urine, no dysuria  + toe \"neuropathy\",. Symmetrical, stable.  No headaches or confusion  No rashes. No easy bleeding or bruising.     Active Medications:     Current Outpatient Medications   Medication Sig     acetaminophen (TYLENOL) 500 MG tablet Take " "1,000-1,500 mg by mouth nightly as needed      aspirin 81 MG tablet Take 81 mg by mouth At Bedtime      AYR SALINE NASAL NO-DRIP GEL Use as needed for nasal dryness     blood glucose (TRUE METRIX BLOOD GLUCOSE TEST) test strip USE TO TEST BLOOD SUGAR 5 TIMES DAILY OR AS DIRECTED     calcium carbonate (OS-GABBY) 500 MG TABS Take 1 tablet by mouth 2 times daily (with meals)     cholecalciferol (VITAMIN D3) 25 mcg (1000 units) capsule Take 1 capsule by mouth daily     Glucagon (BAQSIMI ONE PACK) 3 MG/DOSE POWD Spray 3 mg in nostril as needed (to be used for severe hypoglycemic episodes)     Injection Device for insulin (NOVOPEN ECHO) RORY 1 each 4 times daily     insulin aspart (NOVOLOG PENFILL) 100 UNIT/ML cartridge INJECT subcu 1 UNIT PER 15G CHO WITH MEALS 3x A DAY. APPROX 15 UNITS DAILY.     insulin pen needle (BD PAM U/F) 32G X 4 MM miscellaneous Use as directed with insulin pens.     LANCETS ULTRA THIN 30G MISC Test 7-8 times daily  (Lancets that go with pt current meter )     Multiple Vitamins-Minerals (CENTRUM SILVER PO) Take 1 tablet by mouth daily.     NIFEdipine ER OSMOTIC (PROCARDIA XL) 30 MG 24 hr tablet Take 1 tablet (30 mg) by mouth daily     order for DME Equipment being ordered: compression socks, 20-30 mmHg, knee high     simvastatin (ZOCOR) 40 MG tablet Take 1 tablet (40 mg) by mouth At Bedtime     warfarin ANTICOAGULANT (COUMADIN) 5 MG tablet Take 2 tablets (10mg) by mouth daily or as directed by the Coumadin clinic     mycophenolate (GENERIC EQUIVALENT) 500 MG tablet Take 1 tablet daily. (Patient not taking: Reported on 8/19/2022)     No current facility-administered medications for this visit.       No longer on lantus: too many \"lows\".  Allergies:   Amaryl [glimepiride], Metformin, Plaquenil [aminoquinolines], Sulfa drugs, Amoxicillin, Hydroxychloroquine, and Penicillins      Past Medical History:  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   Raynaud's " disease  Antiphospholipid syndrome   Type 1 diabetes mellitus--started after prednisone therapy. Checks BS 5X daily.  Osteoporosis   Hypertension   Hyperlipidemia   Achalasia   Gastroesophageal reflux disease   Choroidal lesion  Atrophic vaginitis   Urinary urgency   Female stress incontinence   Cystocele, midline   Deep vein thrombosis   Nonsenile cataract   Myopia   Neuropathy   Lymphedema      Past Surgical History:  Phacoemulsification clear cornea with intraocular lens implantation, right 1/8/19, left 2/5/19  Transvaginal sling 12/20/16  Axilla lymph node dissection 2004  Bilateral tubal ligation     Family History:   Glaucoma - father   Breast cancer   Stroke       Social History:   Tobacco Use: No previous or current tobacco use.   Alcohol Use: No alcohol use.   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  Wt Readings from Last 4 Encounters:   08/01/22 56.1 kg (123 lb 11.2 oz)   01/24/22 52.6 kg (116 lb)   07/30/20 52.6 kg (116 lb)   02/05/19 56.7 kg (125 lb)       Constitutional: well-developed, appearing stated age; cooperative  Eyes: nl EOM, PERRLA, vision, conjunctiva, sclera  ENT: nl external ears, nose, hearing, lips, teeth, gums, throat  No mucous membrane lesions, normal saliva pool  Neck: no mass or thyroid enlargement  Resp: unlabored breathing  Lymph: no cervical, supraclavicular, inguinal or epitrochlear nodes  MS: Osteoarthritis changes in the hands; knees without effusion or joint line tenderness.  Skin: Onychomycosis in multiple fingernails with some paronychial fissuring.  No fingertip pulp tenderness or skin ulceration.  Neuro: nl cranial nerves, strength, sensation, DTRs.   Psych: nl judgement, orientation, memory, affect.      Laboratory:   RHEUM RESULTS Latest Ref Rng & Units 1/10/2005 1/14/2005 1/31/2005   ALBUMIN 3.4 - 5.0 g/dL - - -   ALT 0 - 50 U/L  - - -   AST 0 - 45 U/L - - -   COMPLEMENT C3 81 - 157 mg/dL - 101 -   COMPLEMENT C4 13 - 39 mg/dL - - -   CK TOTAL 30 - 225 U/L - - -   CREATININE 0.52 - 1.04 mg/dL - - 0.79   CRP 0.0 - 8.0 mg/L 4.46(H) - -   DNA <10.0 IU/mL - - -   DIRK 0 - 1.0 - - -   GFR ESTIMATE, IF BLACK >60 mL/min/[1.73:m2] - - >80   GFR ESTIMATE >60 mL/min/1.73m2 - - 80   HEMATOCRIT 35.0 - 47.0 % 36.9 33.1(L) 37.3   HEMOGLOBIN 11.7 - 15.7 g/dL 12.3 11.0(L) 12.3   HCVAB NR - - -   WBC 4.0 - 11.0 10e3/uL 5.3 2.2(L) 3.9(L)   RBC 3.80 - 5.20 10e6/uL 4.55 4.11 4.58   RDW 10.0 - 15.0 % 18.2(H) 17.9(H) 19.9(H)   MCHC 31.5 - 36.5 g/dL 33.2 33.3 32.9   MCV 78 - 100 fL 81 81 82   PLATELET COUNT 150 - 450 10e3/uL 292 284 322   RHEUMATOID FACTOR 0 - 14 IU/mL - - -   ESR 0 - 30 mm/h 38(H) - -     Rheumatoid Factor   Date Value Ref Range Status   01/14/2009 8 0 - 14 IU/mL Final     Cyclic Citrullinated Peptide IgG   Date Value Ref Range Status   01/14/2009 <2 <5 U/mL Final     Comment:     Interpretation:  Negative     Ribonucleic Protein IgG Antibody   Date Value Ref Range Status   01/24/2007 12  Final     Comment:     Reference range: 0 to 49  Unit: Units  (Note)  REFERENCE INTERVAL: Ribonucleic Protein (VIKRAM), IgG   Less than 20 Units ........ None detected   20 - 49 Units ............. Inconclusive   50 Units or greater ....... Positive    RNP antibody is seen in % of mixed connective tissue  disease and is considered specific for this syndrome if  other antibodies are negative. RNP is also present in  20-30% of SLE and 15-25% of progressive systemic  sclerosis.  The above test was performed at: [facility],  [address] [place] [address]  [address]  [phone]  [email or web address]     SSA (RO) Antibody IgG   Date Value Ref Range Status   08/24/2005 221 (H)  Final     Comment:     Reference range: 0 to 49  Unit: Units  (Note)  REFERENCE INTERVALS: SSA (Ro) (VIKRAM) Ab, IgG   Less than 20 Units ....... None detected   20 - 49 Units ............  Inconclusive   50 Units or greater ...... Positive    SSA (Ro) antibody is seen in70-75% of Sjogren syndrome  cases, 30-40% of systemic lupus erythematosus (SLE) and  5-10% of progressive systemic sclerosis (PSS).  The above test was performed at: [facility],  [address] [facility]  [id number] [phone]  [email or web address]     SSB (LA) Antibody IgG   Date Value Ref Range Status   08/24/2005 20  Final     Comment:     Reference range: 0 to 49  Unit: Units  (Note)  REFERENCE INTERVALS: SSB (La) (VIKRAM) Ab, IgG   Less than 20 Units ....... None detected   20 - 49 Units ............ Inconclusive   50 Units or greater ...... Positive    SSB (La) antibody is seen in 50-60% of Sjogren syndrome  cases and is specific if it is the only VIKRAM antibody  present. 15-25% of patients with systemic lupus  erythematosus (SLE) and 5-10% of patients with progressive  systemic sclerosis (PSS) also have this antibody.  The above test was performed at: [facility],  [address] [facility]  [id number] [phone]  [email or web address]   ,  ,   CRISTAL Screen by EIA   Date Value Ref Range Status   02/16/2010 8.2 (H) 0 - 1.0 Final     Comment:     Interpretation:  Positive     DNA-ds   Date Value Ref Range Status   06/12/2019 1 <10 IU/mL Final     Comment:     Negative     Albumin Fraction   Date Value Ref Range Status   04/01/2013 3.9 3.7 - 5.1 g/dL Final     Alpha 2 Fraction   Date Value Ref Range Status   04/01/2013 0.7 0.5 - 0.9 g/dL Final     Beta Fraction   Date Value Ref Range Status   04/01/2013 0.6 0.6 - 1.0 g/dL Final     Gamma Fraction   Date Value Ref Range Status   04/01/2013 0.7 0.7 - 1.6 g/dL Final     Monoclonal Peak   Date Value Ref Range Status   04/01/2013 0.0 0.0 g/dL Final     ELP Interpretation:   Date Value Ref Range Status   04/01/2013   Final    Essentially normal electrophoretic pattern.  No monoclonal protein seen.   Pathologic significance requires clinical correlation.  LEATHA Valencia M.D.,   Ph.D., Pathologist

## 2022-08-19 ENCOUNTER — OFFICE VISIT (OUTPATIENT)
Dept: RHEUMATOLOGY | Facility: CLINIC | Age: 77
End: 2022-08-19
Attending: INTERNAL MEDICINE
Payer: COMMERCIAL

## 2022-08-19 ENCOUNTER — TELEPHONE (OUTPATIENT)
Dept: ENDOCRINOLOGY | Facility: CLINIC | Age: 77
End: 2022-08-19

## 2022-08-19 VITALS — HEART RATE: 68 BPM | DIASTOLIC BLOOD PRESSURE: 67 MMHG | OXYGEN SATURATION: 99 % | SYSTOLIC BLOOD PRESSURE: 124 MMHG

## 2022-08-19 DIAGNOSIS — B35.1 ONYCHOMYCOSIS: Primary | ICD-10-CM

## 2022-08-19 DIAGNOSIS — M32.9 SYSTEMIC LUPUS ERYTHEMATOSUS, UNSPECIFIED SLE TYPE, UNSPECIFIED ORGAN INVOLVEMENT STATUS (H): ICD-10-CM

## 2022-08-19 PROCEDURE — 99214 OFFICE O/P EST MOD 30 MIN: CPT | Performed by: INTERNAL MEDICINE

## 2022-08-19 NOTE — NURSING NOTE
Chief Complaint   Patient presents with     Follow Up     6 months for lupus.      Blood pressure 124/67, pulse 68, SpO2 99 %, not currently breastfeeding.    Kemi Sarabia, CMA

## 2022-08-19 NOTE — PATIENT INSTRUCTIONS
Diagnosis:  1.  Systemic lupus erythematosus with a history of phospholipid antibodies, Raynaud's, serositis, and nephritis: Systemic lupus erythematosus remains quiescent, despite discontinuation of cellcept.  2.  Osteoporosis: definitive treatment with zoledronic acid given in July 2020.  I recommend continuing calcium and vitamin D supplements, follow-up with endocrinology.  3.  Propensity to clot: No evidence of recurrent thrombosis.  I recommend continuing warfarin as directed through [facility].  4. Osteoarthritis, knees and hips; continue symptomatic treatment    Plan:  1.  Remain off mycophenolate  2.  Recheck blood work and urinalysis in 4 months.  3.  Continue calcium carbonate 1200 millequivalents daily, and vitamin D 800 international units daily to support bone density; continue range of motion exercise and weight bearing exercise.  4 Monitor blood pressure once or twice weekly, and monitor for recurrence of SLE symptoms.

## 2022-08-19 NOTE — LETTER
2022       RE: Shala Caruso  [address]  [place] [address]     Dear Colleague,    Thank you for referring your patient, Shala Caruso, to the [facility] RHEUMATOLOGY CLINIC [place] at [facility]. Please see a copy of my visit note below.    [facility]  Rheumatology Clinic  Tato Agarwal MD  2022     Name: Shala Caruso  MRN: [id number]  76 year old  : 1945  Referring provider: Joe Contreras     Assessment and Plan:    Systemic lupus erythematosus (+CRISTAL, +dsDNA, +SSA, hypocomplementemia; Raynaud's, arthritis, serositis, tubulo-interstitial nephropathy, pericardial effusion/tamponade): Pt relates stable bilateral knee and hip pain that improves with movement and stretching, as well as mild loss of sensation in her toes, also stable.  Raynaud's phenomenon is mild, and is controlled with thermal protective measures alone.  No urinary or cutaneous symptoms.  Examination today revealed normal range of motion in the hand and finger joints without evidence of digital tapering or cyanosis. +onychomycosis.    Blood work on 2022 showed calcium, phosphorus, albumin, TSH, CBC all normal except for a low white count of 3.7.  Creatinine and transaminases were normal in 2022.    Discussion:   SLE is in remission low side under that should okay remain on this.  GFR by history, examination, and laboratory evaluation.  Disease that formerly affected the kidney as well as serosal surfaces remains quiescent, despite discontinuation of mmf (used [id number]).  I recommend continuing off immunomodulatory therapy (patient is allergic to sulfa containing molecules, and specifically to hydroxychloroquine and methotrexate), monitor carefully for recurrence of symptoms formerly associated with lupus, and checking creatinine, urinalysis, and CBC once every 6 months.    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 Following factor 2 levels.  # Peripheral neuropathy  # Raynaud's: stable  # Achalasia: stable on nifedipine  #Osteoarthritis, knees and hips: Chronic use associated pain and stiffness is modest, stable.  Patient is capable of performing physical activity for multiple hours daily.  I recommended continue weightbearing exercises and range of motion exercises daily.    # Osteoporosis: DEXA scan performed in June July 2020 revealed a T score of -2.3 at the femoral neck.  I agree with continued bisphosphonate therapy.  Patient underwent zoledronic acid per endocrinology in July 2020.  I agree with plans for annual repeat treatment.  Patient should continue calcium carbonate, vitamin D, and weightbearing exercise as well.    Plan:  1.  Remain off mycophenolate  2.  Recheck blood work and urinalysis in 4 months.  3.  Continue calcium carbonate 1200 millequivalents daily, and vitamin D 800 international units daily to support bone density; continue range of motion exercise and weight bearing exercise.  4 Monitor blood pressure once or twice weekly, and monitor for recurrence of SLE symptoms.  5. Derm referral for fungal infection, fingernails.    Tato Agarwal M.D.  Staff Rheumatologist, [facility]  Pager [phone]    Follow-up: 12 mos    HPI:   Shala Caruso has a history of DM, SLE, PE on Coumadin, and HTN who presents for evaluation of lupus.  She was last seen 3-22. At that time she was doing well without no significant symptoms of autoimmune disease.  Plan was to taper mycophenolate to 500 mg twice daily, and then off by June 2022.     Summary of previous history:  SLE diagnosed in 2000 (+CRISTAL, +dsDNA ab, arthritis, serositis).  She has antiphospholipid syndrome and had DVT and PE in 2004, on Coumadin since then.  She had a Lupus flare in 2010 initially treated with Plaquenil however she worsened and developed pericardial effusion/tamponade - started on MTX and tapering dose of prednisone  "at that time (continued on about 10 mg daily).  Cellcept started 6/2010, Prednisone tapered off.  MTX stopped May 2012.  Mycophenolate tapered from 2 g daily to off from November 2021 through July 2022.    Interval history August 19, 2022    Her knees and hips give constant pain, but \"tolerable\". If she sits too long, she is very stiff. Hard to get into and out of a car. Morning stiffness is brief, relieved by movement. She can walk 1-2 hours daily, using a walking stick. No pain in UE joints. She does daily yoga with a lot of stretching; she tends to feel better with it. Not using volatern because of the coumadin.     Her hands are sensitive to cold with ongoing Raynaud's. No digital ulcers. Biggest problem is when she is washing food under cold water. Hard time looking for BG with fingerstick.    She had last COVID booster (4th) in June.    She stopped her cellcept in June. She notes no sx of flare.    Stable numbness in feet for \"years\"; she does have balance issues, especially with stairs.    Interval history 05-:    Getting along \"ok\". She is unchanged in that Her knees and hips give constant pain \"tolerable\". If she sits too long, she is very stiff. Morning stiffness is brief, relieved by movement. No pain in UE joints. She is walking every day; she plans to start biking this summer. She does daily yoga with a lot of stretching; she tends to feel better with it.    Her hands are sensitive to cold with ongoing Raynaud's. No digital ulcers.    No F/C/S, chest pain.  No urinary symptoms.      Hip pain is in the groin, made worse with long walking.      Review of Systems:  Pertinent items are noted in HPI or as below, remainder of complete ROS is negative.      No recent problems with hearing or vision. Eye dryness relieved by once daily Refresh; no dry mouth  No breathing difficulty, shortness of breath, coughing, or wheezing  No chest pain or palpitations  No heart burn, indigestion, abdominal pain, " "nausea, vomiting, diarrhea  No urination problems, no bloody, cloudy urine, no dysuria  + toe \"neuropathy\",. Symmetrical, stable.  No headaches or confusion  No rashes. No easy bleeding or bruising.     Active Medications:     Current Outpatient Medications   Medication Sig     acetaminophen (TYLENOL) 500 MG tablet Take 1,000-1,500 mg by mouth nightly as needed      aspirin 81 MG tablet Take 81 mg by mouth At Bedtime      AYR SALINE NASAL NO-DRIP GEL Use as needed for nasal dryness     blood glucose (TRUE METRIX BLOOD GLUCOSE TEST) test strip USE TO TEST BLOOD SUGAR 5 TIMES DAILY OR AS DIRECTED     calcium carbonate (OS-GABBY) 500 MG TABS Take 1 tablet by mouth 2 times daily (with meals)     cholecalciferol (VITAMIN D3) 25 mcg (1000 units) capsule Take 1 capsule by mouth daily     Glucagon (BAQSIMI ONE PACK) 3 MG/DOSE POWD Spray 3 mg in nostril as needed (to be used for severe hypoglycemic episodes)     Injection Device for insulin (NOVOPEN ECHO) RORY 1 each 4 times daily     insulin aspart (NOVOLOG PENFILL) 100 UNIT/ML cartridge INJECT subcu 1 UNIT PER 15G CHO WITH MEALS 3x A DAY. APPROX 15 UNITS DAILY.     insulin pen needle (BD PAM U/F) 32G X 4 MM miscellaneous Use as directed with insulin pens.     LANCETS ULTRA THIN 30G MISC Test 7-8 times daily  (Lancets that go with pt current meter )     Multiple Vitamins-Minerals (CENTRUM SILVER PO) Take 1 tablet by mouth daily.     NIFEdipine ER OSMOTIC (PROCARDIA XL) 30 MG 24 hr tablet Take 1 tablet (30 mg) by mouth daily     order for DME Equipment being ordered: compression socks, 20-30 mmHg, knee high     simvastatin (ZOCOR) 40 MG tablet Take 1 tablet (40 mg) by mouth At Bedtime     warfarin ANTICOAGULANT (COUMADIN) 5 MG tablet Take 2 tablets (10mg) by mouth daily or as directed by the Coumadin clinic     mycophenolate (GENERIC EQUIVALENT) 500 MG tablet Take 1 tablet daily. (Patient not taking: Reported on 8/19/2022)     No current facility-administered medications for " "this visit.       No longer on lantus: too many \"lows\".  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 Checks BS 5X daily.  Osteoporosis   Hypertension   Hyperlipidemia   Achalasia   Gastroesophageal reflux disease   Choroidal lesion  Atrophic vaginitis   Urinary urgency   Female stress incontinence   Cystocele, midline   Deep vein thrombosis   Nonsenile cataract   Myopia   Neuropathy   Lymphedema      Past Surgical History:  Phacoemulsification clear cornea with intraocular lens implantation, right 1/8/19, left 2/5/19  Transvaginal sling 12/20/16  Axilla lymph node dissection 2004  Bilateral tubal ligation     Family History:   Glaucoma - father   Breast cancer   Stroke       Social History:   Tobacco Use: No previous or current tobacco use.   Alcohol Use: No alcohol use.   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  Wt Readings from Last 4 Encounters:   08/01/22 56.1 kg (123 lb 11.2 oz)   01/24/22 52.6 kg (116 lb)   07/30/20 52.6 kg (116 lb)   02/05/19 56.7 kg (125 lb)       Constitutional: well-developed, appearing stated age; cooperative  Eyes: nl EOM, PERRLA, vision, conjunctiva, sclera  ENT: nl external ears, nose, hearing, lips, teeth, gums, throat  No mucous membrane lesions, normal saliva pool  Neck: no mass or thyroid enlargement  Resp: unlabored breathing  Lymph: no cervical, supraclavicular, inguinal or epitrochlear nodes  MS: Osteoarthritis changes in the hands; knees without effusion or joint line tenderness.  Skin: Onychomycosis in multiple fingernails with some paronychial " fissuring.  No fingertip pulp tenderness or skin ulceration.  Neuro: nl cranial nerves, strength, sensation, DTRs.   Psych: nl judgement, orientation, memory, affect.      Laboratory:   RHEUM RESULTS Latest Ref Rng & Units 1/10/2005 1/14/2005 1/31/2005   ALBUMIN 3.4 - 5.0 g/dL - - -   ALT 0 - 50 U/L - - -   AST 0 - 45 U/L - - -   COMPLEMENT C3 81 - 157 mg/dL - 101 -   COMPLEMENT C4 13 - 39 mg/dL - - -   CK TOTAL 30 - 225 U/L - - -   CREATININE 0.52 - 1.04 mg/dL - - 0.79   CRP 0.0 - 8.0 mg/L 4.46(H) - -   DNA <10.0 IU/mL - - -   DIRK 0 - 1.0 - - -   GFR ESTIMATE, IF BLACK >60 mL/min/[1.73:m2] - - >80   GFR ESTIMATE >60 mL/min/1.73m2 - - 80   HEMATOCRIT 35.0 - 47.0 % 36.9 33.1(L) 37.3   HEMOGLOBIN 11.7 - 15.7 g/dL 12.3 11.0(L) 12.3   HCVAB NR - - -   WBC 4.0 - 11.0 10e3/uL 5.3 2.2(L) 3.9(L)   RBC 3.80 - 5.20 10e6/uL 4.55 4.11 4.58   RDW 10.0 - 15.0 % 18.2(H) 17.9(H) 19.9(H)   MCHC 31.5 - 36.5 g/dL 33.2 33.3 32.9   MCV 78 - 100 fL 81 81 82   PLATELET COUNT 150 - 450 10e3/uL 292 284 322   RHEUMATOID FACTOR 0 - 14 IU/mL - - -   ESR 0 - 30 mm/h 38(H) - -     Rheumatoid Factor   Date Value Ref Range Status   01/14/2009 8 0 - 14 IU/mL Final     Cyclic Citrullinated Peptide IgG   Date Value Ref Range Status   01/14/2009 <2 <5 U/mL Final     Comment:     Interpretation:  Negative     Ribonucleic Protein IgG Antibody   Date Value Ref Range Status   01/24/2007 12  Final     Comment:     Reference range: 0 to 49  Unit: Units  (Note)  REFERENCE INTERVAL: Ribonucleic Protein (VIKRAM), IgG   Less than 20 Units ........ None detected   20 - 49 Units ............. Inconclusive   50 Units or greater ....... Positive    RNP antibody is seen in % of mixed connective tissue  disease and is considered specific for this syndrome if  other antibodies are negative. RNP is also present in  20-30% of SLE and 15-25% of progressive systemic  sclerosis.  The above test was performed at: [facility],  [address] [place] [address]  [address]   [phone]  [email or web address]     SSA (RO) Antibody IgG   Date Value Ref Range Status   08/24/2005 221 (H)  Final     Comment:     Reference range: 0 to 49  Unit: Units  (Note)  REFERENCE INTERVALS: SSA (Ro) (VIKRAM) Ab, IgG   Less than 20 Units ....... None detected   20 - 49 Units ............ Inconclusive   50 Units or greater ...... Positive    SSA (Ro) antibody is seen in70-75% of Sjogren syndrome  cases, 30-40% of systemic lupus erythematosus (SLE) and  5-10% of progressive systemic sclerosis (PSS).  The above test was performed at: [facility],  [address] [place] [address]  [id number] [phone]  [email or web address]     SSB (LA) Antibody IgG   Date Value Ref Range Status   08/24/2005 20  Final     Comment:     Reference range: 0 to 49  Unit: Units  (Note)  REFERENCE INTERVALS: SSB (La) (VIKRAM) Ab, IgG   Less than 20 Units ....... None detected   20 - 49 Units ............ Inconclusive   50 Units or greater ...... Positive    SSB (La) antibody is seen in 50-60% of Sjogren syndrome  cases and is specific if it is the only VIKRAM antibody  present. 15-25% of patients with systemic lupus  erythematosus (SLE) and 5-10% of patients with progressive  systemic sclerosis (PSS) also have this antibody.  The above test was performed at: [facility],  [address] [place] [address]  [id number] [phone]  [email or web address]   ,  ,   CRISTAL Screen by EIA   Date Value Ref Range Status   02/16/2010 8.2 (H) 0 - 1.0 Final     Comment:     Interpretation:  Positive     DNA-ds   Date Value Ref Range Status   06/12/2019 1 <10 IU/mL Final     Comment:     Negative     Albumin Fraction   Date Value Ref Range Status   04/01/2013 3.9 3.7 - 5.1 g/dL Final     Alpha 2 Fraction   Date Value Ref Range Status   04/01/2013 0.7 0.5 - 0.9 g/dL Final     Beta Fraction   Date Value Ref Range Status   04/01/2013 0.6 0.6 - 1.0 g/dL Final     Gamma Fraction   Date Value Ref Range Status   04/01/2013 0.7 0.7 - 1.6 g/dL Final     Monoclonal Peak   Date Value Ref Range  Status   04/01/2013 0.0 0.0 g/dL Final     ELP Interpretation:   Date Value Ref Range Status   04/01/2013   Final    Essentially normal electrophoretic pattern.  No monoclonal protein seen.   Pathologic significance requires clinical correlation.  LEATHA Valencia M.D.,   Ph.D., Pathologist         Again, thank you for allowing me to participate in the care of your patient.      Sincerely,    Tato Agarwal MD

## 2022-08-19 NOTE — TELEPHONE ENCOUNTER
Note sent via [employer].   Yamel Shafer, RN on 8/19/2022 at 2:02 PM   [facility] Call Center    Phone Message    May a detailed message be left on voicemail: yes     Reason for Call: Other: Pt stated she is unable to get onto [employer] and is wondeirng if it would be possible for her to receive the [employer] message sent through text or email. If possible, please send to email [email or web address]. Thank you     Action Taken: Message routed to:  Other: endo    Travel Screening: Not Applicable

## 2022-08-22 ENCOUNTER — MYC MEDICAL ADVICE (OUTPATIENT)
Dept: ENDOCRINOLOGY | Facility: CLINIC | Age: 77
End: 2022-08-22

## 2022-08-31 ENCOUNTER — LAB (OUTPATIENT)
Dept: LAB | Facility: CLINIC | Age: 77
End: 2022-08-31
Payer: COMMERCIAL

## 2022-08-31 ENCOUNTER — MYC MEDICAL ADVICE (OUTPATIENT)
Dept: ENDOCRINOLOGY | Facility: CLINIC | Age: 77
End: 2022-08-31

## 2022-08-31 DIAGNOSIS — E10.649 TYPE 1 DIABETES MELLITUS WITH HYPOGLYCEMIA AND WITHOUT COMA (H): ICD-10-CM

## 2022-08-31 DIAGNOSIS — E11.9 DIABETES MELLITUS (H): ICD-10-CM

## 2022-08-31 DIAGNOSIS — D68.61 ANTIPHOSPHOLIPID SYNDROME (H): ICD-10-CM

## 2022-08-31 DIAGNOSIS — Z79.01 LONG TERM CURRENT USE OF ANTICOAGULANT THERAPY: ICD-10-CM

## 2022-08-31 DIAGNOSIS — L93.0 LUPUS ERYTHEMATOSUS: ICD-10-CM

## 2022-08-31 DIAGNOSIS — H31.8 CHOROIDAL LESION: Primary | ICD-10-CM

## 2022-08-31 PROCEDURE — 36415 COLL VENOUS BLD VENIPUNCTURE: CPT | Performed by: PATHOLOGY

## 2022-08-31 PROCEDURE — 99000 SPECIMEN HANDLING OFFICE-LAB: CPT | Performed by: PATHOLOGY

## 2022-08-31 PROCEDURE — 85210 CLOT FACTOR II PROTHROM SPEC: CPT | Mod: 90 | Performed by: PATHOLOGY

## 2022-08-31 RX ORDER — PEN NEEDLE, DIABETIC 32GX 5/32"
NEEDLE, DISPOSABLE MISCELLANEOUS
Qty: 400 EACH | Refills: 3 | Status: SHIPPED | OUTPATIENT
Start: 2022-08-31 | End: 2023-08-03

## 2022-08-31 RX ORDER — BISMUTH SUBSALICYLATE 262MG/15ML
SUSPENSION, ORAL (FINAL DOSE FORM) ORAL
Qty: 450 EACH | Refills: 3 | Status: SHIPPED | OUTPATIENT
Start: 2022-08-31

## 2022-09-01 ENCOUNTER — ANCILLARY PROCEDURE (OUTPATIENT)
Dept: MAMMOGRAPHY | Facility: CLINIC | Age: 77
End: 2022-09-01
Attending: INTERNAL MEDICINE
Payer: COMMERCIAL

## 2022-09-01 ENCOUNTER — ANTICOAGULATION THERAPY VISIT (OUTPATIENT)
Dept: ANTICOAGULATION | Facility: CLINIC | Age: 77
End: 2022-09-01

## 2022-09-01 DIAGNOSIS — L93.0 LUPUS ERYTHEMATOSUS: ICD-10-CM

## 2022-09-01 DIAGNOSIS — D68.61 ANTIPHOSPHOLIPID SYNDROME (H): ICD-10-CM

## 2022-09-01 DIAGNOSIS — Z79.01 LONG TERM CURRENT USE OF ANTICOAGULANT THERAPY: Primary | ICD-10-CM

## 2022-09-01 DIAGNOSIS — Z12.31 VISIT FOR SCREENING MAMMOGRAM: ICD-10-CM

## 2022-09-01 LAB — PROTHROM ACT/NOR PPP: 19 % (ref 60–140)

## 2022-09-01 PROCEDURE — 77067 SCR MAMMO BI INCL CAD: CPT | Performed by: RADIOLOGY

## 2022-09-01 PROCEDURE — 77063 BREAST TOMOSYNTHESIS BI: CPT | Performed by: RADIOLOGY

## 2022-09-01 NOTE — PROGRESS NOTES
ANTICOAGULATION MANAGEMENT     Shala Caruso 76 year old female is on warfarin with therapeutic result. (Goal Factor II: 25-15%)    Recent labs: (last 7 days)     08/31/22  1341   F2 19*       ASSESSMENT     Source(s): Chart Review and Patient/Caregiver Call       Warfarin doses taken: Warfarin taken as instructed    Diet: No new diet changes identified    New illness, injury, or hospitalization: No    Medication/supplement changes: stopped an extra vitamin D tablet    Signs or symptoms of bleeding or clotting: No    Previous result: Therapeutic last visit; previously outside of goal range    Additional findings: None       PLAN     Recommended plan for no diet, medication or health factor changes affecting result    Dosing Instructions: Continue your current warfarin dose 10 mg Tu; and 7.5 mg all other days of the week. with next Factor II in 4 weeks       Telephone call with Jeanmarie who verbalizes understanding and agrees to plan    Patient offered & declined to schedule next visit    Education provided: Please call back if any changes to your diet, medications or how you've been taking warfarin and Contact [phone] with any changes, questions or concerns.     Plan made per [facility] anticoagulation protocol

## 2022-09-07 ENCOUNTER — OFFICE VISIT (OUTPATIENT)
Dept: OPHTHALMOLOGY | Facility: CLINIC | Age: 77
End: 2022-09-07
Attending: OPHTHALMOLOGY
Payer: COMMERCIAL

## 2022-09-07 DIAGNOSIS — H31.8 CHOROIDAL LESION: ICD-10-CM

## 2022-09-07 PROCEDURE — 92134 CPTRZ OPH DX IMG PST SGM RTA: CPT | Performed by: OPHTHALMOLOGY

## 2022-09-07 PROCEDURE — 99213 OFFICE O/P EST LOW 20 MIN: CPT | Mod: GC | Performed by: STUDENT IN AN ORGANIZED HEALTH CARE EDUCATION/TRAINING PROGRAM

## 2022-09-07 PROCEDURE — 92015 DETERMINE REFRACTIVE STATE: CPT

## 2022-09-07 PROCEDURE — 92134 CPTRZ OPH DX IMG PST SGM RTA: CPT | Mod: 26 | Performed by: STUDENT IN AN ORGANIZED HEALTH CARE EDUCATION/TRAINING PROGRAM

## 2022-09-07 PROCEDURE — G0463 HOSPITAL OUTPT CLINIC VISIT: HCPCS | Mod: 25

## 2022-09-07 ASSESSMENT — TONOMETRY
IOP_METHOD: TONOPEN
OS_IOP_MMHG: 13
OD_IOP_MMHG: 13

## 2022-09-07 ASSESSMENT — CONF VISUAL FIELD
METHOD: COUNTING FINGERS
OD_NORMAL: 1
OS_NORMAL: 1

## 2022-09-07 ASSESSMENT — VISUAL ACUITY
METHOD: SNELLEN - LINEAR
OS_CC+: -1
OD_CC: 20/20
OD_CC+: -2
OS_CC: 20/20
CORRECTION_TYPE: GLASSES

## 2022-09-07 ASSESSMENT — REFRACTION_WEARINGRX
OS_CYLINDER: +0.75
OD_CYLINDER: +1.50
OD_SPHERE: -1.00
OS_ADD: +2.75
OS_SPHERE: -0.50
OD_AXIS: 144
OS_AXIS: 178
OD_ADD: +2.75

## 2022-09-07 ASSESSMENT — REFRACTION_MANIFEST
OD_AXIS: 145
OD_SPHERE: -0.75
OS_SPHERE: -0.50
OS_AXIS: 172
OS_ADD: +2.50
OD_ADD: +2.50
OD_CYLINDER: +1.50
OS_CYLINDER: +0.75

## 2022-09-07 ASSESSMENT — CUP TO DISC RATIO
OD_RATIO: 0.3
OS_RATIO: 0.3

## 2022-09-07 ASSESSMENT — EXTERNAL EXAM - LEFT EYE: OS_EXAM: NORMAL

## 2022-09-07 ASSESSMENT — SLIT LAMP EXAM - LIDS
COMMENTS: NORMAL
COMMENTS: NORMAL

## 2022-09-07 ASSESSMENT — EXTERNAL EXAM - RIGHT EYE: OD_EXAM: NORMAL

## 2022-09-07 NOTE — PROGRESS NOTES
CC: DM1 annual exam and choroidal lesion follow up     Interval: No significant changes in vision. No flashes and floaters   SLE now stable, off MMF now.     HPI: 76 year old female with hx of DM type I. Denies flashes and floaters.    Past med hx: SLE currently off cellcept (allergic to plaq)  Past ocular hx: none     OCULAR IMAGING  Optical Coherence Tomography 09/07/2022 vs 08/2021  Right eye normal foveal contour, no srf/irf  Left eye normal foveal contour, no srf/irf  OCT thru lesion less than 1 mm thick with drusen overlying, no srf    PHOTOS 8-9-18  Right eye   Left eye  Choroidal pigmented lesion with drusen     U/S left eye 08/19/21   ST lesion appears stable from prior: 08/19/21 <1mm in height   8-19-20 <1mm in height - stable. No posterior shadowing. 0.61 mm x 4.78T x 5.38L  9/14/16 C1 0.56 mm; C2 5.46 mm  8-30-17 C1 1.3 mm; C2   8-9-18 less than 1 mm ashwini lesion. 0.73mm x 5.15T x 5.03L  9-11-19 Minimally elevated lesion <1 mm in height appears stable / unchanged on U/S today. Negative for posterior shadowing or patent extension / excavation.     A/P:     # Choroidal pigmented lesion, left eye  - superior temporal periphery  - no orange pigmentation, no subretinal fluid, + drusen   - ultrasound 08/09/18  less than 1 mm height, normal retrobulbar echo pattern  - stable on exam and OCT MATEUSZ    - annual exams     # patient with history of Lupus   Took prednisolone for 10 yrs --> patient developed Diabetes mellitus   No lupus retinopathy   Patient had DVT left upper leg 2001, then 2002, then 2003 --> patient on coumadin indefinitely   Off MMF     # DM type I   Lab Results   Component Value Date    A1C 5.5 06/16/2022    A1C 5.7 02/23/2022    A1C 5.6 12/22/2021    A1C 5.4 07/07/2021    - Diet controlled and insulin for meals   - no evidence of DR     # Pseudophakia both eyes   - doing well, lens centered    # PCO both eyes  - Not bothered by glare  - Will call to schedule Nd:YAG if desired  - Pt wishes to get  new MRx from today    # Myopic astigmatism, OU   - updated Rx        return to clinic: Retina 12 months, repeat oct enhanced depth image of the LEFT choroidal peripheral lesion , macula Optical Coherence Tomography each eye ; ultrasound and optos pic    Nd:YAG whenever patient is bothered by symptoms, will MyChart      ~~~~~~~~~~~~~~~~~~~~~~~~~~~~~~~~~~  My privilege to be part of your care,  Ted Fernandez MD, MSc  Ophthalmology PGY-3 resident physician  Pager: [phone]     Complete documentation of historical and exam elements from today's encounter can be found in the full encounter summary report (not reduplicated in this progress note).  I personally obtained the chief complaint(s) and history of present illness.  I confirmed and edited as necessary the review of systems, past medical/surgical history, family history, social history, and examination findings as documented by others; and I examined the patient myself.  I personally reviewed the relevant tests, images, and reports as documented above.  I personally reviewed the ophthalmic test(s) associated with this encounter, agree with the interpretation(s) as documented by the resident/fellow, and have edited the corresponding report(s) as necessary.   I formulated and edited as necessary the assessment and plan and discussed the findings and management plan with the patient and family    Monika Fermin MD  Professor of Ophthalmology.  Retina Service   Department of Ophthalmology and Visual Neurosciences   [facility]  Phone: [phone]   Fax: [phone]

## 2022-09-07 NOTE — NURSING NOTE
Chief Complaints and History of Present Illnesses   Patient presents with     Follow Up     Choroidal lesion left eye and diabetic eye exam       Chief Complaint(s) and History of Present Illness(es)     Follow Up     Laterality: both eyes    Course: stable    Associated symptoms: dryness (mornings, her eyes feel gritty), tearing (outside in the wind, has occurred for years) and photophobia (sunlight).  Negative for eye pain    Treatments tried: artificial tears    Pain scale: 0/10    Comments: Choroidal lesion left eye and diabetic eye exam                Comments     She states that her vision has seemed stable in both eyes since her last eye exam.  She has found that after she reads for an hour, when she looks up it takes a long time before she can focus on distance images.      Both eyes seem intermittently dry.  Using artificial tears does help the discomfort.    Lab Results       Component                Value               Date                       A1C                      5.5                 06/16/2022                 A1C                      5.7                 02/23/2022                 A1C                      5.6                 12/22/2021                 A1C                      5.4                 07/07/2021                 A1C                      5.2                 04/21/2021                 A1C                      5.6                 01/20/2021                 A1C                      5.1                 07/09/2020                 A1C                      5.3                 07/11/2017          SAE Arriaga 1:07 PM  September 7, 2022

## 2022-09-20 DIAGNOSIS — D68.61 ANTIPHOSPHOLIPID SYNDROME (H): ICD-10-CM

## 2022-09-20 DIAGNOSIS — Z79.01 LONG TERM CURRENT USE OF ANTICOAGULANT THERAPY: ICD-10-CM

## 2022-09-20 RX ORDER — WARFARIN SODIUM 5 MG/1
TABLET ORAL
Qty: 200 TABLET | Refills: 1 | Status: SHIPPED | OUTPATIENT
Start: 2022-09-20 | End: 2023-03-14

## 2022-09-27 ENCOUNTER — LAB (OUTPATIENT)
Dept: LAB | Facility: CLINIC | Age: 77
End: 2022-09-27
Payer: COMMERCIAL

## 2022-09-27 DIAGNOSIS — D68.61 ANTIPHOSPHOLIPID SYNDROME (H): ICD-10-CM

## 2022-09-27 DIAGNOSIS — Z79.01 LONG TERM CURRENT USE OF ANTICOAGULANT THERAPY: ICD-10-CM

## 2022-09-27 DIAGNOSIS — L93.0 LUPUS ERYTHEMATOSUS: ICD-10-CM

## 2022-09-27 PROCEDURE — 85210 CLOT FACTOR II PROTHROM SPEC: CPT | Mod: 90 | Performed by: PATHOLOGY

## 2022-09-27 PROCEDURE — 36415 COLL VENOUS BLD VENIPUNCTURE: CPT | Performed by: PATHOLOGY

## 2022-09-27 PROCEDURE — 99000 SPECIMEN HANDLING OFFICE-LAB: CPT | Performed by: PATHOLOGY

## 2022-09-28 ENCOUNTER — ANTICOAGULATION THERAPY VISIT (OUTPATIENT)
Dept: ANTICOAGULATION | Facility: CLINIC | Age: 77
End: 2022-09-28

## 2022-09-28 DIAGNOSIS — D68.61 ANTIPHOSPHOLIPID SYNDROME (H): ICD-10-CM

## 2022-09-28 DIAGNOSIS — Z79.01 LONG TERM CURRENT USE OF ANTICOAGULANT THERAPY: Primary | ICD-10-CM

## 2022-09-28 DIAGNOSIS — L93.0 LUPUS ERYTHEMATOSUS: ICD-10-CM

## 2022-09-28 LAB — PROTHROM ACT/NOR PPP: 16 % (ref 60–140)

## 2022-09-28 NOTE — PROGRESS NOTES
ANTICOAGULATION MANAGEMENT     Shala Caruso 76 year old female is on warfarin with therapeutic result. (Goal Factor II: 25-15%)    Recent labs: (last 7 days)     09/27/22  1321   F2 16*       ASSESSMENT     Source(s): Chart Review       Warfarin doses taken: Reviewed in chart    Diet: No new diet changes identified    New illness, injury, or hospitalization: No    Medication/supplement changes: None noted    Signs or symptoms of bleeding or clotting: No    Previous result: Therapeutic last 2(+) visits    Additional findings: None       PLAN     Recommended plan for no diet, medication or health factor changes affecting result    Dosing Instructions: Continue your current warfarin dose 10 mg Tu; and 7.5 mg all other days of the week with next Factor II in 4 weeks       Detailed voice message left for Jeanmarie with dosing instructions and follow up date.   Jeanmarie called back and reports no changes in health, diet, medications.    Lab visit scheduled    Education provided: Please call back if any changes to your diet, medications or how you've been taking warfarin and Contact [phone] with any changes, questions or concerns.     Plan made per [facility] anticoagulation protocol

## 2022-10-04 DIAGNOSIS — E78.49 OTHER HYPERLIPIDEMIA: ICD-10-CM

## 2022-10-05 RX ORDER — SIMVASTATIN 40 MG
40 TABLET ORAL AT BEDTIME
Qty: 90 TABLET | Refills: 3 | Status: SHIPPED | OUTPATIENT
Start: 2022-10-05 | End: 2023-09-01

## 2022-10-25 ENCOUNTER — LAB (OUTPATIENT)
Dept: LAB | Facility: CLINIC | Age: 77
End: 2022-10-25
Payer: COMMERCIAL

## 2022-10-25 DIAGNOSIS — L93.0 LUPUS ERYTHEMATOSUS: ICD-10-CM

## 2022-10-25 DIAGNOSIS — D68.61 ANTIPHOSPHOLIPID SYNDROME (H): ICD-10-CM

## 2022-10-25 DIAGNOSIS — Z79.01 LONG TERM CURRENT USE OF ANTICOAGULANT THERAPY: ICD-10-CM

## 2022-10-25 PROCEDURE — 85210 CLOT FACTOR II PROTHROM SPEC: CPT | Mod: 90 | Performed by: PATHOLOGY

## 2022-10-25 PROCEDURE — 99000 SPECIMEN HANDLING OFFICE-LAB: CPT | Performed by: PATHOLOGY

## 2022-10-25 PROCEDURE — 36415 COLL VENOUS BLD VENIPUNCTURE: CPT | Performed by: PATHOLOGY

## 2022-10-26 ENCOUNTER — ANTICOAGULATION THERAPY VISIT (OUTPATIENT)
Dept: ANTICOAGULATION | Facility: CLINIC | Age: 77
End: 2022-10-26

## 2022-10-26 DIAGNOSIS — L93.0 LUPUS ERYTHEMATOSUS: ICD-10-CM

## 2022-10-26 DIAGNOSIS — D68.61 ANTIPHOSPHOLIPID SYNDROME (H): ICD-10-CM

## 2022-10-26 DIAGNOSIS — Z79.01 LONG TERM CURRENT USE OF ANTICOAGULANT THERAPY: Primary | ICD-10-CM

## 2022-10-26 LAB — PROTHROM ACT/NOR PPP: 19 % (ref 60–140)

## 2022-10-26 NOTE — PROGRESS NOTES
ANTICOAGULATION MANAGEMENT     Shala Caruso 77 year old female is on warfarin with therapeutic result. (Goal Factor II: 25-15%)    Recent labs: (last 7 days)     10/25/22  1311   F2 19*       ASSESSMENT     Source(s): Chart Review       Warfarin doses taken: Reviewed in chart    Diet: No new diet changes identified    New illness, injury, or hospitalization: No    Medication/supplement changes: None noted    Signs or symptoms of bleeding or clotting: No    Previous result: Therapeutic last 2(+) visits    Additional findings: None       PLAN     Recommended plan for no diet, medication or health factor changes affecting result    Dosing Instructions: Continue your current warfarin dose 10mg Tu and 7.5mg all other days with next Factor II in 4 weeks       Detailed voice message left for Jeanmarie with dosing instructions and follow up date.     Contact [phone] to schedule and with any changes, questions or concerns.     Education provided:     Please call back if any changes to your diet, medications or how you've been taking warfarin    Contact [phone] with any changes, questions or concerns.     Plan made per [facility] anticoagulation protocol

## 2022-10-29 ENCOUNTER — HEALTH MAINTENANCE LETTER (OUTPATIENT)
Age: 77
End: 2022-10-29

## 2022-11-08 ENCOUNTER — IMMUNIZATION (OUTPATIENT)
Dept: NURSING | Facility: CLINIC | Age: 77
End: 2022-11-08
Payer: COMMERCIAL

## 2022-11-08 PROCEDURE — 91312 COVID-19,PF,PFIZER BOOSTER BIVALENT: CPT

## 2022-11-08 PROCEDURE — 0124A COVID-19,PF,PFIZER BOOSTER BIVALENT: CPT

## 2022-11-22 ENCOUNTER — LAB (OUTPATIENT)
Dept: LAB | Facility: CLINIC | Age: 77
End: 2022-11-22
Payer: COMMERCIAL

## 2022-11-22 DIAGNOSIS — L93.0 LUPUS ERYTHEMATOSUS: ICD-10-CM

## 2022-11-22 DIAGNOSIS — Z79.01 LONG TERM CURRENT USE OF ANTICOAGULANT THERAPY: ICD-10-CM

## 2022-11-22 DIAGNOSIS — D68.61 ANTIPHOSPHOLIPID SYNDROME (H): ICD-10-CM

## 2022-11-22 PROCEDURE — 85210 CLOT FACTOR II PROTHROM SPEC: CPT | Mod: 90 | Performed by: PATHOLOGY

## 2022-11-22 PROCEDURE — 36415 COLL VENOUS BLD VENIPUNCTURE: CPT | Performed by: PATHOLOGY

## 2022-11-22 PROCEDURE — 99000 SPECIMEN HANDLING OFFICE-LAB: CPT | Performed by: PATHOLOGY

## 2022-11-23 ENCOUNTER — ANTICOAGULATION THERAPY VISIT (OUTPATIENT)
Dept: ANTICOAGULATION | Facility: CLINIC | Age: 77
End: 2022-11-23

## 2022-11-23 DIAGNOSIS — D68.61 ANTIPHOSPHOLIPID SYNDROME (H): ICD-10-CM

## 2022-11-23 DIAGNOSIS — Z79.01 LONG TERM CURRENT USE OF ANTICOAGULANT THERAPY: Primary | ICD-10-CM

## 2022-11-23 DIAGNOSIS — L93.0 LUPUS ERYTHEMATOSUS: ICD-10-CM

## 2022-11-23 LAB — PROTHROM ACT/NOR PPP: 27 % (ref 60–140)

## 2022-11-23 NOTE — PROGRESS NOTES
ANTICOAGULATION MANAGEMENT     Shala Caruso 77 year old female is on warfarin with subtherapeutic result. (Goal Factor II: 25-15%)    Recent labs: (last 7 days)     11/22/22  1309   F2 27*       ASSESSMENT     Source(s): Chart Review and Patient/Caregiver Call       Warfarin doses taken: Warfarin taken as instructed    Diet: Increased greens/vitamin K in diet; plans to resume previous intake    New illness, injury, or hospitalization: No    Medication/supplement changes: None noted    Signs or symptoms of bleeding or clotting: No    Previous result: Therapeutic last 2(+) visits    Additional findings: None       PLAN     Recommended plan for temporary change(s) affecting result    Dosing Instructions: booster dose then continue your current warfarin dose 10mg Q tuesday; 7.5mg ROW with next Factor II in 2 weeks       Telephone call with Jeanmarie who verbalizes understanding and agrees to plan    Patient offered & declined to schedule next visit    Education provided:     Goal range and lab monitoring: goal range and significance of current result    Plan made per [facility] anticoagulation protocol     Kacy Colbert RN  Anticoagulation Nurse  [facility]  [phone]

## 2022-11-28 NOTE — PROGRESS NOTES
Jeanmarie left a message requesting clarification on warfarin dosing.  Writer calls back and leaves message with current dosing [facility] has on file.

## 2022-12-06 ENCOUNTER — LAB (OUTPATIENT)
Dept: LAB | Facility: CLINIC | Age: 77
End: 2022-12-06
Payer: COMMERCIAL

## 2022-12-06 DIAGNOSIS — L93.0 LUPUS ERYTHEMATOSUS: ICD-10-CM

## 2022-12-06 DIAGNOSIS — D68.61 ANTIPHOSPHOLIPID SYNDROME (H): ICD-10-CM

## 2022-12-06 DIAGNOSIS — Z79.01 LONG TERM CURRENT USE OF ANTICOAGULANT THERAPY: ICD-10-CM

## 2022-12-06 PROCEDURE — 99000 SPECIMEN HANDLING OFFICE-LAB: CPT | Performed by: PATHOLOGY

## 2022-12-06 PROCEDURE — 36415 COLL VENOUS BLD VENIPUNCTURE: CPT | Performed by: PATHOLOGY

## 2022-12-06 PROCEDURE — 85210 CLOT FACTOR II PROTHROM SPEC: CPT | Mod: 90 | Performed by: PATHOLOGY

## 2022-12-07 ENCOUNTER — ANTICOAGULATION THERAPY VISIT (OUTPATIENT)
Dept: ANTICOAGULATION | Facility: CLINIC | Age: 77
End: 2022-12-07

## 2022-12-07 DIAGNOSIS — L93.0 LUPUS ERYTHEMATOSUS: ICD-10-CM

## 2022-12-07 DIAGNOSIS — Z79.01 LONG TERM CURRENT USE OF ANTICOAGULANT THERAPY: Primary | ICD-10-CM

## 2022-12-07 DIAGNOSIS — D68.61 ANTIPHOSPHOLIPID SYNDROME (H): ICD-10-CM

## 2022-12-07 LAB — PROTHROM ACT/NOR PPP: 13 % (ref 60–140)

## 2022-12-09 DIAGNOSIS — K22.0 ACHALASIA OF ESOPHAGUS: ICD-10-CM

## 2022-12-12 RX ORDER — NIFEDIPINE 30 MG/1
30 TABLET, EXTENDED RELEASE ORAL DAILY
Qty: 90 TABLET | Refills: 0 | Status: SHIPPED | OUTPATIENT
Start: 2022-12-12 | End: 2023-01-04

## 2022-12-12 NOTE — TELEPHONE ENCOUNTER
NIFEdipine ER OSMOTIC (PROCARDIA XL) 30 MG 24 hr tablet    Calcium Channel Blockers Protocol  Passed     1/24/2022  [facility]  Internal Medicine     Upcoming appt  01-4-2023

## 2022-12-15 ENCOUNTER — LAB (OUTPATIENT)
Dept: LAB | Facility: CLINIC | Age: 77
End: 2022-12-15
Payer: COMMERCIAL

## 2022-12-15 DIAGNOSIS — L93.0 LUPUS ERYTHEMATOSUS: ICD-10-CM

## 2022-12-15 DIAGNOSIS — M32.9 SYSTEMIC LUPUS ERYTHEMATOSUS, UNSPECIFIED SLE TYPE, UNSPECIFIED ORGAN INVOLVEMENT STATUS (H): ICD-10-CM

## 2022-12-15 DIAGNOSIS — Z79.01 LONG TERM CURRENT USE OF ANTICOAGULANT THERAPY: ICD-10-CM

## 2022-12-15 DIAGNOSIS — D68.61 ANTIPHOSPHOLIPID SYNDROME (H): ICD-10-CM

## 2022-12-15 PROCEDURE — 36415 COLL VENOUS BLD VENIPUNCTURE: CPT | Performed by: PATHOLOGY

## 2022-12-15 PROCEDURE — 85210 CLOT FACTOR II PROTHROM SPEC: CPT | Mod: 90 | Performed by: PATHOLOGY

## 2022-12-15 PROCEDURE — 99000 SPECIMEN HANDLING OFFICE-LAB: CPT | Performed by: PATHOLOGY

## 2022-12-16 ENCOUNTER — TELEPHONE (OUTPATIENT)
Dept: ANTICOAGULATION | Facility: CLINIC | Age: 77
End: 2022-12-16

## 2022-12-16 DIAGNOSIS — L93.0 LUPUS ERYTHEMATOSUS: ICD-10-CM

## 2022-12-16 DIAGNOSIS — D68.61 ANTIPHOSPHOLIPID SYNDROME (H): ICD-10-CM

## 2022-12-16 DIAGNOSIS — Z79.01 LONG TERM CURRENT USE OF ANTICOAGULANT THERAPY: Primary | ICD-10-CM

## 2022-12-16 LAB — PROTHROM ACT/NOR PPP: 8 % (ref 60–140)

## 2022-12-16 NOTE — TELEPHONE ENCOUNTER
ANTICOAGULATION MANAGEMENT     Shala Caruso 77 year old female is on warfarin with supratherapeutic result. (Goal Factor II: 25-15%)    Critical lab result received from lab: Factor 2 12/15: 8%    ASSESSMENT     Source(s): Chart Review       Warfarin doses taken: Warfarin recently held for supratherapeutic factor 2 result which may be affecting result(s)    Diet: No new diet changes identified    New illness, injury, or hospitalization: No    Medication/supplement changes: None noted    Signs or symptoms of bleeding or clotting: No    Previous result: Supratherapeutic    Additional findings: None       PLAN     Recommended plan for no diet, medication or health factor changes affecting result    Dosing Instructions: hold 2 doses then decrease your warfarin dose (18.2% change) 5 mg every Sun, Tues, Thurs; 7.5 mg all other days with next Factor II in 3 days       Telephone call with Jeanmarie who verbalizes understanding and agrees to plan and who agrees to plan and repeated back plan correctly    Lab visit scheduled    Education provided:     Symptom monitoring: monitoring for bleeding signs and symptoms, when to seek medical attention/emergency care and if you hit your head or have a bad fall seek emergency care    Contact [phone] with any changes, questions or concerns.     Plan made with [facility] Pharmacist Cristina Black

## 2022-12-19 ENCOUNTER — LAB (OUTPATIENT)
Dept: LAB | Facility: CLINIC | Age: 77
End: 2022-12-19
Payer: COMMERCIAL

## 2022-12-19 DIAGNOSIS — Z79.01 LONG TERM CURRENT USE OF ANTICOAGULANT THERAPY: ICD-10-CM

## 2022-12-19 DIAGNOSIS — M32.9 SYSTEMIC LUPUS ERYTHEMATOSUS, UNSPECIFIED SLE TYPE, UNSPECIFIED ORGAN INVOLVEMENT STATUS (H): ICD-10-CM

## 2022-12-19 DIAGNOSIS — D68.61 ANTIPHOSPHOLIPID SYNDROME (H): ICD-10-CM

## 2022-12-19 DIAGNOSIS — L93.0 LUPUS ERYTHEMATOSUS: ICD-10-CM

## 2022-12-19 LAB
ALBUMIN UR-MCNC: NEGATIVE MG/DL
APPEARANCE UR: CLEAR
BACTERIA #/AREA URNS HPF: ABNORMAL /HPF
BASOPHILS # BLD AUTO: 0 10E3/UL (ref 0–0.2)
BASOPHILS NFR BLD AUTO: 1 %
BILIRUB UR QL STRIP: NEGATIVE
COLOR UR AUTO: ABNORMAL
CREAT SERPL-MCNC: 0.67 MG/DL (ref 0.51–0.95)
EOSINOPHIL # BLD AUTO: 0.1 10E3/UL (ref 0–0.7)
EOSINOPHIL NFR BLD AUTO: 4 %
ERYTHROCYTE [DISTWIDTH] IN BLOOD BY AUTOMATED COUNT: 15.3 % (ref 10–15)
GFR SERPL CREATININE-BSD FRML MDRD: >90 ML/MIN/1.73M2
GLUCOSE UR STRIP-MCNC: NEGATIVE MG/DL
HCT VFR BLD AUTO: 39.8 % (ref 35–47)
HGB BLD-MCNC: 13 G/DL (ref 11.7–15.7)
HGB UR QL STRIP: NEGATIVE
IMM GRANULOCYTES # BLD: 0 10E3/UL
IMM GRANULOCYTES NFR BLD: 0 %
KETONES UR STRIP-MCNC: NEGATIVE MG/DL
LEUKOCYTE ESTERASE UR QL STRIP: NEGATIVE
LYMPHOCYTES # BLD AUTO: 1.1 10E3/UL (ref 0.8–5.3)
LYMPHOCYTES NFR BLD AUTO: 31 %
MCH RBC QN AUTO: 28.4 PG (ref 26.5–33)
MCHC RBC AUTO-ENTMCNC: 32.7 G/DL (ref 31.5–36.5)
MCV RBC AUTO: 87 FL (ref 78–100)
MONOCYTES # BLD AUTO: 0.3 10E3/UL (ref 0–1.3)
MONOCYTES NFR BLD AUTO: 8 %
NEUTROPHILS # BLD AUTO: 2 10E3/UL (ref 1.6–8.3)
NEUTROPHILS NFR BLD AUTO: 56 %
NITRATE UR QL: NEGATIVE
NRBC # BLD AUTO: 0 10E3/UL
NRBC BLD AUTO-RTO: 0 /100
PH UR STRIP: 5.5 [PH] (ref 5–7)
PLATELET # BLD AUTO: 230 10E3/UL (ref 150–450)
RBC # BLD AUTO: 4.58 10E6/UL (ref 3.8–5.2)
RBC URINE: 1 /HPF
SP GR UR STRIP: 1.01 (ref 1–1.03)
UROBILINOGEN UR STRIP-MCNC: NORMAL MG/DL
WBC # BLD AUTO: 3.5 10E3/UL (ref 4–11)
WBC URINE: 4 /HPF

## 2022-12-19 PROCEDURE — 82565 ASSAY OF CREATININE: CPT | Performed by: PATHOLOGY

## 2022-12-19 PROCEDURE — 99000 SPECIMEN HANDLING OFFICE-LAB: CPT | Performed by: PATHOLOGY

## 2022-12-19 PROCEDURE — 36415 COLL VENOUS BLD VENIPUNCTURE: CPT | Performed by: PATHOLOGY

## 2022-12-19 PROCEDURE — 85210 CLOT FACTOR II PROTHROM SPEC: CPT | Mod: 90 | Performed by: PATHOLOGY

## 2022-12-19 PROCEDURE — 81001 URINALYSIS AUTO W/SCOPE: CPT | Performed by: PATHOLOGY

## 2022-12-19 PROCEDURE — 85025 COMPLETE CBC W/AUTO DIFF WBC: CPT | Performed by: PATHOLOGY

## 2022-12-20 ENCOUNTER — ANTICOAGULATION THERAPY VISIT (OUTPATIENT)
Dept: ANTICOAGULATION | Facility: CLINIC | Age: 77
End: 2022-12-20

## 2022-12-20 DIAGNOSIS — D68.61 ANTIPHOSPHOLIPID SYNDROME (H): ICD-10-CM

## 2022-12-20 DIAGNOSIS — Z79.01 LONG TERM CURRENT USE OF ANTICOAGULANT THERAPY: Primary | ICD-10-CM

## 2022-12-20 DIAGNOSIS — L93.0 LUPUS ERYTHEMATOSUS: ICD-10-CM

## 2022-12-20 LAB — PROTHROM ACT/NOR PPP: 27 % (ref 60–140)

## 2022-12-20 NOTE — PROGRESS NOTES
ANTICOAGULATION MANAGEMENT     Shala Caruso 77 year old female is on warfarin with subtherapeutic result. (Goal Factor II: 25-15%)    Recent labs: (last 7 days)     12/19/22  1311   F2 27*       ASSESSMENT     Source(s): Chart Review and Patient/Caregiver Call       Warfarin doses taken: Warfarin taken as instructed    Diet: Patient reports that she thinks her last reading was off because she was eating salads before bed to help control her diabetes instead of carbs.  Patient stopped doing this. Patient reports that she has a hard time eating the same thing all the time.  Patient needs variety in her diet.    New illness, injury, or hospitalization: No    Medication/supplement changes: None noted    Signs or symptoms of bleeding or clotting: No    Previous result: Supratherapeutic    Additional findings: Writer suggests rechecking an F2 in 1 week.  Patient prefers 2 weeks.        PLAN     Recommended plan for ongoing change(s) affecting result    Dosing Instructions: Continue your current warfarin dose 5mg TuThSun and 7.5mg all other days.  with next Factor II in 2 weeks       Telephone call with Jeanmarie who verbalizes understanding and agrees to plan and who agrees to plan and repeated back plan correctly    Lab visit scheduled    Education provided:     Taking warfarin: Importance of taking warfarin as instructed    Goal range and lab monitoring: goal range and significance of current result, Importance of therapeutic range and Importance of following up at instructed interval    Plan made per [facility] anticoagulation protocol

## 2022-12-22 DIAGNOSIS — E10.649 TYPE 1 DIABETES MELLITUS WITH HYPOGLYCEMIA AND WITHOUT COMA (H): Primary | ICD-10-CM

## 2022-12-26 RX ORDER — CALCIUM CITRATE/VITAMIN D3 200MG-6.25
TABLET ORAL
Qty: 500 STRIP | Refills: 3 | Status: SHIPPED | OUTPATIENT
Start: 2022-12-26 | End: 2023-08-03

## 2023-01-04 ENCOUNTER — OFFICE VISIT (OUTPATIENT)
Dept: INTERNAL MEDICINE | Facility: CLINIC | Age: 78
End: 2023-01-04
Payer: COMMERCIAL

## 2023-01-04 ENCOUNTER — LAB (OUTPATIENT)
Dept: LAB | Facility: CLINIC | Age: 78
End: 2023-01-04
Payer: COMMERCIAL

## 2023-01-04 VITALS
SYSTOLIC BLOOD PRESSURE: 113 MMHG | RESPIRATION RATE: 16 BRPM | DIASTOLIC BLOOD PRESSURE: 62 MMHG | HEART RATE: 87 BPM | OXYGEN SATURATION: 98 %

## 2023-01-04 DIAGNOSIS — D68.61 ANTIPHOSPHOLIPID SYNDROME (H): Primary | ICD-10-CM

## 2023-01-04 DIAGNOSIS — E11.43 TYPE 2 DIABETES MELLITUS WITH DIABETIC AUTONOMIC NEUROPATHY, WITH LONG-TERM CURRENT USE OF INSULIN (H): ICD-10-CM

## 2023-01-04 DIAGNOSIS — I82.4Y9 DEEP VEIN THROMBOSIS (DVT) OF PROXIMAL LOWER EXTREMITY, UNSPECIFIED CHRONICITY, UNSPECIFIED LATERALITY (H): ICD-10-CM

## 2023-01-04 DIAGNOSIS — Z79.4 TYPE 2 DIABETES MELLITUS WITH DIABETIC AUTONOMIC NEUROPATHY, WITH LONG-TERM CURRENT USE OF INSULIN (H): ICD-10-CM

## 2023-01-04 DIAGNOSIS — I87.2 VENOUS (PERIPHERAL) INSUFFICIENCY: ICD-10-CM

## 2023-01-04 DIAGNOSIS — Z79.01 LONG TERM CURRENT USE OF ANTICOAGULANT THERAPY: ICD-10-CM

## 2023-01-04 DIAGNOSIS — D68.61 ANTIPHOSPHOLIPID SYNDROME (H): ICD-10-CM

## 2023-01-04 DIAGNOSIS — K22.0 ACHALASIA OF ESOPHAGUS: ICD-10-CM

## 2023-01-04 DIAGNOSIS — L93.0 LUPUS ERYTHEMATOSUS: ICD-10-CM

## 2023-01-04 LAB
ALBUMIN SERPL BCG-MCNC: 4.2 G/DL (ref 3.5–5.2)
ALP SERPL-CCNC: 55 U/L (ref 35–104)
ALT SERPL W P-5'-P-CCNC: 21 U/L (ref 10–35)
ANION GAP SERPL CALCULATED.3IONS-SCNC: 10 MMOL/L (ref 7–15)
AST SERPL W P-5'-P-CCNC: 20 U/L (ref 10–35)
BILIRUB SERPL-MCNC: 0.3 MG/DL
BUN SERPL-MCNC: 29.6 MG/DL (ref 8–23)
CALCIUM SERPL-MCNC: 10.3 MG/DL (ref 8.8–10.2)
CHLORIDE SERPL-SCNC: 104 MMOL/L (ref 98–107)
CREAT SERPL-MCNC: 0.87 MG/DL (ref 0.51–0.95)
CREAT UR-MCNC: 203 MG/DL
DEPRECATED HCO3 PLAS-SCNC: 29 MMOL/L (ref 22–29)
GFR SERPL CREATININE-BSD FRML MDRD: 69 ML/MIN/1.73M2
GLUCOSE SERPL-MCNC: 124 MG/DL (ref 70–99)
HBA1C MFR BLD: 5.7 %
MICROALBUMIN UR-MCNC: 45.3 MG/L
MICROALBUMIN/CREAT UR: 22.32 MG/G CR (ref 0–25)
POTASSIUM SERPL-SCNC: 4 MMOL/L (ref 3.4–5.3)
PROT SERPL-MCNC: 6.8 G/DL (ref 6.4–8.3)
SODIUM SERPL-SCNC: 143 MMOL/L (ref 136–145)

## 2023-01-04 PROCEDURE — 82570 ASSAY OF URINE CREATININE: CPT | Mod: 90 | Performed by: PATHOLOGY

## 2023-01-04 PROCEDURE — 99397 PER PM REEVAL EST PAT 65+ YR: CPT | Performed by: INTERNAL MEDICINE

## 2023-01-04 PROCEDURE — 99000 SPECIMEN HANDLING OFFICE-LAB: CPT | Performed by: PATHOLOGY

## 2023-01-04 PROCEDURE — 36415 COLL VENOUS BLD VENIPUNCTURE: CPT | Performed by: PATHOLOGY

## 2023-01-04 PROCEDURE — 83036 HEMOGLOBIN GLYCOSYLATED A1C: CPT | Mod: 90 | Performed by: PATHOLOGY

## 2023-01-04 PROCEDURE — 80053 COMPREHEN METABOLIC PANEL: CPT | Performed by: PATHOLOGY

## 2023-01-04 PROCEDURE — 85210 CLOT FACTOR II PROTHROM SPEC: CPT | Mod: 90 | Performed by: PATHOLOGY

## 2023-01-04 PROCEDURE — 82043 UR ALBUMIN QUANTITATIVE: CPT | Mod: 90 | Performed by: PATHOLOGY

## 2023-01-04 RX ORDER — NIFEDIPINE 30 MG/1
TABLET, EXTENDED RELEASE ORAL
Qty: 180 TABLET | Refills: 3 | Status: SHIPPED | OUTPATIENT
Start: 2023-01-04 | End: 2024-01-05

## 2023-01-04 NOTE — PROGRESS NOTES
Medicare Annual Wellness Questionnaire  This 77 year old year old female presents for a Medicare Wellness Exam.    The following is Shala S Shady's care team:  Patient Care Team       Relationship Specialty Notifications Start End    Joe Contreras MD PCP - General Internal Medicine  10/8/14     Phone: [phone] Fax: [phone]         [address] [facility] [address]    Self, Referred, MD Referring Physician   9/26/14     Fax: [phone]         Monika Fermin MD MD Ophthalmology  4/9/15     Phone: [phone] Fax: [phone]         [address]  [facility] [address]    Mei Bryan MD MD Dermatology  5/26/15     Phone: [phone] Fax: [phone]         [address] [facility] [address] [facility] [address]    Dorita Cramer MD MD Internal Medicine  7/24/15     Phone: [phone] Fax: [phone]         [address] [facility] [address]    Joe Contreras MD Referring Physician Internal Medicine  8/7/15     ref to lab    Phone: [phone] Fax: [phone]         [address] [facility] [address]    John Roque DPM  Podiatry  7/15/16     Phone: [phone] Fax: [phone]         [facility] [address]    Micaela Valverde MD MD OB/Gyn  8/29/16     Phone: [phone] Fax: [phone]         [address] [place] [address] [facility] [address]    Jeanmarie Pierson MD MD Urology  9/28/16     Phone: [phone] Fax: [phone]         [address] [address] [facility] [address]    Monika Fermin MD MD Ophthalmology  4/10/19     Phone: [phone] Fax: [phone]         [address] [address]    Monika Fermin MD Assigned Surgical Provider   10/23/20     Phone: [phone] Fax: [phone] [address] [address]    Dorita Cramer MD Assigned Endocrinology Provider   10/23/20     Phone: [phone] Fax: [phone]          [address] [facility] [address]    Tato Agarwal MD Assigned Rheumatology Provider   10/23/20     Phone: [phone] Fax: [phone] [address] [address]    Joe Contreras MD Assigned PCP   5/27/21     Phone: [phone] Fax: [phone]         [address] [place] [address]        Fall Risk Assessment:    Have you fallen 2 or more times in the last year? No    How many times were you injured due to a fall in the last year? 0    PHQ-2:    Over the last 2 weeks, how often have you been bothered by feeling down, depressed, or hopeless? Not at all (0)    Over the last 2 weeks, how often have you had little interest or pleasure in doing things? Not at all (0)    Social History:    What is your marital status?     Who lives in your household?     Does your home have loose rugs in the hallway: Yes     Does your home have grab bars in the bathroom: No    Does your home have handrails on the stairs? Yes     Does your home have poorly lit areas? No    Do you feel threatened or controlled by a partner, ex-partner or anyone in your life? No    Has anyone hurt you physically, for example by pushing, hitting, slapping or kicking you or forcing you to have sex? No    Do you need help with the phone, transportation, shopping, preparing meals, housework, laundry, medications or managing money? No    Sexual Health:    Are you sexually active? Patient did not answer.    Have you had any sexually transmitted infections in the last year? Patient did not answer.    Do you have any sexual concerns? Patient did not answer.    Women: What year did you stop having periods (approximate age)? 2000    Women: Any vaginal bleeding in the last year? No    Women: Have you ever had an abnormal Pap smear? No    General Health Assessment:    Have you noticed any hearing difficulties? No    Do you wear hearing aids? No    Have you seen a hearing professional such as an  audiologist in the last 1 year? No    Do you have vision difficulty? Yes     Do you wear glasses or contacts? Yes     Have you seen an eye doctor in the last 1 year? Yes     How many servings of fruits and vegetables do you eat a day? Yes    How often do you exercise in a week? 7    How long and what kind of exercise do you do? walking    Tobacco and Alcohol History:    Do you use tobacco/nicotine products? No    Do you use any other drugs? No    Do you drink alcohol? No    Advance Directive:    Have you completed an Advance Directives document? No  o If yes, have you given a copy to the clinic? No    Do you need information on Advance Directives? No    Dario Fong, EMT  3:36 PM 1/4/2023

## 2023-01-04 NOTE — PROGRESS NOTES
HPI  77-year-old presents today for Medicare wellness visit.  She has been doing quite well.  She is exercising walking about an hour a day.  She is tolerating this well without any side effects or ill effects associated with this.  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  She is not interested in injection or any surgical intervention for this.  She has had ongoing issues with venous insufficiency she uses compression stockings for this to good effect.  She is asking for a refill on this.  She has a history of achalasia this is been managed with nifedipine she is tolerated this well has had minimal to no symptoms on the nifedipine maintaining her weight but at times she has some mild dysphagia and we discussed doubling the dose on a as needed basis and we will increase her prescription for this.  She has had some aching in the knees bilaterally and some stiffness this is not interfered with her daily activities or exercise.  She is not interested in more aggressive management at this time and we discussed quadricep strengthening exercises and I referred her to the my knee exercise website otherwise she is following a healthy diet no other complaints or problems  Past Medical History:   Diagnosis Date     Achalasia      Antiphospholipid syndrome (H)      Antiplatelet or antithrombotic long-term use      Coagulation disorder (H) 3574-8684     DM (diabetes mellitus) (H)      DVT (deep venous thrombosis) (H) 2003    and PE     Dysphagia     occasional, use Nifedipine     GERD (gastroesophageal reflux disease)      Hyperlipidemia      Lymphedema      Myopia      Neuropathy     toes bilateral     Nonsenile cataract      osteoporosis      Pericarditis      Raynaud's disease      SLE (systemic lupus erythematosus) (H)      Past Surgical History:   Procedure Laterality Date     CATARACT IOL, RT/LT Left 02/2019     CATARACT IOL, RT/LT  Right 01/2019     COLONOSCOPY N/A 11/28/2016    Procedure: COLONOSCOPY;  Surgeon: Sang August MD;  Location: [facility]     CYSTOSCOPY, SLING TRANSVAGINAL N/A 12/20/2016    Procedure: CYSTOSCOPY, SLING TRANSVAGINAL;  Surgeon: Jeanmarie Pierson MD;  [facility]     DISSECT LYMPH NODE AXILLA  2004    Lt, during eval for SLE     HYSTEROSCOPIC PLACEMENT CONTRACEPTIVE DEVICE  1985     PHACOEMULSIFICATION WITH STANDARD INTRAOCULAR LENS IMPLANT Right 1/8/2019    Procedure: Right Eye Cataract Extraction with Intraocular Lens;  Surgeon: Monika Fermin MD;  Location: [facility]     PHACOEMULSIFICATION WITH STANDARD INTRAOCULAR LENS IMPLANT Left 2/5/2019    Procedure: Left Eye Cataract Extraction with Intraocular Lens;  Surgeon: Monika Fermin MD;  Location: [facility] OR     TUBAL LIGATION Bilateral      Family History   Problem Relation Age of Onset     Cancer Other         breast     Cerebrovascular Disease Other      C.A.D. Other      Glaucoma Father      Macular Degeneration No family hx of          Exam:  /62 (BP Location: Right arm, Patient Position: Sitting, Cuff Size: Adult Regular)   Pulse 87   Resp 16   LMP  (LMP Unknown)   SpO2 98%   Breastfeeding No   PHYSICAL EXAMINATION:   The patient is alert, oriented with a clear sensorium.   Skin shows no lesions or rashes and good turgor.   Head is normocephalic and atraumatic.   Eyes show PERRLA  Ears show normal right TM cerumen on the left.   Mouth shows clear oral mucosa.   Neck shows no nodes, thyromegaly or bruits.   Back is nontender.   Lungs are clear to percussion and auscultation.   Heart shows normal S1 and S2 without murmur or gallop.   Abdomen is soft, nontender without masses or organomegaly.   Extremities show no edema and no evidence of active synovitis.   Neurologic examination shows cranial nerves II-XII intact. Motor shows normal strength. Reflexes are full and symmetrical.     Labs reviewed:  Results for orders placed or  performed in visit on 01/04/23   Hemoglobin A1c     Status: Abnormal   Result Value Ref Range    Hemoglobin A1C 5.7 (H) <5.7 %   Comprehensive metabolic panel     Status: Abnormal   Result Value Ref Range    Sodium 143 136 - 145 mmol/L    Potassium 4.0 3.4 - 5.3 mmol/L    Chloride 104 98 - 107 mmol/L    Carbon Dioxide (CO2) 29 22 - 29 mmol/L    Anion Gap 10 7 - 15 mmol/L    Urea Nitrogen 29.6 (H) 8.0 - 23.0 mg/dL    Creatinine 0.87 0.51 - 0.95 mg/dL    Calcium 10.3 (H) 8.8 - 10.2 mg/dL    Glucose 124 (H) 70 - 99 mg/dL    Alkaline Phosphatase 55 35 - 104 U/L    AST 20 10 - 35 U/L    ALT 21 10 - 35 U/L    Protein Total 6.8 6.4 - 8.3 g/dL    Albumin 4.2 3.5 - 5.2 g/dL    Bilirubin Total 0.3 <=1.2 mg/dL    GFR Estimate 69 >60 mL/min/1.73m2   Albumin Random Urine Quantitative with Creat Ratio     Status: None   Result Value Ref Range    Albumin Urine mg/L 45.3 mg/L    Albumin Urine mg/g Cr 22.32 0.00 - 25.00 mg/g Cr    Creatinine Urine mg/dL 203.0 mg/dL         ASSESSMENT  1 Venous insufficiency with history DVT  2 diabetes mellitus well controlled on Novalog  3 hypertension well controlled on nifedipine  4 hyperlipidemia well controlled on simvastatin  5 osteoporosis on Reclast  6 history of stable SLE with Raynaud's phenomena on immunosuppression and antiphospholipid antibodies on warfarin  7 GERD  8 peripheral neuropathy   9 bilateral osteoarthritis of the knees symptomatic  10 Achalasia on nifedipine    Plan  We will increase the nifedipine as needed to 60 mg a day.  Refilled her compression stockings for the venous insufficiency and we will reassess her labs today.  Referred her to the my knee exercise website regarding the knee  This note was completed using Dragon voice recognition software.      Joe Contreras MD  General Internal Medicine  [facility]  [phone]

## 2023-01-04 NOTE — NURSING NOTE
Shala Caruso is a 77 year old female patient that presents today in clinic for the following:    Chief Complaint   Patient presents with     Physical     Annual Visit     The patient's allergies and medications were reviewed as noted. A set of vitals were recorded as noted without incident: /62 (BP Location: Right arm, Patient Position: Sitting, Cuff Size: Adult Regular)   Pulse 87   Resp 16   LMP  (LMP Unknown)   SpO2 98%   Breastfeeding No . The patient does not have any other questions for the provider.    Dario Fong, EMT  2:46 PM 1/4/2023

## 2023-01-05 ENCOUNTER — ANTICOAGULATION THERAPY VISIT (OUTPATIENT)
Dept: ANTICOAGULATION | Facility: CLINIC | Age: 78
End: 2023-01-05

## 2023-01-05 DIAGNOSIS — D68.61 ANTIPHOSPHOLIPID SYNDROME (H): ICD-10-CM

## 2023-01-05 DIAGNOSIS — L93.0 LUPUS ERYTHEMATOSUS: ICD-10-CM

## 2023-01-05 DIAGNOSIS — Z79.01 LONG TERM CURRENT USE OF ANTICOAGULANT THERAPY: Primary | ICD-10-CM

## 2023-01-05 LAB — PROTHROM ACT/NOR PPP: 12 % (ref 60–140)

## 2023-01-05 NOTE — PROGRESS NOTES
ANTICOAGULATION MANAGEMENT     Shala Caruso 77 year old female is on warfarin with supratherapeutic result. (Goal Factor II: 25-15%)    Recent labs: (last 7 days)     01/04/23  1541   F2 12*       ASSESSMENT     Source(s): Chart Review and Patient/Caregiver Call       Warfarin doses taken: More warfarin taken than planned which may be affecting result(s) Patient was taking 10mg on Tues and 7.5mg all other days.      Diet: No new diet changes identified    New illness, injury, or hospitalization: No    Medication/supplement changes: None noted    Signs or symptoms of bleeding or clotting: No    Previous result: Subtherapeutic    Additional findings: Writer will send a my chart message and patient will write down dose.  Patient agrees if there is confusion in the future about dosing she will call [facility] clinic instead of guessing.        PLAN     Recommended plan for temporary change(s) affecting result    Dosing Instructions:  decrease your warfarin dose 11 (5mg on Thur and 7.5mg all other days% change) with next Factor II in 1 week       Telephone call with Jeanmarie who verbalizes understanding and agrees to plan and who agrees to plan and repeated back plan correctly    Patient offered & declined to schedule next visit    Education provided:     Taking warfarin: Importance of taking warfarin as instructed    Goal range and lab monitoring: goal range and significance of current result, Importance of therapeutic range and Importance of following up at instructed interval    Plan made per [facility] anticoagulation protocol

## 2023-01-16 ENCOUNTER — LAB (OUTPATIENT)
Dept: LAB | Facility: CLINIC | Age: 78
End: 2023-01-16
Payer: COMMERCIAL

## 2023-01-16 DIAGNOSIS — L93.0 LUPUS ERYTHEMATOSUS: ICD-10-CM

## 2023-01-16 DIAGNOSIS — Z79.01 LONG TERM CURRENT USE OF ANTICOAGULANT THERAPY: ICD-10-CM

## 2023-01-16 DIAGNOSIS — D68.61 ANTIPHOSPHOLIPID SYNDROME (H): ICD-10-CM

## 2023-01-16 PROCEDURE — 85210 CLOT FACTOR II PROTHROM SPEC: CPT | Mod: 90 | Performed by: PATHOLOGY

## 2023-01-16 PROCEDURE — 99000 SPECIMEN HANDLING OFFICE-LAB: CPT | Performed by: PATHOLOGY

## 2023-01-16 PROCEDURE — 36415 COLL VENOUS BLD VENIPUNCTURE: CPT | Performed by: PATHOLOGY

## 2023-01-17 ENCOUNTER — ANTICOAGULATION THERAPY VISIT (OUTPATIENT)
Dept: ANTICOAGULATION | Facility: CLINIC | Age: 78
End: 2023-01-17
Payer: COMMERCIAL

## 2023-01-17 DIAGNOSIS — L93.0 LUPUS ERYTHEMATOSUS: ICD-10-CM

## 2023-01-17 DIAGNOSIS — D68.61 ANTIPHOSPHOLIPID SYNDROME (H): ICD-10-CM

## 2023-01-17 DIAGNOSIS — Z79.01 LONG TERM CURRENT USE OF ANTICOAGULANT THERAPY: Primary | ICD-10-CM

## 2023-01-17 LAB — PROTHROM ACT/NOR PPP: 12 % (ref 60–140)

## 2023-01-17 NOTE — PROGRESS NOTES
ANTICOAGULATION MANAGEMENT     Shala Caruso 77 year old female is on warfarin with supratherapeutic result. (Goal Factor II: 25-15%)    Recent labs: (last 7 days)     01/16/23  1338   F2 12*       ASSESSMENT     Source(s): Chart Review and Patient/Caregiver Call       Warfarin doses taken: Warfarin taken as instructed    Diet: Decreased greens/vitamin K in diet; ongoing change    New illness, injury, or hospitalization: No    Medication/supplement changes: None noted    Signs or symptoms of bleeding or clotting: No    Previous result: Supratherapeutic    Additional findings: None       PLAN     Recommended plan for ongoing change(s) affecting result    Dosing Instructions: hold dose then decrease your warfarin dose (10% change) 5mg every MWF, 7.5mg all other days with next Chromogenic Factor X in 9 days       Telephone call with Jeanmarie who verbalizes understanding and agrees to plan and who agrees to plan and repeated back plan correctly    Lab visit scheduled    Education provided:     Dietary considerations: importance of consistent vitamin K intake    Symptom monitoring: monitoring for bleeding signs and symptoms, monitoring for clotting signs and symptoms, monitoring for stroke signs and symptoms and when to seek medical attention/emergency care    Plan made per [facility] anticoagulation protocol

## 2023-01-26 ENCOUNTER — LAB (OUTPATIENT)
Dept: LAB | Facility: CLINIC | Age: 78
End: 2023-01-26
Payer: COMMERCIAL

## 2023-01-26 DIAGNOSIS — L93.0 LUPUS ERYTHEMATOSUS: ICD-10-CM

## 2023-01-26 DIAGNOSIS — Z79.01 LONG TERM CURRENT USE OF ANTICOAGULANT THERAPY: ICD-10-CM

## 2023-01-26 DIAGNOSIS — D68.61 ANTIPHOSPHOLIPID SYNDROME (H): ICD-10-CM

## 2023-01-26 PROCEDURE — 85210 CLOT FACTOR II PROTHROM SPEC: CPT | Mod: 90 | Performed by: PATHOLOGY

## 2023-01-26 PROCEDURE — 36415 COLL VENOUS BLD VENIPUNCTURE: CPT | Performed by: PATHOLOGY

## 2023-01-26 PROCEDURE — 99000 SPECIMEN HANDLING OFFICE-LAB: CPT | Performed by: PATHOLOGY

## 2023-01-27 ENCOUNTER — ANTICOAGULATION THERAPY VISIT (OUTPATIENT)
Dept: ANTICOAGULATION | Facility: CLINIC | Age: 78
End: 2023-01-27
Payer: COMMERCIAL

## 2023-01-27 DIAGNOSIS — D68.61 ANTIPHOSPHOLIPID SYNDROME (H): ICD-10-CM

## 2023-01-27 DIAGNOSIS — Z79.01 LONG TERM CURRENT USE OF ANTICOAGULANT THERAPY: Primary | ICD-10-CM

## 2023-01-27 DIAGNOSIS — L93.0 LUPUS ERYTHEMATOSUS: ICD-10-CM

## 2023-01-27 LAB — PROTHROM ACT/NOR PPP: 23 % (ref 60–140)

## 2023-01-27 NOTE — PROGRESS NOTES
ANTICOAGULATION MANAGEMENT     Shala Caruso 77 year old female is on warfarin with therapeutic result. (Goal Factor II: 25-15%)    Recent labs: (last 7 days)     01/26/23  1338   F2 23*       ASSESSMENT       Source(s): Chart Review    Previous result was Supratherapeutic    Medication, diet, health changes since last result: chart reviewed; none identified           PLAN     Recommended plan for no diet, medication or health factor changes affecting result    Dosing Instructions: Continue your current warfarin dose 5mg every Mon, Wed, Fri and 7.5mg all other days  with next Factor II in 2 weeks       Detailed voice message left for Jeanmarie with dosing instructions and follow up date.     Contact [phone] to schedule and with any changes, questions or concerns.     Education provided:     Please call back if any changes to your diet, medications or how you've been taking warfarin    Contact [phone] with any changes, questions or concerns.     Plan made per [facility] anticoagulation protocol

## 2023-01-30 ENCOUNTER — MYC MEDICAL ADVICE (OUTPATIENT)
Dept: ENDOCRINOLOGY | Facility: CLINIC | Age: 78
End: 2023-01-30
Payer: COMMERCIAL

## 2023-01-31 ENCOUNTER — OFFICE VISIT (OUTPATIENT)
Dept: DERMATOLOGY | Facility: CLINIC | Age: 78
End: 2023-01-31
Attending: INTERNAL MEDICINE
Payer: COMMERCIAL

## 2023-01-31 DIAGNOSIS — B35.1 ONYCHOMYCOSIS: ICD-10-CM

## 2023-01-31 PROCEDURE — 99204 OFFICE O/P NEW MOD 45 MIN: CPT | Mod: GC | Performed by: DERMATOLOGY

## 2023-01-31 RX ORDER — CICLOPIROX 80 MG/ML
SOLUTION TOPICAL
Qty: 6.6 ML | Refills: 11 | Status: SHIPPED | OUTPATIENT
Start: 2023-01-31 | End: 2023-01-31

## 2023-01-31 RX ORDER — CICLOPIROX 80 MG/ML
SOLUTION TOPICAL
Qty: 6.6 ML | Refills: 11 | Status: SHIPPED | OUTPATIENT
Start: 2023-01-31 | End: 2023-08-16

## 2023-01-31 ASSESSMENT — PAIN SCALES - GENERAL: PAINLEVEL: NO PAIN (0)

## 2023-01-31 NOTE — LETTER
1/31/2023       RE: Shala Caruso  [address]  [place] [address]     Dear Colleague,    Thank you for referring your patient, Shala Caruso, to the [facility] DERMATOLOG[facility] [place] at [facility]. Please see a copy of my visit note below.    [facility] Dermatology Note  Encounter Date: Jan 31, 2023  Office Visit     Dermatology Problem List:  1. SLE  - Well controlled off treatment, previously on CellCept   2. 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  Onychomycosis  -Avoiding terbinafine given history of SLE and risk for flare  -Current: Penlac (started 1/31/2023)  -Prior: Pulsed fluconazole in the distant past  ____________________________________________    Assessment & Plan:     # Onychomycosis  Discussed nature of disease and possible treatment options and high risk of recurrence, informed that nail will take months to grow and appear normal.  Given her history of lupus we we will avoid  terbinafine which has been associated with drug-induced lupus and has been reported to cause SLE flares.  Could consider pulsed fluconazole which she has tolerated in the past, but after discussion and given her complex PMH including reactions to PO medications we opted to try topical treatment today.  Set realistic expectations that this may be effective for the fingernails but unlikely to be effective for the toenails.  -Start Penlac (ciclopirox lacquer) once daily to all affected nails once daily. Remove at end of week with alcohol.   -Future: Urea cream, pulsed fluconazole    Procedures Performed:   None    Follow-up: 6 month(s) in-person, or earlier for new or changing lesions    Staff and Resident:     Portia Vasquez MD  Dermatology Resident PGY3  I, Mei Bryan MD, saw this patient with the resident and agree with the resident s findings and plan of care as documented in the resident s  note.    ____________________________________________    CC: Derm Problem (Pt here to have fingernails and toenails assessed.)    HPI:  Ms. Shala Caruso is a(n) 77 year old female who presents today as a return patient for onychomycosis.  She was last seen for this over 10 years ago at which time she had done pulsed fluconazole with success, but the onychomycosis quickly returned to has been persistent since.  Affects multiple fingers and all of her toenails.  She denies significant concerns about scaling or itching of the feet or hands.  She reminds us that her lupus is well controlled and she has been off of CellCept for about 9 months at this point without issues.  She has historically tried topical creams but has never tried Penlac.     Patient is otherwise feeling well, without additional skin concerns.    Labs Reviewed:  N/A    Physical Exam:  Vitals: LMP  (LMP Unknown)   SKIN: Focused examination of hands and feet was performed.  -Hyperkeratosis and yellow discoloration with subungual debris of all 10 toenails and several fingernails (photos taken today)  - No other lesions of concern on areas examined.                     Medications:  Current Outpatient Medications   Medication     acetaminophen (TYLENOL) 500 MG tablet     aspirin 81 MG tablet     AYR SALINE NASAL NO-DRIP GEL     blood glucose (TRUE METRIX BLOOD GLUCOSE TEST) test strip     blood glucose monitoring (ULTRA THIN 30G) lancets     calcium carbonate (OS-GABBY) 500 MG TABS     cholecalciferol (VITAMIN D3) 25 mcg (1000 units) capsule     Glucagon (BAQSIMI ONE PACK) 3 MG/DOSE POWD     Injection Device for insulin (NOVOPEN ECHO) RORY     insulin aspart (NOVOLOG PENFILL) 100 UNIT/ML cartridge     insulin pen needle (BD PAM U/F) 32G X 4 MM miscellaneous     Multiple Vitamins-Minerals (CENTRUM SILVER PO)     NIFEdipine ER OSMOTIC (PROCARDIA XL) 30 MG 24 hr tablet     order for DME     simvastatin (ZOCOR) 40 MG tablet     warfarin ANTICOAGULANT  (JANTOVEN ANTICOAGULANT) 5 MG tablet     mycophenolate (GENERIC EQUIVALENT) 500 MG tablet     No current facility-administered medications for this visit.      Past Medical History:   Patient Active Problem List   Diagnosis     Achalasia     Antiphospholipid syndrome (H)     DM (diabetes mellitus) (H)     GERD (gastroesophageal reflux disease)     Hyperlipidemia     HTN (hypertension)     Osteopenia     Raynaud's disease     DVT (deep venous thrombosis) (H)     Encounter for long-term current use of medication     Type 1 diabetes mellitus (H)     Osteoporosis, idiopathic     Osteoporosis, postmenopausal     Cystocele, midline     Urinary urgency     Urinary frequency     Female stress incontinence     Atrophic vaginitis     Long term current use of anticoagulant therapy     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)     Choroidal lesion     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, unspecified SLE type (H)     Hematoma of leg, right, initial encounter     Lupus erythematosus     Past Medical History:   Diagnosis Date     Achalasia      Antiphospholipid syndrome (H)      Antiplatelet or antithrombotic long-term use      Coagulation disorder (H) 4079-5820     DM (diabetes mellitus) (H)      DVT (deep venous thrombosis) (H) 2003    and PE     Dysphagia     occasional, use Nifedipine     GERD (gastroesophageal reflux disease)      Hyperlipidemia      Lymphedema      Myopia      Neuropathy     toes bilateral     Nonsenile cataract      osteoporosis      Pericarditis      Raynaud's disease      SLE (systemic lupus erythematosus) (H)        CC Tato Agarwal MD  [address] [place] [address] on close of this encounter.        Again, thank you for allowing me to participate in the care of your patient.      Sincerely,    Mei Bryan MD

## 2023-01-31 NOTE — PROGRESS NOTES
[facility] Dermatology Note  Encounter Date: Jan 31, 2023  Office Visit     Dermatology Problem List:  1. SLE  - Well controlled off treatment, previously on CellCept   2. 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  Onychomycosis  -Avoiding terbinafine given history of SLE and risk for flare  -Current: Penlac (started 1/31/2023)  -Prior: Pulsed fluconazole in the distant past  ____________________________________________    Assessment & Plan:     # Onychomycosis  Discussed nature of disease and possible treatment options and high risk of recurrence, informed that nail will take months to grow and appear normal.  Given her history of lupus we we will avoid  terbinafine which has been associated with drug-induced lupus and has been reported to cause SLE flares.  Could consider pulsed fluconazole which she has tolerated in the past, but after discussion and given her complex PMH including reactions to PO medications we opted to try topical treatment today.  Set realistic expectations that this may be effective for the fingernails but unlikely to be effective for the toenails.  -Start Penlac (ciclopirox lacquer) once daily to all affected nails once daily. Remove at end of week with alcohol.   -Future: Urea cream, pulsed fluconazole    Procedures Performed:   None    Follow-up: 6 month(s) in-person, or earlier for new or changing lesions    Staff and Resident:     Portia Vaqsuez MD  Dermatology Resident PGY3  I, Mei Bryan MD, saw this patient with the resident and agree with the resident s findings and plan of care as documented in the resident s note.    ____________________________________________    CC: Derm Problem (Pt here to have fingernails and toenails assessed.)    HPI:  Ms. Shala Caruso is a(n) 77 year old female who presents today as a return patient for onychomycosis.  She was last seen for this over 10 years ago at which time she had done pulsed fluconazole with  success, but the onychomycosis quickly returned to has been persistent since.  Affects multiple fingers and all of her toenails.  She denies significant concerns about scaling or itching of the feet or hands.  She reminds us that her lupus is well controlled and she has been off of CellCept for about 9 months at this point without issues.  She has historically tried topical creams but has never tried Penlac.     Patient is otherwise feeling well, without additional skin concerns.    Labs Reviewed:  N/A    Physical Exam:  Vitals: LMP  (LMP Unknown)   SKIN: Focused examination of hands and feet was performed.  -Hyperkeratosis and yellow discoloration with subungual debris of all 10 toenails and several fingernails (photos taken today)  - No other lesions of concern on areas examined.                     Medications:  Current Outpatient Medications   Medication     acetaminophen (TYLENOL) 500 MG tablet     aspirin 81 MG tablet     AYR SALINE NASAL NO-DRIP GEL     blood glucose (TRUE METRIX BLOOD GLUCOSE TEST) test strip     blood glucose monitoring (ULTRA THIN 30G) lancets     calcium carbonate (OS-GABBY) 500 MG TABS     cholecalciferol (VITAMIN D3) 25 mcg (1000 units) capsule     Glucagon (BAQSIMI ONE PACK) 3 MG/DOSE POWD     Injection Device for insulin (NOVOPEN ECHO) RORY     insulin aspart (NOVOLOG PENFILL) 100 UNIT/ML cartridge     insulin pen needle (BD PAM U/F) 32G X 4 MM miscellaneous     Multiple Vitamins-Minerals (CENTRUM SILVER PO)     NIFEdipine ER OSMOTIC (PROCARDIA XL) 30 MG 24 hr tablet     order for DME     simvastatin (ZOCOR) 40 MG tablet     warfarin ANTICOAGULANT (JANTOVEN ANTICOAGULANT) 5 MG tablet     mycophenolate (GENERIC EQUIVALENT) 500 MG tablet     No current facility-administered medications for this visit.      Past Medical History:   Patient Active Problem List   Diagnosis     Achalasia     Antiphospholipid syndrome (H)     DM (diabetes mellitus) (H)     GERD (gastroesophageal reflux  disease)     Hyperlipidemia     HTN (hypertension)     Osteopenia     Raynaud's disease     DVT (deep venous thrombosis) (H)     Encounter for long-term current use of medication     Type 1 diabetes mellitus (H)     Osteoporosis, idiopathic     Osteoporosis, postmenopausal     Cystocele, midline     Urinary urgency     Urinary frequency     Female stress incontinence     Atrophic vaginitis     Long term current use of anticoagulant therapy     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)     Choroidal lesion     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, unspecified SLE type (H)     Hematoma of leg, right, initial encounter     Lupus erythematosus     Past Medical History:   Diagnosis Date     Achalasia      Antiphospholipid syndrome (H)      Antiplatelet or antithrombotic long-term use      Coagulation disorder (H) 4985-0020     DM (diabetes mellitus) (H)      DVT (deep venous thrombosis) (H) 2003    and PE     Dysphagia     occasional, use Nifedipine     GERD (gastroesophageal reflux disease)      Hyperlipidemia      Lymphedema      Myopia      Neuropathy     toes bilateral     Nonsenile cataract      osteoporosis      Pericarditis      Raynaud's disease      SLE (systemic lupus erythematosus) (H)        CC Tato Agarwal MD  [address] [place] [phone] on close of this encounter.

## 2023-01-31 NOTE — PATIENT INSTRUCTIONS
For the fungal infection of the fingernails/toenails:  Apply ciclopirox lacquer (Penlac) to adjacent skin and affected nails daily. Remove with alcohol every 7 days, then repeat.

## 2023-01-31 NOTE — NURSING NOTE
Chief Complaint   Patient presents with     Derm Problem     Pt here to have fingernails and toenails assessed.     Sang Ace, EMT

## 2023-02-03 ENCOUNTER — LAB (OUTPATIENT)
Dept: LAB | Facility: CLINIC | Age: 78
End: 2023-02-03
Payer: COMMERCIAL

## 2023-02-03 DIAGNOSIS — D68.61 ANTIPHOSPHOLIPID SYNDROME (H): ICD-10-CM

## 2023-02-03 DIAGNOSIS — L93.0 LUPUS ERYTHEMATOSUS: ICD-10-CM

## 2023-02-03 DIAGNOSIS — Z79.01 LONG TERM CURRENT USE OF ANTICOAGULANT THERAPY: ICD-10-CM

## 2023-02-03 PROCEDURE — 85210 CLOT FACTOR II PROTHROM SPEC: CPT | Mod: 90 | Performed by: PATHOLOGY

## 2023-02-03 PROCEDURE — 36415 COLL VENOUS BLD VENIPUNCTURE: CPT | Performed by: PATHOLOGY

## 2023-02-03 PROCEDURE — 99000 SPECIMEN HANDLING OFFICE-LAB: CPT | Performed by: PATHOLOGY

## 2023-02-03 ASSESSMENT — ENCOUNTER SYMPTOMS
NAUSEA: 0
MUSCLE CRAMPS: 0
JAUNDICE: 0
BLOATING: 0
BOWEL INCONTINENCE: 0
BLOOD IN STOOL: 0
STIFFNESS: 1
VOMITING: 0
ARTHRALGIAS: 0
RECTAL PAIN: 0
NECK PAIN: 0
MYALGIAS: 0
MUSCLE WEAKNESS: 0
ABDOMINAL PAIN: 0
JOINT SWELLING: 0
HEARTBURN: 0
CONSTIPATION: 0
BACK PAIN: 0
DIARRHEA: 0

## 2023-02-03 NOTE — PROGRESS NOTES
Outcome for 02/03/23 1:05 PM :Sent patient [employer] message asking them to upload their BG data   Ruth Kathleen  Outcome for 02/03/23 4:24 PM :Patient is not sharing data with clinic. I asked her to send us her glucose readings in a [employer] message.  Ruth Kathleen

## 2023-02-04 LAB — PROTHROM ACT/NOR PPP: 24 % (ref 60–140)

## 2023-02-06 ENCOUNTER — OFFICE VISIT (OUTPATIENT)
Dept: ENDOCRINOLOGY | Facility: CLINIC | Age: 78
End: 2023-02-06
Payer: COMMERCIAL

## 2023-02-06 ENCOUNTER — ANTICOAGULATION THERAPY VISIT (OUTPATIENT)
Dept: ANTICOAGULATION | Facility: CLINIC | Age: 78
End: 2023-02-06

## 2023-02-06 VITALS
HEART RATE: 69 BPM | SYSTOLIC BLOOD PRESSURE: 135 MMHG | HEIGHT: 66 IN | BODY MASS INDEX: 20.52 KG/M2 | WEIGHT: 127.7 LBS | DIASTOLIC BLOOD PRESSURE: 82 MMHG

## 2023-02-06 DIAGNOSIS — D68.61 ANTIPHOSPHOLIPID SYNDROME (H): ICD-10-CM

## 2023-02-06 DIAGNOSIS — E83.52 HYPERCALCEMIA: ICD-10-CM

## 2023-02-06 DIAGNOSIS — E78.49 OTHER HYPERLIPIDEMIA: ICD-10-CM

## 2023-02-06 DIAGNOSIS — E10.649 TYPE 1 DIABETES MELLITUS WITH HYPOGLYCEMIA AND WITHOUT COMA (H): Primary | ICD-10-CM

## 2023-02-06 DIAGNOSIS — L93.0 LUPUS ERYTHEMATOSUS: ICD-10-CM

## 2023-02-06 DIAGNOSIS — M81.0 OSTEOPOROSIS, POSTMENOPAUSAL: ICD-10-CM

## 2023-02-06 DIAGNOSIS — Z79.01 LONG TERM CURRENT USE OF ANTICOAGULANT THERAPY: Primary | ICD-10-CM

## 2023-02-06 PROCEDURE — 99215 OFFICE O/P EST HI 40 MIN: CPT | Performed by: INTERNAL MEDICINE

## 2023-02-06 ASSESSMENT — PAIN SCALES - GENERAL: PAINLEVEL: NO PAIN (0)

## 2023-02-06 NOTE — PROGRESS NOTES
Date Time Reading Time  Reading  Time  Reading  Time  Reading  Time  Reading    2/6 5am 109           2/5 5am 119 11am 90 5pm 122 7pm 88     2/4 7am 106 11am 96 5pm 131 7pm 52 10pm 116   2/3 7am 120 11am 87 5pm 105 9pm 88     2/2 5am 94 11am 113 5pm 109 10pm 87     2/1 6am 113 11am 81 6pm 91 10pm 68

## 2023-02-06 NOTE — LETTER
2/6/2023       RE: Shala Caruso  [address]  [place] [address]     Dear Colleague,    Thank you for referring your patient, Shala Caruso, to the [facility] [place] at [facility]. Please see a copy of my visit note below.    Outcome for 02/03/23 1:05 PM :Sent patient BoxTone message asking them to upload their BG data   Ruth Kathleen  Outcome for 02/03/23 4:24 PM :Patient is not sharing data with clinic. I asked her to send us her glucose readings in a BoxTone message.  Ruth Murry is a 77 year old pleasant female with hx of SLE, APLS, DVT, osteoporosis and type 1 diabetes presenting for f/up.     Interval history:    1. Type 1 diabetes  Lantus was discontinued and in 2020 and her diabetes is now managed only with NovoLog.  On average, she reports taking 2 units NovoLog for breakfast, 4 for lunch and 3-4 for dinner.  Most recent A1c was 5.7, on 1/4/2022.  It was 5.5, in June 2022.    Diabetes complications:   No h/o microalbuminuria.  Most recent urine microalbumin negative in June 2022.  Recent GFR 69.  Last eye exam -September 2022; note reviewed.  No DR. 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  S/P cataract surgery.  Numbness and tingling sensation B/L toes - for many years but light sensation is intact. She does wear comfortable shoes/insoles. The neuropathy is stable, per patient. 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  She develops confusion, inability to concentrate or focus her vision and she feels extremely tired when her blood sugar is the 60s or 50s.  She has no prior episodes of loss of consciousness due to hypoglycemia.  She does not always recognize the lows and, recently, she has been experiencing episodes when her blood sugar is 57 on the glucometer as she did not have hypoglycemic symptoms.  She does have Baqsimi at home.  Most recent lipid panel  from 6/16/2022: LDL cholesterol 54, HDL cholesterol 77, triglycerides 62.  On 40 mg simvastatin daily.  Exercise: mainly walking at this time    She is interested in considering a CGM.  She has noticed that her blood sugar is consistently a little higher compared with the bedtime blood sugar and she is concerned she might have some insulin resistance in the morning.  She would prefer to have something to proactively alarm her when her blood sugar increases or decreases at a rapid speed.  Especially when hiking, she does develop unpredictable hypoglycemia and the sensor might help with this.  Her meal schedule has been more variable since long-term and she is interested in finding out the effect of the different foods on the blood sugar, by using the sensor.    I reviewed the provided blood glucose glucometer data:  Date Time Reading Time  Reading  Time  Reading  Time  Reading  Time  Reading    2/6 5am 109                   2/5 5am 119 11am 90 5pm 122 7pm 88       2/4 7am 106 11am 96 5pm 131 7pm 52 10pm 116   2/3 7am 120 11am 87 5pm 105 9pm 88       2/2 5am 94 11am 113 5pm 109 10pm 87       2/1 6am 113 11am 81 6pm 91 10pm 68         She reports taking 2 years of NovoLog for breakfast, 4 units for lunch and 2 to 3 units for dinner.  In general, she has been using an insulin to carbohydrate ratio of 1 unit per 15 g.  She does have the insulin pen which administer 0.5 units.  Example of a lunch: Meat, green vegetables, yogurt and nuts, sometimes sugar-free Jell-O  Dinner is frequently leftovers  If she snacks on cheese she does not administer any extra NovoLog.    2. Osteoporosis  Jeanmarie also has a history of osteoporosis, treated with Boniva for almost 3 years, followed by Forteo which was started in 4/12 - interrupted treatment from 4/2013 and restarted in 7/2013 until July 2014. After she completed the treatment with Forteo, she received one dose of Reclast, in July 2014.       She has no history of prior bone  fractures.  She's been off prednisone since 2013.  Her height has decreased over the years from 5'6'' to 5'1/2''. Her mother had a hip fracture in her 80s.      On the DXA scan images from 7/1/16, L1-L3 T score was - 1.  Overall, at the spine, there was a significant increase of bone mineral density since 2005 of 10.5%. Since 2015, the bone mineral density has remained stable at spine. The lowest T score at the hip level was -2.2, at the left femoral neck.  Overall, there was a significant improvement of bone mineral density at the mean hip of 8.9% since 2005, with no significant changes since 2015. While the bone mineral density at the left hip increased since baseline, at the right hip, there was a decrease of bone mineral density since baseline and also, since prior year.     On the DEXA scan from 7/27/18, the lowest T score was -2.1, at the left femoral neck.  Compared with the prior scan from 2016, the bone mineral density at the hips remained stable.  At the lumbar spine, L1-L3 T score was -1.3, not significantly changed since 2016.     On DEXA scan images from January 2020, L2-L4 T score was -1.5. At the hips, the lowest score was -2.3, at the left femoral neck. Compared with the prior DEXA scan from 2018, there was a significant decrease in bone mineral density of the lumbar spine, left total hip and right total hip by 3.1, 4.7 and 3.5%, respectively.  The patient received zoledronic acid infusion on 7/30/2020 and 7/27/21.     Most recent DEXA from 6/16/2022 showed the following T score: -0.9 at L1 L4, -0.3 at 33% distal radius, -1.7 at both right and left femoral necks, -1.8 at the left total hip and -1.9 at the right total hip.  At the radius and average hip, the bone mineral density remained stable since 2020.  At the spine, there was a significant increase of 4.5%.     3. Hypercalcemia   She had mild hyperglycemia in July 2022.  In August 2022, the calcium normalized at 9.2, with a vitamin D level of 74  and a normal phosphorus and albumin.   On the most recent blood work from 1/4/2023, calcium was again mildly elevated at 10.3.  Of note that parathyroid hormone level was normal at 34 in July 2021, when calcium was 9.1.  The current dose of calcium and vitamin D is 500 mg/200 U BID (oyster shell), Vit D 1000mg daily, an additional daily Centrum Silver MVI which contains 1000 U vitamin D per tablet.   She had mild hyperglycemia in July 2022.  In August 2022, the calcium normalized at 9.2, with a vitamin D level of 74 and a normal phosphorus and albumin.  At that time, the patient was taking 2000 units vitamin D daily and an oyster cell calcium with vitamin D, 500 mg/200 U BID.     I recommended 500 mg calcium twice daily and to decrease the dose of vitamin D daily to 1000 units daily.  Patient thinks she only takes a multivitamin and the calcium supplements, but she is unsure of the dosages  In terms of dairy products, she does have yogurt and cottage cheese daily.       Past Medical History  Past Medical History:   Diagnosis Date     Achalasia      Antiphospholipid syndrome (H)      Antiplatelet or antithrombotic long-term use      Coagulation disorder (H) 8250-4332     DM (diabetes mellitus) (H)      DVT (deep venous thrombosis) (H) 2003    and PE     Dysphagia     occasional, use Nifedipine     GERD (gastroesophageal reflux disease)      Hyperlipidemia      Lymphedema      Myopia      Neuropathy     toes bilateral     Nonsenile cataract      osteoporosis      Pericarditis      Raynaud's disease      SLE (systemic lupus erythematosus) (H)        Past Surgical History  Past Surgical History:   Procedure Laterality Date     CATARACT IOL, RT/LT Left 02/2019     CATARACT IOL, RT/LT Right 01/2019     COLONOSCOPY N/A 11/28/2016    Procedure: COLONOSCOPY;  Surgeon: Sang August MD;  Location:  GI     CYSTOSCOPY, SLING TRANSVAGINAL N/A 12/20/2016    Procedure: CYSTOSCOPY, SLING TRANSVAGINAL;  Surgeon: Kenna  Jeanmarie CURTIS MD;  Location: [facility] OR     DISSECT LYMPH NODE AXILLA  2004    Lt, during eval for SLE     HYSTEROSCOPIC PLACEMENT CONTRACEPTIVE DEVICE  1985     PHACOEMULSIFICATION WITH STANDARD INTRAOCULAR LENS IMPLANT Right 1/8/2019    Procedure: Right Eye Cataract Extraction with Intraocular Lens;  Surgeon: Monika Fermin MD;  Location: [facility] OR     PHACOEMULSIFICATION WITH STANDARD INTRAOCULAR LENS IMPLANT Left 2/5/2019    Procedure: Left Eye Cataract Extraction with Intraocular Lens;  Surgeon: Monika Fermin MD;  Location: [facility] OR     TUBAL LIGATION Bilateral         Medications    Current Outpatient Medications:      acetaminophen (TYLENOL) 500 MG tablet, Take 1,000-1,500 mg by mouth nightly as needed , Disp: , Rfl:      aspirin 81 MG tablet, Take 81 mg by mouth At Bedtime , Disp: , Rfl:      AYR SALINE NASAL NO-DRIP GEL, Use as needed for nasal dryness, Disp: 3 Tube, Rfl: 3     blood glucose (TRUE METRIX BLOOD GLUCOSE TEST) test strip, USE TO TEST BLOOD SUGAR 5 TIMES DAILY OR AS DIRECTED, Disp: 500 strip, Rfl: 3     blood glucose monitoring (ULTRA THIN 30G) lancets, Test 5 times daily  [employer]  Super thin, Disp: 450 each, Rfl: 3     calcium carbonate (OS-GABBY) 500 MG TABS, Take 1 tablet by mouth 2 times daily (with meals), Disp: , Rfl:      cholecalciferol (VITAMIN D3) 25 mcg (1000 units) capsule, Take 1 capsule by mouth daily, Disp: , Rfl:      ciclopirox (PENLAC) 8 % external solution, Apply to adjacent skin and affected nails daily.  Remove with alcohol every 7 days, then repeat., Disp: 6.6 mL, Rfl: 11     Glucagon (BAQSIMI ONE PACK) 3 MG/DOSE POWD, Spray 3 mg in nostril as needed (to be used for severe hypoglycemic episodes), Disp: 1 each, Rfl: 4     Injection Device for insulin (NOVOPEN ECHO) RORY, 1 each 4 times daily, Disp: 1 each, Rfl: 0     insulin aspart (NOVOLOG PENFILL) 100 UNIT/ML cartridge, INJECT subcu 1 UNIT PER 15G CHO WITH MEALS 3x A DAY. APPROX 15 UNITS DAILY., Disp: 15 mL, Rfl:  "3     insulin pen needle (BD PAM U/F) 32G X 4 MM miscellaneous, Use as directed with insulin pens 4 times daily, Disp: 400 each, Rfl: 3     Multiple Vitamins-Minerals (CENTRUM SILVER PO), Take 1 tablet by mouth daily., Disp: , Rfl:      mycophenolate (GENERIC EQUIVALENT) 500 MG tablet, Take 1 tablet daily. (Patient not taking: Reported on 8/19/2022), Disp: 90 tablet, Rfl: 2     NIFEdipine ER OSMOTIC (PROCARDIA XL) 30 MG 24 hr tablet, 1-2 daily or dysphagia, Disp: 180 tablet, Rfl: 3     order for DME, Equipment being ordered: compression socks, 20-30 mmHg, knee high, Disp: 8 Piece, Rfl: 4     simvastatin (ZOCOR) 40 MG tablet, Take 1 tablet (40 mg) by mouth At Bedtime, Disp: 90 tablet, Rfl: 3     warfarin ANTICOAGULANT (JANTOVEN ANTICOAGULANT) 5 MG tablet, Take 1.5 to 2 tablets daily or as directed by the Coumadin clinic., Disp: 200 tablet, Rfl: 1    /82 (BP Location: Right arm, Patient Position: Sitting, Cuff Size: Adult Regular)   Pulse 69   Ht 1.676 m (5' 6\")   Wt 57.9 kg (127 lb 11.2 oz)   LMP  (LMP Unknown)   BMI 20.61 kg/m    Wt Readings from Last 10 Encounters:   02/06/23 57.9 kg (127 lb 11.2 oz)   08/01/22 56.1 kg (123 lb 11.2 oz)   01/24/22 52.6 kg (116 lb)   07/30/20 52.6 kg (116 lb)   02/05/19 56.7 kg (125 lb)   01/08/19 54.1 kg (119 lb 4.8 oz)   07/30/18 54.1 kg (119 lb 3.2 oz)   01/29/18 54 kg (119 lb)   08/02/17 52.8 kg (116 lb 8 oz)   07/31/17 53.2 kg (117 lb 4.8 oz)       Physical Examination:  General appearance: seated comfortably in the chair during assessment. No distress noted    Endocrine Labs:  Lab Results   Component Value Date    A1C 5.7 (H) 01/04/2023    A1C 5.5 06/16/2022    A1C 5.7 (H) 02/23/2022    A1C 5.6 12/22/2021    A1C 5.4 07/07/2021    A1C 5.2 04/21/2021    A1C 5.6 01/20/2021    A1C 5.1 07/09/2020    A1C 5.3 07/11/2017    HEMOGLOBINA1 5.1 01/13/2020    HEMOGLOBINA1 5.1 07/15/2019    HEMOGLOBINA1 5.0 07/30/2018    HEMOGLOBINA1 5.0 01/29/2018    HEMOGLOBINA1 5.0 01/23/2017 "       Hemoglobin   Date Value Ref Range Status   12/19/2022 13.0 11.7 - 15.7 g/dL Final   04/29/2021 12.9 11.7 - 15.7 g/dL Final     Hematocrit   Date Value Ref Range Status   12/19/2022 39.8 35.0 - 47.0 % Final   04/29/2021 41.5 35.0 - 47.0 % Final     Cholesterol   Date Value Ref Range Status   06/16/2022 143 <200 mg/dL Final   07/07/2021 199 <200 mg/dL Final     Comment:     Desirable:       <200 mg/dl     Cholesterol/HDL Ratio   Date Value Ref Range Status   10/01/2014 1.5 0.0 - 5.0 Final     HDL Cholesterol   Date Value Ref Range Status   07/07/2021 108 >49 mg/dL Final     Direct Measure HDL   Date Value Ref Range Status   06/16/2022 77 >=50 mg/dL Final     LDL Cholesterol Calculated   Date Value Ref Range Status   06/16/2022 54 <=100 mg/dL Final   07/07/2021 79 <100 mg/dL Final     Comment:     Desirable:       <100 mg/dl     VLDL-Cholesterol   Date Value Ref Range Status   10/01/2014 7 0 - 30 mg/dL Final     Triglycerides   Date Value Ref Range Status   06/16/2022 62 <150 mg/dL Final   07/07/2021 58 <150 mg/dL Final     Albumin Urine mg/L   Date Value Ref Range Status   01/04/2023 45.3 mg/L Final     Comment:     The reference ranges have not been established in urine albumin. The results should be integrated into the clinical context for interpretation.   06/16/2022 16 mg/L Final   07/07/2021 9 mg/L Final     TSH   Date Value Ref Range Status   08/01/2022 1.34 0.40 - 4.00 mU/L Final   07/09/2019 0.99 0.40 - 4.00 mU/L Final         Last Basic Metabolic Panel:    Sodium   Date Value Ref Range Status   01/04/2023 143 136 - 145 mmol/L Final   07/07/2021 141 133 - 144 mmol/L Final     Potassium   Date Value Ref Range Status   01/04/2023 4.0 3.4 - 5.3 mmol/L Final   07/19/2022 4.2 3.4 - 5.3 mmol/L Final   07/07/2021 4.0 3.4 - 5.3 mmol/L Final     Chloride   Date Value Ref Range Status   01/04/2023 104 98 - 107 mmol/L Final   07/19/2022 108 94 - 109 mmol/L Final   07/07/2021 106 94 - 109 mmol/L Final     Calcium    Date Value Ref Range Status   01/04/2023 10.3 (H) 8.8 - 10.2 mg/dL Final   07/07/2021 9.1 8.5 - 10.1 mg/dL Final     Carbon Dioxide   Date Value Ref Range Status   07/07/2021 27 20 - 32 mmol/L Final     Carbon Dioxide (CO2)   Date Value Ref Range Status   01/04/2023 29 22 - 29 mmol/L Final   07/19/2022 28 20 - 32 mmol/L Final     Urea Nitrogen   Date Value Ref Range Status   01/04/2023 29.6 (H) 8.0 - 23.0 mg/dL Final   07/19/2022 22 7 - 30 mg/dL Final   07/07/2021 21 7 - 30 mg/dL Final     Creatinine   Date Value Ref Range Status   01/04/2023 0.87 0.51 - 0.95 mg/dL Final   07/07/2021 0.71 0.52 - 1.04 mg/dL Final     GFR Estimate   Date Value Ref Range Status   01/04/2023 69 >60 mL/min/1.73m2 Final     Comment:     Effective December 21, 2021 eGFRcr in adults is calculated using the 2021 CKD-EPI creatinine equation which includes age and gender (Eliazar et al., NEJ, DOI: 10.1056/NSJIah7778146)   07/07/2021 84 >60 mL/min/[1.73_m2] Final     Comment:     Non  GFR Calc  Starting 12/18/2018, serum creatinine based estimated GFR (eGFR) will be   calculated using the Chronic Kidney Disease Epidemiology Collaboration   (CKD-EPI) equation.       Glucose   Date Value Ref Range Status   01/04/2023 124 (H) 70 - 99 mg/dL Final   07/19/2022 109 (H) 70 - 99 mg/dL Final   07/07/2021 110 (H) 70 - 99 mg/dL Final       AST   Date Value Ref Range Status   01/04/2023 20 10 - 35 U/L Final   07/07/2021 14 0 - 45 U/L Final     ALT   Date Value Ref Range Status   01/04/2023 21 10 - 35 U/L Final   07/07/2021 23 0 - 50 U/L Final     Albumin Urine mg/g Cr   Date Value Ref Range Status   01/04/2023 22.32 0.00 - 25.00 mg/g Cr Final     Comment:     Microalbuminuria is defined as an albumin:creatinine ratio of 17 to 299 for males and 25 to 299 for females. A ratio of albumin:creatinine of 300 or higher is indicative of overt proteinuria.  Due to biologic variability, positive results should be confirmed by a second, first-morning  random or 24-hour timed urine specimen. If there is discrepancy, a third specimen is recommended. When 2 out of 3 results are in the microalbuminuria range, this is evidence for incipient nephropathy and warrants increased efforts at glucose control, blood pressure control, and institution of therapy with an angiotensin-converting-enzyme (ACE) inhibitor (if the patient can tolerate it).     06/16/2022 13.11 0.00 - 25.00 mg/g Cr Final   07/07/2021 9.35 0 - 25 mg/g Cr Final            Assessment and Plan:    Jaenmarie is a 76 year old pleasant female with hx of SLE, APLS, DVT, osteoporosis and type 1 diabetes presenting for f/up    1) Type 1 diabetes, formally diagnosed in 2010 while being evaluated for steroid-induced hyperglycemia.  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  The hypoglycemic episodes have significantly decreased following the discontinuation of long-acting insulin.  Currently, her diabetes is managed with small dosages of short acting insulin based on the carbohydrate intake.  Patient continues to have good control of her diabetes, with tight glycemic control.  In general, she has been experiencing more frequent hypoglycemic episodes at bedtime.  The highest blood sugar of the day tends to occur prior to dinner.  It is possible that snacks between lunch and dinner might affect the predinner blood sugar.    I recommend is to take approximately 0.5 units of NovoLog more for lunch and 0.5 units less NovoLog insulin for dinner.    I had a long discussion with the patient regarding the sensors, their accuracy.  They tend to be less accurate than the glucometer.  In general, they are used to establish trends.  She might find them helpful in alarming for wide variations of the blood sugar over short periods of time.  She would always need to confirm the hypoglycemic episodes noted on the sensor by using the glucometer, especially if asymptomatic.  She prefers a Siddharth sensor, given her  insurance coverage.  I placed a prescription and the plan is for her to meet with the diabetes educator to review its use.    2) Osteoporosis now osteopenia  Risk factors for osteoporosis include postmenopausal status, lupus, prior treatment with steroids, diabetes, fam history.   She was treated with Boniva for 3 years, followed by Forteo for 2 years. Received one dose of Reclast in July 2014, when she completed treatment with Forteo.  Treatment with Reclast was resumed in July 2020, when the DEXA scan revealed some loss of bone mineral density, although the lowest T score remained in the osteopenic range.  She received another dose of Reclast in July 2021. Most recent DEXA from 6/22 showed improvement in bone mineral density, with the lowest T-scores at bilateral femoral necks.  Plan:   Continue to take calcium and vitamin D  Walking, weightbearing exercises encouraged  Schedule a follow-up DEXA scan in June 2023    3) Hypercalcemia, mild and recurrent  Of unclear etiology.  Advised the patient to contact us with the dose of calcium and vitamin D she has been taking on a daily basis.  Follow-up BMP, phosphorus, albumin, vitamin D PTH importance.  Return to clinic in 6 months    Orders Placed This Encounter   Procedures     Dexa hip/pelvis/spine     25 Hydroxyvitamin D2 and D3     Albumin level     Basic metabolic panel     Calcium     Parathyroid Hormone Intact     Phosphorus     Answers for HPI/ROS submitted by the patient on 2/3/2023  General Symptoms: No  Skin Symptoms: No  HENT Symptoms: No  EYE SYMPTOMS: No  HEART SYMPTOMS: No  LUNG SYMPTOMS: No  INTESTINAL SYMPTOMS: Yes  URINARY SYMPTOMS: No  GYNECOLOGIC SYMPTOMS: No  BREAST SYMPTOMS: No  SKELETAL SYMPTOMS: Yes  BLOOD SYMPTOMS: No  NERVOUS SYSTEM SYMPTOMS: No  MENTAL HEALTH SYMPTOMS: No  Heart burn or indigestion: No  Nausea: No  Vomiting: No  Abdominal pain: No  Bloating: No  Constipation: No  Diarrhea: No  Blood in stool: No  Black stools: No  Rectal or  Anal pain: No  Fecal incontinence: No  Yellowing of skin or eyes: No  Vomit with blood: No  Change in stools: No  Back pain: No  Muscle aches: No  Neck pain: No  Swollen joints: No  Joint pain: No  Bone pain: No  Muscle cramps: No  Muscle weakness: No  Joint stiffness: Yes  Bone fracture: No    42 minutes spent on the date of the encounter doing chart review, history and exam, documentation and further activities as noted above.      Date Time Reading Time  Reading  Time  Reading  Time  Reading  Time  Reading    2/6 5am 109           2/5 5am 119 11am 90 5pm 122 7pm 88     2/4 7am 106 11am 96 5pm 131 7pm 52 10pm 116   2/3 7am 120 11am 87 5pm 105 9pm 88     2/2 5am 94 11am 113 5pm 109 10pm 87     2/1 6am 113 11am 81 6pm 91 10pm 68         Dorita Cramer MD

## 2023-02-06 NOTE — PROGRESS NOTES
Jeanmarie is a 77 year old pleasant female with hx of SLE, APLS, DVT, osteoporosis and type 1 diabetes presenting for f/up.     Interval history:    1. Type 1 diabetes  Lantus was discontinued and in 2020 and her diabetes is now managed only with NovoLog.  On average, she reports taking 2 units NovoLog for breakfast, 4 for lunch and 3-4 for dinner.  Most recent A1c was 5.7, on 1/4/2022.  It was 5.5, in June 2022.    Diabetes complications:   No h/o microalbuminuria.  Most recent urine microalbumin negative in June 2022.  Recent GFR 69.  Last eye exam -September 2022; note reviewed.  No DR. 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  S/P cataract surgery.  Numbness and tingling sensation B/L toes - for many years but light sensation is intact. She does wear comfortable shoes/insoles. The neuropathy is stable, per patient. 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  She develops confusion, inability to concentrate or focus her vision and she feels extremely tired when her blood sugar is the 60s or 50s.  She has no prior episodes of loss of consciousness due to hypoglycemia.  She does not always recognize the lows and, recently, she has been experiencing episodes when her blood sugar is 57 on the glucometer as she did not have hypoglycemic symptoms.  She does have Baqsimi at home.  Most recent lipid panel from 6/16/2022: LDL cholesterol 54, HDL cholesterol 77, triglycerides 62.  On 40 mg simvastatin daily.  Exercise: mainly walking at this time    She is interested in considering a CGM.  She has noticed that her blood sugar is consistently a little higher compared with the bedtime blood sugar and she is concerned she might have some insulin resistance in the morning.  She would prefer to have something to proactively alarm her when her blood sugar increases or decreases at a rapid speed.  Especially when hiking, she does develop unpredictable hypoglycemia and the sensor might help  with this.  Her meal schedule has been more variable since custodial and she is interested in finding out the effect of the different foods on the blood sugar, by using the sensor.    I reviewed the provided blood glucose glucometer data:  Date Time Reading Time  Reading  Time  Reading  Time  Reading  Time  Reading    2/6 5am 109                   2/5 5am 119 11am 90 5pm 122 7pm 88       2/4 7am 106 11am 96 5pm 131 7pm 52 10pm 116   2/3 7am 120 11am 87 5pm 105 9pm 88       2/2 5am 94 11am 113 5pm 109 10pm 87       2/1 6am 113 11am 81 6pm 91 10pm 68         She reports taking 2 years of NovoLog for breakfast, 4 units for lunch and 2 to 3 units for dinner.  In general, she has been using an insulin to carbohydrate ratio of 1 unit per 15 g.  She does have the insulin pen which administer 0.5 units.  Example of a lunch: Meat, green vegetables, yogurt and nuts, sometimes sugar-free Jell-O  Dinner is frequently leftovers  If she snacks on cheese she does not administer any extra NovoLog.    2. Osteoporosis  Jeanmarie also has a history of osteoporosis, treated with Boniva for almost 3 years, followed by Forteo which was started in 4/12 - interrupted treatment from 4/2013 and restarted in 7/2013 until July 2014. After she completed the treatment with Forteo, she received one dose of Reclast, in July 2014.       She has no history of prior bone fractures.  She's been off prednisone since 2013.  Her height has decreased over the years from 5'6'' to 5'1/2''. Her mother had a hip fracture in her 80s.      On the DXA scan images from 7/1/16, L1-L3 T score was - 1.  Overall, at the spine, there was a significant increase of bone mineral density since 2005 of 10.5%. Since 2015, the bone mineral density has remained stable at spine. The lowest T score at the hip level was -2.2, at the left femoral neck.  Overall, there was a significant improvement of bone mineral density at the mean hip of 8.9% since 2005, with no significant changes  since 2015. While the bone mineral density at the left hip increased since baseline, at the right hip, there was a decrease of bone mineral density since baseline and also, since prior year.     On the DEXA scan from 7/27/18, the lowest T score was -2.1, at the left femoral neck.  Compared with the prior scan from 2016, the bone mineral density at the hips remained stable.  At the lumbar spine, L1-L3 T score was -1.3, not significantly changed since 2016.     On DEXA scan images from January 2020, L2-L4 T score was -1.5. At the hips, the lowest score was -2.3, at the left femoral neck. Compared with the prior DEXA scan from 2018, there was a significant decrease in bone mineral density of the lumbar spine, left total hip and right total hip by 3.1, 4.7 and 3.5%, respectively.  The patient received zoledronic acid infusion on 7/30/2020 and 7/27/21.     Most recent DEXA from 6/16/2022 showed the following T score: -0.9 at L1 L4, -0.3 at 33% distal radius, -1.7 at both right and left femoral necks, -1.8 at the left total hip and -1.9 at the right total hip.  At the radius and average hip, the bone mineral density remained stable since 2020.  At the spine, there was a significant increase of 4.5%.     3. Hypercalcemia   She had mild hypercalcemia in July 2022.  In August 2022, the calcium normalized at 9.2, with a vitamin D level of 74 and a normal phosphorus and albumin.   On the most recent blood work from 1/4/2023, calcium was again mildly elevated at 10.3.  Of note that parathyroid hormone level was normal at 34 in July 2021, when calcium was 9.1.  Prior dosages of calcium and vitamin D:  500 mg/200 U BID (oyster shell), Vit D 1000mg daily, an additional daily Centrum Silver MVI which contains 1000 U vitamin D per tablet.     I recommended 500 mg calcium twice daily and to decrease the dose of vitamin D daily to 1000 units daily.  Patient thinks she only takes a multivitamin and the calcium supplements, but she is  unsure of the dosages  In terms of dairy products, she does have yogurt and cottage cheese daily.       Past Medical History  Past Medical History:   Diagnosis Date     Achalasia      Antiphospholipid syndrome (H)      Antiplatelet or antithrombotic long-term use      Coagulation disorder (H) 5811-0242     DM (diabetes mellitus) (H)      DVT (deep venous thrombosis) (H) 2003    and PE     Dysphagia     occasional, use Nifedipine     GERD (gastroesophageal reflux disease)      Hyperlipidemia      Lymphedema      Myopia      Neuropathy     toes bilateral     Nonsenile cataract      osteoporosis      Pericarditis      Raynaud's disease      SLE (systemic lupus erythematosus) (H)        Past Surgical History  Past Surgical History:   Procedure Laterality Date     CATARACT IOL, RT/LT Left 02/2019     CATARACT IOL, RT/LT Right 01/2019     COLONOSCOPY N/A 11/28/2016    Procedure: COLONOSCOPY;  Surgeon: Sang August MD;  Location: [facility]     CYSTOSCOPY, SLING TRANSVAGINAL N/A 12/20/2016    Procedure: CYSTOSCOPY, SLING TRANSVAGINAL;  Surgeon: Jeanmarie Pierson MD;  Location: [facility]     DISSECT LYMPH NODE AXILLA  2004    Lt, during eval for SLE     HYSTEROSCOPIC PLACEMENT CONTRACEPTIVE DEVICE  1985     PHACOEMULSIFICATION WITH STANDARD INTRAOCULAR LENS IMPLANT Right 1/8/2019    Procedure: Right Eye Cataract Extraction with Intraocular Lens;  Surgeon: Monika Fermin MD;  Location: [facility]     PHACOEMULSIFICATION WITH STANDARD INTRAOCULAR LENS IMPLANT Left 2/5/2019    Procedure: Left Eye Cataract Extraction with Intraocular Lens;  Surgeon: Monika Fermin MD;  Location: [facility]     TUBAL LIGATION Bilateral         Medications    Current Outpatient Medications:      acetaminophen (TYLENOL) 500 MG tablet, Take 1,000-1,500 mg by mouth nightly as needed , Disp: , Rfl:      aspirin 81 MG tablet, Take 81 mg by mouth At Bedtime , Disp: , Rfl:      AYR SALINE NASAL NO-DRIP GEL, Use as needed for nasal dryness,  Disp: 3 Tube, Rfl: 3     blood glucose (TRUE METRIX BLOOD GLUCOSE TEST) test strip, USE TO TEST BLOOD SUGAR 5 TIMES DAILY OR AS DIRECTED, Disp: 500 strip, Rfl: 3     blood glucose monitoring (ULTRA THIN 30G) lancets, Test 5 times daily  [employer]  Super thin, Disp: 450 each, Rfl: 3     calcium carbonate (OS-GABBY) 500 MG TABS, Take 1 tablet by mouth 2 times daily (with meals), Disp: , Rfl:      cholecalciferol (VITAMIN D3) 25 mcg (1000 units) capsule, Take 1 capsule by mouth daily, Disp: , Rfl:      ciclopirox (PENLAC) 8 % external solution, Apply to adjacent skin and affected nails daily.  Remove with alcohol every 7 days, then repeat., Disp: 6.6 mL, Rfl: 11     Glucagon (BAQSIMI ONE PACK) 3 MG/DOSE POWD, Spray 3 mg in nostril as needed (to be used for severe hypoglycemic episodes), Disp: 1 each, Rfl: 4     Injection Device for insulin (NOVOPEN ECHO) RORY, 1 each 4 times daily, Disp: 1 each, Rfl: 0     insulin aspart (NOVOLOG PENFILL) 100 UNIT/ML cartridge, INJECT subcu 1 UNIT PER 15G CHO WITH MEALS 3x A DAY. APPROX 15 UNITS DAILY., Disp: 15 mL, Rfl: 3     insulin pen needle (BD PAM U/F) 32G X 4 MM miscellaneous, Use as directed with insulin pens 4 times daily, Disp: 400 each, Rfl: 3     Multiple Vitamins-Minerals (CENTRUM SILVER PO), Take 1 tablet by mouth daily., Disp: , Rfl:      mycophenolate (GENERIC EQUIVALENT) 500 MG tablet, Take 1 tablet daily. (Patient not taking: Reported on 8/19/2022), Disp: 90 tablet, Rfl: 2     NIFEdipine ER OSMOTIC (PROCARDIA XL) 30 MG 24 hr tablet, 1-2 daily or dysphagia, Disp: 180 tablet, Rfl: 3     order for DME, Equipment being ordered: compression socks, 20-30 mmHg, knee high, Disp: 8 Piece, Rfl: 4     simvastatin (ZOCOR) 40 MG tablet, Take 1 tablet (40 mg) by mouth At Bedtime, Disp: 90 tablet, Rfl: 3     warfarin ANTICOAGULANT (JANTOVEN ANTICOAGULANT) 5 MG tablet, Take 1.5 to 2 tablets daily or as directed by the Coumadin clinic., Disp: 200 tablet, Rfl: 1    /82 (BP Location:  "Right arm, Patient Position: Sitting, Cuff Size: Adult Regular)   Pulse 69   Ht 1.676 m (5' 6\")   Wt 57.9 kg (127 lb 11.2 oz)   LMP  (LMP Unknown)   BMI 20.61 kg/m    Wt Readings from Last 10 Encounters:   02/06/23 57.9 kg (127 lb 11.2 oz)   08/01/22 56.1 kg (123 lb 11.2 oz)   01/24/22 52.6 kg (116 lb)   07/30/20 52.6 kg (116 lb)   02/05/19 56.7 kg (125 lb)   01/08/19 54.1 kg (119 lb 4.8 oz)   07/30/18 54.1 kg (119 lb 3.2 oz)   01/29/18 54 kg (119 lb)   08/02/17 52.8 kg (116 lb 8 oz)   07/31/17 53.2 kg (117 lb 4.8 oz)       Physical Examination:  General appearance: seated comfortably in the chair during assessment. No distress noted    Endocrine Labs:  Lab Results   Component Value Date    A1C 5.7 (H) 01/04/2023    A1C 5.5 06/16/2022    A1C 5.7 (H) 02/23/2022    A1C 5.6 12/22/2021    A1C 5.4 07/07/2021    A1C 5.2 04/21/2021    A1C 5.6 01/20/2021    A1C 5.1 07/09/2020    A1C 5.3 07/11/2017    HEMOGLOBINA1 5.1 01/13/2020    HEMOGLOBINA1 5.1 07/15/2019    HEMOGLOBINA1 5.0 07/30/2018    HEMOGLOBINA1 5.0 01/29/2018    HEMOGLOBINA1 5.0 01/23/2017       Hemoglobin   Date Value Ref Range Status   12/19/2022 13.0 11.7 - 15.7 g/dL Final   04/29/2021 12.9 11.7 - 15.7 g/dL Final     Hematocrit   Date Value Ref Range Status   12/19/2022 39.8 35.0 - 47.0 % Final   04/29/2021 41.5 35.0 - 47.0 % Final     Cholesterol   Date Value Ref Range Status   06/16/2022 143 <200 mg/dL Final   07/07/2021 199 <200 mg/dL Final     Comment:     Desirable:       <200 mg/dl     Cholesterol/HDL Ratio   Date Value Ref Range Status   10/01/2014 1.5 0.0 - 5.0 Final     HDL Cholesterol   Date Value Ref Range Status   07/07/2021 108 >49 mg/dL Final     Direct Measure HDL   Date Value Ref Range Status   06/16/2022 77 >=50 mg/dL Final     LDL Cholesterol Calculated   Date Value Ref Range Status   06/16/2022 54 <=100 mg/dL Final   07/07/2021 79 <100 mg/dL Final     Comment:     Desirable:       <100 mg/dl     VLDL-Cholesterol   Date Value Ref Range " Status   10/01/2014 7 0 - 30 mg/dL Final     Triglycerides   Date Value Ref Range Status   06/16/2022 62 <150 mg/dL Final   07/07/2021 58 <150 mg/dL Final     Albumin Urine mg/L   Date Value Ref Range Status   01/04/2023 45.3 mg/L Final     Comment:     The reference ranges have not been established in urine albumin. The results should be integrated into the clinical context for interpretation.   06/16/2022 16 mg/L Final   07/07/2021 9 mg/L Final     TSH   Date Value Ref Range Status   08/01/2022 1.34 0.40 - 4.00 mU/L Final   07/09/2019 0.99 0.40 - 4.00 mU/L Final         Last Basic Metabolic Panel:    Sodium   Date Value Ref Range Status   01/04/2023 143 136 - 145 mmol/L Final   07/07/2021 141 133 - 144 mmol/L Final     Potassium   Date Value Ref Range Status   01/04/2023 4.0 3.4 - 5.3 mmol/L Final   07/19/2022 4.2 3.4 - 5.3 mmol/L Final   07/07/2021 4.0 3.4 - 5.3 mmol/L Final     Chloride   Date Value Ref Range Status   01/04/2023 104 98 - 107 mmol/L Final   07/19/2022 108 94 - 109 mmol/L Final   07/07/2021 106 94 - 109 mmol/L Final     Calcium   Date Value Ref Range Status   01/04/2023 10.3 (H) 8.8 - 10.2 mg/dL Final   07/07/2021 9.1 8.5 - 10.1 mg/dL Final     Carbon Dioxide   Date Value Ref Range Status   07/07/2021 27 20 - 32 mmol/L Final     Carbon Dioxide (CO2)   Date Value Ref Range Status   01/04/2023 29 22 - 29 mmol/L Final   07/19/2022 28 20 - 32 mmol/L Final     Urea Nitrogen   Date Value Ref Range Status   01/04/2023 29.6 (H) 8.0 - 23.0 mg/dL Final   07/19/2022 22 7 - 30 mg/dL Final   07/07/2021 21 7 - 30 mg/dL Final     Creatinine   Date Value Ref Range Status   01/04/2023 0.87 0.51 - 0.95 mg/dL Final   07/07/2021 0.71 0.52 - 1.04 mg/dL Final     GFR Estimate   Date Value Ref Range Status   01/04/2023 69 >60 mL/min/1.73m2 Final     Comment:     Effective December 21, 2021 eGFRcr in adults is calculated using the 2021 CKD-EPI creatinine equation which includes age and gender (Eliazar et al., NEJM, DOI:  10.1056/TOZSww4676654)   07/07/2021 84 >60 mL/min/[1.73_m2] Final     Comment:     Non  GFR Calc  Starting 12/18/2018, serum creatinine based estimated GFR (eGFR) will be   calculated using the Chronic Kidney Disease Epidemiology Collaboration   (CKD-EPI) equation.       Glucose   Date Value Ref Range Status   01/04/2023 124 (H) 70 - 99 mg/dL Final   07/19/2022 109 (H) 70 - 99 mg/dL Final   07/07/2021 110 (H) 70 - 99 mg/dL Final       AST   Date Value Ref Range Status   01/04/2023 20 10 - 35 U/L Final   07/07/2021 14 0 - 45 U/L Final     ALT   Date Value Ref Range Status   01/04/2023 21 10 - 35 U/L Final   07/07/2021 23 0 - 50 U/L Final     Albumin Urine mg/g Cr   Date Value Ref Range Status   01/04/2023 22.32 0.00 - 25.00 mg/g Cr Final     Comment:     Microalbuminuria is defined as an albumin:creatinine ratio of 17 to 299 for males and 25 to 299 for females. A ratio of albumin:creatinine of 300 or higher is indicative of overt proteinuria.  Due to biologic variability, positive results should be confirmed by a second, first-morning random or 24-hour timed urine specimen. If there is discrepancy, a third specimen is recommended. When 2 out of 3 results are in the microalbuminuria range, this is evidence for incipient nephropathy and warrants increased efforts at glucose control, blood pressure control, and institution of therapy with an angiotensin-converting-enzyme (ACE) inhibitor (if the patient can tolerate it).     06/16/2022 13.11 0.00 - 25.00 mg/g Cr Final   07/07/2021 9.35 0 - 25 mg/g Cr Final            Assessment and Plan:    Jeanmarie is a 76 year old pleasant female with hx of SLE, APLS, DVT, osteoporosis and type 1 diabetes presenting for f/up    1) Type 1 diabetes, formally diagnosed in 2010 while being evaluated for steroid-induced hyperglycemia.  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  The hypoglycemic episodes have significantly decreased following the  discontinuation of long-acting insulin.  Currently, her diabetes is managed with small dosages of short acting insulin based on the carbohydrate intake.  Patient continues to have good control of her diabetes, with tight glycemic control.  In general, she has been experiencing more frequent hypoglycemic episodes at bedtime.  The highest blood sugar of the day tends to occur prior to dinner.  It is possible that snacks between lunch and dinner might affect the predinner blood sugar.    I recommend is to take approximately 0.5 units of NovoLog more for lunch and 0.5 units less NovoLog insulin for dinner.    I had a long discussion with the patient regarding the sensors, their accuracy.  They tend to be less accurate than the glucometer.  In general, they are used to establish trends.  She might find them helpful in alarming for wide variations of the blood sugar over short periods of time.  She would always need to confirm the hypoglycemic episodes noted on the sensor by using the glucometer, especially if asymptomatic.  She prefers a Siddharth sensor, given her insurance coverage.  I placed a prescription and the plan is for her to meet with the diabetes educator to review its use.    2) Osteoporosis now osteopenia  Risk factors for osteoporosis include postmenopausal status, lupus, prior treatment with steroids, diabetes, fam history.   She was treated with Boniva for 3 years, followed by Forteo for 2 years. Received one dose of Reclast in July 2014, when she completed treatment with Forteo.  Treatment with Reclast was resumed in July 2020, when the DEXA scan revealed some loss of bone mineral density, although the lowest T score remained in the osteopenic range.  She received another dose of Reclast in July 2021. Most recent DEXA from 6/22 showed improvement in bone mineral density, with the lowest T-scores at bilateral femoral necks.  Plan:   Continue to take calcium and vitamin D  Walking, weightbearing exercises  encouraged  Schedule a follow-up DEXA scan in June 2023    3) Hypercalcemia, mild and recurrent  Of unclear etiology.  Advised the patient to contact us with the dose of calcium and vitamin D she has been taking on a daily basis.  Follow-up BMP, phosphorus, albumin, vitamin D PTH importance.  Return to clinic in 6 months    Orders Placed This Encounter   Procedures     Dexa hip/pelvis/spine     25 Hydroxyvitamin D2 and D3     Albumin level     Basic metabolic panel     Calcium     Parathyroid Hormone Intact     Phosphorus     Answers for HPI/ROS submitted by the patient on 2/3/2023  General Symptoms: No  Skin Symptoms: No  HENT Symptoms: No  EYE SYMPTOMS: No  HEART SYMPTOMS: No  LUNG SYMPTOMS: No  INTESTINAL SYMPTOMS: Yes  URINARY SYMPTOMS: No  GYNECOLOGIC SYMPTOMS: No  BREAST SYMPTOMS: No  SKELETAL SYMPTOMS: Yes  BLOOD SYMPTOMS: No  NERVOUS SYSTEM SYMPTOMS: No  MENTAL HEALTH SYMPTOMS: No  Heart burn or indigestion: No  Nausea: No  Vomiting: No  Abdominal pain: No  Bloating: No  Constipation: No  Diarrhea: No  Blood in stool: No  Black stools: No  Rectal or Anal pain: No  Fecal incontinence: No  Yellowing of skin or eyes: No  Vomit with blood: No  Change in stools: No  Back pain: No  Muscle aches: No  Neck pain: No  Swollen joints: No  Joint pain: No  Bone pain: No  Muscle cramps: No  Muscle weakness: No  Joint stiffness: Yes  Bone fracture: No    42 minutes spent on the date of the encounter doing chart review, history and exam, documentation and further activities as noted above.

## 2023-02-06 NOTE — PROGRESS NOTES
ANTICOAGULATION MANAGEMENT     Shala Caruso 77 year old female is on warfarin with therapeutic result. (Goal Factor II: 25-15%)    Recent labs: (last 7 days)     02/03/23  1406   F2 24*       ASSESSMENT     Source(s): Patient/Caregiver Call       Warfarin doses taken: Warfarin taken as instructed    Diet: Increased greens/vitamin K in diet; plans to resume previous intake. Patient is unable to keep her greens consistent and says it varies. Patient is more worried about clotting than bleeding, but writer educated the patient that if there is not a consistent diet she could clot with too much vitamin K and if we change the maintenance dose to compensate the vitamin K intake and she doesn't eat this then she is at risk for bleeding. Patient says she will try her best.     New illness, injury, or hospitalization: No    Medication/supplement changes: None noted    Signs or symptoms of bleeding or clotting: No    Previous result: Therapeutic last visit; previously outside of goal range    Additional findings: Patient would like to go out 4 weeks       PLAN     Recommended plan for temporary change(s) affecting result    Dosing Instructions: Continue your current warfarin dose 5mg every Mon, Wed, Fri and 7.5mg all other days  with next Factor II in 4 weeks       Telephone call with Jeanmarie who verbalizes understanding and agrees to plan and who agrees to plan and repeated back plan correctly    Patient offered & declined to schedule next visit    Education provided:     None required    Plan made per [facility] anticoagulation protocol

## 2023-02-09 RX ORDER — CALCIUM CARBONATE 500(1250)
1 TABLET ORAL DAILY
Qty: 90 TABLET | Refills: 3 | Status: SHIPPED | OUTPATIENT
Start: 2023-02-09 | End: 2024-06-18

## 2023-02-13 ENCOUNTER — TELEPHONE (OUTPATIENT)
Dept: ANTICOAGULATION | Facility: CLINIC | Age: 78
End: 2023-02-13
Payer: COMMERCIAL

## 2023-02-13 NOTE — TELEPHONE ENCOUNTER
Spoke with Jeanmarie on  2/9/23.  She is looking for information on vitamin K based on the weights of foods.  Explained most information comes based on cups/amounts of foods.  Sent her the link to a more detailed Dr. Hart vitamin K food list.  Jeanmarie wants to get her vitamin K from food and not her multi vitamin.  The vitamin she currently is taking has 50 mcg (42%) vitamin K.  She plans to continue to take this vitamin until it is gone, and then find a new vitamin with no K.  She states she eats a lot of greens and wants to keep more consistent.   She knows her factor 2 will need to be watched closely when she switches multivitamins and adjusts the amount of greens she is eating.  Gwen Abel RN

## 2023-02-16 ENCOUNTER — LAB (OUTPATIENT)
Dept: LAB | Facility: CLINIC | Age: 78
End: 2023-02-16
Payer: COMMERCIAL

## 2023-02-16 DIAGNOSIS — Z79.01 LONG TERM CURRENT USE OF ANTICOAGULANT THERAPY: ICD-10-CM

## 2023-02-16 DIAGNOSIS — L93.0 LUPUS ERYTHEMATOSUS: ICD-10-CM

## 2023-02-16 DIAGNOSIS — D68.61 ANTIPHOSPHOLIPID SYNDROME (H): ICD-10-CM

## 2023-02-16 PROCEDURE — 85210 CLOT FACTOR II PROTHROM SPEC: CPT | Mod: 90 | Performed by: PATHOLOGY

## 2023-02-16 PROCEDURE — 99000 SPECIMEN HANDLING OFFICE-LAB: CPT | Performed by: PATHOLOGY

## 2023-02-16 PROCEDURE — 36415 COLL VENOUS BLD VENIPUNCTURE: CPT | Performed by: PATHOLOGY

## 2023-02-17 ENCOUNTER — ANTICOAGULATION THERAPY VISIT (OUTPATIENT)
Dept: ANTICOAGULATION | Facility: CLINIC | Age: 78
End: 2023-02-17
Payer: COMMERCIAL

## 2023-02-17 DIAGNOSIS — L93.0 LUPUS ERYTHEMATOSUS: ICD-10-CM

## 2023-02-17 DIAGNOSIS — Z79.01 LONG TERM CURRENT USE OF ANTICOAGULANT THERAPY: Primary | ICD-10-CM

## 2023-02-17 DIAGNOSIS — D68.61 ANTIPHOSPHOLIPID SYNDROME (H): ICD-10-CM

## 2023-02-17 LAB — PROTHROM ACT/NOR PPP: 23 % (ref 60–140)

## 2023-02-17 NOTE — PROGRESS NOTES
ANTICOAGULATION MANAGEMENT     Shala Caruso 77 year old female is on warfarin with therapeutic result. (Goal Factor II: 25-15%)    Recent labs: (last 7 days)     02/16/23  1334   F2 23*       ASSESSMENT     Source(s): Chart Review and Patient/Caregiver Call       Warfarin doses taken: Warfarin taken as instructed    Diet: No new diet changes identified    New illness, injury, or hospitalization: No    Medication/supplement changes: None noted    Signs or symptoms of bleeding or clotting: No    Previous result: Therapeutic last 2(+) visits    Additional findings: None       PLAN     Recommended plan for no diet, medication or health factor changes affecting result    Dosing Instructions: Continue your current warfarin dose 5 mg every Mon, Wed, Fri; 7.5 mg all other days with next Factor II in 3 weeks       Telephone call with Jeanmarie who verbalizes understanding and agrees to plan    Lab visit scheduled    Education provided:     Goal range and lab monitoring: goal range and significance of current result    Plan made per [facility] anticoagulation protocol     Kacy Colbert RN  Anticoagulation Nurse  [facility]  [phone]

## 2023-03-03 ENCOUNTER — LAB (OUTPATIENT)
Dept: LAB | Facility: CLINIC | Age: 78
End: 2023-03-03
Payer: COMMERCIAL

## 2023-03-03 DIAGNOSIS — Z79.01 LONG TERM CURRENT USE OF ANTICOAGULANT THERAPY: ICD-10-CM

## 2023-03-03 DIAGNOSIS — L93.0 LUPUS ERYTHEMATOSUS: ICD-10-CM

## 2023-03-03 DIAGNOSIS — D68.61 ANTIPHOSPHOLIPID SYNDROME (H): ICD-10-CM

## 2023-03-03 PROCEDURE — 36415 COLL VENOUS BLD VENIPUNCTURE: CPT | Performed by: PATHOLOGY

## 2023-03-03 PROCEDURE — 99000 SPECIMEN HANDLING OFFICE-LAB: CPT | Performed by: PATHOLOGY

## 2023-03-03 PROCEDURE — 85210 CLOT FACTOR II PROTHROM SPEC: CPT | Mod: 90 | Performed by: PATHOLOGY

## 2023-03-04 DIAGNOSIS — K22.0 ACHALASIA OF ESOPHAGUS: ICD-10-CM

## 2023-03-04 LAB — PROTHROM ACT/NOR PPP: 18 % (ref 60–140)

## 2023-03-06 ENCOUNTER — ALLIED HEALTH/NURSE VISIT (OUTPATIENT)
Dept: EDUCATION SERVICES | Facility: CLINIC | Age: 78
End: 2023-03-06
Payer: COMMERCIAL

## 2023-03-06 ENCOUNTER — ANTICOAGULATION THERAPY VISIT (OUTPATIENT)
Dept: ANTICOAGULATION | Facility: CLINIC | Age: 78
End: 2023-03-06

## 2023-03-06 DIAGNOSIS — Z79.01 LONG TERM CURRENT USE OF ANTICOAGULANT THERAPY: Primary | ICD-10-CM

## 2023-03-06 DIAGNOSIS — D68.61 ANTIPHOSPHOLIPID SYNDROME (H): ICD-10-CM

## 2023-03-06 DIAGNOSIS — E10.649 TYPE 1 DIABETES MELLITUS WITH HYPOGLYCEMIA AND WITHOUT COMA (H): Primary | ICD-10-CM

## 2023-03-06 DIAGNOSIS — L93.0 LUPUS ERYTHEMATOSUS: ICD-10-CM

## 2023-03-06 PROCEDURE — G0108 DIAB MANAGE TRN  PER INDIV: HCPCS

## 2023-03-06 NOTE — PATIENT INSTRUCTIONS
PlAN/FOLLOW-UP:    *Scan in morning, before meals, and before bed. Minimum every 8 hours  *Contact Mejias at [phone] if sensor falls off before 12 days  *Appt with Dr. Cramer 8/7.  *Follow up with Diabetes Education as needed

## 2023-03-06 NOTE — PROGRESS NOTES
Diabetes Self-Management Education & Support    Shala Caruso presents today for education related to Type 1 diabetes    Patient is being treated with:  insulin  She is accompanied by     Year of diagnosis: 2010  Referring provider:  Bela  Living Situation: Home with   Employment: Retired    PATIENT CONCERNS RELATED TO DIABETES SELF MANAGEMENT: Can't tell if going high or low      ASSESSMENT:    Taking Medication:     Current Diabetes Management per Patient:  Taking diabetes medications? Novolog 2.5 in AM, 3.5 for lunch, 2-3 units for dinner    Monitoring  Patient glucose self monitoring as follows: four times daily  BG meter: True Metrix  BG results:  7 day average 97       Date Pre-B Post-B Pre-L Post-L Pre-D Post-D  HS/NOC   3/6 96 66        3/5 113  110  94  72   3/4 88  100  91  84   3/3 114  65  107  77   3/2 114  111  103  100   3/1 96  82  113  86   2/28 116  109  109  97     Patient's most recent   Lab Results   Component Value Date    A1C 5.7 01/04/2023    A1C 5.4 07/07/2021      Patient's A1C goal: At goal    Activity: Walks 6 times per week-around [place], [place], mall 45-60 minutes, elliptical 30 minutes    Healthy Eating:   Patient currently eats 3 meals no snacks per day   Breakfast: [place] milk  Lunch: Protein (salmon, turkey, eggs), vegetables (coumadin)  Dinner: Egg wraps with cheese  Tea, coffee, 1/2 can sugar free pop    Problem Solving:    Patient is at risk of hypoglycemia?: 1-2 times per month, sometimes feels in 60's, patial unawareness, shaky, treats with crackers, glucose tablets  Hospitalizations for hyper or hypoglycemia: No    Healthy Coping and Stress Management:   Sources of stress identified by patient  Other (please specify):  Weather  Coping mechanisms identified by patient:  Other (please specify):  Knit, cross stitch, read, art shows      EDUCATION and INSTRUCTION PROVIDED AT THIS VISIT:    Patient had IN PERSON visit for Siddharth 2 start. Medications and BG data  reviewed. Discussed with patient: purpose of sensor, variability between blood glucose and sensor values, meaning of trending arrows,compression lows discussed, cannot be further than 20 feet from sensor, cannot wear sensor when having MRI or CT scan, informed to not go through full body scanner at airport, and sensor is waterproof. Reminded that if sensor reading does not match symptoms, to check with fingerstick. Low alert set at 75. High alert: OFF. Informed urgent low 55 or less, cannot be turned off. There is a 1 hour warm up period. Data is least accurate the first 12-24 hours. Connected to [employer]. Patient demonstrated correct technique and placed in right arm without difficulty. Instructed to peel off like a bandaid when due for sensor change. Reviewed 15:15 rule.    Lot [id number] EXP 8/31/2023    Dorinda Vinson, RN Diabetes Educator  Diabetes Education Department  [facility] [place]  [phone]    Patient-stated goal written and given to Shala Caruso-via My Chart  Verbalized and demonstrated understanding of instructions.       PlAN/FOLLOW-UP:    *Scan in morning, before meals, and before bed. Minimum every 8 hours  *Contact Mejias at [phone] if sensor falls off before 12 days  *Can try to add more carbs to diet such as fruit  *Appt with Dr. Cramer 8/7.  *Follow up with Diabetes Education as needed    Time spent with patient at today's visit was 60 minutes.      Any diabetes medication dose changes were made via the CDE Protocol and Collaborative Practice Agreement with [facility] and  [facility].  A copy of this encounter was provided to patient's referring provider.

## 2023-03-06 NOTE — PROGRESS NOTES
ANTICOAGULATION MANAGEMENT     Shala Caruso 77 year old female is on warfarin with therapeutic result. (Goal Factor II: 25-15%)    Recent labs: (last 7 days)     03/03/23  1344   F2 18*       ASSESSMENT       Source(s): Chart Review    Previous result was Therapeutic last 2(+) visits    Medication, diet, health changes since last result: chart reviewed; none identified           PLAN     Recommended plan for no diet, medication or health factor changes affecting result    Dosing Instructions: Continue your current warfarin dose 5mg every Mon, Wed, Fri and 7.5mg all other days with next Chromogenic Factor X in 3-4 weeks       Detailed voice message left for Jeanmarie with dosing instructions and follow up date.     Contact [phone] to schedule and with any changes, questions or concerns.     Education provided:     Please call back if any changes to your diet, medications or how you've been taking warfarin    Contact [phone] with any changes, questions or concerns.     Plan made per [facility] anticoagulation protocol

## 2023-03-08 RX ORDER — NIFEDIPINE 30 MG/1
TABLET, EXTENDED RELEASE ORAL
Qty: 90 TABLET | OUTPATIENT
Start: 2023-03-08

## 2023-03-14 ENCOUNTER — TELEPHONE (OUTPATIENT)
Dept: EDUCATION SERVICES | Facility: CLINIC | Age: 78
End: 2023-03-14
Payer: COMMERCIAL

## 2023-03-14 DIAGNOSIS — D68.61 ANTIPHOSPHOLIPID SYNDROME (H): ICD-10-CM

## 2023-03-14 DIAGNOSIS — Z79.01 LONG TERM CURRENT USE OF ANTICOAGULANT THERAPY: ICD-10-CM

## 2023-03-14 PROCEDURE — 99207 PR NO BILLABLE SERVICE THIS VISIT: CPT | Performed by: NUTRITIONIST

## 2023-03-14 RX ORDER — WARFARIN SODIUM 5 MG/1
TABLET ORAL
Qty: 135 TABLET | Refills: 1 | Status: SHIPPED | OUTPATIENT
Start: 2023-03-14 | End: 2024-01-05

## 2023-03-14 NOTE — TELEPHONE ENCOUNTER
Called Jeanmarie and spoke with her about pros and cons of NNS. All questions answered.  Jannette Samano, GINA, LD, CDE  3/14/2023   10:46 AM

## 2023-03-23 ENCOUNTER — TELEPHONE (OUTPATIENT)
Dept: ANTICOAGULATION | Facility: CLINIC | Age: 78
End: 2023-03-23
Payer: COMMERCIAL

## 2023-03-23 DIAGNOSIS — L93.0 LUPUS ERYTHEMATOSUS: ICD-10-CM

## 2023-03-23 DIAGNOSIS — D68.61 ANTIPHOSPHOLIPID SYNDROME (H): ICD-10-CM

## 2023-03-23 DIAGNOSIS — Z79.01 LONG TERM CURRENT USE OF ANTICOAGULANT THERAPY: Primary | ICD-10-CM

## 2023-03-23 NOTE — TELEPHONE ENCOUNTER
Patient called back to report that starting a new multivitamin that does not contain vitamin K.  Patient also reports missing her warfarin yesterday.  Jeanmarie will be in for a Factor 2 level tomorrow.

## 2023-03-23 NOTE — TELEPHONE ENCOUNTER
Jeanmarie left a message on the Anticoagulation Clinic voice mail this morning stating that she has had changes in medications and her diet. She is planning to have a factor 2 checked tomorrow, 3/24/23.  Attempted to call Jeanmarie back--left a VM stating we can discuss changes tomorrow, or she can call the [facility] back today if she wants to.  Gwen Abel RN

## 2023-03-24 ENCOUNTER — LAB (OUTPATIENT)
Dept: LAB | Facility: CLINIC | Age: 78
End: 2023-03-24
Payer: COMMERCIAL

## 2023-03-24 DIAGNOSIS — D68.61 ANTIPHOSPHOLIPID SYNDROME (H): ICD-10-CM

## 2023-03-24 DIAGNOSIS — L93.0 LUPUS ERYTHEMATOSUS: ICD-10-CM

## 2023-03-24 DIAGNOSIS — Z79.01 LONG TERM CURRENT USE OF ANTICOAGULANT THERAPY: ICD-10-CM

## 2023-03-24 DIAGNOSIS — E83.52 HYPERCALCEMIA: ICD-10-CM

## 2023-03-24 LAB
ALBUMIN SERPL BCG-MCNC: 4.2 G/DL (ref 3.5–5.2)
ANION GAP SERPL CALCULATED.3IONS-SCNC: 7 MMOL/L (ref 7–15)
BUN SERPL-MCNC: 24.7 MG/DL (ref 8–23)
CALCIUM SERPL-MCNC: 10.4 MG/DL (ref 8.8–10.2)
CALCIUM SERPL-MCNC: 10.4 MG/DL (ref 8.8–10.2)
CHLORIDE SERPL-SCNC: 105 MMOL/L (ref 98–107)
CREAT SERPL-MCNC: 0.83 MG/DL (ref 0.51–0.95)
DEPRECATED HCO3 PLAS-SCNC: 29 MMOL/L (ref 22–29)
GFR SERPL CREATININE-BSD FRML MDRD: 73 ML/MIN/1.73M2
GLUCOSE SERPL-MCNC: 70 MG/DL (ref 70–99)
PHOSPHATE SERPL-MCNC: 4.2 MG/DL (ref 2.5–4.5)
POTASSIUM SERPL-SCNC: 4.3 MMOL/L (ref 3.4–5.3)
PTH-INTACT SERPL-MCNC: 34 PG/ML (ref 15–65)
SODIUM SERPL-SCNC: 141 MMOL/L (ref 136–145)

## 2023-03-24 PROCEDURE — 85210 CLOT FACTOR II PROTHROM SPEC: CPT | Mod: 90 | Performed by: PATHOLOGY

## 2023-03-24 PROCEDURE — 80069 RENAL FUNCTION PANEL: CPT | Performed by: PATHOLOGY

## 2023-03-24 PROCEDURE — 99000 SPECIMEN HANDLING OFFICE-LAB: CPT | Performed by: PATHOLOGY

## 2023-03-24 PROCEDURE — 82306 VITAMIN D 25 HYDROXY: CPT | Mod: 90 | Performed by: PATHOLOGY

## 2023-03-24 PROCEDURE — 36415 COLL VENOUS BLD VENIPUNCTURE: CPT | Performed by: PATHOLOGY

## 2023-03-24 PROCEDURE — 83970 ASSAY OF PARATHORMONE: CPT | Performed by: PATHOLOGY

## 2023-03-25 LAB — PROTHROM ACT/NOR PPP: 15 % (ref 60–140)

## 2023-03-27 ENCOUNTER — ANTICOAGULATION THERAPY VISIT (OUTPATIENT)
Dept: ANTICOAGULATION | Facility: CLINIC | Age: 78
End: 2023-03-27
Payer: COMMERCIAL

## 2023-03-27 DIAGNOSIS — Z79.01 LONG TERM CURRENT USE OF ANTICOAGULANT THERAPY: Primary | ICD-10-CM

## 2023-03-27 DIAGNOSIS — D68.61 ANTIPHOSPHOLIPID SYNDROME (H): ICD-10-CM

## 2023-03-27 DIAGNOSIS — L93.0 LUPUS ERYTHEMATOSUS: ICD-10-CM

## 2023-03-27 NOTE — PROGRESS NOTES
ANTICOAGULATION MANAGEMENT     Shala Caruso 77 year old female is on warfarin with therapeutic result. (Goal Factor II: 25-15%)    Recent labs: (last 7 days)     03/24/23  1341   F2 15*       ASSESSMENT       Source(s): Chart Review    Previous result was Therapeutic last 2(+) visits    Medication, diet, health changes since last result: chart reviewed; none identified    Pt called into the [facility] last week to report she had missed a dose of warfarin and she had started a new multivitamin but it does not contain vitamin K.        PLAN     Recommended plan for temporary change(s) affecting result    Dosing Instructions: Continue your current warfarin dose 5mg every Monday, Wednesday, & Friday; 7.5mg all other days of the week  with next Factor II in 2 weeks       Detailed voice message left for Jeanmarie with dosing instructions and follow up date.     Contact [phone]  to schedule and with any changes, questions or concerns.     Education provided:     Please call back if any changes to your diet, medications or how you've been taking warfarin    Plan made per [facility] anticoagulation protocol

## 2023-03-29 LAB
DEPRECATED CALCIDIOL+CALCIFEROL SERPL-MC: <63 UG/L (ref 20–75)
VITAMIN D2 SERPL-MCNC: <5 UG/L
VITAMIN D3 SERPL-MCNC: 58 UG/L

## 2023-03-31 NOTE — RESULT ENCOUNTER NOTE
The calcium level remains minimally elevated.  The vitamin D level is normal and the other lab results are unremarkable.  I recommend to have a calcium level checked in a 24-hour urine collection.   Below are the instructions on how to collect the urine.  You can  the collection jug from any [facility] lab.  I will contact you with the results.      Instructions for Collection of a 24 hour or Timed Urine Specimen:    1. The day before you are to bring your specimen:  At 7:00am (or when you get up the day) empty your bladder as completely as possible. Discard this specimen.    2. During the 24 hour collection period store the collection container in a cool place or in the refrigerator. Some collections require a special preservative that will be in the container if required.    3. At 7:00am the next day (or when you started your urine collection on the previous day) void and add to the collection container. Record the time and date of the end of the collection period. This completes the 24 hour urine collection.    4. Bring the entire specimen directly to the lab  A lab with your name, medical record number and date of birth must be attached to the urine container.  A lab request form completed by your clinic/doctor with your name medical number number, date of birth and test requested must accompany the urine specimen.  Record on the lab request form the date and time (am/pm) of the start of the urine collection and date and time of the end of the urine collection.    5. If you have any questions, feel free to call the laboratory at [phone] or [phone] or your clinic.

## 2023-04-20 ENCOUNTER — LAB (OUTPATIENT)
Dept: LAB | Facility: CLINIC | Age: 78
End: 2023-04-20
Payer: COMMERCIAL

## 2023-04-20 DIAGNOSIS — Z79.01 LONG TERM CURRENT USE OF ANTICOAGULANT THERAPY: ICD-10-CM

## 2023-04-20 DIAGNOSIS — L93.0 LUPUS ERYTHEMATOSUS: ICD-10-CM

## 2023-04-20 DIAGNOSIS — D68.61 ANTIPHOSPHOLIPID SYNDROME (H): ICD-10-CM

## 2023-04-20 PROCEDURE — 36415 COLL VENOUS BLD VENIPUNCTURE: CPT | Performed by: PATHOLOGY

## 2023-04-20 PROCEDURE — 85210 CLOT FACTOR II PROTHROM SPEC: CPT | Mod: 90 | Performed by: PATHOLOGY

## 2023-04-20 PROCEDURE — 99000 SPECIMEN HANDLING OFFICE-LAB: CPT | Performed by: PATHOLOGY

## 2023-04-21 ENCOUNTER — ANTICOAGULATION THERAPY VISIT (OUTPATIENT)
Dept: ANTICOAGULATION | Facility: CLINIC | Age: 78
End: 2023-04-21
Payer: COMMERCIAL

## 2023-04-21 DIAGNOSIS — D68.61 ANTIPHOSPHOLIPID SYNDROME (H): ICD-10-CM

## 2023-04-21 DIAGNOSIS — L93.0 LUPUS ERYTHEMATOSUS: ICD-10-CM

## 2023-04-21 DIAGNOSIS — Z79.01 LONG TERM CURRENT USE OF ANTICOAGULANT THERAPY: Primary | ICD-10-CM

## 2023-04-21 LAB — PROTHROM ACT/NOR PPP: 14 % (ref 60–140)

## 2023-04-21 NOTE — PROGRESS NOTES
ANTICOAGULATION MANAGEMENT     Shala Caruso 77 year old female is on warfarin with supratherapeutic result. (Goal Factor II: 25-15%)    Recent labs: (last 7 days)     04/20/23  1341   F2 14*       ASSESSMENT     Source(s): Chart Review and Patient/Caregiver Call       Warfarin doses taken: Warfarin taken as instructed    Diet: Decreased greens/vitamin K in diet; plans to resume previous intake-reports that she drastically cut back on intake of greens since last encounter. Would like to be able to eat more greens. She will go back to eating more greens, but not as much as before. She follows a Diabetic 1 diet as well. Offered to send Dr. Hart packet with listings of Vitamin K content in food-Jeanmarie has purchased books and multiple lists of vit K content in food, along with weight of food    Medication/supplement changes: new multivitamin with no vitamin K in it    New illness, injury, or hospitalization: No    Signs or symptoms of bleeding or clotting: Yes: harder time clotting off after lab draw yesterday    Previous result: Therapeutic last 2(+) visits    Additional findings: None       PLAN     Recommended plan for temporary change(s) and ongoing change(s) affecting result    Dosing Instructions: hold dose then decrease your warfarin dose (11.1% change) 7.5 mg every Sun, Thurs; 5 mg all other days with next Factor II in 1 week recommended, Jeanmarie is unable to recheck until 2nd week in May.     Telephone call with Jeanmarie who agrees to plan and repeated back plan correctly    Lab visit scheduled    Education provided:     Dietary considerations: importance of consistent vitamin K intake, impact of vitamin K foods on INR, vitamin K content of foods and importance of notifying [facility] to changes in diet    Symptom monitoring: monitoring for bleeding signs and symptoms and when to seek medical attention/emergency care    Contact [phone] with any changes, questions or concerns.     Plan made per [facility] anticoagulation  protocol

## 2023-05-01 ENCOUNTER — TELEPHONE (OUTPATIENT)
Dept: ENDOCRINOLOGY | Facility: CLINIC | Age: 78
End: 2023-05-01
Payer: COMMERCIAL

## 2023-05-01 NOTE — TELEPHONE ENCOUNTER
[facility] Prior Authorization Team Request    Medication: Freestyle Siddharth 2 Sensor Misc  Dosing: Use to test blood sugars every 14 days  Qty: 6   Day Supply: 84  ND (required for Medicaid members): [address]-36     Insurance   BIN: [id number]  PCN: [id number]  Grp: [id number]  ID: [id number]    CoverMyMeds Key (if applicable):     Additional documentation: need a Prior Authorazation        Filling Pharmacy: [facility]  Phone Number:[phone]  Contact:    Pharmacy NPI (required for Medicaid members): [id number]

## 2023-05-04 NOTE — TELEPHONE ENCOUNTER
PA Initiation    Medication: Continuous Blood Gluc Sensor (FREESTYLE SRI 2 SENSOR) [facility] [place]   [place] Company: [email or web address] - Phone [phone] Fax [phone]  Pharmacy Filling the Rx: [facility] [place], [place] - [address] [place] [address] [id number]  Filling Pharmacy Phone: [phone]  Filling Pharmacy Fax: [phone]  Start Date: 5/3/2023

## 2023-05-04 NOTE — TELEPHONE ENCOUNTER
Prior Authorization Approval    Authorization Effective Date: 5/4/2023  Authorization Expiration Date: 5/4/2024  Medication: Continuous Blood Gluc Sensor (FREESTYLE SRI 2 SENSOR) [facility] [place]--APPROVED  Approved Dose/Quantity:   Reference #:     Insurance Company: [employer] - Phone [phone] Fax [phone]  Expected CoPay:       CoPay Card Available:      Foundation Assistance Needed:    Which Pharmacy is filling the prescription (Not needed for infusion/clinic administered): [facility] [address] [id number]  Pharmacy Notified: Yes  Patient Notified: Yes **Instructed pharmacy to notify patient when script is ready to /ship.**

## 2023-05-08 DIAGNOSIS — E10.649 TYPE 1 DIABETES MELLITUS WITH HYPOGLYCEMIA AND WITHOUT COMA (H): ICD-10-CM

## 2023-05-08 RX ORDER — INSULIN ASPART 100 [IU]/ML
INJECTION, SOLUTION INTRAVENOUS; SUBCUTANEOUS
Qty: 30 ML | Refills: 1 | Status: SHIPPED | OUTPATIENT
Start: 2023-05-08 | End: 2024-08-19

## 2023-05-08 NOTE — TELEPHONE ENCOUNTER
insulin aspart (NOVOLOG PENFILL) 100 UNIT/ML cartridge 15 mL 3 12/20/2021         Last Written Prescription Date:  12-  Last Fill Quantity: 15ML,   # refills: 3  Last Office Visit : 2-6-2023  Future Office visit:  8-7-2023    Routing refill request to provider for review/approval because:  Insulin - refilled per clinic      Kathleen M Doege RN

## 2023-05-10 ENCOUNTER — LAB (OUTPATIENT)
Dept: LAB | Facility: CLINIC | Age: 78
End: 2023-05-10
Payer: COMMERCIAL

## 2023-05-10 DIAGNOSIS — L93.0 LUPUS ERYTHEMATOSUS: ICD-10-CM

## 2023-05-10 DIAGNOSIS — Z79.01 LONG TERM CURRENT USE OF ANTICOAGULANT THERAPY: ICD-10-CM

## 2023-05-10 DIAGNOSIS — D68.61 ANTIPHOSPHOLIPID SYNDROME (H): ICD-10-CM

## 2023-05-10 PROCEDURE — 99000 SPECIMEN HANDLING OFFICE-LAB: CPT | Performed by: PATHOLOGY

## 2023-05-10 PROCEDURE — 85210 CLOT FACTOR II PROTHROM SPEC: CPT | Mod: 90 | Performed by: PATHOLOGY

## 2023-05-10 PROCEDURE — 36415 COLL VENOUS BLD VENIPUNCTURE: CPT | Performed by: PATHOLOGY

## 2023-05-11 ENCOUNTER — ANTICOAGULATION THERAPY VISIT (OUTPATIENT)
Dept: ANTICOAGULATION | Facility: CLINIC | Age: 78
End: 2023-05-11
Payer: COMMERCIAL

## 2023-05-11 DIAGNOSIS — Z79.01 LONG TERM CURRENT USE OF ANTICOAGULANT THERAPY: Primary | ICD-10-CM

## 2023-05-11 DIAGNOSIS — L93.0 LUPUS ERYTHEMATOSUS: ICD-10-CM

## 2023-05-11 DIAGNOSIS — D68.61 ANTIPHOSPHOLIPID SYNDROME (H): ICD-10-CM

## 2023-05-11 LAB — PROTHROM ACT/NOR PPP: 21 % (ref 60–140)

## 2023-05-11 NOTE — PROGRESS NOTES
ANTICOAGULATION MANAGEMENT     Shala Caruso 77 year old female is on warfarin with therapeutic result. (Goal Factor II: 25-15%)    Recent labs: (last 7 days)     05/10/23  1345   F2 21*       ASSESSMENT     Source(s): Chart Review and Patient/Caregiver Call       Warfarin doses taken: Warfarin taken as instructed    Diet: No new diet changes identified    Medication/supplement changes: None noted    New illness, injury, or hospitalization: No    Signs or symptoms of bleeding or clotting: No    Previous result: Supratherapeutic    Additional findings: None       PLAN     Recommended plan for no diet, medication or health factor changes affecting result    Dosing Instructions: Continue your current warfarin dose 7.5mg Th and Sun and 5mg all other days with next Factor II in 3 weeks       Telephone call with Jeanmarie who verbalizes understanding and agrees to plan and who agrees to plan and repeated back plan correctly    Lab visit scheduled    Education provided:     Taking warfarin: Importance of taking warfarin as instructed    Goal range and lab monitoring: goal range and significance of current result and Importance of therapeutic range    Plan made per [facility] anticoagulation protocol

## 2023-05-26 ENCOUNTER — DOCUMENTATION ONLY (OUTPATIENT)
Dept: ANTICOAGULATION | Facility: CLINIC | Age: 78
End: 2023-05-26
Payer: COMMERCIAL

## 2023-05-26 DIAGNOSIS — D68.61 ANTIPHOSPHOLIPID SYNDROME (H): Primary | ICD-10-CM

## 2023-05-26 DIAGNOSIS — Z79.01 LONG TERM CURRENT USE OF ANTICOAGULANT THERAPY: ICD-10-CM

## 2023-05-26 DIAGNOSIS — I82.4Y9 DEEP VEIN THROMBOSIS (DVT) OF PROXIMAL LOWER EXTREMITY, UNSPECIFIED CHRONICITY, UNSPECIFIED LATERALITY (H): ICD-10-CM

## 2023-05-26 NOTE — PROGRESS NOTES
ANTICOAGULATION CLINIC REFERRAL RENEWAL REQUEST       An annual renewal order is required for all patients referred to [facility].?  Please review and sign the pended referral order for Shala Caruso.       ANTICOAGULATION SUMMARY      Warfarin indication(s)   Antiphospholipid Antibodies    Mechanical heart valve present?  NO       Current goal range   Factor II: 25-15%     Goal appropriate for indication? Goal INR 2-3, standard for indication(s) above     Time in Therapeutic Range (TTR)  (Goal > 60%) Unable to calculate%       Office visit with referring provider's group within last year yes on 1/4/23       Stacey Beal RN  [facility]

## 2023-05-31 ENCOUNTER — LAB (OUTPATIENT)
Dept: LAB | Facility: CLINIC | Age: 78
End: 2023-05-31
Payer: COMMERCIAL

## 2023-05-31 ENCOUNTER — ANTICOAGULATION THERAPY VISIT (OUTPATIENT)
Dept: ANTICOAGULATION | Facility: CLINIC | Age: 78
End: 2023-05-31

## 2023-05-31 DIAGNOSIS — L93.0 LUPUS ERYTHEMATOSUS: ICD-10-CM

## 2023-05-31 DIAGNOSIS — D68.61 ANTIPHOSPHOLIPID SYNDROME (H): ICD-10-CM

## 2023-05-31 DIAGNOSIS — M32.9 SYSTEMIC LUPUS ERYTHEMATOSUS, UNSPECIFIED SLE TYPE, UNSPECIFIED ORGAN INVOLVEMENT STATUS (H): ICD-10-CM

## 2023-05-31 DIAGNOSIS — I82.4Y9 DEEP VEIN THROMBOSIS (DVT) OF PROXIMAL LOWER EXTREMITY, UNSPECIFIED CHRONICITY, UNSPECIFIED LATERALITY (H): ICD-10-CM

## 2023-05-31 DIAGNOSIS — Z79.01 LONG TERM CURRENT USE OF ANTICOAGULANT THERAPY: Primary | ICD-10-CM

## 2023-05-31 DIAGNOSIS — Z79.01 LONG TERM CURRENT USE OF ANTICOAGULANT THERAPY: ICD-10-CM

## 2023-05-31 LAB
ALBUMIN UR-MCNC: 10 MG/DL
AMORPH CRY #/AREA URNS HPF: ABNORMAL /HPF
APPEARANCE UR: ABNORMAL
BACTERIA #/AREA URNS HPF: ABNORMAL /HPF
BASOPHILS # BLD AUTO: 0 10E3/UL (ref 0–0.2)
BASOPHILS NFR BLD AUTO: 1 %
BILIRUB UR QL STRIP: NEGATIVE
CAOX CRY #/AREA URNS HPF: ABNORMAL /HPF
COLOR UR AUTO: YELLOW
CREAT SERPL-MCNC: 1.1 MG/DL (ref 0.51–0.95)
EOSINOPHIL # BLD AUTO: 0 10E3/UL (ref 0–0.7)
EOSINOPHIL NFR BLD AUTO: 0 %
ERYTHROCYTE [DISTWIDTH] IN BLOOD BY AUTOMATED COUNT: 16.2 % (ref 10–15)
GFR SERPL CREATININE-BSD FRML MDRD: 52 ML/MIN/1.73M2
GLUCOSE UR STRIP-MCNC: NEGATIVE MG/DL
HCT VFR BLD AUTO: 41.6 % (ref 35–47)
HGB BLD-MCNC: 13.3 G/DL (ref 11.7–15.7)
HGB UR QL STRIP: NEGATIVE
HYALINE CASTS: 18 /LPF
IMM GRANULOCYTES # BLD: 0 10E3/UL
IMM GRANULOCYTES NFR BLD: 0 %
KETONES UR STRIP-MCNC: ABNORMAL MG/DL
LEUKOCYTE ESTERASE UR QL STRIP: ABNORMAL
LYMPHOCYTES # BLD AUTO: 0.8 10E3/UL (ref 0.8–5.3)
LYMPHOCYTES NFR BLD AUTO: 16 %
MCH RBC QN AUTO: 27.6 PG (ref 26.5–33)
MCHC RBC AUTO-ENTMCNC: 32 G/DL (ref 31.5–36.5)
MCV RBC AUTO: 86 FL (ref 78–100)
MONOCYTES # BLD AUTO: 0.3 10E3/UL (ref 0–1.3)
MONOCYTES NFR BLD AUTO: 6 %
MUCOUS THREADS #/AREA URNS LPF: PRESENT /LPF
NEUTROPHILS # BLD AUTO: 3.8 10E3/UL (ref 1.6–8.3)
NEUTROPHILS NFR BLD AUTO: 77 %
NITRATE UR QL: POSITIVE
NRBC # BLD AUTO: 0 10E3/UL
NRBC BLD AUTO-RTO: 0 /100
PH UR STRIP: 5.5 [PH] (ref 5–7)
PLATELET # BLD AUTO: 243 10E3/UL (ref 150–450)
PROTHROM ACT/NOR PPP: 23 % (ref 60–140)
RBC # BLD AUTO: 4.82 10E6/UL (ref 3.8–5.2)
RBC URINE: 3 /HPF
SP GR UR STRIP: 1.02 (ref 1–1.03)
URATE CRY #/AREA URNS HPF: ABNORMAL /HPF
UROBILINOGEN UR STRIP-MCNC: NORMAL MG/DL
WBC # BLD AUTO: 4.9 10E3/UL (ref 4–11)
WBC URINE: 12 /HPF
YEAST #/AREA URNS HPF: ABNORMAL /HPF

## 2023-05-31 PROCEDURE — 36415 COLL VENOUS BLD VENIPUNCTURE: CPT | Performed by: PATHOLOGY

## 2023-05-31 PROCEDURE — 81001 URINALYSIS AUTO W/SCOPE: CPT | Performed by: PATHOLOGY

## 2023-05-31 PROCEDURE — 87086 URINE CULTURE/COLONY COUNT: CPT | Mod: 90 | Performed by: PATHOLOGY

## 2023-05-31 PROCEDURE — 85210 CLOT FACTOR II PROTHROM SPEC: CPT | Mod: 90 | Performed by: PATHOLOGY

## 2023-05-31 PROCEDURE — 85025 COMPLETE CBC W/AUTO DIFF WBC: CPT | Performed by: PATHOLOGY

## 2023-05-31 PROCEDURE — 87186 SC STD MICRODIL/AGAR DIL: CPT | Mod: 90 | Performed by: PATHOLOGY

## 2023-05-31 PROCEDURE — 82565 ASSAY OF CREATININE: CPT | Performed by: PATHOLOGY

## 2023-05-31 PROCEDURE — 99000 SPECIMEN HANDLING OFFICE-LAB: CPT | Performed by: PATHOLOGY

## 2023-05-31 NOTE — PROGRESS NOTES
ANTICOAGULATION MANAGEMENT     Shala Caruso 77 year old female is on warfarin with therapeutic result. (Goal Factor II: 25-15%)    Recent labs: (last 7 days)     05/31/23  1253   F2 23*       ASSESSMENT     Source(s): Chart Review and Patient/Caregiver Call       Warfarin doses taken: Warfarin taken as instructed    Diet: No new diet changes identified    Medication/supplement changes: None noted    New illness, injury, or hospitalization: No-reports no s/s of UTI    Signs or symptoms of bleeding or clotting: No    Previous result: Therapeutic last visit; previously outside of goal range    Additional findings: UA results reviewed, UC pending.       PLAN     Recommended plan for no diet, medication or health factor changes affecting result    Dosing Instructions: Continue your current warfarin dose 7.5 mg every Sun, Thurs; 5 mg all other days  with next Factor II in 3 weeks       Telephone call with Jeanmarie who agrees to plan and repeated back plan correctly    Lab visit scheduled    Education provided:     Goal range and lab monitoring: goal range and significance of current result    Importance of notifying anticoagulation clinic for: changes in medications; a sooner lab recheck maybe needed    Contact [phone] with any changes, questions or concerns.     Plan made per [facility] anticoagulation protocol

## 2023-06-01 LAB — BACTERIA UR CULT: ABNORMAL

## 2023-06-02 ENCOUNTER — MYC MEDICAL ADVICE (OUTPATIENT)
Dept: INTERNAL MEDICINE | Facility: CLINIC | Age: 78
End: 2023-06-02

## 2023-06-02 ENCOUNTER — MYC MEDICAL ADVICE (OUTPATIENT)
Dept: RHEUMATOLOGY | Facility: CLINIC | Age: 78
End: 2023-06-02
Payer: COMMERCIAL

## 2023-06-02 ENCOUNTER — LAB (OUTPATIENT)
Dept: LAB | Facility: CLINIC | Age: 78
End: 2023-06-02
Payer: COMMERCIAL

## 2023-06-02 DIAGNOSIS — E83.52 HYPERCALCEMIA: ICD-10-CM

## 2023-06-05 ENCOUNTER — TELEPHONE (OUTPATIENT)
Dept: RHEUMATOLOGY | Facility: CLINIC | Age: 78
End: 2023-06-05
Payer: COMMERCIAL

## 2023-06-05 LAB
CALCIUM 24H UR-MRATE: 0.34 G/SPEC (ref 0.1–0.3)
CALCIUM UR-MCNC: 22.5 MG/DL
COLLECT DURATION TIME UR: 24 H
COLLECT DURATION TIME UR: 24 H
CREAT 24H UR-MRATE: 1.03 G/(24.H) (ref 0.72–1.51)
CREAT UR-MCNC: 68.1 MG/DL
SPECIMEN VOL UR: 1513 ML
SPECIMEN VOL UR: 1513 ML

## 2023-06-05 PROCEDURE — 99000 SPECIMEN HANDLING OFFICE-LAB: CPT | Performed by: PATHOLOGY

## 2023-06-05 PROCEDURE — 82340 ASSAY OF CALCIUM IN URINE: CPT | Mod: 90 | Performed by: PATHOLOGY

## 2023-06-05 PROCEDURE — 82570 ASSAY OF URINE CREATININE: CPT | Performed by: PATHOLOGY

## 2023-06-05 PROCEDURE — 81050 URINALYSIS VOLUME MEASURE: CPT | Performed by: PATHOLOGY

## 2023-06-05 NOTE — TELEPHONE ENCOUNTER
MELISSA and vanna sent for the patient to call back and reschedule the following:    Appointment type: Return  Provider: Dr. Agarwal  Date: 8/4/2023 at 10:30 am  Specialty phone number: [phone]  Additional appointment(s) needed:   Additonal Notes:

## 2023-06-07 ENCOUNTER — LAB (OUTPATIENT)
Dept: LAB | Facility: CLINIC | Age: 78
End: 2023-06-07
Payer: COMMERCIAL

## 2023-06-07 DIAGNOSIS — Z79.01 LONG TERM CURRENT USE OF ANTICOAGULANT THERAPY: ICD-10-CM

## 2023-06-07 DIAGNOSIS — D68.61 ANTIPHOSPHOLIPID SYNDROME (H): ICD-10-CM

## 2023-06-07 DIAGNOSIS — Z13.29 SCREENING FOR THYROID DISORDER: ICD-10-CM

## 2023-06-07 DIAGNOSIS — I82.4Y9 DEEP VEIN THROMBOSIS (DVT) OF PROXIMAL LOWER EXTREMITY, UNSPECIFIED CHRONICITY, UNSPECIFIED LATERALITY (H): ICD-10-CM

## 2023-06-07 LAB — TSH SERPL DL<=0.005 MIU/L-ACNC: 0.86 UIU/ML (ref 0.3–4.2)

## 2023-06-07 PROCEDURE — 99000 SPECIMEN HANDLING OFFICE-LAB: CPT | Performed by: PATHOLOGY

## 2023-06-07 PROCEDURE — 84443 ASSAY THYROID STIM HORMONE: CPT | Performed by: PATHOLOGY

## 2023-06-07 PROCEDURE — 36415 COLL VENOUS BLD VENIPUNCTURE: CPT | Performed by: PATHOLOGY

## 2023-06-07 PROCEDURE — 85210 CLOT FACTOR II PROTHROM SPEC: CPT | Mod: 90 | Performed by: PATHOLOGY

## 2023-06-08 ENCOUNTER — ANTICOAGULATION THERAPY VISIT (OUTPATIENT)
Dept: ANTICOAGULATION | Facility: CLINIC | Age: 78
End: 2023-06-08
Payer: COMMERCIAL

## 2023-06-08 ENCOUNTER — TELEPHONE (OUTPATIENT)
Dept: ENDOCRINOLOGY | Facility: CLINIC | Age: 78
End: 2023-06-08

## 2023-06-08 DIAGNOSIS — Z79.01 LONG TERM CURRENT USE OF ANTICOAGULANT THERAPY: Primary | ICD-10-CM

## 2023-06-08 DIAGNOSIS — L93.0 LUPUS ERYTHEMATOSUS: ICD-10-CM

## 2023-06-08 DIAGNOSIS — I82.4Y9 DEEP VEIN THROMBOSIS (DVT) OF PROXIMAL LOWER EXTREMITY, UNSPECIFIED CHRONICITY, UNSPECIFIED LATERALITY (H): ICD-10-CM

## 2023-06-08 DIAGNOSIS — D68.61 ANTIPHOSPHOLIPID SYNDROME (H): ICD-10-CM

## 2023-06-08 LAB — PROTHROM ACT/NOR PPP: 19 % (ref 60–140)

## 2023-06-08 NOTE — PROGRESS NOTES
ANTICOAGULATION MANAGEMENT     Shala Caruso 77 year old female is on warfarin with therapeutic result. (Goal Factor II: 25-15%)    Recent labs: (last 7 days)     06/07/23  1542   F2 19*       ASSESSMENT     Source(s): Chart Review and Patient/Caregiver Call       Warfarin doses taken: Warfarin taken as instructed    Diet: No new diet changes identified--Jeanmarie will eventually want to add more greens back in to her diet, but plans to continue her current Vitamin K intake for this next month.    Medication/supplement changes: None noted    New illness, injury, or hospitalization: No    Signs or symptoms of bleeding or clotting: No    Previous result: Therapeutic last 2(+) visits    Additional findings: None       PLAN     Recommended plan for no diet, medication or health factor changes affecting result    Dosing Instructions: Continue your current warfarin dose 7.5 mg SuTh; and 5 mg all other days of the week with next Factor II in 4 weeks       Telephone call with Jeanmarie who agrees to plan and repeated back plan correctly    Lab visit scheduled    Education provided:     Contact [phone] with any changes, questions or concerns.     Plan made per [facility] anticoagulation protocol

## 2023-06-08 NOTE — TELEPHONE ENCOUNTER
Spoke w/ Pt: Confirms she would like the referral to ENT. On-call notified to please place referral.   Yamel Shafer, RN on 6/8/2023 at 9:22 AM           RE     Message  Received: Yesterday  Chaim Lovett MD Miller, Yamel YOUSSEF RN  Cc: Dorita Cramer MD  I attempted to call pt but got no answer.     Please try her again and let her know labs show she does have elevated calcium in her urine.  Typically in that case we would refer her to ENT for evaluation for parathyroid surgery.  I do not know if that was discussed based on 's last note so I can either place the referral or she can wait to discuss with  at her followup in August.       If she would like the ENT referral now, please let me know and I can put the order in.   -Chaim Lovett           Previous Messages

## 2023-07-05 ENCOUNTER — LAB (OUTPATIENT)
Dept: LAB | Facility: CLINIC | Age: 78
End: 2023-07-05
Payer: COMMERCIAL

## 2023-07-05 DIAGNOSIS — I82.4Y9 DEEP VEIN THROMBOSIS (DVT) OF PROXIMAL LOWER EXTREMITY, UNSPECIFIED CHRONICITY, UNSPECIFIED LATERALITY (H): ICD-10-CM

## 2023-07-05 DIAGNOSIS — Z79.01 LONG TERM CURRENT USE OF ANTICOAGULANT THERAPY: ICD-10-CM

## 2023-07-05 DIAGNOSIS — D68.61 ANTIPHOSPHOLIPID SYNDROME (H): ICD-10-CM

## 2023-07-05 PROCEDURE — 85210 CLOT FACTOR II PROTHROM SPEC: CPT | Performed by: INTERNAL MEDICINE

## 2023-07-05 PROCEDURE — 36415 COLL VENOUS BLD VENIPUNCTURE: CPT | Performed by: PATHOLOGY

## 2023-07-05 PROCEDURE — 99000 SPECIMEN HANDLING OFFICE-LAB: CPT | Performed by: PATHOLOGY

## 2023-07-06 ENCOUNTER — ANTICOAGULATION THERAPY VISIT (OUTPATIENT)
Dept: ANTICOAGULATION | Facility: CLINIC | Age: 78
End: 2023-07-06
Payer: COMMERCIAL

## 2023-07-06 DIAGNOSIS — I82.4Y9 DEEP VEIN THROMBOSIS (DVT) OF PROXIMAL LOWER EXTREMITY, UNSPECIFIED CHRONICITY, UNSPECIFIED LATERALITY (H): ICD-10-CM

## 2023-07-06 DIAGNOSIS — D68.61 ANTIPHOSPHOLIPID SYNDROME (H): ICD-10-CM

## 2023-07-06 DIAGNOSIS — L93.0 LUPUS ERYTHEMATOSUS: ICD-10-CM

## 2023-07-06 DIAGNOSIS — Z79.01 LONG TERM CURRENT USE OF ANTICOAGULANT THERAPY: Primary | ICD-10-CM

## 2023-07-06 LAB — PROTHROM ACT/NOR PPP: 24 % (ref 60–140)

## 2023-07-06 NOTE — PROGRESS NOTES
ANTICOAGULATION MANAGEMENT     Shala Caruso 77 year old female is on warfarin with therapeutic result. (Goal Factor II: 25-15%)    Recent labs: (last 7 days)     07/05/23  1251   F2 24*       ASSESSMENT     Warfarin Lab Questionnaire    Warfarin Doses Last 7 Days      7/5/2023    12:45 PM   Dose in Tablet or Mg   TAB or MG? milligram (mg)     Pt Rptd Dose SUNDAY MONDAY TUESDAY WED THURS FRIDAY SATURDAY 7/5/2023  12:45 PM 7.5 5 5 5 7.5 5 5         7/5/2023   Warfarin Lab Questionnaire   Missed doses within past 14 days? No   Changes in diet or alcohol within past 14 days? Yes   Please explain:  out of town reduced vitamin k ingestion   Medication changes since last result? No   Injuries or illness since last result? No   New shortness of breath, severe headaches or sudden changes in vision since last result? No   Abnormal bleeding since last result? No   Upcoming surgery, procedure? No     Previous result: Therapeutic last 2(+) visits  Additional findings: None     PLAN     Recommended plan for no diet, medication or health factor changes affecting result    Dosing Instructions: Continue your current warfarin dose   7.5 mg every Sun, Thu; 5 mg all other days   with next Factor II in 4 weeks       Detailed voice message left for Jeanmarie with dosing instructions and follow up date.   Sent CRAVE message with dosing and follow up instructions    Contact [phone] to schedule and with any changes, questions or concerns.     Education provided:     Contact [phone] with any changes, questions or concerns.     Plan made per [facility] anticoagulation protocol

## 2023-08-01 ENCOUNTER — ANCILLARY PROCEDURE (OUTPATIENT)
Dept: BONE DENSITY | Facility: CLINIC | Age: 78
End: 2023-08-01
Attending: INTERNAL MEDICINE
Payer: COMMERCIAL

## 2023-08-01 DIAGNOSIS — M81.0 OSTEOPOROSIS, POSTMENOPAUSAL: ICD-10-CM

## 2023-08-01 PROCEDURE — 77080 DXA BONE DENSITY AXIAL: CPT | Performed by: INTERNAL MEDICINE

## 2023-08-03 ENCOUNTER — LAB (OUTPATIENT)
Dept: LAB | Facility: CLINIC | Age: 78
End: 2023-08-03
Payer: COMMERCIAL

## 2023-08-03 DIAGNOSIS — I82.4Y9 DEEP VEIN THROMBOSIS (DVT) OF PROXIMAL LOWER EXTREMITY, UNSPECIFIED CHRONICITY, UNSPECIFIED LATERALITY (H): ICD-10-CM

## 2023-08-03 DIAGNOSIS — Z79.01 LONG TERM CURRENT USE OF ANTICOAGULANT THERAPY: ICD-10-CM

## 2023-08-03 DIAGNOSIS — D68.61 ANTIPHOSPHOLIPID SYNDROME (H): ICD-10-CM

## 2023-08-03 PROCEDURE — 36415 COLL VENOUS BLD VENIPUNCTURE: CPT | Performed by: PATHOLOGY

## 2023-08-03 PROCEDURE — 99000 SPECIMEN HANDLING OFFICE-LAB: CPT | Performed by: PATHOLOGY

## 2023-08-03 PROCEDURE — 85210 CLOT FACTOR II PROTHROM SPEC: CPT | Performed by: INTERNAL MEDICINE

## 2023-08-04 ENCOUNTER — ANTICOAGULATION THERAPY VISIT (OUTPATIENT)
Dept: ANTICOAGULATION | Facility: CLINIC | Age: 78
End: 2023-08-04
Payer: COMMERCIAL

## 2023-08-04 DIAGNOSIS — L93.0 LUPUS ERYTHEMATOSUS: ICD-10-CM

## 2023-08-04 DIAGNOSIS — D68.61 ANTIPHOSPHOLIPID SYNDROME (H): ICD-10-CM

## 2023-08-04 DIAGNOSIS — I82.4Y9 DEEP VEIN THROMBOSIS (DVT) OF PROXIMAL LOWER EXTREMITY, UNSPECIFIED CHRONICITY, UNSPECIFIED LATERALITY (H): ICD-10-CM

## 2023-08-04 DIAGNOSIS — Z79.01 LONG TERM CURRENT USE OF ANTICOAGULANT THERAPY: Primary | ICD-10-CM

## 2023-08-04 LAB — PROTHROM ACT/NOR PPP: 24 % (ref 60–140)

## 2023-08-07 ENCOUNTER — DOCUMENTATION ONLY (OUTPATIENT)
Dept: LAB | Facility: CLINIC | Age: 78
End: 2023-08-07

## 2023-08-07 ENCOUNTER — LAB (OUTPATIENT)
Dept: LAB | Facility: CLINIC | Age: 78
End: 2023-08-07
Payer: COMMERCIAL

## 2023-08-07 ENCOUNTER — OFFICE VISIT (OUTPATIENT)
Dept: ENDOCRINOLOGY | Facility: CLINIC | Age: 78
End: 2023-08-07
Payer: COMMERCIAL

## 2023-08-07 VITALS
OXYGEN SATURATION: 94 % | HEART RATE: 58 BPM | BODY MASS INDEX: 18.83 KG/M2 | WEIGHT: 113 LBS | DIASTOLIC BLOOD PRESSURE: 76 MMHG | HEIGHT: 65 IN | SYSTOLIC BLOOD PRESSURE: 122 MMHG

## 2023-08-07 DIAGNOSIS — E83.52 HYPERCALCEMIA: ICD-10-CM

## 2023-08-07 DIAGNOSIS — E10.649 TYPE 1 DIABETES MELLITUS WITH HYPOGLYCEMIA AND WITHOUT COMA (H): Primary | ICD-10-CM

## 2023-08-07 DIAGNOSIS — M81.0 OSTEOPOROSIS, POSTMENOPAUSAL: ICD-10-CM

## 2023-08-07 LAB
ALBUMIN SERPL BCG-MCNC: 4.5 G/DL (ref 3.5–5.2)
ANION GAP SERPL CALCULATED.3IONS-SCNC: 10 MMOL/L (ref 7–15)
BUN SERPL-MCNC: 20.3 MG/DL (ref 8–23)
CALCIUM SERPL-MCNC: 10.9 MG/DL (ref 8.8–10.2)
CHLORIDE SERPL-SCNC: 103 MMOL/L (ref 98–107)
CREAT SERPL-MCNC: 0.68 MG/DL (ref 0.51–0.95)
DEPRECATED HCO3 PLAS-SCNC: 28 MMOL/L (ref 22–29)
GFR SERPL CREATININE-BSD FRML MDRD: 90 ML/MIN/1.73M2
GLUCOSE SERPL-MCNC: 115 MG/DL (ref 70–99)
HBA1C MFR BLD: 5.4 % (ref 4.3–?)
PHOSPHATE SERPL-MCNC: 3.5 MG/DL (ref 2.5–4.5)
POTASSIUM SERPL-SCNC: 4.7 MMOL/L (ref 3.4–5.3)
PTH-INTACT SERPL-MCNC: 35 PG/ML (ref 15–65)
SODIUM SERPL-SCNC: 141 MMOL/L (ref 136–145)

## 2023-08-07 PROCEDURE — 99214 OFFICE O/P EST MOD 30 MIN: CPT | Mod: 25 | Performed by: INTERNAL MEDICINE

## 2023-08-07 PROCEDURE — 36415 COLL VENOUS BLD VENIPUNCTURE: CPT | Performed by: PATHOLOGY

## 2023-08-07 PROCEDURE — 82306 VITAMIN D 25 HYDROXY: CPT | Performed by: INTERNAL MEDICINE

## 2023-08-07 PROCEDURE — 83970 ASSAY OF PARATHORMONE: CPT | Performed by: PATHOLOGY

## 2023-08-07 PROCEDURE — 83036 HEMOGLOBIN GLYCOSYLATED A1C: CPT | Performed by: INTERNAL MEDICINE

## 2023-08-07 PROCEDURE — 95251 CONT GLUC MNTR ANALYSIS I&R: CPT | Performed by: INTERNAL MEDICINE

## 2023-08-07 PROCEDURE — 99000 SPECIMEN HANDLING OFFICE-LAB: CPT | Performed by: PATHOLOGY

## 2023-08-07 PROCEDURE — 80069 RENAL FUNCTION PANEL: CPT | Performed by: PATHOLOGY

## 2023-08-07 RX ORDER — ZOLEDRONIC ACID 5 MG/100ML
5 INJECTION, SOLUTION INTRAVENOUS ONCE
Status: CANCELLED
Start: 2023-08-14

## 2023-08-07 ASSESSMENT — PAIN SCALES - GENERAL: PAINLEVEL: NO PAIN (0)

## 2023-08-07 NOTE — LETTER
8/7/2023       RE: Shala Caruso  [address] [place] [address]     Dear Colleague,    Thank you for referring your patient, Shala Caruso, to the [facility] [place] [facility]. Please see a copy of my visit note below.                     Assessment and Plan:    Jeanmarie is a 77 year old pleasant female with hx of SLE, APLS, DVT, osteoporosis and type 1 diabetes presenting for f/up.    1) Type 1 diabetes, formally diagnosed in 2010 while being evaluated for steroid-induced hyperglycemia.  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    Overall, she maintains a good glycemic control by using small dosages of short acting insulin based on the carbohydrate intake.     2) Osteoporosis, now osteopenia  Risk factors for osteoporosis include postmenopausal status, lupus, prior treatment with steroids, diabetes, fam history.   She was treated with Boniva for 3 years, followed by Forteo for 2 years. Received one dose of Reclast in July 2014, when she completed treatment with Forteo.  Treatment with Reclast was resumed in July 2020, when the DEXA scan revealed some loss of bone mineral density, although the lowest T score remained in the osteopenic range.  She received a second dose of Reclast in July 2021. Most recent DEXA from showed improvement of bone mineral density at the average hip.  Plan:   Walking, weightbearing exercises encouraged  Schedule a third dose of Reclast    3) Hypercalcemia, mild but persistent  Lab work is suggestive of primary hyperparathyroidism.  Dehydration might contribute.  Of note that the kidney function was lower on recent lab work.    As long as the calcium level remains mildly elevated, we can continue to monitor.  Counseled on primary hyperparathyroidism, signs and symptoms of hypercalcemia, the option of pursuing parathyroid surgery if the hypercalcemia worsens.    Plan:    Follow-up BMP, phosphorus, albumin, vitamin D, PTH  Adjust the dose of calcium and vitamin D based on the lab results  Consider pursuing a 24-hour urine calcium.    Orders Placed This Encounter   Procedures    Continuous Glucose Monitoring >=72 hours PHYS INTERP    Basic metabolic panel    Phosphorus    Albumin level    Parathyroid Hormone Intact    25 Hydroxyvitamin D2 and D3    Ionized Calcium    Hemoglobin A1c POCT     ============================================================================================================================================  Jeanmarie is a 77 year old pleasant female with hx of SLE, APLS, DVT, osteoporosis and type 1 diabetes presenting for f/up.     Interval history:    1. Type 1 diabetes  Lantus was discontinued and in 2020 and her diabetes is now managed only with NovoLog.  On average, she reports taking 2 units NovoLog for breakfast, 4 for lunch and 2 for dinner.  Most recent A1c was 5.7, in 1/2023. It was 5.4% today.     Diabetes complications:   No h/o microalbuminuria.  Most recent urine microalbumin negative in 1/23. GFR lower at 53 on labs from 5/23.   Last eye exam -September 2022. No DR. 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  S/P cataract surgery.  Numbness and tingling sensation B/L toes - for many years but light sensation is intact. She does wear comfortable shoes/insoles. 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  She develops confusion, inability to concentrate or focus her vision and she feels extremely tired when her blood sugar is the 60s or 50s.  She has no prior episodes of loss of consciousness due to hypoglycemia.  She does not always recognize the lows and, recently, she has been experiencing episodes when her blood sugar is 57 on the glucometer as she did not have hypoglycemic symptoms.  She does have Baqsimi at home.  Most recent lipid panel from 6/16/2022: LDL cholesterol 54, HDL cholesterol 77, triglycerides 62.   On 40 mg simvastatin daily.  Exercise: mainly walking     I reviewed the siddharth records.  Average glucose is 99, with a glucose variability of 16%, corresponding to a GMI of 5.7%.  The sensor glucose is within target of  99% of time and in the mild hypoglycemic range 1%.  She checks her blood sugar once a day or less and by using the glucometer and the meter glucose numbers are almost always higher compared with the siddharth data.  Experiencing significant symptomatic hypoglycemic episodes.  She reports not feeling good if her blood sugar is above 120.    I reviewed the S[facility] data:      2. Osteoporosis  Jeanmarie also has a history of osteoporosis, treated with Boniva for almost 3 years, followed by Forteo which was started in 4/12 - interrupted treatment from 4/2013 and restarted in 7/2013 until July 2014. After she completed the treatment with Forteo, she received one dose of Reclast, in July 2014.       She has no history of prior bone fractures.  She's been off prednisone since 2013.  Her height has decreased over the years from 5'6'' to 5'1/2''. Her mother had a hip fracture in her 80s.      DXA 7/1/16:  L1-L3 T score was - 1.  Overall, at the spine, there was a significant increase of bone mineral density since 2005 of 10.5%. Since 2015, the bone mineral density has remained stable at spine. The lowest T score at the hip level was -2.2, at the left femoral neck.  Overall, there was a significant improvement of bone mineral density at the mean hip of 8.9% since 2005, with no significant changes since 2015. While the bone mineral density at the left hip increased since baseline, at the right hip, there was a decrease of bone mineral density since baseline and also, since prior year.     DXA 7/27/18:   The lowest T score was -2.1, at the left femoral neck.  Compared with the prior scan from 2016, the bone mineral density at the hips remained stable.  At the lumbar spine, L1-L3 T score was -1.3, not significantly  changed since 2016.     DXA 1/2020:  L2-L4 T score was -1.5. At the hips, the lowest score was -2.3, at the left femoral neck. Compared with the prior DEXA scan from 2018, there was a significant decrease in bone mineral density of the lumbar spine, left total hip and right total hip by 3.1, 4.7 and 3.5%, respectively.  The patient received zoledronic acid infusion on 7/30/2020 and 7/27/21.     DXA 6/16/2022:  T score: -0.9 at L1-L4, -0.3 at 33% distal radius, -1.7 at both right and left femoral necks, -1.8 at the left total hip and -1.9 at the right total hip.  At the radius and average hip, the bone mineral density remained stable since 2020.  At the spine, there was a significant increase of 4.5%.    I reviewed the most recent DXA scan images from 8/1/23:  L2-L4 T score -1.4  Left femoral neck T score -1.8, total L hip T score -1.5  Right femoral neck T score -1.5, total R hip T score -1.6  Bone density compared to the prior study has increased at the mean total femur by +4.4%.     3. Hypercalcemia   The patient developed mild hypercalcemia this year.  The mildly elevated calcium from lab work from March 2023 was associated with a normal PTH of 34, normal phosphorus at 4.2, normal vitamin D level at 63.  24-hour urinary calcium from 6/5/2023 revealed an elevated urinary calcium of 0.34 g per 24 hours, with a normal urinary creatinine.    In terms of calcium and vitamin D supplements, she reports taking 500 mg calcium as a oyster shell.  She stopped taking the vitamin D supplements 2 to 3 months ago.  She does endorse sun exposure during summertime.  Trying to maintain a good hydration.     Past Medical History  Past Medical History:   Diagnosis Date    Achalasia     Antiphospholipid syndrome (H)     Antiplatelet or antithrombotic long-term use     Coagulation disorder (H) 9361-3432    DM (diabetes mellitus) (H)     DVT (deep venous thrombosis) (H) 2003    and PE    Dysphagia     occasional, use Nifedipine    GERD  (gastroesophageal reflux disease)     Hyperlipidemia     Lymphedema     Myopia     Neuropathy     toes bilateral    Nonsenile cataract     osteoporosis     Pericarditis     Raynaud's disease     SLE (systemic lupus erythematosus) (H)        Past Surgical History  Past Surgical History:   Procedure Laterality Date    CATARACT IOL, RT/LT Left 02/2019    CATARACT IOL, RT/LT Right 01/2019    COLONOSCOPY N/A 11/28/2016    Procedure: COLONOSCOPY;  Surgeon: Sang August MD;  Location: [facility]    CYSTOSCOPY, SLING TRANSVAGINAL N/A 12/20/2016    Procedure: CYSTOSCOPY, SLING TRANSVAGINAL;  Surgeon: Jeanmarie Pierson MD;  Location: [facility]    DISSECT LYMPH NODE AXILLA  2004    Lt, during eval for SLE    HYSTEROSCOPIC PLACEMENT CONTRACEPTIVE DEVICE  1985    PHACOEMULSIFICATION WITH STANDARD INTRAOCULAR LENS IMPLANT Right 1/8/2019    Procedure: Right Eye Cataract Extraction with Intraocular Lens;  Surgeon: Monika Fermin MD;  Location: [facility]    PHACOEMULSIFICATION WITH STANDARD INTRAOCULAR LENS IMPLANT Left 2/5/2019    Procedure: Left Eye Cataract Extraction with Intraocular Lens;  Surgeon: Monika Fermin MD;  Location: [facility] OR    TUBAL LIGATION Bilateral         Medications    Current Outpatient Medications:     acetaminophen (TYLENOL) 500 MG tablet, Take 1,000-1,500 mg by mouth nightly as needed , Disp: , Rfl:     aspirin 81 MG tablet, Take 81 mg by mouth At Bedtime , Disp: , Rfl:     AYR SALINE NASAL NO-DRIP GEL, Use as needed for nasal dryness, Disp: 3 Tube, Rfl: 3    blood glucose (TRUE METRIX BLOOD GLUCOSE TEST) test strip, USE TO TEST BLOOD SUGAR 5 TIMES DAILY OR AS DIRECTED, Disp: 500 strip, Rfl: 1    blood glucose monitoring (ULTRA THIN 30G) lancets, Test 5 times daily  [employer]  Super thin, Disp: 450 each, Rfl: 3    calcium carbonate (OS-GABBY) 500 MG TABS, Take 1 tablet (1,250 mg) by mouth daily, Disp: 90 tablet, Rfl: 3    cholecalciferol (VITAMIN D3) 25 mcg (1000 units) capsule, Take 1 capsule by  mouth daily, Disp: , Rfl:     ciclopirox (PENLAC) 8 % external solution, Apply to adjacent skin and affected nails daily.  Remove with alcohol every 7 days, then repeat., Disp: 6.6 mL, Rfl: 11    Continuous Blood Gluc  (FREESTYLE SRI 2 READER) RORY, 1 each daily, Disp: 1 each, Rfl: 0    Continuous Blood Gluc Sensor (FREESTYLE SRI 2 SENSOR) MISC, 1 each every 14 days, Disp: 6 each, Rfl: 3    Glucagon (BAQSIMI ONE PACK) 3 MG/DOSE POWD, Spray 3 mg in nostril as needed (to be used for severe hypoglycemic episodes), Disp: 1 each, Rfl: 4    Injection Device for insulin (NOVOPEN ECHO) RORY, 1 each 4 times daily, Disp: 1 each, Rfl: 0    insulin aspart (NOVOLOG PENFILL) 100 UNIT/ML cartridge, INJECT 1 UNIT PER 15 GRAMS OF CARBOHYDRATES UNDER THE SKIN THREE TIMES A DAY WITH MEALS. APPROXIMATELY 15 UNITS DAILY., Disp: 30 mL, Rfl: 1    insulin pen needle (BD PAM U/F) 32G X 4 MM miscellaneous, Use as directed with insulin pens 4 times daily, Disp: 400 each, Rfl: 1    Multiple Vitamins-Minerals (CENTRUM SILVER PO), Take 1 tablet by mouth daily., Disp: , Rfl:     NIFEdipine ER OSMOTIC (PROCARDIA XL) 30 MG 24 hr tablet, 1-2 daily or dysphagia, Disp: 180 tablet, Rfl: 3    order for DME, Equipment being ordered: compression socks, 20-30 mmHg, knee high, Disp: 8 Piece, Rfl: 4    simvastatin (ZOCOR) 40 MG tablet, Take 1 tablet (40 mg) by mouth At Bedtime, Disp: 90 tablet, Rfl: 3    warfarin ANTICOAGULANT (JANTOVEN ANTICOAGULANT) 5 MG tablet, Take 1-1.5 tablets daily or as directed, Disp: 135 tablet, Rfl: 1    LMP  (LMP Unknown)   Wt Readings from Last 10 Encounters:   08/07/23 51.3 kg (113 lb)   02/06/23 57.9 kg (127 lb 11.2 oz)   08/01/22 56.1 kg (123 lb 11.2 oz)   01/24/22 52.6 kg (116 lb)   07/30/20 52.6 kg (116 lb)   02/05/19 56.7 kg (125 lb)   01/08/19 54.1 kg (119 lb 4.8 oz)   07/30/18 54.1 kg (119 lb 3.2 oz)   01/29/18 54 kg (119 lb)   08/02/17 52.8 kg (116 lb 8 oz)       Physical Examination:  General appearance:  she is thin, no acute distress noted  Eyes: conjunctivae and extraocular motions are normal. Pupils are equal, round, and reactive to light. No lid lag, no stare.  HENT: oropharynx moist; neck no JVD, no bruits, no thyromegaly, no palpable nodules  Cardiovascular: regular rhythm, no murmurs, distal pulses palpable, mild L arm edema   Respiratory: chest clear, no rales, no rhonchi  Musculoskeletal: normal tone and strength   Neurologic: left knee reflex decreased, normal B/L bicipital reflexes, no resting tremor  Psychiatric: affect and judgment normal   Skin: nails onychomycosis  Feet: Skin intact, sensation preserved to monofilament testing    Endocrine Labs:  Lab Results   Component Value Date    A1C 5.7 (H) 01/04/2023    A1C 5.5 06/16/2022    A1C 5.7 (H) 02/23/2022    A1C 5.6 12/22/2021    A1C 5.4 07/07/2021    A1C 5.2 04/21/2021    A1C 5.6 01/20/2021    A1C 5.1 07/09/2020    A1C 5.3 07/11/2017    HEMOGLOBINA1 5.1 01/13/2020    HEMOGLOBINA1 5.1 07/15/2019    HEMOGLOBINA1 5.0 07/30/2018    HEMOGLOBINA1 5.0 01/29/2018    HEMOGLOBINA1 5.0 01/23/2017       Hemoglobin   Date Value Ref Range Status   05/31/2023 13.3 11.7 - 15.7 g/dL Final   04/29/2021 12.9 11.7 - 15.7 g/dL Final     Hematocrit   Date Value Ref Range Status   05/31/2023 41.6 35.0 - 47.0 % Final   04/29/2021 41.5 35.0 - 47.0 % Final     Cholesterol   Date Value Ref Range Status   06/16/2022 143 <200 mg/dL Final   07/07/2021 199 <200 mg/dL Final     Comment:     Desirable:       <200 mg/dl     Cholesterol/HDL Ratio   Date Value Ref Range Status   10/01/2014 1.5 0.0 - 5.0 Final     HDL Cholesterol   Date Value Ref Range Status   07/07/2021 108 >49 mg/dL Final     Direct Measure HDL   Date Value Ref Range Status   06/16/2022 77 >=50 mg/dL Final     LDL Cholesterol Calculated   Date Value Ref Range Status   06/16/2022 54 <=100 mg/dL Final   07/07/2021 79 <100 mg/dL Final     Comment:     Desirable:       <100 mg/dl     VLDL-Cholesterol   Date Value Ref Range  Status   10/01/2014 7 0 - 30 mg/dL Final     Triglycerides   Date Value Ref Range Status   06/16/2022 62 <150 mg/dL Final   07/07/2021 58 <150 mg/dL Final     Albumin Urine mg/L   Date Value Ref Range Status   01/04/2023 45.3 mg/L Final     Comment:     The reference ranges have not been established in urine albumin. The results should be integrated into the clinical context for interpretation.   06/16/2022 16 mg/L Final   07/07/2021 9 mg/L Final     TSH   Date Value Ref Range Status   06/07/2023 0.86 0.30 - 4.20 uIU/mL Final   08/01/2022 1.34 0.40 - 4.00 mU/L Final   07/09/2019 0.99 0.40 - 4.00 mU/L Final         Last Basic Metabolic Panel:    Sodium   Date Value Ref Range Status   03/24/2023 141 136 - 145 mmol/L Final   07/07/2021 141 133 - 144 mmol/L Final     Potassium   Date Value Ref Range Status   03/24/2023 4.3 3.4 - 5.3 mmol/L Final   07/19/2022 4.2 3.4 - 5.3 mmol/L Final   07/07/2021 4.0 3.4 - 5.3 mmol/L Final     Chloride   Date Value Ref Range Status   03/24/2023 105 98 - 107 mmol/L Final   07/19/2022 108 94 - 109 mmol/L Final   07/07/2021 106 94 - 109 mmol/L Final     Calcium   Date Value Ref Range Status   03/24/2023 10.4 (H) 8.8 - 10.2 mg/dL Final   03/24/2023 10.4 (H) 8.8 - 10.2 mg/dL Final   07/07/2021 9.1 8.5 - 10.1 mg/dL Final     Carbon Dioxide   Date Value Ref Range Status   07/07/2021 27 20 - 32 mmol/L Final     Carbon Dioxide (CO2)   Date Value Ref Range Status   03/24/2023 29 22 - 29 mmol/L Final   07/19/2022 28 20 - 32 mmol/L Final     Urea Nitrogen   Date Value Ref Range Status   03/24/2023 24.7 (H) 8.0 - 23.0 mg/dL Final   07/19/2022 22 7 - 30 mg/dL Final   07/07/2021 21 7 - 30 mg/dL Final     Creatinine   Date Value Ref Range Status   05/31/2023 1.10 (H) 0.51 - 0.95 mg/dL Final   07/07/2021 0.71 0.52 - 1.04 mg/dL Final     GFR Estimate   Date Value Ref Range Status   05/31/2023 52 (L) >60 mL/min/1.73m2 Final     Comment:     eGFR calculated using 2021 CKD-EPI equation.   07/07/2021 84 >60  mL/min/[1.73_m2] Final     Comment:     Non  GFR Calc  Starting 12/18/2018, serum creatinine based estimated GFR (eGFR) will be   calculated using the Chronic Kidney Disease Epidemiology Collaboration   (CKD-EPI) equation.       Glucose   Date Value Ref Range Status   03/24/2023 70 70 - 99 mg/dL Final   07/19/2022 109 (H) 70 - 99 mg/dL Final   07/07/2021 110 (H) 70 - 99 mg/dL Final       AST   Date Value Ref Range Status   01/04/2023 20 10 - 35 U/L Final   07/07/2021 14 0 - 45 U/L Final     ALT   Date Value Ref Range Status   01/04/2023 21 10 - 35 U/L Final   07/07/2021 23 0 - 50 U/L Final     Albumin Urine mg/g Cr   Date Value Ref Range Status   01/04/2023 22.32 0.00 - 25.00 mg/g Cr Final     Comment:     Microalbuminuria is defined as an albumin:creatinine ratio of 17 to 299 for males and 25 to 299 for females. A ratio of albumin:creatinine of 300 or higher is indicative of overt proteinuria.  Due to biologic variability, positive results should be confirmed by a second, first-morning random or 24-hour timed urine specimen. If there is discrepancy, a third specimen is recommended. When 2 out of 3 results are in the microalbuminuria range, this is evidence for incipient nephropathy and warrants increased efforts at glucose control, blood pressure control, and institution of therapy with an angiotensin-converting-enzyme (ACE) inhibitor (if the patient can tolerate it).     06/16/2022 13.11 0.00 - 25.00 mg/g Cr Final   07/07/2021 9.35 0 - 25 mg/g Cr Final     Sincerely,    Dorita Cramer MD

## 2023-08-07 NOTE — PROGRESS NOTES
Assessment and Plan:    Jeanmarie is a 77 year old pleasant female with hx of SLE, APLS, DVT, osteoporosis and type 1 diabetes presenting for f/up.    1) Type 1 diabetes, formally diagnosed in 2010 while being evaluated for steroid-induced hyperglycemia.  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    Overall, she maintains a good glycemic control by using small dosages of short acting insulin based on the carbohydrate intake.     2) Osteoporosis, now osteopenia  Risk factors for osteoporosis include postmenopausal status, lupus, prior treatment with steroids, diabetes, fam history.   She was treated with Boniva for 3 years, followed by Forteo for 2 years. Received one dose of Reclast in July 2014, when she completed treatment with Forteo.  Treatment with Reclast was resumed in July 2020, when the DEXA scan revealed some loss of bone mineral density, although the lowest T score remained in the osteopenic range.  She received a second dose of Reclast in July 2021. Most recent DEXA from showed improvement of bone mineral density at the average hip.  Plan:   Walking, weightbearing exercises encouraged  Schedule a third dose of Reclast    3) Hypercalcemia, mild but persistent  Lab work is suggestive of primary hyperparathyroidism.  Dehydration might contribute.  Of note that the kidney function was lower on recent lab work.    As long as the calcium level remains mildly elevated, we can continue to monitor.  Counseled on primary hyperparathyroidism, signs and symptoms of hypercalcemia, the option of pursuing parathyroid surgery if the hypercalcemia worsens.    Plan:   Follow-up BMP, phosphorus, albumin, vitamin D, PTH  Adjust the dose of calcium and vitamin D based on the lab results  Consider pursuing a 24-hour urine calcium.    Orders Placed This Encounter   Procedures    Continuous Glucose Monitoring >=72 hours PHYS INTERP    Basic metabolic panel    Phosphorus    Albumin level     Parathyroid Hormone Intact    25 Hydroxyvitamin D2 and D3    Ionized Calcium    Hemoglobin A1c POCT     ============================================================================================================================================  Jeanmarie is a 77 year old pleasant female with hx of SLE, APLS, DVT, osteoporosis and type 1 diabetes presenting for f/up.     Interval history:    1. Type 1 diabetes  Lantus was discontinued and in 2020 and her diabetes is now managed only with NovoLog.  On average, she reports taking 2 units NovoLog for breakfast, 4 for lunch and 2 for dinner.  Most recent A1c was 5.7, in 1/2023. It was 5.4% today.     Diabetes complications:   No h/o microalbuminuria.  Most recent urine microalbumin negative in 1/23. GFR lower at 53 on labs from 5/23.   Last eye exam -September 2022. No DR. 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  S/P cataract surgery.  Numbness and tingling sensation B/L toes - for many years but light sensation is intact. She does wear comfortable shoes/insoles. 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  She develops confusion, inability to concentrate or focus her vision and she feels extremely tired when her blood sugar is the 60s or 50s.  She has no prior episodes of loss of consciousness due to hypoglycemia.  She does not always recognize the lows and, recently, she has been experiencing episodes when her blood sugar is 57 on the glucometer as she did not have hypoglycemic symptoms.  She does have Baqsimi at home.  Most recent lipid panel from 6/16/2022: LDL cholesterol 54, HDL cholesterol 77, triglycerides 62.  On 40 mg simvastatin daily.  Exercise: mainly walking     I reviewed the [facility] records.  Average glucose is 99, with a glucose variability of 16%, corresponding to a GMI of 5.7%.  The sensor glucose is within target of  99% of time and in the mild hypoglycemic range 1%.  She checks her blood sugar once a day or  less and by using the glucometer and the meter glucose numbers are almost always higher compared with the siddharth data.  Experiencing significant symptomatic hypoglycemic episodes.  She reports not feeling good if her blood sugar is above 120.    I reviewed the Siddharth data:      2. Osteoporosis  Jeanmarie also has a history of osteoporosis, treated with Boniva for almost 3 years, followed by Forteo which was started in 4/12 - interrupted treatment from 4/2013 and restarted in 7/2013 until July 2014. After she completed the treatment with Forteo, she received one dose of Reclast, in July 2014.       She has no history of prior bone fractures.  She's been off prednisone since 2013.  Her height has decreased over the years from 5'6'' to 5'1/2''. Her mother had a hip fracture in her 80s.      DXA 7/1/16:  L1-L3 T score was - 1.  Overall, at the spine, there was a significant increase of bone mineral density since 2005 of 10.5%. Since 2015, the bone mineral density has remained stable at spine. The lowest T score at the hip level was -2.2, at the left femoral neck.  Overall, there was a significant improvement of bone mineral density at the mean hip of 8.9% since 2005, with no significant changes since 2015. While the bone mineral density at the left hip increased since baseline, at the right hip, there was a decrease of bone mineral density since baseline and also, since prior year.     DXA 7/27/18:   The lowest T score was -2.1, at the left femoral neck.  Compared with the prior scan from 2016, the bone mineral density at the hips remained stable.  At the lumbar spine, L1-L3 T score was -1.3, not significantly changed since 2016.     DXA 1/2020:  L2-L4 T score was -1.5. At the hips, the lowest score was -2.3, at the left femoral neck. Compared with the prior DEXA scan from 2018, there was a significant decrease in bone mineral density of the lumbar spine, left total hip and right total hip by 3.1, 4.7 and 3.5%,  respectively.  The patient received zoledronic acid infusion on 7/30/2020 and 7/27/21.     DXA 6/16/2022:  T score: -0.9 at L1-L4, -0.3 at 33% distal radius, -1.7 at both right and left femoral necks, -1.8 at the left total hip and -1.9 at the right total hip.  At the radius and average hip, the bone mineral density remained stable since 2020.  At the spine, there was a significant increase of 4.5%.    I reviewed the most recent DXA scan images from 8/1/23:  L2-L4 T score -1.4  Left femoral neck T score -1.8, total L hip T score -1.5  Right femoral neck T score -1.5, total R hip T score -1.6  Bone density compared to the prior study has increased at the mean total femur by +4.4%.     3. Hypercalcemia   The patient developed mild hypercalcemia this year.  The mildly elevated calcium from lab work from March 2023 was associated with a normal PTH of 34, normal phosphorus at 4.2, normal vitamin D level at 63.  24-hour urinary calcium from 6/5/2023 revealed an elevated urinary calcium of 0.34 g per 24 hours, with a normal urinary creatinine.    In terms of calcium and vitamin D supplements, she reports taking 500 mg calcium as a oyster shell.  She stopped taking the vitamin D supplements 2 to 3 months ago.  She does endorse sun exposure during summertime.  Trying to maintain a good hydration.     Past Medical History  Past Medical History:   Diagnosis Date    Achalasia     Antiphospholipid syndrome (H)     Antiplatelet or antithrombotic long-term use     Coagulation disorder (H) 9065-3346    DM (diabetes mellitus) (H)     DVT (deep venous thrombosis) (H) 2003    and PE    Dysphagia     occasional, use Nifedipine    GERD (gastroesophageal reflux disease)     Hyperlipidemia     Lymphedema     Myopia     Neuropathy     toes bilateral    Nonsenile cataract     osteoporosis     Pericarditis     Raynaud's disease     SLE (systemic lupus erythematosus) (H)        Past Surgical History  Past Surgical History:   Procedure  Laterality Date    CATARACT IOL, RT/LT Left 02/2019    CATARACT IOL, RT/LT Right 01/2019    COLONOSCOPY N/A 11/28/2016    Procedure: COLONOSCOPY;  Surgeon: Sang August MD;  Location: [facility]    CYSTOSCOPY, SLING TRANSVAGINAL N/A 12/20/2016    Procedure: CYSTOSCOPY, SLING TRANSVAGINAL;  Surgeon: Jeanmarie Pierson MD;  Location: [facility]    DISSECT LYMPH NODE AXILLA  2004    Lt, during eval for SLE    HYSTEROSCOPIC PLACEMENT CONTRACEPTIVE DEVICE  1985    PHACOEMULSIFICATION WITH STANDARD INTRAOCULAR LENS IMPLANT Right 1/8/2019    Procedure: Right Eye Cataract Extraction with Intraocular Lens;  Surgeon: Monika Fermin MD;  Location: [facility]    PHACOEMULSIFICATION WITH STANDARD INTRAOCULAR LENS IMPLANT Left 2/5/2019    Procedure: Left Eye Cataract Extraction with Intraocular Lens;  Surgeon: Monika Fermin MD;  Location: [facility] OR    TUBAL LIGATION Bilateral         Medications    Current Outpatient Medications:     acetaminophen (TYLENOL) 500 MG tablet, Take 1,000-1,500 mg by mouth nightly as needed , Disp: , Rfl:     aspirin 81 MG tablet, Take 81 mg by mouth At Bedtime , Disp: , Rfl:     AYR SALINE NASAL NO-DRIP GEL, Use as needed for nasal dryness, Disp: 3 Tube, Rfl: 3    blood glucose (TRUE METRIX BLOOD GLUCOSE TEST) test strip, USE TO TEST BLOOD SUGAR 5 TIMES DAILY OR AS DIRECTED, Disp: 500 strip, Rfl: 1    blood glucose monitoring (ULTRA THIN 30G) lancets, Test 5 times daily  [employer]  Super thin, Disp: 450 each, Rfl: 3    calcium carbonate (OS-GABBY) 500 MG TABS, Take 1 tablet (1,250 mg) by mouth daily, Disp: 90 tablet, Rfl: 3    cholecalciferol (VITAMIN D3) 25 mcg (1000 units) capsule, Take 1 capsule by mouth daily, Disp: , Rfl:     ciclopirox (PENLAC) 8 % external solution, Apply to adjacent skin and affected nails daily.  Remove with alcohol every 7 days, then repeat., Disp: 6.6 mL, Rfl: 11    Continuous Blood Gluc  (FREESTYLE SRI 2 READER) RORY, 1 each daily, Disp: 1 each, Rfl: 0     Continuous Blood Gluc Sensor (FREESTYLE SRI 2 SENSOR) MISC, 1 each every 14 days, Disp: 6 each, Rfl: 3    Glucagon (BAQSIMI ONE PACK) 3 MG/DOSE POWD, Spray 3 mg in nostril as needed (to be used for severe hypoglycemic episodes), Disp: 1 each, Rfl: 4    Injection Device for insulin (NOVOPEN ECHO) RORY, 1 each 4 times daily, Disp: 1 each, Rfl: 0    insulin aspart (NOVOLOG PENFILL) 100 UNIT/ML cartridge, INJECT 1 UNIT PER 15 GRAMS OF CARBOHYDRATES UNDER THE SKIN THREE TIMES A DAY WITH MEALS. APPROXIMATELY 15 UNITS DAILY., Disp: 30 mL, Rfl: 1    insulin pen needle (BD PAM U/F) 32G X 4 MM miscellaneous, Use as directed with insulin pens 4 times daily, Disp: 400 each, Rfl: 1    Multiple Vitamins-Minerals (CENTRUM SILVER PO), Take 1 tablet by mouth daily., Disp: , Rfl:     NIFEdipine ER OSMOTIC (PROCARDIA XL) 30 MG 24 hr tablet, 1-2 daily or dysphagia, Disp: 180 tablet, Rfl: 3    order for DME, Equipment being ordered: compression socks, 20-30 mmHg, knee high, Disp: 8 Piece, Rfl: 4    simvastatin (ZOCOR) 40 MG tablet, Take 1 tablet (40 mg) by mouth At Bedtime, Disp: 90 tablet, Rfl: 3    warfarin ANTICOAGULANT (JANTOVEN ANTICOAGULANT) 5 MG tablet, Take 1-1.5 tablets daily or as directed, Disp: 135 tablet, Rfl: 1    LMP  (LMP Unknown)   Wt Readings from Last 10 Encounters:   08/07/23 51.3 kg (113 lb)   02/06/23 57.9 kg (127 lb 11.2 oz)   08/01/22 56.1 kg (123 lb 11.2 oz)   01/24/22 52.6 kg (116 lb)   07/30/20 52.6 kg (116 lb)   02/05/19 56.7 kg (125 lb)   01/08/19 54.1 kg (119 lb 4.8 oz)   07/30/18 54.1 kg (119 lb 3.2 oz)   01/29/18 54 kg (119 lb)   08/02/17 52.8 kg (116 lb 8 oz)       Physical Examination:  General appearance: she is thin, no acute distress noted  Eyes: conjunctivae and extraocular motions are normal. Pupils are equal, round, and reactive to light. No lid lag, no stare.  HENT: oropharynx moist; neck no JVD, no bruits, no thyromegaly, no palpable nodules  Cardiovascular: regular rhythm, no murmurs, distal  pulses palpable, mild L arm edema   Respiratory: chest clear, no rales, no rhonchi  Musculoskeletal: normal tone and strength   Neurologic: left knee reflex decreased, normal B/L bicipital reflexes, no resting tremor  Psychiatric: affect and judgment normal   Skin: nails onychomycosis  Feet: Skin intact, sensation preserved to monofilament testing    Endocrine Labs:  Lab Results   Component Value Date    A1C 5.7 (H) 01/04/2023    A1C 5.5 06/16/2022    A1C 5.7 (H) 02/23/2022    A1C 5.6 12/22/2021    A1C 5.4 07/07/2021    A1C 5.2 04/21/2021    A1C 5.6 01/20/2021    A1C 5.1 07/09/2020    A1C 5.3 07/11/2017    HEMOGLOBINA1 5.1 01/13/2020    HEMOGLOBINA1 5.1 07/15/2019    HEMOGLOBINA1 5.0 07/30/2018    HEMOGLOBINA1 5.0 01/29/2018    HEMOGLOBINA1 5.0 01/23/2017       Hemoglobin   Date Value Ref Range Status   05/31/2023 13.3 11.7 - 15.7 g/dL Final   04/29/2021 12.9 11.7 - 15.7 g/dL Final     Hematocrit   Date Value Ref Range Status   05/31/2023 41.6 35.0 - 47.0 % Final   04/29/2021 41.5 35.0 - 47.0 % Final     Cholesterol   Date Value Ref Range Status   06/16/2022 143 <200 mg/dL Final   07/07/2021 199 <200 mg/dL Final     Comment:     Desirable:       <200 mg/dl     Cholesterol/HDL Ratio   Date Value Ref Range Status   10/01/2014 1.5 0.0 - 5.0 Final     HDL Cholesterol   Date Value Ref Range Status   07/07/2021 108 >49 mg/dL Final     Direct Measure HDL   Date Value Ref Range Status   06/16/2022 77 >=50 mg/dL Final     LDL Cholesterol Calculated   Date Value Ref Range Status   06/16/2022 54 <=100 mg/dL Final   07/07/2021 79 <100 mg/dL Final     Comment:     Desirable:       <100 mg/dl     VLDL-Cholesterol   Date Value Ref Range Status   10/01/2014 7 0 - 30 mg/dL Final     Triglycerides   Date Value Ref Range Status   06/16/2022 62 <150 mg/dL Final   07/07/2021 58 <150 mg/dL Final     Albumin Urine mg/L   Date Value Ref Range Status   01/04/2023 45.3 mg/L Final     Comment:     The reference ranges have not been  established in urine albumin. The results should be integrated into the clinical context for interpretation.   06/16/2022 16 mg/L Final   07/07/2021 9 mg/L Final     TSH   Date Value Ref Range Status   06/07/2023 0.86 0.30 - 4.20 uIU/mL Final   08/01/2022 1.34 0.40 - 4.00 mU/L Final   07/09/2019 0.99 0.40 - 4.00 mU/L Final         Last Basic Metabolic Panel:    Sodium   Date Value Ref Range Status   03/24/2023 141 136 - 145 mmol/L Final   07/07/2021 141 133 - 144 mmol/L Final     Potassium   Date Value Ref Range Status   03/24/2023 4.3 3.4 - 5.3 mmol/L Final   07/19/2022 4.2 3.4 - 5.3 mmol/L Final   07/07/2021 4.0 3.4 - 5.3 mmol/L Final     Chloride   Date Value Ref Range Status   03/24/2023 105 98 - 107 mmol/L Final   07/19/2022 108 94 - 109 mmol/L Final   07/07/2021 106 94 - 109 mmol/L Final     Calcium   Date Value Ref Range Status   03/24/2023 10.4 (H) 8.8 - 10.2 mg/dL Final   03/24/2023 10.4 (H) 8.8 - 10.2 mg/dL Final   07/07/2021 9.1 8.5 - 10.1 mg/dL Final     Carbon Dioxide   Date Value Ref Range Status   07/07/2021 27 20 - 32 mmol/L Final     Carbon Dioxide (CO2)   Date Value Ref Range Status   03/24/2023 29 22 - 29 mmol/L Final   07/19/2022 28 20 - 32 mmol/L Final     Urea Nitrogen   Date Value Ref Range Status   03/24/2023 24.7 (H) 8.0 - 23.0 mg/dL Final   07/19/2022 22 7 - 30 mg/dL Final   07/07/2021 21 7 - 30 mg/dL Final     Creatinine   Date Value Ref Range Status   05/31/2023 1.10 (H) 0.51 - 0.95 mg/dL Final   07/07/2021 0.71 0.52 - 1.04 mg/dL Final     GFR Estimate   Date Value Ref Range Status   05/31/2023 52 (L) >60 mL/min/1.73m2 Final     Comment:     eGFR calculated using 2021 CKD-EPI equation.   07/07/2021 84 >60 mL/min/[1.73_m2] Final     Comment:     Non  GFR Calc  Starting 12/18/2018, serum creatinine based estimated GFR (eGFR) will be   calculated using the Chronic Kidney Disease Epidemiology Collaboration   (CKD-EPI) equation.       Glucose   Date Value Ref Range Status    03/24/2023 70 70 - 99 mg/dL Final   07/19/2022 109 (H) 70 - 99 mg/dL Final   07/07/2021 110 (H) 70 - 99 mg/dL Final       AST   Date Value Ref Range Status   01/04/2023 20 10 - 35 U/L Final   07/07/2021 14 0 - 45 U/L Final     ALT   Date Value Ref Range Status   01/04/2023 21 10 - 35 U/L Final   07/07/2021 23 0 - 50 U/L Final     Albumin Urine mg/g Cr   Date Value Ref Range Status   01/04/2023 22.32 0.00 - 25.00 mg/g Cr Final     Comment:     Microalbuminuria is defined as an albumin:creatinine ratio of 17 to 299 for males and 25 to 299 for females. A ratio of albumin:creatinine of 300 or higher is indicative of overt proteinuria.  Due to biologic variability, positive results should be confirmed by a second, first-morning random or 24-hour timed urine specimen. If there is discrepancy, a third specimen is recommended. When 2 out of 3 results are in the microalbuminuria range, this is evidence for incipient nephropathy and warrants increased efforts at glucose control, blood pressure control, and institution of therapy with an angiotensin-converting-enzyme (ACE) inhibitor (if the patient can tolerate it).     06/16/2022 13.11 0.00 - 25.00 mg/g Cr Final   07/07/2021 9.35 0 - 25 mg/g Cr Final

## 2023-08-07 NOTE — RESULT ENCOUNTER NOTE
Interestingly, the calcium level is a little higher. Kidney function is normal. I'll contact you once the vitamin D level is back.

## 2023-08-07 NOTE — NURSING NOTE
"Chief Complaint   Patient presents with    Diabetes     Vital signs:      BP: 122/76 Pulse: 58     SpO2: 94 %     Height: 165.1 cm (5' 5\") Weight: 51.3 kg (113 lb)  Estimated body mass index is 18.8 kg/m  as calculated from the following:    Height as of this encounter: 1.651 m (5' 5\").    Weight as of this encounter: 51.3 kg (113 lb).        "

## 2023-08-08 LAB
DEPRECATED CALCIDIOL+CALCIFEROL SERPL-MC: <56 UG/L (ref 20–75)
VITAMIN D2 SERPL-MCNC: <5 UG/L
VITAMIN D3 SERPL-MCNC: 51 UG/L

## 2023-08-10 ENCOUNTER — TELEPHONE (OUTPATIENT)
Dept: ANTICOAGULATION | Facility: CLINIC | Age: 78
End: 2023-08-10
Payer: COMMERCIAL

## 2023-08-10 ENCOUNTER — MYC MEDICAL ADVICE (OUTPATIENT)
Dept: RHEUMATOLOGY | Facility: CLINIC | Age: 78
End: 2023-08-10
Payer: COMMERCIAL

## 2023-08-10 DIAGNOSIS — I82.4Y9 DEEP VEIN THROMBOSIS (DVT) OF PROXIMAL LOWER EXTREMITY, UNSPECIFIED CHRONICITY, UNSPECIFIED LATERALITY (H): Primary | ICD-10-CM

## 2023-08-10 DIAGNOSIS — M32.9 SYSTEMIC LUPUS ERYTHEMATOSUS, UNSPECIFIED SLE TYPE, UNSPECIFIED ORGAN INVOLVEMENT STATUS (H): Primary | ICD-10-CM

## 2023-08-10 NOTE — TELEPHONE ENCOUNTER
Anticoagulation clinic is attempting to move away from Factor 2 levels as a measure for warfarin dosing and transition patients to Chromogenic X levels which is a more standardized level.  Often we've been asked to do correlations of a few CFX and Factor II to compare before full transition.     Would it be okay to start a few correlations and update team for review/transition decision?     Thank you    Stacey Beal, RN  [phone]    OK  JL    I still think this is OK  JL

## 2023-08-11 ENCOUNTER — LAB (OUTPATIENT)
Dept: LAB | Facility: CLINIC | Age: 78
End: 2023-08-11
Payer: COMMERCIAL

## 2023-08-11 DIAGNOSIS — M32.9 SYSTEMIC LUPUS ERYTHEMATOSUS, UNSPECIFIED SLE TYPE, UNSPECIFIED ORGAN INVOLVEMENT STATUS (H): ICD-10-CM

## 2023-08-11 LAB
ALBUMIN UR-MCNC: NEGATIVE MG/DL
ANION GAP SERPL CALCULATED.3IONS-SCNC: 11 MMOL/L (ref 7–15)
APPEARANCE UR: CLEAR
BASOPHILS # BLD AUTO: 0 10E3/UL (ref 0–0.2)
BASOPHILS NFR BLD AUTO: 1 %
BILIRUB UR QL STRIP: NEGATIVE
BUN SERPL-MCNC: 23.4 MG/DL (ref 8–23)
CALCIUM SERPL-MCNC: 10.3 MG/DL (ref 8.8–10.2)
CHLORIDE SERPL-SCNC: 100 MMOL/L (ref 98–107)
COLOR UR AUTO: ABNORMAL
CREAT SERPL-MCNC: 0.78 MG/DL (ref 0.51–0.95)
DEPRECATED HCO3 PLAS-SCNC: 28 MMOL/L (ref 22–29)
EOSINOPHIL # BLD AUTO: 0.1 10E3/UL (ref 0–0.7)
EOSINOPHIL NFR BLD AUTO: 3 %
ERYTHROCYTE [DISTWIDTH] IN BLOOD BY AUTOMATED COUNT: 16.4 % (ref 10–15)
GFR SERPL CREATININE-BSD FRML MDRD: 79 ML/MIN/1.73M2
GLUCOSE SERPL-MCNC: 93 MG/DL (ref 70–99)
GLUCOSE UR STRIP-MCNC: NEGATIVE MG/DL
HCT VFR BLD AUTO: 41.9 % (ref 35–47)
HGB BLD-MCNC: 13.6 G/DL (ref 11.7–15.7)
HGB UR QL STRIP: NEGATIVE
IMM GRANULOCYTES # BLD: 0 10E3/UL
IMM GRANULOCYTES NFR BLD: 0 %
KETONES UR STRIP-MCNC: NEGATIVE MG/DL
LEUKOCYTE ESTERASE UR QL STRIP: NEGATIVE
LYMPHOCYTES # BLD AUTO: 1.5 10E3/UL (ref 0.8–5.3)
LYMPHOCYTES NFR BLD AUTO: 34 %
MCH RBC QN AUTO: 27.9 PG (ref 26.5–33)
MCHC RBC AUTO-ENTMCNC: 32.5 G/DL (ref 31.5–36.5)
MCV RBC AUTO: 86 FL (ref 78–100)
MONOCYTES # BLD AUTO: 0.5 10E3/UL (ref 0–1.3)
MONOCYTES NFR BLD AUTO: 11 %
NEUTROPHILS # BLD AUTO: 2.3 10E3/UL (ref 1.6–8.3)
NEUTROPHILS NFR BLD AUTO: 51 %
NITRATE UR QL: NEGATIVE
NRBC # BLD AUTO: 0 10E3/UL
NRBC BLD AUTO-RTO: 0 /100
PH UR STRIP: 6.5 [PH] (ref 5–7)
PLATELET # BLD AUTO: 232 10E3/UL (ref 150–450)
POTASSIUM SERPL-SCNC: 4 MMOL/L (ref 3.4–5.3)
RBC # BLD AUTO: 4.87 10E6/UL (ref 3.8–5.2)
RBC URINE: <1 /HPF
SODIUM SERPL-SCNC: 139 MMOL/L (ref 136–145)
SP GR UR STRIP: 1 (ref 1–1.03)
UROBILINOGEN UR STRIP-MCNC: NORMAL MG/DL
WBC # BLD AUTO: 4.3 10E3/UL (ref 4–11)
WBC URINE: 1 /HPF

## 2023-08-11 PROCEDURE — 85025 COMPLETE CBC W/AUTO DIFF WBC: CPT | Performed by: PATHOLOGY

## 2023-08-11 PROCEDURE — 99000 SPECIMEN HANDLING OFFICE-LAB: CPT | Performed by: PATHOLOGY

## 2023-08-11 PROCEDURE — 80048 BASIC METABOLIC PNL TOTAL CA: CPT | Performed by: PATHOLOGY

## 2023-08-11 PROCEDURE — 81001 URINALYSIS AUTO W/SCOPE: CPT | Performed by: PATHOLOGY

## 2023-08-11 PROCEDURE — 36415 COLL VENOUS BLD VENIPUNCTURE: CPT | Performed by: PATHOLOGY

## 2023-08-11 PROCEDURE — 86225 DNA ANTIBODY NATIVE: CPT | Performed by: INTERNAL MEDICINE

## 2023-08-11 NOTE — RESULT ENCOUNTER NOTE
The vitamin D level is normal.  In the absence of sun exposure, you can resume taking 1000 units vitamin D daily.  I suspect the elevated calcium is due to an overactive parathyroid gland.  For now, I just recommend to maintain a good hydration, continue to take 500 mg calcium daily and have a calcium level rechecked, in the blood and in the urine (24 hrs urine collection), in 3 to 4 months.

## 2023-08-11 NOTE — TELEPHONE ENCOUNTER
Glad that she has been symptom free.I recommend UA, CBC with plts and differential, BMP, anti-ds DNA prior to visit.   Left message for pt to return call. Pt is due to have a colonoscopy. Please place order if pt is in agreement.

## 2023-08-14 LAB — DSDNA AB SER-ACNC: 2.3 IU/ML

## 2023-08-16 ENCOUNTER — OFFICE VISIT (OUTPATIENT)
Dept: DERMATOLOGY | Facility: CLINIC | Age: 78
End: 2023-08-16
Payer: COMMERCIAL

## 2023-08-16 DIAGNOSIS — B35.1 ONYCHOMYCOSIS: ICD-10-CM

## 2023-08-16 PROCEDURE — 99213 OFFICE O/P EST LOW 20 MIN: CPT | Performed by: DERMATOLOGY

## 2023-08-16 RX ORDER — CICLOPIROX 80 MG/ML
SOLUTION TOPICAL
Qty: 6.6 ML | Refills: 11 | Status: SHIPPED | OUTPATIENT
Start: 2023-08-16 | End: 2024-06-03

## 2023-08-16 ASSESSMENT — PAIN SCALES - GENERAL: PAINLEVEL: NO PAIN (0)

## 2023-08-16 NOTE — PROGRESS NOTES
[facility] Dermatology Note  Encounter Date: Aug 16, 2023  Office Visit     Dermatology Problem List:  1. SLE  - Well controlled off treatment, previously on CellCept   2. 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  Onychomycosis  -Avoiding terbinafine given history of SLE and risk for flare  -Current: Penlac (started 1/31/2023)  -Prior: Pulsed fluconazole in the distant past    ____________________________________________    Assessment & Plan:     # Onychomycosis- slowly improving in fingernails   Compared to previous photos the nails are improved- mostly the thumbs  Patient satisfied with current treatment.  Repeat Photos today  - Continue Penlac daily      Follow-up: 1 year(s) in-person, or earlier for new or changing lesions    Staff:     Mei Bryan MD  ____________________________________________    CC: Derm Problem (Onychomycosis follow-up: fingernails 3 on right hand, thumb on left hand)    HPI:  Ms. Shala Caruso is a(n) 77 year old female who presents today as a return patient for fingernail fungus.  Avoiding oral terbinafine due to SLE.  Currently have some improvement with Penlac but it is slow.  Patient happy with current treatment    Patient is otherwise feeling well, without additional skin concerns.     Labs Reviewed:  N/A    Physical Exam:  Vitals: LMP  (LMP Unknown)   SKIN: Focused examination of hands was performed.  - yellow thickened nails but compared to photos from last visit, improvement in the bilateral thumb nails   - No other lesions of concern on areas examined.     Medications:  Current Outpatient Medications   Medication    acetaminophen (TYLENOL) 500 MG tablet    aspirin 81 MG tablet    AYR SALINE NASAL NO-DRIP GEL    blood glucose (TRUE METRIX BLOOD GLUCOSE TEST) test strip    blood glucose monitoring (ULTRA THIN 30G) lancets    calcium carbonate (OS-GABBY) 500 MG TABS    cholecalciferol (VITAMIN D3) 25 mcg (1000 units) capsule    ciclopirox (PENLAC)  8 % external solution    Continuous Blood Gluc  (FREESTYLE SRI 2 READER) RORY    Continuous Blood Gluc Sensor (FREESTYLE SRI 2 SENSOR) MISC    Continuous Blood Gluc Sensor (FREESTYLE SRI 3 SENSOR) MISC    Glucagon (BAQSIMI ONE PACK) 3 MG/DOSE POWD    Injection Device for insulin (NOVOPEN ECHO) RORY    insulin aspart (NOVOLOG PENFILL) 100 UNIT/ML cartridge    insulin pen needle (BD PAM U/F) 32G X 4 MM miscellaneous    Multiple Vitamins-Minerals (CENTRUM SILVER PO)    NIFEdipine ER OSMOTIC (PROCARDIA XL) 30 MG 24 hr tablet    order for DME    simvastatin (ZOCOR) 40 MG tablet    UNABLE TO FIND    warfarin ANTICOAGULANT (JANTOVEN ANTICOAGULANT) 5 MG tablet     No current facility-administered medications for this visit.      Past Medical History:   Patient Active Problem List   Diagnosis    Achalasia    Antiphospholipid syndrome (H)    DM (diabetes mellitus) (H)    GERD (gastroesophageal reflux disease)    Hyperlipidemia    HTN (hypertension)    Osteopenia    Raynaud's disease    DVT (deep venous thrombosis) (H)    Encounter for long-term current use of medication    Type 1 diabetes mellitus (H)    Osteoporosis, idiopathic    Osteoporosis, postmenopausal    Cystocele, midline    Urinary urgency    Urinary frequency    Female stress incontinence    Atrophic vaginitis    Long term current use of anticoagulant therapy    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)    Choroidal lesion    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, unspecified SLE type (H)    Hematoma of leg, right, initial encounter    Lupus erythematosus    Deep vein thrombosis (DVT) of proximal lower extremity, unspecified chronicity, unspecified laterality (H)     Past Medical History:   Diagnosis Date    Achalasia     Antiphospholipid syndrome (H)     Antiplatelet or antithrombotic long-term use     Coagulation disorder (H) 1553-6170    DM (diabetes mellitus) (H)     DVT (deep venous thrombosis) (H)  2003    and PE    Dysphagia     occasional, use Nifedipine    GERD (gastroesophageal reflux disease)     Hyperlipidemia     Lymphedema     Myopia     Neuropathy     toes bilateral    Nonsenile cataract     osteoporosis     Pericarditis     Raynaud's disease     SLE (systemic lupus erythematosus) (H)        CC No referring provider defined for this encounter. on close of this encounter.

## 2023-08-16 NOTE — NURSING NOTE
Dermatology Rooming Note    Shala Caruso's goals for this visit include:   Chief Complaint   Patient presents with    Derm Problem     Onychomycosis follow-up: fingernails 3 on right hand, thumb on left hand     Linette Spring LPN

## 2023-08-16 NOTE — LETTER
8/16/2023       RE: Shala Caruso  [address]  [place] [address]     Dear Colleague,    Thank you for referring your patient, Shala Caruso, to the [facility] [place] at [facility]. Please see a copy of my visit note below.    [facility] Dermatology Note  Encounter Date: Aug 16, 2023  Office Visit     Dermatology Problem List:  1. SLE  - Well controlled off treatment, previously on CellCept   2. 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  Onychomycosis  -Avoiding terbinafine given history of SLE and risk for flare  -Current: Penlac (started 1/31/2023)  -Prior: Pulsed fluconazole in the distant past    ____________________________________________    Assessment & Plan:     # Onychomycosis- slowly improving in fingernails   Compared to previous photos the nails are improved- mostly the thumbs  Patient satisfied with current treatment.  Repeat Photos today  - Continue Penlac daily      Follow-up: 1 year(s) in-person, or earlier for new or changing lesions    Staff:     Mei Bryan MD  ____________________________________________    CC: Derm Problem (Onychomycosis follow-up: fingernails 3 on right hand, thumb on left hand)    HPI:  Ms. Shala Caruso is a(n) 77 year old female who presents today as a return patient for fingernail fungus.  Avoiding oral terbinafine due to SLE.  Currently have some improvement with Penlac but it is slow.  Patient happy with current treatment    Patient is otherwise feeling well, without additional skin concerns.     Labs Reviewed:  N/A    Physical Exam:  Vitals: LMP  (LMP Unknown)   SKIN: Focused examination of hands was performed.  - yellow thickened nails but compared to photos from last visit, improvement in the bilateral thumb nails   - No other lesions of concern on areas examined.     Medications:  Current Outpatient Medications   Medication    acetaminophen  (TYLENOL) 500 MG tablet    aspirin 81 MG tablet    AYR SALINE NASAL NO-DRIP GEL    blood glucose (TRUE METRIX BLOOD GLUCOSE TEST) test strip    blood glucose monitoring (ULTRA THIN 30G) lancets    calcium carbonate (OS-GABBY) 500 MG TABS    cholecalciferol (VITAMIN D3) 25 mcg (1000 units) capsule    ciclopirox (PENLAC) 8 % external solution    Continuous Blood Gluc  (FREESTYLE SRI 2 READER) RORY    Continuous Blood Gluc Sensor (FREESTYLE SRI 2 SENSOR) MISC    Continuous Blood Gluc Sensor (FREESTYLE SRI 3 SENSOR) MISC    Glucagon (BAQSIMI ONE PACK) 3 MG/DOSE POWD    Injection Device for insulin (NOVOPEN ECHO) RORY    insulin aspart (NOVOLOG PENFILL) 100 UNIT/ML cartridge    insulin pen needle (BD PAM U/F) 32G X 4 MM miscellaneous    Multiple Vitamins-Minerals (CENTRUM SILVER PO)    NIFEdipine ER OSMOTIC (PROCARDIA XL) 30 MG 24 hr tablet    order for DME    simvastatin (ZOCOR) 40 MG tablet    UNABLE TO FIND    warfarin ANTICOAGULANT (JANTOVEN ANTICOAGULANT) 5 MG tablet     No current facility-administered medications for this visit.      Past Medical History:   Patient Active Problem List   Diagnosis    Achalasia    Antiphospholipid syndrome (H)    DM (diabetes mellitus) (H)    GERD (gastroesophageal reflux disease)    Hyperlipidemia    HTN (hypertension)    Osteopenia    Raynaud's disease    DVT (deep venous thrombosis) (H)    Encounter for long-term current use of medication    Type 1 diabetes mellitus (H)    Osteoporosis, idiopathic    Osteoporosis, postmenopausal    Cystocele, midline    Urinary urgency    Urinary frequency    Female stress incontinence    Atrophic vaginitis    Long term current use of anticoagulant therapy    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)    Choroidal lesion    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, unspecified SLE type (H)    Hematoma of leg, right, initial encounter    Lupus erythematosus    Deep vein thrombosis (DVT) of  proximal lower extremity, unspecified chronicity, unspecified laterality (H)     Past Medical History:   Diagnosis Date    Achalasia     Antiphospholipid syndrome (H)     Antiplatelet or antithrombotic long-term use     Coagulation disorder (H) 1698-5021    DM (diabetes mellitus) (H)     DVT (deep venous thrombosis) (H) 2003    and PE    Dysphagia     occasional, use Nifedipine    GERD (gastroesophageal reflux disease)     Hyperlipidemia     Lymphedema     Myopia     Neuropathy     toes bilateral    Nonsenile cataract     osteoporosis     Pericarditis     Raynaud's disease     SLE (systemic lupus erythematosus) (H)        CC No referring provider defined for this encounter. on close of this encounter.

## 2023-08-17 ENCOUNTER — LAB (OUTPATIENT)
Dept: LAB | Facility: CLINIC | Age: 78
End: 2023-08-17
Payer: COMMERCIAL

## 2023-08-17 DIAGNOSIS — D68.61 ANTIPHOSPHOLIPID SYNDROME (H): ICD-10-CM

## 2023-08-17 DIAGNOSIS — Z79.01 LONG TERM CURRENT USE OF ANTICOAGULANT THERAPY: ICD-10-CM

## 2023-08-17 DIAGNOSIS — I82.4Y9 DEEP VEIN THROMBOSIS (DVT) OF PROXIMAL LOWER EXTREMITY, UNSPECIFIED CHRONICITY, UNSPECIFIED LATERALITY (H): ICD-10-CM

## 2023-08-17 PROCEDURE — 85210 CLOT FACTOR II PROTHROM SPEC: CPT | Performed by: INTERNAL MEDICINE

## 2023-08-17 PROCEDURE — 36415 COLL VENOUS BLD VENIPUNCTURE: CPT | Performed by: PATHOLOGY

## 2023-08-17 PROCEDURE — 99000 SPECIMEN HANDLING OFFICE-LAB: CPT | Performed by: PATHOLOGY

## 2023-08-17 NOTE — PROGRESS NOTES
[facility]  Rheumatology Clinic  Tato Agarwal MD  2023     Name: Shala Caruso  MRN: [id number]  77 year old  : 1945  Referring provider: Joe Contreras     Assessment and Plan:    Systemic lupus erythematosus (+CRISTAL, +dsDNA, +SSA, hypocomplementemia; Raynaud's, arthritis, serositis, tubulo-interstitial nephropathy, pericardial effusion/tamponade): Pt relates stable bilateral knee and hip pain that improves with movement and stretching, as well as mild loss of sensation in her toes, also stable.  Raynaud's phenomenon is mild, and is controlled with thermal protective measures alone.  No urinary or cutaneous symptoms.  Examination today revealed normal range of motion in the hand and finger joints without evidence of digital tapering or cyanosis. +onychomycosis.     Data Lab work on 2023 showed creatinine 0.78, calcium elevated at 10.3, CBC normal; urinalysis clear; parathyroid hormone normal at 35.Anti-DNA was negative.    Discussion:   SLE remains in remission by history, examination, and laboratory evaluation.  Disease that formerly affected the kidney as well as serosal surfaces remains quiescent, despite discontinuation of immunomodulatory medication (mmf, used [id number]).  I recommend continuing off immunomodulatory therapy (patient is allergic to sulfa containing molecules, and specifically to hydroxychloroquine and methotrexate), monitor carefully for recurrence of symptoms formerly associated with lupus, and checking creatinine, urinalysis, and CBC once annually.    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 Following factor 2 levels.  # Peripheral neuropathy: stable  # Raynaud's: stable  # Achalasia: stable/improved on nifedipine, still has occasional dysphagia.  #Osteoarthritis, knees and hips: Chronic use associated pain and stiffness is modest, stable. Not limiting ambulation of 46837 steps per day. Patient is capable of performing  physical activity for multiple hours daily.  I recommended continue weightbearing exercises and range of motion exercises daily.    # Osteoporosis: DEXA scan performed in June July 2020 revealed a T score of -2.3 at the femoral neck.  I agree with continued bisphosphonate therapy.  Patient underwent zoledronic acid per endocrinology in July 2020.  I agree with plans for annual repeat treatment.  Patient should continue calcium carbonate, vitamin D, and weightbearing exercise as well.    Plan:  1.  Remain off mycophenolate  2.  Recheck blood work and urinalysis in 12 months.  3.  Continue calcium carbonate 1200 millequivalents daily, and vitamin D 800 international units daily to support bone density; continue range of motion exercise and weight bearing exercise.  4  Monitor blood pressure once or twice weekly, and monitor for recurrence of SLE symptoms.    Tato Agarwal M.D.  Staff Rheumatologist, [facility]  Pager [phone]    Follow-up: 12 mos    HPI:   Shala Caruso has a history of DM, SLE, PE on Coumadin, and HTN who presents for followup of lupus.  She was last seen 8-22. At that time she was doing well without no significant symptoms of autoimmune disease.  Plan was to monitor off immunomodulatory medication.     Summary of previous history:  SLE diagnosed in 2000 (+CRISTAL, +dsDNA ab, arthritis, serositis).  She has antiphospholipid syndrome and had DVT and PE in 2004, on Coumadin since then.  She had a Lupus flare in 2010 initially treated with Plaquenil however she worsened and developed pericardial effusion/tamponade - started on MTX and tapering dose of prednisone at that time (continued on about 10 mg daily).  Cellcept started 6/2010, Prednisone tapered off.  MTX stopped May 2012.  Mycophenolate tapered from 2 g daily to off from November 2021 through July 2022.    Interval history August 18, 2023    She is doing well.  There is no joint pain, no skin rash, good energy level, no pleurisy, neuritic  "symptoms, fevers, glandular swelling.  She is able to walk 10,000 steps per day.  She does have stable Raynaud's phenomenon, and stable achalasia, managed with nifedipine.    She stopped cellcept in June 2022. She notes no sx of flare since.    Interval history August 19, 2022    Her knees and hips give constant pain, but \"tolerable\". If she sits too long, she is very stiff. Hard to get into and out of a car. Morning stiffness is brief, relieved by movement. She can walk 1-2 hours daily, using a walking stick. No pain in UE joints. She does daily yoga with a lot of stretching; she tends to feel better with it. Not using volatern because of the coumadin.     Her hands are sensitive to cold with ongoing Raynaud's. No digital ulcers. Biggest problem is when she is washing food under cold water. Hard time looking for BG with fingerstick.    She had last COVID booster (4th) in June.    She stopped her cellcept in June. She notes no sx of flare.    Stable numbness in feet for \"years\"; she does have balance issues, especially with stairs.    Interval history 05-:    Getting along \"ok\". She is unchanged in that Her knees and hips give constant pain \"tolerable\". If she sits too long, she is very stiff. Morning stiffness is brief, relieved by movement. No pain in UE joints. She is walking every day; she plans to start biking this summer. She does daily yoga with a lot of stretching; she tends to feel better with it.    Her hands are sensitive to cold with ongoing Raynaud's. No digital ulcers.    No F/C/S, chest pain.  No urinary symptoms.      Hip pain is in the groin, made worse with long walking.      Review of Systems:  Pertinent items are noted in HPI or as below, remainder of complete ROS is negative.      No recent problems with hearing or vision. Eye dryness relieved by once daily Refresh; no dry mouth  No breathing difficulty, shortness of breath, coughing, or wheezing  No chest pain or palpitations  No heart " "burn, indigestion, abdominal pain, nausea, vomiting, diarrhea  No urination problems, no bloody, cloudy urine, no dysuria  + toe \"neuropathy\",. Symmetrical, stable.  No headaches or confusion  No rashes. No easy bleeding or bruising.     Active Medications:     Current Outpatient Medications   Medication Sig    acetaminophen (TYLENOL) 500 MG tablet Take 1,000-1,500 mg by mouth nightly as needed     aspirin 81 MG tablet Take 81 mg by mouth At Bedtime     AYR SALINE NASAL NO-DRIP GEL Use as needed for nasal dryness    blood glucose (TRUE METRIX BLOOD GLUCOSE TEST) test strip USE TO TEST BLOOD SUGAR 5 TIMES DAILY OR AS DIRECTED    blood glucose monitoring (ULTRA THIN 30G) lancets Test 5 times daily  [employer]  Super thin    calcium carbonate (OS-GABBY) 500 MG TABS Take 1 tablet (1,250 mg) by mouth daily    cholecalciferol (VITAMIN D3) 25 mcg (1000 units) capsule Take 1 capsule by mouth daily    ciclopirox (PENLAC) 8 % external solution Apply to adjacent skin and affected nails daily.  Remove with alcohol every 7 days, then repeat.    Continuous Blood Gluc  (FREESTYLE SRI 2 READER) RORY 1 each daily    Continuous Blood Gluc Sensor (FREESTYLE SRI 2 SENSOR) MISC 1 each every 14 days    Continuous Blood Gluc Sensor (FREESTYLE SRI 3 SENSOR) MISC 1 each every 14 days    Glucagon (BAQSIMI ONE PACK) 3 MG/DOSE POWD Spray 3 mg in nostril as needed (to be used for severe hypoglycemic episodes)    Injection Device for insulin (NOVOPEN ECHO) RORY 1 each 4 times daily    insulin aspart (NOVOLOG PENFILL) 100 UNIT/ML cartridge INJECT 1 UNIT PER 15 GRAMS OF CARBOHYDRATES UNDER THE SKIN THREE TIMES A DAY WITH MEALS. APPROXIMATELY 15 UNITS DAILY.    insulin pen needle (BD PAM U/F) 32G X 4 MM miscellaneous Use as directed with insulin pens 4 times daily    Multiple Vitamins-Minerals (CENTRUM SILVER PO) Take 1 tablet by mouth daily    NIFEdipine ER OSMOTIC (PROCARDIA XL) 30 MG 24 hr tablet 1-2 daily or dysphagia    order for DME " "Equipment being ordered: compression socks, 20-30 mmHg, knee high    simvastatin (ZOCOR) 40 MG tablet Take 1 tablet (40 mg) by mouth At Bedtime    UNABLE TO FIND MEDICATION NAME:     warfarin ANTICOAGULANT (JANTOVEN ANTICOAGULANT) 5 MG tablet Take 1-1.5 tablets daily or as directed     No current facility-administered medications for this visit.       No longer on lantus: too many \"lows\".  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 Checks BS 5X daily.  Osteoporosis   Hypertension   Hyperlipidemia   Achalasia   Gastroesophageal reflux disease   Choroidal lesion  Atrophic vaginitis   Urinary urgency   Female stress incontinence   Cystocele, midline   Deep vein thrombosis   Nonsenile cataract   Myopia   Neuropathy   Lymphedema      Past Surgical History:  Phacoemulsification clear cornea with intraocular lens implantation, right 1/8/19, left 2/5/19  Transvaginal sling 12/20/16  Axilla lymph node dissection 2004  Bilateral tubal ligation     Family History:   Glaucoma - father   Breast cancer   Stroke       Social History:   Tobacco Use: No previous or current tobacco use.   Alcohol Use: No alcohol use.   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  Wt Readings from Last 4 Encounters:   08/07/23 51.3 kg (113 lb)   02/06/23 57.9 kg (127 lb 11.2 oz)   08/01/22 56.1 kg (123 lb 11.2 oz)   01/24/22 52.6 kg (116 lb)       Constitutional: well-developed, appearing stated age; cooperative  Eyes: nl EOM, PERRLA, vision, " conjunctiva, sclera  ENT: nl external ears, nose, hearing, lips, teeth, gums, throat  No mucous membrane lesions, normal saliva pool  Neck: no mass or thyroid enlargement  Resp: unlabored breathing  Lymph: no cervical, supraclavicular, inguinal or epitrochlear nodes  MS: Osteoarthritis changes in the hands; knees without effusion or joint line tenderness.  Skin: Onychomycosis in multiple fingernails with some paronychial fissuring.  No fingertip pulp tenderness or skin ulceration.  Neuro: nl cranial nerves, strength, sensation, DTRs.   Psych: nl judgement, orientation, memory, affect.      Laboratory:       Latest Ref Rng & Units 5/31/2023    12:53 PM 8/7/2023    12:16 PM 8/11/2023     6:06 PM   RHEUM RESULTS   Albumin 3.5 - 5.2 g/dL  4.5     Creatinine 0.51 - 0.95 mg/dL 1.10  0.68  0.78    DNA-ds <10.0 IU/mL   2.3    GFR Estimate >60 mL/min/1.73m2 52  90  79    Hematocrit 35.0 - 47.0 % 41.6   41.9    Hemoglobin 11.7 - 15.7 g/dL 13.3   13.6    WBC 4.0 - 11.0 10e3/uL 4.9   4.3    RBC Count 3.80 - 5.20 10e6/uL 4.82   4.87    RDW 10.0 - 15.0 % 16.2   16.4    MCHC 31.5 - 36.5 g/dL 32.0   32.5    MCV 78 - 100 fL 86   86    Platelet Count 150 - 450 10e3/uL 243   232      Rheumatoid Factor   Date Value Ref Range Status   01/14/2009 8 0 - 14 IU/mL Final     Cyclic Citrullinated Peptide IgG   Date Value Ref Range Status   01/14/2009 <2 <5 U/mL Final     Comment:     Interpretation:  Negative     Ribonucleic Protein IgG Antibody   Date Value Ref Range Status   01/24/2007 12  Final     Comment:     Reference range: 0 to 49  Unit: Units  (Note)  REFERENCE INTERVAL: Ribonucleic Protein (VIKRAM), IgG   Less than 20 Units ........ None detected   20 - 49 Units ............. Inconclusive   50 Units or greater ....... Positive    RNP antibody is seen in % of mixed connective tissue  disease and is considered specific for this syndrome if  other antibodies are negative. RNP is also present in  20-30% of SLE and 15-25% of  progressive systemic  sclerosis.  The above test was performed at: [facility],  [address] [place] [address]  [id number] [phone]  [email or web address]     SSA (RO) Antibody IgG   Date Value Ref Range Status   08/24/2005 221 (H)  Final     Comment:     Reference range: 0 to 49  Unit: Units  (Note)  REFERENCE INTERVALS: SSA (Ro) (VIKRAM) Ab, IgG   Less than 20 Units ....... None detected   20 - 49 Units ............ Inconclusive   50 Units or greater ...... Positive    SSA (Ro) antibody is seen in70-75% of Sjogren syndrome  cases, 30-40% of systemic lupus erythematosus (SLE) and  5-10% of progressive systemic sclerosis (PSS).  The above test was performed at: [facility],  [address] [place] [address] [place]  [id number] [phone]  [email or web address]     SSB (LA) Antibody IgG   Date Value Ref Range Status   08/24/2005 20  Final     Comment:     Reference range: 0 to 49  Unit: Units  (Note)  REFERENCE INTERVALS: SSB (La) (VIKRAM) Ab, IgG   Less than 20 Units ....... None detected   20 - 49 Units ............ Inconclusive   50 Units or greater ...... Positive    SSB (La) antibody is seen in 50-60% of Sjogren syndrome  cases and is specific if it is the only VIKRAM antibody  present. 15-25% of patients with systemic lupus  erythematosus (SLE) and 5-10% of patients with progressive  systemic sclerosis (PSS) also have this antibody.  The above test was performed at: [facility],  [address] [place] [address]  [id number] [phone]  [email or web address]   ,  ,   CRISTAL Screen by EIA   Date Value Ref Range Status   02/16/2010 8.2 (H) 0 - 1.0 Final     Comment:     Interpretation:  Positive     DNA-ds   Date Value Ref Range Status   06/12/2019 1 <10 IU/mL Final     Comment:     Negative     Albumin Fraction   Date Value Ref Range Status   04/01/2013 3.9 3.7 - 5.1 g/dL Final     Alpha 2 Fraction   Date Value Ref Range Status   04/01/2013 0.7 0.5 - 0.9 g/dL Final     Beta Fraction   Date Value Ref Range Status   04/01/2013 0.6 0.6 - 1.0 g/dL Final     Gamma  Fraction   Date Value Ref Range Status   04/01/2013 0.7 0.7 - 1.6 g/dL Final     Monoclonal Peak   Date Value Ref Range Status   04/01/2013 0.0 0.0 g/dL Final     ELP Interpretation:   Date Value Ref Range Status   04/01/2013   Final    Essentially normal electrophoretic pattern.  No monoclonal protein seen.   Pathologic significance requires clinical correlation.  LEATHA Valencia M.D.,   Ph.D., Pathologist

## 2023-08-18 ENCOUNTER — OFFICE VISIT (OUTPATIENT)
Dept: RHEUMATOLOGY | Facility: CLINIC | Age: 78
End: 2023-08-18
Attending: INTERNAL MEDICINE
Payer: COMMERCIAL

## 2023-08-18 ENCOUNTER — TELEPHONE (OUTPATIENT)
Dept: ENDOCRINOLOGY | Facility: CLINIC | Age: 78
End: 2023-08-18
Payer: COMMERCIAL

## 2023-08-18 ENCOUNTER — ANTICOAGULATION THERAPY VISIT (OUTPATIENT)
Dept: ANTICOAGULATION | Facility: CLINIC | Age: 78
End: 2023-08-18
Payer: COMMERCIAL

## 2023-08-18 VITALS
DIASTOLIC BLOOD PRESSURE: 69 MMHG | OXYGEN SATURATION: 95 % | SYSTOLIC BLOOD PRESSURE: 128 MMHG | TEMPERATURE: 98.1 F | HEART RATE: 66 BPM

## 2023-08-18 DIAGNOSIS — Z79.01 LONG TERM CURRENT USE OF ANTICOAGULANT THERAPY: Primary | ICD-10-CM

## 2023-08-18 DIAGNOSIS — M32.9 SYSTEMIC LUPUS ERYTHEMATOSUS, UNSPECIFIED SLE TYPE, UNSPECIFIED ORGAN INVOLVEMENT STATUS (H): Primary | ICD-10-CM

## 2023-08-18 DIAGNOSIS — M81.0 OSTEOPOROSIS, POSTMENOPAUSAL: Primary | ICD-10-CM

## 2023-08-18 DIAGNOSIS — D68.61 ANTIPHOSPHOLIPID SYNDROME (H): ICD-10-CM

## 2023-08-18 DIAGNOSIS — I82.4Y9 DEEP VEIN THROMBOSIS (DVT) OF PROXIMAL LOWER EXTREMITY, UNSPECIFIED CHRONICITY, UNSPECIFIED LATERALITY (H): ICD-10-CM

## 2023-08-18 DIAGNOSIS — L93.0 LUPUS ERYTHEMATOSUS: ICD-10-CM

## 2023-08-18 LAB — PROTHROM ACT/NOR PPP: 18 % (ref 60–140)

## 2023-08-18 PROCEDURE — 99213 OFFICE O/P EST LOW 20 MIN: CPT | Performed by: INTERNAL MEDICINE

## 2023-08-18 PROCEDURE — G0463 HOSPITAL OUTPT CLINIC VISIT: HCPCS | Performed by: INTERNAL MEDICINE

## 2023-08-18 NOTE — LETTER
2023       RE: Shala Caruso  [address]  [place] [address]     Dear Colleague,    Thank you for referring your patient, Shala Caruso, to the [facility] [place] at [facility]. Please see a copy of my visit note below.    [facility]  Rheumatology Clinic  Tato Agarwal MD  2023     Name: Shala Caruso  MRN: [id number]  77 year old  : 1945  Referring provider: Joe Contreras     Assessment and Plan:    Systemic lupus erythematosus (+CRISTAL, +dsDNA, +SSA, hypocomplementemia; Raynaud's, arthritis, serositis, tubulo-interstitial nephropathy, pericardial effusion/tamponade): Pt relates stable bilateral knee and hip pain that improves with movement and stretching, as well as mild loss of sensation in her toes, also stable.  Raynaud's phenomenon is mild, and is controlled with thermal protective measures alone.  No urinary or cutaneous symptoms.  Examination today revealed normal range of motion in the hand and finger joints without evidence of digital tapering or cyanosis. +onychomycosis.     Data Lab work on 2023 showed creatinine 0.78, calcium elevated at 10.3, CBC normal; urinalysis clear; parathyroid hormone normal at 35.Anti-DNA was negative.    Discussion:   SLE remains in remission by history, examination, and laboratory evaluation.  Disease that formerly affected the kidney as well as serosal surfaces remains quiescent, despite discontinuation of immunomodulatory medication (mmf, used [id number]).  I recommend continuing off immunomodulatory therapy (patient is allergic to sulfa containing molecules, and specifically to hydroxychloroquine and methotrexate), monitor carefully for recurrence of symptoms formerly associated with lupus, and checking creatinine, urinalysis, and CBC once annually.    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 Following factor 2  levels.  # Peripheral neuropathy: stable  # Raynaud's: stable  # Achalasia: stable/improved on nifedipine, still has occasional dysphagia.  #Osteoarthritis, knees and hips: Chronic use associated pain and stiffness is modest, stable. Not limiting ambulation of 29059 steps per day. Patient is capable of performing physical activity for multiple hours daily.  I recommended continue weightbearing exercises and range of motion exercises daily.    # Osteoporosis: DEXA scan performed in June July 2020 revealed a T score of -2.3 at the femoral neck.  I agree with continued bisphosphonate therapy.  Patient underwent zoledronic acid per endocrinology in July 2020.  I agree with plans for annual repeat treatment.  Patient should continue calcium carbonate, vitamin D, and weightbearing exercise as well.    Plan:  1.  Remain off mycophenolate  2.  Recheck blood work and urinalysis in 12 months.  3.  Continue calcium carbonate 1200 millequivalents daily, and vitamin D 800 international units daily to support bone density; continue range of motion exercise and weight bearing exercise.  4  Monitor blood pressure once or twice weekly, and monitor for recurrence of SLE symptoms.    Tato Agarwal M.D.  Staff Rheumatologist, [facility]  Pager [phone]    Follow-up: 12 mos    HPI:   Shala Caruso has a history of DM, SLE, PE on Coumadin, and HTN who presents for followup of lupus.  She was last seen 8-22. At that time she was doing well without no significant symptoms of autoimmune disease.  Plan was to monitor off immunomodulatory medication.     Summary of previous history:  SLE diagnosed in 2000 (+CRISTAL, +dsDNA ab, arthritis, serositis).  She has antiphospholipid syndrome and had DVT and PE in 2004, on Coumadin since then.  She had a Lupus flare in 2010 initially treated with Plaquenil however she worsened and developed pericardial effusion/tamponade - started on MTX and tapering dose of prednisone at that time (continued on  "about 10 mg daily).  Cellcept started 6/2010, Prednisone tapered off.  MTX stopped May 2012.  Mycophenolate tapered from 2 g daily to off from November 2021 through July 2022.    Interval history August 18, 2023    She is doing well.  There is no joint pain, no skin rash, good energy level, no pleurisy, neuritic symptoms, fevers, glandular swelling.  She is able to walk 10,000 steps per day.  She does have stable Raynaud's phenomenon, and stable achalasia, managed with nifedipine.    She stopped cellcept in June 2022. She notes no sx of flare since.    Interval history August 19, 2022    Her knees and hips give constant pain, but \"tolerable\". If she sits too long, she is very stiff. Hard to get into and out of a car. Morning stiffness is brief, relieved by movement. She can walk 1-2 hours daily, using a walking stick. No pain in UE joints. She does daily yoga with a lot of stretching; she tends to feel better with it. Not using volatern because of the coumadin.     Her hands are sensitive to cold with ongoing Raynaud's. No digital ulcers. Biggest problem is when she is washing food under cold water. Hard time looking for BG with fingerstick.    She had last COVID booster (4th) in June.    She stopped her cellcept in June. She notes no sx of flare.    Stable numbness in feet for \"years\"; she does have balance issues, especially with stairs.    Interval history 05-:    Getting along \"ok\". She is unchanged in that Her knees and hips give constant pain \"tolerable\". If she sits too long, she is very stiff. Morning stiffness is brief, relieved by movement. No pain in UE joints. She is walking every day; she plans to start biking this summer. She does daily yoga with a lot of stretching; she tends to feel better with it.    Her hands are sensitive to cold with ongoing Raynaud's. No digital ulcers.    No F/C/S, chest pain.  No urinary symptoms.      Hip pain is in the groin, made worse with long walking.      Review " "of Systems:  Pertinent items are noted in HPI or as below, remainder of complete ROS is negative.      No recent problems with hearing or vision. Eye dryness relieved by once daily Refresh; no dry mouth  No breathing difficulty, shortness of breath, coughing, or wheezing  No chest pain or palpitations  No heart burn, indigestion, abdominal pain, nausea, vomiting, diarrhea  No urination problems, no bloody, cloudy urine, no dysuria  + toe \"neuropathy\",. Symmetrical, stable.  No headaches or confusion  No rashes. No easy bleeding or bruising.     Active Medications:     Current Outpatient Medications   Medication Sig    acetaminophen (TYLENOL) 500 MG tablet Take 1,000-1,500 mg by mouth nightly as needed     aspirin 81 MG tablet Take 81 mg by mouth At Bedtime     AYR SALINE NASAL NO-DRIP GEL Use as needed for nasal dryness    blood glucose (TRUE METRIX BLOOD GLUCOSE TEST) test strip USE TO TEST BLOOD SUGAR 5 TIMES DAILY OR AS DIRECTED    blood glucose monitoring (ULTRA THIN 30G) lancets Test 5 times daily  [employer]  Super thin    calcium carbonate (OS-GABBY) 500 MG TABS Take 1 tablet (1,250 mg) by mouth daily    cholecalciferol (VITAMIN D3) 25 mcg (1000 units) capsule Take 1 capsule by mouth daily    ciclopirox (PENLAC) 8 % external solution Apply to adjacent skin and affected nails daily.  Remove with alcohol every 7 days, then repeat.    Continuous Blood Gluc  (FREESTYLE SRI 2 READER) RORY 1 each daily    Continuous Blood Gluc Sensor (FREESTYLE SRI 2 SENSOR) MISC 1 each every 14 days    Continuous Blood Gluc Sensor (FREESTYLE SRI 3 SENSOR) MISC 1 each every 14 days    Glucagon (BAQSIMI ONE PACK) 3 MG/DOSE POWD Spray 3 mg in nostril as needed (to be used for severe hypoglycemic episodes)    Injection Device for insulin (NOVOPEN ECHO) RORY 1 each 4 times daily    insulin aspart (NOVOLOG PENFILL) 100 UNIT/ML cartridge INJECT 1 UNIT PER 15 GRAMS OF CARBOHYDRATES UNDER THE SKIN THREE TIMES A DAY WITH MEALS. " "APPROXIMATELY 15 UNITS DAILY.    insulin pen needle (BD PAM U/F) 32G X 4 MM miscellaneous Use as directed with insulin pens 4 times daily    Multiple Vitamins-Minerals (CENTRUM SILVER PO) Take 1 tablet by mouth daily    NIFEdipine ER OSMOTIC (PROCARDIA XL) 30 MG 24 hr tablet 1-2 daily or dysphagia    order for DME Equipment being ordered: compression socks, 20-30 mmHg, knee high    simvastatin (ZOCOR) 40 MG tablet Take 1 tablet (40 mg) by mouth At Bedtime    UNABLE TO FIND MEDICATION NAME:     warfarin ANTICOAGULANT (JANTOVEN ANTICOAGULANT) 5 MG tablet Take 1-1.5 tablets daily or as directed     No current facility-administered medications for this visit.       No longer on lantus: too many \"lows\".  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 Checks BS 5X daily.  Osteoporosis   Hypertension   Hyperlipidemia   Achalasia   Gastroesophageal reflux disease   Choroidal lesion  Atrophic vaginitis   Urinary urgency   Female stress incontinence   Cystocele, midline   Deep vein thrombosis   Nonsenile cataract   Myopia   Neuropathy   Lymphedema      Past Surgical History:  Phacoemulsification clear cornea with intraocular lens implantation, right 1/8/19, left 2/5/19  Transvaginal sling 12/20/16  Axilla lymph node dissection 2004  Bilateral tubal ligation     Family History:   Glaucoma - father   Breast cancer   Stroke       Social History:   Tobacco Use: No previous or current tobacco use.   Alcohol Use: No alcohol use.   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  Wt Readings from Last 4 Encounters:   08/07/23 51.3 kg (113 lb)   02/06/23 57.9 kg (127 lb 11.2 oz)   08/01/22 56.1 kg (123 lb 11.2 oz)   01/24/22 52.6 kg (116 lb)       Constitutional: well-developed, appearing stated age; cooperative  Eyes: nl EOM, PERRLA, vision, conjunctiva, sclera  ENT: nl external ears, nose, hearing, lips, teeth, gums, throat  No mucous membrane lesions, normal saliva pool  Neck: no mass or thyroid enlargement  Resp: unlabored breathing  Lymph: no cervical, supraclavicular, inguinal or epitrochlear nodes  MS: Osteoarthritis changes in the hands; knees without effusion or joint line tenderness.  Skin: Onychomycosis in multiple fingernails with some paronychial fissuring.  No fingertip pulp tenderness or skin ulceration.  Neuro: nl cranial nerves, strength, sensation, DTRs.   Psych: nl judgement, orientation, memory, affect.      Laboratory:       Latest Ref Rng & Units 5/31/2023    12:53 PM 8/7/2023    12:16 PM 8/11/2023     6:06 PM   RHEUM RESULTS   Albumin 3.5 - 5.2 g/dL  4.5     Creatinine 0.51 - 0.95 mg/dL 1.10  0.68  0.78    DNA-ds <10.0 IU/mL   2.3    GFR Estimate >60 mL/min/1.73m2 52  90  79    Hematocrit 35.0 - 47.0 % 41.6   41.9    Hemoglobin 11.7 - 15.7 g/dL 13.3   13.6    WBC 4.0 - 11.0 10e3/uL 4.9   4.3    RBC Count 3.80 - 5.20 10e6/uL 4.82   4.87    RDW 10.0 - 15.0 % 16.2   16.4    MCHC 31.5 - 36.5 g/dL 32.0   32.5    MCV 78 - 100 fL 86   86    Platelet Count 150 - 450 10e3/uL 243   232      Rheumatoid Factor   Date Value Ref Range Status   01/14/2009 8 0 - 14 IU/mL Final     Cyclic Citrullinated Peptide IgG   Date Value Ref Range Status   01/14/2009 <2 <5 U/mL Final     Comment:     Interpretation:  Negative     Ribonucleic Protein IgG Antibody   Date Value Ref Range Status   01/24/2007 12  Final     Comment:     Reference range: 0 to 49  Unit: Units  (Note)  REFERENCE INTERVAL: Ribonucleic Protein (VIKRAM), IgG   Less than 20 Units ........  None detected   20 - 49 Units ............. Inconclusive   50 Units or greater ....... Positive    RNP antibody is seen in % of mixed connective tissue  disease and is considered specific for this syndrome if  other antibodies are negative. RNP is also present in  20-30% of SLE and 15-25% of progressive systemic  sclerosis.  The above test was performed at: [facility],  [address] [place] [address]  [id number] [phone]  [email or web address]     SSA (RO) Antibody IgG   Date Value Ref Range Status   08/24/2005 221 (H)  Final     Comment:     Reference range: 0 to 49  Unit: Units  (Note)  REFERENCE INTERVALS: SSA (Ro) (VIKRAM) Ab, IgG   Less than 20 Units ....... None detected   20 - 49 Units ............ Inconclusive   50 Units or greater ...... Positive    SSA (Ro) antibody is seen in70-75% of Sjogren syndrome  cases, 30-40% of systemic lupus erythematosus (SLE) and  5-10% of progressive systemic sclerosis (PSS).  The above test was performed at: [facility],  [address] [facility]  [id number] [phone]  [email or web address]     SSB (LA) Antibody IgG   Date Value Ref Range Status   08/24/2005 20  Final     Comment:     Reference range: 0 to 49  Unit: Units  (Note)  REFERENCE INTERVALS: SSB (La) (VIKRAM) Ab, IgG   Less than 20 Units ....... None detected   20 - 49 Units ............ Inconclusive   50 Units or greater ...... Positive    SSB (La) antibody is seen in 50-60% of Sjogren syndrome  cases and is specific if it is the only VIKRAM antibody  present. 15-25% of patients with systemic lupus  erythematosus (SLE) and 5-10% of patients with progressive  systemic sclerosis (PSS) also have this antibody.  The above test was performed at: [facility],  [address] [place] [address]  [id number] [phone]  [email or web address]   ,  ,   CRISTAL Screen by EIA   Date Value Ref Range Status   02/16/2010 8.2 (H) 0 - 1.0 Final     Comment:     Interpretation:  Positive     DNA-ds   Date Value Ref Range Status   06/12/2019 1 <10 IU/mL Final      Comment:     Negative     Albumin Fraction   Date Value Ref Range Status   04/01/2013 3.9 3.7 - 5.1 g/dL Final     Alpha 2 Fraction   Date Value Ref Range Status   04/01/2013 0.7 0.5 - 0.9 g/dL Final     Beta Fraction   Date Value Ref Range Status   04/01/2013 0.6 0.6 - 1.0 g/dL Final     Gamma Fraction   Date Value Ref Range Status   04/01/2013 0.7 0.7 - 1.6 g/dL Final     Monoclonal Peak   Date Value Ref Range Status   04/01/2013 0.0 0.0 g/dL Final     ELP Interpretation:   Date Value Ref Range Status   04/01/2013   Final    Essentially normal electrophoretic pattern.  No monoclonal protein seen.   Pathologic significance requires clinical correlation.  LEATHA Valencia M.D.,   Ph.D., Pathologist         Again, thank you for allowing me to participate in the care of your patient.      Sincerely,    Tato Agarwal MD

## 2023-08-18 NOTE — PATIENT INSTRUCTIONS
Diagnosis:  1.  Systemic lupus erythematosus with a history of phospholipid antibodies, Raynaud's, serositis, and nephritis: Systemic lupus erythematosus remains quiescent, despite discontinuation of cellcept in 2022.  2.  Osteoporosis: definitive treatment with zoledronic acid given in July 2020.  I recommend continuing calcium and vitamin D supplements, follow-up with endocrinology.  3.  Propensity to clot: No evidence of recurrent thrombosis.  I recommend continuing warfarin as [facility] through [facility].  4. Osteoarthritis, knees and hips; mild, continue symptomatic treatment    Plan:  1.  Remain off mycophenolate  2.  Recheck blood work and urinalysis in 12 months.  3.  Continue calcium carbonate 1200 millequivalents daily, and vitamin D 800 international units daily to support bone density; continue range of motion exercise and weight bearing exercise.  4 Monitor blood pressure once or twice weekly, and monitor for recurrence of SLE symptoms.

## 2023-08-18 NOTE — PROGRESS NOTES
ANTICOAGULATION MANAGEMENT     Shala Caruso 77 year old female is on warfarin with therapeutic result. (Goal Factor II: 25-15%)    Recent labs: (last 7 days)     08/17/23  1346   F2 18*       ASSESSMENT     Source(s): Chart Review and Patient/Caregiver Call     Warfarin doses taken: Warfarin taken as instructed  Diet: No new diet changes identified  Medication/supplement changes: None noted  New illness, injury, or hospitalization: No  Signs or symptoms of bleeding or clotting: No  Previous result: Therapeutic last 2(+) visits  Additional findings: None     PLAN     Recommended plan for no diet, medication or health factor changes affecting result    Dosing Instructions: Continue your current warfarin dose 7.5mg every Sun, Tue, Fri; 5mg all other days  with next Factor II in 2 weeks       Telephone call with Jeanmarie who verbalizes understanding and agrees to plan and who agrees to plan and repeated back plan correctly    Lab visit scheduled    Education provided:   None required    Plan made per [facility] anticoagulation protocol    Andre Ashraf, RN  Anticoagulation Clinic  8/18/2023

## 2023-08-18 NOTE — TELEPHONE ENCOUNTER
Infusion team number provided via [employer].   Yamel Shafer, RN on 8/18/2023 at 12:06 PM     [facility] Call Center    Phone Message    May a detailed message be left on voicemail: yes     Reason for Call: Other: Pt called asking if her prior auth for her last SI Reclast infusion is still good for her upcoming infusion on the 29th. Please call pt to discuss      Action Taken: Other: [facility]    Travel Screening: Not Applicable

## 2023-08-22 ENCOUNTER — MYC MEDICAL ADVICE (OUTPATIENT)
Dept: INTERNAL MEDICINE | Facility: CLINIC | Age: 78
End: 2023-08-22
Payer: COMMERCIAL

## 2023-08-23 DIAGNOSIS — H31.8 CHOROIDAL LESION: Primary | ICD-10-CM

## 2023-08-23 NOTE — TELEPHONE ENCOUNTER
Message is left for patient requesting that she return call to explain rationale for moving away from Factor 2 measurement to chromogenic X measurement.  Comparison readings will be done.

## 2023-08-28 RX ORDER — MEPERIDINE HYDROCHLORIDE 25 MG/ML
25 INJECTION INTRAMUSCULAR; INTRAVENOUS; SUBCUTANEOUS EVERY 30 MIN PRN
Status: CANCELLED | OUTPATIENT
Start: 2023-08-29

## 2023-08-28 RX ORDER — ZOLEDRONIC ACID 5 MG/100ML
5 INJECTION, SOLUTION INTRAVENOUS ONCE
Status: CANCELLED
Start: 2023-08-29

## 2023-08-28 RX ORDER — HEPARIN SODIUM (PORCINE) LOCK FLUSH IV SOLN 100 UNIT/ML 100 UNIT/ML
5 SOLUTION INTRAVENOUS
Status: CANCELLED | OUTPATIENT
Start: 2023-08-29

## 2023-08-28 RX ORDER — ALBUTEROL SULFATE 0.83 MG/ML
2.5 SOLUTION RESPIRATORY (INHALATION)
Status: CANCELLED | OUTPATIENT
Start: 2023-08-29

## 2023-08-28 RX ORDER — METHYLPREDNISOLONE SODIUM SUCCINATE 125 MG/2ML
125 INJECTION, POWDER, LYOPHILIZED, FOR SOLUTION INTRAMUSCULAR; INTRAVENOUS
Status: CANCELLED
Start: 2023-08-29

## 2023-08-28 RX ORDER — ACETAMINOPHEN 325 MG/1
325 TABLET ORAL
Status: CANCELLED | OUTPATIENT
Start: 2023-08-29

## 2023-08-28 RX ORDER — EPINEPHRINE 1 MG/ML
0.3 INJECTION, SOLUTION INTRAMUSCULAR; SUBCUTANEOUS EVERY 5 MIN PRN
Status: CANCELLED | OUTPATIENT
Start: 2023-08-29

## 2023-08-28 RX ORDER — HEPARIN SODIUM,PORCINE 10 UNIT/ML
5-20 VIAL (ML) INTRAVENOUS DAILY PRN
Status: CANCELLED | OUTPATIENT
Start: 2023-08-29

## 2023-08-28 RX ORDER — DIPHENHYDRAMINE HYDROCHLORIDE 50 MG/ML
50 INJECTION INTRAMUSCULAR; INTRAVENOUS
Status: CANCELLED
Start: 2023-08-29

## 2023-08-28 RX ORDER — ALBUTEROL SULFATE 90 UG/1
1-2 AEROSOL, METERED RESPIRATORY (INHALATION)
Status: CANCELLED
Start: 2023-08-29

## 2023-08-29 ENCOUNTER — OFFICE VISIT (OUTPATIENT)
Dept: INTERNAL MEDICINE | Facility: CLINIC | Age: 78
End: 2023-08-29
Payer: COMMERCIAL

## 2023-08-29 ENCOUNTER — INFUSION THERAPY VISIT (OUTPATIENT)
Dept: INFUSION THERAPY | Facility: CLINIC | Age: 78
End: 2023-08-29
Attending: INTERNAL MEDICINE
Payer: COMMERCIAL

## 2023-08-29 VITALS
HEART RATE: 61 BPM | OXYGEN SATURATION: 94 % | DIASTOLIC BLOOD PRESSURE: 79 MMHG | TEMPERATURE: 97.5 F | SYSTOLIC BLOOD PRESSURE: 127 MMHG | BODY MASS INDEX: 18.8 KG/M2 | HEIGHT: 65 IN

## 2023-08-29 VITALS
DIASTOLIC BLOOD PRESSURE: 72 MMHG | HEART RATE: 68 BPM | SYSTOLIC BLOOD PRESSURE: 114 MMHG | TEMPERATURE: 98.1 F | OXYGEN SATURATION: 96 %

## 2023-08-29 DIAGNOSIS — E78.49 OTHER HYPERLIPIDEMIA: ICD-10-CM

## 2023-08-29 DIAGNOSIS — M81.0 OSTEOPOROSIS, POSTMENOPAUSAL: Primary | ICD-10-CM

## 2023-08-29 DIAGNOSIS — S10.93XA SUPERFICIAL BRUISING OF HEAD AND NECK REGION: Primary | ICD-10-CM

## 2023-08-29 DIAGNOSIS — Z00.00 HEALTHCARE MAINTENANCE: ICD-10-CM

## 2023-08-29 DIAGNOSIS — S00.93XA SUPERFICIAL BRUISING OF HEAD AND NECK REGION: Primary | ICD-10-CM

## 2023-08-29 PROCEDURE — 250N000011 HC RX IP 250 OP 636: Mod: JZ | Performed by: INTERNAL MEDICINE

## 2023-08-29 PROCEDURE — 96365 THER/PROPH/DIAG IV INF INIT: CPT

## 2023-08-29 PROCEDURE — 99213 OFFICE O/P EST LOW 20 MIN: CPT | Mod: GC

## 2023-08-29 RX ORDER — MEPERIDINE HYDROCHLORIDE 25 MG/ML
25 INJECTION INTRAMUSCULAR; INTRAVENOUS; SUBCUTANEOUS EVERY 30 MIN PRN
Status: CANCELLED | OUTPATIENT
Start: 2024-08-28

## 2023-08-29 RX ORDER — ALBUTEROL SULFATE 0.83 MG/ML
2.5 SOLUTION RESPIRATORY (INHALATION)
Status: CANCELLED | OUTPATIENT
Start: 2024-08-28

## 2023-08-29 RX ORDER — ALBUTEROL SULFATE 90 UG/1
1-2 AEROSOL, METERED RESPIRATORY (INHALATION)
Status: CANCELLED
Start: 2024-08-28

## 2023-08-29 RX ORDER — HEPARIN SODIUM (PORCINE) LOCK FLUSH IV SOLN 100 UNIT/ML 100 UNIT/ML
5 SOLUTION INTRAVENOUS
Status: CANCELLED | OUTPATIENT
Start: 2024-08-28

## 2023-08-29 RX ORDER — EPINEPHRINE 1 MG/ML
0.3 INJECTION, SOLUTION INTRAMUSCULAR; SUBCUTANEOUS EVERY 5 MIN PRN
Status: CANCELLED | OUTPATIENT
Start: 2024-08-28

## 2023-08-29 RX ORDER — METHYLPREDNISOLONE SODIUM SUCCINATE 125 MG/2ML
125 INJECTION, POWDER, LYOPHILIZED, FOR SOLUTION INTRAMUSCULAR; INTRAVENOUS
Status: CANCELLED
Start: 2024-08-28

## 2023-08-29 RX ORDER — ZOLEDRONIC ACID 5 MG/100ML
5 INJECTION, SOLUTION INTRAVENOUS ONCE
Status: CANCELLED
Start: 2024-08-28

## 2023-08-29 RX ORDER — ZOLEDRONIC ACID 5 MG/100ML
5 INJECTION, SOLUTION INTRAVENOUS ONCE
Status: COMPLETED | OUTPATIENT
Start: 2023-08-29 | End: 2023-08-29

## 2023-08-29 RX ORDER — DIPHENHYDRAMINE HYDROCHLORIDE 50 MG/ML
50 INJECTION INTRAMUSCULAR; INTRAVENOUS
Status: CANCELLED
Start: 2024-08-28

## 2023-08-29 RX ORDER — ACETAMINOPHEN 325 MG/1
325 TABLET ORAL
Status: CANCELLED | OUTPATIENT
Start: 2024-08-28

## 2023-08-29 RX ORDER — HEPARIN SODIUM,PORCINE 10 UNIT/ML
5-20 VIAL (ML) INTRAVENOUS DAILY PRN
Status: CANCELLED | OUTPATIENT
Start: 2024-08-28

## 2023-08-29 RX ADMIN — ZOLEDRONIC ACID 5 MG: 0.05 INJECTION, SOLUTION INTRAVENOUS at 15:30

## 2023-08-29 NOTE — PROGRESS NOTES
Nursing Note  Shala Caruso presents today to [facility] for:   Chief Complaint   Patient presents with    Infusion     reclast     During today's [facility] appointment, orders from Dr. Matamoros were completed.  Frequency: once    Progress note:  Patient identification verified by name and date of birth.  Assessment completed.  Vitals recorded in Doc Flowsheets.  Patient was provided with education regarding medication/procedure and possible side effects.  Patient verbalized understanding.     present during visit today: Not Applicable.    Treatment Conditions: Patient's Creatinine Clearance is within paramaters to administer medication per orders.    Premedications: were not ordered.    Drug Waste Record: No    Infusion length and rate:  infusion given over approximately  30 minutes  200 ml/hr.    Labs: were drawn prior to appointment.    Vascular access: peripheral IV placed today.    Is the next appt scheduled? N/a one time order    Post Infusion Assessment:  Patient tolerated infusion without incident.     Discharge Plan:   Follow up plan of care with: ongoing infusions at [facility]. and primary care provider,  Discharge instructions were reviewed with patient.  Patient/representative verbalized understanding of discharge instructions and all questions answered.  Patient discharged from [facility] in stable condition.    La Smith RN      Administrations This Visit       zoledronic Acid (RECLAST) infusion 5 mg       Admin Date  08/29/2023 Action  $New Bag Dose  5 mg Rate  200 mL/hr Route  Intravenous Administered By  La Smith RN                      /72   Pulse 68   Temp 98.1  F (36.7  C) (Oral)   LMP  (LMP Unknown)   SpO2 96%

## 2023-08-29 NOTE — NURSING NOTE
Shala Caruso is a 77 year old female patient that presents today in clinic for the following:    Chief Complaint   Patient presents with    Head Injury     Pt hit head a year ago on kitchen cabinet; injury had healed but recently flared up and became a bruise    Derm Problem     Pt reports bump on R hand/wrist     The patient's allergies and medications were reviewed as noted. A set of vitals were recorded as noted without incident. The patient does not have any other questions for the provider.    Erica Cheung, NATALYA at 7:48 AM on 8/29/2023

## 2023-08-29 NOTE — PROGRESS NOTES
[facility]         HPI:       Shala Caruso is a 77 year old female with PMH of GERD, T1DM, SLE, anti-phospholipid syndrome who presents for a forehead bruise.  Hit her head about a year ago, no imaging was taken, the patient was discharged home. The bruise went away and remained in remission up to a year ago until it flared up again two weeks ago as a purple bruise that is now healing. She denies the possibility of hitting her head. It is the only bruise that she can see in her body. Monitors warfarin using Factor 2 assay, most recently has been at goal. Tension headaches that have been there for years, happening only once every 1-2 months. Denies any new skin rashes. Has new hypercalcemia that is worked up by the endocrinology clinic. Denies any bone pain. Denies any change in her bowel or urinary habits.             Review of Systems:     ROS  I have personally reviewed and updated the complete ROS on the day of the visit.             Past Medical History:     Past Medical History:   Diagnosis Date    Achalasia     Antiphospholipid syndrome (H)     Antiplatelet or antithrombotic long-term use     Coagulation disorder (H) 1454-1361    DM (diabetes mellitus) (H)     DVT (deep venous thrombosis) (H) 2003    and PE    Dysphagia     occasional, use Nifedipine    GERD (gastroesophageal reflux disease)     Hyperlipidemia     Lymphedema     Myopia     Neuropathy     toes bilateral    Nonsenile cataract     osteoporosis     Pericarditis     Raynaud's disease     SLE (systemic lupus erythematosus) (H)             Past Surgical History:     Past Surgical History:   Procedure Laterality Date    CATARACT IOL, RT/LT Left 02/2019    CATARACT IOL, RT/LT Right 01/2019    COLONOSCOPY N/A 11/28/2016    Procedure: COLONOSCOPY;  Surgeon: Sang August MD;  Location:  GI    CYSTOSCOPY, SLING TRANSVAGINAL N/A 12/20/2016    Procedure: CYSTOSCOPY, SLING TRANSVAGINAL;  Surgeon: Jeanmarie Pierson MD;  Location:   OR    DISSECT LYMPH NODE AXILLA  2004    Lt, during eval for SLE    HYSTEROSCOPIC PLACEMENT CONTRACEPTIVE DEVICE  1985    PHACOEMULSIFICATION WITH STANDARD INTRAOCULAR LENS IMPLANT Right 1/8/2019    Procedure: Right Eye Cataract Extraction with Intraocular Lens;  Surgeon: Monika Fermin MD;  Location: [facility] OR    PHACOEMULSIFICATION WITH STANDARD INTRAOCULAR LENS IMPLANT Left 2/5/2019    Procedure: Left Eye Cataract Extraction with Intraocular Lens;  Surgeon: Monika Fermin MD;  Location: [facility] OR    TUBAL LIGATION Bilateral        Current Outpatient Medications   Medication Sig Dispense Refill    acetaminophen (TYLENOL) 500 MG tablet Take 1,000-1,500 mg by mouth nightly as needed       aspirin 81 MG tablet Take 81 mg by mouth At Bedtime       AYR SALINE NASAL NO-DRIP GEL Use as needed for nasal dryness 3 Tube 3    blood glucose (TRUE METRIX BLOOD GLUCOSE TEST) test strip USE TO TEST BLOOD SUGAR 5 TIMES DAILY OR AS DIRECTED 500 strip 1    blood glucose monitoring (ULTRA THIN 30G) lancets Test 5 times daily  [employer]  Super thin 450 each 3    calcium carbonate (OS-GABBY) 500 MG TABS Take 1 tablet (1,250 mg) by mouth daily 90 tablet 3    cholecalciferol (VITAMIN D3) 25 mcg (1000 units) capsule Take 1 capsule by mouth daily      ciclopirox (PENLAC) 8 % external solution Apply to adjacent skin and affected nails daily.  Remove with alcohol every 7 days, then repeat. 6.6 mL 11    Continuous Blood Gluc  (FREESTYLE SRI 2 READER) RORY 1 each daily 1 each 0    Continuous Blood Gluc Sensor (FREESTYLE SRI 2 SENSOR) MISC 1 each every 14 days 6 each 3    Continuous Blood Gluc Sensor (FREESTYLE SRI 3 SENSOR) MISC 1 each every 14 days 6 each 4    Glucagon (BAQSIMI ONE PACK) 3 MG/DOSE POWD Spray 3 mg in nostril as needed (to be used for severe hypoglycemic episodes) 1 each 4    Injection Device for insulin (NOVOPEN ECHO) RORY 1 each 4 times daily 1 each 0    insulin aspart (NOVOLOG PENFILL) 100 UNIT/ML  cartridge INJECT 1 UNIT PER 15 GRAMS OF CARBOHYDRATES UNDER THE SKIN THREE TIMES A DAY WITH MEALS. APPROXIMATELY 15 UNITS DAILY. 30 mL 1    insulin pen needle (BD PAM U/F) 32G X 4 MM miscellaneous Use as directed with insulin pens 4 times daily 400 each 1    Multiple Vitamins-Minerals (CENTRUM SILVER PO) Take 1 tablet by mouth daily      NIFEdipine ER OSMOTIC (PROCARDIA XL) 30 MG 24 hr tablet 1-2 daily or dysphagia 180 tablet 3    order for DME Equipment being ordered: compression socks, 20-30 mmHg, knee high 8 Piece 4    simvastatin (ZOCOR) 40 MG tablet Take 1 tablet (40 mg) by mouth At Bedtime 90 tablet 3    UNABLE TO FIND MEDICATION NAME:       warfarin ANTICOAGULANT (JANTOVEN ANTICOAGULANT) 5 MG tablet Take 1-1.5 tablets daily or as directed 135 tablet 1          Allergies   Allergen Reactions    Amaryl [Glimepiride] Swelling     Swelling of tongue    Metformin Blisters     Experienced big blisters all over body and sloughing of skin    Plaquenil [Aminoquinolines] Hives    Sulfa Antibiotics Other (See Comments)     Turned bright red    Amoxicillin Rash    Hydroxychloroquine Rash    Penicillins Rash            Family History:     Family History   Problem Relation Age of Onset    Cancer Other         breast    Cerebrovascular Disease Other     C.A.D. Other     Glaucoma Father     Macular Degeneration No family hx of             Social History:     Social History     Socioeconomic History    Marital status:      Spouse name: Not on file    Number of children: Not on file    Years of education: Not on file    Highest education level: Not on file   Occupational History    Not on file   Tobacco Use    Smoking status: Never    Smokeless tobacco: Never   Substance and Sexual Activity    Alcohol use: No    Drug use: No    Sexual activity: Yes     Partners: Male     Comment: , safe in relationship   Other Topics Concern    Parent/sibling w/ CABG, MI or angioplasty before 65F 55M? Not Asked   Social  "History Narrative    How much exercise per week? Walk to work every morning (2 miles), swimming 3 times a week, takes walks with , periodically works out at gym on stationary bike     How much calcium per day? Supplement 3x daily        How much caffeine per day? On rare occasion, only if she is out to eat and that is all they have    How much vitamin D per day? supplement    Do you/your family wear seatbelts?  Yes    Do you/your family use safety helmets? Yes    Do you/your family use sunscreen? Yes    Do you/your family keep firearms in the home? No    Do you/your family have a smoke detector(s)? Yes        Do you feel safe in your home? Yes    Has anyone ever touched you in an unwanted manner? No        Klever R LPN 7/18/2013                 Social Determinants of Health     Financial Resource Strain: Not on file   Food Insecurity: Not on file   Transportation Needs: Not on file   Physical Activity: Not on file   Stress: Not on file   Social Connections: Not on file   Intimate Partner Violence: Not At Risk (8/19/2021)    Humiliation, Afraid, Rape, and Kick questionnaire     Fear of Current or Ex-Partner: No     Emotionally Abused: No     Physically Abused: No     Sexually Abused: No   Housing Stability: Not on file            Physical Exam:   /79 (BP Location: Right arm, Patient Position: Sitting, Cuff Size: Adult Regular)   Pulse 61   Temp 97.5  F (36.4  C) (Oral)   Ht 1.651 m (5' 5\")   LMP  (LMP Unknown)   SpO2 94%   BMI 18.80 kg/m    Body mass index is 18.8 kg/m .  Vitals were reviewed       GENERAL APPEARANCE: healthy, alert and no distress     EYES: EOMI,  PERRL     HENT: ear canals and TM's normal and nose and mouth without ulcers or lesions, healing  bruise at the forehead     NECK: no adenopathy, no asymmetry, masses, or scars and thyroid normal to palpation     RESP: lungs clear to auscultation - no rales, rhonchi or wheezes     CV: regular rates and rhythm, normal S1 S2, no S3 or S4 " and no murmur, click or rub      MS: extremities normal- no gross deformities noted, no evidence of inflammation in joints, FROM in all extremities.     SKIN: no suspicious lesions or rashes     NEURO: Normal strength and tone, sensory exam grossly normal, mentation intact and speech normal     PSYCH: mentation appears normal. and affect normal/bright     LYMPHATICS: No cervical adenopathy        Results:     Results from last visit:  Lab on 08/17/2023   Component Date Value Ref Range Status    Factor 2 Assay 08/17/2023 18 (L)  60 - 140 % Final       Component Ref Range & Units 2 wk ago  (8/11/23) 1 yr ago  (7/19/22) 1 yr ago  (2/23/22) 1 yr ago  (12/22/21) 1 yr ago  (9/2/21) 2 yr ago  (7/7/21) 2 yr ago  (4/29/21)     DNA (ds) Antibody <10.0 IU/mL 2.3 1.3 CM 1.0 CM 0.9 CM <0.6 CM <1 R, CM 1 R, CM       Assessment and Plan     Shala was seen today for head injury and derm problem.    Diagnoses and all orders for this visit:    Superficial bruising of head and neck region    Healthcare maintenance    Patient presenting with forehead bruise that recurred after an injury she sustained last year, not healing well without any other appreciated symptoms.  Coumadin on therapeutic levels, no evidence of bruising elsewhere, most likely the result of a sacral injury.  We will monitor for any recurrence of the bruise, without getting any imaging today.  The patient's chronic issues are very well controlled, off CellCept for lupus, recently seen by rheumatology on 8/18/2023,  and having hypercalcemia worked up by endocrinology, last time seen on 8/7/2023.  The patient is up-to-date with all here age-appropriate screening, counseling was provided on getting flu shot, COVID-vaccine, and RSV vaccine, which the patient will obtain this fall.    Options for treatment and follow-up care were reviewed with the patient. Shala Caruso engaged in the decision making process and verbalized understanding of the options discussed and  agreed with the final plan.    Heather Garcia MD  Internal Medicine, PGY-3  [facility]  [phone]   Aug 29, 2023      Pt was seen and plan of care discussed with the attending physician Dr. Ramirez

## 2023-09-01 NOTE — TELEPHONE ENCOUNTER
simvastatin (ZOCOR) 40 MG tablet   90 tablet 3 10/5/2022     Last Office Visit : 8-07- 2023 2-  next visit  Statins Protocol [id number]/01/2023 09:59 AM   Protocol Details LDL on file in past 12 months     Lab Test 06/16/22  1243   LDL 54

## 2023-09-03 RX ORDER — SIMVASTATIN 40 MG
40 TABLET ORAL AT BEDTIME
Qty: 90 TABLET | Refills: 3 | Status: SHIPPED | OUTPATIENT
Start: 2023-09-03 | End: 2024-01-05

## 2023-09-06 ENCOUNTER — LAB (OUTPATIENT)
Dept: LAB | Facility: CLINIC | Age: 78
End: 2023-09-06
Payer: COMMERCIAL

## 2023-09-06 ENCOUNTER — ANCILLARY PROCEDURE (OUTPATIENT)
Dept: MAMMOGRAPHY | Facility: CLINIC | Age: 78
End: 2023-09-06
Attending: INTERNAL MEDICINE
Payer: COMMERCIAL

## 2023-09-06 DIAGNOSIS — D68.61 ANTIPHOSPHOLIPID SYNDROME (H): ICD-10-CM

## 2023-09-06 DIAGNOSIS — Z12.31 VISIT FOR SCREENING MAMMOGRAM: ICD-10-CM

## 2023-09-06 DIAGNOSIS — I82.4Y9 DEEP VEIN THROMBOSIS (DVT) OF PROXIMAL LOWER EXTREMITY, UNSPECIFIED CHRONICITY, UNSPECIFIED LATERALITY (H): ICD-10-CM

## 2023-09-06 DIAGNOSIS — Z79.01 LONG TERM CURRENT USE OF ANTICOAGULANT THERAPY: ICD-10-CM

## 2023-09-06 PROCEDURE — 36415 COLL VENOUS BLD VENIPUNCTURE: CPT | Performed by: PATHOLOGY

## 2023-09-06 PROCEDURE — 85210 CLOT FACTOR II PROTHROM SPEC: CPT | Performed by: INTERNAL MEDICINE

## 2023-09-06 PROCEDURE — 99000 SPECIMEN HANDLING OFFICE-LAB: CPT | Performed by: PATHOLOGY

## 2023-09-06 PROCEDURE — 77067 SCR MAMMO BI INCL CAD: CPT | Performed by: STUDENT IN AN ORGANIZED HEALTH CARE EDUCATION/TRAINING PROGRAM

## 2023-09-06 PROCEDURE — 77063 BREAST TOMOSYNTHESIS BI: CPT | Performed by: STUDENT IN AN ORGANIZED HEALTH CARE EDUCATION/TRAINING PROGRAM

## 2023-09-06 PROCEDURE — 85260 CLOT FACTOR X STUART-POWER: CPT | Performed by: INTERNAL MEDICINE

## 2023-09-07 ENCOUNTER — ANTICOAGULATION THERAPY VISIT (OUTPATIENT)
Dept: ANTICOAGULATION | Facility: CLINIC | Age: 78
End: 2023-09-07
Payer: COMMERCIAL

## 2023-09-07 ENCOUNTER — OFFICE VISIT (OUTPATIENT)
Dept: OPHTHALMOLOGY | Facility: CLINIC | Age: 78
End: 2023-09-07
Attending: OPHTHALMOLOGY
Payer: COMMERCIAL

## 2023-09-07 DIAGNOSIS — Z79.01 LONG TERM CURRENT USE OF ANTICOAGULANT THERAPY: Primary | ICD-10-CM

## 2023-09-07 DIAGNOSIS — D68.61 ANTIPHOSPHOLIPID SYNDROME (H): ICD-10-CM

## 2023-09-07 DIAGNOSIS — L93.0 LUPUS ERYTHEMATOSUS: ICD-10-CM

## 2023-09-07 DIAGNOSIS — H31.8 CHOROIDAL LESION: ICD-10-CM

## 2023-09-07 DIAGNOSIS — I82.4Y9 DEEP VEIN THROMBOSIS (DVT) OF PROXIMAL LOWER EXTREMITY, UNSPECIFIED CHRONICITY, UNSPECIFIED LATERALITY (H): ICD-10-CM

## 2023-09-07 DIAGNOSIS — H26.492 PCO (POSTERIOR CAPSULAR OPACIFICATION), LEFT: Primary | ICD-10-CM

## 2023-09-07 LAB
FACT X ACT/NOR PPP CHRO: 20 % (ref 70–130)
PROTHROM ACT/NOR PPP: 15 % (ref 60–140)

## 2023-09-07 PROCEDURE — 250N000009 HC RX 250: Performed by: OPHTHALMOLOGY

## 2023-09-07 PROCEDURE — 66821 AFTER CATARACT LASER SURGERY: CPT | Mod: LT | Performed by: OPHTHALMOLOGY

## 2023-09-07 PROCEDURE — 99207 FUNDUS PHOTOS OU (BOTH EYES): CPT | Mod: 26 | Performed by: OPHTHALMOLOGY

## 2023-09-07 PROCEDURE — 99214 OFFICE O/P EST MOD 30 MIN: CPT | Mod: 25 | Performed by: OPHTHALMOLOGY

## 2023-09-07 PROCEDURE — 92134 CPTRZ OPH DX IMG PST SGM RTA: CPT | Performed by: OPHTHALMOLOGY

## 2023-09-07 PROCEDURE — G0463 HOSPITAL OUTPT CLINIC VISIT: HCPCS | Mod: 25 | Performed by: OPHTHALMOLOGY

## 2023-09-07 PROCEDURE — 76512 OPH US DX B-SCAN: CPT | Performed by: OPHTHALMOLOGY

## 2023-09-07 PROCEDURE — 92250 FUNDUS PHOTOGRAPHY W/I&R: CPT | Performed by: OPHTHALMOLOGY

## 2023-09-07 RX ADMIN — APRACLONIDINE HYDROCHLORIDE 1 DROP: 10 SOLUTION/ DROPS OPHTHALMIC at 15:55

## 2023-09-07 ASSESSMENT — CUP TO DISC RATIO
OD_RATIO: 0.3
OS_RATIO: 0.3

## 2023-09-07 ASSESSMENT — VISUAL ACUITY
OD_CC: 20/20
OS_CC: 20/25
OD_CC: J1+
METHOD: SNELLEN - LINEAR
OS_CC: J1+

## 2023-09-07 ASSESSMENT — REFRACTION_WEARINGRX
OD_ADD: +2.75
OD_AXIS: 144
OD_SPHERE: -1.00
OS_SPHERE: -0.50
OS_CYLINDER: +0.75
OS_AXIS: 178
OD_CYLINDER: +1.50
OS_ADD: +2.75

## 2023-09-07 ASSESSMENT — TONOMETRY
OD_IOP_MMHG: 13
OS_IOP_MMHG: 13
IOP_METHOD: TONOPEN

## 2023-09-07 ASSESSMENT — CONF VISUAL FIELD
OS_SUPERIOR_TEMPORAL_RESTRICTION: 0
OD_SUPERIOR_NASAL_RESTRICTION: 0
OS_SUPERIOR_NASAL_RESTRICTION: 0
OD_SUPERIOR_TEMPORAL_RESTRICTION: 0
OD_NORMAL: 1
OD_INFERIOR_NASAL_RESTRICTION: 0
OS_INFERIOR_NASAL_RESTRICTION: 0
OD_INFERIOR_TEMPORAL_RESTRICTION: 0
OS_INFERIOR_TEMPORAL_RESTRICTION: 0
METHOD: COUNTING FINGERS
OS_NORMAL: 1

## 2023-09-07 ASSESSMENT — EXTERNAL EXAM - LEFT EYE: OS_EXAM: NORMAL

## 2023-09-07 ASSESSMENT — SLIT LAMP EXAM - LIDS
COMMENTS: NORMAL
COMMENTS: NORMAL

## 2023-09-07 ASSESSMENT — EXTERNAL EXAM - RIGHT EYE: OD_EXAM: NORMAL

## 2023-09-07 NOTE — PROGRESS NOTES
CC: here for  yag left eye and choroidal lesions evaluation    Interval: LCV 9/7/22: No significant changes in vision. No flashes and floaters. Needs updated glasses rx.   AT help with intermittent blurriness    HPI: 77 year old female with hx of DM type I. Denies flashes and floaters.    Past med hx: SLE currently off cellcept (allergic to plaq)    OCULAR IMAGING  Optical Coherence Tomography 09/07/23 vs 09/07/2022  Right eye normal foveal contour, no srf/irf  Left eye normal foveal contour, no srf/irf  OCT thru choroidal lesion less than 1 mm thick with drusen overlying, no srf    PHOTOS 09/07/23   Right eye: no focal concerns  Left eye  Choroidal pigmented lesion with drusen stable in appearance     U/S left eye 09/07/23   ST lesion appears stable from prior, <1mm in height, C2 5.74  08/19/21 <1mm in height   8-19-20 <1mm in height - stable. No posterior shadowing. 0.61 mm x 4.78T x 5.38L  9/14/16 C1 0.56 mm; C2 5.46 mm  8-30-17 C1 1.3 mm; C2   8-9-18 less than 1 mm ashwini lesion. 0.73mm x 5.15T x 5.03L  9-11-19 Minimally elevated lesion <1 mm in height appears stable / unchanged on U/S today. Negative for posterior shadowing or patent extension / excavation.   09/07/23 Lesion measures: .81 x 5.51T x 5.74L. Too small for reliable a-scan.    ASSESSMENT:     # Choroidal pigmented lesion, left eye  - superior temporal periphery  - no orange pigmentation, no subretinal fluid, + drusen   - ultrasound 09/07/23  less than 1 mm height, normal retrobulbar echo pattern  - stable on exam and OCT MATEUSZ  - annual exams     # patient with history of Lupus   Took prednisolone for 10 yrs --> patient developed Diabetes mellitus   No lupus retinopathy   Patient had DVT left upper leg 2001, then 2002, then 2003 --> patient on coumadin indefinitely   Off MMF     # DM type I - no evidence of DR     # Pseudophakia both eyes   - doing well, lens centered    # PCO both eyes  -Visually significant left eye   - r/b/a of YAG cap discussed  09/07/23   Agreed to proceed  Consider yag right eye in the future    # Myopic astigmatism, OU   - updated Rx        return to clinic: 1-2 weeks with prescription and dilation      Thank you for entrusting us with your care  Vanessa Breen MD, PGY3  Ophthalmology Resident  [facility]         Complete documentation of historical and exam elements from today's encounter can be found in the full encounter summary report (not reduplicated in this progress note).  I personally obtained the chief complaint(s) and history of present illness.  I confirmed and edited as necessary the review of systems, past medical/surgical history, family history, social history, and examination findings as documented by others; and I examined the patient myself.  I personally reviewed the relevant tests, images, and reports as documented above.  I personally reviewed the ophthalmic test(s) associated with this encounter, agree with the interpretation(s) as documented by the resident/fellow, and have edited the corresponding report(s) as necessary.   I formulated and edited as necessary the assessment and plan and discussed the findings and management plan with the patient and family and No resident or fellow assisted with the procedures performed.  I performed the procedures myself.    Monika Fermin MD  Professor of Ophthalmology.  Retina Service   Department of Ophthalmology and Visual Neurosciences   [facility]  Phone: [phone]   Fax: [phone]

## 2023-09-07 NOTE — NURSING NOTE
No chief complaint on file.    Chief Complaint(s) and History of Present Illness(es)    Jeanmarie is here for her annual follow up of a choroidal lesion LE. Today she states eyes seem dry and overall vision does not seems as good as in the past. Current glasses are about two years old. She thinks she sees some floaters, but denies flashes or eye pain.     Shant Velázquez COT 1:46 PM September 7, 2023

## 2023-09-07 NOTE — PROGRESS NOTES
ANTICOAGULATION MANAGEMENT     Shala Caruso 77 year old female is on warfarin with therapeutic result. (Goal Factor II: 25-15%)    Recent labs: (last 7 days)     09/06/23  1405   [id number] 20*   F2 15*       ASSESSMENT     Source(s): Chart Review and Patient/Caregiver Call     Warfarin doses taken: Reviewed in chart  Diet: No new diet changes identified  Medication/supplement changes: None noted  New illness, injury, or hospitalization: No  Signs or symptoms of bleeding or clotting: No  Previous result: Therapeutic last 2(+) visits  Additional findings:  Patient plans on increasing veggie intake since the Chromo X and F2 are on the supratherapeutic side of range.     PLAN     Recommended plan for no diet, medication or health factor changes affecting result    Dosing Instructions: Continue your current warfarin dose 7.5mg Tue, Fri and Sun and 5mg all other days  with next Chromogenic Factor X and Factor II in 3 weeks       Telephone call with Jeanmarie who verbalizes understanding and agrees to plan and who agrees to plan and repeated back plan correctly    Patient offered & declined to schedule next visit    Education provided:   Taking warfarin: Importance of taking warfarin as instructed  Goal range and lab monitoring: goal range and significance of current result, Importance of therapeutic range, and Importance of following up at instructed interval  Dietary considerations: impact of vitamin K foods on INR    Plan made per [facility] anticoagulation protocol    Stacey Beal RN  Anticoagulation Clinic  9/7/2023

## 2023-09-11 ENCOUNTER — TELEPHONE (OUTPATIENT)
Dept: ANTICOAGULATION | Facility: CLINIC | Age: 78
End: 2023-09-11
Payer: COMMERCIAL

## 2023-09-11 NOTE — TELEPHONE ENCOUNTER
9/11/23    At time of encounter, Jeanmarie is NOT taking a collagen supplement.  There isn't any literature on the affects of Collagen and Warfarin.  If she decides to obtain this supplement she will update the [facility].    Andre Ashraf RN

## 2023-09-14 ENCOUNTER — OFFICE VISIT (OUTPATIENT)
Dept: OPHTHALMOLOGY | Facility: CLINIC | Age: 78
End: 2023-09-14
Attending: OPHTHALMOLOGY
Payer: COMMERCIAL

## 2023-09-14 DIAGNOSIS — Z48.810 AFTERCARE FOLLOWING SURGERY OF A SENSORY ORGAN: Primary | ICD-10-CM

## 2023-09-14 PROCEDURE — G0463 HOSPITAL OUTPT CLINIC VISIT: HCPCS | Performed by: OPHTHALMOLOGY

## 2023-09-14 PROCEDURE — 99024 POSTOP FOLLOW-UP VISIT: CPT | Mod: GC | Performed by: OPHTHALMOLOGY

## 2023-09-14 ASSESSMENT — VISUAL ACUITY
CORRECTION_TYPE: GLASSES
OD_CC: 20/20
METHOD: SNELLEN - LINEAR
OS_CC: 20/20
OS_CC+: -2

## 2023-09-14 ASSESSMENT — TONOMETRY
OD_IOP_MMHG: 13
IOP_METHOD: TONOPEN
OS_IOP_MMHG: 11

## 2023-09-14 ASSESSMENT — CONF VISUAL FIELD
OD_SUPERIOR_TEMPORAL_RESTRICTION: 0
OS_INFERIOR_NASAL_RESTRICTION: 0
OS_INFERIOR_TEMPORAL_RESTRICTION: 0
OS_NORMAL: 1
OD_SUPERIOR_NASAL_RESTRICTION: 0
OS_SUPERIOR_TEMPORAL_RESTRICTION: 0
OD_NORMAL: 1
OD_INFERIOR_NASAL_RESTRICTION: 0
METHOD: COUNTING FINGERS
OD_INFERIOR_TEMPORAL_RESTRICTION: 0
OS_SUPERIOR_NASAL_RESTRICTION: 0

## 2023-09-14 ASSESSMENT — REFRACTION_WEARINGRX
OD_SPHERE: -1.00
OS_CYLINDER: +0.75
OD_CYLINDER: +1.50
OS_AXIS: 178
OS_ADD: +2.75
OS_SPHERE: -0.50
OD_ADD: +2.75
OD_AXIS: 144

## 2023-09-14 ASSESSMENT — REFRACTION_MANIFEST
OS_CYLINDER: SPHERE
OS_SPHERE: -0.25

## 2023-09-14 ASSESSMENT — EXTERNAL EXAM - LEFT EYE: OS_EXAM: NORMAL

## 2023-09-14 ASSESSMENT — EXTERNAL EXAM - RIGHT EYE: OD_EXAM: NORMAL

## 2023-09-14 ASSESSMENT — SLIT LAMP EXAM - LIDS
COMMENTS: NORMAL
COMMENTS: NORMAL

## 2023-09-14 ASSESSMENT — CUP TO DISC RATIO: OS_RATIO: 0.3

## 2023-09-14 NOTE — PROGRESS NOTES
CC: here for  yag left eye and choroidal lesions evaluation    Interval: LCV 9/7/23: Here for yag cap left eye follow up. Happy with vision. No pain/light sensitivity/ new flashes/floaters    HPI: 77 year old female with hx of DM type I. Denies flashes and floaters.    Past med hx: SLE currently off cellcept (allergic to plaq)    OCULAR IMAGING  Optical Coherence Tomography 09/07/23 vs 09/07/2022  Right eye normal foveal contour, no srf/irf  Left eye normal foveal contour, no srf/irf  OCT thru choroidal lesion less than 1 mm thick with drusen overlying, no srf    PHOTOS 09/07/23   Right eye: no focal concerns  Left eye  Choroidal pigmented lesion with drusen stable in appearance     U/S left eye 09/07/23   ST lesion appears stable from prior, <1mm in height, C2 5.74  08/19/21 <1mm in height   8-19-20 <1mm in height - stable. No posterior shadowing. 0.61 mm x 4.78T x 5.38L  9/14/16 C1 0.56 mm; C2 5.46 mm  8-30-17 C1 1.3 mm; C2   8-9-18 less than 1 mm ashwini lesion. 0.73mm x 5.15T x 5.03L  9-11-19 Minimally elevated lesion <1 mm in height appears stable / unchanged on U/S today. Negative for posterior shadowing or patent extension / excavation.   09/07/23 Lesion measures: .81 x 5.51T x 5.74L. Too small for reliable a-scan.    ASSESSMENT:     # PCO both eyes  -Visually significant left eye   - s/p YAG cap left eye 9/7/23  -Good clearance 09/14/23     # Choroidal pigmented lesion, left eye  - superior temporal periphery  - no orange pigmentation, no subretinal fluid, + drusen   - ultrasound 09/07/23  less than 1 mm height, normal retrobulbar echo pattern  - stable on exam and OCT MATEUSZ  - annual exams     # patient with history of Lupus   Took prednisolone for 10 yrs --> patient developed Diabetes mellitus   No lupus retinopathy   Patient had DVT left upper leg 2001, then 2002, then 2003 --> patient on coumadin indefinitely   Off MMF     # DM type I - no evidence of DR     # Pseudophakia both eyes   - doing well, lens  centered    # Myopic astigmatism, OU   - updated Rx     Follow up: 1 year with Repeat oct enhanced depth image of the choroidal peripheral lesion , macula Optical Coherence Tomography     Thank you for entrusting us with your care  Vanessa Breen MD, PGY3  Ophthalmology Resident  [facility]     Complete documentation of historical and exam elements from today's encounter can be found in the full encounter summary report (not reduplicated in this progress note).  I personally obtained the chief complaint(s) and history of present illness.  I confirmed and edited as necessary the review of systems, past medical/surgical history, family history, social history, and examination findings as documented by others; and I examined the patient myself.  I personally reviewed the relevant tests, images, and reports as documented above.  I personally reviewed the ophthalmic test(s) associated with this encounter, agree with the interpretation(s) as documented by the resident/fellow, and have edited the corresponding report(s) as necessary.   I formulated and edited as necessary the assessment and plan and discussed the findings and management plan with the patient and family and No resident or fellow assisted with the procedures performed.  I performed the procedures myself.    Monika Fermin MD  Professor of Ophthalmology.  Retina Service   Department of Ophthalmology and Visual Neurosciences   [facility]  Phone: [phone]   Fax: [phone]

## 2023-09-14 NOTE — NURSING NOTE
Chief Complaints and History of Present Illnesses   Patient presents with    Follow Up     PCO (posterior capsular opacification), left eye -Yag Cap on 09/07/2023     Chief Complaint(s) and History of Present Illness(es)       Follow Up              Laterality: left eye    Course: gradually improving    Associated symptoms: dryness (mornings) and floaters.  Negative for eye pain and flashes    Treatments tried: artificial tears    Pain scale: 0/10    Comments: PCO (posterior capsular opacification), left eye -Yag Cap on 09/07/2023              Comments    She states that her vision in the left eye improved with the laser procedure done one week ago.  She has noticed a new floater in her left eye over the past week.    Greta Morrison, COT 1:50 PM  September 14, 2023

## 2023-09-27 ENCOUNTER — LAB (OUTPATIENT)
Dept: LAB | Facility: CLINIC | Age: 78
End: 2023-09-27
Payer: COMMERCIAL

## 2023-09-27 DIAGNOSIS — Z79.01 LONG TERM CURRENT USE OF ANTICOAGULANT THERAPY: ICD-10-CM

## 2023-09-27 DIAGNOSIS — I82.4Y9 DEEP VEIN THROMBOSIS (DVT) OF PROXIMAL LOWER EXTREMITY, UNSPECIFIED CHRONICITY, UNSPECIFIED LATERALITY (H): ICD-10-CM

## 2023-09-27 DIAGNOSIS — D68.61 ANTIPHOSPHOLIPID SYNDROME (H): ICD-10-CM

## 2023-09-27 PROCEDURE — 36415 COLL VENOUS BLD VENIPUNCTURE: CPT | Performed by: PATHOLOGY

## 2023-09-27 PROCEDURE — 99000 SPECIMEN HANDLING OFFICE-LAB: CPT | Performed by: PATHOLOGY

## 2023-09-27 PROCEDURE — 85260 CLOT FACTOR X STUART-POWER: CPT | Performed by: INTERNAL MEDICINE

## 2023-09-27 PROCEDURE — 85210 CLOT FACTOR II PROTHROM SPEC: CPT | Performed by: INTERNAL MEDICINE

## 2023-09-28 ENCOUNTER — ANTICOAGULATION THERAPY VISIT (OUTPATIENT)
Dept: ANTICOAGULATION | Facility: CLINIC | Age: 78
End: 2023-09-28
Payer: COMMERCIAL

## 2023-09-28 DIAGNOSIS — L93.0 LUPUS ERYTHEMATOSUS: ICD-10-CM

## 2023-09-28 DIAGNOSIS — D68.61 ANTIPHOSPHOLIPID SYNDROME (H): ICD-10-CM

## 2023-09-28 DIAGNOSIS — Z79.01 LONG TERM CURRENT USE OF ANTICOAGULANT THERAPY: Primary | ICD-10-CM

## 2023-09-28 DIAGNOSIS — I82.4Y9 DEEP VEIN THROMBOSIS (DVT) OF PROXIMAL LOWER EXTREMITY, UNSPECIFIED CHRONICITY, UNSPECIFIED LATERALITY (H): ICD-10-CM

## 2023-09-28 LAB
FACT X ACT/NOR PPP CHRO: 17 % (ref 70–130)
PROTHROM ACT/NOR PPP: 11 % (ref 60–140)

## 2023-09-28 NOTE — PROGRESS NOTES
ANTICOAGULATION MANAGEMENT     Shala Caruso 77 year old female is on warfarin with supratherapeutic result. (Goal Factor II: 25-15%)    Recent labs: (last 7 days)     09/27/23  1335   [id number] 17*   F2 11*       ASSESSMENT     Source(s): Chart Review and Patient/Caregiver Call     Warfarin doses taken: Warfarin taken as instructed  Diet: Decreased greens/vitamin K in diet; plans to resume previous intake  Medication/supplement changes: None noted  New illness, injury, or hospitalization: No  Signs or symptoms of bleeding or clotting: No  Previous result: Therapeutic last 2(+) visits  Additional findings:  Patient does not want to change dose and really feels diet is the issue.  Writer requests labs in 1 week but patient reports she will be out of town.  Jeanmarie agrees to hold warfarin X 1 dose tomorrow since she has already taken warfarin today     PLAN     Recommended plan for temporary change(s) affecting result    Dosing Instructions: hold dose then continue your current warfarin dose 7.5mg Tue, Fri, Sun and 5mg all other days  with next Chromogenic Factor X in 2 weeks       Telephone call with Jeanmarie who verbalizes understanding and agrees to plan and who agrees to plan and repeated back plan correctly    Patient offered & declined to schedule next visit    Education provided:   Taking warfarin: Importance of taking warfarin as instructed  Goal range and lab monitoring: goal range and significance of current result, Importance of therapeutic range, and Importance of following up at instructed interval  Dietary considerations: importance of consistent vitamin K intake and impact of vitamin K foods on INR  Symptom monitoring: monitoring for bleeding signs and symptoms, monitoring for stroke signs and symptoms, and when to seek medical attention/emergency care    Plan made per [facility] anticoagulation protocol    Stacey Beal RN  Anticoagulation Clinic  9/28/2023

## 2023-10-24 ENCOUNTER — ALLIED HEALTH/NURSE VISIT (OUTPATIENT)
Dept: FAMILY MEDICINE | Facility: CLINIC | Age: 78
End: 2023-10-24
Payer: COMMERCIAL

## 2023-10-24 ENCOUNTER — TELEPHONE (OUTPATIENT)
Dept: ANTICOAGULATION | Facility: CLINIC | Age: 78
End: 2023-10-24

## 2023-10-24 DIAGNOSIS — Z23 ENCOUNTER FOR IMMUNIZATION: Primary | ICD-10-CM

## 2023-10-24 PROCEDURE — 90471 IMMUNIZATION ADMIN: CPT

## 2023-10-24 PROCEDURE — 90678 RSV VACC PREF BIVALENT IM: CPT

## 2023-10-24 PROCEDURE — 90472 IMMUNIZATION ADMIN EACH ADD: CPT

## 2023-10-24 PROCEDURE — 99207 PR NO CHARGE NURSE ONLY: CPT

## 2023-10-24 PROCEDURE — 90662 IIV NO PRSV INCREASED AG IM: CPT

## 2023-10-24 NOTE — PROGRESS NOTES
Prior to immunization administration, verified patients identity using patient s name and date of birth. Please see Immunization Activity for additional information.     Screening Questionnaire for Adult Immunization    Are you sick today?   No   Do you have allergies to medications, food, a vaccine component or latex?   Yes   Have you ever had a serious reaction after receiving a vaccination?   No   Do you have a long-term health problem with heart, lung, kidney, or metabolic disease (e.g., diabetes), asthma, a blood disorder, no spleen, complement component deficiency, a cochlear implant, or a spinal fluid leak?  Are you on long-term aspirin therapy?   Yes   Do you have cancer, leukemia, HIV/AIDS, or any other immune system problem?   Yes   Do you have a parent, brother, or sister with an immune system problem?   No   In the past 3 months, have you taken medications that affect  your immune system, such as prednisone, other steroids, or anticancer drugs; drugs for the treatment of rheumatoid arthritis, Crohn s disease, or psoriasis; or have you had radiation treatments?   No   Have you had a seizure, or a brain or other nervous system problem?   No   During the past year, have you received a transfusion of blood or blood    products, or been given immune (gamma) globulin or antiviral drug?   No   For women: Are you pregnant or is there a chance you could become       pregnant during the next month?   No   Have you received any vaccinations in the past 4 weeks?   No     Immunization questionnaire was positive for at least one answer.  Notified Harmony Alas.    I have reviewed the following standing orders:   This patient is due and qualifies for the Influenza vaccine.    Click here for Influenza Vaccine Standing Order    I have reviewed the vaccines inclusion and exclusion criteria; No concerns regarding eligibility.         This patient is due and qualifies for the RSV vaccine.    Click here for RSV Vaccine  Standing Order    I have reviewed the vaccines inclusion and exclusion criteria; No concerns regarding eligibility.     Patient instructed to remain in clinic for 15 minutes afterwards, and to report any adverse reactions.     Screening performed by Kailee Love on 10/24/2023 at 3:19 PM.

## 2023-10-24 NOTE — TELEPHONE ENCOUNTER
ANTICOAGULATION     Shala Caruso is overdue for a CFX/CFII check.     Spoke with Jeanmarie and scheduled labs for tomorrow. She states she may have messed up her dosing so her number may be a little off.    Tato Muhammad RN

## 2023-10-25 ENCOUNTER — LAB (OUTPATIENT)
Dept: LAB | Facility: CLINIC | Age: 78
End: 2023-10-25
Payer: COMMERCIAL

## 2023-10-25 DIAGNOSIS — I82.4Y9 DEEP VEIN THROMBOSIS (DVT) OF PROXIMAL LOWER EXTREMITY, UNSPECIFIED CHRONICITY, UNSPECIFIED LATERALITY (H): ICD-10-CM

## 2023-10-25 PROCEDURE — 85260 CLOT FACTOR X STUART-POWER: CPT | Performed by: INTERNAL MEDICINE

## 2023-10-25 PROCEDURE — 99000 SPECIMEN HANDLING OFFICE-LAB: CPT | Performed by: PATHOLOGY

## 2023-10-25 PROCEDURE — 36415 COLL VENOUS BLD VENIPUNCTURE: CPT | Performed by: PATHOLOGY

## 2023-10-26 ENCOUNTER — TELEPHONE (OUTPATIENT)
Dept: ANTICOAGULATION | Facility: CLINIC | Age: 78
End: 2023-10-26
Payer: COMMERCIAL

## 2023-10-26 ENCOUNTER — ANTICOAGULATION THERAPY VISIT (OUTPATIENT)
Dept: ANTICOAGULATION | Facility: CLINIC | Age: 78
End: 2023-10-26
Payer: COMMERCIAL

## 2023-10-26 DIAGNOSIS — Z79.01 LONG TERM CURRENT USE OF ANTICOAGULANT THERAPY: Primary | ICD-10-CM

## 2023-10-26 DIAGNOSIS — D68.61 ANTIPHOSPHOLIPID SYNDROME (H): Primary | ICD-10-CM

## 2023-10-26 DIAGNOSIS — I82.4Y9 DEEP VEIN THROMBOSIS (DVT) OF PROXIMAL LOWER EXTREMITY, UNSPECIFIED CHRONICITY, UNSPECIFIED LATERALITY (H): ICD-10-CM

## 2023-10-26 DIAGNOSIS — D68.61 ANTIPHOSPHOLIPID SYNDROME (H): ICD-10-CM

## 2023-10-26 DIAGNOSIS — L93.0 LUPUS ERYTHEMATOSUS: ICD-10-CM

## 2023-10-26 LAB — FACT X ACT/NOR PPP CHRO: 20 % (ref 70–130)

## 2023-10-26 NOTE — PROGRESS NOTES
ANTICOAGULATION MANAGEMENT     Shala Caruso 78 year old female is on warfarin.   (Goal Factor II: 25-15%)    Recent labs: (last 7 days)     10/25/23  1235   [id number] 20*       ASSESSMENT     NO Factor 2 was collected with CFX lab yesterday. Unable to add this on per special coag lab. Consulted with [facility]: first 2 CFX to F2 correlated well. She's now back to where she was with first CFX/F2. therapeutic, but on edge of goal. I'm okay if she continues dose and rechecks in 3-4 weeks based on correlation we've already seen. If she'd like to correlate one more with next labs we can for her comfort, but I'd be okay with just CFX if she's ready.       Source(s): Chart Review and Patient/Caregiver Call     Warfarin doses taken: More warfarin taken than planned which may be affecting result(s)-reports taking 7.5 mg 3/week instead of recommended 1/week.   Diet: No new diet changes identified  Medication/supplement changes: None noted  New illness, injury, or hospitalization: No  Signs or symptoms of bleeding or clotting: No  Previous result: Supratherapeutic  Additional findings:  recommending to change maintenance dose to 7.5 mg 2/week     Jeanmarie is comfortable with monitoring CFX only going forward. A note on correlation and new referral will be sent to referring provider by [facility].    PLAN     Recommended plan for ongoing change(s) affecting result    Dosing Instructions:  change your warfarin dose 7.5 mg every Tues, Fri; 5 mg all other days (6.7% change) with next Chromogenic Factor X in 3-4 weeks       Telephone call with Jeanmarie who agrees to plan and repeated back plan correctly    Lab visit scheduled    Education provided:   Taking warfarin: Importance of taking warfarin as instructed  Goal range and lab monitoring: goal range and significance of current result and Importance of following up at instructed interval  Contact [phone] with any changes, questions or concerns.     Plan made with [facility] Pharmacist Cristina  Wayne BRANTLEY, RN  Anticoagulation Clinic  10/26/2023

## 2023-11-08 NOTE — PROGRESS NOTES
11/8/23:  Now monitoring CFX--new referral signed.  Goal range 40 - 20.  Updated anticoag tracking description note.  Gwen Abel RN

## 2023-11-08 NOTE — TELEPHONE ENCOUNTER
Signed orders received from Dr. Suarez for CFX monitoring.      Patient was aware comfortable of transition at last visit    Cristina Black [facility]

## 2023-11-08 NOTE — TELEPHONE ENCOUNTER
Anticoagulation    Jeanmarie with Hx of VTE and antiphospholipid syndrome monitoring factor with Factor 2 (goal 25-15%)    Anticoagulation clinic has been working with Dr. Carter, hematologist at [facility] and anticoagulation protocol oversight  to convert Factor II patients to Chromogenic Factor X monitoring.      Correlation recent results performed    Date Chromogenic Factor X   (Goal 40-20%) Factor 2  (Goal 25-15%) Assessment   10/25/23 20%  Only CFX was drawn; unable to add on Factor 2   9/27/23 17% 11% Both Supratherapeutic; held dose and continued (decreased greens)   9/6/23 20% 15% Both therapeutic (and edge of supratherapeutic)     Recommendations:    Initial two Chromogenic Factor X and Factor 2 results correlating extremely well and trending together.  Recommend to transition to Chromogenic Factor X monitoring with equivalent goal (40-20%) and discontinuation of Factor II     Cristina Black, [facility]

## 2023-11-15 ENCOUNTER — MYC MEDICAL ADVICE (OUTPATIENT)
Dept: INTERNAL MEDICINE | Facility: CLINIC | Age: 78
End: 2023-11-15
Payer: COMMERCIAL

## 2023-11-15 DIAGNOSIS — E78.5 HYPERLIPIDEMIA, UNSPECIFIED HYPERLIPIDEMIA TYPE: Primary | ICD-10-CM

## 2023-11-22 ENCOUNTER — LAB (OUTPATIENT)
Dept: LAB | Facility: CLINIC | Age: 78
End: 2023-11-22
Payer: COMMERCIAL

## 2023-11-22 ENCOUNTER — ANTICOAGULATION THERAPY VISIT (OUTPATIENT)
Dept: ANTICOAGULATION | Facility: CLINIC | Age: 78
End: 2023-11-22

## 2023-11-22 DIAGNOSIS — L93.0 LUPUS ERYTHEMATOSUS: ICD-10-CM

## 2023-11-22 DIAGNOSIS — D68.61 ANTIPHOSPHOLIPID SYNDROME (H): ICD-10-CM

## 2023-11-22 DIAGNOSIS — Z79.01 LONG TERM CURRENT USE OF ANTICOAGULANT THERAPY: Primary | ICD-10-CM

## 2023-11-22 DIAGNOSIS — I82.4Y9 DEEP VEIN THROMBOSIS (DVT) OF PROXIMAL LOWER EXTREMITY, UNSPECIFIED CHRONICITY, UNSPECIFIED LATERALITY (H): ICD-10-CM

## 2023-11-22 LAB — FACT X ACT/NOR PPP CHRO: 20 % (ref 70–130)

## 2023-11-22 PROCEDURE — 85260 CLOT FACTOR X STUART-POWER: CPT | Performed by: INTERNAL MEDICINE

## 2023-11-22 PROCEDURE — 99000 SPECIMEN HANDLING OFFICE-LAB: CPT | Performed by: PATHOLOGY

## 2023-11-22 PROCEDURE — 36415 COLL VENOUS BLD VENIPUNCTURE: CPT | Performed by: PATHOLOGY

## 2023-11-22 NOTE — PROGRESS NOTES
ANTICOAGULATION MANAGEMENT     Shala Caruso 78 year old female is on warfarin with therapeutic result. (Goal Chromogenic Factor X 40-20%)    Recent labs: (last 7 days)     11/22/23  1243   [id number] 20*       ASSESSMENT     Source(s): Chart Review  Previous result was Therapeutic last visit; previously outside of goal range  Medication, diet, health changes since last result: chart reviewed; none identified       PLAN     Recommended plan for no diet, medication or health factor changes affecting result    Dosing Instructions: Continue your current warfarin dose 7.5 mg every Tues, Fri; 5 mg all other days  with next Chromogenic Factor X in 5 weeks       Detailed voice message left for Jeanmarie with dosing instructions and follow up date.   Sent QPD message with dosing and follow up instructions    Lab visit scheduled-previously scheduled, CFX added to lab note    Education provided:   Please call back if any changes to your diet, medications or how you've been taking warfarin  Goal range and lab monitoring: goal range and significance of current result and Importance of following up at instructed interval  Contact [phone] with any changes, questions or concerns.     Plan made per [facility] anticoagulation protocol    KYRA BRANTLEY RN  Anticoagulation Clinic  11/22/2023

## 2023-12-06 ENCOUNTER — LAB (OUTPATIENT)
Dept: LAB | Facility: CLINIC | Age: 78
End: 2023-12-06
Payer: COMMERCIAL

## 2023-12-06 DIAGNOSIS — E83.52 HYPERCALCEMIA: ICD-10-CM

## 2023-12-06 DIAGNOSIS — M32.9 SYSTEMIC LUPUS ERYTHEMATOSUS, UNSPECIFIED SLE TYPE, UNSPECIFIED ORGAN INVOLVEMENT STATUS (H): ICD-10-CM

## 2023-12-06 LAB
ALBUMIN UR-MCNC: NEGATIVE MG/DL
APPEARANCE UR: CLEAR
BACTERIA #/AREA URNS HPF: ABNORMAL /HPF
BILIRUB UR QL STRIP: NEGATIVE
COLOR UR AUTO: ABNORMAL
GLUCOSE UR STRIP-MCNC: NEGATIVE MG/DL
HGB UR QL STRIP: NEGATIVE
KETONES UR STRIP-MCNC: NEGATIVE MG/DL
LEUKOCYTE ESTERASE UR QL STRIP: NEGATIVE
NITRATE UR QL: POSITIVE
PH UR STRIP: 5.5 [PH] (ref 5–7)
RBC URINE: 1 /HPF
SP GR UR STRIP: 1.01 (ref 1–1.03)
UROBILINOGEN UR STRIP-MCNC: NORMAL MG/DL
WBC URINE: 6 /HPF

## 2023-12-06 PROCEDURE — 81001 URINALYSIS AUTO W/SCOPE: CPT | Performed by: PATHOLOGY

## 2023-12-08 ENCOUNTER — LAB (OUTPATIENT)
Dept: LAB | Facility: CLINIC | Age: 78
End: 2023-12-08
Payer: COMMERCIAL

## 2023-12-08 DIAGNOSIS — N39.0 URINARY TRACT INFECTION WITHOUT HEMATURIA, SITE UNSPECIFIED: ICD-10-CM

## 2023-12-09 ENCOUNTER — APPOINTMENT (OUTPATIENT)
Dept: LAB | Facility: CLINIC | Age: 78
End: 2023-12-09
Payer: COMMERCIAL

## 2023-12-09 LAB
CALCIUM 24H UR-MRATE: 0.38 G/SPEC (ref 0.1–0.3)
CALCIUM UR-MCNC: 16.3 MG/DL
COLLECT DURATION TIME UR: 24 H
COLLECT DURATION TIME UR: 24 H
CREAT 24H UR-MRATE: 0.92 G/SPEC (ref 0.72–1.51)
CREAT UR-MCNC: 39.6 MG/DL
SPECIMEN VOL UR: 2325 ML
SPECIMEN VOL UR: 2325 ML

## 2023-12-09 PROCEDURE — 81050 URINALYSIS VOLUME MEASURE: CPT | Performed by: INTERNAL MEDICINE

## 2023-12-09 PROCEDURE — 81050 URINALYSIS VOLUME MEASURE: CPT | Performed by: PATHOLOGY

## 2023-12-09 PROCEDURE — 82570 ASSAY OF URINE CREATININE: CPT | Performed by: PATHOLOGY

## 2023-12-09 PROCEDURE — 99000 SPECIMEN HANDLING OFFICE-LAB: CPT | Performed by: PATHOLOGY

## 2023-12-11 NOTE — RESULT ENCOUNTER NOTE
The calcium level in the urine remains minimally elevated.  You may continue to take 500 mg calcium daily.

## 2023-12-20 ENCOUNTER — LAB (OUTPATIENT)
Dept: LAB | Facility: CLINIC | Age: 78
End: 2023-12-20
Payer: COMMERCIAL

## 2023-12-20 DIAGNOSIS — E83.52 HYPERCALCEMIA: ICD-10-CM

## 2023-12-20 DIAGNOSIS — N39.0 URINARY TRACT INFECTION WITHOUT HEMATURIA, SITE UNSPECIFIED: ICD-10-CM

## 2023-12-20 DIAGNOSIS — I82.4Y9 DEEP VEIN THROMBOSIS (DVT) OF PROXIMAL LOWER EXTREMITY, UNSPECIFIED CHRONICITY, UNSPECIFIED LATERALITY (H): ICD-10-CM

## 2023-12-20 DIAGNOSIS — E78.5 HYPERLIPIDEMIA, UNSPECIFIED HYPERLIPIDEMIA TYPE: ICD-10-CM

## 2023-12-20 LAB
ALBUMIN SERPL BCG-MCNC: 4 G/DL (ref 3.5–5.2)
ALBUMIN UR-MCNC: NEGATIVE MG/DL
ANION GAP SERPL CALCULATED.3IONS-SCNC: 9 MMOL/L (ref 7–15)
APPEARANCE UR: CLEAR
BACTERIA #/AREA URNS HPF: ABNORMAL /HPF
BILIRUB UR QL STRIP: NEGATIVE
BUN SERPL-MCNC: 30.6 MG/DL (ref 8–23)
CA-I BLD-MCNC: 4.8 MG/DL (ref 4.4–5.2)
CALCIUM SERPL-MCNC: 9.4 MG/DL (ref 8.8–10.2)
CHLORIDE SERPL-SCNC: 102 MMOL/L (ref 98–107)
COLOR UR AUTO: ABNORMAL
CREAT SERPL-MCNC: 0.63 MG/DL (ref 0.51–0.95)
DEPRECATED HCO3 PLAS-SCNC: 28 MMOL/L (ref 22–29)
EGFRCR SERPLBLD CKD-EPI 2021: >90 ML/MIN/1.73M2
GLUCOSE SERPL-MCNC: 101 MG/DL (ref 70–99)
GLUCOSE UR STRIP-MCNC: NEGATIVE MG/DL
HGB UR QL STRIP: NEGATIVE
KETONES UR STRIP-MCNC: NEGATIVE MG/DL
LEUKOCYTE ESTERASE UR QL STRIP: NEGATIVE
NITRATE UR QL: NEGATIVE
PH UR STRIP: 6 [PH] (ref 5–7)
PHOSPHATE SERPL-MCNC: 4.1 MG/DL (ref 2.5–4.5)
POTASSIUM SERPL-SCNC: 3.8 MMOL/L (ref 3.4–5.3)
PTH-INTACT SERPL-MCNC: 33 PG/ML (ref 15–65)
RBC URINE: 1 /HPF
SODIUM SERPL-SCNC: 139 MMOL/L (ref 135–145)
SP GR UR STRIP: 1.01 (ref 1–1.03)
TRANSITIONAL EPI: <1 /HPF
UROBILINOGEN UR STRIP-MCNC: NORMAL MG/DL
WBC URINE: 2 /HPF

## 2023-12-20 PROCEDURE — 99000 SPECIMEN HANDLING OFFICE-LAB: CPT | Performed by: PATHOLOGY

## 2023-12-20 PROCEDURE — 80069 RENAL FUNCTION PANEL: CPT | Performed by: PATHOLOGY

## 2023-12-20 PROCEDURE — 83970 ASSAY OF PARATHORMONE: CPT | Performed by: PATHOLOGY

## 2023-12-20 PROCEDURE — 82330 ASSAY OF CALCIUM: CPT | Performed by: PATHOLOGY

## 2023-12-20 PROCEDURE — 85260 CLOT FACTOR X STUART-POWER: CPT | Performed by: INTERNAL MEDICINE

## 2023-12-20 PROCEDURE — 81001 URINALYSIS AUTO W/SCOPE: CPT | Performed by: PATHOLOGY

## 2023-12-20 PROCEDURE — 36415 COLL VENOUS BLD VENIPUNCTURE: CPT | Performed by: PATHOLOGY

## 2023-12-21 ENCOUNTER — ANTICOAGULATION THERAPY VISIT (OUTPATIENT)
Dept: ANTICOAGULATION | Facility: CLINIC | Age: 78
End: 2023-12-21
Payer: COMMERCIAL

## 2023-12-21 DIAGNOSIS — Z79.01 LONG TERM CURRENT USE OF ANTICOAGULANT THERAPY: Primary | ICD-10-CM

## 2023-12-21 DIAGNOSIS — I82.4Y9 DEEP VEIN THROMBOSIS (DVT) OF PROXIMAL LOWER EXTREMITY, UNSPECIFIED CHRONICITY, UNSPECIFIED LATERALITY (H): ICD-10-CM

## 2023-12-21 DIAGNOSIS — D68.61 ANTIPHOSPHOLIPID SYNDROME (H): ICD-10-CM

## 2023-12-21 LAB — FACT X ACT/NOR PPP CHRO: 26 % (ref 70–130)

## 2023-12-21 NOTE — PROGRESS NOTES
ANTICOAGULATION MANAGEMENT     Shala Caruso 78 year old female is on warfarin with therapeutic result. (Goal Chromogenic Factor X 40-20%)    Recent labs: (last 7 days)     12/20/23  1622   [id number] 26*       ASSESSMENT     Source(s): Chart Review and Patient/Caregiver Call     Warfarin doses taken: Warfarin taken as instructed  Diet: No new diet changes identified  Medication/supplement changes: None noted  New illness, injury, or hospitalization: No  Signs or symptoms of bleeding or clotting: No  Previous result: Therapeutic last 2(+) visits  Additional findings: None     PLAN     Recommended plan for no diet, medication or health factor changes affecting result    Dosing Instructions: Continue your current warfarin dose 7.5mg Tuesday/Friday, 5mg ROW  with next Chromogenic Factor X in 4 weeks       Detailed voice message left for Jeanmarie with dosing instructions and follow up date.     Contact [phone] to schedule and with any changes, questions or concerns.     Education provided:   Contact [phone] with any changes, questions or concerns.     Plan made per [facility] anticoagulation protocol    Siria Cook RN  Anticoagulation Clinic  12/21/2023

## 2023-12-30 ENCOUNTER — LAB (OUTPATIENT)
Dept: LAB | Facility: CLINIC | Age: 78
End: 2023-12-30
Attending: INTERNAL MEDICINE
Payer: COMMERCIAL

## 2023-12-30 ENCOUNTER — MYC MEDICAL ADVICE (OUTPATIENT)
Dept: INTERNAL MEDICINE | Facility: CLINIC | Age: 78
End: 2023-12-30

## 2023-12-30 DIAGNOSIS — E10.649 TYPE 1 DIABETES MELLITUS WITH HYPOGLYCEMIA AND WITHOUT COMA (H): ICD-10-CM

## 2023-12-30 DIAGNOSIS — E78.5 HYPERLIPIDEMIA, UNSPECIFIED HYPERLIPIDEMIA TYPE: ICD-10-CM

## 2023-12-30 DIAGNOSIS — E78.5 HYPERLIPIDEMIA, UNSPECIFIED HYPERLIPIDEMIA TYPE: Primary | ICD-10-CM

## 2023-12-30 DIAGNOSIS — I82.4Y9 DEEP VEIN THROMBOSIS (DVT) OF PROXIMAL LOWER EXTREMITY, UNSPECIFIED CHRONICITY, UNSPECIFIED LATERALITY (H): ICD-10-CM

## 2023-12-30 LAB — HOLD SPECIMEN: NORMAL

## 2023-12-30 PROCEDURE — 36415 COLL VENOUS BLD VENIPUNCTURE: CPT | Performed by: PATHOLOGY

## 2023-12-30 PROCEDURE — 80061 LIPID PANEL: CPT | Performed by: PATHOLOGY

## 2023-12-30 RX ORDER — PEN NEEDLE, DIABETIC 32GX 5/32"
NEEDLE, DISPOSABLE MISCELLANEOUS
Qty: 400 EACH | Refills: 3 | Status: SHIPPED | OUTPATIENT
Start: 2023-12-30

## 2023-12-30 NOTE — TELEPHONE ENCOUNTER
Pen needle  32G X 4 MM miscellaneous     400 each 1 8/3/2023     8/7/2023  [facility] [place]      Dorita Cramer MD  Endocrinology, Diabetes, and Metabolism     Nv:  2/19/24

## 2024-01-02 DIAGNOSIS — E78.5 HYPERLIPIDEMIA, UNSPECIFIED HYPERLIPIDEMIA TYPE: Primary | ICD-10-CM

## 2024-01-02 LAB
CHOLEST SERPL-MCNC: 166 MG/DL
HDLC SERPL-MCNC: 71 MG/DL
LDLC SERPL CALC-MCNC: 83 MG/DL
NONHDLC SERPL-MCNC: 95 MG/DL
TRIGL SERPL-MCNC: 58 MG/DL

## 2024-01-05 ENCOUNTER — OFFICE VISIT (OUTPATIENT)
Dept: INTERNAL MEDICINE | Facility: CLINIC | Age: 79
End: 2024-01-05
Payer: COMMERCIAL

## 2024-01-05 VITALS
HEART RATE: 64 BPM | SYSTOLIC BLOOD PRESSURE: 104 MMHG | WEIGHT: 109.5 LBS | HEIGHT: 65 IN | OXYGEN SATURATION: 100 % | DIASTOLIC BLOOD PRESSURE: 68 MMHG | BODY MASS INDEX: 18.24 KG/M2

## 2024-01-05 DIAGNOSIS — E78.49 OTHER HYPERLIPIDEMIA: ICD-10-CM

## 2024-01-05 DIAGNOSIS — Z79.01 LONG TERM CURRENT USE OF ANTICOAGULANT THERAPY: ICD-10-CM

## 2024-01-05 DIAGNOSIS — K22.0 ACHALASIA OF ESOPHAGUS: ICD-10-CM

## 2024-01-05 DIAGNOSIS — D68.61 ANTIPHOSPHOLIPID SYNDROME (H): ICD-10-CM

## 2024-01-05 DIAGNOSIS — E10.649 TYPE 1 DIABETES MELLITUS WITH HYPOGLYCEMIA AND WITHOUT COMA (H): Primary | ICD-10-CM

## 2024-01-05 PROCEDURE — 99397 PER PM REEVAL EST PAT 65+ YR: CPT | Performed by: INTERNAL MEDICINE

## 2024-01-05 RX ORDER — WARFARIN SODIUM 5 MG/1
TABLET ORAL
Qty: 135 TABLET | Refills: 4 | Status: SHIPPED | OUTPATIENT
Start: 2024-01-05

## 2024-01-05 RX ORDER — SIMVASTATIN 40 MG
40 TABLET ORAL AT BEDTIME
Qty: 90 TABLET | Refills: 3 | Status: SHIPPED | OUTPATIENT
Start: 2024-01-05

## 2024-01-05 RX ORDER — NIFEDIPINE 30 MG/1
TABLET, EXTENDED RELEASE ORAL
Qty: 180 TABLET | Refills: 3 | Status: SHIPPED | OUTPATIENT
Start: 2024-01-05

## 2024-01-05 NOTE — PROGRESS NOTES
Medicare Annual Wellness Questionnaire:  This 78 year old year old female presents for a Medicare Wellness Exam.    Fall Risk Assessment:  Have you fallen 2 or more times in the last year?  No    How many times were you injured due to a fall in the last year?  none    PHQ-2:  Over the last 2 weeks, how often have you been bothered by feeling down, depressed, or hopeless?  Not at all (0)     Over the last 2 weeks, how often have you had little interest or pleasure in doing things?  Not at all (0)     Social History:  What is your marital status?      Who lives in your household?   and I    Does your home have loose rugs in the hallway:     Yes     Does your home have grab bars in the bathroom:    No    Does your home have handrails on the stairs?  Yes     Does your home have poorly lit areas?    No    Do you feel threatened or controlled by a partner, ex-partner or anyone in your life?   No    Has anyone hurt you physically, for example by pushing, hitting, slapping or kicking you or forcing you to have sex?   No    Do you need help with the phone, transportation, shopping, preparing meals, housework, laundry, medications or managing money?   No    Sexual Health:  Are you sexually active?    No    If yes, with men, women, or both?       If yes, how many partners?  1    If yes, are you using condoms?    No    Have you had any sexually transmitted infections in the last year?   No    Do you have any sexual concerns?    No    Women Only:  Women: What year did you stop having periods (approximate age)?  2000    Women: Any vaginal bleeding in the last year?    No    Women: Have you ever had an abnormal Pap smear?    No    General Health Assessment:  Have you noticed any hearing difficulties?       Do you wear hearing aids?   No    Have you seen a hearing professional such as an audiologist in the last 1 year?   No    Do you have vision difficulty?    Yes     Do you wear glasses or contacts?   Yes     Have  you seen an eye doctor in the last 1 year?   Yes     How many servings of fruits and vegetables do you eat a day?  Fruit: none  Vegetables: 2-3    How often do you exercise in a week?  varies    How long and what kind of exercise do you do?  Mostly walking    Tobacco and Alcohol History:  Do you use tobacco/nicotine products?    No    If yes, please list the method of use and average weekly consumption?  N/A    Do you use any other drugs?   No         Do you drink alcohol?   No    If you drink alcohol, how many drinks per week?  N/A    Advanced Directive:  Have you completed an Advance Directives document?  No    If yes, have you given a copy to the clinic?       Do you need information on Advance Directives?   Yes     Martha Castano, EMT at 2:37 PM on 1/5/2024

## 2024-01-05 NOTE — PROGRESS NOTES
Jeanmarie is a 78 year old that presents in clinic today for the following:     Chief Complaint   Patient presents with    Physical    Refill Request           1/5/2024     1:53 PM   Additional Questions   Roomed by Martha Castano   Accompanied by N/A       Screenings as of today     PHQ-2 Total Score (Adult) - Positive if 3 or more points; Administer   PHQ-9 if positive 0        Martha Castano at 2:03 PM on 1/5/2024      HPI  78-year-old returns today for Medicare wellness visit she has been doing quite well.  She is tolerating medications well she has had no side effects or effects on this she needs reauthorization for her Coumadin is also looking for repeat refill of her compression stockings.  He has not been exercising much recently and we discussed the importance on doing this regularly.  Otherwise she is eating healthy and does not use alcohol or tobacco is sleeping well and is fully vaccinated.  Past Medical History:   Diagnosis Date    Achalasia     Antiphospholipid syndrome (H24)     Antiplatelet or antithrombotic long-term use     Coagulation disorder (H24) [id number]    DM (diabetes mellitus) (H)     DVT (deep venous thrombosis) (H) 2003    and PE    Dysphagia     occasional, use Nifedipine    GERD (gastroesophageal reflux disease)     Hyperlipidemia     Lymphedema     Myopia     Neuropathy     toes bilateral    Nonsenile cataract     osteoporosis     Pericarditis     Raynaud's disease     SLE (systemic lupus erythematosus) (H)      Past Surgical History:   Procedure Laterality Date    CATARACT IOL, RT/LT Left 02/2019    CATARACT IOL, RT/LT Right 01/2019    COLONOSCOPY N/A 11/28/2016    Procedure: COLONOSCOPY;  Surgeon: Sang August MD;  Location: [facility]    CYSTOSCOPY, SLING TRANSVAGINAL N/A 12/20/2016    Procedure: CYSTOSCOPY, SLING TRANSVAGINAL;  Surgeon: Jeanmarie Pierson MD;  Location: [facility]    DISSECT LYMPH NODE AXILLA  2004    Lt, during eval for SLE    HYSTEROSCOPIC PLACEMENT CONTRACEPTIVE DEVICE   "1985    PHACOEMULSIFICATION WITH STANDARD INTRAOCULAR LENS IMPLANT Right 1/8/2019    Procedure: Right Eye Cataract Extraction with Intraocular Lens;  Surgeon: Monika Fermin MD;  Location: [facility]    PHACOEMULSIFICATION WITH STANDARD INTRAOCULAR LENS IMPLANT Left 2/5/2019    Procedure: Left Eye Cataract Extraction with Intraocular Lens;  Surgeon: Monika Fermin MD;  Location: [facility]    TUBAL LIGATION Bilateral      Family History   Problem Relation Age of Onset    Cancer Other         breast    Cerebrovascular Disease Other     C.A.D. Other     Glaucoma Father     Macular Degeneration No family hx of          Exam:  /68 (BP Location: Right arm, Patient Position: Sitting, Cuff Size: Adult Small)   Pulse 64   Ht 1.662 m (5' 5.43\")   Wt 49.7 kg (109 lb 8 oz)   LMP  (LMP Unknown)   SpO2 100%   BMI 17.98 kg/m    109 lbs 8 oz  PHYSICAL EXAMINATION:   The patient is alert, oriented with a clear sensorium.   Skin shows no lesions or rashes and good turgor.   Head is normocephalic and atraumatic.   Eyes show PERRLA  Ears show normal TMs bilaterally.   Mouth shows clear oral mucosa.   Neck shows no nodes, thyromegaly or bruits.   Back is nontender.   Lungs are clear to percussion and auscultation.   Heart shows normal S1 and S2 without murmur or gallop.   Abdomen is soft, nontender without masses or organomegaly.   Extremities show no edema and no evidence of active synovitis.   Neurologic examination shows cranial nerves II-XII intact. Motor shows normal strength. Reflexes are full and symmetrical.       ASSESSMENT  1 Venous insufficiency with history DVT  2 diabetes mellitus well controlled on Novalog  3 hypertension well controlled on nifedipine  4 hyperlipidemia well controlled on simvastatin  5 osteoporosis on Reclast  6 SLE with Raynaud's   7 antiphospholipid antibodies on warfarin  8 peripheral neuropathy   9 bilateral osteoarthritis of the knees symptomatic  10 Achalasia on nifedipine  11 " GERD      Plan  Recent labs reviewed do not need to be repeated we will continue on the same medications and have her follow-up for reassessment in a year   note was completed using Dragon voice recognition software.      Joe Contreras MD  General Internal Medicine  [facility]  [phone]

## 2024-01-11 ENCOUNTER — DOCUMENTATION ONLY (OUTPATIENT)
Dept: INTERNAL MEDICINE | Facility: CLINIC | Age: 79
End: 2024-01-11
Payer: COMMERCIAL

## 2024-01-11 DIAGNOSIS — E10.649 TYPE 1 DIABETES MELLITUS WITH HYPOGLYCEMIA AND WITHOUT COMA (H): ICD-10-CM

## 2024-01-15 RX ORDER — CALCIUM CITRATE/VITAMIN D3 200MG-6.25
TABLET ORAL
Qty: 500 STRIP | Refills: 2 | Status: SHIPPED | OUTPATIENT
Start: 2024-01-15 | End: 2024-08-21

## 2024-01-15 NOTE — TELEPHONE ENCOUNTER
blood glucose (TRUE METRIX BLOOD GLUCOSE TEST) test strip 500 strip 1 8/3/2023     Last Office Visit: 8/7/23  Future Office visit:  2/19/24     Refill protocol passed  Heather Lowery RN

## 2024-01-18 ENCOUNTER — LAB (OUTPATIENT)
Dept: LAB | Facility: CLINIC | Age: 79
End: 2024-01-18
Payer: COMMERCIAL

## 2024-01-18 DIAGNOSIS — I82.4Y9 DEEP VEIN THROMBOSIS (DVT) OF PROXIMAL LOWER EXTREMITY, UNSPECIFIED CHRONICITY, UNSPECIFIED LATERALITY (H): ICD-10-CM

## 2024-01-18 PROCEDURE — 36415 COLL VENOUS BLD VENIPUNCTURE: CPT | Performed by: PATHOLOGY

## 2024-01-18 PROCEDURE — 85260 CLOT FACTOR X STUART-POWER: CPT | Performed by: INTERNAL MEDICINE

## 2024-01-18 PROCEDURE — 99000 SPECIMEN HANDLING OFFICE-LAB: CPT | Performed by: PATHOLOGY

## 2024-01-19 ENCOUNTER — ANTICOAGULATION THERAPY VISIT (OUTPATIENT)
Dept: ANTICOAGULATION | Facility: CLINIC | Age: 79
End: 2024-01-19
Payer: COMMERCIAL

## 2024-01-19 DIAGNOSIS — I82.4Y9 DEEP VEIN THROMBOSIS (DVT) OF PROXIMAL LOWER EXTREMITY, UNSPECIFIED CHRONICITY, UNSPECIFIED LATERALITY (H): ICD-10-CM

## 2024-01-19 DIAGNOSIS — Z79.01 LONG TERM CURRENT USE OF ANTICOAGULANT THERAPY: Primary | ICD-10-CM

## 2024-01-19 DIAGNOSIS — L93.0 LUPUS ERYTHEMATOSUS: ICD-10-CM

## 2024-01-19 DIAGNOSIS — D68.61 ANTIPHOSPHOLIPID SYNDROME (H): ICD-10-CM

## 2024-01-19 LAB — FACT X ACT/NOR PPP CHRO: 28 % (ref 70–130)

## 2024-01-19 NOTE — PROGRESS NOTES
ANTICOAGULATION MANAGEMENT     Shala Caruso 78 year old female is on warfarin with therapeutic result. (Goal Chromogenic Factor X 40-20%)    Recent labs: (last 7 days)     01/18/24  1244   [id number] 28*       ASSESSMENT     Source(s): Chart Review and Patient/Caregiver Call     Warfarin doses taken: Warfarin taken as instructed  Diet: No new diet changes identified  Medication/supplement changes: None noted  New illness, injury, or hospitalization: No  Signs or symptoms of bleeding or clotting: No  Previous result: Therapeutic last 2(+) visits  Additional findings: None     PLAN     Recommended plan for no diet, medication or health factor changes affecting result    Dosing Instructions: Continue your current warfarin dose 7.5 mg Tues, Fri and 5 mg ROW  with next Chromogenic Factor X in 4 weeks       Telephone call with Jeanmarie who verbalizes understanding and agrees to plan    Lab visit scheduled    Education provided:   Please call back if any changes to your diet, medications or how you've been taking warfarin    Plan made per [facility] anticoagulation protocol    Sharmin Smith, RN  Anticoagulation Clinic  1/19/2024

## 2024-01-30 ENCOUNTER — MEDICAL CORRESPONDENCE (OUTPATIENT)
Dept: HEALTH INFORMATION MANAGEMENT | Facility: CLINIC | Age: 79
End: 2024-01-30
Payer: COMMERCIAL

## 2024-02-15 ENCOUNTER — LAB (OUTPATIENT)
Dept: LAB | Facility: CLINIC | Age: 79
End: 2024-02-15
Payer: COMMERCIAL

## 2024-02-15 DIAGNOSIS — E10.649 TYPE 1 DIABETES MELLITUS WITH HYPOGLYCEMIA AND WITHOUT COMA (H): ICD-10-CM

## 2024-02-15 DIAGNOSIS — I82.4Y9 DEEP VEIN THROMBOSIS (DVT) OF PROXIMAL LOWER EXTREMITY, UNSPECIFIED CHRONICITY, UNSPECIFIED LATERALITY (H): ICD-10-CM

## 2024-02-15 LAB
CA-I BLD-MCNC: 5.6 MG/DL (ref 4.4–5.2)
CREAT UR-MCNC: 98.6 MG/DL
MICROALBUMIN UR-MCNC: <12 MG/L
MICROALBUMIN/CREAT UR: NORMAL MG/G{CREAT}

## 2024-02-15 PROCEDURE — 99000 SPECIMEN HANDLING OFFICE-LAB: CPT | Performed by: PATHOLOGY

## 2024-02-15 PROCEDURE — 82043 UR ALBUMIN QUANTITATIVE: CPT | Performed by: INTERNAL MEDICINE

## 2024-02-15 PROCEDURE — 36415 COLL VENOUS BLD VENIPUNCTURE: CPT | Performed by: PATHOLOGY

## 2024-02-15 PROCEDURE — 82330 ASSAY OF CALCIUM: CPT | Performed by: PATHOLOGY

## 2024-02-15 PROCEDURE — 85260 CLOT FACTOR X STUART-POWER: CPT | Performed by: INTERNAL MEDICINE

## 2024-02-16 ENCOUNTER — ANTICOAGULATION THERAPY VISIT (OUTPATIENT)
Dept: ANTICOAGULATION | Facility: CLINIC | Age: 79
End: 2024-02-16
Payer: COMMERCIAL

## 2024-02-16 DIAGNOSIS — D68.61 ANTIPHOSPHOLIPID SYNDROME (H): ICD-10-CM

## 2024-02-16 DIAGNOSIS — L93.0 LUPUS ERYTHEMATOSUS: ICD-10-CM

## 2024-02-16 DIAGNOSIS — Z79.01 LONG TERM CURRENT USE OF ANTICOAGULANT THERAPY: Primary | ICD-10-CM

## 2024-02-16 DIAGNOSIS — I82.4Y9 DEEP VEIN THROMBOSIS (DVT) OF PROXIMAL LOWER EXTREMITY, UNSPECIFIED CHRONICITY, UNSPECIFIED LATERALITY (H): ICD-10-CM

## 2024-02-16 LAB — FACT X ACT/NOR PPP CHRO: 23 % (ref 70–130)

## 2024-02-16 NOTE — PROGRESS NOTES
ANTICOAGULATION MANAGEMENT     Shala Caruso 78 year old female is on warfarin with therapeutic result. (Goal Chromogenic Factor X 40-20%)    Recent labs: (last 7 days)     02/15/24  1318   [id number] 23*       ASSESSMENT     Source(s): Chart Review and Patient/Caregiver Call     Warfarin doses taken: Warfarin taken as instructed  Diet: No new diet changes identified  Medication/supplement changes: None noted  New illness, injury, or hospitalization: No  Signs or symptoms of bleeding or clotting: No  Previous result: Therapeutic last 2(+) visits  Additional findings: None     PLAN     Recommended plan for no diet, medication or health factor changes affecting result    Dosing Instructions: Continue your current warfarin dose 7.5mg Tue Fri and 5mg all other days  with next Chromogenic Factor X in 4 weeks       Telephone call with Jeanmarie who verbalizes understanding and agrees to plan and who agrees to plan and repeated back plan correctly    Lab visit scheduled    Education provided:   Taking warfarin: Importance of taking warfarin as instructed  Goal range and lab monitoring: goal range and significance of current result and Importance of therapeutic range    Plan made per [facility] anticoagulation protocol    Stacey Beal RN  Anticoagulation Clinic  2/16/2024

## 2024-02-19 ENCOUNTER — OFFICE VISIT (OUTPATIENT)
Dept: ENDOCRINOLOGY | Facility: CLINIC | Age: 79
End: 2024-02-19
Payer: COMMERCIAL

## 2024-02-19 VITALS
SYSTOLIC BLOOD PRESSURE: 115 MMHG | OXYGEN SATURATION: 100 % | HEART RATE: 68 BPM | DIASTOLIC BLOOD PRESSURE: 76 MMHG | WEIGHT: 107 LBS | HEIGHT: 64 IN | BODY MASS INDEX: 18.27 KG/M2

## 2024-02-19 DIAGNOSIS — E10.649 TYPE 1 DIABETES MELLITUS WITH HYPOGLYCEMIA AND WITHOUT COMA (H): Primary | ICD-10-CM

## 2024-02-19 DIAGNOSIS — M81.0 OSTEOPOROSIS, POSTMENOPAUSAL: ICD-10-CM

## 2024-02-19 DIAGNOSIS — E83.52 HYPERCALCEMIA: ICD-10-CM

## 2024-02-19 LAB — HBA1C MFR BLD: 5.5 %

## 2024-02-19 PROCEDURE — 83036 HEMOGLOBIN GLYCOSYLATED A1C: CPT | Performed by: PATHOLOGY

## 2024-02-19 PROCEDURE — 99214 OFFICE O/P EST MOD 30 MIN: CPT | Performed by: INTERNAL MEDICINE

## 2024-02-19 ASSESSMENT — PAIN SCALES - GENERAL: PAINLEVEL: NO PAIN (0)

## 2024-02-19 NOTE — PATIENT INSTRUCTIONS
Food Calcium in milligrams   Milk (skim, 2%, or whole; 8 oz [240 mL]) 300   Yogurt (6 oz [168 g]) 250   Orange juice (with calcium; 8 oz [240 mL]) 300   Tofu with calcium (0.5 cup [113 g]) 435   Cheese (1 oz [28 g]) 195 to 335 (hard cheese = higher calcium)   Cottage cheese (0.5 cup [113 g]) 130   Ice cream or frozen yogurt (0.5 cup [113 g]) 100   Non-dairy milks (soy, oat, almond; 8 oz [240 mL]) 300 to 450   Beans (0.5 cup cooked [113 g]) 60 to 80   Dark, leafy green vegetables (0.5 cup cooked [113 g]) 50 to 135   Almonds (24 whole) 70   Orange (1 medium) 60     Total daily dose of calcium from food products and/or supplements should be ~ 800-1000 mg.

## 2024-02-19 NOTE — PROGRESS NOTES
Assessment and Plan:    Jeanmarie is a 78 year old pleasant female with hx of SLE, APLS, DVT, osteoporosis and type 1 diabetes presenting for f/up.    1) Type 1 diabetes, formally diagnosed in 2010 while being evaluated for steroid-induced hyperglycemia.  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    Overall, she maintains a good glycemic control by using small dosages of short acting insulin based on the carbohydrate intake.     2) Osteoporosis, now osteopenia  Risk factors for osteoporosis include postmenopausal status, lupus, prior treatment with steroids, diabetes, fam history.   She was treated with Boniva for 3 years, followed by Forteo for 2 years. Received one dose of Reclast in July 2014, when she completed treatment with Forteo.  Treatment with Reclast was resumed in July 2020, when the DEXA scan revealed some loss of bone mineral density, although the lowest T score remained in the osteopenic range.  She received subsequent Reclast dosages in in 2021 and 2023. Most recent DXA from 8/23 from showed improvement of bone mineral density at the average hip.  Plan:   Walking, weightbearing exercises encouraged  Schedule a 4th dose of Reclast in 8/2024 and a f/up DXA scan in 8/2025     3) Hypercalcemia, mild   As long as the calcium level remains mildly elevated, we can continue to monitor.  Lab work is suggestive of primary hyperparathyroidism.    Plan:   Try to have a daily calcium intake from food products and/or supplements of 800-1000 mg   Maintain a good hydration by drinking 6 cups of liquids daily   Follow-up BMP, phosphorus, albumin, vitamin D, PTH and 24-hour urine calcium in 6 months.    Orders Placed This Encounter   Procedures    GLUCOSE MONITOR, 72 HOUR, PHYS INTERP    AFINION HEMOGLOBIN A1C POCT    25 Hydroxyvitamin D2 and D3    Phosphorus    Parathyroid Hormone Intact    Albumin level    Basic metabolic panel    Calcium timed urine    Creatinine timed urine      ============================================================================================================================================  Jeanmarie is a 78 year old pleasant female with hx of SLE, APLS, DVT, osteoporosis and type 1 diabetes presenting for f/up.   Her last clinic visit was on 8/7/2023.     Interval history:    1. Type 1 diabetes  Lantus was discontinued and in 2020 and her diabetes is now managed only with NovoLog.    On average, she reports taking 1-2.5 units per meal. In general she has 2 meals a day, lunch and dinner.     Most recent A1c was 5.4, in 8/2023. It was 5.5% today.   Weight is down 20 lbs in the last year. Feels good. Appetite OK.  She has changed her diet to account for the clotting disorder she has.     Diabetes complications:   No h/o microalbuminuria.  Most recent urine microalbumin negative in 2/24. GFR lower at 53 on labs from 5/23.   Last eye exam -September 2023. No DR. S/P cataract surgery.  Numbness and tingling sensation B/L toes - for many years but light sensation is intact. She does wear comfortable shoes/insoles. 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  She develops confusion, inability to concentrate or focus her vision and she feels extremely tired when her blood sugar is the 60s or 50s.  She has no prior episodes of loss of consciousness due to hypoglycemia.  She does not always recognize the lows.   She does have Baqsimi at home.  Most recent lipid panel from 12/30/23: LDL cholesterol 83, HDL cholesterol 71, triglycerides 58.  On 40 mg simvastatin daily.  Exercise: mainly walking     I reviewed the Siddharth data:  On the Siddharth sensor, average glucose is 94, with a glucose variability of 16%, corresponding to a GMI of 5.6%.  97% of the glucose numbers are within target, 1% are in the mild hypoglycemic range and 2% are below 54.  Most of the hypoglycemic episodes tend to occur between 3 and 6 AM. The patient is completely asymptomatic.     2.  Osteoporosis  Jeanmarie also has a history of osteoporosis, treated with Boniva for almost 3 years, followed by Forteo which was started in 4/12 - interrupted treatment from 4/2013 and restarted in 7/2013 until July 2014. After she completed the treatment with Forteo, she received one dose of Reclast, in July 2014.       She has no history of prior bone fractures.  She's been off prednisone since 2013.  Her height has decreased over the years from 5'6'' to 5'1/2''. Her mother had a hip fracture in her 80s.      DXA 7/1/16:  L1-L3 T score was - 1.  Overall, at the spine, there was a significant increase of bone mineral density since 2005 of 10.5%. Since 2015, the bone mineral density has remained stable at spine. The lowest T score at the hip level was -2.2, at the left femoral neck.  Overall, there was a significant improvement of bone mineral density at the mean hip of 8.9% since 2005, with no significant changes since 2015. While the bone mineral density at the left hip increased since baseline, at the right hip, there was a decrease of bone mineral density since baseline and also, since prior year.     DXA 7/27/18:   The lowest T score was -2.1, at the left femoral neck.  Compared with the prior scan from 2016, the bone mineral density at the hips remained stable.  At the lumbar spine, L1-L3 T score was -1.3, not significantly changed since 2016.     DXA 1/2020:  L2-L4 T score was -1.5. At the hips, the lowest score was -2.3, at the left femoral neck. Compared with the prior DEXA scan from 2018, there was a significant decrease in bone mineral density of the lumbar spine, left total hip and right total hip by 3.1, 4.7 and 3.5%, respectively.  The patient received zoledronic acid infusion on 7/30/2020 and 7/27/21.     DXA 6/16/2022:  T score: -0.9 at L1-L4, -0.3 at 33% distal radius, -1.7 at both right and left femoral necks, -1.8 at the left total hip and -1.9 at the right total hip.  At the radius and average hip,  the bone mineral density remained stable since 2020.  At the spine, there was a significant increase of 4.5%.    DXA 8/1/23:  L2-L4 T score -1.4  Left femoral neck T score -1.8, total L hip T score -1.5  Right femoral neck T score -1.5, total R hip T score -1.6  Bone density compared to the prior study has increased at the mean total femur by +4.4%.  A third dose of Reclast was administered on 8/29/23.      3. Hypercalcemia   The patient developed mild hypercalcemia in 2023.  The mildly elevated calcium from lab work from March 2023 was associated with a normal PTH of 34, normal phosphorus at 4.2, normal vitamin D level at 63.  24-hour urinary calcium from 6/5/2023 revealed an elevated urinary calcium of 0.34 g per 24 hours, with a normal urinary creatinine.    8/7/23 PTH 35, calcium 10.9, phos 3.5, albumin 4.5, vitamin D 56   12/20/23 PTH 33, calcium 9.4, phos 4.1, albumin 4, GFR >90    2/15/24 ionized calcium 5.6   2/15/24 24 hrs urinary calcium 0.38 g, creatinine 0.92 g   One cup of almond milk daily and 1-2 servings of dairies daily.   In terms of calcium and vitamin D supplements, she reports taking 500 mg calcium as a oyster shell.  She has been taking 1000 U vitamin D daily.   In a regular day, she drinks 4-5 cups of liquids daily.      Past Medical History  Past Medical History:   Diagnosis Date    Achalasia     Antiphospholipid syndrome (H24)     Antiplatelet or antithrombotic long-term use     Coagulation disorder (H24) 9604-4325    DM (diabetes mellitus) (H)     DVT (deep venous thrombosis) (H) 2003    and PE    Dysphagia     occasional, use Nifedipine    GERD (gastroesophageal reflux disease)     Hyperlipidemia     Lymphedema     Myopia     Neuropathy     toes bilateral    Nonsenile cataract     osteoporosis     Pericarditis     Raynaud's disease     SLE (systemic lupus erythematosus) (H)        Past Surgical History  Past Surgical History:   Procedure Laterality Date    CATARACT IOL, RT/LT Left 02/2019     CATARACT IOL, RT/LT Right 01/2019    COLONOSCOPY N/A 11/28/2016    Procedure: COLONOSCOPY;  Surgeon: Sang August MD;  Location: [facility]    CYSTOSCOPY, SLING TRANSVAGINAL N/A 12/20/2016    Procedure: CYSTOSCOPY, SLING TRANSVAGINAL;  Surgeon: Jeanmarie Pierson MD;  Location: [facility]    DISSECT LYMPH NODE AXILLA  2004    Lt, during eval for SLE    HYSTEROSCOPIC PLACEMENT CONTRACEPTIVE DEVICE  1985    PHACOEMULSIFICATION WITH STANDARD INTRAOCULAR LENS IMPLANT Right 1/8/2019    Procedure: Right Eye Cataract Extraction with Intraocular Lens;  Surgeon: Monika Fermin MD;  Location: [facility]    PHACOEMULSIFICATION WITH STANDARD INTRAOCULAR LENS IMPLANT Left 2/5/2019    Procedure: Left Eye Cataract Extraction with Intraocular Lens;  Surgeon: Monika Fermin MD;  Location: [facility] OR    TUBAL LIGATION Bilateral         Medications    Current Outpatient Medications:     acetaminophen (TYLENOL) 500 MG tablet, Take 1,000-1,500 mg by mouth nightly as needed , Disp: , Rfl:     aspirin 81 MG tablet, Take 81 mg by mouth At Bedtime , Disp: , Rfl:     AYR SALINE NASAL NO-DRIP GEL, Use as needed for nasal dryness, Disp: 3 Tube, Rfl: 3    blood glucose (TRUE METRIX BLOOD GLUCOSE TEST) test strip, USE TO TEST BLOOD SUGAR 5 TIMES DAILY OR AS DIRECTED, Disp: 500 strip, Rfl: 2    blood glucose monitoring (ULTRA THIN 30G) lancets, Test 5 times daily  [employer]  Super thin, Disp: 450 each, Rfl: 3    calcium carbonate (OS-GABBY) 500 MG TABS, Take 1 tablet (1,250 mg) by mouth daily, Disp: 90 tablet, Rfl: 3    cholecalciferol (VITAMIN D3) 25 mcg (1000 units) capsule, Take 1 capsule by mouth daily, Disp: , Rfl:     ciclopirox (PENLAC) 8 % external solution, Apply to adjacent skin and affected nails daily.  Remove with alcohol every 7 days, then repeat., Disp: 6.6 mL, Rfl: 11    Continuous Blood Gluc  (FREESTYLE SRI 2 READER) RORY, 1 each daily, Disp: 1 each, Rfl: 0    Continuous Blood Gluc Sensor (FREESTYLE SRI 2  "SENSOR) MISC, 1 each every 14 days, Disp: 6 each, Rfl: 3    Continuous Blood Gluc Sensor (FREESTYLE SRI 3 SENSOR) MISC, 1 each every 14 days, Disp: 6 each, Rfl: 4    Glucagon (BAQSIMI ONE PACK) 3 MG/DOSE POWD, Spray 3 mg in nostril as needed (to be used for severe hypoglycemic episodes), Disp: 1 each, Rfl: 4    Injection Device for insulin (NOVOPEN ECHO) RORY, 1 each 4 times daily, Disp: 1 each, Rfl: 0    insulin aspart (NOVOLOG PENFILL) 100 UNIT/ML cartridge, INJECT 1 UNIT PER 15 GRAMS OF CARBOHYDRATES UNDER THE SKIN THREE TIMES A DAY WITH MEALS. APPROXIMATELY 15 UNITS DAILY., Disp: 30 mL, Rfl: 1    insulin pen needle (BD PAM U/F) 32G X 4 MM miscellaneous, USE AS DIRECTED WITH INSULIN PENS 4 TIMES DAILY, Disp: 400 each, Rfl: 3    NIFEdipine ER OSMOTIC (PROCARDIA XL) 30 MG 24 hr tablet, 1-2 daily or dysphagia, Disp: 180 tablet, Rfl: 3    order for DME, Equipment being ordered: compression socks, 20-30 mmHg, knee high, Disp: 8 Piece, Rfl: 4    simvastatin (ZOCOR) 40 MG tablet, Take 1 tablet (40 mg) by mouth at bedtime, Disp: 90 tablet, Rfl: 3    UNABLE TO FIND, MEDICATION NAME: , Disp: , Rfl:     warfarin ANTICOAGULANT (JANTOVEN ANTICOAGULANT) 5 MG tablet, Take 1-1.5 tablets daily or as directed, Disp: 135 tablet, Rfl: 4    LMP  (LMP Unknown)   Wt Readings from Last 10 Encounters:   02/19/24 48.5 kg (107 lb)   01/05/24 49.7 kg (109 lb 8 oz)   08/07/23 51.3 kg (113 lb)   02/06/23 57.9 kg (127 lb 11.2 oz)   08/01/22 56.1 kg (123 lb 11.2 oz)   01/24/22 52.6 kg (116 lb)   07/30/20 52.6 kg (116 lb)   02/05/19 56.7 kg (125 lb)   01/08/19 54.1 kg (119 lb 4.8 oz)   07/30/18 54.1 kg (119 lb 3.2 oz)     /76 (BP Location: Right arm, Patient Position: Sitting, Cuff Size: Adult Regular)   Pulse 68   Ht 1.63 m (5' 4.17\")   Wt 48.5 kg (107 lb)   LMP  (LMP Unknown)   SpO2 100%   BMI 18.27 kg/m      Physical Examination:  General appearance: she is thin, no acute distress noted  Eyes: conjunctivae and " extraocular motions are normal. Pupils are equal, round, and reactive to light. No lid lag, no stare.  HENT: oropharynx moist; neck no JVD, no bruits, no thyromegaly, palpable 2-3 cm left ?carotid artery aneurism  Cardiovascular: regular rhythm, no murmurs, distal pulses palpable, mild L arm edema   Respiratory: chest clear, no rales, no rhonchi  Musculoskeletal: normal tone and strength   Neurologic: left knee reflex decreased, normal B/L bicipital reflexes, no resting tremor  Psychiatric: affect and judgment normal   Skin: nails onychomycosis  Feet: sensation preserved to monofilament testing, onychomycosis.     Endocrine Labs:  Lab Results   Component Value Date    A1C 5.7 (H) 01/04/2023    A1C 5.5 06/16/2022    A1C 5.7 (H) 02/23/2022    A1C 5.6 12/22/2021    A1C 5.4 07/07/2021    A1C 5.2 04/21/2021    A1C 5.6 01/20/2021    A1C 5.1 07/09/2020    A1C 5.3 07/11/2017    HEMOGLOBINA1 5.4 08/07/2023    HEMOGLOBINA1 5.1 01/13/2020    HEMOGLOBINA1 5.1 07/15/2019    HEMOGLOBINA1 5.0 07/30/2018    HEMOGLOBINA1 5.0 01/29/2018       Hemoglobin   Date Value Ref Range Status   08/11/2023 13.6 11.7 - 15.7 g/dL Final   04/29/2021 12.9 11.7 - 15.7 g/dL Final     Hematocrit   Date Value Ref Range Status   08/11/2023 41.9 35.0 - 47.0 % Final   04/29/2021 41.5 35.0 - 47.0 % Final     Cholesterol   Date Value Ref Range Status   12/30/2023 166 <200 mg/dL Final   07/07/2021 199 <200 mg/dL Final     Comment:     Desirable:       <200 mg/dl     Cholesterol/HDL Ratio   Date Value Ref Range Status   10/01/2014 1.5 0.0 - 5.0 Final     HDL Cholesterol   Date Value Ref Range Status   07/07/2021 108 >49 mg/dL Final     Direct Measure HDL   Date Value Ref Range Status   12/30/2023 71 >=50 mg/dL Final     LDL Cholesterol Calculated   Date Value Ref Range Status   12/30/2023 83 <=100 mg/dL Final   07/07/2021 79 <100 mg/dL Final     Comment:     Desirable:       <100 mg/dl     VLDL-Cholesterol   Date Value Ref Range Status   10/01/2014 7 0 - 30  mg/dL Final     Triglycerides   Date Value Ref Range Status   12/30/2023 58 <150 mg/dL Final   07/07/2021 58 <150 mg/dL Final     Albumin Urine mg/L   Date Value Ref Range Status   02/15/2024 <12.0 mg/L Final     Comment:     The reference ranges have not been established in urine albumin. The results should be integrated into the clinical context for interpretation.   06/16/2022 16 mg/L Final   07/07/2021 9 mg/L Final     TSH   Date Value Ref Range Status   06/07/2023 0.86 0.30 - 4.20 uIU/mL Final   08/01/2022 1.34 0.40 - 4.00 mU/L Final   07/09/2019 0.99 0.40 - 4.00 mU/L Final         Last Basic Metabolic Panel:    Sodium   Date Value Ref Range Status   12/20/2023 139 135 - 145 mmol/L Final     Comment:     Reference intervals for this test were updated on 09/26/2023 to more accurately reflect our healthy population. There may be differences in the flagging of prior results with similar values performed with this method. Interpretation of those prior results can be made in the context of the updated reference intervals.    07/07/2021 141 133 - 144 mmol/L Final     Potassium   Date Value Ref Range Status   12/20/2023 3.8 3.4 - 5.3 mmol/L Final   07/19/2022 4.2 3.4 - 5.3 mmol/L Final   07/07/2021 4.0 3.4 - 5.3 mmol/L Final     Chloride   Date Value Ref Range Status   12/20/2023 102 98 - 107 mmol/L Final   07/19/2022 108 94 - 109 mmol/L Final   07/07/2021 106 94 - 109 mmol/L Final     Calcium   Date Value Ref Range Status   12/20/2023 9.4 8.8 - 10.2 mg/dL Final   07/07/2021 9.1 8.5 - 10.1 mg/dL Final     Carbon Dioxide   Date Value Ref Range Status   07/07/2021 27 20 - 32 mmol/L Final     Carbon Dioxide (CO2)   Date Value Ref Range Status   12/20/2023 28 22 - 29 mmol/L Final   07/19/2022 28 20 - 32 mmol/L Final     Urea Nitrogen   Date Value Ref Range Status   12/20/2023 30.6 (H) 8.0 - 23.0 mg/dL Final   07/19/2022 22 7 - 30 mg/dL Final   07/07/2021 21 7 - 30 mg/dL Final     Creatinine   Date Value Ref Range Status    12/20/2023 0.63 0.51 - 0.95 mg/dL Final   07/07/2021 0.71 0.52 - 1.04 mg/dL Final     GFR Estimate   Date Value Ref Range Status   12/20/2023 >90 >60 mL/min/1.73m2 Final   07/07/2021 84 >60 mL/min/[1.73_m2] Final     Comment:     Non  GFR Calc  Starting 12/18/2018, serum creatinine based estimated GFR (eGFR) will be   calculated using the Chronic Kidney Disease Epidemiology Collaboration   (CKD-EPI) equation.       Glucose   Date Value Ref Range Status   12/20/2023 101 (H) 70 - 99 mg/dL Final   07/19/2022 109 (H) 70 - 99 mg/dL Final   07/07/2021 110 (H) 70 - 99 mg/dL Final       AST   Date Value Ref Range Status   01/04/2023 20 10 - 35 U/L Final   07/07/2021 14 0 - 45 U/L Final     ALT   Date Value Ref Range Status   01/04/2023 21 10 - 35 U/L Final   07/07/2021 23 0 - 50 U/L Final     Albumin Urine mg/g Cr   Date Value Ref Range Status   02/15/2024   Final     Comment:     Unable to calculate, urine albumin and/or urine creatinine is outside detectable limits.  Microalbuminuria is defined as an albumin:creatinine ratio of 17 to 299 for males and 25 to 299 for females. A ratio of albumin:creatinine of 300 or higher is indicative of overt proteinuria.  Due to biologic variability, positive results should be confirmed by a second, first-morning random or 24-hour timed urine specimen. If there is discrepancy, a third specimen is recommended. When 2 out of 3 results are in the microalbuminuria range, this is evidence for incipient nephropathy and warrants increased efforts at glucose control, blood pressure control, and institution of therapy with an angiotensin-converting-enzyme (ACE) inhibitor (if the patient can tolerate it).     06/16/2022 13.11 0.00 - 25.00 mg/g Cr Final   07/07/2021 9.35 0 - 25 mg/g Cr Final

## 2024-02-19 NOTE — LETTER
2/19/2024       RE: Shala Caruso  [address] [place] [address]     Dear Colleague,    Thank you for referring your patient, Shala Caruso, to the [facility] ENDOCRINOLOGY [facility]IC [place] at [facility]. Please see a copy of my visit note below.                       Assessment and Plan:    Jeanmarie is a 78 year old pleasant female with hx of SLE, APLS, DVT, osteoporosis and type 1 diabetes presenting for f/up.    1) Type 1 diabetes, formally diagnosed in 2010 while being evaluated for steroid-induced hyperglycemia.  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    Overall, she maintains a good glycemic control by using small dosages of short acting insulin based on the carbohydrate intake.     2) Osteoporosis, now osteopenia  Risk factors for osteoporosis include postmenopausal status, lupus, prior treatment with steroids, diabetes, fam history.   She was treated with Boniva for 3 years, followed by Forteo for 2 years. Received one dose of Reclast in July 2014, when she completed treatment with Forteo.  Treatment with Reclast was resumed in July 2020, when the DEXA scan revealed some loss of bone mineral density, although the lowest T score remained in the osteopenic range.  She received subsequent Reclast dosages in in 2021 and 2023. Most recent DXA from 8/23 from showed improvement of bone mineral density at the average hip.  Plan:   Walking, weightbearing exercises encouraged  Schedule a 4th dose of Reclast in 8/2024 and a f/up DXA scan in 8/2025     3) Hypercalcemia, mild   As long as the calcium level remains mildly elevated, we can continue to monitor.  Lab work is suggestive of primary hyperparathyroidism.    Plan:   Try to have a daily calcium intake from food products and/or supplements of 800-1000 mg   Maintain a good hydration by drinking 6 cups of liquids daily   Follow-up BMP, phosphorus, albumin, vitamin D, PTH and  24-hour urine calcium in 6 months.    Orders Placed This Encounter   Procedures    GLUCOSE MONITOR, 72 HOUR, PHYS INTERP    AFINION HEMOGLOBIN A1C POCT    25 Hydroxyvitamin D2 and D3    Phosphorus    Parathyroid Hormone Intact    Albumin level    Basic metabolic panel    Calcium timed urine    Creatinine timed urine     ============================================================================================================================================  Jeanmarie is a 78 year old pleasant female with hx of SLE, APLS, DVT, osteoporosis and type 1 diabetes presenting for f/up.   Her last clinic visit was on 8/7/2023.     Interval history:    1. Type 1 diabetes  Lantus was discontinued and in 2020 and her diabetes is now managed only with NovoLog.    On average, she reports taking 1-2.5 units per meal. In general she has 2 meals a day, lunch and dinner.     Most recent A1c was 5.4, in 8/2023. It was 5.5% today.   Weight is down 20 lbs in the last year. Feels good. Appetite OK.  She has changed her diet to account for the clotting disorder she has.     Diabetes complications:   No h/o microalbuminuria.  Most recent urine microalbumin negative in 2/24. GFR lower at 53 on labs from 5/23.   Last eye exam -September 2023. No DR. S/P cataract surgery.  Numbness and tingling sensation B/L toes - for many years but light sensation is intact. She does wear comfortable shoes/insoles. 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  She develops confusion, inability to concentrate or focus her vision and she feels extremely tired when her blood sugar is the 60s or 50s.  She has no prior episodes of loss of consciousness due to hypoglycemia.  She does not always recognize the lows.   She does have Baqsimi at home.  Most recent lipid panel from 12/30/23: LDL cholesterol 83, HDL cholesterol 71, triglycerides 58.  On 40 mg simvastatin daily.  Exercise: mainly walking     I reviewed the Siddharth data:  On the Siddharth  sensor, average glucose is 94, with a glucose variability of 16%, corresponding to a GMI of 5.6%.  97% of the glucose numbers are within target, 1% are in the mild hypoglycemic range and 2% are below 54.  Most of the hypoglycemic episodes tend to occur between 3 and 6 AM. The patient is completely asymptomatic.     2. Osteoporosis  Jeanmarie also has a history of osteoporosis, treated with Boniva for almost 3 years, followed by Forteo which was started in 4/12 - interrupted treatment from 4/2013 and restarted in 7/2013 until July 2014. After she completed the treatment with Forteo, she received one dose of Reclast, in July 2014.       She has no history of prior bone fractures.  She's been off prednisone since 2013.  Her height has decreased over the years from 5'6'' to 5'1/2''. Her mother had a hip fracture in her 80s.      DXA 7/1/16:  L1-L3 T score was - 1.  Overall, at the spine, there was a significant increase of bone mineral density since 2005 of 10.5%. Since 2015, the bone mineral density has remained stable at spine. The lowest T score at the hip level was -2.2, at the left femoral neck.  Overall, there was a significant improvement of bone mineral density at the mean hip of 8.9% since 2005, with no significant changes since 2015. While the bone mineral density at the left hip increased since baseline, at the right hip, there was a decrease of bone mineral density since baseline and also, since prior year.     DXA 7/27/18:   The lowest T score was -2.1, at the left femoral neck.  Compared with the prior scan from 2016, the bone mineral density at the hips remained stable.  At the lumbar spine, L1-L3 T score was -1.3, not significantly changed since 2016.     DXA 1/2020:  L2-L4 T score was -1.5. At the hips, the lowest score was -2.3, at the left femoral neck. Compared with the prior DEXA scan from 2018, there was a significant decrease in bone mineral density of the lumbar spine, left total hip and right total  hip by 3.1, 4.7 and 3.5%, respectively.  The patient received zoledronic acid infusion on 7/30/2020 and 7/27/21.     DXA 6/16/2022:  T score: -0.9 at L1-L4, -0.3 at 33% distal radius, -1.7 at both right and left femoral necks, -1.8 at the left total hip and -1.9 at the right total hip.  At the radius and average hip, the bone mineral density remained stable since 2020.  At the spine, there was a significant increase of 4.5%.    DXA 8/1/23:  L2-L4 T score -1.4  Left femoral neck T score -1.8, total L hip T score -1.5  Right femoral neck T score -1.5, total R hip T score -1.6  Bone density compared to the prior study has increased at the mean total femur by +4.4%.  A third dose of Reclast was administered on 8/29/23.      3. Hypercalcemia   The patient developed mild hypercalcemia in 2023.  The mildly elevated calcium from lab work from March 2023 was associated with a normal PTH of 34, normal phosphorus at 4.2, normal vitamin D level at 63.  24-hour urinary calcium from 6/5/2023 revealed an elevated urinary calcium of 0.34 g per 24 hours, with a normal urinary creatinine.    8/7/23 PTH 35, calcium 10.9, phos 3.5, albumin 4.5, vitamin D 56   12/20/23 PTH 33, calcium 9.4, phos 4.1, albumin 4, GFR >90    2/15/24 ionized calcium 5.6   2/15/24 24 hrs urinary calcium 0.38 g, creatinine 0.92 g   One cup of almond milk daily and 1-2 servings of dairies daily.   In terms of calcium and vitamin D supplements, she reports taking 500 mg calcium as a oyster shell.  She has been taking 1000 U vitamin D daily.   In a regular day, she drinks 4-5 cups of liquids daily.      Past Medical History  Past Medical History:   Diagnosis Date    Achalasia     Antiphospholipid syndrome (H24)     Antiplatelet or antithrombotic long-term use     Coagulation disorder (H24) 3097-6667    DM (diabetes mellitus) (H)     DVT (deep venous thrombosis) (H) 2003    and PE    Dysphagia     occasional, use Nifedipine    GERD (gastroesophageal reflux  disease)     Hyperlipidemia     Lymphedema     Myopia     Neuropathy     toes bilateral    Nonsenile cataract     osteoporosis     Pericarditis     Raynaud's disease     SLE (systemic lupus erythematosus) (H)        Past Surgical History  Past Surgical History:   Procedure Laterality Date    CATARACT IOL, RT/LT Left 02/2019    CATARACT IOL, RT/LT Right 01/2019    COLONOSCOPY N/A 11/28/2016    Procedure: COLONOSCOPY;  Surgeon: Sang August MD;  Location: [facility]    CYSTOSCOPY, SLING TRANSVAGINAL N/A 12/20/2016    Procedure: CYSTOSCOPY, SLING TRANSVAGINAL;  Surgeon: Jeanmarie Pierson MD;  Location: [facility] OR    DISSECT LYMPH NODE AXILLA  2004    Lt, during eval for SLE    HYSTEROSCOPIC PLACEMENT CONTRACEPTIVE DEVICE  1985    PHACOEMULSIFICATION WITH STANDARD INTRAOCULAR LENS IMPLANT Right 1/8/2019    Procedure: Right Eye Cataract Extraction with Intraocular Lens;  Surgeon: Monika Fermin MD;  Location: [facility] OR    PHACOEMULSIFICATION WITH STANDARD INTRAOCULAR LENS IMPLANT Left 2/5/2019    Procedure: Left Eye Cataract Extraction with Intraocular Lens;  Surgeon: Monika Fermin MD;  Location: [facility] OR    TUBAL LIGATION Bilateral         Medications    Current Outpatient Medications:     acetaminophen (TYLENOL) 500 MG tablet, Take 1,000-1,500 mg by mouth nightly as needed , Disp: , Rfl:     aspirin 81 MG tablet, Take 81 mg by mouth At Bedtime , Disp: , Rfl:     AYR SALINE NASAL NO-DRIP GEL, Use as needed for nasal dryness, Disp: 3 Tube, Rfl: 3    blood glucose (TRUE METRIX BLOOD GLUCOSE TEST) test strip, USE TO TEST BLOOD SUGAR 5 TIMES DAILY OR AS DIRECTED, Disp: 500 strip, Rfl: 2    blood glucose monitoring (ULTRA THIN 30G) lancets, Test 5 times daily  [employer]  Super thin, Disp: 450 each, Rfl: 3    calcium carbonate (OS-GABBY) 500 MG TABS, Take 1 tablet (1,250 mg) by mouth daily, Disp: 90 tablet, Rfl: 3    cholecalciferol (VITAMIN D3) 25 mcg (1000 units) capsule, Take 1 capsule by mouth daily, Disp: ,  Rfl:     ciclopirox (PENLAC) 8 % external solution, Apply to adjacent skin and affected nails daily.  Remove with alcohol every 7 days, then repeat., Disp: 6.6 mL, Rfl: 11    Continuous Blood Gluc  (FREESTYLE SRI 2 READER) RORY, 1 each daily, Disp: 1 each, Rfl: 0    Continuous Blood Gluc Sensor (FREESTYLE SRI 2 SENSOR) MISC, 1 each every 14 days, Disp: 6 each, Rfl: 3    Continuous Blood Gluc Sensor (FREESTYLE SRI 3 SENSOR) MISC, 1 each every 14 days, Disp: 6 each, Rfl: 4    Glucagon (BAQSIMI ONE PACK) 3 MG/DOSE POWD, Spray 3 mg in nostril as needed (to be used for severe hypoglycemic episodes), Disp: 1 each, Rfl: 4    Injection Device for insulin (NOVOPEN ECHO) RORY, 1 each 4 times daily, Disp: 1 each, Rfl: 0    insulin aspart (NOVOLOG PENFILL) 100 UNIT/ML cartridge, INJECT 1 UNIT PER 15 GRAMS OF CARBOHYDRATES UNDER THE SKIN THREE TIMES A DAY WITH MEALS. APPROXIMATELY 15 UNITS DAILY., Disp: 30 mL, Rfl: 1    insulin pen needle (BD PAM U/F) 32G X 4 MM miscellaneous, USE AS DIRECTED WITH INSULIN PENS 4 TIMES DAILY, Disp: 400 each, Rfl: 3    NIFEdipine ER OSMOTIC (PROCARDIA XL) 30 MG 24 hr tablet, 1-2 daily or dysphagia, Disp: 180 tablet, Rfl: 3    order for DME, Equipment being ordered: compression socks, 20-30 mmHg, knee high, Disp: 8 Piece, Rfl: 4    simvastatin (ZOCOR) 40 MG tablet, Take 1 tablet (40 mg) by mouth at bedtime, Disp: 90 tablet, Rfl: 3    UNABLE TO FIND, MEDICATION NAME: , Disp: , Rfl:     warfarin ANTICOAGULANT (JANTOVEN ANTICOAGULANT) 5 MG tablet, Take 1-1.5 tablets daily or as directed, Disp: 135 tablet, Rfl: 4    LMP  (LMP Unknown)   Wt Readings from Last 10 Encounters:   02/19/24 48.5 kg (107 lb)   01/05/24 49.7 kg (109 lb 8 oz)   08/07/23 51.3 kg (113 lb)   02/06/23 57.9 kg (127 lb 11.2 oz)   08/01/22 56.1 kg (123 lb 11.2 oz)   01/24/22 52.6 kg (116 lb)   07/30/20 52.6 kg (116 lb)   02/05/19 56.7 kg (125 lb)   01/08/19 54.1 kg (119 lb 4.8 oz)   07/30/18 54.1 kg (119 lb 3.2  "oz)     /76 (BP Location: Right arm, Patient Position: Sitting, Cuff Size: Adult Regular)   Pulse 68   Ht 1.63 m (5' 4.17\")   Wt 48.5 kg (107 lb)   LMP  (LMP Unknown)   SpO2 100%   BMI 18.27 kg/m      Physical Examination:  General appearance: she is thin, no acute distress noted  Eyes: conjunctivae and extraocular motions are normal. Pupils are equal, round, and reactive to light. No lid lag, no stare.  HENT: oropharynx moist; neck no JVD, no bruits, no thyromegaly, palpable 2-3 cm left ?carotid artery aneurism  Cardiovascular: regular rhythm, no murmurs, distal pulses palpable, mild L arm edema   Respiratory: chest clear, no rales, no rhonchi  Musculoskeletal: normal tone and strength   Neurologic: left knee reflex decreased, normal B/L bicipital reflexes, no resting tremor  Psychiatric: affect and judgment normal   Skin: nails onychomycosis  Feet: sensation preserved to monofilament testing, onychomycosis.     Endocrine Labs:  Lab Results   Component Value Date    A1C 5.7 (H) 01/04/2023    A1C 5.5 06/16/2022    A1C 5.7 (H) 02/23/2022    A1C 5.6 12/22/2021    A1C 5.4 07/07/2021    A1C 5.2 04/21/2021    A1C 5.6 01/20/2021    A1C 5.1 07/09/2020    A1C 5.3 07/11/2017    HEMOGLOBINA1 5.4 08/07/2023    HEMOGLOBINA1 5.1 01/13/2020    HEMOGLOBINA1 5.1 07/15/2019    HEMOGLOBINA1 5.0 07/30/2018    HEMOGLOBINA1 5.0 01/29/2018       Hemoglobin   Date Value Ref Range Status   08/11/2023 13.6 11.7 - 15.7 g/dL Final   04/29/2021 12.9 11.7 - 15.7 g/dL Final     Hematocrit   Date Value Ref Range Status   08/11/2023 41.9 35.0 - 47.0 % Final   04/29/2021 41.5 35.0 - 47.0 % Final     Cholesterol   Date Value Ref Range Status   12/30/2023 166 <200 mg/dL Final   07/07/2021 199 <200 mg/dL Final     Comment:     Desirable:       <200 mg/dl     Cholesterol/HDL Ratio   Date Value Ref Range Status   10/01/2014 1.5 0.0 - 5.0 Final     HDL Cholesterol   Date Value Ref Range Status   07/07/2021 108 >49 mg/dL Final     Direct " Measure HDL   Date Value Ref Range Status   12/30/2023 71 >=50 mg/dL Final     LDL Cholesterol Calculated   Date Value Ref Range Status   12/30/2023 83 <=100 mg/dL Final   07/07/2021 79 <100 mg/dL Final     Comment:     Desirable:       <100 mg/dl     VLDL-Cholesterol   Date Value Ref Range Status   10/01/2014 7 0 - 30 mg/dL Final     Triglycerides   Date Value Ref Range Status   12/30/2023 58 <150 mg/dL Final   07/07/2021 58 <150 mg/dL Final     Albumin Urine mg/L   Date Value Ref Range Status   02/15/2024 <12.0 mg/L Final     Comment:     The reference ranges have not been established in urine albumin. The results should be integrated into the clinical context for interpretation.   06/16/2022 16 mg/L Final   07/07/2021 9 mg/L Final     TSH   Date Value Ref Range Status   06/07/2023 0.86 0.30 - 4.20 uIU/mL Final   08/01/2022 1.34 0.40 - 4.00 mU/L Final   07/09/2019 0.99 0.40 - 4.00 mU/L Final         Last Basic Metabolic Panel:    Sodium   Date Value Ref Range Status   12/20/2023 139 135 - 145 mmol/L Final     Comment:     Reference intervals for this test were updated on 09/26/2023 to more accurately reflect our healthy population. There may be differences in the flagging of prior results with similar values performed with this method. Interpretation of those prior results can be made in the context of the updated reference intervals.    07/07/2021 141 133 - 144 mmol/L Final     Potassium   Date Value Ref Range Status   12/20/2023 3.8 3.4 - 5.3 mmol/L Final   07/19/2022 4.2 3.4 - 5.3 mmol/L Final   07/07/2021 4.0 3.4 - 5.3 mmol/L Final     Chloride   Date Value Ref Range Status   12/20/2023 102 98 - 107 mmol/L Final   07/19/2022 108 94 - 109 mmol/L Final   07/07/2021 106 94 - 109 mmol/L Final     Calcium   Date Value Ref Range Status   12/20/2023 9.4 8.8 - 10.2 mg/dL Final   07/07/2021 9.1 8.5 - 10.1 mg/dL Final     Carbon Dioxide   Date Value Ref Range Status   07/07/2021 27 20 - 32 mmol/L Final     Carbon  Dioxide (CO2)   Date Value Ref Range Status   12/20/2023 28 22 - 29 mmol/L Final   07/19/2022 28 20 - 32 mmol/L Final     Urea Nitrogen   Date Value Ref Range Status   12/20/2023 30.6 (H) 8.0 - 23.0 mg/dL Final   07/19/2022 22 7 - 30 mg/dL Final   07/07/2021 21 7 - 30 mg/dL Final     Creatinine   Date Value Ref Range Status   12/20/2023 0.63 0.51 - 0.95 mg/dL Final   07/07/2021 0.71 0.52 - 1.04 mg/dL Final     GFR Estimate   Date Value Ref Range Status   12/20/2023 >90 >60 mL/min/1.73m2 Final   07/07/2021 84 >60 mL/min/[1.73_m2] Final     Comment:     Non  GFR Calc  Starting 12/18/2018, serum creatinine based estimated GFR (eGFR) will be   calculated using the Chronic Kidney Disease Epidemiology Collaboration   (CKD-EPI) equation.       Glucose   Date Value Ref Range Status   12/20/2023 101 (H) 70 - 99 mg/dL Final   07/19/2022 109 (H) 70 - 99 mg/dL Final   07/07/2021 110 (H) 70 - 99 mg/dL Final       AST   Date Value Ref Range Status   01/04/2023 20 10 - 35 U/L Final   07/07/2021 14 0 - 45 U/L Final     ALT   Date Value Ref Range Status   01/04/2023 21 10 - 35 U/L Final   07/07/2021 23 0 - 50 U/L Final     Albumin Urine mg/g Cr   Date Value Ref Range Status   02/15/2024   Final     Comment:     Unable to calculate, urine albumin and/or urine creatinine is outside detectable limits.  Microalbuminuria is defined as an albumin:creatinine ratio of 17 to 299 for males and 25 to 299 for females. A ratio of albumin:creatinine of 300 or higher is indicative of overt proteinuria.  Due to biologic variability, positive results should be confirmed by a second, first-morning random or 24-hour timed urine specimen. If there is discrepancy, a third specimen is recommended. When 2 out of 3 results are in the microalbuminuria range, this is evidence for incipient nephropathy and warrants increased efforts at glucose control, blood pressure control, and institution of therapy with an angiotensin-converting-enzyme  (ACE) inhibitor (if the patient can tolerate it).     06/16/2022 13.11 0.00 - 25.00 mg/g Cr Final   07/07/2021 9.35 0 - 25 mg/g Cr Final       Dorita Cramer MD

## 2024-03-15 ENCOUNTER — LAB (OUTPATIENT)
Dept: LAB | Facility: CLINIC | Age: 79
End: 2024-03-15
Payer: COMMERCIAL

## 2024-03-15 DIAGNOSIS — I82.4Y9 DEEP VEIN THROMBOSIS (DVT) OF PROXIMAL LOWER EXTREMITY, UNSPECIFIED CHRONICITY, UNSPECIFIED LATERALITY (H): ICD-10-CM

## 2024-03-15 PROCEDURE — 85260 CLOT FACTOR X STUART-POWER: CPT | Performed by: INTERNAL MEDICINE

## 2024-03-15 PROCEDURE — 99000 SPECIMEN HANDLING OFFICE-LAB: CPT | Performed by: PATHOLOGY

## 2024-03-15 PROCEDURE — 36415 COLL VENOUS BLD VENIPUNCTURE: CPT | Performed by: PATHOLOGY

## 2024-03-16 LAB — FACT X ACT/NOR PPP CHRO: 26 % (ref 70–130)

## 2024-03-18 ENCOUNTER — ANTICOAGULATION THERAPY VISIT (OUTPATIENT)
Dept: ANTICOAGULATION | Facility: CLINIC | Age: 79
End: 2024-03-18
Payer: COMMERCIAL

## 2024-03-18 DIAGNOSIS — L93.0 LUPUS ERYTHEMATOSUS: ICD-10-CM

## 2024-03-18 DIAGNOSIS — I82.4Y9 DEEP VEIN THROMBOSIS (DVT) OF PROXIMAL LOWER EXTREMITY, UNSPECIFIED CHRONICITY, UNSPECIFIED LATERALITY (H): ICD-10-CM

## 2024-03-18 DIAGNOSIS — D68.61 ANTIPHOSPHOLIPID SYNDROME (H): ICD-10-CM

## 2024-03-18 DIAGNOSIS — Z79.01 LONG TERM CURRENT USE OF ANTICOAGULANT THERAPY: Primary | ICD-10-CM

## 2024-03-18 NOTE — PROGRESS NOTES
ANTICOAGULATION MANAGEMENT     Shala Caruso 78 year old female is on warfarin with therapeutic result. (Goal Chromogenic Factor X 40-20%)    Recent labs: (last 7 days)     03/15/24  1301   [id number]*       ASSESSMENT     Source(s): Chart Review  Previous result was Therapeutic last 2(+) visits  Medication, diet, health changes since last result: chart reviewed; none identified       PLAN     Recommended plan for no diet, medication or health factor changes affecting result    Dosing Instructions: Continue your current warfarin dose 7.5mg Tuesday & Friday, 5mg AOD  with next Chromogenic Factor X in 4 weeks   (4/15/24)    Detailed voice message left for Jeanmarie with dosing instructions and follow up date.     Contact [phone] to schedule and with any changes, questions or concerns.     Education provided:   Please call back if any changes to your diet, medications or how you've been taking warfarin    Plan made per [facility] anticoagulation protocol    Siria Cook RN  Anticoagulation Clinic  3/18/2024

## 2024-03-25 ENCOUNTER — IMMUNIZATION (OUTPATIENT)
Dept: FAMILY MEDICINE | Facility: CLINIC | Age: 79
End: 2024-03-25
Payer: COMMERCIAL

## 2024-03-25 PROCEDURE — 91320 SARSCV2 VAC 30MCG TRS-SUC IM: CPT

## 2024-03-25 PROCEDURE — 90480 ADMN SARSCOV2 VAC 1/ONLY CMP: CPT

## 2024-04-15 ENCOUNTER — LAB (OUTPATIENT)
Dept: LAB | Facility: CLINIC | Age: 79
End: 2024-04-15
Payer: COMMERCIAL

## 2024-04-15 DIAGNOSIS — I82.4Y9 DEEP VEIN THROMBOSIS (DVT) OF PROXIMAL LOWER EXTREMITY, UNSPECIFIED CHRONICITY, UNSPECIFIED LATERALITY (H): ICD-10-CM

## 2024-04-15 PROCEDURE — 36415 COLL VENOUS BLD VENIPUNCTURE: CPT | Performed by: PATHOLOGY

## 2024-04-15 PROCEDURE — 85260 CLOT FACTOR X STUART-POWER: CPT | Performed by: INTERNAL MEDICINE

## 2024-04-15 PROCEDURE — 99000 SPECIMEN HANDLING OFFICE-LAB: CPT | Performed by: PATHOLOGY

## 2024-04-16 ENCOUNTER — ANTICOAGULATION THERAPY VISIT (OUTPATIENT)
Dept: ANTICOAGULATION | Facility: CLINIC | Age: 79
End: 2024-04-16
Payer: COMMERCIAL

## 2024-04-16 DIAGNOSIS — I82.4Y9 DEEP VEIN THROMBOSIS (DVT) OF PROXIMAL LOWER EXTREMITY, UNSPECIFIED CHRONICITY, UNSPECIFIED LATERALITY (H): ICD-10-CM

## 2024-04-16 DIAGNOSIS — L93.0 LUPUS ERYTHEMATOSUS: ICD-10-CM

## 2024-04-16 DIAGNOSIS — Z79.01 LONG TERM CURRENT USE OF ANTICOAGULANT THERAPY: Primary | ICD-10-CM

## 2024-04-16 DIAGNOSIS — D68.61 ANTIPHOSPHOLIPID SYNDROME (H): ICD-10-CM

## 2024-04-16 LAB — FACT X ACT/NOR PPP CHRO: 24 % (ref 70–130)

## 2024-04-16 NOTE — PROGRESS NOTES
ANTICOAGULATION MANAGEMENT     Shala Caruso 78 year old female is on warfarin with therapeutic result. (Goal Chromogenic Factor X 40-20%)    Recent labs: (last 7 days)     04/15/24  1338   [id number] 24*       ASSESSMENT     Source(s): Chart Review and Patient/Caregiver Call     Warfarin doses taken: Warfarin taken as instructed  Diet: No new diet changes identified  Medication/supplement changes: None noted  New illness, injury, or hospitalization: No  Signs or symptoms of bleeding or clotting: No  Previous result: Therapeutic last 2(+) visits  Additional findings: None     PLAN     Recommended plan for no diet, medication or health factor changes affecting result    Dosing Instructions: Continue your current warfarin dose 7.5 mg every Tues, Fri; 5 mg all other days  with next Chromogenic Factor X in 4 weeks       Telephone call with Jeanmarie who agrees to plan and repeated back plan correctly    Lab visit scheduled-scheduled by patient, CFX added to lab notes    Education provided:   Goal range and lab monitoring: goal range and significance of current result and Importance of following up at instructed interval  Contact [phone] with any changes, questions or concerns.     Plan made per [facility] anticoagulation protocol    KYRA BRANTLEY, RN  Anticoagulation Clinic  4/16/2024

## 2024-04-26 ENCOUNTER — MYC MEDICAL ADVICE (OUTPATIENT)
Dept: INTERNAL MEDICINE | Facility: CLINIC | Age: 79
End: 2024-04-26
Payer: COMMERCIAL

## 2024-04-26 DIAGNOSIS — L98.9 SKIN LESION: Primary | ICD-10-CM

## 2024-04-27 ENCOUNTER — LAB (OUTPATIENT)
Dept: LAB | Facility: CLINIC | Age: 79
End: 2024-04-27
Payer: COMMERCIAL

## 2024-04-27 DIAGNOSIS — L98.9 SKIN LESION: ICD-10-CM

## 2024-04-27 PROCEDURE — 86618 LYME DISEASE ANTIBODY: CPT | Performed by: INTERNAL MEDICINE

## 2024-04-27 PROCEDURE — 99000 SPECIMEN HANDLING OFFICE-LAB: CPT | Performed by: PATHOLOGY

## 2024-04-27 PROCEDURE — 36415 COLL VENOUS BLD VENIPUNCTURE: CPT | Performed by: PATHOLOGY

## 2024-04-29 LAB — B BURGDOR IGG+IGM SER QL: 0.1

## 2024-05-13 ENCOUNTER — LAB (OUTPATIENT)
Dept: LAB | Facility: CLINIC | Age: 79
End: 2024-05-13
Payer: COMMERCIAL

## 2024-05-13 DIAGNOSIS — E78.5 HYPERLIPIDEMIA, UNSPECIFIED HYPERLIPIDEMIA TYPE: ICD-10-CM

## 2024-05-13 DIAGNOSIS — I82.4Y9 DEEP VEIN THROMBOSIS (DVT) OF PROXIMAL LOWER EXTREMITY, UNSPECIFIED CHRONICITY, UNSPECIFIED LATERALITY (H): ICD-10-CM

## 2024-05-13 PROCEDURE — 36415 COLL VENOUS BLD VENIPUNCTURE: CPT | Performed by: PATHOLOGY

## 2024-05-13 PROCEDURE — 85260 CLOT FACTOR X STUART-POWER: CPT | Performed by: INTERNAL MEDICINE

## 2024-05-13 PROCEDURE — 99000 SPECIMEN HANDLING OFFICE-LAB: CPT | Performed by: PATHOLOGY

## 2024-05-14 ENCOUNTER — ANTICOAGULATION THERAPY VISIT (OUTPATIENT)
Dept: ANTICOAGULATION | Facility: CLINIC | Age: 79
End: 2024-05-14
Payer: COMMERCIAL

## 2024-05-14 DIAGNOSIS — D68.61 ANTIPHOSPHOLIPID SYNDROME (H): ICD-10-CM

## 2024-05-14 DIAGNOSIS — L93.0 LUPUS ERYTHEMATOSUS: ICD-10-CM

## 2024-05-14 DIAGNOSIS — I82.4Y9 DEEP VEIN THROMBOSIS (DVT) OF PROXIMAL LOWER EXTREMITY, UNSPECIFIED CHRONICITY, UNSPECIFIED LATERALITY (H): ICD-10-CM

## 2024-05-14 DIAGNOSIS — Z79.01 LONG TERM CURRENT USE OF ANTICOAGULANT THERAPY: Primary | ICD-10-CM

## 2024-05-14 LAB — FACT X ACT/NOR PPP CHRO: 22 % (ref 70–130)

## 2024-05-14 NOTE — PROGRESS NOTES
ANTICOAGULATION MANAGEMENT     Shala Caruso 78 year old female is on warfarin with therapeutic result. (Goal Chromogenic Factor X 40-20%)    Recent labs: (last 7 days)     05/13/24  1311   [id number] 22*       ASSESSMENT     Source(s): Chart Review and Patient/Caregiver Call     Warfarin doses taken: Warfarin taken as instructed  Diet: No new diet changes identified  Medication/supplement changes: None noted  New illness, injury, or hospitalization: No  Signs or symptoms of bleeding or clotting: No  Previous result: Therapeutic last 2(+) visits  Additional findings: None     PLAN     Recommended plan for no diet, medication or health factor changes affecting result    Dosing Instructions: Continue your current warfarin dose 7.5mg every Tue, Fri; 5mg all other days  with next Chromogenic Factor X in 3 weeks       Telephone call with Jeanmarie who verbalizes understanding and agrees to plan    Lab visit scheduled    Education provided:   Please call back if any changes to your diet, medications or how you've been taking warfarin    Plan made per [facility] anticoagulation protocol    Andre Ashraf, RN  Anticoagulation Clinic  5/14/2024

## 2024-05-17 ENCOUNTER — MYC REFILL (OUTPATIENT)
Dept: ENDOCRINOLOGY | Facility: CLINIC | Age: 79
End: 2024-05-17
Payer: COMMERCIAL

## 2024-05-17 DIAGNOSIS — E10.649 TYPE 1 DIABETES MELLITUS WITH HYPOGLYCEMIA AND WITHOUT COMA (H): ICD-10-CM

## 2024-05-20 RX ORDER — GLUCAGON 3 MG/1
3 POWDER NASAL PRN
Qty: 1 EACH | Refills: 4 | Status: SHIPPED | OUTPATIENT
Start: 2024-05-20

## 2024-06-03 ENCOUNTER — OFFICE VISIT (OUTPATIENT)
Dept: DERMATOLOGY | Facility: CLINIC | Age: 79
End: 2024-06-03
Payer: COMMERCIAL

## 2024-06-03 DIAGNOSIS — Q82.8 PLANTAR KERATOSIS: ICD-10-CM

## 2024-06-03 DIAGNOSIS — E10.9 TYPE 1 DIABETES MELLITUS WITHOUT COMPLICATION (H): ICD-10-CM

## 2024-06-03 DIAGNOSIS — B35.1 ONYCHOMYCOSIS: Primary | ICD-10-CM

## 2024-06-03 PROCEDURE — 99214 OFFICE O/P EST MOD 30 MIN: CPT | Mod: GC | Performed by: DERMATOLOGY

## 2024-06-03 RX ORDER — CICLOPIROX 80 MG/ML
SOLUTION TOPICAL
Qty: 6.6 ML | Refills: 11 | Status: SHIPPED | OUTPATIENT
Start: 2024-06-03

## 2024-06-03 ASSESSMENT — PAIN SCALES - GENERAL: PAINLEVEL: NO PAIN (0)

## 2024-06-03 NOTE — NURSING NOTE
Dermatology Rooming Note    Shala Caruso's goals for this visit include:   Chief Complaint   Patient presents with    Derm Problem     Onychomycosis- thumbs still affected     Aggie Shaffer [place]

## 2024-06-03 NOTE — LETTER
6/3/2024       RE: Shala Caruso  [address]  [place] [address]     Dear Colleague,    Thank you for referring your patient, Shala Caruso, to the [facility] DERMATOLOGY [facility] [place] at [facility]. Please see a copy of my visit note below.    [facility] Dermatology Note  Encounter Date: Chalino 3, 2024  Office Visit     Dermatology Problem List:  1. SLE  - Well controlled off treatment, previously on CellCept  2. 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 Onychomycosis  - Avoiding terbinafine given history of SLE and risk for flare  - Current: Penlac (started 1/31/2023); will try ordering topical efinaconazole (Jublia)  - Prior: Pulsed fluconazole in the distant past    ____________________________________________    Assessment & Plan:     # Onychomycosis  Remains stable but no significant improvement. Avoiding systemic treatments due to history of SLE and patient also not interested in oral options. Patient previously tried oral fluconazole with some improvement but the fungus recurred after stopping.  - Repeat photos today  - Continue Penlac daily  - Will try ordering topical efinaconazole (Jublia) daily if covered by insurance  - Can also use topical urea (as below) to help thin the nails    # Hyperkeratosis of heels  - Start urea 20-40% cream daily as needed    Procedures Performed:   None    Follow-up: 1 year(s) in-person, or earlier for new or changing lesions    Staff and Resident:     Attending: Dr. Bryan staffed the patient.    Resident: Hever Cuellar MD (PGY-4)  I, Mei Bryan MD, saw this patient with the resident and agree with the resident s findings and plan of care as documented in the resident s note.    ____________________________________________    CC: Derm Problem (Onychomycosis- thumbs still affected)    HPI:  Ms. Shala Caruso is a(n) 78 year old female who presents today as a return  patient for onychomycosis.   - She is doing well and continues using the Penlac daily for her nails. Things remains stable but no significant improvement.   - Reports some thickening of skin on her bilateral heels that is quite bothersome. Becomes dry and itchy in the summers  - Patient is otherwise feeling well, without additional skin concerns.    Labs Reviewed:  N/A    Physical Exam:  Vitals: LMP  (LMP Unknown)   SKIN: Focused examination of bilateral fingernails & L foot was performed  - L 1st fingernail and most of R fingernails with yellow thickening and onychodystrophy  - Mild hyperkeratosis of L heel        Medications:  Current Outpatient Medications   Medication Sig Dispense Refill    acetaminophen (TYLENOL) 500 MG tablet Take 1,000-1,500 mg by mouth nightly as needed       aspirin 81 MG tablet Take 81 mg by mouth At Bedtime       AYR SALINE NASAL NO-DRIP GEL Use as needed for nasal dryness 3 Tube 3    blood glucose (TRUE METRIX BLOOD GLUCOSE TEST) test strip USE TO TEST BLOOD SUGAR 5 TIMES DAILY OR AS DIRECTED 500 strip 2    blood glucose monitoring (ULTRA THIN 30G) lancets Test 5 times daily  [employer]  Super thin 450 each 3    calcium carbonate (OS-GABBY) 500 MG TABS Take 1 tablet (1,250 mg) by mouth daily 90 tablet 3    cholecalciferol (VITAMIN D3) 25 mcg (1000 units) capsule Take 1 capsule by mouth daily      ciclopirox (PENLAC) 8 % external solution Apply to adjacent skin and affected nails daily.  Remove with alcohol every 7 days, then repeat. 6.6 mL 11    Continuous Blood Gluc Sensor (FREESTYLE SRI 3 SENSOR) MISC 1 each every 14 days 6 each 4    Glucagon (BAQSIMI ONE PACK) 3 MG/DOSE nasal powder Spray 1 spray (3 mg) in nostril as needed (to be used for severe hypoglycemic episodes) Please ask pharmacy for instructions on disposal of  medication in your county. 1 each 4    Injection Device for insulin (NOVOPEN ECHO) RORY 1 each 4 times daily 1 each 0    insulin aspart (NOVOLOG PENFILL) 100  UNIT/ML cartridge INJECT 1 UNIT PER 15 GRAMS OF CARBOHYDRATES UNDER THE SKIN THREE TIMES A DAY WITH MEALS. APPROXIMATELY 15 UNITS DAILY. 30 mL 1    insulin pen needle (BD PAM U/F) 32G X 4 MM miscellaneous USE AS DIRECTED WITH INSULIN PENS 4 TIMES DAILY 400 each 3    NIFEdipine ER OSMOTIC (PROCARDIA XL) 30 MG 24 hr tablet 1-2 daily or dysphagia 180 tablet 3    order for DME Equipment being ordered: compression socks, 20-30 mmHg, knee high 8 Piece 4    simvastatin (ZOCOR) 40 MG tablet Take 1 tablet (40 mg) by mouth at bedtime 90 tablet 3    UNABLE TO FIND MEDICATION NAME:       warfarin ANTICOAGULANT (JANTOVEN ANTICOAGULANT) 5 MG tablet Take 1-1.5 tablets daily or as directed 135 tablet 4     No current facility-administered medications for this visit.      Past Medical History:   Patient Active Problem List   Diagnosis    Achalasia    Antiphospholipid syndrome (H24)    DM (diabetes mellitus) (H)    GERD (gastroesophageal reflux disease)    Hyperlipidemia    HTN (hypertension)    Osteopenia    Raynaud's disease    DVT (deep venous thrombosis) (H)    Encounter for long-term current use of medication    Type 1 diabetes mellitus (H)    Osteoporosis, idiopathic    Osteoporosis, postmenopausal    Cystocele, midline    Urinary urgency    Urinary frequency    Female stress incontinence    Atrophic vaginitis    Long term current use of anticoagulant therapy    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)    Choroidal lesion    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, unspecified SLE type (H)    Hematoma of leg, right, initial encounter    Lupus erythematosus    Deep vein thrombosis (DVT) of proximal lower extremity, unspecified chronicity, unspecified laterality (H)     Past Medical History:   Diagnosis Date    Achalasia     Antiphospholipid syndrome (H24)     Antiplatelet or antithrombotic long-term use     Coagulation disorder (H24) 5129-2044    DM (diabetes mellitus) (H)      DVT (deep venous thrombosis) (H) 2003    and PE    Dysphagia     occasional, use Nifedipine    GERD (gastroesophageal reflux disease)     Hyperlipidemia     Lymphedema     Myopia     Neuropathy     toes bilateral    Nonsenile cataract     osteoporosis     Pericarditis     Raynaud's disease     SLE (systemic lupus erythematosus) (H)        CC Referred Self,

## 2024-06-03 NOTE — PROGRESS NOTES
[facility] Dermatology Note  Encounter Date: Chalino 3, 2024  Office Visit     Dermatology Problem List:  1. SLE  - Well controlled off treatment, previously on CellCept  2. 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 Onychomycosis  - Avoiding terbinafine given history of SLE and risk for flare  - Current: Penlac (started 1/31/2023); will try ordering topical efinaconazole (Jublia)  - Prior: Pulsed fluconazole in the distant past    ____________________________________________    Assessment & Plan:     # Onychomycosis  Remains stable but no significant improvement. Avoiding systemic treatments due to history of SLE and patient also not interested in oral options. Patient previously tried oral fluconazole with some improvement but the fungus recurred after stopping.  - Repeat photos today  - Continue Penlac daily  - Will try ordering topical efinaconazole (Jublia) daily if covered by insurance  - Can also use topical urea (as below) to help thin the nails    # Hyperkeratosis of heels  - Start urea 20-40% cream daily as needed    Procedures Performed:   None    Follow-up: 1 year(s) in-person, or earlier for new or changing lesions    Staff and Resident:     Attending: Dr. Bryan staffed the patient.    Resident: Hever Cuellar MD (PGY-4)  I, Mie Bryan MD, saw this patient with the resident and agree with the resident s findings and plan of care as documented in the resident s note.    ____________________________________________    CC: Derm Problem (Onychomycosis- thumbs still affected)    HPI:  Ms. Shala Caruso is a(n) 78 year old female who presents today as a return patient for onychomycosis.   - She is doing well and continues using the Penlac daily for her nails. Things remains stable but no significant improvement.   - Reports some thickening of skin on her bilateral heels that is quite bothersome. Becomes dry and itchy in the summers  - Patient is otherwise feeling well,  without additional skin concerns.    Labs Reviewed:  N/A    Physical Exam:  Vitals: LMP  (LMP Unknown)   SKIN: Focused examination of bilateral fingernails & L foot was performed  - L 1st fingernail and most of R fingernails with yellow thickening and onychodystrophy  - Mild hyperkeratosis of L heel        Medications:  Current Outpatient Medications   Medication Sig Dispense Refill    acetaminophen (TYLENOL) 500 MG tablet Take 1,000-1,500 mg by mouth nightly as needed       aspirin 81 MG tablet Take 81 mg by mouth At Bedtime       AYR SALINE NASAL NO-DRIP GEL Use as needed for nasal dryness 3 Tube 3    blood glucose (TRUE METRIX BLOOD GLUCOSE TEST) test strip USE TO TEST BLOOD SUGAR 5 TIMES DAILY OR AS DIRECTED 500 strip 2    blood glucose monitoring (ULTRA THIN 30G) lancets Test 5 times daily  [employer]  Super thin 450 each 3    calcium carbonate (OS-GABBY) 500 MG TABS Take 1 tablet (1,250 mg) by mouth daily 90 tablet 3    cholecalciferol (VITAMIN D3) 25 mcg (1000 units) capsule Take 1 capsule by mouth daily      ciclopirox (PENLAC) 8 % external solution Apply to adjacent skin and affected nails daily.  Remove with alcohol every 7 days, then repeat. 6.6 mL 11    Continuous Blood Gluc Sensor (FREESTYLE SRI 3 SENSOR) MISC 1 each every 14 days 6 each 4    Glucagon (BAQSIMI ONE PACK) 3 MG/DOSE nasal powder Spray 1 spray (3 mg) in nostril as needed (to be used for severe hypoglycemic episodes) Please ask pharmacy for instructions on disposal of  medication in your county. 1 each 4    Injection Device for insulin (NOVOPEN ECHO) RORY 1 each 4 times daily 1 each 0    insulin aspart (NOVOLOG PENFILL) 100 UNIT/ML cartridge INJECT 1 UNIT PER 15 GRAMS OF CARBOHYDRATES UNDER THE SKIN THREE TIMES A DAY WITH MEALS. APPROXIMATELY 15 UNITS DAILY. 30 mL 1    insulin pen needle (BD PAM U/F) 32G X 4 MM miscellaneous USE AS DIRECTED WITH INSULIN PENS 4 TIMES DAILY 400 each 3    NIFEdipine ER OSMOTIC (PROCARDIA XL) 30 MG 24 hr  tablet 1-2 daily or dysphagia 180 tablet 3    order for DME Equipment being ordered: compression socks, 20-30 mmHg, knee high 8 Piece 4    simvastatin (ZOCOR) 40 MG tablet Take 1 tablet (40 mg) by mouth at bedtime 90 tablet 3    UNABLE TO FIND MEDICATION NAME:       warfarin ANTICOAGULANT (JANTOVEN ANTICOAGULANT) 5 MG tablet Take 1-1.5 tablets daily or as directed 135 tablet 4     No current facility-administered medications for this visit.      Past Medical History:   Patient Active Problem List   Diagnosis    Achalasia    Antiphospholipid syndrome (H24)    DM (diabetes mellitus) (H)    GERD (gastroesophageal reflux disease)    Hyperlipidemia    HTN (hypertension)    Osteopenia    Raynaud's disease    DVT (deep venous thrombosis) (H)    Encounter for long-term current use of medication    Type 1 diabetes mellitus (H)    Osteoporosis, idiopathic    Osteoporosis, postmenopausal    Cystocele, midline    Urinary urgency    Urinary frequency    Female stress incontinence    Atrophic vaginitis    Long term current use of anticoagulant therapy    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)    Choroidal lesion    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, unspecified SLE type (H)    Hematoma of leg, right, initial encounter    Lupus erythematosus    Deep vein thrombosis (DVT) of proximal lower extremity, unspecified chronicity, unspecified laterality (H)     Past Medical History:   Diagnosis Date    Achalasia     Antiphospholipid syndrome (H24)     Antiplatelet or antithrombotic long-term use     Coagulation disorder (H24) 4072-9308    DM (diabetes mellitus) (H)     DVT (deep venous thrombosis) (H) 2003    and PE    Dysphagia     occasional, use Nifedipine    GERD (gastroesophageal reflux disease)     Hyperlipidemia     Lymphedema     Myopia     Neuropathy     toes bilateral    Nonsenile cataract     osteoporosis     Pericarditis     Raynaud's disease     SLE (systemic lupus  erythematosus) (H)        CC Referred Self, MD  No address on file on close of this encounter.

## 2024-06-03 NOTE — PATIENT INSTRUCTIONS
Patient Education   Efinaconazole Topical Solution (EFINACONAZOLE - TOPICAL)  For nail infection.  Brand Name(s): Jublia  Generic Name: Efinaconazole  Instructions  DO NOT take this medicine by mouth.  Wash the area and allow the skin to dry completely (10-15 minutes) before using this medicine.  Use the applicator to apply the medicine to the affected area.  Apply the medicine evenly over the entire nail. Apply to the skin underneath the nail where possible.  Allow the medicine to dry for about a minute before putting on socks or stockings.  Use only on the nails and surrounding skin. Avoid getting on other areas of the skin or in your eyes.  Store in original bottle, tightly closed and upright.  Keep the medicine at room temperature. Avoid heat and direct light.  Do not apply other lotions, creams or gels to the same place you apply this medicine.  Do not use nail polish or other nail products while using this medicine.  Avoid getting the medicine in the eyes, nose, or mouth. Wash the medicine off your fingers after applying it.  Wash your hands after using this medicine unless your hands are part of the area being treated.  Do not touch your eyes, nose or mouth after handling the medicine.  If medicine gets in your eyes, rinse well with warm water.  After applying medicine, try to keep the area dry.  Tell your doctor and pharmacist about all your medicines. Include prescription and over-the-counter medicines, vitamins, and herbal medicines.  Do not freeze the medicine.  This medicine is flammable. Do not use it near heaters, open flame or while smoking. Do not crush, puncture, or burn container.  Do not take the medicine more than once during 24 hours.  Cautions  Tell your doctor and pharmacist if you ever had an allergic reaction to a medicine.  Do not use the medication any more than instructed.  Tell the doctor or pharmacist if you are pregnant, planning to be pregnant, or breastfeeding.  It is important that  you keep taking each dose of this medicine on time even if you are feeling well.  If you forget to take a dose on time, take it as soon as you remember. If it is almost time for the next dose, do not take the missed dose. Return to your normal schedule. Do not take 2 doses at one time.  Do not share this medicine with anyone who has not been prescribed this medicine.  Side Effects  The following is a list of some common side effects from this medicine. Please speak with your doctor about what you should do if you experience these or other side effects.  burning or stinging  ingrown toenail  itching  skin irritation where medicine is applied  Call your doctor or get medical help right away if you notice any of these more serious side effects:  blistering or peeling of the skin  A few people may have an allergic reaction to this medicine. Symptoms can include difficulty breathing, skin rash, itching, swelling, or severe dizziness. If you notice any of these symptoms, seek medical help quickly.  Please speak with your doctor, nurse, or pharmacist if you have any questions about this medicine.  IMPORTANT NOTE: This document tells you briefly how to take your medicine, but it does not tell you all there is to know about it. Your doctor or pharmacist may give you other documents about your medicine. Please talk to them if you have any questions. Always follow their advice.  There is a more complete description of this medicine available in English. Scan this code on your smartphone or tablet or use the web address below. You can also ask your pharmacist for a printout. If you have any questions, please ask your pharmacist.  The display and use of this drug information is subject to Terms of Use.  More information about EFINACONAZOLE - TOPICAL      Copyright(c) 2023 [employer]  Selected from data included with permission and copyright by [employer]. This copyrighted material has been downloaded from a  licensed data provider and is not for distribution, except as may be authorized by the applicable terms of use.  Conditions of Use: The information in this database is intended to supplement, not substitute for the expertise and judgment of healthcare professionals. The information is not intended to cover all possible uses, directions, precautions, drug interactions or adverse effects nor should it be construed to indicate that use of a particular drug is safe, appropriate or effective for you or anyone else. A healthcare professional should be consulted before taking any drug, changing any diet or commencing or discontinuing any course of treatment. The display and use of this drug information is subject to express Terms of Use.  Care instructions adapted under license by your healthcare professional. If you have questions about a medical condition or this instruction, always ask your healthcare professional. [employer] disclaims any warranty or liability for your use of this information.       Patient Education   Urea Topical Cream (EMOLLIENTS - TOPICAL)  For skin disorder.  Brand Name(s): Aqua Care, Carmol, Gormel, Nutraplus, Ureacin-20  Generic Name: Urea  Instructions  DO NOT take this medicine by mouth.  Apply the medicine to the affected area.  Wash the area first before applying medicine.  Keep the medicine at room temperature. Avoid heat and direct light.  Do not apply other lotions, gels, creams or cosmetics for at least 30 minutes after using this medicine. It is best to avoid using these products on the affected area until you are done using this medicine.  Avoid getting the medicine in the eyes, nose, or mouth. Wash the medicine off your fingers after applying it.  Gently rub the medicine into area until evenly spread.  Use the medicine only on normal looking skin. Avoid areas of the skin that are red, have scrapes, or damaged.  Wash your hands after using this medicine unless your hands  are part of the area being treated.  Do not touch your eyes, nose or mouth after handling the medicine.  Tell your doctor and pharmacist about all your medicines. Include prescription and over-the-counter medicines, vitamins, and herbal medicines.  You may stop using this medicine if you no longer have symptoms.  This medicine is available over-the-counter and does not require a prescription.  Do not freeze the medicine.  Cautions  Tell your doctor and pharmacist if you ever had an allergic reaction to a medicine.  Do not use the medication any more than instructed.  It is unknown if this medicine passes into breast milk. Ask your doctor before breastfeeding.  During pregnancy, this medicine should be used only when clearly needed. Talk to your doctor about the risks and benefits.  Side Effects  The following is a list of some common side effects from this medicine. Please speak with your doctor about what you should do if you experience these or other side effects.  burning or stinging  Call your doctor or get medical help right away if you notice any of these more serious side effects:  red, burning, or itchy skin  A few people may have an allergic reaction to this medicine. Symptoms can include difficulty breathing, skin rash, itching, swelling, or severe dizziness. If you notice any of these symptoms, seek medical help quickly.  Please speak with your doctor, nurse, or pharmacist if you have any questions about this medicine.  IMPORTANT NOTE: This document tells you briefly how to take your medicine, but it does not tell you all there is to know about it. Your doctor or pharmacist may give you other documents about your medicine. Please talk to them if you have any questions. Always follow their advice.  There is a more complete description of this medicine available in English. Scan this code on your smartphone or tablet or use the web address below. You can also ask your pharmacist for a printout. If you have  any questions, please ask your pharmacist.  The display and use of this drug information is subject to Terms of Use.  More information about EMOLLIENTS - TOPICAL      Copyright(c) 2023 [employer]  Selected from data included with permission and copyright by [employer]. This copyrighted material has been downloaded from a licensed data provider and is not for distribution, except as may be authorized by the applicable terms of use.  Conditions of Use: The information in this database is intended to supplement, not substitute for the expertise and judgment of healthcare professionals. The information is not intended to cover all possible uses, directions, precautions, drug interactions or adverse effects nor should it be construed to indicate that use of a particular drug is safe, appropriate or effective for you or anyone else. A healthcare professional should be consulted before taking any drug, changing any diet or commencing or discontinuing any course of treatment. The display and use of this drug information is subject to express Terms of Use.  Care instructions adapted under license by your healthcare professional. If you have questions about a medical condition or this instruction, always ask your healthcare professional. [employer] disclaims any warranty or liability for your use of this information.

## 2024-06-07 DIAGNOSIS — E83.52 HYPERCALCEMIA: ICD-10-CM

## 2024-06-08 ENCOUNTER — LAB (OUTPATIENT)
Dept: LAB | Facility: CLINIC | Age: 79
End: 2024-06-08
Payer: COMMERCIAL

## 2024-06-08 DIAGNOSIS — E78.5 HYPERLIPIDEMIA, UNSPECIFIED HYPERLIPIDEMIA TYPE: ICD-10-CM

## 2024-06-08 DIAGNOSIS — I82.4Y9 DEEP VEIN THROMBOSIS (DVT) OF PROXIMAL LOWER EXTREMITY, UNSPECIFIED CHRONICITY, UNSPECIFIED LATERALITY (H): ICD-10-CM

## 2024-06-08 LAB
CHOLEST SERPL-MCNC: 149 MG/DL
FACT X ACT/NOR PPP CHRO: 24 % (ref 70–130)
FASTING STATUS PATIENT QL REPORTED: YES
HDLC SERPL-MCNC: 67 MG/DL
LDLC SERPL CALC-MCNC: 61 MG/DL
NONHDLC SERPL-MCNC: 82 MG/DL
TRIGL SERPL-MCNC: 103 MG/DL

## 2024-06-08 PROCEDURE — 36415 COLL VENOUS BLD VENIPUNCTURE: CPT | Performed by: PATHOLOGY

## 2024-06-08 PROCEDURE — 85260 CLOT FACTOR X STUART-POWER: CPT | Performed by: INTERNAL MEDICINE

## 2024-06-08 PROCEDURE — 80061 LIPID PANEL: CPT | Performed by: PATHOLOGY

## 2024-06-08 PROCEDURE — 99000 SPECIMEN HANDLING OFFICE-LAB: CPT | Performed by: PATHOLOGY

## 2024-06-10 ENCOUNTER — ANTICOAGULATION THERAPY VISIT (OUTPATIENT)
Dept: ANTICOAGULATION | Facility: CLINIC | Age: 79
End: 2024-06-10
Payer: COMMERCIAL

## 2024-06-10 DIAGNOSIS — D68.61 ANTIPHOSPHOLIPID SYNDROME (H): ICD-10-CM

## 2024-06-10 DIAGNOSIS — Z79.01 LONG TERM CURRENT USE OF ANTICOAGULANT THERAPY: Primary | ICD-10-CM

## 2024-06-10 DIAGNOSIS — I82.4Y9 DEEP VEIN THROMBOSIS (DVT) OF PROXIMAL LOWER EXTREMITY, UNSPECIFIED CHRONICITY, UNSPECIFIED LATERALITY (H): ICD-10-CM

## 2024-06-10 DIAGNOSIS — L93.0 LUPUS ERYTHEMATOSUS: ICD-10-CM

## 2024-06-10 NOTE — PROGRESS NOTES
ANTICOAGULATION MANAGEMENT     Shala Caruso 78 year old female is on warfarin with therapeutic result. (Goal Chromogenic Factor X 40-20%)    Recent labs: (last 7 days)     06/08/24  0708   [id number]*       ASSESSMENT     Source(s): Chart Review and Patient/Caregiver Call     Warfarin doses taken: Warfarin taken as instructed  Diet: No new diet changes identified  Medication/supplement changes: None noted  New illness, injury, or hospitalization: No  Signs or symptoms of bleeding or clotting: No  Previous result: Therapeutic last 2(+) visits  Additional findings: None    *new phone number given to patient*     PLAN     Recommended plan for no diet, medication or health factor changes affecting result    Dosing Instructions: Continue your current warfarin dose 7.5mg Tuesday & Friday, 5mg all other days   with next Chromogenic Factor X in 4 weeks   7/8/24    Telephone call with Jeanmarie who verbalizes understanding and agrees to plan and who agrees to plan and repeated back plan correctly    Lab visit scheduled    Education provided:   Please call back if any changes to your diet, medications or how you've been taking warfarin    Plan made per [facility] anticoagulation protocol    Siria Cook RN  Anticoagulation Clinic  6/10/2024

## 2024-06-18 RX ORDER — CALCIUM CARBONATE 500(1250)
1 TABLET ORAL DAILY
Qty: 90 TABLET | Refills: 3 | Status: SHIPPED | OUTPATIENT
Start: 2024-06-18

## 2024-06-18 NOTE — TELEPHONE ENCOUNTER
calcium carbonate (OS-GABBY) 500 MG TABS 90 tablet 3 2/9/2023     Last Office Visit: 2/19/24  Future Office visit:  8/19/24       Routing refill request to provider for review/approval because:  Not on Endocrine refill protocol    Heather Lowery RN  P Red Flag Triage/MRT

## 2024-07-03 ENCOUNTER — ANTICOAGULATION THERAPY VISIT (OUTPATIENT)
Dept: ANTICOAGULATION | Facility: CLINIC | Age: 79
End: 2024-07-03

## 2024-07-03 ENCOUNTER — LAB (OUTPATIENT)
Dept: LAB | Facility: CLINIC | Age: 79
End: 2024-07-03
Payer: COMMERCIAL

## 2024-07-03 DIAGNOSIS — I82.4Y9 DEEP VEIN THROMBOSIS (DVT) OF PROXIMAL LOWER EXTREMITY, UNSPECIFIED CHRONICITY, UNSPECIFIED LATERALITY (H): ICD-10-CM

## 2024-07-03 DIAGNOSIS — D68.61 ANTIPHOSPHOLIPID SYNDROME (H): ICD-10-CM

## 2024-07-03 DIAGNOSIS — L93.0 LUPUS ERYTHEMATOSUS: ICD-10-CM

## 2024-07-03 DIAGNOSIS — Z79.01 LONG TERM CURRENT USE OF ANTICOAGULANT THERAPY: Primary | ICD-10-CM

## 2024-07-03 LAB — FACT X ACT/NOR PPP CHRO: 26 % (ref 70–130)

## 2024-07-03 PROCEDURE — 85260 CLOT FACTOR X STUART-POWER: CPT | Performed by: INTERNAL MEDICINE

## 2024-07-03 PROCEDURE — 99000 SPECIMEN HANDLING OFFICE-LAB: CPT | Performed by: PATHOLOGY

## 2024-07-03 PROCEDURE — 36415 COLL VENOUS BLD VENIPUNCTURE: CPT | Performed by: PATHOLOGY

## 2024-07-03 NOTE — PROGRESS NOTES
ANTICOAGULATION MANAGEMENT     Shala Caruso 78 year old female is on warfarin with therapeutic result. (Goal Chromogenic Factor X 40-20%)    Recent labs: (last 7 days)     07/03/24  1322   [id number]*       ASSESSMENT     Source(s): Chart Review and Patient/Caregiver Call     Warfarin doses taken: Warfarin taken as instructed  Diet: No new diet changes identified   Medication/supplement changes: None noted  New illness, injury, or hospitalization: No  Signs or symptoms of bleeding or clotting: No  Previous result: Therapeutic last 2(+) visits  Additional findings:  States she might eat a little more greens in the future, but does not know when that change will occur. She is aware of impact of vit k on INR and will update [facility] if she makes any significant changes.      PLAN     Recommended plan for no diet, medication or health factor changes affecting result    Dosing Instructions: Continue your current warfarin dose 7.5 mg Tues/Fri; 5 mg all other days  with next Chromogenic Factor X in 4-6 weeks       Telephone call with Jeanmarie who verbalizes understanding and agrees to plan    Lab visit scheduled - with previously scheduled labs on 8/12    Education provided:   Goal range and lab monitoring: goal range and significance of current result and Importance of therapeutic range  Dietary considerations: importance of consistent vitamin K intake and impact of vitamin K foods on INR    Plan made per [facility] anticoagulation protocol    Africa Fernandez, RN  Anticoagulation Clinic  7/3/2024

## 2024-07-05 ENCOUNTER — TELEPHONE (OUTPATIENT)
Dept: INTERNAL MEDICINE | Facility: CLINIC | Age: 79
End: 2024-07-05
Payer: COMMERCIAL

## 2024-07-05 NOTE — TELEPHONE ENCOUNTER
[facility] Call Center    Phone Message    May a detailed message be left on voicemail: yes     Reason for Call: On 7/12/24 patient is coming in for a check and possible removal of an odd shaped wart/mole in the left armpit, should she be seeing you for this or dermatology?  Please call.       Action Taken: Message routed to:  [facility]    Travel Screening: Not Applicable     Date of Service:

## 2024-07-05 NOTE — TELEPHONE ENCOUNTER
Spoke to patient.  Keep appointment with Dr. Contreras to evaluate mole.         Jamal Corbett CMA ([facility]) at 10:04 AM on 7/5/2024

## 2024-07-12 ENCOUNTER — OFFICE VISIT (OUTPATIENT)
Dept: INTERNAL MEDICINE | Facility: CLINIC | Age: 79
End: 2024-07-12
Payer: COMMERCIAL

## 2024-07-12 VITALS — DIASTOLIC BLOOD PRESSURE: 67 MMHG | OXYGEN SATURATION: 98 % | SYSTOLIC BLOOD PRESSURE: 119 MMHG | HEART RATE: 55 BPM

## 2024-07-12 DIAGNOSIS — L82.1 SEBORRHEIC KERATOSES: Primary | ICD-10-CM

## 2024-07-12 DIAGNOSIS — L29.9 ITCHING: ICD-10-CM

## 2024-07-12 PROCEDURE — 99212 OFFICE O/P EST SF 10 MIN: CPT | Mod: 25 | Performed by: INTERNAL MEDICINE

## 2024-07-12 PROCEDURE — 17110 DESTRUCTION B9 LES UP TO 14: CPT | Performed by: INTERNAL MEDICINE

## 2024-07-12 RX ORDER — LIDOCAINE 50 MG/G
OINTMENT TOPICAL AT BEDTIME
Qty: 50 G | Refills: 5 | Status: SHIPPED | OUTPATIENT
Start: 2024-07-12

## 2024-07-12 NOTE — PROGRESS NOTES
HPI  Patient here today for a couple of issues.  The predominant 1 is she has been bothered by a pigmented raised lesion on her left lateral axillary fold.  This has not been bleeding has not changed in color or shape but she would like it removed.  She is also had ongoing issues with nocturnal itching of her right heel this is always in the same location it is associated with some moderate severity it has been unresponsive to topical lotion and lubrication.  There is been no associated rash in association with this and she is only experience it at night.  She had no other rash itching elsewhere.  Past Medical History:   Diagnosis Date    Achalasia     Antiphospholipid syndrome (H24)     Antiplatelet or antithrombotic long-term use     Coagulation disorder (H24) [id number]    DM (diabetes mellitus) (H)     DVT (deep venous thrombosis) (H) 2003    and PE    Dysphagia     occasional, use Nifedipine    GERD (gastroesophageal reflux disease)     Hyperlipidemia     Lymphedema     Myopia     Neuropathy     toes bilateral    Nonsenile cataract     osteoporosis     Pericarditis     Raynaud's disease     SLE (systemic lupus erythematosus) (H)      Past Surgical History:   Procedure Laterality Date    CATARACT IOL, RT/LT Left 02/2019    CATARACT IOL, RT/LT Right 01/2019    COLONOSCOPY N/A 11/28/2016    Procedure: COLONOSCOPY;  Surgeon: Sang August MD;  [facility]: [facility]    CYSTOSCOPY, SLING TRANSVAGINAL N/A 12/20/2016    Procedure: CYSTOSCOPY, SLING TRANSVAGINAL;  Surgeon: Jeanmarie Pierson MD;  Location: [facility]    DISSECT LYMPH NODE AXILLA  2004    Lt, during eval for SLE    HYSTEROSCOPIC PLACEMENT CONTRACEPTIVE DEVICE  1985    PHACOEMULSIFICATION WITH STANDARD INTRAOCULAR LENS IMPLANT Right 1/8/2019    Procedure: Right Eye Cataract Extraction with Intraocular Lens;  Surgeon: Monika Fermin MD;  Location: [facility]    PHACOEMULSIFICATION WITH STANDARD INTRAOCULAR LENS IMPLANT Left 2/5/2019    Procedure: Left Eye  Cataract Extraction with Intraocular Lens;  Surgeon: Monika Fermin MD;  Location: [facility]    TUBAL LIGATION Bilateral      Family History   Problem Relation Age of Onset    Glaucoma Father     Cancer Other         breast    Cerebrovascular Disease Other     C.A.D. Other     Macular Degeneration No family hx of     Skin Cancer No family hx of          Exam:  /67 (BP Location: Right arm, Patient Position: Sitting, Cuff Size: Adult Regular)   Pulse 55   LMP  (LMP Unknown)   SpO2 98%   The patient is alert, oriented with a clear sensorium.   Skin shows a 6 mm pigmented seborrheic keratosis on her left lateral axillary fold which was treated with liquid nitrogen  Head is normocephalic and atraumatic.    Extremities show no edema tenderness or deformity of the right heel.        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  Will have her follow-up as needed regarding the seborrheic keratosis does not resolve completely    This note was completed using Dragon voice recognition software.      Joe Contreras MD  General Internal Medicine  [facility]  [phone]

## 2024-07-12 NOTE — PROGRESS NOTES
Jeanmarie is a 78 year old that presents in clinic today for the following:     Chief Complaint   Patient presents with    General Visit     Check and possibly remove an odd shaped wart/mole in the left armpit            7/12/2024    12:51 PM   Additional Questions   Roomed by EVELIA, EMT     Screenings as of today     Fallen 2 or more times in the past year? NATALYA Gutierrez at 12:54 PM on 7/12/2024

## 2024-08-01 NOTE — PROGRESS NOTES
08/01/24 10:07 AM  PATIENT LAB/IMAGING STATUS : Appt scheduled / Day of appt for 8/12/2024.  Ruth Kathleen

## 2024-08-12 ENCOUNTER — TELEPHONE (OUTPATIENT)
Dept: OPHTHALMOLOGY | Facility: CLINIC | Age: 79
End: 2024-08-12

## 2024-08-12 ENCOUNTER — LAB (OUTPATIENT)
Dept: LAB | Facility: CLINIC | Age: 79
End: 2024-08-12
Payer: COMMERCIAL

## 2024-08-12 DIAGNOSIS — I82.4Y9 DEEP VEIN THROMBOSIS (DVT) OF PROXIMAL LOWER EXTREMITY, UNSPECIFIED CHRONICITY, UNSPECIFIED LATERALITY (H): ICD-10-CM

## 2024-08-12 DIAGNOSIS — E83.52 HYPERCALCEMIA: ICD-10-CM

## 2024-08-12 DIAGNOSIS — M32.9 SYSTEMIC LUPUS ERYTHEMATOSUS, UNSPECIFIED SLE TYPE, UNSPECIFIED ORGAN INVOLVEMENT STATUS (H): ICD-10-CM

## 2024-08-12 DIAGNOSIS — E78.5 HYPERLIPIDEMIA, UNSPECIFIED HYPERLIPIDEMIA TYPE: ICD-10-CM

## 2024-08-12 LAB
ALBUMIN SERPL BCG-MCNC: 3.9 G/DL (ref 3.5–5.2)
ALP SERPL-CCNC: 51 U/L (ref 40–150)
ALT SERPL W P-5'-P-CCNC: 23 U/L (ref 0–50)
ANION GAP SERPL CALCULATED.3IONS-SCNC: 9 MMOL/L (ref 7–15)
AST SERPL W P-5'-P-CCNC: 28 U/L (ref 0–45)
BASOPHILS # BLD AUTO: 0 10E3/UL (ref 0–0.2)
BASOPHILS NFR BLD AUTO: 1 %
BILIRUB SERPL-MCNC: 0.4 MG/DL
BUN SERPL-MCNC: 20.8 MG/DL (ref 8–23)
CALCIUM SERPL-MCNC: 9.2 MG/DL (ref 8.8–10.4)
CHLORIDE SERPL-SCNC: 106 MMOL/L (ref 98–107)
CHOLEST SERPL-MCNC: 148 MG/DL
CREAT SERPL-MCNC: 0.63 MG/DL (ref 0.51–0.95)
EGFRCR SERPLBLD CKD-EPI 2021: >90 ML/MIN/1.73M2
EOSINOPHIL # BLD AUTO: 0.1 10E3/UL (ref 0–0.7)
EOSINOPHIL NFR BLD AUTO: 3 %
ERYTHROCYTE [DISTWIDTH] IN BLOOD BY AUTOMATED COUNT: 15.9 % (ref 10–15)
FASTING STATUS PATIENT QL REPORTED: NO
FASTING STATUS PATIENT QL REPORTED: NO
GLUCOSE SERPL-MCNC: 93 MG/DL (ref 70–99)
HCO3 SERPL-SCNC: 26 MMOL/L (ref 22–29)
HCT VFR BLD AUTO: 38.7 % (ref 35–47)
HDLC SERPL-MCNC: 63 MG/DL
HGB BLD-MCNC: 12.3 G/DL (ref 11.7–15.7)
IMM GRANULOCYTES # BLD: 0 10E3/UL
IMM GRANULOCYTES NFR BLD: 0 %
LDLC SERPL CALC-MCNC: 72 MG/DL
LYMPHOCYTES # BLD AUTO: 0.7 10E3/UL (ref 0.8–5.3)
LYMPHOCYTES NFR BLD AUTO: 31 %
MCH RBC QN AUTO: 27.8 PG (ref 26.5–33)
MCHC RBC AUTO-ENTMCNC: 31.8 G/DL (ref 31.5–36.5)
MCV RBC AUTO: 87 FL (ref 78–100)
MONOCYTES # BLD AUTO: 0.2 10E3/UL (ref 0–1.3)
MONOCYTES NFR BLD AUTO: 7 %
NEUTROPHILS # BLD AUTO: 1.3 10E3/UL (ref 1.6–8.3)
NEUTROPHILS NFR BLD AUTO: 58 %
NONHDLC SERPL-MCNC: 85 MG/DL
NRBC # BLD AUTO: 0 10E3/UL
NRBC BLD AUTO-RTO: 0 /100
PHOSPHATE SERPL-MCNC: 3.2 MG/DL (ref 2.5–4.5)
PLATELET # BLD AUTO: 178 10E3/UL (ref 150–450)
POTASSIUM SERPL-SCNC: 3.9 MMOL/L (ref 3.4–5.3)
PROT SERPL-MCNC: 6.6 G/DL (ref 6.4–8.3)
PTH-INTACT SERPL-MCNC: 34 PG/ML (ref 15–65)
RBC # BLD AUTO: 4.43 10E6/UL (ref 3.8–5.2)
SODIUM SERPL-SCNC: 141 MMOL/L (ref 135–145)
TRIGL SERPL-MCNC: 64 MG/DL
WBC # BLD AUTO: 2.3 10E3/UL (ref 4–11)

## 2024-08-12 PROCEDURE — 86160 COMPLEMENT ANTIGEN: CPT | Performed by: INTERNAL MEDICINE

## 2024-08-12 PROCEDURE — 82306 VITAMIN D 25 HYDROXY: CPT | Performed by: INTERNAL MEDICINE

## 2024-08-12 PROCEDURE — 99000 SPECIMEN HANDLING OFFICE-LAB: CPT | Performed by: PATHOLOGY

## 2024-08-12 PROCEDURE — 84100 ASSAY OF PHOSPHORUS: CPT | Performed by: PATHOLOGY

## 2024-08-12 PROCEDURE — 83970 ASSAY OF PARATHORMONE: CPT | Performed by: PATHOLOGY

## 2024-08-12 PROCEDURE — 36415 COLL VENOUS BLD VENIPUNCTURE: CPT | Performed by: PATHOLOGY

## 2024-08-12 PROCEDURE — 85025 COMPLETE CBC W/AUTO DIFF WBC: CPT | Performed by: PATHOLOGY

## 2024-08-12 PROCEDURE — 80053 COMPREHEN METABOLIC PANEL: CPT | Performed by: PATHOLOGY

## 2024-08-12 PROCEDURE — 86225 DNA ANTIBODY NATIVE: CPT | Performed by: INTERNAL MEDICINE

## 2024-08-12 PROCEDURE — 80061 LIPID PANEL: CPT | Performed by: PATHOLOGY

## 2024-08-12 PROCEDURE — 85260 CLOT FACTOR X STUART-POWER: CPT | Performed by: INTERNAL MEDICINE

## 2024-08-13 ENCOUNTER — ANTICOAGULATION THERAPY VISIT (OUTPATIENT)
Dept: ANTICOAGULATION | Facility: CLINIC | Age: 79
End: 2024-08-13
Payer: COMMERCIAL

## 2024-08-13 DIAGNOSIS — I82.4Y9 DEEP VEIN THROMBOSIS (DVT) OF PROXIMAL LOWER EXTREMITY, UNSPECIFIED CHRONICITY, UNSPECIFIED LATERALITY (H): ICD-10-CM

## 2024-08-13 DIAGNOSIS — L93.0 LUPUS ERYTHEMATOSUS: ICD-10-CM

## 2024-08-13 DIAGNOSIS — Z79.01 LONG TERM CURRENT USE OF ANTICOAGULANT THERAPY: Primary | ICD-10-CM

## 2024-08-13 DIAGNOSIS — D68.61 ANTIPHOSPHOLIPID SYNDROME (H): ICD-10-CM

## 2024-08-13 LAB
C3 SERPL-MCNC: 60 MG/DL (ref 81–157)
C4 SERPL-MCNC: 7 MG/DL (ref 13–39)
COLLECT DURATION TIME UR: 24 H
CREAT 24H UR-MRATE: 0.67 G/SPEC (ref 0.72–1.51)
CREAT UR-MCNC: 27.7 MG/DL
DSDNA AB SER-ACNC: 3.9 IU/ML
FACT X ACT/NOR PPP CHRO: 22 % (ref 70–130)
SPECIMEN VOL UR: 2416 ML

## 2024-08-13 PROCEDURE — 82570 ASSAY OF URINE CREATININE: CPT | Performed by: PATHOLOGY

## 2024-08-13 PROCEDURE — 99000 SPECIMEN HANDLING OFFICE-LAB: CPT | Performed by: PATHOLOGY

## 2024-08-13 PROCEDURE — 81050 URINALYSIS VOLUME MEASURE: CPT | Performed by: PATHOLOGY

## 2024-08-13 PROCEDURE — 81050 URINALYSIS VOLUME MEASURE: CPT | Performed by: INTERNAL MEDICINE

## 2024-08-13 NOTE — PROGRESS NOTES
ANTICOAGULATION MANAGEMENT     Shala Caruso 78 year old female is on warfarin with therapeutic result. (Goal Chromogenic Factor X 40-20%)    Recent labs: (last 7 days)     08/12/24  1318   [id number] 22*       ASSESSMENT     Source(s): Chart Review and Patient/Caregiver Call     Warfarin doses taken: Warfarin taken as instructed  Diet: No new diet changes identified  Medication/supplement changes: None noted  New illness, injury, or hospitalization: No  Signs or symptoms of bleeding or clotting: No  Previous result: Therapeutic last 2(+) visits  Additional findings: None     PLAN     Recommended plan for no diet, medication or health factor changes affecting result    Dosing Instructions: Continue your current warfarin dose 7.5mg Tuesday & Friday, 5mg All other days   with next Chromogenic Factor X in 6 weeks   (9/24/24)    Telephone call with Jeanmarie who verbalizes understanding and agrees to plan and who agrees to plan and repeated back plan correctly    Lab visit scheduled    Education provided:   Please call back if any changes to your diet, medications or how you've been taking warfarin    Plan made per [facility] anticoagulation protocol    Siria Cook RN  Anticoagulation Clinic  8/13/2024

## 2024-08-14 LAB
CALCIUM 24H UR-MRATE: 0.37 G/SPEC (ref 0.1–0.3)
CALCIUM UR-MCNC: 15.2 MG/DL
COLLECT DURATION TIME UR: 24 H
DEPRECATED CALCIDIOL+CALCIFEROL SERPL-MC: <52 UG/L (ref 20–75)
SPECIMEN VOL UR: 2416 ML
VITAMIN D2 SERPL-MCNC: <5 UG/L
VITAMIN D3 SERPL-MCNC: 47 UG/L

## 2024-08-14 NOTE — PROGRESS NOTES
ANTICOAGULATION MANAGEMENT     Shala Caruso 77 year old female is on warfarin with supratherapeutic result. (Goal Factor II: 25-15%)    Recent labs: (last 7 days)     12/06/22  1332   F2 13*       ASSESSMENT     Source(s): Chart Review       Warfarin doses taken: Reviewed in chart-booster dose 13 days ago    Diet: No new diet changes identified    New illness, injury, or hospitalization: No    Medication/supplement changes: None noted    Signs or symptoms of bleeding or clotting: No    Previous result: Subtherapeutic    Additional findings: None       PLAN     Recommended plan for temporary change(s) affecting result    Dosing Instructions: hold 1 doses then continue your current warfarin dose 10 mg every Tues; 7.5 mg all other days with next Factor II in 1 week       Detailed voice message left for Jeanmarie with dosing instructions and follow up date.   Sent Snapflow message with dosing and follow up instructions    Contact [phone] to schedule and with any changes, questions or concerns.     Education provided:     Please call back if any changes to your diet, medications or how you've been taking warfarin    Symptom monitoring: monitoring for bleeding signs and symptoms    Contact [phone] with any changes, questions or concerns.     Plan made per [facility] anticoagulation protocol    
Return call received from Jeanmarie. No changes to health, diet or medications except booster dose two weeks ago. She held her Warfarin last evening, agrees to continue current maintenance dose with a recheck Factor 2 scheduled. Tracking calendar updated.    KYRA BRANTLEY RN-BC, [facility]  Anticoagulation Clinic  [phone]    
same as pt

## 2024-08-19 ENCOUNTER — LAB (OUTPATIENT)
Dept: LAB | Facility: CLINIC | Age: 79
End: 2024-08-19
Payer: COMMERCIAL

## 2024-08-19 ENCOUNTER — OFFICE VISIT (OUTPATIENT)
Dept: ENDOCRINOLOGY | Facility: CLINIC | Age: 79
End: 2024-08-19
Payer: COMMERCIAL

## 2024-08-19 VITALS
OXYGEN SATURATION: 99 % | WEIGHT: 104 LBS | HEART RATE: 61 BPM | SYSTOLIC BLOOD PRESSURE: 114 MMHG | HEIGHT: 65 IN | DIASTOLIC BLOOD PRESSURE: 73 MMHG | BODY MASS INDEX: 17.33 KG/M2

## 2024-08-19 DIAGNOSIS — R22.1 PALPABLE MASS OF NECK: ICD-10-CM

## 2024-08-19 DIAGNOSIS — E10.649 TYPE 1 DIABETES MELLITUS WITH HYPOGLYCEMIA AND WITHOUT COMA (H): Primary | ICD-10-CM

## 2024-08-19 DIAGNOSIS — E21.0 PRIMARY HYPERPARATHYROIDISM (H): ICD-10-CM

## 2024-08-19 DIAGNOSIS — M81.0 OSTEOPOROSIS, POSTMENOPAUSAL: ICD-10-CM

## 2024-08-19 LAB
HBA1C MFR BLD: 5.3 %
TSH SERPL DL<=0.005 MIU/L-ACNC: 1 UIU/ML (ref 0.3–4.2)

## 2024-08-19 PROCEDURE — 83036 HEMOGLOBIN GLYCOSYLATED A1C: CPT | Performed by: PATHOLOGY

## 2024-08-19 PROCEDURE — G2211 COMPLEX E/M VISIT ADD ON: HCPCS | Performed by: INTERNAL MEDICINE

## 2024-08-19 PROCEDURE — 99215 OFFICE O/P EST HI 40 MIN: CPT | Performed by: INTERNAL MEDICINE

## 2024-08-19 PROCEDURE — 36415 COLL VENOUS BLD VENIPUNCTURE: CPT | Performed by: PATHOLOGY

## 2024-08-19 PROCEDURE — 84443 ASSAY THYROID STIM HORMONE: CPT | Performed by: PATHOLOGY

## 2024-08-19 RX ORDER — ZOLEDRONIC ACID 5 MG/100ML
5 INJECTION, SOLUTION INTRAVENOUS ONCE
Start: 2024-08-26

## 2024-08-19 RX ORDER — ACETAMINOPHEN 325 MG/1
650 TABLET ORAL
OUTPATIENT
Start: 2024-08-26

## 2024-08-19 RX ORDER — EPINEPHRINE 1 MG/ML
0.3 INJECTION, SOLUTION, CONCENTRATE INTRAVENOUS EVERY 5 MIN PRN
OUTPATIENT
Start: 2024-08-26

## 2024-08-19 RX ORDER — DIPHENHYDRAMINE HYDROCHLORIDE 50 MG/ML
50 INJECTION INTRAMUSCULAR; INTRAVENOUS
Start: 2024-08-26

## 2024-08-19 RX ORDER — METHYLPREDNISOLONE SODIUM SUCCINATE 125 MG/2ML
125 INJECTION, POWDER, LYOPHILIZED, FOR SOLUTION INTRAMUSCULAR; INTRAVENOUS
Start: 2024-08-26

## 2024-08-19 RX ORDER — MEPERIDINE HYDROCHLORIDE 25 MG/ML
25 INJECTION INTRAMUSCULAR; INTRAVENOUS; SUBCUTANEOUS EVERY 30 MIN PRN
OUTPATIENT
Start: 2024-08-26

## 2024-08-19 RX ORDER — HEPARIN SODIUM,PORCINE 10 UNIT/ML
5-20 VIAL (ML) INTRAVENOUS DAILY PRN
OUTPATIENT
Start: 2024-08-26

## 2024-08-19 RX ORDER — ALBUTEROL SULFATE 90 UG/1
1-2 AEROSOL, METERED RESPIRATORY (INHALATION)
Start: 2024-08-26

## 2024-08-19 RX ORDER — HEPARIN SODIUM (PORCINE) LOCK FLUSH IV SOLN 100 UNIT/ML 100 UNIT/ML
5 SOLUTION INTRAVENOUS
OUTPATIENT
Start: 2024-08-26

## 2024-08-19 RX ORDER — INSULIN ASPART 100 [IU]/ML
INJECTION, SOLUTION INTRAVENOUS; SUBCUTANEOUS
Qty: 9 ML | Refills: 3 | Status: SHIPPED | OUTPATIENT
Start: 2024-08-19

## 2024-08-19 RX ORDER — ALBUTEROL SULFATE 0.83 MG/ML
2.5 SOLUTION RESPIRATORY (INHALATION)
OUTPATIENT
Start: 2024-08-26

## 2024-08-19 ASSESSMENT — PAIN SCALES - GENERAL: PAINLEVEL: NO PAIN (0)

## 2024-08-19 NOTE — PATIENT INSTRUCTIONS
Please reach out to the following centers to schedule your appointment:       Imaging (DEXA, CT, MRI, XRAY)    [facility] ([facility] [place] [place]) [phone]   [facility] ([place], [place]) [phone]   [facility] ([facility]) [phone]   [facility] ([facility]) [phone]       [place] [phone]   [facility] [phone]   [place] [phone]   [facility]  [phone]   [facility] [phone]   [place] [phone]   [facility] [phone]   [facility] [place] Walk-[facility] Only   [place] [phone]   [place] [phone]   [place] [phone]   [place] [phone]       Infusion    [facility] [phone]   [place] [phone]   [place] [phone]   [place] [phone]   [place] [phone]   [place] [phone]   [facility] [phone]     For any questions, please reach out to the Endocrinology Clinic Number for assistance: [phone].

## 2024-08-19 NOTE — PROGRESS NOTES
Assessment and Plan:    Jeanmarie is a 78 year old pleasant female with hx of SLE, APLS, DVT, osteoporosis and type 1 diabetes presenting for f/up.    1) Type 1 diabetes, formally diagnosed in 2010 while being evaluated for steroid-induced hyperglycemia.  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  She has been taking small dosages of short acting insulin.  Lantus was discontinued in view of hypoglycemia and low insulin requirements.  The patient prefers to maintain a very tight glycemic control despite my recommendations of increasing the overall average target blood glucose.    2) Osteoporosis, now osteopenia  Risk factors for osteoporosis include postmenopausal status, lupus, prior treatment with steroids, diabetes, fam history.   She was treated with Boniva for 3 years, followed by Forteo for 2 years. Received one dose of Reclast in July 2014, when she completed treatment with Forteo.  Treatment with Reclast was resumed in July 2020, when the DEXA scan revealed some loss of bone mineral density, although the lowest T score remained in the osteopenic range.  She received subsequent Reclast dosages in in 2021 and 2023. Most recent DXA from 8/23 from showed improvement of bone mineral density at the average hip.  Plan:   Walking, weightbearing exercises encouraged  Schedule a 4th dose of Reclast and a f/up DXA scan in 8/2025     3) Hypercalcemia, mild and associated with significant hypercalciuria  Lab work is suggestive of primary hyperparathyroidism.  I have counseled the patient on the etiology of this condition and the option of pursuing parathyroidectomy.  Also discussed about the option of considering treatment with hydrochlorothiazide in order to address the hypercalciuria.  The patient declined treatment with hydrochlorothiazide in view of her allergy to sulfas (allergic rash).    Plan:  Pursue a parathyroid scan.    4) Palpable right neck nodule  The patient endorses some sore  throat for which she is going to follow-up on with her primary care provider.  Schedule a neck ultrasound.    Considering that this might be a thyroid nodule, I advised the patient to have a reflex TSH checked today.    Orders Placed This Encounter   Procedures    NM Parathyroid Planar w/Spect Dual Isotope    US Head Neck Soft Tissue    AFINION HEMOGLOBIN A1C POCT    TSH with free T4 reflex     ============================================================================================================================================  Jeanmarie is a 78 year old pleasant female with hx of SLE, APLS, DVT, osteoporosis and type 1 diabetes presenting for f/up.   Her last clinic visit was on 2/19/2024.     Interval history:    1. Type 1 diabetes  Lantus was discontinued and in 2020 and her diabetes is now managed only with NovoLog.    On average, she reports taking 1-2.5 units per meal. In general she has 2 meals a day, lunch and dinner.     Most recent A1c was 5.5, in 2/2024. It was 5.3 % today.   Weight was 107 lbs in 2/2024, down 20 lbs in the prior year. Today, it was 104 lbs.  Feels good. Appetite OK.    Diabetes complications:   No h/o microalbuminuria.  Most recent urine microalbumin negative in 2/24. GFR >90 on labs from 8/24  Last eye exam -September 2023. No DR. S/P cataract surgery.  Numbness and tingling sensation B/L toes - for many years but light sensation is intact. She does wear comfortable shoes/insoles. 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 With walking she might  get a burning sensation.   She develops confusion, inability to concentrate or focus her vision and she feels extremely tired when her blood sugar is the 60s or 50s.  She has no prior episodes of loss of consciousness due to hypoglycemia.  She does not always recognize the lows.   She does have Baqsimi at home.  Most recent lipid panel from 8/12/24: LDL cholesterol 72, HDL cholesterol 63, triglycerides 64.  On 40 mg  simvastatin daily.  Exercise: mainly walking     I reviewed the Siddharth data. Average glucose is 98, with a glucose variability of 12.5%, corresponding to an estimated GMI of 5.7%.  98% of the glucose numbers are within target, 2% are in the mild hypoglycemic range.  Most of the hypoglycemic episodes are mild and tend to occur in the afternoon or evening.  Novolog dose is anywhere between 5-7 units daily.  The patient is very rarely symptomatic during the hypoglycemic episodes.     2. Osteoporosis  Jeanmarie also has a history of osteoporosis, treated with Boniva for almost 3 years, followed by Forteo which was started in 4/12 - interrupted treatment from 4/2013 and restarted in 7/2013 until July 2014. After she completed the treatment with Forteo, she received one dose of Reclast, in July 2014.      She has no history of prior bone fractures.  She's been off prednisone since 2013. Her height has decreased over the years from 5'6'' to 5'1/2''. Her mother had a hip fracture in her 80s.      DXA 7/1/16:  L1-L3 T score was - 1.  Overall, at the spine, there was a significant increase of bone mineral density since 2005 of 10.5%. Since 2015, the bone mineral density has remained stable at spine. The lowest T score at the hip level was -2.2, at the left femoral neck.  Overall, there was a significant improvement of bone mineral density at the mean hip of 8.9% since 2005, with no significant changes since 2015. While the bone mineral density at the left hip increased since baseline, at the right hip, there was a decrease of bone mineral density since baseline and also, since prior year.     DXA 7/27/18:   The lowest T score was -2.1, at the left femoral neck.  Compared with the prior scan from 2016, the bone mineral density at the hips remained stable.  At the lumbar spine, L1-L3 T score was -1.3, not significantly changed since 2016.     DXA 1/2020:  L2-L4 T score was -1.5. At the hips, the lowest score was -2.3, at the left  femoral neck. Compared with the prior DEXA scan from 2018, there was a significant decrease in bone mineral density of the lumbar spine, left total hip and right total hip by 3.1, 4.7 and 3.5%, respectively.  The patient received zoledronic acid infusion on 7/30/2020 and 7/27/21.     DXA 6/16/2022:  T score: -0.9 at L1-L4, -0.3 at 33% distal radius, -1.7 at both right and left femoral necks, -1.8 at the left total hip and -1.9 at the right total hip.  At the radius and average hip, the bone mineral density remained stable since 2020.  At the spine, there was a significant increase of 4.5%.    DXA 8/1/23:  L2-L4 T score -1.4  Left femoral neck T score -1.8, total L hip T score -1.5  Right femoral neck T score -1.5, total R hip T score -1.6  Bone density compared to the prior study increased at the mean total femur by +4.4%.  A third dose of Reclast was administered on 8/29/23.      3. Hypercalcemia   The patient developed mild hypercalcemia in 2023.  The hypercalcemia has been associated with a normal PTH, normal phosphorus, and elevated 24-hour urinary calcium.    Pertinent labs reviewed:  3/24/2023 calcium 10.4, GFR 73, vitamin D 63, albumin 4.2, PTH 34, phosphorus 4.2  6/5/2023 24-hour urinary calcium 0.34 g, urine creatinine 1.03 g, urine volume 1.5 L  8/7/23 PTH 35, calcium 10.9, phos 3.5, albumin 4.5, vitamin D 56   12/20/23 PTH 33, calcium 9.4, phos 4.1, albumin 4, GFR >90    12/9/2023 24-hour urinary calcium 0.38 g, creatinine 0.92 g, volume 2.325 L.   2/15/24 ionized calcium 5.6 (normal range 4.4-5.2)  2/15/24 24 hrs urinary calcium 0.38 g, creatinine 0.92 g   8/12/2024 vitamin D 52, PTH 34, phosphorus 3.2  8/13/2020 four 24-hour urinary calcium 0.37 g, urine creatinine 0.67 g and urine volume 2.4 L.    One cup of almond milk daily and 1-2 servings of dairies daily.   In terms of calcium and vitamin D supplements, she reports taking 500 mg calcium as a oyster shell and 1000 U vitamin D daily.   Liquid intake  is variable.     Past Medical History  Past Medical History:   Diagnosis Date    Achalasia     Antiphospholipid syndrome (H24)     Antiplatelet or antithrombotic long-term use     Coagulation disorder (H24) 6876-6131    DM (diabetes mellitus) (H)     DVT (deep venous thrombosis) (H) 2003    and PE    Dysphagia     occasional, use Nifedipine    GERD (gastroesophageal reflux disease)     Hyperlipidemia     Lymphedema     Myopia     Neuropathy     toes bilateral    Nonsenile cataract     osteoporosis     Pericarditis     Raynaud's disease     SLE (systemic lupus erythematosus) (H)      Past Surgical History  Past Surgical History:   Procedure Laterality Date    CATARACT IOL, RT/LT Left 02/2019    CATARACT IOL, RT/LT Right 01/2019    COLONOSCOPY N/A 11/28/2016    Procedure: COLONOSCOPY;  Surgeon: Sang August MD;  Location: [facility]    CYSTOSCOPY, SLING TRANSVAGINAL N/A 12/20/2016    Procedure: CYSTOSCOPY, SLING TRANSVAGINAL;  Surgeon: Jeanmarie Pierson MD;  Location: [facility]    DISSECT LYMPH NODE AXILLA  2004    Lt, during eval for SLE    HYSTEROSCOPIC PLACEMENT CONTRACEPTIVE DEVICE  1985    PHACOEMULSIFICATION WITH STANDARD INTRAOCULAR LENS IMPLANT Right 1/8/2019    Procedure: Right Eye Cataract Extraction with Intraocular Lens;  Surgeon: Monika Fermin MD;  Location: [facility]    PHACOEMULSIFICATION WITH STANDARD INTRAOCULAR LENS IMPLANT Left 2/5/2019    Procedure: Left Eye Cataract Extraction with Intraocular Lens;  Surgeon: Monika Fermin MD;  Location: [facility] OR    TUBAL LIGATION Bilateral         Medications    Current Outpatient Medications:     acetaminophen (TYLENOL) 500 MG tablet, Take 1,000-1,500 mg by mouth nightly as needed , Disp: , Rfl:     aspirin 81 MG tablet, Take 81 mg by mouth At Bedtime , Disp: , Rfl:     AYR SALINE NASAL NO-DRIP GEL, Use as needed for nasal dryness, Disp: 3 Tube, Rfl: 3    blood glucose (TRUE METRIX BLOOD GLUCOSE TEST) test strip, USE TO TEST BLOOD SUGAR 5 TIMES  DAILY OR AS DIRECTED, Disp: 500 strip, Rfl: 2    blood glucose monitoring (ULTRA THIN 30G) lancets, Test 5 times daily  [employer]  Super thin, Disp: 450 each, Rfl: 3    calcium carbonate 500 mg, elemental, (OSCAL) 500 MG tablet, Take 1 tablet (500 mg) by mouth daily, Disp: 90 tablet, Rfl: 3    cholecalciferol (VITAMIN D3) 25 mcg (1000 units) capsule, Take 1 capsule by mouth daily, Disp: , Rfl:     ciclopirox (PENLAC) 8 % external solution, Apply to adjacent skin and affected nails daily.  Remove with alcohol every 7 days, then repeat., Disp: 6.6 mL, Rfl: 11    Continuous Blood Gluc Sensor (FREESTYLE SRI 3 SENSOR) MISC, 1 each every 14 days, Disp: 6 each, Rfl: 4    Efinaconazole 10 % SOLN, Externally apply topically daily To affected nails for onychomycosis, Disp: 8 mL, Rfl: 11    Glucagon (BAQSIMI ONE PACK) 3 MG/DOSE nasal powder, Spray 1 spray (3 mg) in nostril as needed (to be used for severe hypoglycemic episodes) Please ask pharmacy for instructions on disposal of  medication in your county., Disp: 1 each, Rfl: 4    Injection Device for insulin (NOVOPEN ECHO) RORY, 1 each 4 times daily, Disp: 1 each, Rfl: 0    insulin aspart (NOVOLOG PENFILL) 100 UNIT/ML cartridge, INJECT 1 UNIT PER 15 GRAMS OF CARBOHYDRATES UNDER THE SKIN THREE TIMES A DAY WITH MEALS. APPROXIMATELY 15 UNITS DAILY., Disp: 30 mL, Rfl: 1    insulin pen needle (BD PAM U/F) 32G X 4 MM miscellaneous, USE AS DIRECTED WITH INSULIN PENS 4 TIMES DAILY, Disp: 400 each, Rfl: 3    lidocaine (XYLOCAINE) 5 % external ointment, Apply topically at bedtime, Disp: 50 g, Rfl: 5    NIFEdipine ER OSMOTIC (PROCARDIA XL) 30 MG 24 hr tablet, 1-2 daily or dysphagia, Disp: 180 tablet, Rfl: 3    order for DME, Equipment being ordered: compression socks, 20-30 mmHg, knee high, Disp: 8 Piece, Rfl: 4    simvastatin (ZOCOR) 40 MG tablet, Take 1 tablet (40 mg) by mouth at bedtime, Disp: 90 tablet, Rfl: 3    UNABLE TO FIND, MEDICATION NAME: , Disp: , Rfl:      "warfarin ANTICOAGULANT (JANTOVEN ANTICOAGULANT) 5 MG tablet, Take 1-1.5 tablets daily or as directed, Disp: 135 tablet, Rfl: 4    LMP  (LMP Unknown)   Wt Readings from Last 10 Encounters:   08/19/24 47.2 kg (104 lb)   02/19/24 48.5 kg (107 lb)   01/05/24 49.7 kg (109 lb 8 oz)   08/07/23 51.3 kg (113 lb)   02/06/23 57.9 kg (127 lb 11.2 oz)   08/01/22 56.1 kg (123 lb 11.2 oz)   01/24/22 52.6 kg (116 lb)   07/30/20 52.6 kg (116 lb)   02/05/19 56.7 kg (125 lb)   01/08/19 54.1 kg (119 lb 4.8 oz)     /73   Pulse 61   Ht 1.663 m (5' 5.47\")   Wt 47.2 kg (104 lb)   LMP  (LMP Unknown)   SpO2 99%   BMI 17.06 kg/m      Physical Examination:  General appearance: she is thin, no acute distress noted  Eyes: conjunctivae and extraocular motions are normal. Pupils are equal, round, and reactive to light. No lid lag, no stare.  HENT: oropharynx moist; neck no JVD, no bruits, palpable right thyroid nodule, ~2.5 cm diameter, firm   Cardiovascular: regular rhythm, no murmurs, distal pulses palpable, mild L arm edema   Respiratory: chest clear, no rales, no rhonchi  Musculoskeletal: normal tone and strength   Neurologic: left knee reflex decreased, normal B/L bicipital reflexes, no resting tremor  Psychiatric: affect and judgment normal   Skin: nails onychomycosis  Feet: sensation preserved to monofilament testing, onychomycosis.     Endocrine Labs:  Lab Results   Component Value Date    A1C 5.3 08/19/2024    A1C 5.5 02/19/2024    A1C 5.7 (H) 01/04/2023    A1C 5.5 06/16/2022    A1C 5.7 (H) 02/23/2022    A1C 5.6 12/22/2021    A1C 5.4 07/07/2021    A1C 5.2 04/21/2021    A1C 5.6 01/20/2021    A1C 5.1 07/09/2020    A1C 5.3 07/11/2017    HEMOGLOBINA1 5.4 08/07/2023    HEMOGLOBINA1 5.1 01/13/2020    HEMOGLOBINA1 5.1 07/15/2019    HEMOGLOBINA1 5.0 07/30/2018    HEMOGLOBINA1 5.0 01/29/2018       Hemoglobin   Date Value Ref Range Status   08/12/2024 12.3 11.7 - 15.7 g/dL Final   04/29/2021 12.9 11.7 - 15.7 g/dL Final     Hematocrit "   Date Value Ref Range Status   08/12/2024 38.7 35.0 - 47.0 % Final   04/29/2021 41.5 35.0 - 47.0 % Final     Cholesterol   Date Value Ref Range Status   08/12/2024 148 <200 mg/dL Final   07/07/2021 199 <200 mg/dL Final     Comment:     Desirable:       <200 mg/dl     Cholesterol/HDL Ratio   Date Value Ref Range Status   10/01/2014 1.5 0.0 - 5.0 Final     HDL Cholesterol   Date Value Ref Range Status   07/07/2021 108 >49 mg/dL Final     Direct Measure HDL   Date Value Ref Range Status   08/12/2024 63 >=50 mg/dL Final     LDL Cholesterol Calculated   Date Value Ref Range Status   08/12/2024 72 <=100 mg/dL Final   07/07/2021 79 <100 mg/dL Final     Comment:     Desirable:       <100 mg/dl     VLDL-Cholesterol   Date Value Ref Range Status   10/01/2014 7 0 - 30 mg/dL Final     Triglycerides   Date Value Ref Range Status   08/12/2024 64 <150 mg/dL Final   07/07/2021 58 <150 mg/dL Final     Albumin Urine mg/L   Date Value Ref Range Status   02/15/2024 <12.0 mg/L Final     Comment:     The reference ranges have not been established in urine albumin. The results should be integrated into the clinical context for interpretation.   06/16/2022 16 mg/L Final   07/07/2021 9 mg/L Final     TSH   Date Value Ref Range Status   06/07/2023 0.86 0.30 - 4.20 uIU/mL Final   08/01/2022 1.34 0.40 - 4.00 mU/L Final   07/09/2019 0.99 0.40 - 4.00 mU/L Final         Last Basic Metabolic Panel:    Sodium   Date Value Ref Range Status   08/12/2024 141 135 - 145 mmol/L Final   07/07/2021 141 133 - 144 mmol/L Final     Potassium   Date Value Ref Range Status   08/12/2024 3.9 3.4 - 5.3 mmol/L Final   07/19/2022 4.2 3.4 - 5.3 mmol/L Final   07/07/2021 4.0 3.4 - 5.3 mmol/L Final     Chloride   Date Value Ref Range Status   08/12/2024 106 98 - 107 mmol/L Final   07/19/2022 108 94 - 109 mmol/L Final   07/07/2021 106 94 - 109 mmol/L Final     Calcium   Date Value Ref Range Status   08/12/2024 9.2 8.8 - 10.4 mg/dL Final     Comment:     Reference  intervals for this test were updated on 7/16/2024 to reflect our healthy population more accurately. There may be differences in the flagging of prior results with similar values performed with this method. Those prior results can be interpreted in the context of the updated reference intervals.   07/07/2021 9.1 8.5 - 10.1 mg/dL Final     Carbon Dioxide   Date Value Ref Range Status   07/07/2021 27 20 - 32 mmol/L Final     Carbon Dioxide (CO2)   Date Value Ref Range Status   08/12/2024 26 22 - 29 mmol/L Final   07/19/2022 28 20 - 32 mmol/L Final     Urea Nitrogen   Date Value Ref Range Status   08/12/2024 20.8 8.0 - 23.0 mg/dL Final   07/19/2022 22 7 - 30 mg/dL Final   07/07/2021 21 7 - 30 mg/dL Final     Creatinine   Date Value Ref Range Status   08/12/2024 0.63 0.51 - 0.95 mg/dL Final   07/07/2021 0.71 0.52 - 1.04 mg/dL Final     GFR Estimate   Date Value Ref Range Status   08/12/2024 >90 >60 mL/min/1.73m2 Final     Comment:     eGFR calculated using 2021 CKD-EPI equation.   07/07/2021 84 >60 mL/min/[1.73_m2] Final     Comment:     Non  GFR Calc  Starting 12/18/2018, serum creatinine based estimated GFR (eGFR) will be   calculated using the Chronic Kidney Disease Epidemiology Collaboration   (CKD-EPI) equation.       Glucose   Date Value Ref Range Status   08/12/2024 93 70 - 99 mg/dL Final   07/19/2022 109 (H) 70 - 99 mg/dL Final   07/07/2021 110 (H) 70 - 99 mg/dL Final       AST   Date Value Ref Range Status   08/12/2024 28 0 - 45 U/L Final   07/07/2021 14 0 - 45 U/L Final     ALT   Date Value Ref Range Status   08/12/2024 23 0 - 50 U/L Final   07/07/2021 23 0 - 50 U/L Final     Albumin Urine mg/g Cr   Date Value Ref Range Status   02/15/2024   Final     Comment:     Unable to calculate, urine albumin and/or urine creatinine is outside detectable limits.  Microalbuminuria is defined as an albumin:creatinine ratio of 17 to 299 for males and 25 to 299 for females. A ratio of albumin:creatinine of  300 or higher is indicative of overt proteinuria.  Due to biologic variability, positive results should be confirmed by a second, first-morning random or 24-hour timed urine specimen. If there is discrepancy, a third specimen is recommended. When 2 out of 3 results are in the microalbuminuria range, this is evidence for incipient nephropathy and warrants increased efforts at glucose control, blood pressure control, and institution of therapy with an angiotensin-converting-enzyme (ACE) inhibitor (if the patient can tolerate it).     06/16/2022 13.11 0.00 - 25.00 mg/g Cr Final   07/07/2021 9.35 0 - 25 mg/g Cr Final     The longitudinal plan of care for the diagnosis(es)/condition(s) as documented were addressed during this visit. Due to the added complexity in care, I will continue to support Jeanmarie in the subsequent management and with ongoing continuity of care.    40 minutes spent on the date of the encounter doing chart review, history and exam, documentation and further activities as noted above.

## 2024-08-19 NOTE — NURSING NOTE
"Chief Complaint   Patient presents with    Endocrine Problem     Osteo and DM     Blood pressure 114/73, pulse 61, height 1.663 m (5' 5.47\"), weight 47.2 kg (104 lb), SpO2 99%, not currently breastfeeding.    Ruth Kathleen    "

## 2024-08-19 NOTE — LETTER
8/19/2024       RE: Shala Caruso  [address]  [place] [address]     Dear Colleague,    Thank you for referring your patient, Shala Caruso, to the [facility] [place] at [facility]. Please see a copy of my visit note below.    08/01/24 10:07 AM  PATIENT LAB/IMAGING STATUS : Appt scheduled / Day of appt for 8/12/2024.  Ruth Kathleen                            Assessment and Plan:    Jeanmarie is a 78 year old pleasant female with hx of SLE, APLS, DVT, osteoporosis and type 1 diabetes presenting for f/up.    1) Type 1 diabetes, formally diagnosed in 2010 while being evaluated for steroid-induced hyperglycemia.  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  She has been taking small dosages of short acting insulin.  Lantus was discontinued in view of hypoglycemia and low insulin requirements.  The patient prefers to maintain a very tight glycemic control despite my recommendations of increasing the overall average target blood glucose.    2) Osteoporosis, now osteopenia  Risk factors for osteoporosis include postmenopausal status, lupus, prior treatment with steroids, diabetes, fam history.   She was treated with Boniva for 3 years, followed by Forteo for 2 years. Received one dose of Reclast in July 2014, when she completed treatment with Forteo.  Treatment with Reclast was resumed in July 2020, when the DEXA scan revealed some loss of bone mineral density, although the lowest T score remained in the osteopenic range.  She received subsequent Reclast dosages in in 2021 and 2023. Most recent DXA from 8/23 from showed improvement of bone mineral density at the average hip.  Plan:   Walking, weightbearing exercises encouraged  Schedule a 4th dose of Reclast and a f/up DXA scan in 8/2025     3) Hypercalcemia, mild and associated with significant hypercalciuria  Lab work is suggestive of primary hyperparathyroidism.   I have counseled the patient on the etiology of this condition and the option of pursuing parathyroidectomy.  Also discussed about the option of considering treatment with hydrochlorothiazide in order to address the hypercalciuria.  The patient declined treatment with hydrochlorothiazide in view of her allergy to sulfas (allergic rash).    Plan:  Pursue a parathyroid scan.    4) Palpable right neck nodule  The patient endorses some sore throat for which she is going to follow-up on with her primary care provider.  Schedule a neck ultrasound.    Considering that this might be a thyroid nodule, I advised the patient to have a reflex TSH checked today.    Orders Placed This Encounter   Procedures     NM Parathyroid Planar w/Spect Dual Isotope     US Head Neck Soft Tissue     AFINION HEMOGLOBIN A1C POCT     TSH with free T4 reflex     ============================================================================================================================================  Jeanmarie is a 78 year old pleasant female with hx of SLE, APLS, DVT, osteoporosis and type 1 diabetes presenting for f/up.   Her last clinic visit was on 2/19/2024.     Interval history:    1. Type 1 diabetes  Lantus was discontinued and in 2020 and her diabetes is now managed only with NovoLog.    On average, she reports taking 1-2.5 units per meal. In general she has 2 meals a day, lunch and dinner.     Most recent A1c was 5.5, in 2/2024. It was 5.3 % today.   Weight was 107 lbs in 2/2024, down 20 lbs in the prior year. Today, it was 104 lbs.  Feels good. Appetite OK.    Diabetes complications:   No h/o microalbuminuria.  Most recent urine microalbumin negative in 2/24. GFR >90 on labs from 8/24  Last eye exam -September 2023. No DR. S/P cataract surgery.  Numbness and tingling sensation B/L toes - for many years but light sensation is intact. She does wear comfortable shoes/insoles. 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 With  walking she might  get a burning sensation.   She develops confusion, inability to concentrate or focus her vision and she feels extremely tired when her blood sugar is the 60s or 50s.  She has no prior episodes of loss of consciousness due to hypoglycemia.  She does not always recognize the lows.   She does have Baqsimi at home.  Most recent lipid panel from 8/12/24: LDL cholesterol 72, HDL cholesterol 63, triglycerides 64.  On 40 mg simvastatin daily.  Exercise: mainly walking     I reviewed the Siddharth data. Average glucose is 98, with a glucose variability of 12.5%, corresponding to an estimated GMI of 5.7%.  98% of the glucose numbers are within target, 2% are in the mild hypoglycemic range.  Most of the hypoglycemic episodes are mild and tend to occur in the afternoon or evening.  Novolog dose is anywhere between 5-7 units daily.  The patient is very rarely symptomatic during the hypoglycemic episodes.     2. Osteoporosis  Jeanmarie also has a history of osteoporosis, treated with Boniva for almost 3 years, followed by Forteo which was started in 4/12 - interrupted treatment from 4/2013 and restarted in 7/2013 until July 2014. After she completed the treatment with Forteo, she received one dose of Reclast, in July 2014.      She has no history of prior bone fractures.  She's been off prednisone since 2013. Her height has decreased over the years from 5'6'' to 5'1/2''. Her mother had a hip fracture in her 80s.      DXA 7/1/16:  L1-L3 T score was - 1.  Overall, at the spine, there was a significant increase of bone mineral density since 2005 of 10.5%. Since 2015, the bone mineral density has remained stable at spine. The lowest T score at the hip level was -2.2, at the left femoral neck.  Overall, there was a significant improvement of bone mineral density at the mean hip of 8.9% since 2005, with no significant changes since 2015. While the bone mineral density at the left hip increased since baseline, at the right  hip, there was a decrease of bone mineral density since baseline and also, since prior year.     DXA 7/27/18:   The lowest T score was -2.1, at the left femoral neck.  Compared with the prior scan from 2016, the bone mineral density at the hips remained stable.  At the lumbar spine, L1-L3 T score was -1.3, not significantly changed since 2016.     DXA 1/2020:  L2-L4 T score was -1.5. At the hips, the lowest score was -2.3, at the left femoral neck. Compared with the prior DEXA scan from 2018, there was a significant decrease in bone mineral density of the lumbar spine, left total hip and right total hip by 3.1, 4.7 and 3.5%, respectively.  The patient received zoledronic acid infusion on 7/30/2020 and 7/27/21.     DXA 6/16/2022:  T score: -0.9 at L1-L4, -0.3 at 33% distal radius, -1.7 at both right and left femoral necks, -1.8 at the left total hip and -1.9 at the right total hip.  At the radius and average hip, the bone mineral density remained stable since 2020.  At the spine, there was a significant increase of 4.5%.    DXA 8/1/23:  L2-L4 T score -1.4  Left femoral neck T score -1.8, total L hip T score -1.5  Right femoral neck T score -1.5, total R hip T score -1.6  Bone density compared to the prior study increased at the mean total femur by +4.4%.  A third dose of Reclast was administered on 8/29/23.      3. Hypercalcemia   The patient developed mild hypercalcemia in 2023.  The hypercalcemia has been associated with a normal PTH, normal phosphorus, and elevated 24-hour urinary calcium.    Pertinent labs reviewed:  3/24/2023 calcium 10.4, GFR 73, vitamin D 63, albumin 4.2, PTH 34, phosphorus 4.2  6/5/2023 24-hour urinary calcium 0.34 g, urine creatinine 1.03 g, urine volume 1.5 L  8/7/23 PTH 35, calcium 10.9, phos 3.5, albumin 4.5, vitamin D 56   12/20/23 PTH 33, calcium 9.4, phos 4.1, albumin 4, GFR >90    12/9/2023 24-hour urinary calcium 0.38 g, creatinine 0.92 g, volume 2.325 L.   2/15/24 ionized calcium  5.6 (normal range 4.4-5.2)  2/15/24 24 hrs urinary calcium 0.38 g, creatinine 0.92 g   8/12/2024 vitamin D 52, PTH 34, phosphorus 3.2  8/13/2020 four 24-hour urinary calcium 0.37 g, urine creatinine 0.67 g and urine volume 2.4 L.    One cup of almond milk daily and 1-2 servings of dairies daily.   In terms of calcium and vitamin D supplements, she reports taking 500 mg calcium as a oyster shell and 1000 U vitamin D daily.   Liquid intake is variable.     Past Medical History  Past Medical History:   Diagnosis Date     Achalasia      Antiphospholipid syndrome (H24)      Antiplatelet or antithrombotic long-term use      Coagulation disorder (H24) [id number]     DM (diabetes mellitus) (H)      DVT (deep venous thrombosis) (H) 2003    and PE     Dysphagia     occasional, use Nifedipine     GERD (gastroesophageal reflux disease)      Hyperlipidemia      Lymphedema      Myopia      Neuropathy     toes bilateral     Nonsenile cataract      osteoporosis      Pericarditis      Raynaud's disease      SLE (systemic lupus erythematosus) (H)      Past Surgical History  Past Surgical History:   Procedure Laterality Date     CATARACT IOL, RT/LT Left 02/2019     CATARACT IOL, RT/LT Right 01/2019     COLONOSCOPY N/A 11/28/2016    Procedure: COLONOSCOPY;  Surgeon: Sang August MD;  Location: [facility]     CYSTOSCOPY, SLING TRANSVAGINAL N/A 12/20/2016    Procedure: CYSTOSCOPY, SLING TRANSVAGINAL;  Surgeon: Jeanmarie Pierson MD;  Location: [facility]     DISSECT LYMPH NODE AXILLA  2004    Lt, during eval for SLE     HYSTEROSCOPIC PLACEMENT CONTRACEPTIVE DEVICE  1985     PHACOEMULSIFICATION WITH STANDARD INTRAOCULAR LENS IMPLANT Right 1/8/2019    Procedure: Right Eye Cataract Extraction with Intraocular Lens;  Surgeon: Monika Fermin MD;  Location: [facility]     PHACOEMULSIFICATION WITH STANDARD INTRAOCULAR LENS IMPLANT Left 2/5/2019    Procedure: Left Eye Cataract Extraction with Intraocular Lens;  Surgeon: Monika Fermin  MD Olya;  Location: [facility] OR     TUBAL LIGATION Bilateral         Medications    Current Outpatient Medications:      acetaminophen (TYLENOL) 500 MG tablet, Take 1,000-1,500 mg by mouth nightly as needed , Disp: , Rfl:      aspirin 81 MG tablet, Take 81 mg by mouth At Bedtime , Disp: , Rfl:      AYR SALINE NASAL NO-DRIP GEL, Use as needed for nasal dryness, Disp: 3 Tube, Rfl: 3     blood glucose (TRUE METRIX BLOOD GLUCOSE TEST) test strip, USE TO TEST BLOOD SUGAR 5 TIMES DAILY OR AS DIRECTED, Disp: 500 strip, Rfl: 2     blood glucose monitoring (ULTRA THIN 30G) lancets, Test 5 times daily  [employer]  Super thin, Disp: 450 each, Rfl: 3     calcium carbonate 500 mg, elemental, (OSCAL) 500 MG tablet, Take 1 tablet (500 mg) by mouth daily, Disp: 90 tablet, Rfl: 3     cholecalciferol (VITAMIN D3) 25 mcg (1000 units) capsule, Take 1 capsule by mouth daily, Disp: , Rfl:      ciclopirox (PENLAC) 8 % external solution, Apply to adjacent skin and affected nails daily.  Remove with alcohol every 7 days, then repeat., Disp: 6.6 mL, Rfl: 11     Continuous Blood Gluc Sensor (FREESTYLE SRI 3 SENSOR) MISC, 1 each every 14 days, Disp: 6 each, Rfl: 4     Efinaconazole 10 % SOLN, Externally apply topically daily To affected nails for onychomycosis, Disp: 8 mL, Rfl: 11     Glucagon (BAQSIMI ONE PACK) 3 MG/DOSE nasal powder, Spray 1 spray (3 mg) in nostril as needed (to be used for severe hypoglycemic episodes) Please ask pharmacy for instructions on disposal of  medication in your county., Disp: 1 each, Rfl: 4     Injection Device for insulin (NOVOPEN ECHO) RORY, 1 each 4 times daily, Disp: 1 each, Rfl: 0     insulin aspart (NOVOLOG PENFILL) 100 UNIT/ML cartridge, INJECT 1 UNIT PER 15 GRAMS OF CARBOHYDRATES UNDER THE SKIN THREE TIMES A DAY WITH MEALS. APPROXIMATELY 15 UNITS DAILY., Disp: 30 mL, Rfl: 1     insulin pen needle (BD PAM U/F) 32G X 4 MM miscellaneous, USE AS DIRECTED WITH INSULIN PENS 4 TIMES DAILY, Disp: 400 each,  "Rfl: 3     lidocaine (XYLOCAINE) 5 % external ointment, Apply topically at bedtime, Disp: 50 g, Rfl: 5     NIFEdipine ER OSMOTIC (PROCARDIA XL) 30 MG 24 hr tablet, 1-2 daily or dysphagia, Disp: 180 tablet, Rfl: 3     order for DME, Equipment being ordered: compression socks, 20-30 mmHg, knee high, Disp: 8 Piece, Rfl: 4     simvastatin (ZOCOR) 40 MG tablet, Take 1 tablet (40 mg) by mouth at bedtime, Disp: 90 tablet, Rfl: 3     UNABLE TO FIND, MEDICATION NAME: , Disp: , Rfl:      warfarin ANTICOAGULANT (JANTOVEN ANTICOAGULANT) 5 MG tablet, Take 1-1.5 tablets daily or as directed, Disp: 135 tablet, Rfl: 4    LMP  (LMP Unknown)   Wt Readings from Last 10 Encounters:   08/19/24 47.2 kg (104 lb)   02/19/24 48.5 kg (107 lb)   01/05/24 49.7 kg (109 lb 8 oz)   08/07/23 51.3 kg (113 lb)   02/06/23 57.9 kg (127 lb 11.2 oz)   08/01/22 56.1 kg (123 lb 11.2 oz)   01/24/22 52.6 kg (116 lb)   07/30/20 52.6 kg (116 lb)   02/05/19 56.7 kg (125 lb)   01/08/19 54.1 kg (119 lb 4.8 oz)     /73   Pulse 61   Ht 1.663 m (5' 5.47\")   Wt 47.2 kg (104 lb)   LMP  (LMP Unknown)   SpO2 99%   BMI 17.06 kg/m      Physical Examination:  General appearance: she is thin, no acute distress noted  Eyes: conjunctivae and extraocular motions are normal. Pupils are equal, round, and reactive to light. No lid lag, no stare.  HENT: oropharynx moist; neck no JVD, no bruits, palpable right thyroid nodule, ~2.5 cm diameter, firm   Cardiovascular: regular rhythm, no murmurs, distal pulses palpable, mild L arm edema   Respiratory: chest clear, no rales, no rhonchi  Musculoskeletal: normal tone and strength   Neurologic: left knee reflex decreased, normal B/L bicipital reflexes, no resting tremor  Psychiatric: affect and judgment normal   Skin: nails onychomycosis  Feet: sensation preserved to monofilament testing, onychomycosis.     Endocrine Labs:  Lab Results   Component Value Date    A1C 5.3 08/19/2024    A1C 5.5 02/19/2024    A1C 5.7 (H) " 01/04/2023    A1C 5.5 06/16/2022    A1C 5.7 (H) 02/23/2022    A1C 5.6 12/22/2021    A1C 5.4 07/07/2021    A1C 5.2 04/21/2021    A1C 5.6 01/20/2021    A1C 5.1 07/09/2020    A1C 5.3 07/11/2017    HEMOGLOBINA1 5.4 08/07/2023    HEMOGLOBINA1 5.1 01/13/2020    HEMOGLOBINA1 5.1 07/15/2019    HEMOGLOBINA1 5.0 07/30/2018    HEMOGLOBINA1 5.0 01/29/2018       Hemoglobin   Date Value Ref Range Status   08/12/2024 12.3 11.7 - 15.7 g/dL Final   04/29/2021 12.9 11.7 - 15.7 g/dL Final     Hematocrit   Date Value Ref Range Status   08/12/2024 38.7 35.0 - 47.0 % Final   04/29/2021 41.5 35.0 - 47.0 % Final     Cholesterol   Date Value Ref Range Status   08/12/2024 148 <200 mg/dL Final   07/07/2021 199 <200 mg/dL Final     Comment:     Desirable:       <200 mg/dl     Cholesterol/HDL Ratio   Date Value Ref Range Status   10/01/2014 1.5 0.0 - 5.0 Final     HDL Cholesterol   Date Value Ref Range Status   07/07/2021 108 >49 mg/dL Final     Direct Measure HDL   Date Value Ref Range Status   08/12/2024 63 >=50 mg/dL Final     LDL Cholesterol Calculated   Date Value Ref Range Status   08/12/2024 72 <=100 mg/dL Final   07/07/2021 79 <100 mg/dL Final     Comment:     Desirable:       <100 mg/dl     VLDL-Cholesterol   Date Value Ref Range Status   10/01/2014 7 0 - 30 mg/dL Final     Triglycerides   Date Value Ref Range Status   08/12/2024 64 <150 mg/dL Final   07/07/2021 58 <150 mg/dL Final     Albumin Urine mg/L   Date Value Ref Range Status   02/15/2024 <12.0 mg/L Final     Comment:     The reference ranges have not been established in urine albumin. The results should be integrated into the clinical context for interpretation.   06/16/2022 16 mg/L Final   07/07/2021 9 mg/L Final     TSH   Date Value Ref Range Status   06/07/2023 0.86 0.30 - 4.20 uIU/mL Final   08/01/2022 1.34 0.40 - 4.00 mU/L Final   07/09/2019 0.99 0.40 - 4.00 mU/L Final         Last Basic Metabolic Panel:    Sodium   Date Value Ref Range Status   08/12/2024 141 135 - 145  mmol/L Final   07/07/2021 141 133 - 144 mmol/L Final     Potassium   Date Value Ref Range Status   08/12/2024 3.9 3.4 - 5.3 mmol/L Final   07/19/2022 4.2 3.4 - 5.3 mmol/L Final   07/07/2021 4.0 3.4 - 5.3 mmol/L Final     Chloride   Date Value Ref Range Status   08/12/2024 106 98 - 107 mmol/L Final   07/19/2022 108 94 - 109 mmol/L Final   07/07/2021 106 94 - 109 mmol/L Final     Calcium   Date Value Ref Range Status   08/12/2024 9.2 8.8 - 10.4 mg/dL Final     Comment:     Reference intervals for this test were updated on 7/16/2024 to reflect our healthy population more accurately. There may be differences in the flagging of prior results with similar values performed with this method. Those prior results can be interpreted in the context of the updated reference intervals.   07/07/2021 9.1 8.5 - 10.1 mg/dL Final     Carbon Dioxide   Date Value Ref Range Status   07/07/2021 27 20 - 32 mmol/L Final     Carbon Dioxide (CO2)   Date Value Ref Range Status   08/12/2024 26 22 - 29 mmol/L Final   07/19/2022 28 20 - 32 mmol/L Final     Urea Nitrogen   Date Value Ref Range Status   08/12/2024 20.8 8.0 - 23.0 mg/dL Final   07/19/2022 22 7 - 30 mg/dL Final   07/07/2021 21 7 - 30 mg/dL Final     Creatinine   Date Value Ref Range Status   08/12/2024 0.63 0.51 - 0.95 mg/dL Final   07/07/2021 0.71 0.52 - 1.04 mg/dL Final     GFR Estimate   Date Value Ref Range Status   08/12/2024 >90 >60 mL/min/1.73m2 Final     Comment:     eGFR calculated using 2021 CKD-EPI equation.   07/07/2021 84 >60 mL/min/[1.73_m2] Final     Comment:     Non  GFR Calc  Starting 12/18/2018, serum creatinine based estimated GFR (eGFR) will be   calculated using the Chronic Kidney Disease Epidemiology Collaboration   (CKD-EPI) equation.       Glucose   Date Value Ref Range Status   08/12/2024 93 70 - 99 mg/dL Final   07/19/2022 109 (H) 70 - 99 mg/dL Final   07/07/2021 110 (H) 70 - 99 mg/dL Final       AST   Date Value Ref Range Status   08/12/2024  28 0 - 45 U/L Final   07/07/2021 14 0 - 45 U/L Final     ALT   Date Value Ref Range Status   08/12/2024 23 0 - 50 U/L Final   07/07/2021 23 0 - 50 U/L Final     Albumin Urine mg/g Cr   Date Value Ref Range Status   02/15/2024   Final     Comment:     Unable to calculate, urine albumin and/or urine creatinine is outside detectable limits.  Microalbuminuria is defined as an albumin:creatinine ratio of 17 to 299 for males and 25 to 299 for females. A ratio of albumin:creatinine of 300 or higher is indicative of overt proteinuria.  Due to biologic variability, positive results should be confirmed by a second, first-morning random or 24-hour timed urine specimen. If there is discrepancy, a third specimen is recommended. When 2 out of 3 results are in the microalbuminuria range, this is evidence for incipient nephropathy and warrants increased efforts at glucose control, blood pressure control, and institution of therapy with an angiotensin-converting-enzyme (ACE) inhibitor (if the patient can tolerate it).     06/16/2022 13.11 0.00 - 25.00 mg/g Cr Final   07/07/2021 9.35 0 - 25 mg/g Cr Final     The longitudinal plan of care for the diagnosis(es)/condition(s) as documented were addressed during this visit. Due to the added complexity in care, I will continue to support Jeanmarie in the subsequent management and with ongoing continuity of care.    40 minutes spent on the date of the encounter doing chart review, history and exam, documentation and further activities as noted above.        Again, thank you for allowing me to participate in the care of your patient.      Sincerely,    Dorita Cramer MD

## 2024-08-21 ENCOUNTER — OFFICE VISIT (OUTPATIENT)
Dept: INTERNAL MEDICINE | Facility: CLINIC | Age: 79
End: 2024-08-21
Payer: COMMERCIAL

## 2024-08-21 VITALS — SYSTOLIC BLOOD PRESSURE: 123 MMHG | OXYGEN SATURATION: 100 % | HEART RATE: 61 BPM | DIASTOLIC BLOOD PRESSURE: 73 MMHG

## 2024-08-21 DIAGNOSIS — R13.10 DYSPHAGIA, UNSPECIFIED TYPE: Primary | ICD-10-CM

## 2024-08-21 DIAGNOSIS — R07.0 THROAT PAIN: ICD-10-CM

## 2024-08-21 PROCEDURE — 99214 OFFICE O/P EST MOD 30 MIN: CPT | Performed by: INTERNAL MEDICINE

## 2024-08-21 NOTE — PROGRESS NOTES
[facility]  Rheumatology Clinic  Tato Agarwal MD  2024     Name: Shala Caruso  MRN: [id number]  78 year old  : 1945  Referring provider: Joe Contreras     Assessment and Plan:    Systemic lupus erythematosus (+CRISTAL, +dsDNA, +SSA, hypocomplementemia; Raynaud's, arthritis, serositis, tubulo-interstitial nephropathy, pericardial effusion/tamponade): Pt relates stable bilateral knee and hip pain and stable toe neuro symptoms.  Raynaud's phenomenon  is controlled with thermal protective measures  No urinary or cutaneous symptoms.  Examination today revealed normal range of motion in the hand and finger joints without evidence of digital tapering or cyanosis.     Data:  Lab work on 2024 shows comprehensive metabolic panel normal; vitamin D levels normal; hemoglobin A1c normal; TSH normal; CBC shows leukopenia with white count of 2.3, decreased absolute lymphocytes, lowest values recorded since 2019.  Complement C3 was 60, complement C4 was 7, both decreased compared to most recent values; double-stranded DNA titer undetectable; parathyroid hormone normal at 34;    Discussion:   SLE remains in remission by history, examination.  The presence of leukopenia and hypocomplementemia I discussed blood work suggest that immune complex formation and autoimmunity against leukocyte antigens remains active. However, disease that formerly affected the kidney as well as serosal surfaces remains quiescent, despite discontinuation of immunomodulatory medication (mmf, used [id number]).  I recommend continuing off immunomodulatory therapy (patient is allergic to sulfa containing molecules, and specifically to hydroxychloroquine and methotrexate), but in view of complement and leukopenia, increased monitoring for recurrence of symptoms formerly associated with SLE, and checking creatinine, urinalysis, and CBC in 4-6 months.    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 Following factor 2 levels.  # Peripheral neuropathy: stable  # Raynaud's: stable  # Achalasia: stable/improved on nifedipine, still has occasional dysphagia; workup for hoarseness and parathyroid disease is ongoing.  #Osteoarthritis, knees and hips: Chronic use associated pain and stiffness is modest, stable. Not limiting ambulation of 28164 steps per day.  I recommended continue weightbearing exercises and range of motion exercises daily.    # Osteoporosis: DEXA scan performed in 2023 revealed improved T scores in osteopenic range.  I agree with continued bisphosphonate therapy per Dr. Luna.  Patient should continue calcium carbonate, vitamin D, and weightbearing exercise as well.    Plan:  1.  Remain off mycophenolate,  2.  Recheck BMP, CBC with differential and urinalysis in 4-6 months.  3.  Continue calcium carbonate 1200 millequivalents daily, and vitamin D 800 international units daily to support bone density; continue range of motion exercise and weight bearing exercise.  4. Monitor blood pressure once or twice weekly, and monitor for recurrence of SLE symptoms  5. Urinalysis today    Tato Agarwal M.D.  Staff Rheumatologist, [facility]  Pager [phone]    Follow-up: 8 mos    On the day of the encounter, a total of 31 minutes was spent in chart review, and in counseling and coordination of care, regarding the patient's complex medical problem of systemic lupus erythematosus.    The longitudinal plan of care for the diagnosis(es)/condition(s) as documented were addressed during this visit. Due to the added complexity in care, I will continue to support Jeanmarie in the subsequent management and with ongoing continuity of care.    Orders Placed This Encounter   Procedures    Routine UA with microscopic    Basic metabolic panel    Routine UA with microscopic - No culture ([facility])    CBC with platelets differential       HPI:   Shala Caruso has a history of DM, SLE, PE on Coumadin, and HTN  who presents for followup of lupus.  She was last seen 8-2023. At that time she was doing well without no significant symptoms of autoimmune disease.  Plan was to monitor off immunomodulatory medication.     Summary of previous history:  SLE diagnosed in 2000 (+CRISTAL, +dsDNA ab, arthritis, serositis).  She has antiphospholipid syndrome and had DVT and PE in 2004, on Coumadin since then.  She had SLE flare in 2010 initially treated with Plaquenil however she worsened and developed pericardial effusion/tamponade - started on MTX and tapering dose of prednisone at that time (continued on about 10 mg daily).  Cellcept started 6/2010, Prednisone tapered off.  MTX stopped May 2012.  Mycophenolate tapered from 2 g daily to off from November 2021 through July 2022.  Mycophenolate discontinued in late 2022.    Interval history August 23, 2024    She notes hoarseness and throat soreness for many weeks. She will see ENT, and is undergoing workup for parathyroid.  She has not had recurrence of migratory joint pain since last visit. Her knees hurt when she tries to squat, no swelling redness or warmth. She difficulty getting off the floor due to knee stiffness.    Raynaud's persists, no digital ulcers. She focusses on thermoprotection.  She is still walking 51817 steps per day.  No chest pain, cough, SOB.    Patient was seen in endocrinology on August 19, 2024.  Impression was of type 1 diabetes with controlled hemoglobin A1c, osteoporosis and mild hypercalcemia associated with hypercalciuria.  Recommendation was to pursue a parathyroid scan and neck ultrasound.  Recommendation was to continue Reclast, last dose August 2023.    Interval history August 18, 2023    She is doing well.  There is no joint pain, no skin rash, good energy level, no pleurisy, neuritic symptoms, fevers, glandular swelling.  She is able to walk 10,000 steps per day.  She does have stable Raynaud's phenomenon, and stable achalasia, managed with  "nifedipine.    She stopped cellcept in June 2022. She notes no sx of flare since.    Interval history August 19, 2022    Her knees and hips give constant pain, but \"tolerable\". If she sits too long, she is very stiff. Hard to get into and out of a car. Morning stiffness is brief, relieved by movement. She can walk 1-2 hours daily, using a walking stick. No pain in UE joints. She does daily yoga with a lot of stretching; she tends to feel better with it. Not using volatern because of the coumadin.     Her hands are sensitive to cold with ongoing Raynaud's. No digital ulcers. Biggest problem is when she is washing food under cold water. Hard time looking for BG with fingerstick.    She had last COVID booster (4th) in June.    She stopped her cellcept in June. She notes no sx of flare.    Stable numbness in feet for \"years\"; she does have balance issues, especially with stairs.    Interval history 05-:    Getting along \"ok\". She is unchanged in that Her knees and hips give constant pain \"tolerable\". If she sits too long, she is very stiff. Morning stiffness is brief, relieved by movement. No pain in UE joints. She is walking every day; she plans to start biking this summer. She does daily yoga with a lot of stretching; she tends to feel better with it.    Her hands are sensitive to cold with ongoing Raynaud's. No digital ulcers.    No F/C/S, chest pain.  No urinary symptoms.      Hip pain is in the groin, made worse with long walking.      Review of Systems:  Pertinent items are noted in HPI or as below, remainder of complete ROS is negative.      No recent problems with hearing or vision. Eye dryness relieved by once daily Refresh; no dry mouth  No breathing difficulty, shortness of breath, coughing, or wheezing  No chest pain or palpitations  No heart burn, indigestion, abdominal pain, nausea, vomiting, diarrhea  No urination problems, no bloody, cloudy urine, no dysuria  + toe \"neuropathy\",. Symmetrical, " stable.  No headaches or confusion  No rashes. No easy bleeding or bruising.     Active Medications:     Current Outpatient Medications   Medication Sig Dispense Refill    acetaminophen (TYLENOL) 500 MG tablet Take 1,000-1,500 mg by mouth nightly as needed       aspirin 81 MG tablet Take 81 mg by mouth At Bedtime       AYR SALINE NASAL NO-DRIP GEL Use as needed for nasal dryness 3 Tube 3    blood glucose monitoring (ULTRA THIN 30G) lancets Test 5 times daily  [employer]  Super thin 450 each 3    calcium carbonate 500 mg, elemental, (OSCAL) 500 MG tablet Take 1 tablet (500 mg) by mouth daily 90 tablet 3    cholecalciferol (VITAMIN D3) 25 mcg (1000 units) capsule Take 1 capsule by mouth daily      ciclopirox (PENLAC) 8 % external solution Apply to adjacent skin and affected nails daily.  Remove with alcohol every 7 days, then repeat. 6.6 mL 11    Continuous Blood Gluc Sensor (FREESTYLE SRI 3 SENSOR) MISC 1 each every 14 days 6 each 4    Efinaconazole 10 % SOLN Externally apply topically daily To affected nails for onychomycosis 8 mL 11    Glucagon (BAQSIMI ONE PACK) 3 MG/DOSE nasal powder Spray 1 spray (3 mg) in nostril as needed (to be used for severe hypoglycemic episodes) Please ask pharmacy for instructions on disposal of  medication in your county. 1 each 4    Injection Device for insulin (NOVOPEN ECHO) RORY 1 each 4 times daily 1 each 0    insulin aspart (NOVOLOG PENFILL) 100 UNIT/ML cartridge INJECT 1 UNIT PER 15 GRAMS OF CARBOHYDRATES UNDER THE SKIN THREE TIMES A DAY WITH MEALS. APPROXIMATELY 10 UNITS DAILY. 9 mL 3    insulin pen needle (BD PAM U/F) 32G X 4 MM miscellaneous USE AS DIRECTED WITH INSULIN PENS 4 TIMES DAILY 400 each 3    lidocaine (XYLOCAINE) 5 % external ointment Apply topically at bedtime 50 g 5    NIFEdipine ER OSMOTIC (PROCARDIA XL) 30 MG 24 hr tablet 1-2 daily or dysphagia 180 tablet 3    order for DME Equipment being ordered: compression socks, 20-30 mmHg, knee high 8 Piece 4     "simvastatin (ZOCOR) 40 MG tablet Take 1 tablet (40 mg) by mouth at bedtime 90 tablet 3    UNABLE TO FIND MEDICATION NAME:       warfarin ANTICOAGULANT (JANTOVEN ANTICOAGULANT) 5 MG tablet Take 1-1.5 tablets daily or as directed 135 tablet 4     No current facility-administered medications for this visit.       No longer on lantus: too many \"lows\".  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 Checks BS 5X daily.  Osteoporosis   Hypertension   Hyperlipidemia   Achalasia   Gastroesophageal reflux disease   Choroidal lesion  Atrophic vaginitis   Urinary urgency   Female stress incontinence   Cystocele, midline   Deep vein thrombosis   Nonsenile cataract   Myopia   Neuropathy   Lymphedema      Past Surgical History:  Phacoemulsification clear cornea with intraocular lens implantation, right 1/8/19, left 2/5/19  Transvaginal sling 12/20/16  Axilla lymph node dissection 2004  Bilateral tubal ligation     Family History:   Glaucoma - father   Breast cancer   Stroke       Social History:   Tobacco Use: No previous or current tobacco use.   Alcohol Use: No alcohol use.   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  Wt Readings from Last 4 Encounters:   08/19/24 47.2 kg (104 lb)   02/19/24 48.5 kg (107 lb)   01/05/24 49.7 kg (109 lb 8 oz)   08/07/23 51.3 kg (113 lb)       Constitutional: thin; appearing stated age; cooperative  Eyes: nl EOM, PERRLA, vision, conjunctiva, sclera  ENT: nl external ears, nose, hearing, lips, teeth, gums, throat; firm mass L anterior cervical chain, non " tender  No mucous membrane lesions, normal saliva pool  Neck: L anterior cervical non-tender 1 cm mobile swelling  Resp: unlabored breathing  Lymph: no cervical, supraclavicular, inguinal or epitrochlear nodes  MS: Osteoarthritis changes in the hands; knees without effusion or joint line tenderness.  Skin: Onychomycosis in multiple fingernails with some paronychial fissuring.  No fingertip pulp tenderness or skin ulceration.  Neuro: nl cranial nerves, strength, sensation, DTRs.   Psych: nl judgement, orientation, memory, affect.      Laboratory:       Latest Ref Rng & Units 8/11/2023     6:06 PM 12/20/2023     4:27 PM 8/12/2024     1:18 PM   RHEUM RESULTS   Albumin 3.5 - 5.2 g/dL  4.0  3.9    ALT 0 - 50 U/L   23    AST 0 - 45 U/L   28    Complement C3 81 - 157 mg/dL   60    Complement C4 13 - 39 mg/dL   7    Creatinine 0.51 - 0.95 mg/dL 0.78  0.63  0.63    DNA-ds <10.0 IU/mL 2.3   3.9    GFR Estimate >60 mL/min/1.73m2 79  >90  >90    Hematocrit 35.0 - 47.0 % 41.9   38.7    Hemoglobin 11.7 - 15.7 g/dL 13.6   12.3    WBC 4.0 - 11.0 10e3/uL 4.3   2.3    RBC Count 3.80 - 5.20 10e6/uL 4.87   4.43    RDW 10.0 - 15.0 % 16.4   15.9    MCHC 31.5 - 36.5 g/dL 32.5   31.8    MCV 78 - 100 fL 86   87    Platelet Count 150 - 450 10e3/uL 232   178      Rheumatoid Factor   Date Value Ref Range Status   01/14/2009 8 0 - 14 IU/mL Final     Cyclic Citrullinated Peptide IgG   Date Value Ref Range Status   01/14/2009 <2 <5 U/mL Final     Comment:     Interpretation:  Negative     Ribonucleic Protein IgG Antibody   Date Value Ref Range Status   01/24/2007 12  Final     Comment:     Reference range: 0 to 49  Unit: Units  (Note)  REFERENCE INTERVAL: Ribonucleic Protein (VIKRAM), IgG   Less than 20 Units ........ None detected   20 - 49 Units ............. Inconclusive   50 Units or greater ....... Positive    RNP antibody is seen in % of mixed connective tissue  disease and is considered specific for this syndrome if  other antibodies  are negative. RNP is also present in  20-30% of SLE and 15-25% of progressive systemic  sclerosis.  The above test was performed at: [facility],  [address] [place] [address]  [id number] [phone]  [email or web address]     SSA (RO) Antibody IgG   Date Value Ref Range Status   08/24/2005 221 (H)  Final     Comment:     Reference range: 0 to 49  Unit: Units  (Note)  REFERENCE INTERVALS: SSA (Ro) (VIKRAM) Ab, IgG   Less than 20 Units ....... None detected   20 - 49 Units ............ Inconclusive   50 Units or greater ...... Positive    SSA (Ro) antibody is seen in70-75% of Sjogren syndrome  cases, 30-40% of systemic lupus erythematosus (SLE) and  5-10% of progressive systemic sclerosis (PSS).  The above test was performed at: [facility],  [address] [facility]  [id number] [phone]  [email or web address]     SSB (LA) Antibody IgG   Date Value Ref Range Status   08/24/2005 20  Final     Comment:     Reference range: 0 to 49  Unit: Units  (Note)  REFERENCE INTERVALS: SSB (La) (VIKRAM) Ab, IgG   Less than 20 Units ....... None detected   20 - 49 Units ............ Inconclusive   50 Units or greater ...... Positive    SSB (La) antibody is seen in 50-60% of Sjogren syndrome  cases and is specific if it is the only VIKRAM antibody  present. 15-25% of patients with systemic lupus  erythematosus (SLE) and 5-10% of patients with progressive  systemic sclerosis (PSS) also have this antibody.  The above test was performed at: [facility],  [address] [facility]  [id number] [phone]  [email or web address]   ,  ,   CRISTAL Screen by EIA   Date Value Ref Range Status   02/16/2010 8.2 (H) 0 - 1.0 Final     Comment:     Interpretation:  Positive     DNA-ds   Date Value Ref Range Status   06/12/2019 1 <10 IU/mL Final     Comment:     Negative     Albumin Fraction   Date Value Ref Range Status   04/01/2013 3.9 3.7 - 5.1 g/dL Final     Alpha 2 Fraction   Date Value Ref Range Status   04/01/2013 0.7 0.5 - 0.9 g/dL Final     Beta Fraction   Date Value  Ref Range Status   04/01/2013 0.6 0.6 - 1.0 g/dL Final     Gamma Fraction   Date Value Ref Range Status   04/01/2013 0.7 0.7 - 1.6 g/dL Final     Monoclonal Peak   Date Value Ref Range Status   04/01/2013 0.0 0.0 g/dL Final     ELP Interpretation:   Date Value Ref Range Status   04/01/2013   Final    Essentially normal electrophoretic pattern.  No monoclonal protein seen.   Pathologic significance requires clinical correlation.  LEATHA Valencia M.D.,   Ph.D., Pathologist

## 2024-08-21 NOTE — PROGRESS NOTES
Jeanmarie is a 78 year old that presents in clinic today for the following:     Chief Complaint   Patient presents with    Follow Up     Acid reflux per pt, sore palette onset about a month ago, pt states feeling pressure in both ears and is affecting balance  Pt has history of achalasia            8/21/2024     2:52 PM   Additional Questions   Roomed by MR     Screenings from encounters over the past 10 days    No data recorded       Rayne Alex, EMT at 2:55 PM on 8/21/2024    Answers submitted by the patient for this visit:  General Questionnaire (Submitted on 8/21/2024)  Chief Complaint: Chronic problems general questions HPI Form  What is the reason for your visit today? : sore throat  ears involved reflux balance  How many days per week do you miss taking your medication?: 0

## 2024-08-21 NOTE — PROGRESS NOTES
HPI  78-year-old here today for a couple of concerns.  She reports insidious onset over a month ago of some discomfort in the back of the throat.  This is present bilaterally its constantly there and tends to fluctuate although she has not been able to identify any aggravating or alleviating factors that are associated with it.  Does not interfere with her sleep or wake her up at night but it is present when she is up in the morning.  She is sleeping only 5 hours at night but feels well rested with this.  She is also had some episodes of dysphagia where it feels as if food is hanging up in the upper chest.  She has to drink to get this to pass down.  She has not been regurgitating vomiting or up checking any food.  She has been seen by endocrinology and does have a firm anterior cervical node is scheduled for neck ultrasound to evaluate this.  Because of the persistence of the symptoms she decided to get this evaluated.  She is a non-smoker no significant alcohol and feels occasionally somewhat unsteady and dizzy in association with this but no true vertigo at all.  The itching of her heel has improved with the use of topical urea cream  Past Medical History:   Diagnosis Date    Achalasia     Antiphospholipid syndrome (H24)     Antiplatelet or antithrombotic long-term use     Coagulation disorder (H24) [id number]    DM (diabetes mellitus) (H)     DVT (deep venous thrombosis) (H) 2003    and PE    Dysphagia     occasional, use Nifedipine    GERD (gastroesophageal reflux disease)     Hyperlipidemia     Lymphedema     Myopia     Neuropathy     toes bilateral    Nonsenile cataract     osteoporosis     Pericarditis     Raynaud's disease     SLE (systemic lupus erythematosus) (H)      Past Surgical History:   Procedure Laterality Date    CATARACT IOL, RT/LT Left 02/2019    CATARACT IOL, RT/LT Right 01/2019    COLONOSCOPY N/A 11/28/2016    Procedure: COLONOSCOPY;  Surgeon: Sang August MD;  Location:  GI     CYSTOSCOPY, SLING TRANSVAGINAL N/A 12/20/2016    Procedure: CYSTOSCOPY, SLING TRANSVAGINAL;  Surgeon: Jeanmarie Pierson MD;  Location: [facility] OR    DISSECT LYMPH NODE AXILLA  2004    Lt, during eval for SLE    HYSTEROSCOPIC PLACEMENT CONTRACEPTIVE DEVICE  1985    PHACOEMULSIFICATION WITH STANDARD INTRAOCULAR LENS IMPLANT Right 1/8/2019    Procedure: Right Eye Cataract Extraction with Intraocular Lens;  Surgeon: Monika Fermin MD;  Location: [facility] OR    PHACOEMULSIFICATION WITH STANDARD INTRAOCULAR LENS IMPLANT Left 2/5/2019    Procedure: Left Eye Cataract Extraction with Intraocular Lens;  Surgeon: Monika Fermin MD;  Location: [facility] OR    TUBAL LIGATION Bilateral      Family History   Problem Relation Age of Onset    Glaucoma Father     Cancer Other         breast    Cerebrovascular Disease Other     C.A.D. Other     Macular Degeneration No family hx of     Skin Cancer No family hx of          Exam:  /73 (BP Location: Right arm, Patient Position: Sitting, Cuff Size: Adult Small)   Pulse 61   LMP  (LMP Unknown)   SpO2 100%   PHYSICAL EXAMINATION:   The patient is alert, oriented with a clear sensorium.   Skin shows no lesions or rashes and good turgor.   Head is normocephalic and atraumatic.    Ears show normal right TM cerumen on left.   Nasal mucosa clear  Mouth shows clear oral mucosa.   Neck shows firm fixed right lower anterior cervical node no other nodes, thyromegaly or bruits.   Lungs are clear to auscultation.   Heart shows normal S1 and S2 without murmur or gallop.   Abdomen is soft, nontender without masses or organomegaly.   Extremities show no edema and no evidence of active synovitis.       ASSESSMENT  1 dysphagia with throat discomfort and history of achalasia  2 neuro pruritus right heel  3 diabetes mellitus in remission on Novalog  4 hypertension on nifedipine  5 hyperlipidemia on simvastatin  6 SLE with Raynaud's   7 antiphospholipid antibodies on warfarin  8 peripheral  neuropathy   9 bilateral osteoarthritis of the knees symptomatic  10 Achalasia on nifedipine  11 Venous insufficiency with history DVT  12 osteoporosis on Reclast  13 GERD    Plan      Further evaluate the symptoms with a video swallowing study and an ENT referral.  Will have her follow-up sooner immediately if any progression of the symptoms or problems.    This note was completed using Dragon voice recognition software.      Joe Contreras MD  General Internal Medicine  [facility]  [phone]

## 2024-08-23 ENCOUNTER — LAB (OUTPATIENT)
Dept: LAB | Facility: CLINIC | Age: 79
End: 2024-08-23
Payer: COMMERCIAL

## 2024-08-23 ENCOUNTER — OFFICE VISIT (OUTPATIENT)
Dept: RHEUMATOLOGY | Facility: CLINIC | Age: 79
End: 2024-08-23
Attending: INTERNAL MEDICINE
Payer: COMMERCIAL

## 2024-08-23 VITALS — HEART RATE: 62 BPM | DIASTOLIC BLOOD PRESSURE: 73 MMHG | SYSTOLIC BLOOD PRESSURE: 145 MMHG

## 2024-08-23 DIAGNOSIS — M32.9 SYSTEMIC LUPUS ERYTHEMATOSUS, UNSPECIFIED SLE TYPE, UNSPECIFIED ORGAN INVOLVEMENT STATUS (H): ICD-10-CM

## 2024-08-23 DIAGNOSIS — M32.9 SYSTEMIC LUPUS ERYTHEMATOSUS, UNSPECIFIED SLE TYPE, UNSPECIFIED ORGAN INVOLVEMENT STATUS (H): Primary | ICD-10-CM

## 2024-08-23 LAB
ALBUMIN UR-MCNC: NEGATIVE MG/DL
APPEARANCE UR: CLEAR
BACTERIA #/AREA URNS HPF: ABNORMAL /HPF
BILIRUB UR QL STRIP: NEGATIVE
COLOR UR AUTO: ABNORMAL
GLUCOSE UR STRIP-MCNC: NEGATIVE MG/DL
HGB UR QL STRIP: NEGATIVE
KETONES UR STRIP-MCNC: NEGATIVE MG/DL
LEUKOCYTE ESTERASE UR QL STRIP: ABNORMAL
MUCOUS THREADS #/AREA URNS LPF: PRESENT /LPF
NITRATE UR QL: NEGATIVE
PH UR STRIP: 6 [PH] (ref 5–7)
RBC URINE: <1 /HPF
SP GR UR STRIP: 1.01 (ref 1–1.03)
TRANSITIONAL EPI: <1 /HPF
UROBILINOGEN UR STRIP-MCNC: NORMAL MG/DL
WBC URINE: 3 /HPF

## 2024-08-23 PROCEDURE — 81001 URINALYSIS AUTO W/SCOPE: CPT | Performed by: PATHOLOGY

## 2024-08-23 PROCEDURE — G2211 COMPLEX E/M VISIT ADD ON: HCPCS | Performed by: INTERNAL MEDICINE

## 2024-08-23 PROCEDURE — 99213 OFFICE O/P EST LOW 20 MIN: CPT | Performed by: INTERNAL MEDICINE

## 2024-08-23 PROCEDURE — 99214 OFFICE O/P EST MOD 30 MIN: CPT | Performed by: INTERNAL MEDICINE

## 2024-08-23 ASSESSMENT — PAIN SCALES - GENERAL: PAINLEVEL: NO PAIN (0)

## 2024-08-23 NOTE — LETTER
2024       RE: Shala Caruso  [address] [place] [address]     Dear Colleague,    Thank you for referring your patient, Shala Caruso, to the [facility] [place] [facility]. Please see a copy of my visit note below.    [facility]  Rheumatology Clinic  Tato Agarwal MD  2024     Name: Shala Caruso  MRN: [id number]  78 year old  : 1945  Referring provider: Joe Contreras     Assessment and Plan:    Systemic lupus erythematosus (+CRISTAL, +dsDNA, +SSA, hypocomplementemia; Raynaud's, arthritis, serositis, tubulo-interstitial nephropathy, pericardial effusion/tamponade): Pt relates stable bilateral knee and hip pain and stable toe neuro symptoms.  Raynaud's phenomenon  is controlled with thermal protective measures  No urinary or cutaneous symptoms.  Examination today revealed normal range of motion in the hand and finger joints without evidence of digital tapering or cyanosis.     Data:  Lab work on 2024 shows comprehensive metabolic panel normal; vitamin D levels normal; hemoglobin A1c normal; TSH normal; CBC shows leukopenia with white count of 2.3, decreased absolute lymphocytes, lowest values recorded since 2019.  Complement C3 was 60, complement C4 was 7, both decreased compared to most recent values; double-stranded DNA titer undetectable; parathyroid hormone normal at 34;    Discussion:   SLE remains in remission by history, examination.  The presence of leukopenia and hypocomplementemia I discussed blood work suggest that immune complex formation and autoimmunity against leukocyte antigens remains active. However, disease that formerly affected the kidney as well as serosal surfaces remains quiescent, despite discontinuation of immunomodulatory medication (mmf, used [id number]).  I recommend continuing off immunomodulatory therapy (patient is allergic to sulfa containing  molecules, and specifically to hydroxychloroquine and methotrexate), but in view of complement and leukopenia, increased monitoring for recurrence of symptoms formerly associated with SLE, and checking creatinine, urinalysis, and CBC in 4-6 months.    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 Following factor 2 levels.  # Peripheral neuropathy: stable  # Raynaud's: stable  # Achalasia: stable/improved on nifedipine, still has occasional dysphagia; workup for hoarseness and parathyroid disease is ongoing.  #Osteoarthritis, knees and hips: Chronic use associated pain and stiffness is modest, stable. Not limiting ambulation of 23380 steps per day.  I recommended continue weightbearing exercises and range of motion exercises daily.    # Osteoporosis: DEXA scan performed in 2023 revealed improved T scores in osteopenic range.  I agree with continued bisphosphonate therapy per Dr. Luna.  Patient should continue calcium carbonate, vitamin D, and weightbearing exercise as well.    Plan:  1.  Remain off mycophenolate,  2.  Recheck BMP, CBC with differential and urinalysis in 4-6 months.  3.  Continue calcium carbonate 1200 millequivalents daily, and vitamin D 800 international units daily to support bone density; continue range of motion exercise and weight bearing exercise.  4. Monitor blood pressure once or twice weekly, and monitor for recurrence of SLE symptoms  5. Urinalysis today    Tato Agarwal M.D.  Staff Rheumatologist,  Health  Pager [phone]    Follow-up: 8 mos    On the day of the encounter, a total of 31 minutes was spent in chart review, and in counseling and coordination of care, regarding the patient's complex medical problem of systemic lupus erythematosus.    The longitudinal plan of care for the diagnosis(es)/condition(s) as documented were addressed during this visit. Due to the added complexity in care, I will continue to support Jeanmarie in the subsequent  management and with ongoing continuity of care.    Orders Placed This Encounter   Procedures     Routine UA with microscopic     Basic metabolic panel     Routine UA with microscopic - No culture (UMP)     CBC with platelets differential       HPI:   Shala Caruso has a history of DM, SLE, PE on Coumadin, and HTN who presents for followup of lupus.  She was last seen 8-2023. At that time she was doing well without no significant symptoms of autoimmune disease.  Plan was to monitor off immunomodulatory medication.     Summary of previous history:  SLE diagnosed in 2000 (+CRISTAL, +dsDNA ab, arthritis, serositis).  She has antiphospholipid syndrome and had DVT and PE in 2004, on Coumadin since then.  She had SLE flare in 2010 initially treated with Plaquenil however she worsened and developed pericardial effusion/tamponade - started on MTX and tapering dose of prednisone at that time (continued on about 10 mg daily).  Cellcept started 6/2010, Prednisone tapered off.  MTX stopped May 2012.  Mycophenolate tapered from 2 g daily to off from November 2021 through July 2022.  Mycophenolate discontinued in late 2022.    Interval history August 23, 2024    She notes hoarseness and throat soreness for many weeks. She will see ENT, and is undergoing workup for parathyroid.  She has not had recurrence of migratory joint pain since last visit. Her knees hurt when she tries to squat, no swelling redness or warmth. She difficulty getting off the floor due to knee stiffness.    Raynaud's persists, no digital ulcers. She focusses on thermoprotection.  She is still walking 18841 steps per day.  No chest pain, cough, SOB.    Patient was seen in endocrinology on August 19, 2024.  Impression was of type 1 diabetes with controlled hemoglobin A1c, osteoporosis and mild hypercalcemia associated with hypercalciuria.  Recommendation was to pursue a parathyroid scan and neck ultrasound.  Recommendation was to continue Reclast, last dose  "August 2023.    Interval history August 18, 2023    She is doing well.  There is no joint pain, no skin rash, good energy level, no pleurisy, neuritic symptoms, fevers, glandular swelling.  She is able to walk 10,000 steps per day.  She does have stable Raynaud's phenomenon, and stable achalasia, managed with nifedipine.    She stopped cellcept in June 2022. She notes no sx of flare since.    Interval history August 19, 2022    Her knees and hips give constant pain, but \"tolerable\". If she sits too long, she is very stiff. Hard to get into and out of a car. Morning stiffness is brief, relieved by movement. She can walk 1-2 hours daily, using a walking stick. No pain in UE joints. She does daily yoga with a lot of stretching; she tends to feel better with it. Not using volatern because of the coumadin.     Her hands are sensitive to cold with ongoing Raynaud's. No digital ulcers. Biggest problem is when she is washing food under cold water. Hard time looking for BG with fingerstick.    She had last COVID booster (4th) in June.    She stopped her cellcept in June. She notes no sx of flare.    Stable numbness in feet for \"years\"; she does have balance issues, especially with stairs.    Interval history 05-:    Getting along \"ok\". She is unchanged in that Her knees and hips give constant pain \"tolerable\". If she sits too long, she is very stiff. Morning stiffness is brief, relieved by movement. No pain in UE joints. She is walking every day; she plans to start biking this summer. She does daily yoga with a lot of stretching; she tends to feel better with it.    Her hands are sensitive to cold with ongoing Raynaud's. No digital ulcers.    No F/C/S, chest pain.  No urinary symptoms.      Hip pain is in the groin, made worse with long walking.      Review of Systems:  Pertinent items are noted in HPI or as below, remainder of complete ROS is negative.      No recent problems with hearing or vision. Eye dryness " "relieved by once daily Refresh; no dry mouth  No breathing difficulty, shortness of breath, coughing, or wheezing  No chest pain or palpitations  No heart burn, indigestion, abdominal pain, nausea, vomiting, diarrhea  No urination problems, no bloody, cloudy urine, no dysuria  + toe \"neuropathy\",. Symmetrical, stable.  No headaches or confusion  No rashes. No easy bleeding or bruising.     Active Medications:     Current Outpatient Medications   Medication Sig Dispense Refill     acetaminophen (TYLENOL) 500 MG tablet Take 1,000-1,500 mg by mouth nightly as needed        aspirin 81 MG tablet Take 81 mg by mouth At Bedtime        AYR SALINE NASAL NO-DRIP GEL Use as needed for nasal dryness 3 Tube 3     blood glucose monitoring (ULTRA THIN 30G) lancets Test 5 times daily  [employer]  Super thin 450 each 3     calcium carbonate 500 mg, elemental, (OSCAL) 500 MG tablet Take 1 tablet (500 mg) by mouth daily 90 tablet 3     cholecalciferol (VITAMIN D3) 25 mcg (1000 units) capsule Take 1 capsule by mouth daily       ciclopirox (PENLAC) 8 % external solution Apply to adjacent skin and affected nails daily.  Remove with alcohol every 7 days, then repeat. 6.6 mL 11     Continuous Blood Gluc Sensor (FREESTYLE SRI 3 SENSOR) MISC 1 each every 14 days 6 each 4     Efinaconazole 10 % SOLN Externally apply topically daily To affected nails for onychomycosis 8 mL 11     Glucagon (BAQSIMI ONE PACK) 3 MG/DOSE nasal powder Spray 1 spray (3 mg) in nostril as needed (to be used for severe hypoglycemic episodes) Please ask pharmacy for instructions on disposal of  medication in your county. 1 each 4     Injection Device for insulin (NOVOPEN ECHO) RORY 1 each 4 times daily 1 each 0     insulin aspart (NOVOLOG PENFILL) 100 UNIT/ML cartridge INJECT 1 UNIT PER 15 GRAMS OF CARBOHYDRATES UNDER THE SKIN THREE TIMES A DAY WITH MEALS. APPROXIMATELY 10 UNITS DAILY. 9 mL 3     insulin pen needle (BD PAM U/F) 32G X 4 MM miscellaneous USE AS " "DIRECTED WITH INSULIN PENS 4 TIMES DAILY 400 each 3     lidocaine (XYLOCAINE) 5 % external ointment Apply topically at bedtime 50 g 5     NIFEdipine ER OSMOTIC (PROCARDIA XL) 30 MG 24 hr tablet 1-2 daily or dysphagia 180 tablet 3     order for DME Equipment being ordered: compression socks, 20-30 mmHg, knee high 8 Piece 4     simvastatin (ZOCOR) 40 MG tablet Take 1 tablet (40 mg) by mouth at bedtime 90 tablet 3     UNABLE TO FIND MEDICATION NAME:        warfarin ANTICOAGULANT (JANTOVEN ANTICOAGULANT) 5 MG tablet Take 1-1.5 tablets daily or as directed 135 tablet 4     No current facility-administered medications for this visit.       No longer on lantus: too many \"lows\".  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 Checks BS 5X daily.  Osteoporosis   Hypertension   Hyperlipidemia   Achalasia   Gastroesophageal reflux disease   Choroidal lesion  Atrophic vaginitis   Urinary urgency   Female stress incontinence   Cystocele, midline   Deep vein thrombosis   Nonsenile cataract   Myopia   Neuropathy   Lymphedema      Past Surgical History:  Phacoemulsification clear cornea with intraocular lens implantation, right 1/8/19, left 2/5/19  Transvaginal sling 12/20/16  Axilla lymph node dissection 2004  Bilateral tubal ligation     Family History:   Glaucoma - father   Breast cancer   Stroke       Social History:   Tobacco Use: No previous or current tobacco use.   Alcohol Use: No alcohol use.   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  Wt Readings from Last 4 Encounters:   08/19/24 47.2 " kg (104 lb)   02/19/24 48.5 kg (107 lb)   01/05/24 49.7 kg (109 lb 8 oz)   08/07/23 51.3 kg (113 lb)       Constitutional: thin; appearing stated age; cooperative  Eyes: nl EOM, PERRLA, vision, conjunctiva, sclera  ENT: nl external ears, nose, hearing, lips, teeth, gums, throat; firm mass L anterior cervical chain, non tender  No mucous membrane lesions, normal saliva pool  Neck: L anterior cervical non-tender 1 cm mobile swelling  Resp: unlabored breathing  Lymph: no cervical, supraclavicular, inguinal or epitrochlear nodes  MS: Osteoarthritis changes in the hands; knees without effusion or joint line tenderness.  Skin: Onychomycosis in multiple fingernails with some paronychial fissuring.  No fingertip pulp tenderness or skin ulceration.  Neuro: nl cranial nerves, strength, sensation, DTRs.   Psych: nl judgement, orientation, memory, affect.      Laboratory:       Latest Ref Rng & Units 8/11/2023     6:06 PM 12/20/2023     4:27 PM 8/12/2024     1:18 PM   RHEUM RESULTS   Albumin 3.5 - 5.2 g/dL  4.0  3.9    ALT 0 - 50 U/L   23    AST 0 - 45 U/L   28    Complement C3 81 - 157 mg/dL   60    Complement C4 13 - 39 mg/dL   7    Creatinine 0.51 - 0.95 mg/dL 0.78  0.63  0.63    DNA-ds <10.0 IU/mL 2.3   3.9    GFR Estimate >60 mL/min/1.73m2 79  >90  >90    Hematocrit 35.0 - 47.0 % 41.9   38.7    Hemoglobin 11.7 - 15.7 g/dL 13.6   12.3    WBC 4.0 - 11.0 10e3/uL 4.3   2.3    RBC Count 3.80 - 5.20 10e6/uL 4.87   4.43    RDW 10.0 - 15.0 % 16.4   15.9    MCHC 31.5 - 36.5 g/dL 32.5   31.8    MCV 78 - 100 fL 86   87    Platelet Count 150 - 450 10e3/uL 232   178      Rheumatoid Factor   Date Value Ref Range Status   01/14/2009 8 0 - 14 IU/mL Final     Cyclic Citrullinated Peptide IgG   Date Value Ref Range Status   01/14/2009 <2 <5 U/mL Final     Comment:     Interpretation:  Negative     Ribonucleic Protein IgG Antibody   Date Value Ref Range Status   01/24/2007 12  Final     Comment:     Reference range: 0 to 49  Unit:  Units  (Note)  REFERENCE INTERVAL: Ribonucleic Protein (VIKRAM), IgG   Less than 20 Units ........ None detected   20 - 49 Units ............. Inconclusive   50 Units or greater ....... Positive    RNP antibody is seen in % of mixed connective tissue  disease and is considered specific for this syndrome if  other antibodies are negative. RNP is also present in  20-30% of SLE and 15-25% of progressive systemic  sclerosis.  The above test was performed at: [facility],  [address] [place], [place] [address] [facility]  [id number] [phone]  [email or web address]     SSA (RO) Antibody IgG   Date Value Ref Range Status   08/24/2005 221 (H)  Final     Comment:     Reference range: 0 to 49  Unit: Units  (Note)  REFERENCE INTERVALS: SSA (Ro) (VIKRAM) Ab, IgG   Less than 20 Units ....... None detected   20 - 49 Units ............ Inconclusive   50 Units or greater ...... Positive    SSA (Ro) antibody is seen in70-75% of Sjogren syndrome  cases, 30-40% of systemic lupus erythematosus (SLE) and  5-10% of progressive systemic sclerosis (PSS).  The above test was performed at: [facility],  [address] [place] [address]  [id number] [phone]  [email or web address]     SSB (LA) Antibody IgG   Date Value Ref Range Status   08/24/2005 20  Final     Comment:     Reference range: 0 to 49  Unit: Units  (Note)  REFERENCE INTERVALS: SSB (La) (VIKRAM) Ab, IgG   Less than 20 Units ....... None detected   20 - 49 Units ............ Inconclusive   50 Units or greater ...... Positive    SSB (La) antibody is seen in 50-60% of Sjogren syndrome  cases and is specific if it is the only VIKRAM antibody  present. 15-25% of patients with systemic lupus  erythematosus (SLE) and 5-10% of patients with progressive  systemic sclerosis (PSS) also have this antibody.  The above test was performed at: [facility],  [facility] [employer] [facility]  [id number] [phone]  [email or web address]   ,  ,   CRISTAL Screen by EIA   Date Value Ref Range Status   02/16/2010 8.2 (H) 0 - 1.0 Final     Comment:      Interpretation:  Positive     DNA-ds   Date Value Ref Range Status   06/12/2019 1 <10 IU/mL Final     Comment:     Negative     Albumin Fraction   Date Value Ref Range Status   04/01/2013 3.9 3.7 - 5.1 g/dL Final     Alpha 2 Fraction   Date Value Ref Range Status   04/01/2013 0.7 0.5 - 0.9 g/dL Final     Beta Fraction   Date Value Ref Range Status   04/01/2013 0.6 0.6 - 1.0 g/dL Final     Gamma Fraction   Date Value Ref Range Status   04/01/2013 0.7 0.7 - 1.6 g/dL Final     Monoclonal Peak   Date Value Ref Range Status   04/01/2013 0.0 0.0 g/dL Final     ELP Interpretation:   Date Value Ref Range Status   04/01/2013   Final    Essentially normal electrophoretic pattern.  No monoclonal protein seen.   Pathologic significance requires clinical correlation.  LEATHA Valencia M.D.,   Ph.D., Pathologist   Again, thank you for allowing me to participate in the care of your patient.      Sincerely,    Tato Agarwal MD

## 2024-08-23 NOTE — PATIENT INSTRUCTIONS
Diagnosis:  1.  Systemic lupus erythematosus with a history of antiphospholipid antibodies, Raynaud's, serositis, and nephritis: Systemic lupus erythematosus remains quiescent, despite discontinuation of cellcept in 2022.  2.  Osteoporosis: I recommend continuing calcium and vitamin D supplements, follow-up with endocrinology.  3.  Propensity to clot: No evidence of recurrent thrombosis.  I recommend continuing warfarin as directed through [facility].  4. Osteoarthritis, knees and hips; modest, continue symptomatic treatment  5. Weight loss, sore throat: workup through ENT and endocrinology.    Plan:  1.  Remain off mycophenolate  2.  Recheck blood work and urinalysis in 6 months.  3.  Continue calcium carbonate 1200 millequivalents daily, and vitamin D 800 international units daily to support bone density; continue range of motion exercise and weight bearing exercise.  4. Monitor blood pressure once or twice weekly, and monitor for recurrence of SLE symptoms  5. Urinalysis today

## 2024-08-23 NOTE — NURSING NOTE
"Chief Complaint   Patient presents with    RECHECK     Follow-up      Vital signs:      BP: (!) 145/73 Pulse: 62             Weight:  (pt refused to get on scale)  Estimated body mass index is 17.06 kg/m  as calculated from the following:    Height as of 8/19/24: 1.663 m (5' 5.47\").    Weight as of 8/19/24: 47.2 kg (104 lb).      Felicitas Escoto CMA   8/23/2024 11:12 AM    "

## 2024-08-30 ENCOUNTER — ANCILLARY PROCEDURE (OUTPATIENT)
Dept: ULTRASOUND IMAGING | Facility: CLINIC | Age: 79
End: 2024-08-30
Attending: INTERNAL MEDICINE
Payer: COMMERCIAL

## 2024-08-30 DIAGNOSIS — R22.1 PALPABLE MASS OF NECK: ICD-10-CM

## 2024-08-30 PROCEDURE — 76536 US EXAM OF HEAD AND NECK: CPT | Mod: GC | Performed by: STUDENT IN AN ORGANIZED HEALTH CARE EDUCATION/TRAINING PROGRAM

## 2024-09-02 ENCOUNTER — MYC MEDICAL ADVICE (OUTPATIENT)
Dept: INTERNAL MEDICINE | Facility: CLINIC | Age: 79
End: 2024-09-02
Payer: COMMERCIAL

## 2024-09-02 DIAGNOSIS — L98.9 SKIN LESION: Primary | ICD-10-CM

## 2024-09-06 DIAGNOSIS — H31.8 CHOROIDAL LESION: Primary | ICD-10-CM

## 2024-09-17 ENCOUNTER — TELEPHONE (OUTPATIENT)
Dept: ENDOCRINOLOGY | Facility: CLINIC | Age: 79
End: 2024-09-17
Payer: COMMERCIAL

## 2024-09-17 DIAGNOSIS — E10.649 TYPE 1 DIABETES MELLITUS WITH HYPOGLYCEMIA AND WITHOUT COMA (H): Primary | ICD-10-CM

## 2024-09-17 NOTE — TELEPHONE ENCOUNTER
[facility]    Phone Message    May a detailed message be left on voicemail: yes     Reason for Call: Other: Patient would like referral fro CDE for her Type 1 .  Pls call once referral is placed     Action Taken: Other: endo    Travel Screening: Not Applicable     Date of Service:

## 2024-09-18 ENCOUNTER — OFFICE VISIT (OUTPATIENT)
Dept: OPHTHALMOLOGY | Facility: CLINIC | Age: 79
End: 2024-09-18
Attending: OPHTHALMOLOGY
Payer: COMMERCIAL

## 2024-09-18 DIAGNOSIS — H31.8 CHOROIDAL LESION: ICD-10-CM

## 2024-09-18 DIAGNOSIS — H26.491 PCO (POSTERIOR CAPSULAR OPACIFICATION), RIGHT: Primary | ICD-10-CM

## 2024-09-18 DIAGNOSIS — E10.649 TYPE 1 DIABETES MELLITUS WITH HYPOGLYCEMIA AND WITHOUT COMA (H): ICD-10-CM

## 2024-09-18 PROCEDURE — 99212 OFFICE O/P EST SF 10 MIN: CPT | Performed by: OPHTHALMOLOGY

## 2024-09-18 PROCEDURE — 99214 OFFICE O/P EST MOD 30 MIN: CPT | Mod: 57 | Performed by: OPHTHALMOLOGY

## 2024-09-18 PROCEDURE — 66821 AFTER CATARACT LASER SURGERY: CPT | Mod: RT | Performed by: OPHTHALMOLOGY

## 2024-09-18 PROCEDURE — 92134 CPTRZ OPH DX IMG PST SGM RTA: CPT | Performed by: OPHTHALMOLOGY

## 2024-09-18 ASSESSMENT — CONF VISUAL FIELD
OD_SUPERIOR_TEMPORAL_RESTRICTION: 0
OD_INFERIOR_NASAL_RESTRICTION: 0
OS_SUPERIOR_NASAL_RESTRICTION: 0
OD_INFERIOR_TEMPORAL_RESTRICTION: 0
METHOD: COUNTING FINGERS
OD_NORMAL: 1
OS_INFERIOR_TEMPORAL_RESTRICTION: 0
OS_SUPERIOR_TEMPORAL_RESTRICTION: 0
OD_SUPERIOR_NASAL_RESTRICTION: 0
OS_INFERIOR_NASAL_RESTRICTION: 0
OS_NORMAL: 1

## 2024-09-18 ASSESSMENT — REFRACTION_WEARINGRX
OS_ADD: +2.75
OD_ADD: +2.75
OD_AXIS: 144
OD_CYLINDER: +1.50
OS_AXIS: 178
OS_CYLINDER: +0.75
OS_SPHERE: -0.50
OD_SPHERE: -1.00

## 2024-09-18 ASSESSMENT — TONOMETRY
OD_IOP_MMHG: 16
OS_IOP_MMHG: 15
IOP_METHOD: TONOPEN

## 2024-09-18 ASSESSMENT — REFRACTION_MANIFEST
OS_SPHERE: -0.50
OS_AXIS: 177
OS_CYLINDER: +0.75
OD_ADD: +2.75
OD_SPHERE: -0.50
OD_AXIS: 165
OS_ADD: +2.75
OD_CYLINDER: +1.50

## 2024-09-18 ASSESSMENT — VISUAL ACUITY
OD_CC+: -1
OD_CC: 20/30
METHOD: SNELLEN - LINEAR
OS_CC: 20/20 SLOW

## 2024-09-18 ASSESSMENT — EXTERNAL EXAM - LEFT EYE: OS_EXAM: NORMAL

## 2024-09-18 ASSESSMENT — SLIT LAMP EXAM - LIDS
COMMENTS: NORMAL
COMMENTS: NORMAL

## 2024-09-18 ASSESSMENT — CUP TO DISC RATIO: OS_RATIO: 0.3

## 2024-09-18 ASSESSMENT — EXTERNAL EXAM - RIGHT EYE: OD_EXAM: NORMAL

## 2024-09-18 NOTE — PROGRESS NOTES
CC: follow up choroidal lesion    Interval: reports difficulty focusing and glare right eye; no changes in vision left eye  No flashes and floaters     HPI: 78 year old female with hx of DM type I. Denies flashes and floaters.    Past med hx: SLE currently off cellcept (allergic to plaq)    OCULAR IMAGING  Optical Coherence Tomography 09/18/24   Right eye normal foveal contour, no srf/intraretinal fluid; possible tiny drusen  Left eye normal foveal contour, no srf/irf  OCT thru choroidal lesion less than 1 mm thick with drusen overlying, no srf    PHOTOS 09/07/23   Right eye: no focal concerns  Left eye  Choroidal pigmented lesion with drusen stable in appearance     U/S left eye 09/07/23   ST lesion appears stable from prior, <1mm in height, C2 5.74  08/19/21 <1mm in height   8-19-20 <1mm in height - stable. No posterior shadowing. 0.61 mm x 4.78T x 5.38L  9/14/16 C1 0.56 mm; C2 5.46 mm  8-30-17 C1 1.3 mm; C2   8-9-18 less than 1 mm ashwini lesion. 0.73mm x 5.15T x 5.03L  9-11-19 Minimally elevated lesion <1 mm in height appears stable / unchanged on U/S today. Negative for posterior shadowing or patent extension / excavation.   09/07/23 Lesion measures: .81 x 5.51T x 5.74L. Too small for reliable a-scan.    ASSESSMENT:     # Choroidal pigmented lesion, left eye  - superior temporal periphery  - no orange pigmentation, no subretinal fluid, + drusen   - ultrasound 09/07/23  less than 1 mm height, normal retrobulbar echo pattern  - stable on exam and OCT MATEUSZ  - annual exams     # patient with history of Lupus   Took prednisolone for 10 yrs --> patient developed Diabetes mellitus   No lupus retinopathy   Patient had DVT left upper leg 2001, then 2002, then 2003 --> patient on coumadin indefinitely   Off MMF     # DM type I - no evidence of DR     # Pseudophakia both eyes   - doing well, lens centered  Status post yag left eye 9.7.23    # NEW visually significant PCO right eye   -difficulty focusing and glare  - r/b/a of  YAG cap discussed and Agreed to proceed 09/18/24   No complications    # Myopic astigmatism, OU   - updated Rx        return to clinic: 1-2 weeks with prescription and dilation       Complete documentation of historical and exam elements from today's encounter can be found in the full encounter summary report (not reduplicated in this progress note).  I personally obtained the chief complaint(s) and history of present illness.  I confirmed and edited as necessary the review of systems, past medical/surgical history, family history, social history, and examination findings as documented by others; and I examined the patient myself.  I personally reviewed the relevant tests, images, and reports as documented above.  I formulated and edited as necessary the assessment and plan and discussed the findings and management plan with the patient and family and No resident or fellow assisted with the procedures performed.  I performed the procedures myself.    Monika Fermin MD  Professor of Ophthalmology.  Retina Service   Department of Ophthalmology and Visual Neurosciences   [facility]  Phone: [phone]   Fax: [phone]

## 2024-09-18 NOTE — NURSING NOTE
Chief Complaints and History of Present Illnesses   Patient presents with    choroidal lesion      Chief Complaint(s) and History of Present Illness(es)       choroidal lesion                Comments    Jeanmarie is here for her annual follow up on choroidal lesion of left eye. Today she says left eye seems about the same as last visit. Right eye seems a little blurry. History of type one diabetes. She is curious if right eye will need an additional procedure. She also needs brighter lighting to read the newspaper. She sometimes sees floaters, and eyes feel dry. Sometimes has tearing, and sometimes discharge. She denies eye pain or flashes.     Lab Results       Component                Value               Date                       A1C                      5.3                 08/19/2024                 A1C                      5.5                 02/19/2024                 A1C                      5.7                 01/04/2023                 A1C                      5.5                 06/16/2022                 A1C                      5.7                 02/23/2022                 A1C                      5.6                 12/22/2021                 A1C                      5.4                 07/07/2021                 A1C                      5.2                 04/21/2021                 A1C                      5.6                 01/20/2021                 A1C                      5.1                 07/09/2020                 A1C                      5.3                 07/11/2017         Shant Velázquez COT 7:41 AM September 18, 2024

## 2024-09-19 ENCOUNTER — ANCILLARY PROCEDURE (OUTPATIENT)
Dept: GENERAL RADIOLOGY | Facility: CLINIC | Age: 79
End: 2024-09-19
Attending: INTERNAL MEDICINE
Payer: COMMERCIAL

## 2024-09-19 ENCOUNTER — THERAPY VISIT (OUTPATIENT)
Dept: SPEECH THERAPY | Facility: CLINIC | Age: 79
End: 2024-09-19
Attending: INTERNAL MEDICINE
Payer: COMMERCIAL

## 2024-09-19 DIAGNOSIS — R13.10 DYSPHAGIA, UNSPECIFIED TYPE: ICD-10-CM

## 2024-09-19 DIAGNOSIS — R07.0 THROAT PAIN: ICD-10-CM

## 2024-09-19 PROCEDURE — 92611 MOTION FLUOROSCOPY/SWALLOW: CPT | Mod: GN | Performed by: SPEECH-LANGUAGE PATHOLOGIST

## 2024-09-19 PROCEDURE — 92610 EVALUATE SWALLOWING FUNCTION: CPT | Mod: GN | Performed by: SPEECH-LANGUAGE PATHOLOGIST

## 2024-09-19 PROCEDURE — 74230 X-RAY XM SWLNG FUNCJ C+: CPT | Mod: GC | Performed by: RADIOLOGY

## 2024-09-19 RX ORDER — BARIUM SULFATE 400 MG/ML
SUSPENSION ORAL ONCE
Status: COMPLETED | OUTPATIENT
Start: 2024-09-19 | End: 2024-09-19

## 2024-09-19 RX ADMIN — BARIUM SULFATE 5 ML: 400 SUSPENSION ORAL at 13:58

## 2024-09-19 NOTE — PROGRESS NOTES
SPEECH LANGUAGE PATHOLOGY EVALUATION        Fall Risk Screen:  Fall screen completed by: SLP  Have you fallen 2 or more times in the past year?: No  Have you fallen and had an injury in the past year?: No  Is patient a fall risk?: No    Subjective      Presenting condition or subjective complaint:  intermittent difficulty with swallowing  Date of onset:      Relevant medical history:     Past Medical History:   Diagnosis Date    Achalasia     Antiphospholipid syndrome (H24)     Antiplatelet or antithrombotic long-term use     Coagulation disorder (H24) [id number]    DM (diabetes mellitus) (H)     DVT (deep venous thrombosis) (H) 2003    and PE    Dysphagia     occasional, use Nifedipine    GERD (gastroesophageal reflux disease)     Hyperlipidemia     Lymphedema     Myopia     Neuropathy     toes bilateral    Nonsenile cataract     osteoporosis     Pericarditis     Raynaud's disease     SLE (systemic lupus erythematosus) (H)        Dates & types of surgery:    Past Surgical History:   Procedure Laterality Date    CATARACT IOL, RT/LT Left 02/2019    CATARACT IOL, RT/LT Right 01/2019    COLONOSCOPY N/A 11/28/2016    Procedure: COLONOSCOPY;  Surgeon: Sang August MD;  Location: [facility]    CYSTOSCOPY, SLING TRANSVAGINAL N/A 12/20/2016    Procedure: CYSTOSCOPY, SLING TRANSVAGINAL;  Surgeon: Jeanmarie Pierson MD;  Location: [facility]    DISSECT LYMPH NODE AXILLA  2004    Lt, during eval for SLE    HYSTEROSCOPIC PLACEMENT CONTRACEPTIVE DEVICE  1985    PHACOEMULSIFICATION WITH STANDARD INTRAOCULAR LENS IMPLANT Right 1/8/2019    Procedure: Right Eye Cataract Extraction with Intraocular Lens;  Surgeon: Monika Fermin MD;  Location: [facility]    PHACOEMULSIFICATION WITH STANDARD INTRAOCULAR LENS IMPLANT Left 2/5/2019    Procedure: Left Eye Cataract Extraction with Intraocular Lens;  Surgeon: Monika Fermin MD;  Location: [facility]    TUBAL LIGATION Bilateral          Prior diagnostic imaging/testing results:      "  Prior therapy history for the same diagnosis, illness or injury:        Living Environment  Social support:   lives with spouse    Employment:    retired: worked for the [employer]  Hobbies/Interests:  walking, read, knit    Patient goals for therapy:  to figure out if we can spot anything.     Pain assessment:      Objective     SWALLOW EVALUTION  Dysphagia history: Pt reports troubles swallowing tuna fish, red meat, turkey burger, sometimes has to take drinks to help it go down. She notes liquids occasionally will go down and sometimes get stuck also, then it feels like something opens up and then the liquid can go through.   Denies coughing with po intake. Foods that go well include \"yogurt, cottage cheeses\". She does not eat starch as much as possible. When she was diagnosed with achalasia, they did all sorts of tests and the LES doesn't open along with decrease motility.     Current Diet/Method of Nutritional Intake: thin liquids (level 0), regular diet      CLINICAL SWALLOW EVALUATION  Oral Motor Function: Dentition: adequate dentition  Secretion management: WFL  Mucosal quality: adequate  Mandibular function: intact  Oral labial function: WFL  Lingual function: WFL  Velar function: intact   Buccal function: intact  Laryngeal function: cough, throat clear, volitional swallow, voicing WFL     Level of assist required for feeding: no assistance needed   Textures Trialed:   Clinical Swallow Eval: Thin Liquids  Mode of presentation: cup, self-fed   Volume presented: 3oz  Preparatory Phase: WFL  Oral Phase: WFL  Pharyngeal phase of swallow: intact   Strategies trialed during procedure: STEVIE SLP CLINICAL EVAL STRATEGIES: none   Diagnostic statement: No clinical s/sx of penetration/aspiration.        ADDITIONAL EVAL COMPLETED TODAY : yes; rationale: to further assess pharyngeal phase    VIDEOFLUOROSCOPIC SWALLOW STUDY  Radiologist: Resident  Views Taken: left lateral, A/P   Physical location of procedure: [facility] " [facility]  Patient sitting upright on chair/stool     VFSS textures trialed:   VFSS Eval: Thin Liquids  Mode of Presentation: cup, straw, self-fed   Order of Presentation: Series 1, 2, 3, 10, 13 (A/P)  Preparatory Phase: WFL  Oral Phase: WFL  Bolus Location When Swallow Initiated: posterior angle of ramus  Pharyngeal Phase: WFL  Rosenbeck's Penetration Aspiration Scale: 1 - no aspiration, contrast does not enter airway  Response to Aspiration: n/a  Strategies and Compensations: not applicable  Diagnostic Statement: Safe functional oropharyngeal swallow noted on thin liquid trials. She has minimal residue int he oral cavity which she advances into the pharynx and then swallows on the next trial.     VFSS Eval: Mildly Thick Liquids  Mode of Presentation: cup, self-fed   Order of Presentation: series 4,5  Preparatory Phase: WFL  Oral Phase: WFL  Bolus Location When Swallow Initiated: posterior angle of ramus  Pharyngeal Phase: WFL  Rosenbeck's Penetration Aspiration Scale: 1 - no aspiration, contrast does not enter airway  Response to Aspiration: n/a   Strategies and Compensations: not applicable  Diagnostic Statement: Pt demonstrates safe functional oropharyngeal swallow. No aspiration/penetration noted. Pt demonstrates minimal oral residue after the swallow which she advances to the valleculae and swallows on the next trial.     VFSS Eval: Purees  Mode of Presentation: spoon, self-fed   Order of Presentation: Series 6, 7, 12 (A/P)  Preparatory Phase: WFL  Oral Phase: WFL  Bolus Location When Swallow Initiated: posterior angle of ramus  Pharyngeal Phase: WFL  Rosenbeck s Penetration Aspiration Scale: 1 - no aspiration, contrast does not enter airway  Response to Aspiration: n/a  Strategies and Compensations: not applicable  Diagnostic Statement: Safe functional oropharyngeal swallow noted on puree consistency trials.     VFSS Eval: Solids  Mode of Presentation: self-fed   Order of Presentation: series 8, 9  Preparatory  Phase: WFL  Oral Phase: WFL  Bolus Location When Swallow Initiated: posterior angle of ramus  Pharyngeal Phase: WFL   Rosenbeck s Penetration Aspiration Scale: 1 - no aspiration, contrast does not enter airway  Response to Aspiration: n/a  Strategies and Compensations: not applicable  Diagnostic Statement: Pt demonstrates safe functional oropharyngeal swallow on solid consistency trials. PT demonstrates no oral or pharyngeal residue after the completed swallow.     VFSS Eval: Barium Tablet  Mode of Presentation: whole tablet taken with thin water by cup   Order of Presentation: Series 11  Preparatory Phase: WFL  Oral Phase: WFL  Bolus Location When Swallow Initiated: posterior angle of ramus  Pharyngeal Phase: WFL  Rosenbeck s Penetration Aspiration Scale: 1 - no aspiration, contrast does not enter airway  Strategies and Compensations: n/a  Diagnostic Statement: Barium tablet passed through oropharynx without difficulty.     ESOPHAGEAL PHASE OF SWALLOW  patient reports symptoms of esophageal dysphagia  esophageal sweep performed during today's videofluoroscopic exam  Pt demonstrates adequate transit of liquid bolus in upright position. Please refer to radiologist report for further details.       SWALLOW ASSESSMENT CLINICAL IMPRESSIONS AND RATIONALE  Diet Consistency Recommendations: thin liquids (level 0), regular diet    Recommended Feeding/Eating Techniques: small bolus size, slow rate of intake, alternate food and liquid intake, maintain upright posture during/after eating for 30 minutes   Medication Administration Recommendations: Whole with liquid ok  Instrumental Assessment Recommendations:      Assessment & Plan   CLINICAL IMPRESSIONS   Medical Diagnosis: achalasia, GERD, Raynaud's, Lupus    Treatment Diagnosis: safe functional oropharyngeal swallow   Impression/Assessment: Pt is a 78 year old female with dysphagia complaints. The following significant findings have been identified: safe functional swallow,  characterized by adequate transit of all consistencies through oral and pharyngeal phases.  There is trace residue in the oral cavity on thin and mildly thick liquids which is advanced to the pharynx prior to the next swallow.  Residue was cleared with a neck swallow.  No aspiration/penetration noted on any consistency.  Adequate Hyo laryngeal excursion and epiglottic retroflexion.  Adequate nasopharyngeal port closure.  Adequate UES opening.  All images from today's exam were reviewed with patient in detail including anatomy and physiology of the swallow.  All questions answered within scope of practice.    PLAN OF CARE  Evaluation only    Recommended Referrals to Other Professionals:   Education Assessment:   Learner/Method: Patient;No Barriers to Learning    Risks and benefits of evaluation/treatment have been explained.   Patient/Family/caregiver agrees with Plan of Care.     Evaluation Time:    SLP Eval: oral/pharyngeal swallow function, clinical minutes (02464): 10  SLP Eval: VideoFluoroscopic Swallow function Minutes (65960): 25         Signing Clinician: ROMEO Buckner

## 2024-09-21 RX ORDER — BLOOD-GLUCOSE SENSOR
EACH MISCELLANEOUS
Qty: 6 EACH | Refills: 4 | Status: SHIPPED | OUTPATIENT
Start: 2024-09-21

## 2024-09-21 NOTE — TELEPHONE ENCOUNTER
Continuous Blood Gluc Sensor (FREESTYLE SRI 3 SENSOR) MISC 6 each 4 8/10/2023 -- No   Sig - Route: 1 each every 14 days - Does not apply     LOV: 8/19/2024  [facility]    FOV:   11/4/2024 10:00 AM (30 min)  Ovi   Arrive by:  9:45 AM   RETURN DIABETES   [facility] ([facility])   Dorita Cramer MD

## 2024-09-24 ENCOUNTER — LAB (OUTPATIENT)
Dept: LAB | Facility: CLINIC | Age: 79
End: 2024-09-24
Payer: COMMERCIAL

## 2024-09-24 DIAGNOSIS — L98.9 SKIN LESION: ICD-10-CM

## 2024-09-24 DIAGNOSIS — I82.4Y9 DEEP VEIN THROMBOSIS (DVT) OF PROXIMAL LOWER EXTREMITY, UNSPECIFIED CHRONICITY, UNSPECIFIED LATERALITY (H): ICD-10-CM

## 2024-09-24 LAB — B BURGDOR IGG+IGM SER QL: 0.12

## 2024-09-24 PROCEDURE — 85260 CLOT FACTOR X STUART-POWER: CPT | Performed by: INTERNAL MEDICINE

## 2024-09-24 PROCEDURE — 86618 LYME DISEASE ANTIBODY: CPT | Performed by: INTERNAL MEDICINE

## 2024-09-24 PROCEDURE — 36415 COLL VENOUS BLD VENIPUNCTURE: CPT | Performed by: PATHOLOGY

## 2024-09-24 PROCEDURE — 99000 SPECIMEN HANDLING OFFICE-LAB: CPT | Performed by: PATHOLOGY

## 2024-09-25 ENCOUNTER — OFFICE VISIT (OUTPATIENT)
Dept: OPHTHALMOLOGY | Facility: CLINIC | Age: 79
End: 2024-09-25
Attending: OPHTHALMOLOGY
Payer: COMMERCIAL

## 2024-09-25 ENCOUNTER — ANTICOAGULATION THERAPY VISIT (OUTPATIENT)
Dept: ANTICOAGULATION | Facility: CLINIC | Age: 79
End: 2024-09-25
Payer: COMMERCIAL

## 2024-09-25 DIAGNOSIS — Z79.01 LONG TERM CURRENT USE OF ANTICOAGULANT THERAPY: Primary | ICD-10-CM

## 2024-09-25 DIAGNOSIS — Z48.810 AFTERCARE FOLLOWING SURGERY OF A SENSORY ORGAN: Primary | ICD-10-CM

## 2024-09-25 DIAGNOSIS — L93.0 LUPUS ERYTHEMATOSUS: ICD-10-CM

## 2024-09-25 DIAGNOSIS — I82.4Y9 DEEP VEIN THROMBOSIS (DVT) OF PROXIMAL LOWER EXTREMITY, UNSPECIFIED CHRONICITY, UNSPECIFIED LATERALITY (H): ICD-10-CM

## 2024-09-25 DIAGNOSIS — D68.61 ANTIPHOSPHOLIPID SYNDROME (H): ICD-10-CM

## 2024-09-25 LAB — FACT X ACT/NOR PPP CHRO: 28 % (ref 70–130)

## 2024-09-25 PROCEDURE — 99024 POSTOP FOLLOW-UP VISIT: CPT | Performed by: OPHTHALMOLOGY

## 2024-09-25 PROCEDURE — 99213 OFFICE O/P EST LOW 20 MIN: CPT | Performed by: OPHTHALMOLOGY

## 2024-09-25 ASSESSMENT — SLIT LAMP EXAM - LIDS
COMMENTS: NORMAL
COMMENTS: NORMAL

## 2024-09-25 ASSESSMENT — VISUAL ACUITY
OS_CC: 20/20
METHOD: SNELLEN - LINEAR
OD_CC: 20/20
METHOD_MR: MRX BY PJO
CORRECTION_TYPE: GLASSES

## 2024-09-25 ASSESSMENT — REFRACTION_WEARINGRX
OD_SPHERE: -1.00
OD_ADD: +2.75
OD_AXIS: 144
OS_CYLINDER: +0.75
OS_ADD: +2.75
OS_AXIS: 178
OD_CYLINDER: +1.50
OS_SPHERE: -0.50

## 2024-09-25 ASSESSMENT — CUP TO DISC RATIO
OS_RATIO: 0.3
OD_RATIO: 0.3

## 2024-09-25 ASSESSMENT — EXTERNAL EXAM - RIGHT EYE: OD_EXAM: NORMAL

## 2024-09-25 ASSESSMENT — REFRACTION_MANIFEST
OD_SPHERE: -0.50
OS_SPHERE: -0.50
OD_ADD: +2.75
OS_AXIS: 178
OD_CYLINDER: +1.75
OS_ADD: +2.75
OS_CYLINDER: +1.00
OD_AXIS: 145

## 2024-09-25 ASSESSMENT — EXTERNAL EXAM - LEFT EYE: OS_EXAM: NORMAL

## 2024-09-25 ASSESSMENT — TONOMETRY
IOP_METHOD: TONOPEN
OS_IOP_MMHG: 15
OD_IOP_MMHG: 14

## 2024-09-25 NOTE — NURSING NOTE
"Chief Complaints and History of Present Illnesses   Patient presents with    Post Op (Ophthalmology) Right Eye     S/p yag caps     Chief Complaint(s) and History of Present Illness(es)       Post Op (Ophthalmology) Right Eye              Associated symptoms: floaters (only OD, appeared after the YAG procedure).  Negative for eye pain and flashes    Treatments tried: artificial tears    Comments: S/p yag caps              Comments    Pt reports her vision is much better than it was, like \"a miracle cure\".  She uses PF AT. New floaters since YAG in right eye.    Nathaly Nj OA 1:48 PM September 25, 2024                      "

## 2024-09-25 NOTE — PROGRESS NOTES
ANTICOAGULATION MANAGEMENT     Shala Caruso 78 year old female is on warfarin with therapeutic result. (Goal Chromogenic Factor X 40-20%)    Recent labs: (last 7 days)     09/24/24  1304   [id number]*       ASSESSMENT     Source(s): Chart Review and Patient/Caregiver Call     Warfarin doses taken: Warfarin taken as instructed  Diet: No new diet changes identified but Jeanmarie is planning to increase her Vit K foods a bit in the coming weeks. She has been really trying to limit her veggies and salads but having a hard time and would like to reintroduce these foods. Nothing extreme, but will plan to increase by just a few servings a week.   Medication/supplement changes: None noted  New illness, injury, or hospitalization: No  Signs or symptoms of bleeding or clotting: No  Previous result: Therapeutic last 2(+) visits  Additional findings: None     PLAN     Recommended plan for no diet, medication or health factor changes affecting result    Dosing Instructions: Continue your current warfarin dose 7.5mg Tue/Fri and 5mg row  with next Chromogenic Factor X in 3 weeks. Recheck date was planning with input from Jeanmarie related to upcoming travel and planned upcoming dietary changes    Telephone call with Jeanmarie who verbalizes understanding and agrees to plan    Lab visit scheduled    Education provided:   Dietary considerations: impact of vitamin K foods on INR  Contact [phone] with any changes, questions or concerns.     Plan made per [facility] anticoagulation protocol    Teresa Marshall RN  9/25/2024  Anticoagulation Clinic  blueKiwi for routing messages: [facility]  [facility] patient phone line: [phone]

## 2024-09-25 NOTE — PROGRESS NOTES
CC: follow up yag laser both eyes     Interval: reports Improvement of the vision; few floaters; no flashes    HPI: 78 year old female with hx of DM type I. Denies flashes and floaters.    Past med hx: SLE currently off cellcept (allergic to plaq)    OCULAR IMAGING  Optical Coherence Tomography 09/18/24   Right eye normal foveal contour, no srf/intraretinal fluid; possible tiny drusen  Left eye normal foveal contour, no srf/irf  OCT thru choroidal lesion less than 1 mm thick with drusen overlying, no srf    PHOTOS 09/07/23   Right eye: no focal concerns  Left eye  Choroidal pigmented lesion with drusen stable in appearance     U/S left eye 09/07/23   ST lesion appears stable from prior, <1mm in height, C2 5.74  08/19/21 <1mm in height   8-19-20 <1mm in height - stable. No posterior shadowing. 0.61 mm x 4.78T x 5.38L  9/14/16 C1 0.56 mm; C2 5.46 mm  8-30-17 C1 1.3 mm; C2   8-9-18 less than 1 mm ashwini lesion. 0.73mm x 5.15T x 5.03L  9-11-19 Minimally elevated lesion <1 mm in height appears stable / unchanged on U/S today. Negative for posterior shadowing or patent extension / excavation.   09/07/23 Lesion measures: .81 x 5.51T x 5.74L. Too small for reliable a-scan.    ASSESSMENT:     # Pseudophakia both eyes   Status post yag left eye 9.7.23; right eye 9.18.24  Retina attached  Doing well    -----  # Choroidal pigmented lesion, left eye  - superior temporal periphery  - no orange pigmentation, no subretinal fluid, + drusen   - ultrasound 09/07/23  less than 1 mm height, normal retrobulbar echo pattern  - stable on exam and OCT MATEUSZ  - annual exams     # patient with history of Lupus   Took prednisolone for 10 yrs --> patient developed Diabetes mellitus   No lupus retinopathy   Patient had DVT left upper leg 2001, then 2002, then 2003 --> patient on coumadin indefinitely   Off MMF     # DM type I - no evidence of DR       # Myopic astigmatism, OU   - updated Rx        return to clinic: 1 yr with optos; OCT enhanced  depth image of the choroid left eye diabetic exam     Complete documentation of historical and exam elements from today's encounter can be found in the full encounter summary report (not reduplicated in this progress note).  I personally obtained the chief complaint(s) and history of present illness.  I confirmed and edited as necessary the review of systems, past medical/surgical history, family history, social history, and examination findings as documented by others; and I examined the patient myself.  I personally reviewed the relevant tests, images, and reports as documented above.  I formulated and edited as necessary the assessment and plan and discussed the findings and management plan with the patient and family and No resident or fellow assisted with the procedures performed.  I performed the procedures myself.    Monika Fermin MD  Professor of Ophthalmology.  Retina Service   Department of Ophthalmology and Visual Neurosciences   [facility]  Phone: [phone]   Fax: [phone]

## 2024-10-11 ENCOUNTER — ALLIED HEALTH/NURSE VISIT (OUTPATIENT)
Dept: EDUCATION SERVICES | Facility: CLINIC | Age: 79
End: 2024-10-11
Attending: INTERNAL MEDICINE
Payer: COMMERCIAL

## 2024-10-11 DIAGNOSIS — E10.649 TYPE 1 DIABETES MELLITUS WITH HYPOGLYCEMIA AND WITHOUT COMA (H): ICD-10-CM

## 2024-10-11 PROCEDURE — G0108 DIAB MANAGE TRN  PER INDIV: HCPCS | Performed by: DIETITIAN, REGISTERED

## 2024-10-11 NOTE — PROGRESS NOTES
"Diabetes Self-Management Education & Support    Shala Caruso is a 78 year old who presents today for education related to Type 1 diabetes  Patient is being treated with:  diet, exercise, and insulin injections  She is accompanied by self    Year of diagnosis: 2010  Referring provider:  Dorita Cramer  Living Situation: with   Employment: did not ask    PATIENT CONCERNS RELATED TO DIABETES SELF MANAGEMENT: questions about carb counting and carb ratio      SUBJECTIVE / OBJECTIVE:  Diabetes education in the past 24mo: No  Diabetes type: Type 1  How confident are you filling out medical forms by yourself:: Quite a bit      Monitoring:  Blood Glucose Meter: CGM  Times checking blood sugar at home (number): 4  Times checking blood sugar at home (per): Day  CGM: Freestyle Siddharth 3            Labs:  Lab Results   Component Value Date    A1C 5.3 08/19/2024    A1C 5.7 01/04/2023    A1C 5.4 07/07/2021     Lab Results   Component Value Date    GLC 93 08/12/2024     07/19/2022     07/07/2021     Lab Results   Component Value Date    LDL 72 08/12/2024    LDL 79 07/07/2021     HDL Cholesterol   Date Value Ref Range Status   07/07/2021 108 >49 mg/dL Final     Direct Measure HDL   Date Value Ref Range Status   08/12/2024 63 >=50 mg/dL Final     GFR Estimate   Date Value Ref Range Status   08/12/2024 >90 >60 mL/min/1.73m2 Final     Comment:     eGFR calculated using 2021 CKD-EPI equation.   07/07/2021 84 >60 mL/min/[1.73_m2] Final     Comment:     Non  GFR Calc  Starting 12/18/2018, serum creatinine based estimated GFR (eGFR) will be   calculated using the Chronic Kidney Disease Epidemiology Collaboration   (CKD-EPI) equation.       Lab Results   Component Value Date    CR 0.63 08/12/2024    CR 0.71 07/07/2021     No results found for: \"MICROALBUMIN\"  Glutamic Acid Decarboxylase Antibody   Date Value Ref Range Status   04/22/2010 >250.0 (H)  Final     Comment:     Reference range: 0.0 " to 5.0  Unit: IU/mL  (Note)  Performed by [facility],  [address] [place] [address] [phone]  [email or web address], Zahraa Grossman MD, Lab. Director     C Peptide   Date Value Ref Range Status   2010 0.9 0.9 - 6.9 ng/mL Final   2005 0.8 ng/mL Final     Comment:     C Peptide Reference Range   Fastin.1-5.0 ng/mL         Diabetes Symptoms & Complications:  Diabetes Related Symptoms: Polyuria (increased urination), Slow healing wounds  Weight trend: Stable  Symptom course: Stable         Taking Medications:  Diabetes Medication(s)       Diabetic Other       Glucagon (BAQSIMI ONE PACK) 3 MG/DOSE nasal powder Spray 1 spray (3 mg) in nostril as needed (to be used for severe hypoglycemic episodes) Please ask pharmacy for instructions on disposal of  medication in your county.       Insulin       insulin aspart (NOVOLOG PENFILL) 100 UNIT/ML cartridge INJECT 1 UNIT PER 15 GRAMS OF CARBOHYDRATES UNDER THE SKIN THREE TIMES A DAY WITH MEALS. APPROXIMATELY 10 UNITS DAILY.          Current Treatments: Diet, Insulin Injections    Problem Solving:  Has hypoglycemia unawareness  Patient is at risk of hypoglycemia?: YES  Hypoglycemia symptoms: none      Reducing Risks:  Has dilated eye exam at least once a year?: Yes  Sees dentist every 6 months?: Yes  Feet checked by healthcare provider in the last year?: Yes    Patient's most recent   Lab Results   Component Value Date    A1C 5.3 2024    A1C 5.5 2024    A1C 5.7 2023    A1C 5.5 2022    A1C 5.4 2021    A1C 5.2 2021      Patient's A1C goal: <7.0    Being Active:  Barrier to exercise: Time, Physical limitation, Other      Nutrition:     Cultural/Pentecostal diet restrictions?: No  avoids    Healthy Coping:  Informal Support system:: Spouse        EDUCATION and INSTRUCTION PROVIDED AT THIS VISIT:    T1D seen for Comprehensive Knowledge Assessment and Instruction at the request of Dr. Cramer. Jeanmarie was first  diagnosed with diabetes in 2010. She is wearing a FreeStyle Siddharth 3 sensor. Lantus was stopped and she is managing with just Novolog. She uses a Jacquelyn Echopen for 0.5 unit dosing option. Sh aims for tight glucose control.  We reviewed FreeStyle Siddharth 3 reports. Time in Range (70-180mg/dL) is 99%, average glucose is 97 mg/dL, GMI is 5.6, variation is 12.4%. Jeanmarie is noticing highs in the morning during her walk. She is wondering if it is okay to give 1 unit of Novolog to offset this rise. She says glucoses may go up to 150 mg/dL. Discussed that 0.5 - 1 unit seems reasonable.  She is using a carb ratio of 1 unit to 10 grams of CHO. Typically takes 2-4 units at meals. More if eating out. She has hypoglycemia unawareness. NO longer feels symptoms of lows. Feels symptoms when glucoses are high. Discussed that she can loosen glucose control.  Worked with Jeanmarie to practice calculating meal doses based on 1 unit per 15 grams carbohydrate based on typical meals and snacks consumed.  Discussed how to add more fiber into diet and how to use CGM as learning too. Discussed using [employer] jonnathan and Carb Factors to aid in carb counting.         Patient-stated goal written and given to Shala TATIANA Caruso.  Verbalized and demonstrated understanding of instructions.     PLAN:  Loosen carb ratio to 1 unit for 15 grams of CHO.      FOLLOW-UP:    As needed.        See patient instructions.  AVS provided to patient.    Time spent with patient at today's visit was 60 minutes.      Any diabetes medication initiation or dose changes were made via the CDCES Standing Orders per the patient's referring provider. A copy of this encounter was shared with the provider.

## 2024-10-11 NOTE — PATIENT INSTRUCTIONS
Tucker Murry,    It was nice meeting with you. Here is a summary of our visit and plan:    Try a carb ratio of 1 unit for 15 grams of CHO    https://"[employer]/wp-content/uploads/2022/04/Carb-factors-of-various-foods.pdf    To Start Counting Carbs:    Read food labels  Weigh and measure serving sizes  Look up the carb content in a book that lists carbs, or  Use the carb database on a website or phone leanne    **Weighing food, determining serving sizes, and calculating carb contents trains your eye to estimate carbs more accurately at home or when eating out. You'll be able to look at a piece or fruit, a bowl of pasta, or a plate of stir-fried veggies.**    Here are some apps and websites that may be helpful for carb counting:    [employer]  https://www.[employer]/us/en/    Figwee (Visual Carb Counting Leanne)  https://[employer]    [employer]  https://www.ProtoShare.com/    Cronometer (Has more detailed information on micronutrients)  https://cronometer.com/          Please call or MyChart if you have any questions.  Jovanna RODRIGUEZ [facility]  Diabetes Educator  [facility] [address] [address]  Phone: [phone]  Email: [email or web address]

## 2024-10-14 ENCOUNTER — MYC MEDICAL ADVICE (OUTPATIENT)
Dept: OTOLARYNGOLOGY | Facility: CLINIC | Age: 79
End: 2024-10-14
Payer: COMMERCIAL

## 2024-10-14 NOTE — PROGRESS NOTES
Shala Caruso is a 78 year old female  Chief Complaint: Dysphagia, sore throat x 2 months  History of Present Illness  Location:throat  Quality:dysphagia, sore throat  Severity: mild  Duration: 2 mos    Past Medical History -   Patient Active Problem List   Diagnosis    Achalasia    Antiphospholipid syndrome (H)    DM (diabetes mellitus) (H)    GERD (gastroesophageal reflux disease)    Hyperlipidemia    HTN (hypertension)    Osteopenia    Raynaud's disease    DVT (deep venous thrombosis) (H)    Encounter for long-term current use of medication    Type 1 diabetes mellitus (H)    Osteoporosis, idiopathic    Osteoporosis, postmenopausal    Cystocele, midline    Urinary urgency    Urinary frequency    Female stress incontinence    Atrophic vaginitis    Long term current use of anticoagulant therapy    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)    Choroidal lesion    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, unspecified SLE type (H)    Hematoma of leg, right, initial encounter    Lupus erythematosus    Deep vein thrombosis (DVT) of proximal lower extremity, unspecified chronicity, unspecified laterality (H)       Current Medications -   Current Outpatient Medications:     acetaminophen (TYLENOL) 500 MG tablet, Take 1,000-1,500 mg by mouth nightly as needed , Disp: , Rfl:     aspirin 81 MG tablet, Take 81 mg by mouth At Bedtime , Disp: , Rfl:     AYR SALINE NASAL NO-DRIP GEL, Use as needed for nasal dryness, Disp: 3 Tube, Rfl: 3    blood glucose monitoring (ULTRA THIN 30G) lancets, Test 5 times daily  Sunmark  Super thin, Disp: 450 each, Rfl: 3    calcium carbonate 500 mg, elemental, (OSCAL) 500 MG tablet, Take 1 tablet (500 mg) by mouth daily, Disp: 90 tablet, Rfl: 3    cholecalciferol (VITAMIN D3) 25 mcg (1000 units) capsule, Take 1 capsule by mouth daily, Disp: , Rfl:     ciclopirox (PENLAC) 8 % external solution, Apply to adjacent skin and affected nails daily.  Remove with  alcohol every 7 days, then repeat., Disp: 6.6 mL, Rfl: 11    Continuous Glucose Sensor (FREESTYLE SRI 3 SENSOR) [facility] [place], CHANGE ONE SENSOR EVERY 14 DAYS, Disp: 6 each, Rfl: 4    Glucagon (BAQSIMI ONE PACK) 3 MG/DOSE nasal powder, Spray 1 spray (3 mg) in nostril as needed (to be used for severe hypoglycemic episodes) Please ask pharmacy for instructions on disposal of  medication in your county., Disp: 1 each, Rfl: 4    Injection Device for insulin (NOVOPEN ECHO) RORY, 1 each 4 times daily, Disp: 1 each, Rfl: 0    insulin aspart (NOVOLOG PENFILL) 100 UNIT/ML cartridge, INJECT 1 UNIT PER 15 GRAMS OF CARBOHYDRATES UNDER THE SKIN THREE TIMES A DAY WITH MEALS. APPROXIMATELY 10 UNITS DAILY., Disp: 9 mL, Rfl: 3    insulin pen needle (BD PAM U/F) 32G X 4 MM miscellaneous, USE AS DIRECTED WITH INSULIN PENS 4 TIMES DAILY, Disp: 400 each, Rfl: 3    lidocaine (XYLOCAINE) 5 % external ointment, Apply topically at bedtime, Disp: 50 g, Rfl: 5    NIFEdipine ER OSMOTIC (PROCARDIA XL) 30 MG 24 hr tablet, 1-2 daily or dysphagia, Disp: 180 tablet, Rfl: 3    order for DME, Equipment being ordered: compression socks, 20-30 mmHg, knee high, Disp: 8 Piece, Rfl: 4    simvastatin (ZOCOR) 40 MG tablet, Take 1 tablet (40 mg) by mouth at bedtime, Disp: 90 tablet, Rfl: 3    UNABLE TO FIND, MEDICATION NAME: , Disp: , Rfl:     warfarin ANTICOAGULANT (JANTOVEN ANTICOAGULANT) 5 MG tablet, Take 1-1.5 tablets daily or as directed, Disp: 135 tablet, Rfl: 4    Allergies -   Allergies   Allergen Reactions    Amaryl [Glimepiride] Swelling     Swelling of tongue    Metformin Blisters     Experienced big blisters all over body and sloughing of skin    Plaquenil [Aminoquinolines] Hives    Sulfa Antibiotics Other (See Comments)     Turned bright red    Amoxicillin Rash    Hydroxychloroquine Rash    Penicillins Rash       Social History -   Social History     Socioeconomic History    Marital status:    Tobacco Use    Smoking status:  Never    Smokeless tobacco: Never   Substance and Sexual Activity    Alcohol use: No    Drug use: No    Sexual activity: Yes     Partners: Male     Comment: , safe in relationship   Social History Narrative    How much exercise per week? Walk to work every morning (2 miles), swimming 3 times a week, takes walks with , periodically works out at gym on stationary bike     How much calcium per day? Supplement 3x daily        How much caffeine per day? On rare occasion, only if she is out to eat and that is all they have    How much vitamin D per day? supplement    Do you/your family wear seatbelts?  Yes    Do you/your family use safety helmets? Yes    Do you/your family use sunscreen? Yes    Do you/your family keep firearms in the home? No    Do you/your family have a smoke detector(s)? Yes        Do you feel safe in your home? Yes    Has anyone ever touched you in an unwanted manner? No        Klever R LPN 7/18/2013                 Social Determinants of Health     Financial Resource Strain: Low Risk  (1/5/2024)    Financial Resource Strain     Within the past 12 months, have you or your family members you live with been unable to get utilities (heat, electricity) when it was really needed?: No   Food Insecurity: Low Risk  (1/5/2024)    Food Insecurity     Within the past 12 months, did you worry that your food would run out before you got money to buy more?: No     Within the past 12 months, did the food you bought just not last and you didn t have money to get more?: No   Transportation Needs: Low Risk  (1/5/2024)    Transportation Needs     Within the past 12 months, has lack of transportation kept you from medical appointments, getting your medicines, non-medical meetings or appointments, work, or from getting things that you need?: No   Interpersonal Safety: Low Risk  (9/18/2024)    Interpersonal Safety     Do you feel physically and emotionally safe where you currently live?: Yes     Within the  past 12 months, have you been hit, slapped, kicked or otherwise physically hurt by someone?: No     Within the past 12 months, have you been humiliated or emotionally abused in other ways by your partner or ex-partner?: No   Housing Stability: Low Risk  (1/5/2024)    Housing Stability     Do you have housing? : Yes     Are you worried about losing your housing?: No       Family History -   Family History   Problem Relation Age of Onset    Glaucoma Father     Cancer Other         breast    Cerebrovascular Disease Other     C.A.D. Other     Macular Degeneration No family hx of     Skin Cancer No family hx of        Review of Systems:   !.  Weight Loss: No   2. Difficulty Breathing: No   3. Difficulty Swallowing: No   4. Pain: No    Physical Exam  B/P: Data Unavailable, T: Data Unavailable, P: Data Unavailable, R: Data Unavailable  Vitals: /56 (BP Location: Right arm, Patient Position: Chair, Cuff Size: Adult Regular)   Pulse 69   Temp 98.1  F (36.7  C) (Tympanic)   LMP  (LMP Unknown)   BMI= There is no height or weight on file to calculate BMI.    General  Appearance - Normal  Head/Face/Scalp:    Skin - Normal    Facial Palpation - Normal    Facial Strength - Normal  Ears:    Pinna - Normal    Canal - Normal   Tympanic membrane - Normal  Nose:    External - Normal    Septum - Normal    Turbinates - Normal    Middle meatus - Normal  Oral Cavity:    Lips - Normal    Floor of Mouth - Normal    Gingiva - Normal    Tongue - Normal    Buccal - Normal    Palate - Normal  Nasopharynx: clear    Oropharynx:    Tonsils - Normal    Tongue base - Normal    Soft palate - Normal    Posterior pharyngeal wall - Normal  Hypopharynx: clear  Larynx:    Epiglottis - normal    Aryepiglottic folds - Rt red    Arytenoids - Rt red, post-cricoid edema-     True vocal cords -normal  Neck Masses - No  Neck lymphatics - no lymphadenopathy  Thyroid - Normal  Salivary glands - Normal    Audiogram - not applicable  Radiology - not  applicable   Reports:   View films:  Procedures -  Procedure: Flexible Endoscopy  Indication: dysphagia, sore throat    Fiberoptic examination was performed using flexible laryngoscope.  Both sides of the nose were sprayed with a mixture of lidocaine and neosynephrine.  The flexible nasopharyngoscope scope was passed through the nasal cavity.  The nasal cavity was unremarkable.  The nasopharynx was mucosally covered and symmetric.  The Eustachian tube openings were unobstructed.  Going further down I had a clear view of the base of tongue which had normal appearing lingual tonsillar tissue.  The base of tongue was free of lesions, and the vallecula was open.  The epiglottis was smooth and mucosally covered.  The supraglottic larynx was then clearly visualized.  The vocal cords moved smoothly and symmetrically, they were pearly white and no lesions were seen.  The pyriform sinuses were open, and the limited view of the postcricoid region moderate edema    The patient tolerated the patient well.    Patient Education:     A/P - Shala Caruso is a 78 year old female  Medical Decision Making 1. Dysphagia 2. Sore throat 3. Laryngopharyngeal reflux

## 2024-10-15 ENCOUNTER — LAB (OUTPATIENT)
Dept: LAB | Facility: CLINIC | Age: 79
End: 2024-10-15
Payer: COMMERCIAL

## 2024-10-15 DIAGNOSIS — I82.4Y9 DEEP VEIN THROMBOSIS (DVT) OF PROXIMAL LOWER EXTREMITY, UNSPECIFIED CHRONICITY, UNSPECIFIED LATERALITY (H): ICD-10-CM

## 2024-10-15 PROCEDURE — 36415 COLL VENOUS BLD VENIPUNCTURE: CPT | Performed by: PATHOLOGY

## 2024-10-15 PROCEDURE — 99000 SPECIMEN HANDLING OFFICE-LAB: CPT | Performed by: PATHOLOGY

## 2024-10-15 PROCEDURE — 85260 CLOT FACTOR X STUART-POWER: CPT | Performed by: INTERNAL MEDICINE

## 2024-10-16 ENCOUNTER — DOCUMENTATION ONLY (OUTPATIENT)
Dept: ANTICOAGULATION | Facility: CLINIC | Age: 79
End: 2024-10-16
Payer: COMMERCIAL

## 2024-10-16 ENCOUNTER — ANTICOAGULATION THERAPY VISIT (OUTPATIENT)
Dept: ANTICOAGULATION | Facility: CLINIC | Age: 79
End: 2024-10-16
Payer: COMMERCIAL

## 2024-10-16 DIAGNOSIS — Z79.01 LONG TERM CURRENT USE OF ANTICOAGULANT THERAPY: Primary | ICD-10-CM

## 2024-10-16 DIAGNOSIS — I82.4Y9 DEEP VEIN THROMBOSIS (DVT) OF PROXIMAL LOWER EXTREMITY, UNSPECIFIED CHRONICITY, UNSPECIFIED LATERALITY (H): ICD-10-CM

## 2024-10-16 DIAGNOSIS — L93.0 LUPUS ERYTHEMATOSUS: ICD-10-CM

## 2024-10-16 DIAGNOSIS — D68.61 ANTIPHOSPHOLIPID SYNDROME (H): ICD-10-CM

## 2024-10-16 LAB — FACT X ACT/NOR PPP CHRO: 24 % (ref 70–130)

## 2024-10-16 NOTE — PROGRESS NOTES
ANTICOAGULATION MANAGEMENT     Shala Caruso 78 year old female is on warfarin with therapeutic result. (Goal Chromogenic Factor X 40-20%)    Recent labs: (last 7 days)     10/15/24  1337   [id number] 24*       ASSESSMENT     Source(s): Chart Review and Patient/Caregiver Call     Warfarin doses taken: Warfarin taken as instructed  Diet: No new diet changes identified  Medication/supplement changes: None noted  New illness, injury, or hospitalization: No  Signs or symptoms of bleeding or clotting: No  Previous result: Therapeutic last 2(+) visits  Additional findings: None     PLAN     Recommended plan for no diet, medication or health factor changes affecting result    Dosing Instructions: Continue your current warfarin dose 7.5 mg Tues, Fri and 5 mg ROW  with next Chromogenic Factor X in 4 weeks       Telephone call with Jeanmarie who verbalizes understanding and agrees to plan    Lab visit scheduled    Education provided:   Please call back if any changes to your diet, medications or how you've been taking warfarin    Plan made per [facility] anticoagulation protocol    Sharmin Smith RN  10/16/2024  Anticoagulation Clinic  [employer] for routing messages: [facility]  [facility] patient phone line: [phone]

## 2024-10-16 NOTE — PROGRESS NOTES
ANTICOAGULATION CLINIC REFERRAL RENEWAL REQUEST       An annual renewal order is required for all patients referred to [facility].?  Please review and sign the pended referral order for Shala Caruso.       ANTICOAGULATION SUMMARY      Warfarin indication(s)   Antiphospholipid Antibodies    Mechanical heart valve present?  NO       Current goal range   Chromogenic Factor X:  40-20%     Goal appropriate for indication? Chromogenic Factor X of 40-20% appropriate for indication(s) above     Time in Therapeutic Range (TTR)  (Goal > 60%) NA%       Office visit with referring provider's group within last year yes on 8/21/24       Sharmin Smith, RN  [facility]

## 2024-10-17 ENCOUNTER — OFFICE VISIT (OUTPATIENT)
Dept: OTOLARYNGOLOGY | Facility: CLINIC | Age: 79
End: 2024-10-17
Payer: COMMERCIAL

## 2024-10-17 VITALS — SYSTOLIC BLOOD PRESSURE: 101 MMHG | HEART RATE: 69 BPM | TEMPERATURE: 98.1 F | DIASTOLIC BLOOD PRESSURE: 56 MMHG

## 2024-10-17 DIAGNOSIS — K21.9 LARYNGOPHARYNGEAL REFLUX: Primary | ICD-10-CM

## 2024-10-17 DIAGNOSIS — R13.10 DYSPHAGIA, UNSPECIFIED TYPE: ICD-10-CM

## 2024-10-17 DIAGNOSIS — R07.0 THROAT PAIN: ICD-10-CM

## 2024-10-17 PROCEDURE — 99203 OFFICE O/P NEW LOW 30 MIN: CPT | Mod: 25 | Performed by: OTOLARYNGOLOGY

## 2024-10-17 PROCEDURE — 31575 DIAGNOSTIC LARYNGOSCOPY: CPT | Performed by: OTOLARYNGOLOGY

## 2024-10-17 RX ORDER — OMEPRAZOLE 40 MG/1
40 CAPSULE, DELAYED RELEASE ORAL DAILY
Qty: 90 CAPSULE | Refills: 0 | Status: SHIPPED | OUTPATIENT
Start: 2024-10-17 | End: 2025-01-15

## 2024-10-17 ASSESSMENT — PAIN SCALES - GENERAL: PAINLEVEL: MILD PAIN (2)

## 2024-10-17 NOTE — NURSING NOTE
"Initial /56 (BP Location: Right arm, Patient Position: Chair, Cuff Size: Adult Regular)   Pulse 69   Temp 98.1  F (36.7  C) (Tympanic)   LMP  (LMP Unknown)  Estimated body mass index is 17.06 kg/m  as calculated from the following:    Height as of 8/19/24: 1.663 m (5' 5.47\").    Weight as of 8/19/24: 47.2 kg (104 lb). .  Heather Acosta LPN    "

## 2024-10-17 NOTE — LETTER
10/17/2024      Shala Caruso  [address] [place] [address]      Dear Colleague,    Thank you for referring your patient, Shala Caruso, to the [facility]. Please see a copy of my visit note below.    Shala Caruso is a 78 year old female  Chief Complaint: Dysphagia, sore throat x 2 months  History of Present Illness  Location:throat  Quality:dysphagia, sore throat  Severity: mild  Duration: 2 mos    Past Medical History -   Patient Active Problem List   Diagnosis     Achalasia     Antiphospholipid syndrome (H)     DM (diabetes mellitus) (H)     GERD (gastroesophageal reflux disease)     Hyperlipidemia     HTN (hypertension)     Osteopenia     Raynaud's disease     DVT (deep venous thrombosis) (H)     Encounter for long-term current use of medication     Type 1 diabetes mellitus (H)     Osteoporosis, idiopathic     Osteoporosis, postmenopausal     Cystocele, midline     Urinary urgency     Urinary frequency     Female stress incontinence     Atrophic vaginitis     Long term current use of anticoagulant therapy     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)     Choroidal lesion     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, unspecified SLE type (H)     Hematoma of leg, right, initial encounter     Lupus erythematosus     Deep vein thrombosis (DVT) of proximal lower extremity, unspecified chronicity, unspecified laterality (H)       Current Medications -   Current Outpatient Medications:      acetaminophen (TYLENOL) 500 MG tablet, Take 1,000-1,500 mg by mouth nightly as needed , Disp: , Rfl:      aspirin 81 MG tablet, Take 81 mg by mouth At Bedtime , Disp: , Rfl:      AYR SALINE NASAL NO-DRIP GEL, Use as needed for nasal dryness, Disp: 3 Tube, Rfl: 3     blood glucose monitoring (ULTRA THIN 30G) lancets, Test 5 times daily  Sunmark  Super thin, Disp: 450 each, Rfl: 3     calcium carbonate 500 mg, elemental, (OSCAL) 500 MG tablet, Take 1  tablet (500 mg) by mouth daily, Disp: 90 tablet, Rfl: 3     cholecalciferol (VITAMIN D3) 25 mcg (1000 units) capsule, Take 1 capsule by mouth daily, Disp: , Rfl:      ciclopirox (PENLAC) 8 % external solution, Apply to adjacent skin and affected nails daily.  Remove with alcohol every 7 days, then repeat., Disp: 6.6 mL, Rfl: 11     Continuous Glucose Sensor (FREESTYLE SRI 3 SENSOR) [facility] [place], CHANGE ONE SENSOR EVERY 14 DAYS, Disp: 6 each, Rfl: 4     Glucagon (BAQSIMI ONE PACK) 3 MG/DOSE nasal powder, Spray 1 spray (3 mg) in nostril as needed (to be used for severe hypoglycemic episodes) Please ask pharmacy for instructions on disposal of  medication in your county., Disp: 1 each, Rfl: 4     Injection Device for insulin (NOVOPEN ECHO) RORY, 1 each 4 times daily, Disp: 1 each, Rfl: 0     insulin aspart (NOVOLOG PENFILL) 100 UNIT/ML cartridge, INJECT 1 UNIT PER 15 GRAMS OF CARBOHYDRATES UNDER THE SKIN THREE TIMES A DAY WITH MEALS. APPROXIMATELY 10 UNITS DAILY., Disp: 9 mL, Rfl: 3     insulin pen needle (BD PAM U/F) 32G X 4 MM miscellaneous, USE AS DIRECTED WITH INSULIN PENS 4 TIMES DAILY, Disp: 400 each, Rfl: 3     lidocaine (XYLOCAINE) 5 % external ointment, Apply topically at bedtime, Disp: 50 g, Rfl: 5     NIFEdipine ER OSMOTIC (PROCARDIA XL) 30 MG 24 hr tablet, 1-2 daily or dysphagia, Disp: 180 tablet, Rfl: 3     order for DME, Equipment being ordered: compression socks, 20-30 mmHg, knee high, Disp: 8 Piece, Rfl: 4     simvastatin (ZOCOR) 40 MG tablet, Take 1 tablet (40 mg) by mouth at bedtime, Disp: 90 tablet, Rfl: 3     UNABLE TO FIND, MEDICATION NAME: , Disp: , Rfl:      warfarin ANTICOAGULANT (JANTOVEN ANTICOAGULANT) 5 MG tablet, Take 1-1.5 tablets daily or as directed, Disp: 135 tablet, Rfl: 4    Allergies -   Allergies   Allergen Reactions     Amaryl [Glimepiride] Swelling     Swelling of tongue     Metformin Blisters     Experienced big blisters all over body and sloughing of skin     Plaquenil  [Aminoquinolines] Hives     Sulfa Antibiotics Other (See Comments)     Turned bright red     Amoxicillin Rash     Hydroxychloroquine Rash     Penicillins Rash       Social History -   Social History     Socioeconomic History     Marital status:    Tobacco Use     Smoking status: Never     Smokeless tobacco: Never   Substance and Sexual Activity     Alcohol use: No     Drug use: No     Sexual activity: Yes     Partners: Male     Comment: , safe in relationship   Social History Narrative    How much exercise per week? Walk to work every morning (2 miles), swimming 3 times a week, takes walks with , periodically works out at gym on stationary bike     How much calcium per day? Supplement 3x daily        How much caffeine per day? On rare occasion, only if she is out to eat and that is all they have    How much vitamin D per day? supplement    Do you/your family wear seatbelts?  Yes    Do you/your family use safety helmets? Yes    Do you/your family use sunscreen? Yes    Do you/your family keep firearms in the home? No    Do you/your family have a smoke detector(s)? Yes        Do you feel safe in your home? Yes    Has anyone ever touched you in an unwanted manner? No        Klever R LPN 7/18/2013                 Social Determinants of Health     Financial Resource Strain: Low Risk  (1/5/2024)    Financial Resource Strain      Within the past 12 months, have you or your family members you live with been unable to get utilities (heat, electricity) when it was really needed?: No   Food Insecurity: Low Risk  (1/5/2024)    Food Insecurity      Within the past 12 months, did you worry that your food would run out before you got money to buy more?: No      Within the past 12 months, did the food you bought just not last and you didn t have money to get more?: No   Transportation Needs: Low Risk  (1/5/2024)    Transportation Needs      Within the past 12 months, has lack of transportation kept you from  medical appointments, getting your medicines, non-medical meetings or appointments, work, or from getting things that you need?: No   Interpersonal Safety: Low Risk  (9/18/2024)    Interpersonal Safety      Do you feel physically and emotionally safe where you currently live?: Yes      Within the past 12 months, have you been hit, slapped, kicked or otherwise physically hurt by someone?: No      Within the past 12 months, have you been humiliated or emotionally abused in other ways by your partner or ex-partner?: No   Housing Stability: Low Risk  (1/5/2024)    Housing Stability      Do you have housing? : Yes      Are you worried about losing your housing?: No       Family History -   Family History   Problem Relation Age of Onset     Glaucoma Father      Cancer Other         breast     Cerebrovascular Disease Other      C.A.D. Other      Macular Degeneration No family hx of      Skin Cancer No family hx of        Review of Systems:   !.  Weight Loss: No   2. Difficulty Breathing: No   3. Difficulty Swallowing: No   4. Pain: No    Physical Exam  B/P: Data Unavailable, T: Data Unavailable, P: Data Unavailable, R: Data Unavailable  Vitals: /56 (BP Location: Right arm, Patient Position: Chair, Cuff Size: Adult Regular)   Pulse 69   Temp 98.1  F (36.7  C) (Tympanic)   LMP  (LMP Unknown)   BMI= There is no height or weight on file to calculate BMI.    General  Appearance - Normal  Head/Face/Scalp:    Skin - Normal    Facial Palpation - Normal    Facial Strength - Normal  Ears:    Pinna - Normal    Canal - Normal   Tympanic membrane - Normal  Nose:    External - Normal    Septum - Normal    Turbinates - Normal    Middle meatus - Normal  Oral Cavity:    Lips - Normal    Floor of Mouth - Normal    Gingiva - Normal    Tongue - Normal    Buccal - Normal    Palate - Normal  Nasopharynx: clear    Oropharynx:    Tonsils - Normal    Tongue base - Normal    Soft palate - Normal    Posterior pharyngeal wall -  Normal  Hypopharynx: clear  Larynx:    Epiglottis - normal    Aryepiglottic folds - Rt red    Arytenoids - Rt red, post-cricoid edema-     True vocal cords -normal  Neck Masses - No  Neck lymphatics - no lymphadenopathy  Thyroid - Normal  Salivary glands - Normal    Audiogram - not applicable  Radiology - not applicable   Reports:   View films:  Procedures -  Procedure: Flexible Endoscopy  Indication: dysphagia, sore throat    Fiberoptic examination was performed using flexible laryngoscope.  Both sides of the nose were sprayed with a mixture of lidocaine and neosynephrine.  The flexible nasopharyngoscope scope was passed through the nasal cavity.  The nasal cavity was unremarkable.  The nasopharynx was mucosally covered and symmetric.  The Eustachian tube openings were unobstructed.  Going further down I had a clear view of the base of tongue which had normal appearing lingual tonsillar tissue.  The base of tongue was free of lesions, and the vallecula was open.  The epiglottis was smooth and mucosally covered.  The supraglottic larynx was then clearly visualized.  The vocal cords moved smoothly and symmetrically, they were pearly white and no lesions were seen.  The pyriform sinuses were open, and the limited view of the postcricoid region moderate edema    The patient tolerated the patient well.    Patient Education:     A/P - Shala Caruso is a 78 year old female  Medical Decision Making 1. Dysphagia 2. Sore throat 3. Laryngopharyngeal reflux      Again, thank you for allowing me to participate in the care of your patient.        Sincerely,        Delmer Gutierrez MD

## 2024-10-21 ENCOUNTER — HOSPITAL ENCOUNTER (OUTPATIENT)
Dept: NUCLEAR MEDICINE | Facility: CLINIC | Age: 79
Setting detail: NUCLEAR MEDICINE
Discharge: HOME | End: 2024-10-21
Attending: INTERNAL MEDICINE
Payer: COMMERCIAL

## 2024-10-21 DIAGNOSIS — E21.0 PRIMARY HYPERPARATHYROIDISM (H): ICD-10-CM

## 2024-10-21 PROCEDURE — 343N000001 HC RX 343 MED OP 636: Performed by: INTERNAL MEDICINE

## 2024-10-21 PROCEDURE — A9500 TC99M SESTAMIBI: HCPCS | Performed by: INTERNAL MEDICINE

## 2024-10-21 PROCEDURE — 78072 PARATHYRD PLANAR W/SPECT&CT: CPT

## 2024-10-21 PROCEDURE — A9516 IODINE I-123 SOD IODIDE MIC: HCPCS | Performed by: INTERNAL MEDICINE

## 2024-10-21 PROCEDURE — 78072 PARATHYRD PLANAR W/SPECT&CT: CPT | Mod: 26 | Performed by: RADIOLOGY

## 2024-10-21 RX ADMIN — Medication 586 MICROCURIE: at 08:04

## 2024-10-21 RX ADMIN — KIT FOR THE PREPARATION OF TECHNETIUM TC99M SESTAMIBI 21.6 MILLICURIE: 1 INJECTION, POWDER, LYOPHILIZED, FOR SOLUTION PARENTERAL at 11:02

## 2024-10-25 DIAGNOSIS — E21.0 PRIMARY HYPERPARATHYROIDISM (H): Primary | ICD-10-CM

## 2024-10-25 NOTE — RESULT ENCOUNTER NOTE
Happy belated birthday!   The parathyroid scan failed to identify an enlarged parathyroid gland. Another imaging test which might help is a neck CT. I placed an order. Please try to have it scheduled at your convenience and we'll review the results at your f/up apt.

## 2024-10-28 RX ORDER — MEPERIDINE HYDROCHLORIDE 25 MG/ML
25 INJECTION INTRAMUSCULAR; INTRAVENOUS; SUBCUTANEOUS
Status: CANCELLED | OUTPATIENT
Start: 2024-10-28

## 2024-10-28 RX ORDER — ALBUTEROL SULFATE 90 UG/1
1-2 INHALANT RESPIRATORY (INHALATION)
Status: CANCELLED
Start: 2024-10-28

## 2024-10-28 RX ORDER — EPINEPHRINE 1 MG/ML
0.3 INJECTION, SOLUTION, CONCENTRATE INTRAVENOUS EVERY 5 MIN PRN
Status: CANCELLED | OUTPATIENT
Start: 2024-10-28

## 2024-10-28 RX ORDER — ALBUTEROL SULFATE 0.83 MG/ML
2.5 SOLUTION RESPIRATORY (INHALATION)
Status: CANCELLED | OUTPATIENT
Start: 2024-10-28

## 2024-10-28 RX ORDER — METHYLPREDNISOLONE SODIUM SUCCINATE 40 MG/ML
40 INJECTION INTRAMUSCULAR; INTRAVENOUS
Status: CANCELLED
Start: 2024-10-28

## 2024-10-28 RX ORDER — DIPHENHYDRAMINE HYDROCHLORIDE 50 MG/ML
25 INJECTION INTRAMUSCULAR; INTRAVENOUS
Status: CANCELLED
Start: 2024-10-28

## 2024-10-28 RX ORDER — DIPHENHYDRAMINE HYDROCHLORIDE 50 MG/ML
50 INJECTION INTRAMUSCULAR; INTRAVENOUS
Status: CANCELLED
Start: 2024-10-28

## 2024-10-29 RX ORDER — METHYLPREDNISOLONE SODIUM SUCCINATE 40 MG/ML
40 INJECTION INTRAMUSCULAR; INTRAVENOUS
Status: CANCELLED
Start: 2025-10-29

## 2024-10-29 RX ORDER — ALBUTEROL SULFATE 90 UG/1
1-2 INHALANT RESPIRATORY (INHALATION)
Status: CANCELLED
Start: 2025-10-29

## 2024-10-29 RX ORDER — ACETAMINOPHEN 325 MG/1
650 TABLET ORAL
Status: CANCELLED | OUTPATIENT
Start: 2025-10-29

## 2024-10-29 RX ORDER — DIPHENHYDRAMINE HYDROCHLORIDE 50 MG/ML
25 INJECTION INTRAMUSCULAR; INTRAVENOUS
Status: CANCELLED
Start: 2025-10-29

## 2024-10-29 RX ORDER — HEPARIN SODIUM (PORCINE) LOCK FLUSH IV SOLN 100 UNIT/ML 100 UNIT/ML
5 SOLUTION INTRAVENOUS
Status: CANCELLED | OUTPATIENT
Start: 2025-10-29

## 2024-10-29 RX ORDER — ZOLEDRONIC ACID 0.05 MG/ML
5 INJECTION, SOLUTION INTRAVENOUS ONCE
Status: CANCELLED
Start: 2025-10-29

## 2024-10-29 RX ORDER — ALBUTEROL SULFATE 0.83 MG/ML
2.5 SOLUTION RESPIRATORY (INHALATION)
Status: CANCELLED | OUTPATIENT
Start: 2025-10-29

## 2024-10-29 RX ORDER — HEPARIN SODIUM,PORCINE 10 UNIT/ML
5-20 VIAL (ML) INTRAVENOUS DAILY PRN
Status: CANCELLED | OUTPATIENT
Start: 2025-10-29

## 2024-10-29 RX ORDER — DIPHENHYDRAMINE HYDROCHLORIDE 50 MG/ML
50 INJECTION INTRAMUSCULAR; INTRAVENOUS
Status: CANCELLED
Start: 2025-10-29

## 2024-10-29 RX ORDER — EPINEPHRINE 1 MG/ML
0.3 INJECTION, SOLUTION INTRAMUSCULAR; SUBCUTANEOUS EVERY 5 MIN PRN
Status: CANCELLED | OUTPATIENT
Start: 2025-10-29

## 2024-10-29 RX ORDER — MEPERIDINE HYDROCHLORIDE 25 MG/ML
25 INJECTION INTRAMUSCULAR; INTRAVENOUS; SUBCUTANEOUS
Status: CANCELLED | OUTPATIENT
Start: 2025-10-29

## 2024-10-30 ENCOUNTER — APPOINTMENT (OUTPATIENT)
Dept: LAB | Facility: CLINIC | Age: 79
End: 2024-10-30
Attending: INTERNAL MEDICINE
Payer: COMMERCIAL

## 2024-10-30 ENCOUNTER — INFUSION THERAPY VISIT (OUTPATIENT)
Dept: INFUSION THERAPY | Facility: CLINIC | Age: 79
End: 2024-10-30
Attending: INTERNAL MEDICINE
Payer: COMMERCIAL

## 2024-10-30 VITALS
BODY MASS INDEX: 18.41 KG/M2 | SYSTOLIC BLOOD PRESSURE: 98 MMHG | WEIGHT: 110.5 LBS | OXYGEN SATURATION: 98 % | HEIGHT: 65 IN | TEMPERATURE: 97.8 F | DIASTOLIC BLOOD PRESSURE: 60 MMHG | HEART RATE: 68 BPM | RESPIRATION RATE: 16 BRPM

## 2024-10-30 DIAGNOSIS — M81.0 OSTEOPOROSIS, POSTMENOPAUSAL: Primary | ICD-10-CM

## 2024-10-30 LAB
CALCIUM SERPL-MCNC: 9.2 MG/DL (ref 8.8–10.4)
CREAT SERPL-MCNC: 0.78 MG/DL (ref 0.51–0.95)
EGFRCR SERPLBLD CKD-EPI 2021: 78 ML/MIN/1.73M2

## 2024-10-30 PROCEDURE — 36415 COLL VENOUS BLD VENIPUNCTURE: CPT | Performed by: INTERNAL MEDICINE

## 2024-10-30 PROCEDURE — 96365 THER/PROPH/DIAG IV INF INIT: CPT

## 2024-10-30 PROCEDURE — 82565 ASSAY OF CREATININE: CPT | Performed by: INTERNAL MEDICINE

## 2024-10-30 PROCEDURE — 82310 ASSAY OF CALCIUM: CPT | Performed by: INTERNAL MEDICINE

## 2024-10-30 PROCEDURE — 250N000011 HC RX IP 250 OP 636: Performed by: INTERNAL MEDICINE

## 2024-10-30 RX ORDER — ACETAMINOPHEN 325 MG/1
650 TABLET ORAL
Status: DISCONTINUED | OUTPATIENT
Start: 2024-10-30 | End: 2024-10-30

## 2024-10-30 RX ORDER — ZOLEDRONIC ACID 0.05 MG/ML
5 INJECTION, SOLUTION INTRAVENOUS ONCE
Status: COMPLETED | OUTPATIENT
Start: 2024-10-30 | End: 2024-10-30

## 2024-10-30 RX ADMIN — ZOLEDRONIC ACID 5 MG: 0.05 INJECTION, SOLUTION INTRAVENOUS at 15:35

## 2024-10-30 ASSESSMENT — PAIN SCALES - GENERAL: PAINLEVEL_OUTOF10: NO PAIN (0)

## 2024-10-30 NOTE — PROGRESS NOTES
"Infusion Nursing Note:  Shala Caruso presents today for IV infusion; Reclast.    Patient seen by provider today: No   present during visit today: Not Applicable.    Note:   Administrations This Visit       sodium chloride (PF) 0.9% PF flush 3-20 mL       Admin Date  10/30/2024 Action  $Given Dose  20 mL Route  Intracatheter Documented By  Douglas Abel RN              zoledronic acid (RECLAST) infusion 5 mg       Admin Date  10/30/2024 Action  $New Bag Dose  5 mg Rate  200 mL/hr Route  Intravenous Documented By  Douglas Abel RN                  Temp: 97.8  F (36.6  C) Temp src: Oral BP: 98/60 Pulse: 68   Resp: 16 SpO2: 98 %        110 lbs 8 oz    Intravenous Access:  Labs drawn without difficulty.  Peripheral IV placed.    Treatment Conditions:  \"Verify that patient is taking calcium/vitamin D supplements: 1 tablet PO BID. Each tablet should have 500-600mg elemental calcium and 400 units of vitamin D. Exception: patients who have or have a history of hypercalcemia.\"      \"Give if: Creatinine Clearance is >35 mL/min and calcium level within normal limits. If calcium is <8.5 release the add on albumin level. If the corrected calcium is WNL okay to proceed with the infusion. Labs must be within 2 months of dose.\"    Creatinine   Date Value Ref Range Status   10/30/2024 0.78 0.51 - 0.95 mg/dL Final   07/07/2021 0.71 0.52 - 1.04 mg/dL Final     Calcium   Date Value Ref Range Status   10/30/2024 9.2 8.8 - 10.4 mg/dL Final     Comment:     Reference intervals for this test were updated on 7/16/2024 to reflect our healthy population more accurately. There may be differences in the flagging of prior results with similar values performed with this method. Those prior results can be interpreted in the context of the updated reference intervals.   07/07/2021 9.1 8.5 - 10.1 mg/dL Final     48 mL/min  Creatinine clearance for underweight patient (BMI 18.2 kg/m ), calculated using actual body weight (no " adjustment).    Post Infusion Assessment:  Patient tolerated infusion without incident.  Blood return noted pre and post infusion.  Site patent and intact, free from redness, edema or discomfort.  No evidence of extravasations.  Access discontinued per protocol.     Discharge Plan:   Patient and/or family verbalized understanding of discharge instructions and all questions answered.  AVS to patient via [employer].  Patient will return next year for next appointment.   Patient discharged in stable condition accompanied by: self.  Departure Mode: Ambulatory.    Douglas Abel RN

## 2024-10-30 NOTE — NURSING NOTE
Chief Complaint   Patient presents with    Blood Draw     Labs drawn via piv placed by RN in lab. VS taken.      Labs drawn from PIV placed by RN. Line flushed with saline. Vitals taken. Pt checked in for appointment(s).    Harmony Haney RN

## 2024-10-30 NOTE — PATIENT INSTRUCTIONS
Dear Shala Caruso    Thank you for choosing [facility] ([facility] for your infusion.  The following information is a summary of our appointment as well as important reminders.    If you have any questions on your upcoming Specialty Infusion appointments, please call scheduling at [phone].  It was a pleasure taking care of you today.    Sincerely,    [facility]  [address] [place] [address]  [id number]  Phone:  [phone]    Elizabeth Rothman  (RN)  2017 18:54:24

## 2024-11-01 ENCOUNTER — MYC MEDICAL ADVICE (OUTPATIENT)
Dept: ENDOCRINOLOGY | Facility: CLINIC | Age: 79
End: 2024-11-01
Payer: COMMERCIAL

## 2024-11-04 ENCOUNTER — OFFICE VISIT (OUTPATIENT)
Dept: ENDOCRINOLOGY | Facility: CLINIC | Age: 79
End: 2024-11-04
Payer: COMMERCIAL

## 2024-11-04 VITALS
DIASTOLIC BLOOD PRESSURE: 77 MMHG | HEIGHT: 66 IN | WEIGHT: 104 LBS | HEART RATE: 86 BPM | BODY MASS INDEX: 16.71 KG/M2 | SYSTOLIC BLOOD PRESSURE: 118 MMHG | OXYGEN SATURATION: 97 %

## 2024-11-04 DIAGNOSIS — M81.0 OSTEOPOROSIS, POSTMENOPAUSAL: ICD-10-CM

## 2024-11-04 DIAGNOSIS — E21.0 PRIMARY HYPERPARATHYROIDISM (H): ICD-10-CM

## 2024-11-04 DIAGNOSIS — E04.1 THYROID NODULE: ICD-10-CM

## 2024-11-04 DIAGNOSIS — E10.649 TYPE 1 DIABETES MELLITUS WITH HYPOGLYCEMIA AND WITHOUT COMA (H): Primary | ICD-10-CM

## 2024-11-04 LAB
EST. AVERAGE GLUCOSE BLD GHB EST-MCNC: 103 MG/DL
HBA1C MFR BLD: 5.2 %

## 2024-11-04 PROCEDURE — 83036 HEMOGLOBIN GLYCOSYLATED A1C: CPT | Performed by: PATHOLOGY

## 2024-11-04 PROCEDURE — 99215 OFFICE O/P EST HI 40 MIN: CPT | Mod: 24 | Performed by: INTERNAL MEDICINE

## 2024-11-04 PROCEDURE — G2211 COMPLEX E/M VISIT ADD ON: HCPCS | Performed by: INTERNAL MEDICINE

## 2024-11-04 ASSESSMENT — PAIN SCALES - GENERAL: PAINLEVEL_OUTOF10: NO PAIN (0)

## 2024-11-04 NOTE — PROGRESS NOTES
Assessment and Plan:    Jeanmarie is a 79 year old pleasant female with hx of SLE, APLS, DVT, osteoporosis and type 1 diabetes presenting for f/up.    1) Type 1 diabetes, formally diagnosed in 2010 while being evaluated for steroid-induced hyperglycemia.  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  She has been taking small dosages of short acting insulin.  Lantus was discontinued in view of hypoglycemia and low insulin requirements.  The patient prefers to maintain a very tight glycemic control.     2) Osteoporosis, now osteopenia  Risk factors for osteoporosis include postmenopausal status, lupus, prior treatment with steroids, diabetes, fam history.   She was treated with Boniva for 3 years, followed by Forteo for 2 years. Received one dose of Reclast in July 2014, when she completed treatment with Forteo.  Treatment with Reclast was resumed in July 2020, when the DEXA scan revealed some loss of bone mineral density, although the lowest T score remained in the osteopenic range.  She received subsequent Reclast dosages in in 2021, 2023 and 2024. Most recent DXA from 8/23 from showed improvement of bone mineral density at the average hip.  Plan:   Walking, weightbearing exercises encouraged  F/up DXA scan in 8/2025     3) Hypercalcemia, mild and associated with significant hypercalciuria  Lab work was suggestive of primary hyperparathyroidism.  Treatment with hydrochlorothiazide is not an option in view of the allergy to sulfas (allergic rash).  The parathyroid scan failed to identify parathyroid adenomas.   Indications for surgery: hypercalciuria and low BMD.   Plan:  Pursue a neck CT once cleared by insurance   In the event over functioning parathyroid gland/glands are not identified by the CT, I recommended to consider evaluation at [facility] for C11 choline PET/CT.    4) Right neck mostly cystic nodule, 2 cm   It has decreased in size since the last visit. Clinically and  biochemically, Jeanmarie is euthyroid.   Plan to schedule a f/up US in March 2025.     Orders Placed This Encounter   Procedures    US Thyroid    DX Bone Density    AFINION HEMOGLOBIN A1C POCT     ============================================================================================================================================  Jeanmarie is a 79 year old pleasant female with hx of SLE, APLS, DVT, osteoporosis and type 1 diabetes presenting for f/up.   Her last clinic visit was on 8/19/2024.     Interval history:    1. Type 1 diabetes  Lantus was discontinued and in 2020 and her diabetes is now managed only with NovoLog.    On average, she reports taking 1-2.5 units per meal. In general she has 2 meals a day, lunch and dinner.     Most recent A1c was 5.3, in 8/2024. It was 5.2% today.   Weight is up 6 lbs since her last visit (she has recovered some of the weight loss).     Diabetes complications:   No h/o microalbuminuria.  Most recent urine microalbumin negative in 2/24. GFR >90 on labs from 10/24  Last eye exam -September 2023. No DR. S/P cataract surgery.  Numbness and tingling sensation B/L toes - for many years but light sensation is intact. She does wear comfortable shoes/insoles. 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 With walking she might  get a burning sensation.   She develops confusion, inability to concentrate or focus her vision and she feels extremely tired when her blood sugar is the 60s or 50s.  She has no prior episodes of loss of consciousness due to hypoglycemia.  She does not always recognize the lows.   She does have Baqsimi at home.  Most recent lipid panel from 8/12/24: LDL cholesterol 72, HDL cholesterol 63, triglycerides 64.  On 40 mg simvastatin daily.  Exercise: mainly walking     I reviewed the Siddharth data. Average glucose is 99, with a glucose variability of 14.5%, corresponding to a GMI of 5.7%.  98% of the glucose numbers are within target, 2% time in the mild  hypoglycemic range.    2. Osteoporosis  Jeanmarie also has a history of osteoporosis, treated with Boniva for almost 3 years, followed by Forteo which was started in 4/12 - interrupted treatment from 4/2013 and restarted in 7/2013 until July 2014. After she completed the treatment with Forteo, she received one dose of Reclast, in July 2014.      She has no history of prior bone fractures.  She's been off prednisone since 2013. Her height has decreased over the years from 5'6'' to 5'1/2''. Her mother had a hip fracture in her 80s.      DXA 7/1/16:  L1-L3 T score was - 1.  Overall, at the spine, there was a significant increase of bone mineral density since 2005 of 10.5%. Since 2015, the bone mineral density has remained stable at spine. The lowest T score at the hip level was -2.2, at the left femoral neck.  Overall, there was a significant improvement of bone mineral density at the mean hip of 8.9% since 2005, with no significant changes since 2015. While the bone mineral density at the left hip increased since baseline, at the right hip, there was a decrease of bone mineral density since baseline and also, since prior year.     DXA 7/27/18:   The lowest T score was -2.1, at the left femoral neck.  Compared with the prior scan from 2016, the bone mineral density at the hips remained stable.  At the lumbar spine, L1-L3 T score was -1.3, not significantly changed since 2016.     DXA 1/2020:  L2-L4 T score was -1.5. At the hips, the lowest score was -2.3, at the left femoral neck. Compared with the prior DEXA scan from 2018, there was a significant decrease in bone mineral density of the lumbar spine, left total hip and right total hip by 3.1, 4.7 and 3.5%, respectively.  The patient received zoledronic acid infusion on 7/30/2020 and 7/27/21.     DXA 6/16/2022:  T score: -0.9 at L1-L4, -0.3 at 33% distal radius, -1.7 at both right and left femoral necks, -1.8 at the left total hip and -1.9 at the right total hip.  At the  radius and average hip, the bone mineral density remained stable since 2020.  At the spine, there was a significant increase of 4.5%.    DXA 8/1/23:  L2-L4 T score -1.4  Left femoral neck T score -1.8, total L hip T score -1.5  Right femoral neck T score -1.5, total R hip T score -1.6  Bone density compared to the prior study increased at the mean total femur by +4.4%.  A third dose of Reclast was administered on 8/29/23 and a forth dose on 10/30/24.      3. Hypercalcemia   The patient developed mild hypercalcemia in 2023.  The hypercalcemia has been associated with a normal PTH, normal phosphorus, and elevated 24-hour urinary calcium.    Pertinent labs reviewed:  3/24/2023 calcium 10.4, GFR 73, vitamin D 63, albumin 4.2, PTH 34, phosphorus 4.2  6/5/2023 24-hour urinary calcium 0.34 g, urine creatinine 1.03 g, urine volume 1.5 L  8/7/23 PTH 35, calcium 10.9, phos 3.5, albumin 4.5, vitamin D 56   12/20/23 PTH 33, calcium 9.4, phos 4.1, albumin 4, GFR >90    12/9/2023 24-hour urinary calcium 0.38 g, creatinine 0.92 g, volume 2.325 L.   2/15/24 ionized calcium 5.6 (normal range 4.4-5.2)  2/15/24 24 hrs urinary calcium 0.38 g, creatinine 0.92 g   8/12/2024 vitamin D 52, PTH 34, phosphorus 3.2  8/13/2024 24-hour urinary calcium 0.37 g, urine creatinine 0.67 g and urine volume 2.4 L.  Treatment with hydrochlorothiazide was not an option due to patient's allergies to sulpha.     8/30/24 neck US - right thyroid cyst with a thick wall, measuring 2.2 cm and some inferior calcification inferiorly. 2 atypical looking LNs at the right level 7.   9/19/24 swallowing study unremarkable   10/21/24 parathyroid scan - failed to identify a parathyroid gland. I reviewed the scan images.   Most recent TSH was 1 on 8/19/24.     One cup of almond milk daily and 1-2 servings of dairies daily.   In terms of calcium and vitamin D supplements, she reports taking 500 mg calcium as a oyster shell and 1000 U vitamin D daily.   Liquid intake is  variable.     Past Medical History  Past Medical History:   Diagnosis Date    Achalasia     Antiphospholipid syndrome (H)     Antiplatelet or antithrombotic long-term use     Coagulation disorder (H) 6068-3143    DM (diabetes mellitus) (H)     DVT (deep venous thrombosis) (H) 2003    and PE    Dysphagia     occasional, use Nifedipine    GERD (gastroesophageal reflux disease)     Hyperlipidemia     Lymphedema     Myopia     Neuropathy     toes bilateral    Nonsenile cataract     osteoporosis     Pericarditis     Raynaud's disease     SLE (systemic lupus erythematosus) (H)      Past Surgical History  Past Surgical History:   Procedure Laterality Date    CATARACT IOL, RT/LT Left 02/2019    CATARACT IOL, RT/LT Right 01/2019    COLONOSCOPY N/A 11/28/2016    Procedure: COLONOSCOPY;  Surgeon: Sang August MD;  Location: [facility]    CYSTOSCOPY, SLING TRANSVAGINAL N/A 12/20/2016    Procedure: CYSTOSCOPY, SLING TRANSVAGINAL;  Surgeon: Jeanmarie Pierson MD;  Location: [facility]    DISSECT LYMPH NODE AXILLA  2004    Lt, during eval for SLE    HYSTEROSCOPIC PLACEMENT CONTRACEPTIVE DEVICE  1985    PHACOEMULSIFICATION WITH STANDARD INTRAOCULAR LENS IMPLANT Right 1/8/2019    Procedure: Right Eye Cataract Extraction with Intraocular Lens;  Surgeon: Monika Fermin MD;  Location: [facility]    PHACOEMULSIFICATION WITH STANDARD INTRAOCULAR LENS IMPLANT Left 2/5/2019    Procedure: Left Eye Cataract Extraction with Intraocular Lens;  Surgeon: Monika Fermin MD;  Location: [facility] OR    TUBAL LIGATION Bilateral         Medications    Current Outpatient Medications:     acetaminophen (TYLENOL) 500 MG tablet, Take 1,000-1,500 mg by mouth nightly as needed , Disp: , Rfl:     aspirin 81 MG tablet, Take 81 mg by mouth At Bedtime , Disp: , Rfl:     AYR SALINE NASAL NO-DRIP GEL, Use as needed for nasal dryness, Disp: 3 Tube, Rfl: 3    blood glucose monitoring (ULTRA THIN 30G) lancets, Test 5 times daily  [employer]  Super thin, Disp:  450 each, Rfl: 3    calcium carbonate 500 mg, elemental, (OSCAL) 500 MG tablet, Take 1 tablet (500 mg) by mouth daily, Disp: 90 tablet, Rfl: 3    cholecalciferol (VITAMIN D3) 25 mcg (1000 units) capsule, Take 1 capsule by mouth daily, Disp: , Rfl:     ciclopirox (PENLAC) 8 % external solution, Apply to adjacent skin and affected nails daily.  Remove with alcohol every 7 days, then repeat., Disp: 6.6 mL, Rfl: 11    Continuous Glucose Sensor (FREESTYLE SRI 3 SENSOR) [facility] [place] CHANGE ONE SENSOR EVERY 14 DAYS, Disp: 6 each, Rfl: 4    Glucagon (BAQSIMI ONE PACK) 3 MG/DOSE nasal powder, Spray 1 spray (3 mg) in nostril as needed (to be used for severe hypoglycemic episodes) Please ask pharmacy for instructions on disposal of  medication in your county., Disp: 1 each, Rfl: 4    Injection Device for insulin (NOVOPEN ECHO) RORY, 1 each 4 times daily, Disp: 1 each, Rfl: 0    insulin aspart (NOVOLOG PENFILL) 100 UNIT/ML cartridge, INJECT 1 UNIT PER 15 GRAMS OF CARBOHYDRATES UNDER THE SKIN THREE TIMES A DAY WITH MEALS. APPROXIMATELY 10 UNITS DAILY., Disp: 9 mL, Rfl: 3    insulin pen needle (BD PAM U/F) 32G X 4 MM miscellaneous, USE AS DIRECTED WITH INSULIN PENS 4 TIMES DAILY, Disp: 400 each, Rfl: 3    lidocaine (XYLOCAINE) 5 % external ointment, Apply topically at bedtime, Disp: 50 g, Rfl: 5    NIFEdipine ER OSMOTIC (PROCARDIA XL) 30 MG 24 hr tablet, 1-2 daily or dysphagia, Disp: 180 tablet, Rfl: 3    omeprazole (PRILOSEC) 40 MG DR capsule, Take 1 capsule (40 mg) by mouth daily., Disp: 90 capsule, Rfl: 0    order for DME, Equipment being ordered: compression socks, 20-30 mmHg, knee high, Disp: 8 Piece, Rfl: 4    simvastatin (ZOCOR) 40 MG tablet, Take 1 tablet (40 mg) by mouth at bedtime, Disp: 90 tablet, Rfl: 3    UNABLE TO FIND, MEDICATION NAME: , Disp: , Rfl:     warfarin ANTICOAGULANT (JANTOVEN ANTICOAGULANT) 5 MG tablet, Take 1-1.5 tablets daily or as directed, Disp: 135 tablet, Rfl: 4    LMP  (LMP Unknown)  "  Wt Readings from Last 10 Encounters:   10/30/24 50.1 kg (110 lb 8 oz)   08/19/24 47.2 kg (104 lb)   02/19/24 48.5 kg (107 lb)   01/05/24 49.7 kg (109 lb 8 oz)   08/07/23 51.3 kg (113 lb)   02/06/23 57.9 kg (127 lb 11.2 oz)   08/01/22 56.1 kg (123 lb 11.2 oz)   01/24/22 52.6 kg (116 lb)   07/30/20 52.6 kg (116 lb)   02/05/19 56.7 kg (125 lb)     /77   Pulse 86   Ht 1.664 m (5' 5.5\")   Wt 47.2 kg (104 lb)   LMP  (LMP Unknown)   SpO2 97%   BMI 17.04 kg/m      Physical Examination:  General appearance: she is thin, no acute distress noted  HENT: oropharynx moist; neck no JVD, no bruits, the right thyroid nodule is smaller ? 1 cm     Endocrine Labs:  Lab Results   Component Value Date    A1C 5.3 08/19/2024    A1C 5.5 02/19/2024    A1C 5.7 (H) 01/04/2023    A1C 5.5 06/16/2022    A1C 5.7 (H) 02/23/2022    A1C 5.6 12/22/2021    A1C 5.4 07/07/2021    A1C 5.2 04/21/2021    A1C 5.6 01/20/2021    A1C 5.1 07/09/2020    A1C 5.3 07/11/2017    HEMOGLOBINA1 5.4 08/07/2023    HEMOGLOBINA1 5.1 01/13/2020    HEMOGLOBINA1 5.1 07/15/2019    HEMOGLOBINA1 5.0 07/30/2018    HEMOGLOBINA1 5.0 01/29/2018       Hemoglobin   Date Value Ref Range Status   08/12/2024 12.3 11.7 - 15.7 g/dL Final   04/29/2021 12.9 11.7 - 15.7 g/dL Final     Hematocrit   Date Value Ref Range Status   08/12/2024 38.7 35.0 - 47.0 % Final   04/29/2021 41.5 35.0 - 47.0 % Final     Cholesterol   Date Value Ref Range Status   08/12/2024 148 <200 mg/dL Final   07/07/2021 199 <200 mg/dL Final     Comment:     Desirable:       <200 mg/dl     Cholesterol/HDL Ratio   Date Value Ref Range Status   10/01/2014 1.5 0.0 - 5.0 Final     HDL Cholesterol   Date Value Ref Range Status   07/07/2021 108 >49 mg/dL Final     Direct Measure HDL   Date Value Ref Range Status   08/12/2024 63 >=50 mg/dL Final     LDL Cholesterol Calculated   Date Value Ref Range Status   08/12/2024 72 <=100 mg/dL Final   07/07/2021 79 <100 mg/dL Final     Comment:     Desirable:       <100 mg/dl "     VLDL-Cholesterol   Date Value Ref Range Status   10/01/2014 7 0 - 30 mg/dL Final     Triglycerides   Date Value Ref Range Status   08/12/2024 64 <150 mg/dL Final   07/07/2021 58 <150 mg/dL Final     Albumin Urine mg/L   Date Value Ref Range Status   02/15/2024 <12.0 mg/L Final     Comment:     The reference ranges have not been established in urine albumin. The results should be integrated into the clinical context for interpretation.   06/16/2022 16 mg/L Final   07/07/2021 9 mg/L Final     TSH   Date Value Ref Range Status   08/19/2024 1.00 0.30 - 4.20 uIU/mL Final   08/01/2022 1.34 0.40 - 4.00 mU/L Final   07/09/2019 0.99 0.40 - 4.00 mU/L Final         Last Basic Metabolic Panel:    Sodium   Date Value Ref Range Status   08/12/2024 141 135 - 145 mmol/L Final   07/07/2021 141 133 - 144 mmol/L Final     Potassium   Date Value Ref Range Status   08/12/2024 3.9 3.4 - 5.3 mmol/L Final   07/19/2022 4.2 3.4 - 5.3 mmol/L Final   07/07/2021 4.0 3.4 - 5.3 mmol/L Final     Chloride   Date Value Ref Range Status   08/12/2024 106 98 - 107 mmol/L Final   07/19/2022 108 94 - 109 mmol/L Final   07/07/2021 106 94 - 109 mmol/L Final     Calcium   Date Value Ref Range Status   10/30/2024 9.2 8.8 - 10.4 mg/dL Final     Comment:     Reference intervals for this test were updated on 7/16/2024 to reflect our healthy population more accurately. There may be differences in the flagging of prior results with similar values performed with this method. Those prior results can be interpreted in the context of the updated reference intervals.   07/07/2021 9.1 8.5 - 10.1 mg/dL Final     Carbon Dioxide   Date Value Ref Range Status   07/07/2021 27 20 - 32 mmol/L Final     Carbon Dioxide (CO2)   Date Value Ref Range Status   08/12/2024 26 22 - 29 mmol/L Final   07/19/2022 28 20 - 32 mmol/L Final     Urea Nitrogen   Date Value Ref Range Status   08/12/2024 20.8 8.0 - 23.0 mg/dL Final   07/19/2022 22 7 - 30 mg/dL Final   07/07/2021 21 7 - 30  mg/dL Final     Creatinine   Date Value Ref Range Status   10/30/2024 0.78 0.51 - 0.95 mg/dL Final   07/07/2021 0.71 0.52 - 1.04 mg/dL Final     GFR Estimate   Date Value Ref Range Status   10/30/2024 78 >60 mL/min/1.73m2 Final     Comment:     eGFR calculated using 2021 CKD-EPI equation.   07/07/2021 84 >60 mL/min/[1.73_m2] Final     Comment:     Non  GFR Calc  Starting 12/18/2018, serum creatinine based estimated GFR (eGFR) will be   calculated using the Chronic Kidney Disease Epidemiology Collaboration   (CKD-EPI) equation.       Glucose   Date Value Ref Range Status   08/12/2024 93 70 - 99 mg/dL Final   07/19/2022 109 (H) 70 - 99 mg/dL Final   07/07/2021 110 (H) 70 - 99 mg/dL Final       AST   Date Value Ref Range Status   08/12/2024 28 0 - 45 U/L Final   07/07/2021 14 0 - 45 U/L Final     ALT   Date Value Ref Range Status   08/12/2024 23 0 - 50 U/L Final   07/07/2021 23 0 - 50 U/L Final       40 minutes spent on the date of the encounter doing chart review, history and exam, documentation and further activities as noted above.  The longitudinal plan of care for the diagnosis(es)/condition(s) as documented were addressed during this visit. Due to the added complexity in care, I will continue to support Jeanmarie in the subsequent management and with ongoing continuity of care.

## 2024-11-04 NOTE — LETTER
11/4/2024       RE: Shala Caruso  [address] [place] [address]     Dear Colleague,    Thank you for referring your patient, Shala Caruso, to the [facility] [place] at [facility]. Please see a copy of my visit note below.      Blood Glucose Monitoring :                        Assessment and Plan:    Jeanmarie is a 79 year old pleasant female with hx of SLE, APLS, DVT, osteoporosis and type 1 diabetes presenting for f/up.    1) Type 1 diabetes, formally diagnosed in 2010 while being evaluated for steroid-induced hyperglycemia.  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  She has been taking small dosages of short acting insulin.  Lantus was discontinued in view of hypoglycemia and low insulin requirements.  The patient prefers to maintain a very tight glycemic control.     2) Osteoporosis, now osteopenia  Risk factors for osteoporosis include postmenopausal status, lupus, prior treatment with steroids, diabetes, fam history.   She was treated with Boniva for 3 years, followed by Forteo for 2 years. Received one dose of Reclast in July 2014, when she completed treatment with Forteo.  Treatment with Reclast was resumed in July 2020, when the DEXA scan revealed some loss of bone mineral density, although the lowest T score remained in the osteopenic range.  She received subsequent Reclast dosages in in 2021, 2023 and 2024. Most recent DXA from 8/23 from showed improvement of bone mineral density at the average hip.  Plan:   Walking, weightbearing exercises encouraged  F/up DXA scan in 8/2025     3) Hypercalcemia, mild and associated with significant hypercalciuria  Lab work was suggestive of primary hyperparathyroidism.  Treatment with hydrochlorothiazide is not an option in view of the allergy to sulfas (allergic rash).  The parathyroid scan failed to identify parathyroid adenomas.   Indications for surgery:  hypercalciuria and low BMD.   Plan:  Pursue a neck CT once cleared by insurance   In the event over functioning parathyroid gland/glands are not identified by the CT, I recommended to consider evaluation at [facility] for C11 choline PET/CT.    4) Right neck mostly cystic nodule, 2 cm   It has decreased in size since the last visit. Clinically and biochemically, Jeanmarie is euthyroid.   Plan to schedule a f/up US in March 2025.     Orders Placed This Encounter   Procedures     US Thyroid     DX Bone Density     AFINION HEMOGLOBIN A1C POCT     ============================================================================================================================================  Jeanmarie is a 79 year old pleasant female with hx of SLE, APLS, DVT, osteoporosis and type 1 diabetes presenting for f/up.   Her last clinic visit was on 8/19/2024.     Interval history:    1. Type 1 diabetes  Lantus was discontinued and in 2020 and her diabetes is now managed only with NovoLog.    On average, she reports taking 1-2.5 units per meal. In general she has 2 meals a day, lunch and dinner.     Most recent A1c was 5.3, in 8/2024. It was 5.2% today.   Weight is up 6 lbs since her last visit (she has recovered some of the weight loss).     Diabetes complications:   No h/o microalbuminuria.  Most recent urine microalbumin negative in 2/24. GFR >90 on labs from 10/24  Last eye exam -September 2023. No DR. S/P cataract surgery.  Numbness and tingling sensation B/L toes - for many years but light sensation is intact. She does wear comfortable shoes/insoles. 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 With walking she might  get a burning sensation.   She develops confusion, inability to concentrate or focus her vision and she feels extremely tired when her blood sugar is the 60s or 50s.  She has no prior episodes of loss of consciousness due to hypoglycemia.  She does not always recognize the lows.   She does have  Baqsimi at home.  Most recent lipid panel from 8/12/24: LDL cholesterol 72, HDL cholesterol 63, triglycerides 64.  On 40 mg simvastatin daily.  Exercise: mainly walking     I reviewed the Siddharth data. Average glucose is 99, with a glucose variability of 14.5%, corresponding to a GMI of 5.7%.  98% of the glucose numbers are within target, 2% time in the mild hypoglycemic range.    2. Osteoporosis  Jeanmarie also has a history of osteoporosis, treated with Boniva for almost 3 years, followed by Forteo which was started in 4/12 - interrupted treatment from 4/2013 and restarted in 7/2013 until July 2014. After she completed the treatment with Forteo, she received one dose of Reclast, in July 2014.      She has no history of prior bone fractures.  She's been off prednisone since 2013. Her height has decreased over the years from 5'6'' to 5'1/2''. Her mother had a hip fracture in her 80s.      DXA 7/1/16:  L1-L3 T score was - 1.  Overall, at the spine, there was a significant increase of bone mineral density since 2005 of 10.5%. Since 2015, the bone mineral density has remained stable at spine. The lowest T score at the hip level was -2.2, at the left femoral neck.  Overall, there was a significant improvement of bone mineral density at the mean hip of 8.9% since 2005, with no significant changes since 2015. While the bone mineral density at the left hip increased since baseline, at the right hip, there was a decrease of bone mineral density since baseline and also, since prior year.     DXA 7/27/18:   The lowest T score was -2.1, at the left femoral neck.  Compared with the prior scan from 2016, the bone mineral density at the hips remained stable.  At the lumbar spine, L1-L3 T score was -1.3, not significantly changed since 2016.     DXA 1/2020:  L2-L4 T score was -1.5. At the hips, the lowest score was -2.3, at the left femoral neck. Compared with the prior DEXA scan from 2018, there was a significant decrease in bone  mineral density of the lumbar spine, left total hip and right total hip by 3.1, 4.7 and 3.5%, respectively.  The patient received zoledronic acid infusion on 7/30/2020 and 7/27/21.     DXA 6/16/2022:  T score: -0.9 at L1-L4, -0.3 at 33% distal radius, -1.7 at both right and left femoral necks, -1.8 at the left total hip and -1.9 at the right total hip.  At the radius and average hip, the bone mineral density remained stable since 2020.  At the spine, there was a significant increase of 4.5%.    DXA 8/1/23:  L2-L4 T score -1.4  Left femoral neck T score -1.8, total L hip T score -1.5  Right femoral neck T score -1.5, total R hip T score -1.6  Bone density compared to the prior study increased at the mean total femur by +4.4%.  A third dose of Reclast was administered on 8/29/23 and a forth dose on 10/30/24.      3. Hypercalcemia   The patient developed mild hypercalcemia in 2023.  The hypercalcemia has been associated with a normal PTH, normal phosphorus, and elevated 24-hour urinary calcium.    Pertinent labs reviewed:  3/24/2023 calcium 10.4, GFR 73, vitamin D 63, albumin 4.2, PTH 34, phosphorus 4.2  6/5/2023 24-hour urinary calcium 0.34 g, urine creatinine 1.03 g, urine volume 1.5 L  8/7/23 PTH 35, calcium 10.9, phos 3.5, albumin 4.5, vitamin D 56   12/20/23 PTH 33, calcium 9.4, phos 4.1, albumin 4, GFR >90    12/9/2023 24-hour urinary calcium 0.38 g, creatinine 0.92 g, volume 2.325 L.   2/15/24 ionized calcium 5.6 (normal range 4.4-5.2)  2/15/24 24 hrs urinary calcium 0.38 g, creatinine 0.92 g   8/12/2024 vitamin D 52, PTH 34, phosphorus 3.2  8/13/2024 24-hour urinary calcium 0.37 g, urine creatinine 0.67 g and urine volume 2.4 L.  Treatment with hydrochlorothiazide was not an option due to patient's allergies to sulpha.     8/30/24 neck US - right thyroid cyst with a thick wall, measuring 2.2 cm and some inferior calcification inferiorly. 2 atypical looking LNs at the right level 7.   9/19/24 swallowing study  unremarkable   10/21/24 parathyroid scan - failed to identify a parathyroid gland. I reviewed the scan images.   Most recent TSH was 1 on 8/19/24.     One cup of almond milk daily and 1-2 servings of dairies daily.   In terms of calcium and vitamin D supplements, she reports taking 500 mg calcium as a oyster shell and 1000 U vitamin D daily.   Liquid intake is variable.     Past Medical History  Past Medical History:   Diagnosis Date     Achalasia      Antiphospholipid syndrome (H)      Antiplatelet or antithrombotic long-term use      Coagulation disorder (H) [id number]     DM (diabetes mellitus) (H)      DVT (deep venous thrombosis) (H) 2003    and PE     Dysphagia     occasional, use Nifedipine     GERD (gastroesophageal reflux disease)      Hyperlipidemia      Lymphedema      Myopia      Neuropathy     toes bilateral     Nonsenile cataract      osteoporosis      Pericarditis      Raynaud's disease      SLE (systemic lupus erythematosus) (H)      Past Surgical History  Past Surgical History:   Procedure Laterality Date     CATARACT IOL, RT/LT Left 02/2019     CATARACT IOL, RT/LT Right 01/2019     COLONOSCOPY N/A 11/28/2016    Procedure: COLONOSCOPY;  Surgeon: Sang August MD;  Location: [facility]     CYSTOSCOPY, SLING TRANSVAGINAL N/A 12/20/2016    Procedure: CYSTOSCOPY, SLING TRANSVAGINAL;  Surgeon: Jeanmarie Pierson MD;  Location: [facility] OR     DISSECT LYMPH NODE AXILLA  2004    Lt, during eval for SLE     HYSTEROSCOPIC PLACEMENT CONTRACEPTIVE DEVICE  1985     PHACOEMULSIFICATION WITH STANDARD INTRAOCULAR LENS IMPLANT Right 1/8/2019    Procedure: Right Eye Cataract Extraction with Intraocular Lens;  Surgeon: Monika Fermin MD;  Location: [facility] OR     PHACOEMULSIFICATION WITH STANDARD INTRAOCULAR LENS IMPLANT Left 2/5/2019    Procedure: Left Eye Cataract Extraction with Intraocular Lens;  Surgeon: Monika Fermin MD;  Location: [facility] OR     TUBAL LIGATION Bilateral         Medications    Current  Outpatient Medications:      acetaminophen (TYLENOL) 500 MG tablet, Take 1,000-1,500 mg by mouth nightly as needed , Disp: , Rfl:      aspirin 81 MG tablet, Take 81 mg by mouth At Bedtime , Disp: , Rfl:      AYR SALINE NASAL NO-DRIP GEL, Use as needed for nasal dryness, Disp: 3 Tube, Rfl: 3     blood glucose monitoring (ULTRA THIN 30G) lancets, Test 5 times daily  [employer]  Super thin, Disp: 450 each, Rfl: 3     calcium carbonate 500 mg, elemental, (OSCAL) 500 MG tablet, Take 1 tablet (500 mg) by mouth daily, Disp: 90 tablet, Rfl: 3     cholecalciferol (VITAMIN D3) 25 mcg (1000 units) capsule, Take 1 capsule by mouth daily, Disp: , Rfl:      ciclopirox (PENLAC) 8 % external solution, Apply to adjacent skin and affected nails daily.  Remove with alcohol every 7 days, then repeat., Disp: 6.6 mL, Rfl: 11     Continuous Glucose Sensor (FREESTYLE SRI 3 SENSOR) [facility] [place]ANGE ONE SENSOR EVERY 14 DAYS, Disp: 6 each, Rfl: 4     Glucagon (BAQSIMI ONE PACK) 3 MG/DOSE nasal powder, Spray 1 spray (3 mg) in nostril as needed (to be used for severe hypoglycemic episodes) Please ask pharmacy for instructions on disposal of  medication in your county., Disp: 1 each, Rfl: 4     Injection Device for insulin (NOVOPEN ECHO) RORY, 1 each 4 times daily, Disp: 1 each, Rfl: 0     insulin aspart (NOVOLOG PENFILL) 100 UNIT/ML cartridge, INJECT 1 UNIT PER 15 GRAMS OF CARBOHYDRATES UNDER THE SKIN THREE TIMES A DAY WITH MEALS. APPROXIMATELY 10 UNITS DAILY., Disp: 9 mL, Rfl: 3     insulin pen needle (BD PAM U/F) 32G X 4 MM miscellaneous, USE AS DIRECTED WITH INSULIN PENS 4 TIMES DAILY, Disp: 400 each, Rfl: 3     lidocaine (XYLOCAINE) 5 % external ointment, Apply topically at bedtime, Disp: 50 g, Rfl: 5     NIFEdipine ER OSMOTIC (PROCARDIA XL) 30 MG 24 hr tablet, 1-2 daily or dysphagia, Disp: 180 tablet, Rfl: 3     omeprazole (PRILOSEC) 40 MG DR capsule, Take 1 capsule (40 mg) by mouth daily., Disp: 90 capsule, Rfl: 0     order for DME,  "Equipment being ordered: compression socks, 20-30 mmHg, knee high, Disp: 8 Piece, Rfl: 4     simvastatin (ZOCOR) 40 MG tablet, Take 1 tablet (40 mg) by mouth at bedtime, Disp: 90 tablet, Rfl: 3     UNABLE TO FIND, MEDICATION NAME: , Disp: , Rfl:      warfarin ANTICOAGULANT (JANTOVEN ANTICOAGULANT) 5 MG tablet, Take 1-1.5 tablets daily or as directed, Disp: 135 tablet, Rfl: 4    LMP  (LMP Unknown)   Wt Readings from Last 10 Encounters:   10/30/24 50.1 kg (110 lb 8 oz)   08/19/24 47.2 kg (104 lb)   02/19/24 48.5 kg (107 lb)   01/05/24 49.7 kg (109 lb 8 oz)   08/07/23 51.3 kg (113 lb)   02/06/23 57.9 kg (127 lb 11.2 oz)   08/01/22 56.1 kg (123 lb 11.2 oz)   01/24/22 52.6 kg (116 lb)   07/30/20 52.6 kg (116 lb)   02/05/19 56.7 kg (125 lb)     /77   Pulse 86   Ht 1.664 m (5' 5.5\")   Wt 47.2 kg (104 lb)   LMP  (LMP Unknown)   SpO2 97%   BMI 17.04 kg/m      Physical Examination:  General appearance: she is thin, no acute distress noted  HENT: oropharynx moist; neck no JVD, no bruits, the right thyroid nodule is smaller ? 1 cm     Endocrine Labs:  Lab Results   Component Value Date    A1C 5.3 08/19/2024    A1C 5.5 02/19/2024    A1C 5.7 (H) 01/04/2023    A1C 5.5 06/16/2022    A1C 5.7 (H) 02/23/2022    A1C 5.6 12/22/2021    A1C 5.4 07/07/2021    A1C 5.2 04/21/2021    A1C 5.6 01/20/2021    A1C 5.1 07/09/2020    A1C 5.3 07/11/2017    HEMOGLOBINA1 5.4 08/07/2023    HEMOGLOBINA1 5.1 01/13/2020    HEMOGLOBINA1 5.1 07/15/2019    HEMOGLOBINA1 5.0 07/30/2018    HEMOGLOBINA1 5.0 01/29/2018       Hemoglobin   Date Value Ref Range Status   08/12/2024 12.3 11.7 - 15.7 g/dL Final   04/29/2021 12.9 11.7 - 15.7 g/dL Final     Hematocrit   Date Value Ref Range Status   08/12/2024 38.7 35.0 - 47.0 % Final   04/29/2021 41.5 35.0 - 47.0 % Final     Cholesterol   Date Value Ref Range Status   08/12/2024 148 <200 mg/dL Final   07/07/2021 199 <200 mg/dL Final     Comment:     Desirable:       <200 mg/dl     Cholesterol/HDL " Ratio   Date Value Ref Range Status   10/01/2014 1.5 0.0 - 5.0 Final     HDL Cholesterol   Date Value Ref Range Status   07/07/2021 108 >49 mg/dL Final     Direct Measure HDL   Date Value Ref Range Status   08/12/2024 63 >=50 mg/dL Final     LDL Cholesterol Calculated   Date Value Ref Range Status   08/12/2024 72 <=100 mg/dL Final   07/07/2021 79 <100 mg/dL Final     Comment:     Desirable:       <100 mg/dl     VLDL-Cholesterol   Date Value Ref Range Status   10/01/2014 7 0 - 30 mg/dL Final     Triglycerides   Date Value Ref Range Status   08/12/2024 64 <150 mg/dL Final   07/07/2021 58 <150 mg/dL Final     Albumin Urine mg/L   Date Value Ref Range Status   02/15/2024 <12.0 mg/L Final     Comment:     The reference ranges have not been established in urine albumin. The results should be integrated into the clinical context for interpretation.   06/16/2022 16 mg/L Final   07/07/2021 9 mg/L Final     TSH   Date Value Ref Range Status   08/19/2024 1.00 0.30 - 4.20 uIU/mL Final   08/01/2022 1.34 0.40 - 4.00 mU/L Final   07/09/2019 0.99 0.40 - 4.00 mU/L Final         Last Basic Metabolic Panel:    Sodium   Date Value Ref Range Status   08/12/2024 141 135 - 145 mmol/L Final   07/07/2021 141 133 - 144 mmol/L Final     Potassium   Date Value Ref Range Status   08/12/2024 3.9 3.4 - 5.3 mmol/L Final   07/19/2022 4.2 3.4 - 5.3 mmol/L Final   07/07/2021 4.0 3.4 - 5.3 mmol/L Final     Chloride   Date Value Ref Range Status   08/12/2024 106 98 - 107 mmol/L Final   07/19/2022 108 94 - 109 mmol/L Final   07/07/2021 106 94 - 109 mmol/L Final     Calcium   Date Value Ref Range Status   10/30/2024 9.2 8.8 - 10.4 mg/dL Final     Comment:     Reference intervals for this test were updated on 7/16/2024 to reflect our healthy population more accurately. There may be differences in the flagging of prior results with similar values performed with this method. Those prior results can be interpreted in the context of the updated reference  intervals.   07/07/2021 9.1 8.5 - 10.1 mg/dL Final     Carbon Dioxide   Date Value Ref Range Status   07/07/2021 27 20 - 32 mmol/L Final     Carbon Dioxide (CO2)   Date Value Ref Range Status   08/12/2024 26 22 - 29 mmol/L Final   07/19/2022 28 20 - 32 mmol/L Final     Urea Nitrogen   Date Value Ref Range Status   08/12/2024 20.8 8.0 - 23.0 mg/dL Final   07/19/2022 22 7 - 30 mg/dL Final   07/07/2021 21 7 - 30 mg/dL Final     Creatinine   Date Value Ref Range Status   10/30/2024 0.78 0.51 - 0.95 mg/dL Final   07/07/2021 0.71 0.52 - 1.04 mg/dL Final     GFR Estimate   Date Value Ref Range Status   10/30/2024 78 >60 mL/min/1.73m2 Final     Comment:     eGFR calculated using 2021 CKD-EPI equation.   07/07/2021 84 >60 mL/min/[1.73_m2] Final     Comment:     Non  GFR Calc  Starting 12/18/2018, serum creatinine based estimated GFR (eGFR) will be   calculated using the Chronic Kidney Disease Epidemiology Collaboration   (CKD-EPI) equation.       Glucose   Date Value Ref Range Status   08/12/2024 93 70 - 99 mg/dL Final   07/19/2022 109 (H) 70 - 99 mg/dL Final   07/07/2021 110 (H) 70 - 99 mg/dL Final       AST   Date Value Ref Range Status   08/12/2024 28 0 - 45 U/L Final   07/07/2021 14 0 - 45 U/L Final     ALT   Date Value Ref Range Status   08/12/2024 23 0 - 50 U/L Final   07/07/2021 23 0 - 50 U/L Final       40 minutes spent on the date of the encounter doing chart review, history and exam, documentation and further activities as noted above.  The longitudinal plan of care for the diagnosis(es)/condition(s) as documented were addressed during this visit. Due to the added complexity in care, I will continue to support Jeanmarie in the subsequent management and with ongoing continuity of care.      Again, thank you for allowing me to participate in the care of your patient.      Sincerely,    Dorita Cramer MD

## 2024-11-08 ENCOUNTER — ANCILLARY PROCEDURE (OUTPATIENT)
Dept: MAMMOGRAPHY | Facility: CLINIC | Age: 79
End: 2024-11-08
Attending: INTERNAL MEDICINE
Payer: COMMERCIAL

## 2024-11-08 DIAGNOSIS — Z12.31 VISIT FOR SCREENING MAMMOGRAM: ICD-10-CM

## 2024-11-08 PROCEDURE — 77067 SCR MAMMO BI INCL CAD: CPT | Mod: GC

## 2024-11-08 PROCEDURE — 77063 BREAST TOMOSYNTHESIS BI: CPT | Mod: GC

## 2024-11-10 ENCOUNTER — MYC MEDICAL ADVICE (OUTPATIENT)
Dept: ENDOCRINOLOGY | Facility: CLINIC | Age: 79
End: 2024-11-10
Payer: COMMERCIAL

## 2024-11-12 ENCOUNTER — LAB (OUTPATIENT)
Dept: LAB | Facility: CLINIC | Age: 79
End: 2024-11-12
Payer: COMMERCIAL

## 2024-11-12 DIAGNOSIS — I82.4Y9 DEEP VEIN THROMBOSIS (DVT) OF PROXIMAL LOWER EXTREMITY, UNSPECIFIED CHRONICITY, UNSPECIFIED LATERALITY (H): ICD-10-CM

## 2024-11-12 PROCEDURE — 85260 CLOT FACTOR X STUART-POWER: CPT | Performed by: INTERNAL MEDICINE

## 2024-11-12 PROCEDURE — 99000 SPECIMEN HANDLING OFFICE-LAB: CPT | Performed by: PATHOLOGY

## 2024-11-12 PROCEDURE — 36415 COLL VENOUS BLD VENIPUNCTURE: CPT | Performed by: PATHOLOGY

## 2024-11-13 ENCOUNTER — HOSPITAL ENCOUNTER (OUTPATIENT)
Dept: CT IMAGING | Facility: CLINIC | Age: 79
Discharge: HOME | End: 2024-11-13
Attending: INTERNAL MEDICINE | Admitting: INTERNAL MEDICINE
Payer: COMMERCIAL

## 2024-11-13 ENCOUNTER — ANTICOAGULATION THERAPY VISIT (OUTPATIENT)
Dept: ANTICOAGULATION | Facility: CLINIC | Age: 79
End: 2024-11-13
Payer: COMMERCIAL

## 2024-11-13 DIAGNOSIS — D68.61 ANTIPHOSPHOLIPID SYNDROME (H): ICD-10-CM

## 2024-11-13 DIAGNOSIS — I82.4Y9 DEEP VEIN THROMBOSIS (DVT) OF PROXIMAL LOWER EXTREMITY, UNSPECIFIED CHRONICITY, UNSPECIFIED LATERALITY (H): ICD-10-CM

## 2024-11-13 DIAGNOSIS — L93.0 LUPUS ERYTHEMATOSUS: ICD-10-CM

## 2024-11-13 DIAGNOSIS — E21.0 PRIMARY HYPERPARATHYROIDISM (H): ICD-10-CM

## 2024-11-13 DIAGNOSIS — Z79.01 LONG TERM CURRENT USE OF ANTICOAGULANT THERAPY: Primary | ICD-10-CM

## 2024-11-13 LAB — FACT X ACT/NOR PPP CHRO: 21 % (ref 70–130)

## 2024-11-13 PROCEDURE — 70492 CT SFT TSUE NCK W/O & W/DYE: CPT

## 2024-11-13 PROCEDURE — 70492 CT SFT TSUE NCK W/O & W/DYE: CPT | Mod: 26 | Performed by: STUDENT IN AN ORGANIZED HEALTH CARE EDUCATION/TRAINING PROGRAM

## 2024-11-13 PROCEDURE — 250N000011 HC RX IP 250 OP 636: Performed by: INTERNAL MEDICINE

## 2024-11-13 PROCEDURE — 999N000128 HC STATISTIC PERIPHERAL IV START W/O US GUIDANCE

## 2024-11-13 RX ORDER — IOPAMIDOL 755 MG/ML
67 INJECTION, SOLUTION INTRAVASCULAR ONCE
Status: COMPLETED | OUTPATIENT
Start: 2024-11-13 | End: 2024-11-13

## 2024-11-13 RX ADMIN — IOPAMIDOL 67 ML: 755 INJECTION, SOLUTION INTRAVENOUS at 13:35

## 2024-11-13 NOTE — PROGRESS NOTES
ANTICOAGULATION MANAGEMENT     Shala Caruso 79 year old female is on warfarin with therapeutic result. (Goal Chromogenic Factor X 40-20%)    Recent labs: (last 7 days)     11/12/24  1316   [id number]*       ASSESSMENT     Source(s): Chart Review and Patient/Caregiver Call     Warfarin doses taken: Warfarin taken as instructed  Diet: No new diet changes identified  Medication/supplement changes:  Patient started Omeprazole around 10/17 so if medication was going to interact with warfarin writer would expect to see it by now.  New illness, injury, or hospitalization: No  Signs or symptoms of bleeding or clotting: No  Previous result: Therapeutic last 2(+) visits  Additional findings: None     PLAN     Recommended plan for no diet, medication or health factor changes affecting result    Dosing Instructions: Continue your current warfarin dose 7.5mg TuFri and 5mg all other days  with next Chromogenic Factor X in 3 weeks       Telephone call with Jeanmarie who verbalizes understanding and agrees to plan and who agrees to plan and repeated back plan correctly    Lab visit scheduled    Education provided:   Taking warfarin: Importance of taking warfarin as instructed  Goal range and lab monitoring: goal range and significance of current result and Importance of therapeutic range    Plan made per [facility] anticoagulation protocol    Stacey Beal, RN  11/13/2024  Anticoagulation Clinic  [employer] for routing messages: [facility]  [facility] patient phone line: [phone]

## 2024-11-15 ENCOUNTER — IMMUNIZATION (OUTPATIENT)
Dept: FAMILY MEDICINE | Facility: CLINIC | Age: 79
End: 2024-11-15
Payer: COMMERCIAL

## 2024-11-15 DIAGNOSIS — Z23 ENCOUNTER FOR IMMUNIZATION: Primary | ICD-10-CM

## 2024-11-15 PROCEDURE — 99207 PR NO CHARGE NURSE ONLY: CPT

## 2024-11-15 PROCEDURE — 91320 SARSCV2 VAC 30MCG TRS-SUC IM: CPT

## 2024-11-15 PROCEDURE — 90480 ADMN SARSCOV2 VAC 1/ONLY CMP: CPT

## 2024-11-15 NOTE — PROGRESS NOTES
Prior to immunization administration, verified patients identity using patient s name and date of birth. Please see Immunization Activity for additional information.     Is the patient's temperature normal (100.5 or less)? Yes     Patient MEETS CRITERIA. PROCEED with vaccine administration.        11/15/2024   General Questionnaire    Do you have any questions for your care team about the vaccines you will be receiving today? i am 79 years old and have type one diabetes. Will i be able to recieve a high dose vaccine?                11/15/2024   COVID   Have you had myocarditis or pericarditis (inflammation of or around the heart muscle) after getting a COVID-19 vaccine? No   Have you had a serious reaction to a COVID vaccine or something in a COVID vaccine, like polyethylene glycol (PEG) or polysorbate? No   Have you had multisystem inflammatory syndrome from COVID-19 in the past 90 days? No   Have you received a bone marrow transplant within the previous 3 months? No            Patient MEETS CRITERIA. PROCEED with vaccine administration.        Patient instructed to remain in clinic for 15 minutes afterwards, and to report any adverse reactions.      Link to Ancillary Visit Immunization Standing Orders SmartSet     Screening performed by Kailee De La Torre on 11/15/2024 at 2:03 PM.

## 2024-11-18 ENCOUNTER — TELEPHONE (OUTPATIENT)
Dept: ENDOCRINOLOGY | Facility: CLINIC | Age: 79
End: 2024-11-18
Payer: COMMERCIAL

## 2024-11-18 NOTE — TELEPHONE ENCOUNTER
Left Voicemail (1st Attempt) for the patient to call back and schedule the following:    Appointment type: Lymph node & Thyroid biopsy   Provider: Bela  Return date: next avail   Specialty phone number: [phone]  Additional appointment(s) needed:   Additonal Notes: LVM, MyC x1   Per Dr. Cramer:  Please schedule patient for Biopsy     Devi Cain on 11/18/2024 at 11:53 AM

## 2024-11-19 NOTE — TELEPHONE ENCOUNTER
Left Voicemail (2nd Attempt) for the patient to call back and schedule the following:    Appointment type: Lymph node & Thyroid biopsy   Provider: Bela  Return date: next avail   Specialty phone number: [phone]  Additional appointment(s) needed:   Additonal Notes: LVM x2, MyC x1   Per Dr. Cramer:  Please schedule patient for Biopsy     Devi Cain on 11/19/2024 at 10:02 AM

## 2024-11-30 DIAGNOSIS — K22.0 ACHALASIA OF ESOPHAGUS: ICD-10-CM

## 2024-12-02 ENCOUNTER — LAB (OUTPATIENT)
Dept: LAB | Facility: CLINIC | Age: 79
End: 2024-12-02
Payer: COMMERCIAL

## 2024-12-02 DIAGNOSIS — M32.9 SYSTEMIC LUPUS ERYTHEMATOSUS, UNSPECIFIED SLE TYPE, UNSPECIFIED ORGAN INVOLVEMENT STATUS (H): ICD-10-CM

## 2024-12-02 LAB
ALBUMIN UR-MCNC: NEGATIVE MG/DL
ANION GAP SERPL CALCULATED.3IONS-SCNC: 9 MMOL/L (ref 7–15)
APPEARANCE UR: CLEAR
BACTERIA #/AREA URNS HPF: ABNORMAL /HPF
BASOPHILS # BLD AUTO: 0 10E3/UL (ref 0–0.2)
BASOPHILS NFR BLD AUTO: 1 %
BILIRUB UR QL STRIP: NEGATIVE
BUN SERPL-MCNC: 19.9 MG/DL (ref 8–23)
CALCIUM SERPL-MCNC: 9 MG/DL (ref 8.8–10.4)
CHLORIDE SERPL-SCNC: 102 MMOL/L (ref 98–107)
COLOR UR AUTO: ABNORMAL
CREAT SERPL-MCNC: 0.57 MG/DL (ref 0.51–0.95)
EGFRCR SERPLBLD CKD-EPI 2021: >90 ML/MIN/1.73M2
EOSINOPHIL # BLD AUTO: 0.1 10E3/UL (ref 0–0.7)
EOSINOPHIL NFR BLD AUTO: 3 %
ERYTHROCYTE [DISTWIDTH] IN BLOOD BY AUTOMATED COUNT: 16.2 % (ref 10–15)
GLUCOSE SERPL-MCNC: 106 MG/DL (ref 70–99)
GLUCOSE UR STRIP-MCNC: NEGATIVE MG/DL
HCO3 SERPL-SCNC: 27 MMOL/L (ref 22–29)
HCT VFR BLD AUTO: 37.6 % (ref 35–47)
HGB BLD-MCNC: 12.1 G/DL (ref 11.7–15.7)
HGB UR QL STRIP: NEGATIVE
IMM GRANULOCYTES # BLD: 0 10E3/UL
IMM GRANULOCYTES NFR BLD: 0 %
KETONES UR STRIP-MCNC: NEGATIVE MG/DL
LEUKOCYTE ESTERASE UR QL STRIP: ABNORMAL
LYMPHOCYTES # BLD AUTO: 0.6 10E3/UL (ref 0.8–5.3)
LYMPHOCYTES NFR BLD AUTO: 27 %
MCH RBC QN AUTO: 27.5 PG (ref 26.5–33)
MCHC RBC AUTO-ENTMCNC: 32.2 G/DL (ref 31.5–36.5)
MCV RBC AUTO: 86 FL (ref 78–100)
MONOCYTES # BLD AUTO: 0.2 10E3/UL (ref 0–1.3)
MONOCYTES NFR BLD AUTO: 8 %
NEUTROPHILS # BLD AUTO: 1.4 10E3/UL (ref 1.6–8.3)
NEUTROPHILS NFR BLD AUTO: 61 %
NITRATE UR QL: NEGATIVE
NRBC # BLD AUTO: 0 10E3/UL
NRBC BLD AUTO-RTO: 0 /100
PH UR STRIP: 6.5 [PH] (ref 5–7)
PLATELET # BLD AUTO: 181 10E3/UL (ref 150–450)
POTASSIUM SERPL-SCNC: 4 MMOL/L (ref 3.4–5.3)
RBC # BLD AUTO: 4.4 10E6/UL (ref 3.8–5.2)
RBC URINE: 1 /HPF
SODIUM SERPL-SCNC: 138 MMOL/L (ref 135–145)
SP GR UR STRIP: 1.01 (ref 1–1.03)
SQUAMOUS EPITHELIAL: 2 /HPF
UROBILINOGEN UR STRIP-MCNC: NORMAL MG/DL
WBC # BLD AUTO: 2.3 10E3/UL (ref 4–11)
WBC URINE: 4 /HPF

## 2024-12-02 PROCEDURE — 36415 COLL VENOUS BLD VENIPUNCTURE: CPT | Performed by: PATHOLOGY

## 2024-12-02 PROCEDURE — 80048 BASIC METABOLIC PNL TOTAL CA: CPT | Performed by: PATHOLOGY

## 2024-12-02 PROCEDURE — 85025 COMPLETE CBC W/AUTO DIFF WBC: CPT | Performed by: PATHOLOGY

## 2024-12-02 PROCEDURE — 81001 URINALYSIS AUTO W/SCOPE: CPT | Performed by: PATHOLOGY

## 2024-12-04 ENCOUNTER — TELEPHONE (OUTPATIENT)
Dept: INTERNAL MEDICINE | Facility: CLINIC | Age: 79
End: 2024-12-04

## 2024-12-04 ENCOUNTER — LAB (OUTPATIENT)
Dept: LAB | Facility: CLINIC | Age: 79
End: 2024-12-04
Payer: COMMERCIAL

## 2024-12-04 ENCOUNTER — TELEPHONE (OUTPATIENT)
Dept: ANTICOAGULATION | Facility: CLINIC | Age: 79
End: 2024-12-04

## 2024-12-04 DIAGNOSIS — D68.61 ANTIPHOSPHOLIPID SYNDROME (H): ICD-10-CM

## 2024-12-04 DIAGNOSIS — D68.61 ANTIPHOSPHOLIPID SYNDROME (H): Primary | ICD-10-CM

## 2024-12-04 PROCEDURE — 36415 COLL VENOUS BLD VENIPUNCTURE: CPT | Performed by: PATHOLOGY

## 2024-12-04 PROCEDURE — 85260 CLOT FACTOR X STUART-POWER: CPT | Performed by: INTERNAL MEDICINE

## 2024-12-04 PROCEDURE — 99000 SPECIMEN HANDLING OFFICE-LAB: CPT | Performed by: PATHOLOGY

## 2024-12-04 NOTE — TELEPHONE ENCOUNTER
Patient Returning Call    Reason for call:  pt is asking for a call back    Information relayed to patient:  no    Patient has additional questions:  No    What are your questions/concerns:  wants her INR labs results    Could we send this information to you in [facility] or would you prefer to receive a phone call?:   Patient would prefer a phone call   Okay to leave a detailed message?: Yes at Cell number on file:    Telephone Information:   Mobile [phone]

## 2024-12-04 NOTE — TELEPHONE ENCOUNTER
[facility] ANTICOAGULATION  MANAGEMENT    Shala requesting pre-procedure hold orders for warfarin and review for bridging      Procedure date: 12/20/24       Procedure:  Lymph node and thyroid biopsy to neck      Procedure location and phone number (if external): [facility]     Number of warfarin hold days requested and/or target INR: 4 days    Pre-op date: Not applicable      Dr. Contreras has already instructed patient on warfarin interruption.  Dr. Contreras instructed patient to hold warfarin X 4 days prior to procedure without Lovenox bridging. Jeanmarie is requesting a plan for warfarin post procedure.    [facility] pool: p [facility]     Stacey Beal RN      12/20 warfarin 12.5mg  12/21 warfarin 10mg   12/22-12/26 resume typical warfarin pattern 7.5mg TuFri and 5mg all other days

## 2024-12-04 NOTE — TELEPHONE ENCOUNTER
NIFEdipine ER OSMOTIC (PROCARDIA XL) 30 MG 24 hr tablet       Last Written Prescription Date:  1/5/24  Last Fill Quantity: 180,   # refills: 3  Last Office Visit : 8/21/24  Future Office visit:  1/21/25       Routing refill request to provider for review/approval because:    Please review prn use, Directions 1-2/day, if 2/day, should not need refill until 1/5/25    Monika VIVEROS RN  P Central Nursing/Red Flag Triage & Med Refill Team

## 2024-12-05 ENCOUNTER — ANTICOAGULATION THERAPY VISIT (OUTPATIENT)
Dept: ANTICOAGULATION | Facility: CLINIC | Age: 79
End: 2024-12-05
Payer: COMMERCIAL

## 2024-12-05 DIAGNOSIS — I82.4Y9 DEEP VEIN THROMBOSIS (DVT) OF PROXIMAL LOWER EXTREMITY, UNSPECIFIED CHRONICITY, UNSPECIFIED LATERALITY (H): ICD-10-CM

## 2024-12-05 DIAGNOSIS — L93.0 LUPUS ERYTHEMATOSUS: ICD-10-CM

## 2024-12-05 DIAGNOSIS — D68.61 ANTIPHOSPHOLIPID SYNDROME (H): ICD-10-CM

## 2024-12-05 DIAGNOSIS — Z79.01 LONG TERM CURRENT USE OF ANTICOAGULANT THERAPY: Primary | ICD-10-CM

## 2024-12-05 LAB — FACT X ACT/NOR PPP CHRO: 18 % (ref 70–130)

## 2024-12-05 RX ORDER — NIFEDIPINE 30 MG/1
TABLET, EXTENDED RELEASE ORAL
Qty: 180 TABLET | Refills: 0 | Status: SHIPPED | OUTPATIENT
Start: 2024-12-05

## 2024-12-05 NOTE — PROGRESS NOTES
ANTICOAGULATION MANAGEMENT     Shala Caruso 79 year old female is on warfarin with supratherapeutic result. (Goal Chromogenic Factor X 40-20%)    Recent labs: (last 7 days)     12/04/24  1313   [id number]*       ASSESSMENT     Source(s): Chart Review and Patient/Caregiver Call     Warfarin doses taken: Warfarin taken as instructed  Diet: Decreased greens/vitamin K in diet; plans to resume previous intake  Medication/supplement changes: None noted (continues on omeprazole)  New illness, injury, or hospitalization: No  Signs or symptoms of bleeding or clotting: No  Previous result: Therapeutic last 2(+) visits  Additional findings: Upcoming surgery/procedure 12/20/24:  lymph node and thyroid biopsy of neck. She will hold warfarin 4 days prior to procedure; no bridging.  See warfarin interruption plan in TE dated 12/4/24.     PLAN     Recommended plan for temporary change(s) affecting result (decreased greens)    Dosing Instructions: Continue your current warfarin dose 7.5 mg Tues,Fri; and 5 mg all other days of the week  with next Chromogenic Factor X in 3 weeks.  Hold warfarin 12/16 -  12/19/24.  See warfarin dosing calendar for warfarin dosing post procedure.      Telephone call with Jeanmarie who verbalizes understanding and agrees to plan and who agrees to plan and repeated back plan correctly    Contact [phone] to schedule and with any changes, questions or concerns.     Education provided:   Contact [phone] with any changes, questions or concerns.  Reviewed upcoming procedure plan with Jeanmarie.  A [employer] message was sent on 12/4/24 with plan.    Plan made per [facility] anticoagulation protocol    Gwen Abel RN  12/5/2024  Anticoagulation Clinic  [facility] for routing messages: [facility] patient phone line: [phone]

## 2024-12-09 NOTE — PROGRESS NOTES
Shala Caruso is a 78 year old female  Chief Complaint: follow up for Dysphagia, sore throat, which resolved onomeprazole  History of Present Illness  Location:throat  Quality:dysphagia, sore throat  Severity: resolved  Duration: 2 mos    Past Medical History -   Patient Active Problem List   Diagnosis    Achalasia    Antiphospholipid syndrome (H)    DM (diabetes mellitus) (H)    GERD (gastroesophageal reflux disease)    Hyperlipidemia    HTN (hypertension)    Osteopenia    Raynaud's disease    DVT (deep venous thrombosis) (H)    Encounter for long-term current use of medication    Type 1 diabetes mellitus (H)    Osteoporosis, idiopathic    Osteoporosis, postmenopausal    Cystocele, midline    Urinary urgency    Urinary frequency    Female stress incontinence    Atrophic vaginitis    Long term current use of anticoagulant therapy    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)    Choroidal lesion    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, unspecified SLE type (H)    Hematoma of leg, right, initial encounter    Lupus erythematosus    Deep vein thrombosis (DVT) of proximal lower extremity, unspecified chronicity, unspecified laterality (H)       Current Medications -   Current Outpatient Medications:     acetaminophen (TYLENOL) 500 MG tablet, Take 1,000-1,500 mg by mouth nightly as needed , Disp: , Rfl:     aspirin 81 MG tablet, Take 81 mg by mouth At Bedtime , Disp: , Rfl:     AYR SALINE NASAL NO-DRIP GEL, Use as needed for nasal dryness, Disp: 3 Tube, Rfl: 3    blood glucose monitoring (ULTRA THIN 30G) lancets, Test 5 times daily  Sunmark  Super thin, Disp: 450 each, Rfl: 3    calcium carbonate 500 mg, elemental, (OSCAL) 500 MG tablet, Take 1 tablet (500 mg) by mouth daily, Disp: 90 tablet, Rfl: 3    cholecalciferol (VITAMIN D3) 25 mcg (1000 units) capsule, Take 1 capsule by mouth daily, Disp: , Rfl:     ciclopirox (PENLAC) 8 % external solution, Apply to adjacent skin and  affected nails daily.  Remove with alcohol every 7 days, then repeat., Disp: 6.6 mL, Rfl: 11    Continuous Glucose Sensor (FREESTYLE SRI 3 SENSOR) [facility], CHANGE ONE SENSOR EVERY 14 DAYS, Disp: 6 each, Rfl: 4    Glucagon (BAQSIMI ONE PACK) 3 MG/DOSE nasal powder, Spray 1 spray (3 mg) in nostril as needed (to be used for severe hypoglycemic episodes) Please ask pharmacy for instructions on disposal of  medication in your county., Disp: 1 each, Rfl: 4    Injection Device for insulin (NOVOPEN ECHO) RORY, 1 each 4 times daily, Disp: 1 each, Rfl: 0    insulin aspart (NOVOLOG PENFILL) 100 UNIT/ML cartridge, INJECT 1 UNIT PER 15 GRAMS OF CARBOHYDRATES UNDER THE SKIN THREE TIMES A DAY WITH MEALS. APPROXIMATELY 10 UNITS DAILY., Disp: 9 mL, Rfl: 3    insulin pen needle (BD PAM U/F) 32G X 4 MM miscellaneous, USE AS DIRECTED WITH INSULIN PENS 4 TIMES DAILY, Disp: 400 each, Rfl: 3    lidocaine (XYLOCAINE) 5 % external ointment, Apply topically at bedtime, Disp: 50 g, Rfl: 5    NIFEdipine ER OSMOTIC (PROCARDIA XL) 30 MG 24 hr tablet, TAKE 1 TO 2 TABLETS BY MOUTH DAILY FOR DYSPHAGIA, Disp: 180 tablet, Rfl: 0    omeprazole (PRILOSEC) 40 MG DR capsule, Take 1 capsule (40 mg) by mouth daily., Disp: 90 capsule, Rfl: 0    order for DME, Equipment being ordered: compression socks, 20-30 mmHg, knee high, Disp: 8 Piece, Rfl: 4    simvastatin (ZOCOR) 40 MG tablet, Take 1 tablet (40 mg) by mouth at bedtime, Disp: 90 tablet, Rfl: 3    UNABLE TO FIND, MEDICATION NAME: , Disp: , Rfl:     warfarin ANTICOAGULANT (JANTOVEN ANTICOAGULANT) 5 MG tablet, Take 1-1.5 tablets daily or as directed, Disp: 135 tablet, Rfl: 4    Allergies -   Allergies   Allergen Reactions    Amaryl [Glimepiride] Swelling     Swelling of tongue    Metformin Blisters     Experienced big blisters all over body and sloughing of skin    Plaquenil [Aminoquinolines] Hives    Sulfa Antibiotics Other (See Comments)     Turned bright red    Amoxicillin Rash     Hydroxychloroquine Rash    Penicillins Rash       Social History -   Social History     Socioeconomic History    Marital status:    Tobacco Use    Smoking status: Never    Smokeless tobacco: Never   Substance and Sexual Activity    Alcohol use: No    Drug use: No    Sexual activity: Yes     Partners: Male     Comment: , safe in relationship   Social History Narrative    How much exercise per week? Walk to work every morning (2 miles), swimming 3 times a week, takes walks with , periodically works out at gym on stationary bike     How much calcium per day? Supplement 3x daily        How much caffeine per day? On rare occasion, only if she is out to eat and that is all they have    How much vitamin D per day? supplement    Do you/your family wear seatbelts?  Yes    Do you/your family use safety helmets? Yes    Do you/your family use sunscreen? Yes    Do you/your family keep firearms in the home? No    Do you/your family have a smoke detector(s)? Yes        Do you feel safe in your home? Yes    Has anyone ever touched you in an unwanted manner? No        Klever R LPN 7/18/2013                 Social Determinants of Health     Financial Resource Strain: Low Risk  (1/5/2024)    Financial Resource Strain     Within the past 12 months, have you or your family members you live with been unable to get utilities (heat, electricity) when it was really needed?: No   Food Insecurity: Low Risk  (1/5/2024)    Food Insecurity     Within the past 12 months, did you worry that your food would run out before you got money to buy more?: No     Within the past 12 months, did the food you bought just not last and you didn t have money to get more?: No   Transportation Needs: Low Risk  (1/5/2024)    Transportation Needs     Within the past 12 months, has lack of transportation kept you from medical appointments, getting your medicines, non-medical meetings or appointments, work, or from getting things that you need?:  No   Interpersonal Safety: Low Risk  (9/18/2024)    Interpersonal Safety     Do you feel physically and emotionally safe where you currently live?: Yes     Within the past 12 months, have you been hit, slapped, kicked or otherwise physically hurt by someone?: No     Within the past 12 months, have you been humiliated or emotionally abused in other ways by your partner or ex-partner?: No   Housing Stability: Low Risk  (1/5/2024)    Housing Stability     Do you have housing? : Yes     Are you worried about losing your housing?: No       Family History -   Family History   Problem Relation Age of Onset    Glaucoma Father     Cancer Other         breast    Cerebrovascular Disease Other     C.A.D. Other     Macular Degeneration No family hx of     Skin Cancer No family hx of        Review of Systems:   !.  Weight Loss: No   2. Difficulty Breathing: No   3. Difficulty Swallowing: No   4. Pain: No    Physical Exam  B/P: Data Unavailable, T: Data Unavailable, P: Data Unavailable, R: Data Unavailable  Vitals: /58 (BP Location: Right arm, Patient Position: Chair, Cuff Size: Adult Regular)   Pulse 66   Temp 97.4  F (36.3  C) (Tympanic)   Wt 47.2 kg (104 lb)   LMP  (LMP Unknown)   BMI 17.04 kg/m    BMI= Body mass index is 17.04 kg/m .    General  Appearance - Normal  Head/Face/Scalp:    Skin - Normal    Facial Palpation - Normal    Facial Strength - Normal  Ears:    Pinna - Normal    Canal - Normal   Tympanic membrane - Normal  Nose:    External - Normal    Septum - Normal    Turbinates - Normal    Middle meatus - Normal  Oral Cavity:    Lips - Normal    Floor of Mouth - Normal    Gingiva - Normal    Tongue - Normal    Buccal - Normal    Palate - Normal  Nasopharynx: clear    Oropharynx:    Tonsils - Normal    Tongue base - Normal    Soft palate - Normal    Posterior pharyngeal wall - Normal  Hypopharynx: clear  Larynx:    Epiglottis - normal    Aryepiglottic folds - Rt red    Arytenoids - Rt red, post-cricoid edema-      True vocal cords -normal  Neck Masses - No  Neck lymphatics - no lymphadenopathy  Thyroid - Normal  Salivary glands - Normal    Audiogram - not applicable  Radiology - not applicable   Reports:   View films:  Procedures -    Patient Education:     A/P - Shala TATIANA Caruso is a 78 year old female  Medical Decision Making 1. Dysphagia 2. Sore throat , laryngopharyngeal reflux  have resolved

## 2024-12-12 ENCOUNTER — OFFICE VISIT (OUTPATIENT)
Dept: OTOLARYNGOLOGY | Facility: CLINIC | Age: 79
End: 2024-12-12
Payer: COMMERCIAL

## 2024-12-12 VITALS
TEMPERATURE: 97.4 F | WEIGHT: 104 LBS | BODY MASS INDEX: 17.04 KG/M2 | SYSTOLIC BLOOD PRESSURE: 113 MMHG | DIASTOLIC BLOOD PRESSURE: 58 MMHG | HEART RATE: 66 BPM

## 2024-12-12 DIAGNOSIS — E11.43 TYPE 2 DIABETES MELLITUS WITH DIABETIC AUTONOMIC NEUROPATHY, WITH LONG-TERM CURRENT USE OF INSULIN (H): Primary | ICD-10-CM

## 2024-12-12 DIAGNOSIS — Z79.4 TYPE 2 DIABETES MELLITUS WITH DIABETIC AUTONOMIC NEUROPATHY, WITH LONG-TERM CURRENT USE OF INSULIN (H): Primary | ICD-10-CM

## 2024-12-12 DIAGNOSIS — E10.649 TYPE 1 DIABETES MELLITUS WITH HYPOGLYCEMIA AND WITHOUT COMA (H): ICD-10-CM

## 2024-12-12 DIAGNOSIS — J02.9 SORETHROAT: ICD-10-CM

## 2024-12-12 DIAGNOSIS — K21.9 LARYNGOPHARYNGEAL REFLUX: ICD-10-CM

## 2024-12-12 DIAGNOSIS — R13.12 OROPHARYNGEAL DYSPHAGIA: Primary | ICD-10-CM

## 2024-12-12 RX ORDER — HYDROCHLOROTHIAZIDE 12.5 MG/1
CAPSULE ORAL
COMMUNITY
End: 2024-12-12

## 2024-12-12 RX ORDER — HYDROCHLOROTHIAZIDE 12.5 MG/1
1 CAPSULE ORAL SEE ADMIN INSTRUCTIONS
Qty: 6 EACH | Refills: 1 | Status: SHIPPED | OUTPATIENT
Start: 2024-12-12 | End: 2024-12-12

## 2024-12-12 RX ORDER — HYDROCHLOROTHIAZIDE 12.5 MG/1
CAPSULE ORAL
Qty: 6 EACH | Refills: 3 | OUTPATIENT
Start: 2024-12-12

## 2024-12-12 ASSESSMENT — PAIN SCALES - GENERAL: PAINLEVEL_OUTOF10: NO PAIN (0)

## 2024-12-12 NOTE — LETTER
12/12/2024      Shala Caruso  [address] [place] [address]      Dear Colleague,    Thank you for referring your patient, Shala Caruso, to the [facility]. Please see a copy of my visit note below.    Shala Caruso is a 78 year old female  Chief Complaint: follow up for Dysphagia, sore throat, which resolved onomeprazole  History of Present Illness  Location:throat  Quality:dysphagia, sore throat  Severity: resolved  Duration: 2 mos    Past Medical History -   Patient Active Problem List   Diagnosis     Achalasia     Antiphospholipid syndrome (H)     DM (diabetes mellitus) (H)     GERD (gastroesophageal reflux disease)     Hyperlipidemia     HTN (hypertension)     Osteopenia     Raynaud's disease     DVT (deep venous thrombosis) (H)     Encounter for long-term current use of medication     Type 1 diabetes mellitus (H)     Osteoporosis, idiopathic     Osteoporosis, postmenopausal     Cystocele, midline     Urinary urgency     Urinary frequency     Female stress incontinence     Atrophic vaginitis     Long term current use of anticoagulant therapy     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)     Choroidal lesion     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, unspecified SLE type (H)     Hematoma of leg, right, initial encounter     Lupus erythematosus     Deep vein thrombosis (DVT) of proximal lower extremity, unspecified chronicity, unspecified laterality (H)       Current Medications -   Current Outpatient Medications:      acetaminophen (TYLENOL) 500 MG tablet, Take 1,000-1,500 mg by mouth nightly as needed , Disp: , Rfl:      aspirin 81 MG tablet, Take 81 mg by mouth At Bedtime , Disp: , Rfl:      AYR SALINE NASAL NO-DRIP GEL, Use as needed for nasal dryness, Disp: 3 Tube, Rfl: 3     blood glucose monitoring (ULTRA THIN 30G) lancets, Test 5 times daily  Sunmark  Super thin, Disp: 450 each, Rfl: 3     calcium carbonate 500 mg,  elemental, (OSCAL) 500 MG tablet, Take 1 tablet (500 mg) by mouth daily, Disp: 90 tablet, Rfl: 3     cholecalciferol (VITAMIN D3) 25 mcg (1000 units) capsule, Take 1 capsule by mouth daily, Disp: , Rfl:      ciclopirox (PENLAC) 8 % external solution, Apply to adjacent skin and affected nails daily.  Remove with alcohol every 7 days, then repeat., Disp: 6.6 mL, Rfl: 11     Continuous Glucose Sensor (FREESTYLE SRI 3 SENSOR) [facility] [place], CHANGE ONE SENSOR EVERY 14 DAYS, Disp: 6 each, Rfl: 4     Glucagon (BAQSIMI ONE PACK) 3 MG/DOSE nasal powder, Spray 1 spray (3 mg) in nostril as needed (to be used for severe hypoglycemic episodes) Please ask pharmacy for instructions on disposal of  medication in your county., Disp: 1 each, Rfl: 4     Injection Device for insulin (NOVOPEN ECHO) RORY, 1 each 4 times daily, Disp: 1 each, Rfl: 0     insulin aspart (NOVOLOG PENFILL) 100 UNIT/ML cartridge, INJECT 1 UNIT PER 15 GRAMS OF CARBOHYDRATES UNDER THE SKIN THREE TIMES A DAY WITH MEALS. APPROXIMATELY 10 UNITS DAILY., Disp: 9 mL, Rfl: 3     insulin pen needle (BD PAM U/F) 32G X 4 MM miscellaneous, USE AS DIRECTED WITH INSULIN PENS 4 TIMES DAILY, Disp: 400 each, Rfl: 3     lidocaine (XYLOCAINE) 5 % external ointment, Apply topically at bedtime, Disp: 50 g, Rfl: 5     NIFEdipine ER OSMOTIC (PROCARDIA XL) 30 MG 24 hr tablet, TAKE 1 TO 2 TABLETS BY MOUTH DAILY FOR DYSPHAGIA, Disp: 180 tablet, Rfl: 0     omeprazole (PRILOSEC) 40 MG DR capsule, Take 1 capsule (40 mg) by mouth daily., Disp: 90 capsule, Rfl: 0     order for DME, Equipment being ordered: compression socks, 20-30 mmHg, knee high, Disp: 8 Piece, Rfl: 4     simvastatin (ZOCOR) 40 MG tablet, Take 1 tablet (40 mg) by mouth at bedtime, Disp: 90 tablet, Rfl: 3     UNABLE TO FIND, MEDICATION NAME: , Disp: , Rfl:      warfarin ANTICOAGULANT (JANTOVEN ANTICOAGULANT) 5 MG tablet, Take 1-1.5 tablets daily or as directed, Disp: 135 tablet, Rfl: 4    Allergies -   Allergies    Allergen Reactions     Amaryl [Glimepiride] Swelling     Swelling of tongue     Metformin Blisters     Experienced big blisters all over body and sloughing of skin     Plaquenil [Aminoquinolines] Hives     Sulfa Antibiotics Other (See Comments)     Turned bright red     Amoxicillin Rash     Hydroxychloroquine Rash     Penicillins Rash       Social History -   Social History     Socioeconomic History     Marital status:    Tobacco Use     Smoking status: Never     Smokeless tobacco: Never   Substance and Sexual Activity     Alcohol use: No     Drug use: No     Sexual activity: Yes     Partners: Male     Comment: , safe in relationship   Social History Narrative    How much exercise per week? Walk to work every morning (2 miles), swimming 3 times a week, takes walks with , periodically works out at gym on stationary bike     How much calcium per day? Supplement 3x daily        How much caffeine per day? On rare occasion, only if she is out to eat and that is all they have    How much vitamin D per day? supplement    Do you/your family wear seatbelts?  Yes    Do you/your family use safety helmets? Yes    Do you/your family use sunscreen? Yes    Do you/your family keep firearms in the home? No    Do you/your family have a smoke detector(s)? Yes        Do you feel safe in your home? Yes    Has anyone ever touched you in an unwanted manner? No        Klever R LPN 7/18/2013                 Social Determinants of Health     Financial Resource Strain: Low Risk  (1/5/2024)    Financial Resource Strain      Within the past 12 months, have you or your family members you live with been unable to get utilities (heat, electricity) when it was really needed?: No   Food Insecurity: Low Risk  (1/5/2024)    Food Insecurity      Within the past 12 months, did you worry that your food would run out before you got money to buy more?: No      Within the past 12 months, did the food you bought just not last and you  didn t have money to get more?: No   Transportation Needs: Low Risk  (1/5/2024)    Transportation Needs      Within the past 12 months, has lack of transportation kept you from medical appointments, getting your medicines, non-medical meetings or appointments, work, or from getting things that you need?: No   Interpersonal Safety: Low Risk  (9/18/2024)    Interpersonal Safety      Do you feel physically and emotionally safe where you currently live?: Yes      Within the past 12 months, have you been hit, slapped, kicked or otherwise physically hurt by someone?: No      Within the past 12 months, have you been humiliated or emotionally abused in other ways by your partner or ex-partner?: No   Housing Stability: Low Risk  (1/5/2024)    Housing Stability      Do you have housing? : Yes      Are you worried about losing your housing?: No       Family History -   Family History   Problem Relation Age of Onset     Glaucoma Father      Cancer Other         breast     Cerebrovascular Disease Other      C.A.D. Other      Macular Degeneration No family hx of      Skin Cancer No family hx of        Review of Systems:   !.  Weight Loss: No   2. Difficulty Breathing: No   3. Difficulty Swallowing: No   4. Pain: No    Physical Exam  B/P: Data Unavailable, T: Data Unavailable, P: Data Unavailable, R: Data Unavailable  Vitals: /58 (BP Location: Right arm, Patient Position: Chair, Cuff Size: Adult Regular)   Pulse 66   Temp 97.4  F (36.3  C) (Tympanic)   Wt 47.2 kg (104 lb)   LMP  (LMP Unknown)   BMI 17.04 kg/m    BMI= Body mass index is 17.04 kg/m .    General  Appearance - Normal  Head/Face/Scalp:    Skin - Normal    Facial Palpation - Normal    Facial Strength - Normal  Ears:    Pinna - Normal    Canal - Normal   Tympanic membrane - Normal  Nose:    External - Normal    Septum - Normal    Turbinates - Normal    Middle meatus - Normal  Oral Cavity:    Lips - Normal    Floor of Mouth - Normal    Gingiva - Normal     Tongue - Normal    Buccal - Normal    Palate - Normal  Nasopharynx: clear    Oropharynx:    Tonsils - Normal    Tongue base - Normal    Soft palate - Normal    Posterior pharyngeal wall - Normal  Hypopharynx: clear  Larynx:    Epiglottis - normal    Aryepiglottic folds - Rt red    Arytenoids - Rt red, post-cricoid edema-     True vocal cords -normal  Neck Masses - No  Neck lymphatics - no lymphadenopathy  Thyroid - Normal  Salivary glands - Normal    Audiogram - not applicable  Radiology - not applicable   Reports:   View films:  Procedures -    Patient Education:     A/P - Shala Caruso is a 78 year old female  Medical Decision Making 1. Dysphagia 2. Sore throat , laryngopharyngeal reflux  have resolved      Again, thank you for allowing me to participate in the care of your patient.        Sincerely,        Delmer Gutierrez MD

## 2024-12-12 NOTE — NURSING NOTE
"Initial /58 (BP Location: Right arm, Patient Position: Chair, Cuff Size: Adult Regular)   Pulse 66   Temp 97.4  F (36.3  C) (Tympanic)   Wt 47.2 kg (104 lb)   LMP  (LMP Unknown)   BMI 17.04 kg/m   Estimated body mass index is 17.04 kg/m  as calculated from the following:    Height as of 11/4/24: 1.664 m (5' 5.5\").    Weight as of this encounter: 47.2 kg (104 lb). .  Heather Acosta LPN    "

## 2024-12-20 ENCOUNTER — ANCILLARY PROCEDURE (OUTPATIENT)
Dept: ULTRASOUND IMAGING | Facility: CLINIC | Age: 79
End: 2024-12-20
Attending: INTERNAL MEDICINE
Payer: COMMERCIAL

## 2024-12-20 DIAGNOSIS — R59.9 LYMPH NODE ENLARGEMENT: ICD-10-CM

## 2024-12-20 PROCEDURE — 88172 CYTP DX EVAL FNA 1ST EA SITE: CPT | Mod: 26 | Performed by: PATHOLOGY

## 2024-12-20 PROCEDURE — 88173 CYTOPATH EVAL FNA REPORT: CPT | Mod: 26 | Performed by: PATHOLOGY

## 2024-12-20 PROCEDURE — 86800 THYROGLOBULIN ANTIBODY: CPT | Performed by: INTERNAL MEDICINE

## 2024-12-20 PROCEDURE — 84432 ASSAY OF THYROGLOBULIN: CPT | Performed by: INTERNAL MEDICINE

## 2024-12-20 PROCEDURE — 10005 FNA BX W/US GDN 1ST LES: CPT | Performed by: STUDENT IN AN ORGANIZED HEALTH CARE EDUCATION/TRAINING PROGRAM

## 2024-12-20 PROCEDURE — 88173 CYTOPATH EVAL FNA REPORT: CPT | Mod: TC | Performed by: INTERNAL MEDICINE

## 2024-12-20 RX ORDER — LIDOCAINE HYDROCHLORIDE 10 MG/ML
5 INJECTION, SOLUTION INFILTRATION; PERINEURAL ONCE
Status: COMPLETED | OUTPATIENT
Start: 2024-12-20 | End: 2024-12-20

## 2024-12-20 RX ADMIN — LIDOCAINE HYDROCHLORIDE 3 ML: 10 INJECTION, SOLUTION INFILTRATION; PERINEURAL at 10:43

## 2024-12-20 NOTE — DISCHARGE INSTRUCTIONS
Directions for after your Lymph Node Fine Needle Aspiration:    You can remove your bandage within a few hours.  The site of the biopsy may be sore for a day or two after the procedure. You can take over-the-counter pain medicine if needed.  Notify your doctor if you have any of the following:  Fever above 101 degrees  Swelling in the area of the biopsy  Redness or leaking from the biopsy site  Your doctor will notify you of the results in 2-3 days.

## 2024-12-26 LAB
PATH REPORT.COMMENTS IMP SPEC: NORMAL
PATH REPORT.COMMENTS IMP SPEC: NORMAL
PATH REPORT.FINAL DX SPEC: NORMAL
PATH REPORT.GROSS SPEC: NORMAL
PATH REPORT.MICROSCOPIC SPEC OTHER STN: NORMAL
PATH REPORT.RELEVANT HX SPEC: NORMAL

## 2024-12-27 ENCOUNTER — LAB (OUTPATIENT)
Dept: LAB | Facility: CLINIC | Age: 79
End: 2024-12-27
Payer: COMMERCIAL

## 2024-12-27 DIAGNOSIS — D68.61 ANTIPHOSPHOLIPID SYNDROME (H): ICD-10-CM

## 2024-12-27 PROCEDURE — 85260 CLOT FACTOR X STUART-POWER: CPT | Performed by: INTERNAL MEDICINE

## 2024-12-27 PROCEDURE — 99000 SPECIMEN HANDLING OFFICE-LAB: CPT | Performed by: PATHOLOGY

## 2024-12-27 PROCEDURE — 36415 COLL VENOUS BLD VENIPUNCTURE: CPT | Performed by: PATHOLOGY

## 2024-12-28 LAB — FACT X ACT/NOR PPP CHRO: 25 % (ref 70–130)

## 2024-12-30 ENCOUNTER — ANTICOAGULATION THERAPY VISIT (OUTPATIENT)
Dept: ANTICOAGULATION | Facility: CLINIC | Age: 79
End: 2024-12-30
Payer: COMMERCIAL

## 2024-12-30 DIAGNOSIS — D68.61 ANTIPHOSPHOLIPID SYNDROME (H): ICD-10-CM

## 2024-12-30 DIAGNOSIS — Z79.01 LONG TERM CURRENT USE OF ANTICOAGULANT THERAPY: Primary | ICD-10-CM

## 2024-12-30 DIAGNOSIS — L93.0 LUPUS ERYTHEMATOSUS: ICD-10-CM

## 2024-12-30 DIAGNOSIS — I82.4Y9 DEEP VEIN THROMBOSIS (DVT) OF PROXIMAL LOWER EXTREMITY, UNSPECIFIED CHRONICITY, UNSPECIFIED LATERALITY (H): ICD-10-CM

## 2024-12-30 LAB — SCANNED LAB RESULT: NORMAL

## 2024-12-30 NOTE — PROGRESS NOTES
ANTICOAGULATION MANAGEMENT     Shala Caruso 79 year old female is on warfarin with therapeutic result. (Goal Chromogenic Factor X 40-20%)    Recent labs: (last 7 days)     12/27/24  1324   [id number] 25*       ASSESSMENT     Source(s): Chart Review and Patient/Caregiver Call     Warfarin doses taken: Warfarin taken as instructed  Diet: No new diet changes identified  Medication/supplement changes: Stopped Omeprazole -- started on apple cider vinegar tablets.   New illness, injury, or hospitalization: No  Signs or symptoms of bleeding or clotting: No  Previous result: Supratherapeutic  Additional findings:  12/20/24:  lymph node and thyroid biopsy of neck.      PLAN     Recommended plan for no diet, medication or health factor changes affecting result    Dosing Instructions: Continue your current warfarin dose 7.5 mg every Tue, Fri; 5 mg all other days  with next Chromogenic Factor X in 3 weeks       Telephone call with Jeanmarie who verbalizes understanding and agrees to plan and who agrees to plan and repeated back plan correctly    Patient offered & declined to schedule next visit    Education provided:   Please call back if any changes to your diet, medications or how you've been taking warfarin    Plan made per [facility] anticoagulation protocol    Siria Cook RN  12/30/2024  Anticoagulation Clinic  [facility] for routing messages: [facility]  [facility] patient phone line: [phone]

## 2025-01-03 ENCOUNTER — MYC MEDICAL ADVICE (OUTPATIENT)
Dept: RHEUMATOLOGY | Facility: CLINIC | Age: 80
End: 2025-01-03
Payer: COMMERCIAL

## 2025-01-03 DIAGNOSIS — M32.9 SYSTEMIC LUPUS ERYTHEMATOSUS, UNSPECIFIED SLE TYPE, UNSPECIFIED ORGAN INVOLVEMENT STATUS (H): Primary | ICD-10-CM

## 2025-01-03 NOTE — TELEPHONE ENCOUNTER
"Patient called regarding [employer] message.  C/o pain in both hands up to her elbows.  Hands and fingers are stiff, achy.  Difficulty making a fist.  Patient states she hasn't had a flare in years.  Won't take prednisone, tylenol, or advil.  States prednisone and tylenol interfere with her DM.  Also on coumadin. Patient is wondering about lab work and requesting orders.  Previous labs pended.  Based on the lab results, patient is also requesting a work in appt. She has been icing and states it is \"helping a lot\".     Solange Joshi RN    "

## 2025-01-03 NOTE — TELEPHONE ENCOUNTER
Routed to care team review/advisement.     Zenobia Shelton,CMA  Rheumatology & ID  [address] [place] [address]  [phone]

## 2025-01-07 ENCOUNTER — APPOINTMENT (OUTPATIENT)
Dept: LAB | Facility: CLINIC | Age: 80
End: 2025-01-07
Attending: INTERNAL MEDICINE
Payer: COMMERCIAL

## 2025-01-07 LAB
ALBUMIN UR-MCNC: NEGATIVE MG/DL
ANION GAP SERPL CALCULATED.3IONS-SCNC: 9 MMOL/L (ref 7–15)
APPEARANCE UR: CLEAR
BACTERIA #/AREA URNS HPF: ABNORMAL /HPF
BASOPHILS # BLD AUTO: 0 10E3/UL (ref 0–0.2)
BASOPHILS NFR BLD AUTO: 0 %
BILIRUB UR QL STRIP: NEGATIVE
BUN SERPL-MCNC: 22.9 MG/DL (ref 8–23)
CALCIUM SERPL-MCNC: 9.1 MG/DL (ref 8.8–10.4)
CHLORIDE SERPL-SCNC: 100 MMOL/L (ref 98–107)
COLOR UR AUTO: YELLOW
CREAT SERPL-MCNC: 0.71 MG/DL (ref 0.51–0.95)
EGFRCR SERPLBLD CKD-EPI 2021: 87 ML/MIN/1.73M2
EOSINOPHIL # BLD AUTO: 0.1 10E3/UL (ref 0–0.7)
EOSINOPHIL NFR BLD AUTO: 2 %
ERYTHROCYTE [DISTWIDTH] IN BLOOD BY AUTOMATED COUNT: 16 % (ref 10–15)
GLUCOSE SERPL-MCNC: 98 MG/DL (ref 70–99)
GLUCOSE UR STRIP-MCNC: NEGATIVE MG/DL
HCO3 SERPL-SCNC: 28 MMOL/L (ref 22–29)
HCT VFR BLD AUTO: 36.8 % (ref 35–47)
HGB BLD-MCNC: 12.1 G/DL (ref 11.7–15.7)
HGB UR QL STRIP: NEGATIVE
IMM GRANULOCYTES # BLD: 0 10E3/UL
IMM GRANULOCYTES NFR BLD: 0 %
KETONES UR STRIP-MCNC: NEGATIVE MG/DL
LEUKOCYTE ESTERASE UR QL STRIP: ABNORMAL
LYMPHOCYTES # BLD AUTO: 0.5 10E3/UL (ref 0.8–5.3)
LYMPHOCYTES NFR BLD AUTO: 17 %
MCH RBC QN AUTO: 28 PG (ref 26.5–33)
MCHC RBC AUTO-ENTMCNC: 32.9 G/DL (ref 31.5–36.5)
MCV RBC AUTO: 85 FL (ref 78–100)
MONOCYTES # BLD AUTO: 0.2 10E3/UL (ref 0–1.3)
MONOCYTES NFR BLD AUTO: 6 %
NEUTROPHILS # BLD AUTO: 2.3 10E3/UL (ref 1.6–8.3)
NEUTROPHILS NFR BLD AUTO: 75 %
NITRATE UR QL: POSITIVE
NRBC # BLD AUTO: 0 10E3/UL
NRBC BLD AUTO-RTO: 0 /100
PH UR STRIP: 6 [PH] (ref 5–7)
PLATELET # BLD AUTO: 188 10E3/UL (ref 150–450)
POTASSIUM SERPL-SCNC: 4.3 MMOL/L (ref 3.4–5.3)
RBC # BLD AUTO: 4.32 10E6/UL (ref 3.8–5.2)
RBC URINE: 2 /HPF
SODIUM SERPL-SCNC: 137 MMOL/L (ref 135–145)
SP GR UR STRIP: 1.02 (ref 1–1.03)
TRANSITIONAL EPI: <1 /HPF
UROBILINOGEN UR STRIP-MCNC: NORMAL MG/DL
WBC # BLD AUTO: 3.1 10E3/UL (ref 4–11)
WBC URINE: 27 /HPF

## 2025-01-07 PROCEDURE — 86160 COMPLEMENT ANTIGEN: CPT | Performed by: INTERNAL MEDICINE

## 2025-01-07 PROCEDURE — 86225 DNA ANTIBODY NATIVE: CPT | Performed by: INTERNAL MEDICINE

## 2025-01-07 NOTE — TELEPHONE ENCOUNTER
Sorry to hear about the symptoms.  I agree with lab evaluation for inflammation and possible lupus flare.  For joint pain, use acetaminophen up to 1000 mg 3 times daily; that would have the least expected effect on blood sugar. Treatment recs are limited without renewed evaluation, and patient's stated deferral on prednisone and inability to use NSAIDs.  Can she come on 1-9 at 0830? (May overbook, since 0800 patient is a [facility] return).

## 2025-01-08 LAB
C3 SERPL-MCNC: 64 MG/DL (ref 81–157)
C4 SERPL-MCNC: 6 MG/DL (ref 13–39)
DSDNA AB SER-ACNC: 3.6 IU/ML

## 2025-01-08 NOTE — PROGRESS NOTES
[facility]  Rheumatology Clinic  Tato Agarwal MD  2025     Name: Shala Caruso  MRN: [id number]  79 year old  : 1945  Referring provider: Joe Contreras     Assessment and Plan:    # Systemic lupus erythematosus (+CRISTAL, +dsDNA, +SSA, hypocomplementemia; Raynaud's, arthritis, serositis, tubulo-interstitial nephropathy, pericardial effusion/tamponade): Exam today shows painful right greater than left shoulder flexion, tenderness at multiple MCPs, giveaway weakness with triceps and hip flexor motion, and broad-based tentative gait.     Data: Lab work on 2025 showed basic metabolic panel normal; and CBC with white count of 3.1, up from 2.3 noted in 2024, but lower than 2023.  Urinalysis showed 27 white cells per high-powered field with positive nitrates; complement was decreased with C3 64 and C4 6, comparable to levels observed in 2024.  Double-stranded DNA titer was negative.    Lab work on 2024 showed vitamin D levels normal; hemoglobin A1c normal; TSH normal.    Discussion:   Multifocal joint, muscle, and tendon pain is acute in origin, and is not accompanied by other symptoms that formerly characterized systemic lupus.  The presence of leukopenia and hypocomplementemia in blood work suggest that immune complex formation and autoimmunity against leukocyte antigens remains active. However, disease that formerly affected the kidney as well as serosal surfaces remains quiescent, despite discontinuation of immunomodulatory medication (mmf, used [id number]).    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  I expect gradual improvement over a matter of weeks.  However, current pain is debilitating, and I recommend a brief course of tapering prednisone to counteract debilitation.  I recommend checking CK given patient's slight giveaway weakness on quadriceps and shoulder flexion exam.  I recommend continuing off immunomodulatory therapy  (patient is allergic to sulfa containing molecules, and specifically to hydroxychloroquine and methotrexate).       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 Following factor 2 levels.  # Peripheral neuropathy: stable  # Raynaud's: stable  # Achalasia: stable/improved on nifedipine, still has occasional dysphagia; workup for hoarseness and parathyroid disease is ongoing.  #Osteoarthritis, knees and hips: Chronic use associated pain and stiffness is modest, stable. Not limiting ambulation of 37418 steps per day.  I recommended continue weightbearing exercises and range of motion exercises daily.    # Osteoporosis: DEXA scan performed in 2023 revealed improved T scores in osteopenic range.  I agree with continued bisphosphonate therapy per Dr. Luna.  Patient should continue calcium carbonate, vitamin D, and weightbearing exercise as well.    We discussed in clinic today:  Diagnosis:  1.  Acute onset multiple joint pain: Most likely cause is viral infection, but recurrent lupus could be playing a role.    Plan  1.  Additional blood work for muscle health and inflammation  2.  Start prednisone 15 mg daily for 5 days, then 10 mg daily for 5 days  3.  While on prednisone, monitor blood sugar carefully  4.  Give rheumatology progress report in 10 to 14 days  5.  Consider restarting mycophenolate if joint pain improves with prednisone, but worsens and persists after prednisone is finished.  6.  Acetaminophen 1000 mg up to 3 times daily as needed for joint pain relief.    Tato Agarwal M.D.  Staff Rheumatologist, [facility]  Pager [phone]    Follow-up: As scheduled in May 2025    On the day of the encounter, a total of 31 minutes was spent in chart review, and in counseling and coordination of care, regarding the patient's complex medical problem of systemic lupus erythematosus.    The longitudinal plan of care for the diagnosis(es)/condition(s) as documented were  "addressed during this visit. Due to the added complexity in care, I will continue to support Jeanmarie in the subsequent management and with ongoing continuity of care.    No orders of the defined types were placed in this encounter.      HPI:   Shala Caruso has a history of DM, SLE, PE on Coumadin, and HTN who presents for followup of lupus.  She was last seen August 2024.  At that time she was doing well without no significant symptoms of autoimmune disease.  Plan was to monitor off mycophenolate and immunomodulatory medication.     Summary of previous history:  SLE diagnosed in 2000 (+CRISTAL, +dsDNA ab, arthritis, serositis).  She has antiphospholipid syndrome and had DVT and PE in 2004, on Coumadin since then.  She had SLE flare in 2010 initially treated with Plaquenil however she worsened and developed pericardial effusion/tamponade - started on MTX and tapering dose of prednisone at that time (continued on about 10 mg daily).  Cellcept started 6/2010, Prednisone tapered off.  MTX stopped May 2012.  Mycophenolate tapered from 2 g daily to off from November 2021 through July 2022.  Mycophenolate discontinued in late 2022.    Interval history January 9, 2025    Patient is seen in follow-up to a [employer] message received January 3, 2025: \"C/o pain in both hands up to her elbows. Hands and fingers are stiff, achy. Difficulty making a fist. Patient states she hasn't had a flare in years. Won't take prednisone, tylenol, or advil. States prednisone and tylenol interfere with her DM. Also on coumadin. She has been icing and states it is \"helping a lot\".    She notes pain in R shoulder, elbows, wrists, fingers, back of neck, knees. Ankles/feet have been spared.  Pain is not similar to prior SLE pain; \"very debilitating\"  She has difficulty standing from sitting position. She needs to take a wide stance when she stands up.+ difficulty with dressing and standing.  Mornings are difficult, but not worse than other times of " "day.    She takes tyelnol bid 1000 mg, and sleeps with knees elevated.    She reports that her left arm has been swelling again. Formerly swollen due to lymphedema.    No rash, mouth sores, no SOB/cough, no chest pain, no numbness/tingling. No F/C/S.  +\"rolling fatigue\"; mornings are ok but she fades in a few hours.    Blood sugar control has been stable.  Raynaud's persists, no digital ulcers. She focusses on thermoprotection.    No family/acquaintance illness.      Interval history August 23, 2024    She notes hoarseness and throat soreness for many weeks. She will see ENT, and is undergoing workup for parathyroid.  She has not had recurrence of migratory joint pain since last visit. Her knees hurt when she tries to squat, no swelling redness or warmth. She difficulty getting off the floor due to knee stiffness.    Raynaud's persists, no digital ulcers. She focusses on thermoprotection.  She is still walking 61584 steps per day.  No chest pain, cough, SOB.    Patient was seen in endocrinology on August 19, 2024.  Impression was of type 1 diabetes with controlled hemoglobin A1c, osteoporosis and mild hypercalcemia associated with hypercalciuria.  Recommendation was to pursue a parathyroid scan and neck ultrasound.  Recommendation was to continue Reclast, last dose August 2023.    Interval history August 18, 2023    She is doing well.  There is no joint pain, no skin rash, good energy level, no pleurisy, neuritic symptoms, fevers, glandular swelling.  She is able to walk 10,000 steps per day.  She does have stable Raynaud's phenomenon, and stable achalasia, managed with nifedipine.    She stopped cellcept in June 2022. She notes no sx of flare since.    Interval history August 19, 2022    Her knees and hips give constant pain, but \"tolerable\". If she sits too long, she is very stiff. Hard to get into and out of a car. Morning stiffness is brief, relieved by movement. She can walk 1-2 hours daily, using a walking " "stick. No pain in UE joints. She does daily yoga with a lot of stretching; she tends to feel better with it. Not using volatern because of the coumadin.     Her hands are sensitive to cold with ongoing Raynaud's. No digital ulcers. Biggest problem is when she is washing food under cold water. Hard time looking for BG with fingerstick.    She had last COVID booster (4th) in June.    She stopped her cellcept in June. She notes no sx of flare.    Stable numbness in feet for \"years\"; she does have balance issues, especially with stairs.    Interval history 05-:    Getting along \"ok\". She is unchanged in that Her knees and hips give constant pain \"tolerable\". If she sits too long, she is very stiff. Morning stiffness is brief, relieved by movement. No pain in UE joints. She is walking every day; she plans to start biking this summer. She does daily yoga with a lot of stretching; she tends to feel better with it.    Her hands are sensitive to cold with ongoing Raynaud's. No digital ulcers.    No F/C/S, chest pain.  No urinary symptoms.      Hip pain is in the groin, made worse with long walking.      Review of Systems:  Pertinent items are noted in HPI or as below, remainder of complete ROS is negative.      No recent problems with hearing or vision. Eye dryness relieved by once daily Refresh; no dry mouth  No breathing difficulty, shortness of breath, coughing, or wheezing  No chest pain or palpitations  No heart burn, indigestion, abdominal pain, nausea, vomiting, diarrhea  No urination problems, no bloody, cloudy urine, no dysuria  + toe \"neuropathy\",. Symmetrical, stable.  No headaches or confusion  No rashes. No easy bleeding or bruising.     Active Medications:     Current Outpatient Medications   Medication Sig Dispense Refill    acetaminophen (TYLENOL) 500 MG tablet Take 1,000-1,500 mg by mouth nightly as needed       aspirin 81 MG tablet Take 81 mg by mouth At Bedtime       AYR SALINE NASAL NO-DRIP GEL Use " as needed for nasal dryness 3 Tube 3    blood glucose monitoring (ULTRA THIN 30G) lancets Test 5 times daily  [employer]  Super thin 450 each 3    calcium carbonate 500 mg, elemental, (OSCAL) 500 MG tablet Take 1 tablet (500 mg) by mouth daily 90 tablet 3    cholecalciferol (VITAMIN D3) 25 mcg (1000 units) capsule Take 1 capsule by mouth daily      ciclopirox (PENLAC) 8 % external solution Apply to adjacent skin and affected nails daily.  Remove with alcohol every 7 days, then repeat. 6.6 mL 11    Continuous Glucose Sensor (FREESTYLE SRI 3 PLUS SENSOR) MISC Change every 15 days. 6 each 1    Glucagon (BAQSIMI ONE PACK) 3 MG/DOSE nasal powder Spray 1 spray (3 mg) in nostril as needed (to be used for severe hypoglycemic episodes) Please ask pharmacy for instructions on disposal of  medication in your county. 1 each 4    Injection Device for insulin (NOVOPEN ECHO) RORY 1 each 4 times daily 1 each 0    insulin aspart (NOVOLOG PENFILL) 100 UNIT/ML cartridge INJECT 1 UNIT PER 15 GRAMS OF CARBOHYDRATES UNDER THE SKIN THREE TIMES A DAY WITH MEALS. APPROXIMATELY 10 UNITS DAILY. 9 mL 3    insulin pen needle (BD PAM U/F) 32G X 4 MM miscellaneous USE AS DIRECTED WITH INSULIN PENS 4 TIMES DAILY 400 each 3    lidocaine (XYLOCAINE) 5 % external ointment Apply topically at bedtime 50 g 5    NIFEdipine ER OSMOTIC (PROCARDIA XL) 30 MG 24 hr tablet TAKE 1 TO 2 TABLETS BY MOUTH DAILY FOR DYSPHAGIA 180 tablet 0    omeprazole (PRILOSEC) 40 MG DR capsule Take 1 capsule (40 mg) by mouth daily. 90 capsule 0    order for DME Equipment being ordered: compression socks, 20-30 mmHg, knee high 8 Piece 4    simvastatin (ZOCOR) 40 MG tablet Take 1 tablet (40 mg) by mouth at bedtime 90 tablet 3    UNABLE TO FIND MEDICATION NAME:       warfarin ANTICOAGULANT (JANTOVEN ANTICOAGULANT) 5 MG tablet Take 1-1.5 tablets daily or as directed 135 tablet 4     No current facility-administered medications for this visit.       No longer on  "lantus: too many \"lows\".  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 Checks BS 5X daily.  Osteoporosis   Hypertension   Hyperlipidemia   Achalasia   Gastroesophageal reflux disease   Choroidal lesion  Atrophic vaginitis   Urinary urgency   Female stress incontinence   Cystocele, midline   Deep vein thrombosis   Nonsenile cataract   Myopia   Neuropathy   Lymphedema      Past Surgical History:  Phacoemulsification clear cornea with intraocular lens implantation, right 1/8/19, left 2/5/19  Transvaginal sling 12/20/16  Axilla lymph node dissection 2004  Bilateral tubal ligation     Family History:   Glaucoma - father   Breast cancer   Stroke       Social History:   Tobacco Use: No previous or current tobacco use.   Alcohol Use: No alcohol use.   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 rate 18, SpO2 94%, not currently breastfeeding.  Wt Readings from Last 4 Encounters:   12/12/24 47.2 kg (104 lb)   11/04/24 47.2 kg (104 lb)   10/30/24 50.1 kg (110 lb 8 oz)   08/19/24 47.2 kg (104 lb)       Constitutional: thin; appearing stated age; mildly uncomfortable.  Eyes: nl EOM, PERRLA, vision, conjunctiva, sclera  ENT: nl external ears, nose, hearing, lips, teeth, gums, throat; firm mass L anterior cervical chain, non tender  No mucous membrane lesions, normal saliva pool  Neck: L anterior cervical non-tender 1 cm mobile swelling  Resp: unlabored breathing, lungs clear to auscultation.  Lymph: no cervical, supraclavicular, inguinal or epitrochlear nodes  MS: Painful shoulder flexion and abduction; tenderness in multiple " MCPs without gross synovitis.  Wrist range of motion painful.  Knee range of motion painful with crepitus, no swelling.   Skin: Onychomycosis in multiple fingernails with some paronychial fissuring.  No fingertip pulp tenderness or skin ulceration.  Neuro: nl cranial nerves, strength, sensation, DTRs.   Psych: nl judgement, orientation, memory, affect.      Laboratory:       Latest Ref Rng & Units 10/30/2024     2:27 PM 12/2/2024    10:34 AM 1/7/2025     2:30 PM   RHEUM RESULTS   Complement C3 81 - 157 mg/dL   64    Complement C4 13 - 39 mg/dL   6    Creatinine 0.51 - 0.95 mg/dL 0.78  0.57  0.71    DNA-ds <10.0 IU/mL   3.6    GFR Estimate >60 mL/min/1.73m2 78  >90  87    Hematocrit 35.0 - 47.0 %  37.6  36.8    Hemoglobin 11.7 - 15.7 g/dL  12.1  12.1    WBC 4.0 - 11.0 10e3/uL  2.3  3.1    RBC Count 3.80 - 5.20 10e6/uL  4.40  4.32    RDW 10.0 - 15.0 %  16.2  16.0    MCHC 31.5 - 36.5 g/dL  32.2  32.9    MCV 78 - 100 fL  86  85    Platelet Count 150 - 450 10e3/uL  181  188      Rheumatoid Factor   Date Value Ref Range Status   01/14/2009 8 0 - 14 IU/mL Final     Cyclic Citrullinated Peptide IgG   Date Value Ref Range Status   01/14/2009 <2 <5 U/mL Final     Comment:     Interpretation:  Negative     Ribonucleic Protein IgG Antibody   Date Value Ref Range Status   01/24/2007 12  Final     Comment:     Reference range: 0 to 49  Unit: Units  (Note)  REFERENCE INTERVAL: Ribonucleic Protein (VIKRAM), IgG   Less than 20 Units ........ None detected   20 - 49 Units ............. Inconclusive   50 Units or greater ....... Positive    RNP antibody is seen in % of mixed connective tissue  disease and is considered specific for this syndrome if  other antibodies are negative. RNP is also present in  20-30% of SLE and 15-25% of progressive systemic  sclerosis.  The above test was performed at: [facility],  [address]  [id number]  [phone]  [email or web address]     SSA (RO) Antibody IgG   Date Value Ref Range Status    08/24/2005 221 (H)  Final     Comment:     Reference range: 0 to 49  Unit: Units  (Note)  REFERENCE INTERVALS: SSA (Ro) (VIKRAM) Ab, IgG   Less than 20 Units ....... None detected   20 - 49 Units ............ Inconclusive   50 Units or greater ...... Positive    SSA (Ro) antibody is seen in70-75% of Sjogren syndrome  cases, 30-40% of systemic lupus erythematosus (SLE) and  5-10% of progressive systemic sclerosis (PSS).  The above test was performed at: [facility],  [address] [place] [address]  [id number]  [phone]  [email or web address]     SSB (LA) Antibody IgG   Date Value Ref Range Status   08/24/2005 20  Final     Comment:     Reference range: 0 to 49  Unit: Units  (Note)  REFERENCE INTERVALS: SSB (La) (VIKRAM) Ab, IgG   Less than 20 Units ....... None detected   20 - 49 Units ............ Inconclusive   50 Units or greater ...... Positive    SSB (La) antibody is seen in 50-60% of Sjogren syndrome  cases and is specific if it is the only VIKRAM antibody  present. 15-25% of patients with systemic lupus  erythematosus (SLE) and 5-10% of patients with progressive  systemic sclerosis (PSS) also have this antibody.  The above test was performed at: [facility],  [address] [place] [address]  [id number] [phone]  [email or web address]   ,  ,   CRISTAL Screen by EIA   Date Value Ref Range Status   02/16/2010 8.2 (H) 0 - 1.0 Final     Comment:     Interpretation:  Positive     DNA-ds   Date Value Ref Range Status   06/12/2019 1 <10 IU/mL Final     Comment:     Negative     Albumin Fraction   Date Value Ref Range Status   04/01/2013 3.9 3.7 - 5.1 g/dL Final     Alpha 2 Fraction   Date Value Ref Range Status   04/01/2013 0.7 0.5 - 0.9 g/dL Final     Beta Fraction   Date Value Ref Range Status   04/01/2013 0.6 0.6 - 1.0 g/dL Final     Gamma Fraction   Date Value Ref Range Status   04/01/2013 0.7 0.7 - 1.6 g/dL Final     Monoclonal Peak   Date Value Ref Range Status   04/01/2013 0.0 0.0 g/dL Final     ELP Interpretation:   Date Value Ref Range  Status   04/01/2013   Final    Essentially normal electrophoretic pattern.  No monoclonal protein seen.   Pathologic significance requires clinical correlation.  LEATHA Valencia M.D.,   Ph.D., Pathologist

## 2025-01-09 ENCOUNTER — LAB (OUTPATIENT)
Dept: LAB | Facility: CLINIC | Age: 80
End: 2025-01-09
Payer: COMMERCIAL

## 2025-01-09 ENCOUNTER — OFFICE VISIT (OUTPATIENT)
Dept: RHEUMATOLOGY | Facility: CLINIC | Age: 80
End: 2025-01-09
Payer: COMMERCIAL

## 2025-01-09 VITALS
RESPIRATION RATE: 18 BRPM | SYSTOLIC BLOOD PRESSURE: 137 MMHG | HEART RATE: 62 BPM | DIASTOLIC BLOOD PRESSURE: 81 MMHG | OXYGEN SATURATION: 94 %

## 2025-01-09 DIAGNOSIS — M32.9 SYSTEMIC LUPUS ERYTHEMATOSUS, UNSPECIFIED SLE TYPE, UNSPECIFIED ORGAN INVOLVEMENT STATUS (H): Primary | ICD-10-CM

## 2025-01-09 DIAGNOSIS — M02.30 REACTIVE INFLAMMATORY ARTHRITIS (H): ICD-10-CM

## 2025-01-09 DIAGNOSIS — M32.9 SYSTEMIC LUPUS ERYTHEMATOSUS, UNSPECIFIED SLE TYPE, UNSPECIFIED ORGAN INVOLVEMENT STATUS (H): ICD-10-CM

## 2025-01-09 DIAGNOSIS — E10.649 TYPE 1 DIABETES MELLITUS WITH HYPOGLYCEMIA AND WITHOUT COMA (H): Primary | ICD-10-CM

## 2025-01-09 LAB
CK SERPL-CCNC: 29 U/L (ref 26–192)
CRP SERPL-MCNC: 8.34 MG/L
ERYTHROCYTE [SEDIMENTATION RATE] IN BLOOD BY WESTERGREN METHOD: 37 MM/HR (ref 0–30)

## 2025-01-09 PROCEDURE — 36415 COLL VENOUS BLD VENIPUNCTURE: CPT | Performed by: PATHOLOGY

## 2025-01-09 PROCEDURE — 82550 ASSAY OF CK (CPK): CPT | Performed by: PATHOLOGY

## 2025-01-09 PROCEDURE — 86140 C-REACTIVE PROTEIN: CPT | Performed by: PATHOLOGY

## 2025-01-09 PROCEDURE — 85652 RBC SED RATE AUTOMATED: CPT | Performed by: PATHOLOGY

## 2025-01-09 RX ORDER — INSULIN GLARGINE 100 [IU]/ML
5 INJECTION, SOLUTION SUBCUTANEOUS EVERY MORNING
Qty: 3 ML | Refills: 0 | Status: SHIPPED | OUTPATIENT
Start: 2025-01-09

## 2025-01-09 RX ORDER — PREDNISONE 5 MG/1
5 TABLET ORAL DAILY
Qty: 25 TABLET | Refills: 1 | Status: SHIPPED | OUTPATIENT
Start: 2025-01-09

## 2025-01-09 ASSESSMENT — PAIN SCALES - GENERAL: PAINLEVEL_OUTOF10: EXTREME PAIN (8)

## 2025-01-09 NOTE — PATIENT INSTRUCTIONS
Diagnosis:  1.  Acute onset multiple joint pain: Most likely cause is viral infection, but recurrent lupus could be playing a role.    Plan  1.  Additional blood work for muscle health and inflammation  2.  Start prednisone 15 mg daily for 5 days, then 10 mg daily for 5 days  3.  While on prednisone, monitor blood sugar carefully  4.  Give rheumatology progress report in 10 to 14 days  5.  Consider restarting mycophenolate if joint pain improves with prednisone, but worsens and persists after prednisone is finished.  6.  Acetaminophen 1000 mg up to 3 times daily as needed for joint pain relief.

## 2025-01-09 NOTE — NURSING NOTE
Chief Complaint   Patient presents with    RECHECK     Systemic lupus erythematosus, unspecified SLE type, unspecified organ involvement status (H)       Vitals:    01/09/25 0811   BP: 137/81   BP Location: Left arm   Patient Position: Sitting   Cuff Size: Adult Small   Pulse: 62   Resp: 18   SpO2: 94%       There is no height or weight on file to calculate BMI.

## 2025-01-16 ENCOUNTER — MYC MEDICAL ADVICE (OUTPATIENT)
Dept: ENDOCRINOLOGY | Facility: CLINIC | Age: 80
End: 2025-01-16
Payer: COMMERCIAL

## 2025-01-16 ASSESSMENT — SOCIAL DETERMINANTS OF HEALTH (SDOH): HOW OFTEN DO YOU GET TOGETHER WITH FRIENDS OR RELATIVES?: PATIENT DECLINED

## 2025-01-16 NOTE — TELEPHONE ENCOUNTER
Pt called. BG numbers well control. On average, she has been taking 2-5 units per meals (2 times a day). Lantus 5 units. No lows at night. Has to take 10 mg prednisone for 2 more days. Advised to continue lantus 5 units and decrease the dose to 3 units for only one day after the prednisone is discontinued, then stop it.

## 2025-01-21 ENCOUNTER — ANCILLARY PROCEDURE (OUTPATIENT)
Dept: GENERAL RADIOLOGY | Facility: CLINIC | Age: 80
End: 2025-01-21
Attending: INTERNAL MEDICINE
Payer: COMMERCIAL

## 2025-01-21 ENCOUNTER — OFFICE VISIT (OUTPATIENT)
Dept: INTERNAL MEDICINE | Facility: CLINIC | Age: 80
End: 2025-01-21
Payer: COMMERCIAL

## 2025-01-21 VITALS
OXYGEN SATURATION: 100 % | HEART RATE: 70 BPM | SYSTOLIC BLOOD PRESSURE: 107 MMHG | TEMPERATURE: 97.7 F | RESPIRATION RATE: 16 BRPM | DIASTOLIC BLOOD PRESSURE: 66 MMHG

## 2025-01-21 DIAGNOSIS — M32.9 SYSTEMIC LUPUS ERYTHEMATOSUS, UNSPECIFIED SLE TYPE, UNSPECIFIED ORGAN INVOLVEMENT STATUS (H): ICD-10-CM

## 2025-01-21 DIAGNOSIS — M25.512 CHRONIC LEFT SHOULDER PAIN: ICD-10-CM

## 2025-01-21 DIAGNOSIS — G57.60 MORTON'S NEUROMA, UNSPECIFIED LATERALITY: ICD-10-CM

## 2025-01-21 DIAGNOSIS — E78.49 OTHER HYPERLIPIDEMIA: ICD-10-CM

## 2025-01-21 DIAGNOSIS — I82.4Y9 DEEP VEIN THROMBOSIS (DVT) OF PROXIMAL LOWER EXTREMITY, UNSPECIFIED CHRONICITY, UNSPECIFIED LATERALITY (H): Primary | ICD-10-CM

## 2025-01-21 DIAGNOSIS — K22.0 ACHALASIA OF ESOPHAGUS: ICD-10-CM

## 2025-01-21 DIAGNOSIS — G89.29 CHRONIC LEFT SHOULDER PAIN: ICD-10-CM

## 2025-01-21 LAB
ATRIAL RATE - MUSE: 67 BPM
DIASTOLIC BLOOD PRESSURE - MUSE: NORMAL MMHG
INTERPRETATION ECG - MUSE: NORMAL
P AXIS - MUSE: 79 DEGREES
PR INTERVAL - MUSE: 166 MS
QRS DURATION - MUSE: 90 MS
QT - MUSE: 400 MS
QTC - MUSE: 422 MS
R AXIS - MUSE: 69 DEGREES
SYSTOLIC BLOOD PRESSURE - MUSE: NORMAL MMHG
T AXIS - MUSE: 71 DEGREES
VENTRICULAR RATE- MUSE: 67 BPM

## 2025-01-21 PROCEDURE — 71046 X-RAY EXAM CHEST 2 VIEWS: CPT | Performed by: RADIOLOGY

## 2025-01-21 PROCEDURE — 99397 PER PM REEVAL EST PAT 65+ YR: CPT | Mod: 25 | Performed by: INTERNAL MEDICINE

## 2025-01-21 PROCEDURE — 93000 ELECTROCARDIOGRAM COMPLETE: CPT | Performed by: INTERNAL MEDICINE

## 2025-01-21 RX ORDER — SIMVASTATIN 40 MG
40 TABLET ORAL AT BEDTIME
Qty: 90 TABLET | Refills: 3 | Status: SHIPPED | OUTPATIENT
Start: 2025-01-21

## 2025-01-21 RX ORDER — NIFEDIPINE 30 MG/1
TABLET, EXTENDED RELEASE ORAL
Qty: 180 TABLET | Refills: 3 | Status: SHIPPED | OUTPATIENT
Start: 2025-01-21

## 2025-01-21 NOTE — PROGRESS NOTES
HPI  79-year-old here today for physical examination.  She has been doing reasonably well.  She did have a recent flare in her arthropathy felt to be postviral and resolved completely with a 10-day course of prednisone.  She also had a recent lymph node biopsy showed no evidence of malignancy.  She has been experiencing some pain and discomfort in the left shoulder.  This is worse at night is worse when she lays down also periodically worse with deep breath or breathing.  She has had no previous injury or trauma.  There is been no associated aggravation with movement or activity at all.  She has had a history of pericarditis in the past and was concerned about recurrence in this regard.  No exertional symptoms or dyspnea.  She is exercising on an elliptical and has been tolerating that well.  She is questioning timing of knee replacement therapy and indicated that at the present time the knees are not interfering with her activities of daily living or restricting what she can do.  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  Past Medical History:   Diagnosis Date    Achalasia     Antiphospholipid syndrome     Antiplatelet or antithrombotic long-term use     Coagulation disorder [id number]    DM (diabetes mellitus) (H)     DVT (deep venous thrombosis) (H) 2003    and PE    Dysphagia     occasional, use Nifedipine    GERD (gastroesophageal reflux disease)     Hyperlipidemia     Lymphedema     Myopia     Neuropathy     toes bilateral    Nonsenile cataract     osteoporosis     Pericarditis     Raynaud's disease     SLE (systemic lupus erythematosus) (H)      Past Surgical History:   Procedure Laterality Date    CATARACT IOL, RT/LT Left 02/2019    CATARACT IOL, RT/LT Right 01/2019    COLONOSCOPY N/A 11/28/2016    Procedure: COLONOSCOPY;  Surgeon: Xiang Sanchez  Sang CURTIS MD;  Location: [facility]    CYSTOSCOPY, SLING TRANSVAGINAL N/A 12/20/2016    Procedure: CYSTOSCOPY, SLING TRANSVAGINAL;  Surgeon: Jeanmarie Pierson MD;  [facility]: [facility]    DISSECT LYMPH NODE AXILLA  2004    Lt, during eval for SLE    HYSTEROSCOPIC PLACEMENT CONTRACEPTIVE DEVICE  1985    PHACOEMULSIFICATION WITH STANDARD INTRAOCULAR LENS IMPLANT Right 1/8/2019    Procedure: Right Eye Cataract Extraction with Intraocular Lens;  Surgeon: Monika Femrin MD;  Location: [facility]    PHACOEMULSIFICATION WITH STANDARD INTRAOCULAR LENS IMPLANT Left 2/5/2019    Procedure: Left Eye Cataract Extraction with Intraocular Lens;  Surgeon: Monika Fermin MD;  Location: [facility]    TUBAL LIGATION Bilateral      Family History   Problem Relation Age of Onset    Glaucoma Father     Cancer Other         breast    Cerebrovascular Disease Other     C.A.D. Other     Macular Degeneration No family hx of     Skin Cancer No family hx of          Exam:  /66 (BP Location: Right arm, Patient Position: Sitting, Cuff Size: Adult Small)   Pulse 70   Temp 97.7  F (36.5  C) (Oral)   Resp 16   LMP  (LMP Unknown)   SpO2 100%   PHYSICAL EXAMINATION:   The patient is alert, oriented with a clear sensorium.   Skin shows no lesions or rashes and good turgor.   Head is normocephalic and atraumatic.   Eyes show PERRLA  Ears show normal TMs bilaterally.   Mouth shows clear oral mucosa.   Neck shows no nodes, thyromegaly or bruits.   Back is nontender.   Lungs are clear to percussion and auscultation.   Heart shows normal S1 and S2 without murmur or gallop.   Abdomen is soft, nontender without masses or organomegaly.   Extremities show no edema and no evidence of active synovitis.  She has positive drop arm test on the left she has negative Neer and Gonzales impingement signs however she does have weakness of the supraspinatus and weakness with external rotation on the left shoulder.  Neurologic examination shows cranial nerves  II-XII intact. Motor shows normal strength. Reflexes are full and symmetrical.     EKG reviewed showing voltage criteria for LVH likely related to her thin chest wall otherwise normal sinus rhythm and no abnormalities      ASSESSMENT  1 Left shoulder discomfort associated with rotator cuff weakness  2 pleuritic discomfort uncertain significance  3 diabetes mellitus on Novalog  4 hypertension controlled  5 hyperlipidemia on simvastatin  6 SLE with Raynaud's   7 antiphospholipid antibodies on warfarin  8 peripheral neuropathy   9 bilateral osteoarthritis of the knees   10 Achalasia on nifedipine  11 Venous insufficiency with history DVT  12 osteoporosis on Reclast  13 GERD  14 Wilde's neuroma needs potential injection    Plan  Over see podiatry for injection of the Wilde's neuroma.  Will have her do rotator cuff strengthening exercises see if it does not help with the shoulder.  Will reassess her chest x-ray today because of the pleuritic nature of the discomfort.  Otherwise have refilled her nifedipine and reordered her compression stockings.    This note was completed using Dragon voice recognition software.      Joe Contreras MD  General Internal Medicine  [facility]  [phone]

## 2025-01-22 ENCOUNTER — PATIENT OUTREACH (OUTPATIENT)
Dept: CARE COORDINATION | Facility: CLINIC | Age: 80
End: 2025-01-22
Payer: COMMERCIAL

## 2025-01-24 ENCOUNTER — LAB (OUTPATIENT)
Dept: LAB | Facility: CLINIC | Age: 80
End: 2025-01-24
Payer: COMMERCIAL

## 2025-01-24 DIAGNOSIS — D68.61 ANTIPHOSPHOLIPID SYNDROME: ICD-10-CM

## 2025-01-24 PROCEDURE — 85260 CLOT FACTOR X STUART-POWER: CPT | Performed by: INTERNAL MEDICINE

## 2025-01-24 PROCEDURE — 99000 SPECIMEN HANDLING OFFICE-LAB: CPT | Performed by: PATHOLOGY

## 2025-01-24 PROCEDURE — 36415 COLL VENOUS BLD VENIPUNCTURE: CPT | Performed by: PATHOLOGY

## 2025-01-25 LAB — FACT X ACT/NOR PPP CHRO: 21 % (ref 70–130)

## 2025-01-27 ENCOUNTER — ANTICOAGULATION THERAPY VISIT (OUTPATIENT)
Dept: ANTICOAGULATION | Facility: CLINIC | Age: 80
End: 2025-01-27
Payer: COMMERCIAL

## 2025-01-27 DIAGNOSIS — I82.4Y9 DEEP VEIN THROMBOSIS (DVT) OF PROXIMAL LOWER EXTREMITY, UNSPECIFIED CHRONICITY, UNSPECIFIED LATERALITY (H): ICD-10-CM

## 2025-01-27 DIAGNOSIS — Z79.01 LONG TERM CURRENT USE OF ANTICOAGULANT THERAPY: Primary | ICD-10-CM

## 2025-01-27 DIAGNOSIS — D68.61 ANTIPHOSPHOLIPID SYNDROME: ICD-10-CM

## 2025-01-27 DIAGNOSIS — L93.0 LUPUS ERYTHEMATOSUS: ICD-10-CM

## 2025-01-27 NOTE — PROGRESS NOTES
ANTICOAGULATION MANAGEMENT     Shala Caruso 79 year old female is on warfarin with therapeutic result. (Goal Chromogenic Factor X 40-20%)    Recent labs: (last 7 days)     01/24/25  1336   [id number] 21*       ASSESSMENT     Source(s): Chart Review  Previous result was Therapeutic last visit; previously outside of goal range  Medication, diet, health changes since last result: chart reviewed; none identified       PLAN     Recommended plan for no diet, medication or health factor changes affecting result    Dosing Instructions: Continue your current warfarin dose 7.5 mg every Tue, Fri; 5 mg all other days  with next Chromogenic Factor X in 4 weeks       Detailed voice message left for Jeanmarie with dosing instructions and follow up date.   Sent [email or web address] message with dosing and follow up instructions    Contact [phone] to schedule and with any changes, questions or concerns.     Education provided:   Please call back if any changes to your diet, medications or how you've been taking warfarin  Goal range and lab monitoring: goal range and significance of current result and Importance of following up at instructed interval  Contact [phone] with any changes, questions or concerns.     Plan made per [facility] anticoagulation protocol    KYRA BRANTLEY RN  1/27/2025  Anticoagulation Clinic  "[employer] for routing messages: [facility]  [facility] patient phone line: [phone]

## 2025-02-03 ENCOUNTER — TELEPHONE (OUTPATIENT)
Dept: ENDOCRINOLOGY | Facility: CLINIC | Age: 80
End: 2025-02-03
Payer: COMMERCIAL

## 2025-02-03 NOTE — TELEPHONE ENCOUNTER
[facility] Call Center    Phone Message    May a detailed message be left on voicemail: yes     Reason for Call: Other: Per pt wasn't left much detail of what or why her appt needs to be change  on the VM from Miya . Please call pt back with more details. If pt does not answer please leave a detail message of what are we changing on her appt date for 03/10/25 in this message thread. Please and thank you.      Action Taken: Message routed to:  [facility] ([facility] ENDO    Travel Screening: Not Applicable     Date of Service:                                                                       LLE/weight-bearing as tolerated

## 2025-02-03 NOTE — TELEPHONE ENCOUNTER
Spoke w patient and changed her erroneous appt to a in person day instead of on an in person day. Pt verbalized understanding.

## 2025-02-22 ENCOUNTER — LAB (OUTPATIENT)
Dept: LAB | Facility: CLINIC | Age: 80
End: 2025-02-22
Payer: COMMERCIAL

## 2025-02-22 DIAGNOSIS — D68.61 ANTIPHOSPHOLIPID SYNDROME: ICD-10-CM

## 2025-02-22 DIAGNOSIS — Z79.01 LONG TERM CURRENT USE OF ANTICOAGULANT THERAPY: ICD-10-CM

## 2025-02-22 DIAGNOSIS — I82.4Y9 DEEP VEIN THROMBOSIS (DVT) OF PROXIMAL LOWER EXTREMITY, UNSPECIFIED CHRONICITY, UNSPECIFIED LATERALITY (H): ICD-10-CM

## 2025-02-22 DIAGNOSIS — L93.0 LUPUS ERYTHEMATOSUS: ICD-10-CM

## 2025-02-22 LAB — FACT X ACT/NOR PPP CHRO: 17 % (ref 70–130)

## 2025-02-22 PROCEDURE — 99000 SPECIMEN HANDLING OFFICE-LAB: CPT | Performed by: PATHOLOGY

## 2025-02-22 PROCEDURE — 85260 CLOT FACTOR X STUART-POWER: CPT | Performed by: INTERNAL MEDICINE

## 2025-02-22 PROCEDURE — 36415 COLL VENOUS BLD VENIPUNCTURE: CPT | Performed by: PATHOLOGY

## 2025-02-24 ENCOUNTER — ANTICOAGULATION THERAPY VISIT (OUTPATIENT)
Dept: ANTICOAGULATION | Facility: CLINIC | Age: 80
End: 2025-02-24

## 2025-02-24 ENCOUNTER — ANCILLARY PROCEDURE (OUTPATIENT)
Dept: GENERAL RADIOLOGY | Facility: CLINIC | Age: 80
End: 2025-02-24
Attending: PODIATRIST
Payer: COMMERCIAL

## 2025-02-24 ENCOUNTER — OFFICE VISIT (OUTPATIENT)
Dept: ORTHOPEDICS | Facility: CLINIC | Age: 80
End: 2025-02-24
Attending: INTERNAL MEDICINE
Payer: COMMERCIAL

## 2025-02-24 DIAGNOSIS — L93.0 LUPUS ERYTHEMATOSUS: ICD-10-CM

## 2025-02-24 DIAGNOSIS — B35.1 ONYCHOMYCOSIS: ICD-10-CM

## 2025-02-24 DIAGNOSIS — E83.52 HYPERCALCEMIA: Primary | ICD-10-CM

## 2025-02-24 DIAGNOSIS — I82.4Y9 DEEP VEIN THROMBOSIS (DVT) OF PROXIMAL LOWER EXTREMITY, UNSPECIFIED CHRONICITY, UNSPECIFIED LATERALITY (H): ICD-10-CM

## 2025-02-24 DIAGNOSIS — E11.628 TYPE 2 DIABETES MELLITUS WITH PRESSURE CALLUS (H): ICD-10-CM

## 2025-02-24 DIAGNOSIS — E11.49 TYPE II OR UNSPECIFIED TYPE DIABETES MELLITUS WITH NEUROLOGICAL MANIFESTATIONS, NOT STATED AS UNCONTROLLED(250.60) (H): Primary | ICD-10-CM

## 2025-02-24 DIAGNOSIS — D68.61 ANTIPHOSPHOLIPID SYNDROME: ICD-10-CM

## 2025-02-24 DIAGNOSIS — D36.13 NEUROMA OF FOOT: ICD-10-CM

## 2025-02-24 DIAGNOSIS — L84 TYPE 2 DIABETES MELLITUS WITH PRESSURE CALLUS (H): ICD-10-CM

## 2025-02-24 DIAGNOSIS — Z79.01 LONG TERM CURRENT USE OF ANTICOAGULANT THERAPY: Primary | ICD-10-CM

## 2025-02-24 PROCEDURE — 99204 OFFICE O/P NEW MOD 45 MIN: CPT | Performed by: PODIATRIST

## 2025-02-24 PROCEDURE — 73630 X-RAY EXAM OF FOOT: CPT | Mod: LT | Performed by: RADIOLOGY

## 2025-02-24 RX ORDER — CICLOPIROX OLAMINE 7.7 MG/G
CREAM TOPICAL 2 TIMES DAILY
Qty: 90 G | Refills: 5 | Status: SHIPPED | OUTPATIENT
Start: 2025-02-24 | End: 2025-02-24

## 2025-02-24 RX ORDER — CICLOPIROX OLAMINE 7.7 MG/G
CREAM TOPICAL 2 TIMES DAILY
Qty: 90 G | Refills: 5 | Status: SHIPPED | OUTPATIENT
Start: 2025-02-24

## 2025-02-24 NOTE — PROGRESS NOTES
Past Medical History:   Diagnosis Date    Achalasia     Antiphospholipid syndrome     Antiplatelet or antithrombotic long-term use     Coagulation disorder [id number]    DM (diabetes mellitus) (H)     DVT (deep venous thrombosis) (H) 2003    and PE    Dysphagia     occasional, use Nifedipine    GERD (gastroesophageal reflux disease)     Hyperlipidemia     Lymphedema     Myopia     Neuropathy     toes bilateral    Nonsenile cataract     osteoporosis     Pericarditis     Raynaud's disease     SLE (systemic lupus erythematosus) (H)      Patient Active Problem List   Diagnosis    Achalasia    Antiphospholipid syndrome    DM (diabetes mellitus) (H)    GERD (gastroesophageal reflux disease)    Hyperlipidemia    HTN (hypertension)    Osteopenia    Raynaud's disease    DVT (deep venous thrombosis) (H)    Encounter for long-term current use of medication    Type 1 diabetes mellitus (H)    Osteoporosis, idiopathic    Osteoporosis, postmenopausal    Cystocele, midline    Urinary urgency    Urinary frequency    Female stress incontinence    Atrophic vaginitis    Long term current use of anticoagulant therapy    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)    Choroidal lesion    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, unspecified SLE type (H)    Hematoma of leg, right, initial encounter    Lupus erythematosus    Deep vein thrombosis (DVT) of proximal lower extremity, unspecified chronicity, unspecified laterality (H)     Past Surgical History:   Procedure Laterality Date    CATARACT IOL, RT/LT Left 02/2019    CATARACT IOL, RT/LT Right 01/2019    COLONOSCOPY N/A 11/28/2016    Procedure: COLONOSCOPY;  Surgeon: Sang August MD;  Location: [facility]    CYSTOSCOPY, SLING TRANSVAGINAL N/A 12/20/2016    Procedure: CYSTOSCOPY, SLING TRANSVAGINAL;  Surgeon: Jeanmarie Pierson MD;  [facility]    DISSECT LYMPH NODE AXILLA  2004    Lt, during eval for SLE    HYSTEROSCOPIC PLACEMENT  CONTRACEPTIVE DEVICE  1985    PHACOEMULSIFICATION WITH STANDARD INTRAOCULAR LENS IMPLANT Right 1/8/2019    Procedure: Right Eye Cataract Extraction with Intraocular Lens;  Surgeon: Monika Fermin MD;  [facility]    PHACOEMULSIFICATION WITH STANDARD INTRAOCULAR LENS IMPLANT Left 2/5/2019    Procedure: Left Eye Cataract Extraction with Intraocular Lens;  Surgeon: Monika Fermin MD;  [facility]    TUBAL LIGATION Bilateral      Social History     Socioeconomic History    Marital status:      Spouse name: Not on file    Number of children: Not on file    Years of education: Not on file    Highest education level: Not on file   Occupational History    Not on file   Tobacco Use    Smoking status: Never    Smokeless tobacco: Never   Substance and Sexual Activity    Alcohol use: No    Drug use: No    Sexual activity: Yes     Partners: Male     Comment: , safe in relationship   Other Topics Concern    Parent/sibling w/ CABG, MI or angioplasty before 65F 55M? Not Asked   Social History Narrative    How much exercise per week? Walk to work every morning (2 miles), swimming 3 times a week, takes walks with , periodically works out at gym on stationary bike     How much calcium per day? Supplement 3x daily        How much caffeine per day? On rare occasion, only if she is out to eat and that is all they have    How much vitamin D per day? supplement    Do you/your family wear seatbelts?  Yes    Do you/your family use safety helmets? Yes    Do you/your family use sunscreen? Yes    Do you/your family keep firearms in the home? No    Do you/your family have a smoke detector(s)? Yes        Do you feel safe in your home? Yes    Has anyone ever touched you in an unwanted manner? No        Klever R LPN 7/18/2013                 Social Drivers of Health     Financial Resource Strain: Low Risk  (1/16/2025)    Financial Resource Strain     Within the past 12 months, have you or your family  members you live with been unable to get utilities (heat, electricity) when it was really needed?: No   Food Insecurity: Low Risk  (1/16/2025)    Food Insecurity     Within the past 12 months, did you worry that your food would run out before you got money to buy more?: No     Within the past 12 months, did the food you bought just not last and you didn t have money to get more?: No   Transportation Needs: Low Risk  (1/16/2025)    Transportation Needs     Within the past 12 months, has lack of transportation kept you from medical appointments, getting your medicines, non-medical meetings or appointments, work, or from getting things that you need?: No   Physical Activity: Not on file   Stress: No Stress Concern Present (1/16/2025)    [employer] [place] [employer] - Occupational Stress Questionnaire     Feeling of Stress : Not at all   Social Connections: Unknown (1/16/2025)    Social Connection and Isolation Panel [NHANES]     Frequency of Communication with Friends and Family: Not on file     Frequency of Social Gatherings with Friends and Family: Patient declined     Attends L[employer] day Services: Not on file     Active Member of Clubs or Organizations: Not on file     Attends Club or Organization Meetings: Not on file     Marital Status: Not on file   Interpersonal Safety: Low Risk  (9/18/2024)    Interpersonal Safety     Do you feel physically and emotionally safe where you currently live?: Yes     Within the past 12 months, have you been hit, slapped, kicked or otherwise physically hurt by someone?: No     Within the past 12 months, have you been humiliated or emotionally abused in other ways by your partner or ex-partner?: No   Housing Stability: Low Risk  (1/16/2025)    Housing Stability     Do you have housing? : Yes     Are you worried about losing your housing?: No     Family History   Problem Relation Age of Onset    Glaucoma Father     Cancer Other         breast    Cerebrovascular Disease Other      C.A.D. Other     Macular Degeneration No family hx of     Skin Cancer No family hx of        Lab Results   Component Value Date    A1C 5.2 11/04/2024    A1C 5.3 08/19/2024    A1C 5.5 02/19/2024    A1C 5.7 01/04/2023    A1C 5.5 06/16/2022    A1C 5.7 02/23/2022    A1C 5.6 12/22/2021    A1C 5.4 07/07/2021    A1C 5.2 04/21/2021    A1C 5.6 01/20/2021    A1C 5.1 07/09/2020    A1C 5.3 07/11/2017               Subjective findings- 79-year-old presents from Joe Contreras MD for neuromas bilaterally.  Patient relates she has had neuromas in both feet for years duration, relates to no specific injury, relates when it hurts it hurts a lot and she has burning pain and shooting pain in the third interspace, relates that she wears tight shoes takes about an hour for it to resolve itself when she takes them off.  Relates she is diabetic with some numbness and tingling in her feet.  Relates she has worn diabetic shoes in the past but  she could not get any wide enough.  Relates she has a callus on her left foot.  Relates she has toenail fungus, relates she uses Penlac on her fingernails for fingernail fungus.  Relates she tried an oral pill 1 time in the past but had a reaction to it.    Objective findings- DP and PT are 2 out of 4 bilaterally.  Has mild peripheral edema bilaterally.  Has decreased hair growth bilaterally.  Has mild dorsal contracted digits bilaterally.  Has laterally deviated hallux with dorsal medial first MPJ prominence bilaterally.  Has nucleated hyperkeratotic tissue buildup plantar left second MPJ.  Sharp/dull is intact with 5.07 Morley Nora monofilament bilaterally, proprioception is intact bilaterally, deep tendon reflex are intact bilaterally, no tendon voids bilaterally.  Has dystrophic thickened nails with subungual debris dystrophy and discoloration and brittleness to differing degrees 1 through 5 bilaterally.  There is no palpable click or shooting pain in the interspaces with pinch and  squeeze test bilaterally, no pain on palpation bilaterally, she relates the pain is in the third interspace and into the toes when it occurs.  There is no erythema, no drainage, no odor, no calor, no tendon voids bilaterally.  X-rays 3 views right and left foot reviewed with joint and cortical margins intact, posterior break in the cyma line, mild joint space narrowing and subchondral sclerosis, medially deviated second toe, laterally deviated sesamoids, fifth MPJ ossicle, dorsal medial first MPJ prominence and mild laterally deviated hallux noted.    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  Onychomycosis bilaterally.  Diabetes with peripheral neuropathy.  Diabetes with callus and foot deformity.  Diagnosis and treatment options discussed with the patient.  She is advised on gel toe spacers for stretching.  Prescription for Diabetic shoes and inserts given and she is given the phone number address orthotics and prosthetics lab for these.  Prescription for Loprox cream given for the onychomycosis and use discussed with her.  X-rays 3 views right and left foot were ordered, reviewed and use discussed with the patient today.  Previous notes reviewed.  Return to see me in 3 months.                                        Moderate level of medical decision making.

## 2025-02-24 NOTE — LETTER
2/24/2025      Shala Caruso  [address] [place] [address]      Dear Colleague,    Thank you for referring your patient, Shala Caruso, to the [facility] [place]. Please see a copy of my visit note below.    Past Medical History:   Diagnosis Date     Achalasia      Antiphospholipid syndrome      Antiplatelet or antithrombotic long-term use      Coagulation disorder [id number]     DM (diabetes mellitus) (H)      DVT (deep venous thrombosis) (H) 2003    and PE     Dysphagia     occasional, use Nifedipine     GERD (gastroesophageal reflux disease)      Hyperlipidemia      Lymphedema      Myopia      Neuropathy     toes bilateral     Nonsenile cataract      osteoporosis      Pericarditis      Raynaud's disease      SLE (systemic lupus erythematosus) (H)      Patient Active Problem List   Diagnosis     Achalasia     Antiphospholipid syndrome     DM (diabetes mellitus) (H)     GERD (gastroesophageal reflux disease)     Hyperlipidemia     HTN (hypertension)     Osteopenia     Raynaud's disease     DVT (deep venous thrombosis) (H)     Encounter for long-term current use of medication     Type 1 diabetes mellitus (H)     Osteoporosis, idiopathic     Osteoporosis, postmenopausal     Cystocele, midline     Urinary urgency     Urinary frequency     Female stress incontinence     Atrophic vaginitis     Long term current use of anticoagulant therapy     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)     Choroidal lesion     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, unspecified SLE type (H)     Hematoma of leg, right, initial encounter     Lupus erythematosus     Deep vein thrombosis (DVT) of proximal lower extremity, unspecified chronicity, unspecified laterality (H)     Past Surgical History:   Procedure Laterality Date     CATARACT IOL, RT/LT Left 02/2019     CATARACT IOL, RT/LT Right 01/2019     COLONOSCOPY N/A 11/28/2016    Procedure: COLONOSCOPY;   Surgeon: Sang August MD;  Location: [facility]     CYSTOSCOPY, SLING TRANSVAGINAL N/A 12/20/2016    Procedure: CYSTOSCOPY, SLING TRANSVAGINAL;  Surgeon: Jeanmarie Pierson MD;  Location: [facility]     DISSECT LYMPH NODE AXILLA  2004    Lt, during eval for SLE     HYSTEROSCOPIC PLACEMENT CONTRACEPTIVE DEVICE  1985     PHACOEMULSIFICATION WITH STANDARD INTRAOCULAR LENS IMPLANT Right 1/8/2019    Procedure: Right Eye Cataract Extraction with Intraocular Lens;  Surgeon: Monika Fermin MD;  Location: [facility]     PHACOEMULSIFICATION WITH STANDARD INTRAOCULAR LENS IMPLANT Left 2/5/2019    Procedure: Left Eye Cataract Extraction with Intraocular Lens;  Surgeon: Monika Fermin MD;  Location: [facility]     TUBAL LIGATION Bilateral      Social History     Socioeconomic History     Marital status:      Spouse name: Not on file     Number of children: Not on file     Years of education: Not on file     Highest education level: Not on file   Occupational History     Not on file   Tobacco Use     Smoking status: Never     Smokeless tobacco: Never   Substance and Sexual Activity     Alcohol use: No     Drug use: No     Sexual activity: Yes     Partners: Male     Comment: , safe in relationship   Other Topics Concern     Parent/sibling w/ CABG, MI or angioplasty before 65F 55M? Not Asked   Social History Narrative    How much exercise per week? Walk to work every morning (2 miles), swimming 3 times a week, takes walks with , periodically works out at gym on stationary bike     How much calcium per day? Supplement 3x daily        How much caffeine per day? On rare occasion, only if she is out to eat and that is all they have    How much vitamin D per day? supplement    Do you/your family wear seatbelts?  Yes    Do you/your family use safety helmets? Yes    Do you/your family use sunscreen? Yes    Do you/your family keep firearms in the home? No    Do you/your family have a smoke detector(s)? Yes         Do you feel safe in your home? Yes    Has anyone ever touched you in an unwanted manner? No        Klever MCCORD LPN 7/18/2013                 Social Drivers of Health     Financial Resource Strain: Low Risk  (1/16/2025)    Financial Resource Strain      Within the past 12 months, have you or your family members you live with been unable to get utilities (heat, electricity) when it was really needed?: No   Food Insecurity: Low Risk  (1/16/2025)    Food Insecurity      Within the past 12 months, did you worry that your food would run out before you got money to buy more?: No      Within the past 12 months, did the food you bought just not last and you didn t have money to get more?: No   Transportation Needs: Low Risk  (1/16/2025)    Transportation Needs      Within the past 12 months, has lack of transportation kept you from medical appointments, getting your medicines, non-medical meetings or appointments, work, or from getting things that you need?: No   Physical Activity: Not on file   Stress: No Stress Concern Present (1/16/2025)    [employer] - Occupational Stress Questionnaire      Feeling of Stress : Not at all   Social Connections: Unknown (1/16/2025)    Social Connection and Isolation Panel [NHANES]      Frequency of Communication with Friends and Family: Not on file      Frequency of Social Gatherings with Friends and Family: Patient declined      Attends [employer] Services: Not on file      Active Member of Clubs or Organizations: Not on file      Attends Club or Organization Meetings: Not on file      Marital Status: Not on file   Interpersonal Safety: Low Risk  (9/18/2024)    Interpersonal Safety      Do you feel physically and emotionally safe where you currently live?: Yes      Within the past 12 months, have you been hit, slapped, kicked or otherwise physically hurt by someone?: No      Within the past 12 months, have you been humiliated or emotionally abused in other ways  by your partner or ex-partner?: No   Housing Stability: Low Risk  (1/16/2025)    Housing Stability      Do you have housing? : Yes      Are you worried about losing your housing?: No     Family History   Problem Relation Age of Onset     Glaucoma Father      Cancer Other         breast     Cerebrovascular Disease Other      C.A.D. Other      Macular Degeneration No family hx of      Skin Cancer No family hx of        Lab Results   Component Value Date    A1C 5.2 11/04/2024    A1C 5.3 08/19/2024    A1C 5.5 02/19/2024    A1C 5.7 01/04/2023    A1C 5.5 06/16/2022    A1C 5.7 02/23/2022    A1C 5.6 12/22/2021    A1C 5.4 07/07/2021    A1C 5.2 04/21/2021    A1C 5.6 01/20/2021    A1C 5.1 07/09/2020    A1C 5.3 07/11/2017               Subjective findings- 79-year-old presents from Joe Contreras MD for neuromas bilaterally.  Patient relates she has had neuromas in both feet for years duration, relates to no specific injury, relates when it hurts it hurts a lot and she has burning pain and shooting pain in the third interspace, relates that she wears tight shoes takes about an hour for it to resolve itself when she takes them off.  Relates she is diabetic with some numbness and tingling in her feet.  Relates she has worn diabetic shoes in the past but  she could not get any wide enough.  Relates she has a callus on her left foot.  Relates she has toenail fungus, relates she uses Penlac on her fingernails for fingernail fungus.  Relates she tried an oral pill 1 time in the past but had a reaction to it.    Objective findings- DP and PT are 2 out of 4 bilaterally.  Has mild peripheral edema bilaterally.  Has decreased hair growth bilaterally.  Has mild dorsal contracted digits bilaterally.  Has laterally deviated hallux with dorsal medial first MPJ prominence bilaterally.  Has nucleated hyperkeratotic tissue buildup plantar left second MPJ.  Sharp/dull is intact with 5.07 Tolna Nora monofilament bilaterally, proprioception  is intact bilaterally, deep tendon reflex are intact bilaterally, no tendon voids bilaterally.  Has dystrophic thickened nails with subungual debris dystrophy and discoloration and brittleness to differing degrees 1 through 5 bilaterally.  There is no palpable click or shooting pain in the interspaces with pinch and squeeze test bilaterally, no pain on palpation bilaterally, she relates the pain is in the third interspace and into the toes when it occurs.  There is no erythema, no drainage, no odor, no calor, no tendon voids bilaterally.  X-rays 3 views right and left foot reviewed with joint and cortical margins intact, posterior break in the cyma line, mild joint space narrowing and subchondral sclerosis, medially deviated second toe, laterally deviated sesamoids, fifth MPJ ossicle, dorsal medial first MPJ prominence and mild laterally deviated hallux noted.    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  Onychomycosis bilaterally.  Diabetes with peripheral neuropathy.  Diabetes with callus and foot deformity.  Diagnosis and treatment options discussed with the patient.  She is advised on gel toe spacers for stretching.  Prescription for Diabetic shoes and inserts given and she is given the phone number address orthotics and prosthetics lab for these.  Prescription for Loprox cream given for the onychomycosis and use discussed with her.  X-rays 3 views right and left foot were ordered, reviewed and use discussed with the patient today.  Previous notes reviewed.  Return to see me in 3 months.                                        Moderate level of medical decision making.      Again, thank you for allowing me to participate in the care of your patient.        Sincerely,        John Roque DPM    Electronically signed

## 2025-02-24 NOTE — PROGRESS NOTES
ANTICOAGULATION MANAGEMENT     Shala Caruso 79 year old female is on warfarin with supratherapeutic result. (Goal Chromogenic Factor X 40-20%)    Recent labs: (last 7 days)     02/22/25  0750   [id number]*       ASSESSMENT     Source(s): Chart Review and Patient/Caregiver Call     Warfarin doses taken: Warfarin taken as instructed  Diet: No new diet changes identified  Medication/supplement changes: None noted  New illness, injury, or hospitalization: No  Signs or symptoms of bleeding or clotting: No  Previous result: Therapeutic last 2(+) visits  Additional findings: None     PLAN     Recommended plan for no diet, medication or health factor changes affecting result    Dosing Instructions:  decrease your warfarin dose 7.5 mg every Fri; 5 mg all other days (6.2% change) with next Chromogenic Factor X in 2 weeks       Telephone call with Jeanmarie who agrees to plan and repeated back plan correctly    Lab visit scheduled    Education provided:   Goal range and lab monitoring: goal range and significance of current result and Importance of following up at instructed interval  Contact [phone] with any changes, questions or concerns.     Plan made per [facility] anticoagulation protocol    KYRA BRANTLEY RN  2/24/2025  Anticoagulation Clinic  el? for routing messages: [facility]  [facility] patient phone line: [phone]

## 2025-02-25 DIAGNOSIS — E78.49 OTHER HYPERLIPIDEMIA: ICD-10-CM

## 2025-02-25 DIAGNOSIS — K22.0 ACHALASIA OF ESOPHAGUS: ICD-10-CM

## 2025-02-26 ENCOUNTER — TELEPHONE (OUTPATIENT)
Dept: INTERNAL MEDICINE | Facility: CLINIC | Age: 80
End: 2025-02-26
Payer: COMMERCIAL

## 2025-02-26 NOTE — TELEPHONE ENCOUNTER
Patient Returning Call    Reason for call:  DX with with rotator cuff issues, pt has been taking Tylenol for pain. This could be causing the change in results    Information relayed to patient:  Will leave TE for call back    Patient has additional questions:  No      Could we [facility] this [facility] to you [facility] or would you prefer to receive a phone call?:   Patient would prefer a phone call   Okay to leave a detailed message?: No at Cell number on file:    Telephone Information:   Mobile [phone]

## 2025-02-26 NOTE — TELEPHONE ENCOUNTER
Reviewed [facility] dosing note 2/24/25. Called and spoke to Jeanmarie. She is taking Tylenol at bedtime 4-5 days per week and trying home PT exercises for her shoulder. This issue started >2 weeks ago. She is planning to try to start weaning down the Tylenol, maybe 1 tablet only at bedtime and only take 2 if her pain is really bad. We discussed that more frequent Tylenol use and pain/inflammation can both increase CFX. Recommended that for this ongoing (although hopefully short term) issues that she still with the dosing plan outlined by MINA Mcmullen in encounter 2/24/25. She was in agreement with this plan.     Shala S Shady verbalized understanding and had no other concerns or questions at this time.

## 2025-03-03 RX ORDER — CALCIUM CARBONATE 500(1250)
1 TABLET ORAL DAILY
Qty: 90 TABLET | Refills: 3 | Status: SHIPPED | OUTPATIENT
Start: 2025-03-03

## 2025-03-04 RX ORDER — SIMVASTATIN 40 MG
40 TABLET ORAL AT BEDTIME
Qty: 90 TABLET | Refills: 3 | OUTPATIENT
Start: 2025-03-04

## 2025-03-04 RX ORDER — NIFEDIPINE 30 MG/1
TABLET, EXTENDED RELEASE ORAL
Qty: 180 TABLET | Refills: 3 | OUTPATIENT
Start: 2025-03-04

## 2025-03-07 ENCOUNTER — ANCILLARY PROCEDURE (OUTPATIENT)
Dept: ULTRASOUND IMAGING | Facility: CLINIC | Age: 80
End: 2025-03-07
Attending: INTERNAL MEDICINE
Payer: COMMERCIAL

## 2025-03-07 DIAGNOSIS — E04.1 THYROID NODULE: ICD-10-CM

## 2025-03-07 PROCEDURE — 76536 US EXAM OF HEAD AND NECK: CPT | Performed by: STUDENT IN AN ORGANIZED HEALTH CARE EDUCATION/TRAINING PROGRAM

## 2025-03-10 ENCOUNTER — LAB (OUTPATIENT)
Dept: LAB | Facility: CLINIC | Age: 80
End: 2025-03-10
Payer: COMMERCIAL

## 2025-03-10 DIAGNOSIS — D68.61 ANTIPHOSPHOLIPID SYNDROME: ICD-10-CM

## 2025-03-10 PROCEDURE — 99000 SPECIMEN HANDLING OFFICE-LAB: CPT | Performed by: PATHOLOGY

## 2025-03-10 PROCEDURE — 85260 CLOT FACTOR X STUART-POWER: CPT | Performed by: INTERNAL MEDICINE

## 2025-03-10 PROCEDURE — 36415 COLL VENOUS BLD VENIPUNCTURE: CPT | Performed by: PATHOLOGY

## 2025-03-11 ENCOUNTER — ANTICOAGULATION THERAPY VISIT (OUTPATIENT)
Dept: ANTICOAGULATION | Facility: CLINIC | Age: 80
End: 2025-03-11
Payer: COMMERCIAL

## 2025-03-11 DIAGNOSIS — L93.0 LUPUS ERYTHEMATOSUS: ICD-10-CM

## 2025-03-11 DIAGNOSIS — Z79.01 LONG TERM CURRENT USE OF ANTICOAGULANT THERAPY: Primary | ICD-10-CM

## 2025-03-11 DIAGNOSIS — D68.61 ANTIPHOSPHOLIPID SYNDROME: ICD-10-CM

## 2025-03-11 DIAGNOSIS — I82.4Y9 DEEP VEIN THROMBOSIS (DVT) OF PROXIMAL LOWER EXTREMITY, UNSPECIFIED CHRONICITY, UNSPECIFIED LATERALITY (H): ICD-10-CM

## 2025-03-11 LAB — FACT X ACT/NOR PPP CHRO: 24 % (ref 70–130)

## 2025-03-11 NOTE — PROGRESS NOTES
ANTICOAGULATION MANAGEMENT     Shala Caruso 79 year old female is on warfarin with therapeutic result. (Goal Chromogenic Factor X 40-20%)    Recent labs: (last 7 days)     03/10/25  1347   [id number] 24*       ASSESSMENT     Source(s): Chart Review and Patient/Caregiver Call     Warfarin doses taken: Warfarin taken as instructed  Diet: No new diet changes identified  Medication/supplement changes:  Patient is no long using Tylenol on a consistent basis  New illness, injury, or hospitalization: No Patient has found if she exercises the rotator cuff pain has improved when she exercises  Signs or symptoms of bleeding or clotting: No  Previous result: Supratherapeutic  Additional findings: None     PLAN     Recommended plan for no diet, medication or health factor changes affecting result    Dosing Instructions: Continue your current warfarin dose 7.5mg Fri and 5mg all other days  with next Chromogenic Factor X in 2-3 weeks       Telephone call with Jeanmarie who verbalizes understanding and agrees to plan and who agrees to plan and repeated back plan correctly    Patient offered & declined to schedule next visit    Education provided:   Taking warfarin: Importance of taking warfarin as instructed  Goal range and lab monitoring: goal range and significance of current result, Importance of therapeutic range, and Importance of following up at instructed interval    Plan made per [facility] anticoagulation protocol    Stacey Beal, RN  3/11/2025  Anticoagulation Clinic  [facility] [employer]iance for routing messages: [facility]  [facility] patient phone line: [phone]

## 2025-03-17 ENCOUNTER — OFFICE VISIT (OUTPATIENT)
Dept: ENDOCRINOLOGY | Facility: CLINIC | Age: 80
End: 2025-03-17
Payer: COMMERCIAL

## 2025-03-17 VITALS — HEART RATE: 60 BPM | DIASTOLIC BLOOD PRESSURE: 81 MMHG | OXYGEN SATURATION: 100 % | SYSTOLIC BLOOD PRESSURE: 130 MMHG

## 2025-03-17 DIAGNOSIS — E10.649 TYPE 1 DIABETES MELLITUS WITH HYPOGLYCEMIA AND WITHOUT COMA (H): Primary | ICD-10-CM

## 2025-03-17 DIAGNOSIS — M81.0 OSTEOPOROSIS, POSTMENOPAUSAL: ICD-10-CM

## 2025-03-17 DIAGNOSIS — E04.1 THYROID NODULE: ICD-10-CM

## 2025-03-17 DIAGNOSIS — E78.49 OTHER HYPERLIPIDEMIA: ICD-10-CM

## 2025-03-17 DIAGNOSIS — R59.9 LYMPH NODE ENLARGEMENT: ICD-10-CM

## 2025-03-17 DIAGNOSIS — E21.0 PRIMARY HYPERPARATHYROIDISM: ICD-10-CM

## 2025-03-17 LAB
EST. AVERAGE GLUCOSE BLD GHB EST-MCNC: 108 MG/DL
HBA1C MFR BLD: 5.4 %

## 2025-03-17 PROCEDURE — 99215 OFFICE O/P EST HI 40 MIN: CPT | Performed by: INTERNAL MEDICINE

## 2025-03-17 PROCEDURE — 83036 HEMOGLOBIN GLYCOSYLATED A1C: CPT | Performed by: PATHOLOGY

## 2025-03-17 PROCEDURE — 1126F AMNT PAIN NOTED NONE PRSNT: CPT | Performed by: INTERNAL MEDICINE

## 2025-03-17 PROCEDURE — 3079F DIAST BP 80-89 MM HG: CPT | Performed by: INTERNAL MEDICINE

## 2025-03-17 PROCEDURE — 3075F SYST BP GE 130 - 139MM HG: CPT | Performed by: INTERNAL MEDICINE

## 2025-03-17 RX ORDER — HYDROCHLOROTHIAZIDE 12.5 MG/1
CAPSULE ORAL
Qty: 6 EACH | Refills: 3 | Status: SHIPPED | OUTPATIENT
Start: 2025-03-17

## 2025-03-17 ASSESSMENT — PAIN SCALES - GENERAL: PAINLEVEL_OUTOF10: NO PAIN (0)

## 2025-03-17 NOTE — PATIENT INSTRUCTIONS
Imaging (DEXA, CT, MRI, XRAY)    [facility] ([facility], [facility] [place]) [phone]   [facility] ([place], [place]) [phone]   [facility] ([place], [place]) [phone]   [facility] ([facility]) [phone]       [facility] [phone]   [facility] [phone]   [place] [phone]   [facility]  [phone]   [facility] [phone]   [place] [phone]   [facility]) [phone]   [facility]   [place] [phone]   [place] [phone]   [place] [phone]   [place] [phone]       Infusion    [phone]   [place] [phone]   [place] [phone]   [place] [phone]   [place] [phone]   [place] [phone]   [facility] [phone]   [place] [phone]     For any questions, please reach out to the [facility] Number for assistance: [phone].     Thank you! We will see you soon!    Labs ordered:

## 2025-03-17 NOTE — LETTER
3/17/2025       RE: Shala Caruso  [address] [place] [address]     Dear Colleague,    Thank you for referring your patient, Shala Caruso, to the [facility] [place] at [facility]. Please see a copy of my visit note below.                       Assessment and Plan:   Jeanmarie is a 79 year old pleasant female with hx of SLE, APLS, DVT, osteoporosis and type 1 diabetes presenting for f/up.    1) Type 1 diabetes, formally diagnosed in 2010 while being evaluated for steroid-induced hyperglycemia.  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  She has been taking small dosages of short acting insulin.  Lantus was discontinued in view of hypoglycemia and low insulin requirements.  The patient prefers to maintain a very tight glycemic control.   F/up labs at her next visit     2) Osteoporosis, now osteopenia  Risk factors for osteoporosis include postmenopausal status, lupus, prior treatment with steroids, diabetes, fam history.   She was treated with Boniva for 3 years, followed by Forteo for 2 years. Received one dose of Reclast in July 2014, when she completed treatment with Forteo.  Treatment with Reclast was resumed in July 2020, when the DEXA scan revealed some loss of bone mineral density, although the lowest T score remained in the osteopenic range.  She received subsequent Reclast dosages in in 2021, 2023 and 2024. Most recent DXA from 8/23 from showed improvement of bone mineral density at the average hip.  Plan:   Walking, weightbearing exercises encouraged  F/up DXA scan in 8/2025     3) Hypercalcemia, mild and associated with significant hypercalciuria  Lab work was suggestive of primary hyperparathyroidism.  Treatment with hydrochlorothiazide is not an option in view of the allergy to sulfas (allergic rash).  The parathyroid scan and the neck CT failed to identify parathyroid adenomas.   Indications  for surgery: hypercalciuria and low BMD.   Plan:  Pursue a neck CT once cleared by insurance   In the event over functioning parathyroid gland/glands are not identified by the CT, I recommended to consider evaluation at [facility] for C11 choline PET/CT.    4) Right neck mostly cystic nodule, 2 cm   Resolved on the most recent US, most likely resorbed bleeding.     5) Cervical lymphadenopathy   Most likely related to GERD exacerbation     Orders Placed This Encounter   Procedures     AFINION HEMOGLOBIN A1C POCT     Comprehensive metabolic panel     Lipid panel reflex to direct LDL Fasting     Hematocrit     Albumin Random Urine Quantitative with Creat Ratio     Hemoglobin A1c     ============================================================================================================================================  Jeanmarie is a 79 year old pleasant female with hx of SLE, APLS, DVT, osteoporosis and type 1 diabetes presenting for f/up.   Her last clinic visit was on 11/4/2024.     Interval history:    1. Type 1 diabetes, diagnosed in 2010  Lantus was discontinued in 2020 and her diabetes is now managed only with NovoLog.    On average, she reports taking 1-2.5 units per meal. In general she has 2 meals a day, lunch and dinner.     Most recent A1c was 5.2, in 11/2024. It was 5.4% today.     Diabetes complications:   No h/o microalbuminuria.  Most recent urine microalbumin negative in 2/24. GFR >90 on labs from 10/24  Last eye exam -September 2024. No DR. S/P cataract surgery.  Numbness and tingling sensation B/L toes - for many years but light sensation is intact. She does wear comfortable shoes/insoles. 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 With walking she might  get a burning sensation.   She develops confusion, inability to concentrate or focus her vision and she feels extremely tired when her blood sugar is the 60s or 50s.  She has no prior episodes of loss of consciousness due to  hypoglycemia.  She does not always recognize the lows.   She does have Baqsimi at home.  Most recent lipid panel from 8/12/24: LDL cholesterol 72, HDL cholesterol 63, triglycerides 64.  On 40 mg simvastatin daily.  Exercise: mainly walking     I reviewed the Siddharth data. On the sensor, average glucose is 91, with a glucose variability of 16.7%.  93% of the glucose numbers are within target, 7% are in the mild hypoglycemic range.  Most of the hypoglycemia occurs during the night, occasionally in the afternoon or late evening.  During the night, the hypoglycemic episodes are prolonged and mild, in the 60s. Not real lows, per patient.     2. Osteoporosis  Jeanmarie also has a history of osteoporosis, treated with Boniva for almost 3 years, followed by Forteo which was started in 4/12 - interrupted treatment from 4/2013 and restarted in 7/2013 until July 2014. After she completed the treatment with Forteo, she received one dose of Reclast, in July 2014.      She has no history of prior bone fractures.  She's been off prednisone since 2013. Her height has decreased over the years from 5'6'' to 5'1/2''. Her mother had a hip fracture in her 80s.      DXA 7/1/16:  L1-L3 T score was - 1.  Overall, at the spine, there was a significant increase of bone mineral density since 2005 of 10.5%. Since 2015, the bone mineral density has remained stable at spine. The lowest T score at the hip level was -2.2, at the left femoral neck.  Overall, there was a significant improvement of bone mineral density at the mean hip of 8.9% since 2005, with no significant changes since 2015. While the bone mineral density at the left hip increased since baseline, at the right hip, there was a decrease of bone mineral density since baseline and also, since prior year.     DXA 7/27/18:   The lowest T score was -2.1, at the left femoral neck.  Compared with the prior scan from 2016, the bone mineral density at the hips remained stable.  At the lumbar spine,  L1-L3 T score was -1.3, not significantly changed since 2016.     DXA 1/2020:  L2-L4 T score was -1.5. At the hips, the lowest score was -2.3, at the left femoral neck. Compared with the prior DEXA scan from 2018, there was a significant decrease in bone mineral density of the lumbar spine, left total hip and right total hip by 3.1, 4.7 and 3.5%, respectively.  The patient received zoledronic acid infusion on 7/30/2020 and 7/27/21.     DXA 6/16/2022:  T score: -0.9 at L1-L4, -0.3 at 33% distal radius, -1.7 at both right and left femoral necks, -1.8 at the left total hip and -1.9 at the right total hip.  At the radius and average hip, the bone mineral density remained stable since 2020.  At the spine, there was a significant increase of 4.5%.    DXA 8/1/23:  L2-L4 T score -1.4  Left femoral neck T score -1.8, total L hip T score -1.5  Right femoral neck T score -1.5, total R hip T score -1.6  Bone density compared to the prior study increased at the mean total femur by +4.4%.  A third dose of Reclast was administered on 8/29/23 and a forth dose on 10/30/24.      3. Hypercalcemia   The patient developed mild hypercalcemia in 2023.  The hypercalcemia has been associated with a normal PTH, normal phosphorus, and elevated 24-hour urinary calcium.    Pertinent labs reviewed:  3/24/2023 calcium 10.4, GFR 73, vitamin D 63, albumin 4.2, PTH 34, phosphorus 4.2  6/5/2023 24-hour urinary calcium 0.34 g, urine creatinine 1.03 g, urine volume 1.5 L  8/7/23 PTH 35, calcium 10.9, phos 3.5, albumin 4.5, vitamin D 56   12/20/23 PTH 33, calcium 9.4, phos 4.1, albumin 4, GFR >90    12/9/2023 24-hour urinary calcium 0.38 g, creatinine 0.92 g, volume 2.325 L.   2/15/24 ionized calcium 5.6 (normal range 4.4-5.2)  2/15/24 24 hrs urinary calcium 0.38 g, creatinine 0.92 g   8/12/2024 vitamin D 52, PTH 34, phosphorus 3.2  8/13/2024 24-hour urinary calcium 0.37 g, urine creatinine 0.67 g and urine volume 2.4 L.  Treatment with  "hydrochlorothiazide was not an option due to patient's allergies to sulpha.   10/21/24 parathyroid scan - failed to identify a parathyroid gland.   11/13/24 Neck CT:  Inferior and posterior to right thyroid lobe, there was a hypoenhancing 8 x 7 mm centrally cystic nodular lesion. Inferiorly there is a punctate microcalcification.This lesion probably internally bled and now is resolving. A hemorrhagic cystic necrotic parathyroid adenoma cannot be completely excluded. Clusters of round shaped lymph nodes with internal echogenicity in the right level 4 and level 6.  1/7/25 calcium 9.1     One cup of almond milk daily and 1-2 servings of dairies daily.   In terms of calcium and vitamin D supplements, she reports taking 500 mg calcium as a oyster shell and 1000 U vitamin D daily.   Liquid intake: \"a lot\"    3. Thyroid nodules   8/30/24 neck US - right thyroid cyst with a thick wall, measuring 2.2 cm and some inferior calcification inferiorly. 2 atypical looking LNs at the right level 7.   9/19/24 swallowing study unremarkable   12/24 Right level 7 LN biopsy - negative for malignancy. Tg 0.12   3/7/25 Thyroid US - no identifiable nodules. I reviewed the US images.   Most recent TSH was 1, on 8/19/24.     Past Medical History  Past Medical History:   Diagnosis Date     Achalasia      Antiphospholipid syndrome      Antiplatelet or antithrombotic long-term use      Coagulation disorder [id number]     DM (diabetes mellitus) (H)      DVT (deep venous thrombosis) (H) 2003    and PE     Dysphagia     occasional, use Nifedipine     GERD (gastroesophageal reflux disease)      Hyperlipidemia      Lymphedema      Myopia      Neuropathy     toes bilateral     Nonsenile cataract      osteoporosis      Pericarditis      Raynaud's disease      SLE (systemic lupus erythematosus) (H)      Past Surgical History  Past Surgical History:   Procedure Laterality Date     CATARACT IOL, RT/LT Left 02/2019     CATARACT IOL, RT/LT Right 01/2019     " COLONOSCOPY N/A 11/28/2016    Procedure: COLONOSCOPY;  Surgeon: Sang August MD;  Location: [facility]     CYSTOSCOPY, SLING TRANSVAGINAL N/A 12/20/2016    Procedure: CYSTOSCOPY, SLING TRANSVAGINAL;  Surgeon: Jeanmarie Pierson MD;  Location: [facility] OR     DISSECT LYMPH NODE AXILLA  2004    Lt, during eval for SLE     HYSTEROSCOPIC PLACEMENT CONTRACEPTIVE DEVICE  1985     PHACOEMULSIFICATION WITH STANDARD INTRAOCULAR LENS IMPLANT Right 1/8/2019    Procedure: Right Eye Cataract Extraction with Intraocular Lens;  Surgeon: Monika Fermin MD;  Location: [facility] OR     PHACOEMULSIFICATION WITH STANDARD INTRAOCULAR LENS IMPLANT Left 2/5/2019    Procedure: Left Eye Cataract Extraction with Intraocular Lens;  Surgeon: Monika Fermin MD;  Location: [facility] OR     TUBAL LIGATION Bilateral         Medications    Current Outpatient Medications:      acetaminophen (TYLENOL) 500 MG tablet, Take 1,000-1,500 mg by mouth nightly as needed , Disp: , Rfl:      aspirin 81 MG tablet, Take 81 mg by mouth At Bedtime , Disp: , Rfl:      AYR SALINE NASAL NO-DRIP GEL, Use as needed for nasal dryness, Disp: 3 Tube, Rfl: 3     blood glucose monitoring (ULTRA THIN 30G) lancets, Test 5 times daily  [employer]  Super thin, Disp: 450 each, Rfl: 3     calcium carbonate 500 mg, elemental, (OSCAL) 500 MG tablet, Take 1 tablet (500 mg) by mouth daily., Disp: 90 tablet, Rfl: 3     cholecalciferol (VITAMIN D3) 25 mcg (1000 units) capsule, Take 1 capsule by mouth daily, Disp: , Rfl:      ciclopirox (LOPROX) 0.77 % cream, Apply topically 2 times daily. To toenails and affected areas on feet., Disp: 90 g, Rfl: 5     ciclopirox (PENLAC) 8 % external solution, Apply to adjacent skin and affected nails daily.  Remove with alcohol every 7 days, then repeat., Disp: 6.6 mL, Rfl: 11     Continuous Glucose Sensor (FREESTYLE SRI 3 PLUS SENSOR) MISC, Change every 15 days., Disp: 6 each, Rfl: 1     Glucagon (BAQSIMI ONE PACK) 3 MG/DOSE nasal powder, Spray 1  spray (3 mg) in nostril as needed (to be used for severe hypoglycemic episodes) Please ask pharmacy for instructions on disposal of  medication in your county., Disp: 1 each, Rfl: 4     Injection Device for insulin (NOVOPEN ECHO) RORY, 1 each 4 times daily, Disp: 1 each, Rfl: 0     insulin aspart (NOVOLOG PENFILL) 100 UNIT/ML cartridge, INJECT 1 UNIT PER 15 GRAMS OF CARBOHYDRATES UNDER THE SKIN THREE TIMES A DAY WITH MEALS. APPROXIMATELY 10 UNITS DAILY., Disp: 9 mL, Rfl: 3     insulin glargine (LANTUS SOLOSTAR) 100 UNIT/ML pen, Inject 5 Units subcutaneously every morning. (Patient not taking: Reported on 2025), Disp: 3 mL, Rfl: 0     insulin pen needle (BD PAM U/F) 32G X 4 MM miscellaneous, USE AS DIRECTED WITH INSULIN PENS 4 TIMES DAILY, Disp: 400 each, Rfl: 3     lidocaine (XYLOCAINE) 5 % external ointment, Apply topically at bedtime, Disp: 50 g, Rfl: 5     NIFEdipine ER OSMOTIC (PROCARDIA XL) 30 MG 24 hr tablet, TAKE 1 TO 2 TABLETS BY MOUTH DAILY FOR DYSPHAGIA, Disp: 180 tablet, Rfl: 3     order for DME, Equipment being ordered: compression socks, 20-30 mmHg, knee high, Disp: 8 Piece, Rfl: 4     predniSONE (DELTASONE) 5 MG tablet, Take 1 tablet (5 mg) by mouth daily. Take 3 tabs daily for 5 days, then take 2 tabs daily for 5 days (Patient not taking: Reported on 2025), Disp: 25 tablet, Rfl: 1     simvastatin (ZOCOR) 40 MG tablet, Take 1 tablet (40 mg) by mouth at bedtime., Disp: 90 tablet, Rfl: 3     UNABLE TO FIND, MEDICATION NAME:  (multivitamin), Disp: , Rfl:      warfarin ANTICOAGULANT (JANTOVEN ANTICOAGULANT) 5 MG tablet, Take 1-1.5 tablets daily or as directed, Disp: 135 tablet, Rfl: 4    LMP  (LMP Unknown)   Wt Readings from Last 10 Encounters:   24 47.2 kg (104 lb)   24 47.2 kg (104 lb)   10/30/24 50.1 kg (110 lb 8 oz)   24 47.2 kg (104 lb)   24 48.5 kg (107 lb)   24 49.7 kg (109 lb 8 oz)   23 51.3 kg (113 lb)   23 57.9 kg (127 lb 11.2  oz)   08/01/22 56.1 kg (123 lb 11.2 oz)   01/24/22 52.6 kg (116 lb)     Refused to weight herself today.   /81   Pulse 60   LMP  (LMP Unknown)   SpO2 100%     Physical Examination:  General appearance: she is thin, no acute distress noted  Eyes: conjunctivae and extraocular motions are normal. Pupils are equal, round, and reactive to light. No lid lag, no stare.  HENT: oropharynx moist; neck no JVD, no bruits, palpable right thyroid nodule, ~2.5 cm diameter, firm   Cardiovascular: regular rhythm, no murmurs, distal pulses palpable, mild L arm edema   Respiratory: chest clear, no rales, no rhonchi  Musculoskeletal: normal tone and strength   Neurologic: left knee reflex decreased, normal B/L bicipital reflexes, no resting tremor  Psychiatric: affect and judgment normal   Skin: nails onychomycosis  Feet: sensation preserved to monofilament testing, onychomycosis    Endocrine Labs:  Lab Results   Component Value Date    A1C 5.2 11/04/2024    A1C 5.3 08/19/2024    A1C 5.5 02/19/2024    A1C 5.7 (H) 01/04/2023    A1C 5.5 06/16/2022    A1C 5.7 (H) 02/23/2022    A1C 5.6 12/22/2021    A1C 5.4 07/07/2021    A1C 5.2 04/21/2021    A1C 5.6 01/20/2021    A1C 5.1 07/09/2020    A1C 5.3 07/11/2017    HEMOGLOBINA1 5.4 08/07/2023    HEMOGLOBINA1 5.1 01/13/2020    HEMOGLOBINA1 5.1 07/15/2019    HEMOGLOBINA1 5.0 07/30/2018    HEMOGLOBINA1 5.0 01/29/2018       Hemoglobin   Date Value Ref Range Status   01/07/2025 12.1 11.7 - 15.7 g/dL Final   04/29/2021 12.9 11.7 - 15.7 g/dL Final     Hematocrit   Date Value Ref Range Status   01/07/2025 36.8 35.0 - 47.0 % Final   04/29/2021 41.5 35.0 - 47.0 % Final     Cholesterol   Date Value Ref Range Status   08/12/2024 148 <200 mg/dL Final   07/07/2021 199 <200 mg/dL Final     Comment:     Desirable:       <200 mg/dl     Cholesterol/HDL Ratio   Date Value Ref Range Status   10/01/2014 1.5 0.0 - 5.0 Final     HDL Cholesterol   Date Value Ref Range Status   07/07/2021 108 >49 mg/dL Final      Direct Measure HDL   Date Value Ref Range Status   08/12/2024 63 >=50 mg/dL Final     LDL Cholesterol Calculated   Date Value Ref Range Status   08/12/2024 72 <=100 mg/dL Final   07/07/2021 79 <100 mg/dL Final     Comment:     Desirable:       <100 mg/dl     VLDL-Cholesterol   Date Value Ref Range Status   10/01/2014 7 0 - 30 mg/dL Final     Triglycerides   Date Value Ref Range Status   08/12/2024 64 <150 mg/dL Final   07/07/2021 58 <150 mg/dL Final     Albumin Urine mg/L   Date Value Ref Range Status   02/15/2024 <12.0 mg/L Final     Comment:     The reference ranges have not been established in urine albumin. The results should be integrated into the clinical context for interpretation.   06/16/2022 16 mg/L Final   07/07/2021 9 mg/L Final     TSH   Date Value Ref Range Status   08/19/2024 1.00 0.30 - 4.20 uIU/mL Final   08/01/2022 1.34 0.40 - 4.00 mU/L Final   07/09/2019 0.99 0.40 - 4.00 mU/L Final         Last Basic Metabolic Panel:    Sodium   Date Value Ref Range Status   01/07/2025 137 135 - 145 mmol/L Final   07/07/2021 141 133 - 144 mmol/L Final     Potassium   Date Value Ref Range Status   01/07/2025 4.3 3.4 - 5.3 mmol/L Final   07/19/2022 4.2 3.4 - 5.3 mmol/L Final   07/07/2021 4.0 3.4 - 5.3 mmol/L Final     Chloride   Date Value Ref Range Status   01/07/2025 100 98 - 107 mmol/L Final   07/19/2022 108 94 - 109 mmol/L Final   07/07/2021 106 94 - 109 mmol/L Final     Calcium   Date Value Ref Range Status   01/07/2025 9.1 8.8 - 10.4 mg/dL Final     Comment:     Reference intervals for this test were updated on 7/16/2024 to reflect our healthy population more accurately. There may be differences in the flagging of prior results with similar values performed with this method. Those prior results can be interpreted in the context of the updated reference intervals.   07/07/2021 9.1 8.5 - 10.1 mg/dL Final     Carbon Dioxide   Date Value Ref Range Status   07/07/2021 27 20 - 32 mmol/L Final     Carbon Dioxide  (CO2)   Date Value Ref Range Status   01/07/2025 28 22 - 29 mmol/L Final   07/19/2022 28 20 - 32 mmol/L Final     Urea Nitrogen   Date Value Ref Range Status   01/07/2025 22.9 8.0 - 23.0 mg/dL Final   07/19/2022 22 7 - 30 mg/dL Final   07/07/2021 21 7 - 30 mg/dL Final     Creatinine   Date Value Ref Range Status   01/07/2025 0.71 0.51 - 0.95 mg/dL Final   07/07/2021 0.71 0.52 - 1.04 mg/dL Final     GFR Estimate   Date Value Ref Range Status   01/07/2025 87 >60 mL/min/1.73m2 Final     Comment:     eGFR calculated using 2021 CKD-EPI equation.   07/07/2021 84 >60 mL/min/[1.73_m2] Final     Comment:     Non  GFR Calc  Starting 12/18/2018, serum creatinine based estimated GFR (eGFR) will be   calculated using the Chronic Kidney Disease Epidemiology Collaboration   (CKD-EPI) equation.       Glucose   Date Value Ref Range Status   01/07/2025 98 70 - 99 mg/dL Final   07/19/2022 109 (H) 70 - 99 mg/dL Final   07/07/2021 110 (H) 70 - 99 mg/dL Final       AST   Date Value Ref Range Status   08/12/2024 28 0 - 45 U/L Final   07/07/2021 14 0 - 45 U/L Final     ALT   Date Value Ref Range Status   08/12/2024 23 0 - 50 U/L Final   07/07/2021 23 0 - 50 U/L Final       40 minutes spent on the date of the encounter doing chart review, history and exam, documentation and further activities as noted above.      Again, thank you for allowing me to participate in the care of your patient.      Sincerely,    Dorita Cramer MD

## 2025-03-17 NOTE — NURSING NOTE
"Chief Complaint   Patient presents with    Diabetes    Endocrine Problem     Vital signs:      BP: 130/81 Pulse: 60     SpO2: 100 %          Estimated body mass index is 17.04 kg/m  as calculated from the following:    Height as of 11/4/24: 1.664 m (5' 5.5\").    Weight as of 12/12/24: 47.2 kg (104 lb).      Jeanmarie states she would like a Diabetic foot exam  "

## 2025-03-17 NOTE — PROGRESS NOTES
Assessment and Plan:   Jeanmarie is a 79 year old pleasant female with hx of SLE, APLS, DVT, osteoporosis and type 1 diabetes presenting for f/up.    1) Type 1 diabetes, formally diagnosed in 2010 while being evaluated for steroid-induced hyperglycemia.  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  She has been taking small dosages of short acting insulin.  Lantus was discontinued in view of hypoglycemia and low insulin requirements.  The patient prefers to maintain a very tight glycemic control.   F/up labs at her next visit     2) Osteoporosis, now osteopenia  Risk factors for osteoporosis include postmenopausal status, lupus, prior treatment with steroids, diabetes, fam history.   She was treated with Boniva for 3 years, followed by Forteo for 2 years. Received one dose of Reclast in July 2014, when she completed treatment with Forteo.  Treatment with Reclast was resumed in July 2020, when the DEXA scan revealed some loss of bone mineral density, although the lowest T score remained in the osteopenic range.  She received subsequent Reclast dosages in in 2021, 2023 and 2024. Most recent DXA from 8/23 from showed improvement of bone mineral density at the average hip.  Plan:   Walking, weightbearing exercises encouraged  F/up DXA scan in 8/2025     3) Hypercalcemia, mild and associated with significant hypercalciuria  Lab work was suggestive of primary hyperparathyroidism.  Treatment with hydrochlorothiazide is not an option in view of the allergy to sulfas (allergic rash).  The parathyroid scan and the neck CT failed to identify parathyroid adenomas.   Indications for surgery: hypercalciuria and low BMD.   Plan:  Pursue a neck CT once cleared by insurance   In the event over functioning parathyroid gland/glands are not identified by the CT, I recommended to consider evaluation at [facility] for C11 choline PET/CT.    4) Right neck mostly cystic nodule, 2 cm   Resolved on the most recent  US, most likely resorbed bleeding.     5) Cervical lymphadenopathy   Most likely related to GERD exacerbation     Orders Placed This Encounter   Procedures    AFINION HEMOGLOBIN A1C POCT    Comprehensive metabolic panel    Lipid panel reflex to direct LDL Fasting    Hematocrit    Albumin Random Urine Quantitative with Creat Ratio    Hemoglobin A1c     ============================================================================================================================================  Jeanmarie is a 79 year old pleasant female with hx of SLE, APLS, DVT, osteoporosis and type 1 diabetes presenting for f/up.   Her last clinic visit was on 11/4/2024.     Interval history:    1. Type 1 diabetes, diagnosed in 2010  Lantus was discontinued in 2020 and her diabetes is now managed only with NovoLog.    On average, she reports taking 1-2.5 units per meal. In general she has 2 meals a day, lunch and dinner.     Most recent A1c was 5.2, in 11/2024. It was 5.4% today.     Diabetes complications:   No h/o microalbuminuria.  Most recent urine microalbumin negative in 2/24. GFR >90 on labs from 10/24  Last eye exam -September 2024. No DR. S/P cataract surgery.  Numbness and tingling sensation B/L toes - for many years but light sensation is intact. She does wear comfortable shoes/insoles. 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 With walking she might  get a burning sensation.   She develops confusion, inability to concentrate or focus her vision and she feels extremely tired when her blood sugar is the 60s or 50s.  She has no prior episodes of loss of consciousness due to hypoglycemia.  She does not always recognize the lows.   She does have Baqsimi at home.  Most recent lipid panel from 8/12/24: LDL cholesterol 72, HDL cholesterol 63, triglycerides 64.  On 40 mg simvastatin daily.  Exercise: mainly walking     I reviewed the Siddharth data. On the sensor, average glucose is 91, with a glucose variability of  16.7%.  93% of the glucose numbers are within target, 7% are in the mild hypoglycemic range.  Most of the hypoglycemia occurs during the night, occasionally in the afternoon or late evening.  During the night, the hypoglycemic episodes are prolonged and mild, in the 60s. Not real lows, per patient.     2. Osteoporosis  Jeanmarie also has a history of osteoporosis, treated with Boniva for almost 3 years, followed by Forteo which was started in 4/12 - interrupted treatment from 4/2013 and restarted in 7/2013 until July 2014. After she completed the treatment with Forteo, she received one dose of Reclast, in July 2014.      She has no history of prior bone fractures.  She's been off prednisone since 2013. Her height has decreased over the years from 5'6'' to 5'1/2''. Her mother had a hip fracture in her 80s.      DXA 7/1/16:  L1-L3 T score was - 1.  Overall, at the spine, there was a significant increase of bone mineral density since 2005 of 10.5%. Since 2015, the bone mineral density has remained stable at spine. The lowest T score at the hip level was -2.2, at the left femoral neck.  Overall, there was a significant improvement of bone mineral density at the mean hip of 8.9% since 2005, with no significant changes since 2015. While the bone mineral density at the left hip increased since baseline, at the right hip, there was a decrease of bone mineral density since baseline and also, since prior year.     DXA 7/27/18:   The lowest T score was -2.1, at the left femoral neck.  Compared with the prior scan from 2016, the bone mineral density at the hips remained stable.  At the lumbar spine, L1-L3 T score was -1.3, not significantly changed since 2016.     DXA 1/2020:  L2-L4 T score was -1.5. At the hips, the lowest score was -2.3, at the left femoral neck. Compared with the prior DEXA scan from 2018, there was a significant decrease in bone mineral density of the lumbar spine, left total hip and right total hip by 3.1, 4.7  and 3.5%, respectively.  The patient received zoledronic acid infusion on 7/30/2020 and 7/27/21.     DXA 6/16/2022:  T score: -0.9 at L1-L4, -0.3 at 33% distal radius, -1.7 at both right and left femoral necks, -1.8 at the left total hip and -1.9 at the right total hip.  At the radius and average hip, the bone mineral density remained stable since 2020.  At the spine, there was a significant increase of 4.5%.    DXA 8/1/23:  L2-L4 T score -1.4  Left femoral neck T score -1.8, total L hip T score -1.5  Right femoral neck T score -1.5, total R hip T score -1.6  Bone density compared to the prior study increased at the mean total femur by +4.4%.  A third dose of Reclast was administered on 8/29/23 and a forth dose on 10/30/24.      3. Hypercalcemia   The patient developed mild hypercalcemia in 2023.  The hypercalcemia has been associated with a normal PTH, normal phosphorus, and elevated 24-hour urinary calcium.    Pertinent labs reviewed:  3/24/2023 calcium 10.4, GFR 73, vitamin D 63, albumin 4.2, PTH 34, phosphorus 4.2  6/5/2023 24-hour urinary calcium 0.34 g, urine creatinine 1.03 g, urine volume 1.5 L  8/7/23 PTH 35, calcium 10.9, phos 3.5, albumin 4.5, vitamin D 56   12/20/23 PTH 33, calcium 9.4, phos 4.1, albumin 4, GFR >90    12/9/2023 24-hour urinary calcium 0.38 g, creatinine 0.92 g, volume 2.325 L.   2/15/24 ionized calcium 5.6 (normal range 4.4-5.2)  2/15/24 24 hrs urinary calcium 0.38 g, creatinine 0.92 g   8/12/2024 vitamin D 52, PTH 34, phosphorus 3.2  8/13/2024 24-hour urinary calcium 0.37 g, urine creatinine 0.67 g and urine volume 2.4 L.  Treatment with hydrochlorothiazide was not an option due to patient's allergies to sulpha.   10/21/24 parathyroid scan - failed to identify a parathyroid gland.   11/13/24 Neck CT:  Inferior and posterior to right thyroid lobe, there was a hypoenhancing 8 x 7 mm centrally cystic nodular lesion. Inferiorly there is a punctate microcalcification.This lesion probably  "internally bled and now is resolving. A hemorrhagic cystic necrotic parathyroid adenoma cannot be completely excluded. Clusters of round shaped lymph nodes with internal echogenicity in the right level 4 and level 6.  1/7/25 calcium 9.1     One cup of almond milk daily and 1-2 servings of dairies daily.   In terms of calcium and vitamin D supplements, she reports taking 500 mg calcium as a oyster shell and 1000 U vitamin D daily.   Liquid intake: \"a lot\"    3. Thyroid nodules   8/30/24 neck US - right thyroid cyst with a thick wall, measuring 2.2 cm and some inferior calcification inferiorly. 2 atypical looking LNs at the right level 7.   9/19/24 swallowing study unremarkable   12/24 Right level 7 LN biopsy - negative for malignancy. Tg 0.12   3/7/25 Thyroid US - no identifiable nodules. I reviewed the US images.   Most recent TSH was 1, on 8/19/24.     Past Medical History  Past Medical History:   Diagnosis Date    Achalasia     Antiphospholipid syndrome     Antiplatelet or antithrombotic long-term use     Coagulation disorder [id number]    DM (diabetes mellitus) (H)     DVT (deep venous thrombosis) (H) 2003    and PE    Dysphagia     occasional, use Nifedipine    GERD (gastroesophageal reflux disease)     Hyperlipidemia     Lymphedema     Myopia     Neuropathy     toes bilateral    Nonsenile cataract     osteoporosis     Pericarditis     Raynaud's disease     SLE (systemic lupus erythematosus) (H)      Past Surgical History  Past Surgical History:   Procedure Laterality Date    CATARACT IOL, RT/LT Left 02/2019    CATARACT IOL, RT/LT Right 01/2019    COLONOSCOPY N/A 11/28/2016    Procedure: COLONOSCOPY;  Surgeon: Sang August MD;  Location: [facility]    CYSTOSCOPY, SLING TRANSVAGINAL N/A 12/20/2016    Procedure: CYSTOSCOPY, SLING TRANSVAGINAL;  Surgeon: Jeanmarie Pierson MD;  Location: [facility]    DISSECT LYMPH NODE AXILLA  2004    Lt, during eval for SLE    HYSTEROSCOPIC PLACEMENT CONTRACEPTIVE DEVICE  1985    " PHACOEMULSIFICATION WITH STANDARD INTRAOCULAR LENS IMPLANT Right 2019    Procedure: Right Eye Cataract Extraction with Intraocular Lens;  Surgeon: Monika Fermin MD;  Location: [facility]    PHACOEMULSIFICATION WITH STANDARD INTRAOCULAR LENS IMPLANT Left 2019    Procedure: Left Eye Cataract Extraction with Intraocular Lens;  Surgeon: Monika Fermin MD;  Location: [facility] OR    TUBAL LIGATION Bilateral         Medications    Current Outpatient Medications:     acetaminophen (TYLENOL) 500 MG tablet, Take 1,000-1,500 mg by mouth nightly as needed , Disp: , Rfl:     aspirin 81 MG tablet, Take 81 mg by mouth At Bedtime , Disp: , Rfl:     AYR SALINE NASAL NO-DRIP GEL, Use as needed for nasal dryness, Disp: 3 Tube, Rfl: 3    blood glucose monitoring (ULTRA THIN 30G) lancets, Test 5 times daily  [employer]  Super thin, Disp: 450 each, Rfl: 3    calcium carbonate 500 mg, elemental, (OSCAL) 500 MG tablet, Take 1 tablet (500 mg) by mouth daily., Disp: 90 tablet, Rfl: 3    cholecalciferol (VITAMIN D3) 25 mcg (1000 units) capsule, Take 1 capsule by mouth daily, Disp: , Rfl:     ciclopirox (LOPROX) 0.77 % cream, Apply topically 2 times daily. To toenails and affected areas on feet., Disp: 90 g, Rfl: 5    ciclopirox (PENLAC) 8 % external solution, Apply to adjacent skin and affected nails daily.  Remove with alcohol every 7 days, then repeat., Disp: 6.6 mL, Rfl: 11    Continuous Glucose Sensor (FREESTYLE SRI 3 PLUS SENSOR) MISC, Change every 15 days., Disp: 6 each, Rfl: 1    Glucagon (BAQSIMI ONE PACK) 3 MG/DOSE nasal powder, Spray 1 spray (3 mg) in nostril as needed (to be used for severe hypoglycemic episodes) Please ask pharmacy for instructions on disposal of  medication in your county., Disp: 1 each, Rfl: 4    Injection Device for insulin (NOVOPEN ECHO) RORY, 1 each 4 times daily, Disp: 1 each, Rfl: 0    insulin aspart (NOVOLOG PENFILL) 100 UNIT/ML cartridge, INJECT 1 UNIT PER 15 GRAMS OF CARBOHYDRATES  UNDER THE SKIN THREE TIMES A DAY WITH MEALS. APPROXIMATELY 10 UNITS DAILY., Disp: 9 mL, Rfl: 3    insulin glargine (LANTUS SOLOSTAR) 100 UNIT/ML pen, Inject 5 Units subcutaneously every morning. (Patient not taking: Reported on 1/21/2025), Disp: 3 mL, Rfl: 0    insulin pen needle (BD PAM U/F) 32G X 4 MM miscellaneous, USE AS DIRECTED WITH INSULIN PENS 4 TIMES DAILY, Disp: 400 each, Rfl: 3    lidocaine (XYLOCAINE) 5 % external ointment, Apply topically at bedtime, Disp: 50 g, Rfl: 5    NIFEdipine ER OSMOTIC (PROCARDIA XL) 30 MG 24 hr tablet, TAKE 1 TO 2 TABLETS BY MOUTH DAILY FOR DYSPHAGIA, Disp: 180 tablet, Rfl: 3    order for DME, Equipment being ordered: compression socks, 20-30 mmHg, knee high, Disp: 8 Piece, Rfl: 4    predniSONE (DELTASONE) 5 MG tablet, Take 1 tablet (5 mg) by mouth daily. Take 3 tabs daily for 5 days, then take 2 tabs daily for 5 days (Patient not taking: Reported on 1/21/2025), Disp: 25 tablet, Rfl: 1    simvastatin (ZOCOR) 40 MG tablet, Take 1 tablet (40 mg) by mouth at bedtime., Disp: 90 tablet, Rfl: 3    UNABLE TO FIND, MEDICATION NAME:  (multivitamin), Disp: , Rfl:     warfarin ANTICOAGULANT (JANTOVEN ANTICOAGULANT) 5 MG tablet, Take 1-1.5 tablets daily or as directed, Disp: 135 tablet, Rfl: 4    LMP  (LMP Unknown)   Wt Readings from Last 10 Encounters:   12/12/24 47.2 kg (104 lb)   11/04/24 47.2 kg (104 lb)   10/30/24 50.1 kg (110 lb 8 oz)   08/19/24 47.2 kg (104 lb)   02/19/24 48.5 kg (107 lb)   01/05/24 49.7 kg (109 lb 8 oz)   08/07/23 51.3 kg (113 lb)   02/06/23 57.9 kg (127 lb 11.2 oz)   08/01/22 56.1 kg (123 lb 11.2 oz)   01/24/22 52.6 kg (116 lb)     Refused to weight herself today.   /81   Pulse 60   LMP  (LMP Unknown)   SpO2 100%     Physical Examination:  General appearance: she is thin, no acute distress noted  Eyes: conjunctivae and extraocular motions are normal. Pupils are equal, round, and reactive to light. No lid lag, no stare.  HENT: oropharynx moist;  neck no JVD, no bruits, palpable right thyroid nodule, ~2.5 cm diameter, firm   Cardiovascular: regular rhythm, no murmurs, distal pulses palpable, mild L arm edema   Respiratory: chest clear, no rales, no rhonchi  Musculoskeletal: normal tone and strength   Neurologic: left knee reflex decreased, normal B/L bicipital reflexes, no resting tremor  Psychiatric: affect and judgment normal   Skin: nails onychomycosis  Feet: sensation preserved to monofilament testing, onychomycosis    Endocrine Labs:  Lab Results   Component Value Date    A1C 5.2 11/04/2024    A1C 5.3 08/19/2024    A1C 5.5 02/19/2024    A1C 5.7 (H) 01/04/2023    A1C 5.5 06/16/2022    A1C 5.7 (H) 02/23/2022    A1C 5.6 12/22/2021    A1C 5.4 07/07/2021    A1C 5.2 04/21/2021    A1C 5.6 01/20/2021    A1C 5.1 07/09/2020    A1C 5.3 07/11/2017    HEMOGLOBINA1 5.4 08/07/2023    HEMOGLOBINA1 5.1 01/13/2020    HEMOGLOBINA1 5.1 07/15/2019    HEMOGLOBINA1 5.0 07/30/2018    HEMOGLOBINA1 5.0 01/29/2018       Hemoglobin   Date Value Ref Range Status   01/07/2025 12.1 11.7 - 15.7 g/dL Final   04/29/2021 12.9 11.7 - 15.7 g/dL Final     Hematocrit   Date Value Ref Range Status   01/07/2025 36.8 35.0 - 47.0 % Final   04/29/2021 41.5 35.0 - 47.0 % Final     Cholesterol   Date Value Ref Range Status   08/12/2024 148 <200 mg/dL Final   07/07/2021 199 <200 mg/dL Final     Comment:     Desirable:       <200 mg/dl     Cholesterol/HDL Ratio   Date Value Ref Range Status   10/01/2014 1.5 0.0 - 5.0 Final     HDL Cholesterol   Date Value Ref Range Status   07/07/2021 108 >49 mg/dL Final     Direct Measure HDL   Date Value Ref Range Status   08/12/2024 63 >=50 mg/dL Final     LDL Cholesterol Calculated   Date Value Ref Range Status   08/12/2024 72 <=100 mg/dL Final   07/07/2021 79 <100 mg/dL Final     Comment:     Desirable:       <100 mg/dl     VLDL-Cholesterol   Date Value Ref Range Status   10/01/2014 7 0 - 30 mg/dL Final     Triglycerides   Date Value Ref Range Status    08/12/2024 64 <150 mg/dL Final   07/07/2021 58 <150 mg/dL Final     Albumin Urine mg/L   Date Value Ref Range Status   02/15/2024 <12.0 mg/L Final     Comment:     The reference ranges have not been established in urine albumin. The results should be integrated into the clinical context for interpretation.   06/16/2022 16 mg/L Final   07/07/2021 9 mg/L Final     TSH   Date Value Ref Range Status   08/19/2024 1.00 0.30 - 4.20 uIU/mL Final   08/01/2022 1.34 0.40 - 4.00 mU/L Final   07/09/2019 0.99 0.40 - 4.00 mU/L Final         Last Basic Metabolic Panel:    Sodium   Date Value Ref Range Status   01/07/2025 137 135 - 145 mmol/L Final   07/07/2021 141 133 - 144 mmol/L Final     Potassium   Date Value Ref Range Status   01/07/2025 4.3 3.4 - 5.3 mmol/L Final   07/19/2022 4.2 3.4 - 5.3 mmol/L Final   07/07/2021 4.0 3.4 - 5.3 mmol/L Final     Chloride   Date Value Ref Range Status   01/07/2025 100 98 - 107 mmol/L Final   07/19/2022 108 94 - 109 mmol/L Final   07/07/2021 106 94 - 109 mmol/L Final     Calcium   Date Value Ref Range Status   01/07/2025 9.1 8.8 - 10.4 mg/dL Final     Comment:     Reference intervals for this test were updated on 7/16/2024 to reflect our healthy population more accurately. There may be differences in the flagging of prior results with similar values performed with this method. Those prior results can be interpreted in the context of the updated reference intervals.   07/07/2021 9.1 8.5 - 10.1 mg/dL Final     Carbon Dioxide   Date Value Ref Range Status   07/07/2021 27 20 - 32 mmol/L Final     Carbon Dioxide (CO2)   Date Value Ref Range Status   01/07/2025 28 22 - 29 mmol/L Final   07/19/2022 28 20 - 32 mmol/L Final     Urea Nitrogen   Date Value Ref Range Status   01/07/2025 22.9 8.0 - 23.0 mg/dL Final   07/19/2022 22 7 - 30 mg/dL Final   07/07/2021 21 7 - 30 mg/dL Final     Creatinine   Date Value Ref Range Status   01/07/2025 0.71 0.51 - 0.95 mg/dL Final   07/07/2021 0.71 0.52 - 1.04 mg/dL  Final     GFR Estimate   Date Value Ref Range Status   01/07/2025 87 >60 mL/min/1.73m2 Final     Comment:     eGFR calculated using 2021 CKD-EPI equation.   07/07/2021 84 >60 mL/min/[1.73_m2] Final     Comment:     Non  GFR Calc  Starting 12/18/2018, serum creatinine based estimated GFR (eGFR) will be   calculated using the Chronic Kidney Disease Epidemiology Collaboration   (CKD-EPI) equation.       Glucose   Date Value Ref Range Status   01/07/2025 98 70 - 99 mg/dL Final   07/19/2022 109 (H) 70 - 99 mg/dL Final   07/07/2021 110 (H) 70 - 99 mg/dL Final       AST   Date Value Ref Range Status   08/12/2024 28 0 - 45 U/L Final   07/07/2021 14 0 - 45 U/L Final     ALT   Date Value Ref Range Status   08/12/2024 23 0 - 50 U/L Final   07/07/2021 23 0 - 50 U/L Final       40 minutes spent on the date of the encounter doing chart review, history and exam, documentation and further activities as noted above.

## 2025-04-01 ENCOUNTER — LAB (OUTPATIENT)
Dept: LAB | Facility: CLINIC | Age: 80
End: 2025-04-01
Payer: COMMERCIAL

## 2025-04-01 DIAGNOSIS — D68.61 ANTIPHOSPHOLIPID SYNDROME: ICD-10-CM

## 2025-04-01 PROCEDURE — 99000 SPECIMEN HANDLING OFFICE-LAB: CPT | Performed by: PATHOLOGY

## 2025-04-01 PROCEDURE — 85260 CLOT FACTOR X STUART-POWER: CPT | Performed by: INTERNAL MEDICINE

## 2025-04-01 PROCEDURE — 36415 COLL VENOUS BLD VENIPUNCTURE: CPT | Performed by: PATHOLOGY

## 2025-04-02 ENCOUNTER — ANTICOAGULATION THERAPY VISIT (OUTPATIENT)
Dept: ANTICOAGULATION | Facility: CLINIC | Age: 80
End: 2025-04-02
Payer: COMMERCIAL

## 2025-04-02 DIAGNOSIS — L93.0 LUPUS ERYTHEMATOSUS: ICD-10-CM

## 2025-04-02 DIAGNOSIS — I82.4Y9 DEEP VEIN THROMBOSIS (DVT) OF PROXIMAL LOWER EXTREMITY, UNSPECIFIED CHRONICITY, UNSPECIFIED LATERALITY (H): ICD-10-CM

## 2025-04-02 DIAGNOSIS — D68.61 ANTIPHOSPHOLIPID SYNDROME: ICD-10-CM

## 2025-04-02 DIAGNOSIS — Z79.01 LONG TERM CURRENT USE OF ANTICOAGULANT THERAPY: Primary | ICD-10-CM

## 2025-04-02 LAB — FACT X ACT/NOR PPP CHRO: 32 % (ref 70–130)

## 2025-04-02 NOTE — PROGRESS NOTES
ANTICOAGULATION MANAGEMENT     Shala Caruso 79 year old female is on warfarin with therapeutic result. (Goal Chromogenic Factor X 40-20%)    Recent labs: (last 7 days)     04/01/25  1349   [id number]*       ASSESSMENT     Source(s): Chart Review and Patient/Caregiver Call     Warfarin doses taken: Warfarin taken as instructed  Diet: No new diet changes identified  Medication/supplement changes:  Pt has been taking glucosamine/chondroitin supplement for the past 2 weeks, per [employer] potential for interaction between warfarin and chondroitin resulting in elevations of INR serum values, could impact CFX.   New illness, injury, or hospitalization: No  Signs or symptoms of bleeding or clotting: No  Previous result: Therapeutic last visit; previously outside of goal range  Additional findings: None     PLAN     Recommended plan for ongoing change(s) affecting result    Dosing Instructions: Continue your current warfarin dose 7.5mg every  Friday; 5mg all other days of the week  with next Chromogenic Factor X in 2 weeks       Telephone call with Jeanmarie who verbalizes understanding and agrees to plan    Lab visit scheduled    Education provided:   Goal range and lab monitoring: goal range and significance of current result, Importance of therapeutic range, and Importance of following up at instructed interval  Potential for interaction between warfarin and chondroitin    Plan made per [facility] anticoagulation protocol    Salvatore Wong, RN  4/2/2025  Anticoagulation Clinic  [employer] for routing messages: [facility]  [facility] patient phone line: [phone]

## 2025-04-17 ENCOUNTER — LAB (OUTPATIENT)
Dept: LAB | Facility: CLINIC | Age: 80
End: 2025-04-17
Payer: COMMERCIAL

## 2025-04-17 DIAGNOSIS — D68.61 ANTIPHOSPHOLIPID SYNDROME: ICD-10-CM

## 2025-04-17 PROCEDURE — 85260 CLOT FACTOR X STUART-POWER: CPT | Performed by: INTERNAL MEDICINE

## 2025-04-17 PROCEDURE — 99000 SPECIMEN HANDLING OFFICE-LAB: CPT | Performed by: PATHOLOGY

## 2025-04-28 ENCOUNTER — TELEPHONE (OUTPATIENT)
Dept: RHEUMATOLOGY | Facility: CLINIC | Age: 80
End: 2025-04-28

## 2025-04-28 ENCOUNTER — DOCUMENTATION ONLY (OUTPATIENT)
Dept: RHEUMATOLOGY | Facility: CLINIC | Age: 80
End: 2025-04-28

## 2025-04-28 DIAGNOSIS — M32.9 SYSTEMIC LUPUS ERYTHEMATOSUS, UNSPECIFIED SLE TYPE, UNSPECIFIED ORGAN INVOLVEMENT STATUS (H): Primary | ICD-10-CM

## 2025-04-28 NOTE — TELEPHONE ENCOUNTER
[facility] Call Center    Phone Message    May a detailed message be left on voicemail: yes     Reason for Call: Other: Pt is scheduled for labs today (4/28). There is no current orders in pt's chart. Please update standing lab orders.     Pt would like a call at ph: [phone] or [employer] message when orders are updated.     Action Taken: Message routed to:  Other: CS Rheum    Travel Screening: Not Applicable     Date of Service: 4/28

## 2025-04-28 NOTE — TELEPHONE ENCOUNTER
Please confirm if she will need to see Dr. Agarwal at all if he doesn't need labs as her Lupus is in remission right now. She would like a call either way thank you.

## 2025-04-29 ENCOUNTER — LAB (OUTPATIENT)
Dept: LAB | Facility: CLINIC | Age: 80
End: 2025-04-29
Payer: COMMERCIAL

## 2025-04-29 DIAGNOSIS — D68.61 ANTIPHOSPHOLIPID SYNDROME: ICD-10-CM

## 2025-04-29 DIAGNOSIS — M32.9 SYSTEMIC LUPUS ERYTHEMATOSUS, UNSPECIFIED SLE TYPE, UNSPECIFIED ORGAN INVOLVEMENT STATUS (H): ICD-10-CM

## 2025-04-29 LAB
ALBUMIN SERPL BCG-MCNC: 4 G/DL (ref 3.5–5.2)
ALBUMIN UR-MCNC: NEGATIVE MG/DL
ALP SERPL-CCNC: 56 U/L (ref 40–150)
ALT SERPL W P-5'-P-CCNC: 42 U/L (ref 0–50)
ANION GAP SERPL CALCULATED.3IONS-SCNC: 9 MMOL/L (ref 7–15)
APPEARANCE UR: CLEAR
AST SERPL W P-5'-P-CCNC: 38 U/L (ref 0–45)
BACTERIA #/AREA URNS HPF: ABNORMAL /HPF
BILIRUB SERPL-MCNC: 0.3 MG/DL
BILIRUB UR QL STRIP: NEGATIVE
BUN SERPL-MCNC: 32.9 MG/DL (ref 8–23)
CALCIUM SERPL-MCNC: 9.8 MG/DL (ref 8.8–10.4)
CHLORIDE SERPL-SCNC: 100 MMOL/L (ref 98–107)
COLOR UR AUTO: YELLOW
CREAT SERPL-MCNC: 0.93 MG/DL (ref 0.51–0.95)
EGFRCR SERPLBLD CKD-EPI 2021: 63 ML/MIN/1.73M2
ERYTHROCYTE [DISTWIDTH] IN BLOOD BY AUTOMATED COUNT: 16.8 % (ref 10–15)
GLUCOSE SERPL-MCNC: 93 MG/DL (ref 70–99)
GLUCOSE UR STRIP-MCNC: NEGATIVE MG/DL
HCO3 SERPL-SCNC: 29 MMOL/L (ref 22–29)
HCT VFR BLD AUTO: 38 % (ref 35–47)
HGB BLD-MCNC: 12.3 G/DL (ref 11.7–15.7)
HGB UR QL STRIP: NEGATIVE
HYALINE CASTS: 9 /LPF
KETONES UR STRIP-MCNC: NEGATIVE MG/DL
LEUKOCYTE ESTERASE UR QL STRIP: NEGATIVE
MCH RBC QN AUTO: 27.3 PG (ref 26.5–33)
MCHC RBC AUTO-ENTMCNC: 32.4 G/DL (ref 31.5–36.5)
MCV RBC AUTO: 84 FL (ref 78–100)
MUCOUS THREADS #/AREA URNS LPF: PRESENT /LPF
NITRATE UR QL: POSITIVE
PH UR STRIP: 7 [PH] (ref 5–7)
PLATELET # BLD AUTO: 244 10E3/UL (ref 150–450)
POTASSIUM SERPL-SCNC: 4.4 MMOL/L (ref 3.4–5.3)
PROT SERPL-MCNC: 6.7 G/DL (ref 6.4–8.3)
RBC # BLD AUTO: 4.5 10E6/UL (ref 3.8–5.2)
RBC URINE: <1 /HPF
SODIUM SERPL-SCNC: 138 MMOL/L (ref 135–145)
SP GR UR STRIP: 1.02 (ref 1–1.03)
UROBILINOGEN UR STRIP-MCNC: NORMAL MG/DL
WBC # BLD AUTO: 3.8 10E3/UL (ref 4–11)
WBC URINE: 3 /HPF

## 2025-04-29 PROCEDURE — 36415 COLL VENOUS BLD VENIPUNCTURE: CPT | Performed by: PATHOLOGY

## 2025-04-29 PROCEDURE — 85027 COMPLETE CBC AUTOMATED: CPT | Performed by: PATHOLOGY

## 2025-04-29 PROCEDURE — 99000 SPECIMEN HANDLING OFFICE-LAB: CPT | Performed by: PATHOLOGY

## 2025-04-29 PROCEDURE — 80053 COMPREHEN METABOLIC PANEL: CPT | Performed by: PATHOLOGY

## 2025-04-29 PROCEDURE — 85260 CLOT FACTOR X STUART-POWER: CPT | Performed by: INTERNAL MEDICINE

## 2025-04-29 PROCEDURE — 81001 URINALYSIS AUTO W/SCOPE: CPT | Performed by: PATHOLOGY

## 2025-04-30 ENCOUNTER — ANTICOAGULATION THERAPY VISIT (OUTPATIENT)
Dept: ANTICOAGULATION | Facility: CLINIC | Age: 80
End: 2025-04-30
Payer: COMMERCIAL

## 2025-04-30 DIAGNOSIS — L93.0 LUPUS ERYTHEMATOSUS: ICD-10-CM

## 2025-04-30 DIAGNOSIS — I82.4Y9 DEEP VEIN THROMBOSIS (DVT) OF PROXIMAL LOWER EXTREMITY, UNSPECIFIED CHRONICITY, UNSPECIFIED LATERALITY (H): ICD-10-CM

## 2025-04-30 DIAGNOSIS — Z79.01 LONG TERM CURRENT USE OF ANTICOAGULANT THERAPY: Primary | ICD-10-CM

## 2025-04-30 DIAGNOSIS — D68.61 ANTIPHOSPHOLIPID SYNDROME: ICD-10-CM

## 2025-04-30 LAB — FACT X ACT/NOR PPP CHRO: 28 % (ref 70–130)

## 2025-04-30 NOTE — PROGRESS NOTES
ANTICOAGULATION MANAGEMENT     Shala Caruso 79 year old female is on warfarin with therapeutic result. (Goal Chromogenic Factor X 40-20%)    Recent labs: (last 7 days)     04/29/25  1645   [id number]*       ASSESSMENT     Source(s): Chart Review  Previous result was Therapeutic last 2(+) visits  Medication, diet, health changes since last result: chart reviewed; none identified    I left a detailed voicemail with the orders reflected in flowsheet. I have also requested a call back if there have been any missed doses, concerns, illness, fever, or if there have been any changes in medications, activity level, or diet     PLAN     Recommended plan for no diet, medication or health factor changes affecting result    Dosing Instructions: Continue your current warfarin dose with next Chromogenic Factor X in 3 weeks          Summary  As of 4/30/2025      Full warfarin instructions:  5 mg every day               Detailed voice message left for Jeanmarie with dosing instructions and follow up date.   Sent [email or web address] message with dosing and follow up instructions    Contact [phone] to schedule and with any changes, questions or concerns.     Education provided:   Please call back if any changes to your diet, medications or how you've been taking warfarin    Plan made per [facility] anticoagulation protocol    Sharmin Smith, RN  4/30/2025  Anticoagulation Clinic  [facility] for routing messages: [facility]  [facility] patient phone line: [phone]

## 2025-05-07 DIAGNOSIS — E10.649 TYPE 1 DIABETES MELLITUS WITH HYPOGLYCEMIA AND WITHOUT COMA (H): ICD-10-CM

## 2025-05-07 RX ORDER — GLUCAGON 3 MG/1
3 POWDER NASAL PRN
Qty: 1 EACH | Refills: 4 | Status: SHIPPED | OUTPATIENT
Start: 2025-05-07

## 2025-05-12 DIAGNOSIS — D68.61 ANTIPHOSPHOLIPID SYNDROME: ICD-10-CM

## 2025-05-12 DIAGNOSIS — Z79.01 LONG TERM CURRENT USE OF ANTICOAGULANT THERAPY: ICD-10-CM

## 2025-05-14 DIAGNOSIS — Z79.01 LONG TERM CURRENT USE OF ANTICOAGULANT THERAPY: ICD-10-CM

## 2025-05-14 DIAGNOSIS — D68.61 ANTIPHOSPHOLIPID SYNDROME: ICD-10-CM

## 2025-05-14 RX ORDER — WARFARIN SODIUM 5 MG/1
5 TABLET ORAL EVERY EVENING
Qty: 90 TABLET | Refills: 1 | Status: SHIPPED | OUTPATIENT
Start: 2025-05-14

## 2025-05-14 RX ORDER — WARFARIN SODIUM 5 MG/1
5 TABLET ORAL EVERY EVENING
Qty: 90 TABLET | Refills: 1 | OUTPATIENT
Start: 2025-05-14

## 2025-05-14 RX ORDER — WARFARIN SODIUM 5 MG/1
5 TABLET ORAL EVERY EVENING
Qty: 90 TABLET | Refills: 1 | Status: CANCELLED | OUTPATIENT
Start: 2025-05-14

## 2025-05-14 NOTE — TELEPHONE ENCOUNTER
ANTICOAGULATION MANAGEMENT:  Medication Refill    Anticoagulation Summary  As of 4/30/2025      Warfarin maintenance plan:  5 mg (5 mg x 1) every day   Next INR check:  5/20/2025   Target end date:  Indefinite    Indications    SLE (systemic lupus erythematosus) (H) (Resolved) [M32.9]  Long term current use of anticoagulant therapy [Z79.01]  Lupus erythematosus [L93.0]  Antiphospholipid syndrome [D68.61]  Deep vein thrombosis (DVT) of proximal lower extremity  unspecified chronicity  unspecified laterality (H) [I82.4Y9]                 Anticoagulation Care Providers       Provider Role Specialty Phone number    Joe Contreras MD Referring Internal Medicine [phone]            Refill Criteria    Visit with referring provider/group: Meets criteria: visit within referring provider group in the last 15 months on 1/21/25    [facility] referral last signed: 10/16/2024; within last year:  Yes    Lab monitoring is up to date (not exceeding 2 weeks overdue): Yes    Shala meets all criteria for refill. Rx instructions and quantity supplied updated to match patient's current dosing plan. Warfarin 90 day supply with 1 refill granted per [facility] protocol     Andre Ashraf, RN  Anticoagulation Clinic

## 2025-05-14 NOTE — TELEPHONE ENCOUNTER
ANTICOAGULATION MANAGEMENT:  Medication Refill    Anticoagulation Summary  As of 4/30/2025      Warfarin maintenance plan:  5 mg (5 mg x 1) every day   Next INR check:  5/20/2025   Target end date:  Indefinite    Indications    SLE (systemic lupus erythematosus) (H) (Resolved) [M32.9]  Long term current use of anticoagulant therapy [Z79.01]  Lupus erythematosus [L93.0]  Antiphospholipid syndrome [D68.61]  Deep vein thrombosis (DVT) of proximal lower extremity  unspecified chronicity  unspecified laterality (H) [I82.4Y9]                 Anticoagulation Care Providers       Provider Role Specialty Phone number    Joe Contreras MD Referring Internal Medicine [phone]            Refill Criteria    Visit with referring provider/group: Meets criteria: visit within referring provider group in the last 15 months on 1/21/25    [facility] referral last signed: 10/16/2024; within last year:  Yes    Lab monitoring is up to date (not exceeding 2 weeks overdue): Yes    Shala meets all criteria for refill. Rx instructions and quantity supplied updated to match patient's current dosing plan. Warfarin 90 day supply with 1 refill granted per [facility] protocol     Andre Ashraf, RN  Anticoagulation Clinic

## 2025-05-19 ENCOUNTER — OFFICE VISIT (OUTPATIENT)
Dept: INTERNAL MEDICINE | Facility: CLINIC | Age: 80
End: 2025-05-19
Payer: COMMERCIAL

## 2025-05-19 VITALS
HEART RATE: 59 BPM | RESPIRATION RATE: 16 BRPM | SYSTOLIC BLOOD PRESSURE: 146 MMHG | OXYGEN SATURATION: 100 % | TEMPERATURE: 98 F | DIASTOLIC BLOOD PRESSURE: 82 MMHG

## 2025-05-19 DIAGNOSIS — K13.0 SORE LIP: Primary | ICD-10-CM

## 2025-05-19 PROCEDURE — 99213 OFFICE O/P EST LOW 20 MIN: CPT | Performed by: INTERNAL MEDICINE

## 2025-05-19 PROCEDURE — 3077F SYST BP >= 140 MM HG: CPT | Performed by: INTERNAL MEDICINE

## 2025-05-19 PROCEDURE — 3079F DIAST BP 80-89 MM HG: CPT | Performed by: INTERNAL MEDICINE

## 2025-05-19 RX ORDER — TRIAMCINOLONE ACETONIDE 0.1 %
PASTE (GRAM) DENTAL
Qty: 5 G | Refills: 12 | Status: SHIPPED | OUTPATIENT
Start: 2025-05-19

## 2025-05-19 RX ORDER — INSULIN GLARGINE 100 [IU]/ML
INJECTION, SOLUTION SUBCUTANEOUS
COMMUNITY
Start: 2025-01-10

## 2025-05-19 NOTE — PROGRESS NOTES
HPI  79-year-old here today with a lesion on her left lower lip.  This occurred after a bite.  She accidentally bit it it has been swollen since that time she is seen by her dentist told that this was a fibroma and to follow-up with medicine.  She has not had any bleeding she has not had any pain or discomfort to the sessile soft lump is somewhat irritating when she chews and occasionally get caught when she bites.  No bleeding or other abnormality no past history of similar issues in the past.  Otherwise she has been feeling well and no other significant complaints or concerns today.  Past Medical History:   Diagnosis Date    Achalasia     Antiphospholipid syndrome     Antiplatelet or antithrombotic long-term use     Coagulation disorder 9735-6131    DM (diabetes mellitus) (H)     DVT (deep venous thrombosis) (H) 2003    and PE    Dysphagia     occasional, use Nifedipine    GERD (gastroesophageal reflux disease)     Hyperlipidemia     Lymphedema     Myopia     Neuropathy     toes bilateral    Nonsenile cataract     osteoporosis     Pericarditis     Raynaud's disease     SLE (systemic lupus erythematosus) (H)      Past Surgical History:   Procedure Laterality Date    CATARACT IOL, RT/LT Left 02/2019    CATARACT IOL, RT/LT Right 01/2019    COLONOSCOPY N/A 11/28/2016    Procedure: COLONOSCOPY;  Surgeon: Sang August MD;  Location: [facility]    CYSTOSCOPY, SLING TRANSVAGINAL N/A 12/20/2016    Procedure: CYSTOSCOPY, SLING TRANSVAGINAL;  Surgeon: Jeanmarie Pierson MD;  [facility]    DISSECT LYMPH NODE AXILLA  2004    Lt, during eval for SLE    HYSTEROSCOPIC PLACEMENT CONTRACEPTIVE DEVICE  1985    PHACOEMULSIFICATION WITH STANDARD INTRAOCULAR LENS IMPLANT Right 1/8/2019    Procedure: Right Eye Cataract Extraction with Intraocular Lens;  Surgeon: Monika Fermin MD;  [facility]    PHACOEMULSIFICATION WITH STANDARD INTRAOCULAR LENS IMPLANT Left 2/5/2019    Procedure: Left Eye Cataract Extraction  with Intraocular Lens;  Surgeon: Monika Fermin MD;  [facility]    TUBAL LIGATION Bilateral      Family History   Problem Relation Age of Onset    Glaucoma Father     Cancer Other         breast    Cerebrovascular Disease Other     C.A.D. Other     Macular Degeneration No family hx of     Skin Cancer No family hx of          Exam:  BP (!) 146/82 (BP Location: Left arm, Patient Position: Sitting, Cuff Size: Adult Regular)   Pulse 59   Temp 98  F (36.7  C) (Oral)   Resp 16   LMP  (LMP Unknown)   SpO2 100%   The patient is alert, oriented with a clear sensorium.   Skin shows no lesions or rashes and good turgor.   Head is normocephalic and atraumatic.    Lips show a small soft pedunculated 4 to 5 mm raised lesion with no vascularity or induration.  Lungs are clear.   Heart shows normal S1 and S2 without murmur or gallop.    Extremities show no edema.        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    This note was completed using Dragon voice recognition software.      Joe Contreras MD  General Internal Medicine  [facility]  [phone]

## 2025-05-20 ENCOUNTER — LAB (OUTPATIENT)
Dept: LAB | Facility: CLINIC | Age: 80
End: 2025-05-20
Payer: COMMERCIAL

## 2025-05-20 DIAGNOSIS — D68.61 ANTIPHOSPHOLIPID SYNDROME: ICD-10-CM

## 2025-05-20 PROCEDURE — 99000 SPECIMEN HANDLING OFFICE-LAB: CPT | Performed by: PATHOLOGY

## 2025-05-20 PROCEDURE — 36415 COLL VENOUS BLD VENIPUNCTURE: CPT | Performed by: PATHOLOGY

## 2025-05-20 PROCEDURE — 85260 CLOT FACTOR X STUART-POWER: CPT | Performed by: INTERNAL MEDICINE

## 2025-05-21 ENCOUNTER — ANTICOAGULATION THERAPY VISIT (OUTPATIENT)
Dept: ANTICOAGULATION | Facility: CLINIC | Age: 80
End: 2025-05-21
Payer: COMMERCIAL

## 2025-05-21 ENCOUNTER — RESULTS FOLLOW-UP (OUTPATIENT)
Dept: ANTICOAGULATION | Facility: CLINIC | Age: 80
End: 2025-05-21

## 2025-05-21 LAB — FACT X ACT/NOR PPP CHRO: 28 % (ref 70–130)

## 2025-05-21 NOTE — PROGRESS NOTES
ANTICOAGULATION MANAGEMENT     Shala Caruso 79 year old female is on warfarin with therapeutic result. (Goal Chromogenic Factor X 40-20%)    Recent labs: (last 7 days)     05/20/25  1347   [id number] 28*       ASSESSMENT     Source(s): Chart Review  Previous result was Therapeutic last visit; previously outside of goal range  Medication, diet, health changes since last result: chart reviewed; none identified  MD decreased on 4/17/25       PLAN     Dosing Instructions: Continue your current warfarin dose with next Chromogenic Factor X in 4 weeks (6/18/25)         Summary  As of 5/21/2025      Full warfarin instructions:  5 mg every day               Detailed voice message left for Jeanmarie with dosing instructions and follow up date.     Contact [phone] to schedule and with any changes, questions or concerns.     Education provided:   Please call back if any changes to your diet, medications or how you've been taking warfarin  Contact [phone] with any changes, questions or concerns.     Plan made per [facility] anticoagulation protocol    Stacy Mckenna RN  5/21/2025  Anticoagulation Clinic  [employer] for routing messages: [facility]  [facility] patient phone line: [phone]

## 2025-06-09 ENCOUNTER — OFFICE VISIT (OUTPATIENT)
Dept: ORTHOPEDICS | Facility: CLINIC | Age: 80
End: 2025-06-09
Payer: COMMERCIAL

## 2025-06-09 DIAGNOSIS — E10.40 TYPE 1 DIABETES MELLITUS WITH DIABETIC NEUROPATHY (H): ICD-10-CM

## 2025-06-09 DIAGNOSIS — D36.13 NEUROMA OF FOOT: Primary | ICD-10-CM

## 2025-06-09 DIAGNOSIS — E10.628 TYPE 1 DIABETES MELLITUS WITH PRESSURE CALLUS (H): ICD-10-CM

## 2025-06-09 DIAGNOSIS — L84 TYPE 1 DIABETES MELLITUS WITH PRESSURE CALLUS (H): ICD-10-CM

## 2025-06-09 DIAGNOSIS — B35.1 ONYCHOMYCOSIS: ICD-10-CM

## 2025-06-09 PROCEDURE — 99213 OFFICE O/P EST LOW 20 MIN: CPT | Performed by: PODIATRIST

## 2025-06-09 NOTE — PROGRESS NOTES
Past Medical History:   Diagnosis Date    Achalasia     Antiphospholipid syndrome     Antiplatelet or antithrombotic long-term use     Coagulation disorder [id number]    DM (diabetes mellitus) (H)     DVT (deep venous thrombosis) (H) 2003    and PE    Dysphagia     occasional, use Nifedipine    GERD (gastroesophageal reflux disease)     Hyperlipidemia     Lymphedema     Myopia     Neuropathy     toes bilateral    Nonsenile cataract     osteoporosis     Pericarditis     Raynaud's disease     SLE (systemic lupus erythematosus) (H)      Patient Active Problem List   Diagnosis    Achalasia    Antiphospholipid syndrome    DM (diabetes mellitus) (H)    GERD (gastroesophageal reflux disease)    Hyperlipidemia    HTN (hypertension)    Osteopenia    Raynaud's disease    DVT (deep venous thrombosis) (H)    Encounter for long-term current use of medication    Type 1 diabetes mellitus (H)    Osteoporosis, idiopathic    Osteoporosis, postmenopausal    Cystocele, midline    Urinary urgency    Urinary frequency    Female stress incontinence    Atrophic vaginitis    Long term current use of anticoagulant therapy    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)    Choroidal lesion    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, unspecified SLE type (H)    Hematoma of leg, right, initial encounter    Lupus erythematosus    Deep vein thrombosis (DVT) of proximal lower extremity, unspecified chronicity, unspecified laterality (H)     Past Surgical History:   Procedure Laterality Date    CATARACT IOL, RT/LT Left 02/2019    CATARACT IOL, RT/LT Right 01/2019    COLONOSCOPY N/A 11/28/2016    Procedure: COLONOSCOPY;  Surgeon: Sang August MD;  Location: [facility]    CYSTOSCOPY, SLING TRANSVAGINAL N/A 12/20/2016    Procedure: CYSTOSCOPY, SLING TRANSVAGINAL;  Surgeon: Jeanmarie Pierson MD;  [facility]    DISSECT LYMPH NODE AXILLA  2004    Lt, during eval for SLE    HYSTEROSCOPIC PLACEMENT  CONTRACEPTIVE DEVICE  1985    PHACOEMULSIFICATION WITH STANDARD INTRAOCULAR LENS IMPLANT Right 1/8/2019    Procedure: Right Eye Cataract Extraction with Intraocular Lens;  Surgeon: Monika Fermin MD;  [facility]    PHACOEMULSIFICATION WITH STANDARD INTRAOCULAR LENS IMPLANT Left 2/5/2019    Procedure: Left Eye Cataract Extraction with Intraocular Lens;  Surgeon: Monika Fermin MD;  [facility]    TUBAL LIGATION Bilateral      Social History     Socioeconomic History    Marital status:      Spouse name: Not on file    Number of children: Not on file    Years of education: Not on file    Highest education level: Not on file   Occupational History    Not on file   Tobacco Use    Smoking status: Never    Smokeless tobacco: Never   Substance and Sexual Activity    Alcohol use: No    Drug use: No    Sexual activity: Yes     Partners: Male     Comment: , safe in relationship   Other Topics Concern    Parent/sibling w/ CABG, MI or angioplasty before 65F 55M? Not Asked   Social History Narrative    How much exercise per week? Walk to work every morning (2 miles), swimming 3 times a week, takes walks with , periodically works out at gym on stationary bike     How much calcium per day? Supplement 3x daily        How much caffeine per day? On rare occasion, only if she is out to eat and that is all they have    How much vitamin D per day? supplement    Do you/your family wear seatbelts?  Yes    Do you/your family use safety helmets? Yes    Do you/your family use sunscreen? Yes    Do you/your family keep firearms in the home? No    Do you/your family have a smoke detector(s)? Yes        Do you feel safe in your home? Yes    Has anyone ever touched you in an unwanted manner? No        Klever R LPN 7/18/2013                 Social Drivers of Health     Financial Resource Strain: Low Risk  (1/16/2025)    Financial Resource Strain     Within the past 12 months, have you or your family  members you live with been unable to get utilities (heat, electricity) when it was really needed?: No   Food Insecurity: Low Risk  (1/16/2025)    Food Insecurity     Within the past 12 months, did you worry that your food would run out before you got money to buy more?: No     Within the past 12 months, did the food you bought just not last and you didn t have money to get more?: No   Transportation Needs: Low Risk  (1/16/2025)    Transportation Needs     Within the past 12 months, has lack of transportation kept you from medical appointments, getting your medicines, non-medical meetings or appointments, work, or from getting things that you need?: No   Physical Activity: Not on file   Stress: No Stress Concern Present (1/16/2025)    [employer] [place] [employer] - Occupational Stress Questionnaire     Feeling of Stress : Not at all   Social Connections: Unknown (1/16/2025)    Social Connection and Isolation Panel [NHANES]     Frequency of Communication with Friends and Family: Not on file     Frequency of Social Gatherings with Friends and Family: Patient declined     Attends Christianity Services: Not on file     Active Member of Clubs or Organizations: Not on file     Attends Club or Organization Meetings: Not on file     Marital Status: Not on file   Interpersonal Safety: Low Risk  (9/18/2024)    Interpersonal Safety     Do you feel physically and emotionally safe where you currently live?: Yes     Within the past 12 months, have you been hit, slapped, kicked or otherwise physically hurt by someone?: No     Within the past 12 months, have you been humiliated or emotionally abused in other ways by your partner or ex-partner?: No   Housing Stability: Low Risk  (1/16/2025)    Housing Stability     Do you have housing? : Yes     Are you worried about losing your housing?: No     Family History   Problem Relation Age of Onset    Glaucoma Father     Cancer Other         breast    Cerebrovascular Disease Other      C.A.D. Other     Macular Degeneration No family hx of     Skin Cancer No family hx of          Lab Results   Component Value Date    A1C 5.4 03/17/2025    A1C 5.2 11/04/2024    A1C 5.3 08/19/2024    A1C 5.5 02/19/2024    A1C 5.7 01/04/2023    A1C 5.5 06/16/2022    A1C 5.7 02/23/2022    A1C 5.6 12/22/2021    A1C 5.4 07/07/2021    A1C 5.2 04/21/2021    A1C 5.6 01/20/2021    A1C 5.1 07/09/2020    A1C 5.3 07/11/2017             Subjective findings- 79-year-old returns clinic for chronic Neuromas right third interspace bilaterally, onychomycosis, diabetes with peripheral neuropathy, callus and foot deformity.  She relates she is doing relatively well.  Relates she still has neuropathy in her feet that is unchanged.  Relates she did get her diabetic shoes and insoles and those are good.  Relates to using the Loprox cream and that it is helping.  Relates to no ulcers, no sores, no injuries since we seen her last.    Objective findings- DP and PT are 2 out of 4 bilaterally.  Has dystrophic discolored thickened nails with subungual debris dystrophy and discoloration to differing degrees 1 through 5 bilaterally.  There is no erythema, no edema, no drainage, no odor, no calor, no pain on palpation bilaterally.    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  Onychomycosis bilaterally.  Diabetes with peripheral Neuropathy.  Diabetes with callus and foot deformity.  Diagnosis and treatment options discussed with patient.  Continue the Diabetic shoes.  Continue Loprox cream.  Previous notes reviewed.  Return to clinic and see me in 1 year.                                              Low level of medical decision making.

## 2025-06-09 NOTE — LETTER
6/9/2025      Shala Caruso  [address] [place] [address]      Dear Colleague,    Thank you for referring your patient, Shala Caruso, to the [facility] [place]. Please see a copy of my visit note below.    Past Medical History:   Diagnosis Date    Achalasia     Antiphospholipid syndrome     Antiplatelet or antithrombotic long-term use     Coagulation disorder [id number]    DM (diabetes mellitus) (H)     DVT (deep venous thrombosis) (H) 2003    and PE    Dysphagia     occasional, use Nifedipine    GERD (gastroesophageal reflux disease)     Hyperlipidemia     Lymphedema     Myopia     Neuropathy     toes bilateral    Nonsenile cataract     osteoporosis     Pericarditis     Raynaud's disease     SLE (systemic lupus erythematosus) (H)      Patient Active Problem List   Diagnosis    Achalasia    Antiphospholipid syndrome    DM (diabetes mellitus) (H)    GERD (gastroesophageal reflux disease)    Hyperlipidemia    HTN (hypertension)    Osteopenia    Raynaud's disease    DVT (deep venous thrombosis) (H)    Encounter for long-term current use of medication    Type 1 diabetes mellitus (H)    Osteoporosis, idiopathic    Osteoporosis, postmenopausal    Cystocele, midline    Urinary urgency    Urinary frequency    Female stress incontinence    Atrophic vaginitis    Long term current use of anticoagulant therapy    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)    Choroidal lesion    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, unspecified SLE type (H)    Hematoma of leg, right, initial encounter    Lupus erythematosus    Deep vein thrombosis (DVT) of proximal lower extremity, unspecified chronicity, unspecified laterality (H)     Past Surgical History:   Procedure Laterality Date    CATARACT IOL, RT/LT Left 02/2019    CATARACT IOL, RT/LT Right 01/2019    COLONOSCOPY N/A 11/28/2016    Procedure: COLONOSCOPY;  Surgeon: Sang August MD;  Location:  [facility]    CYSTOSCOPY, SLING TRANSVAGINAL N/A 12/20/2016    Procedure: CYSTOSCOPY, SLING TRANSVAGINAL;  Surgeon: Jeanmarie Pierson MD;  [facility]    DISSECT LYMPH NODE AXILLA  2004    Lt, during eval for SLE    HYSTEROSCOPIC PLACEMENT CONTRACEPTIVE DEVICE  1985    PHACOEMULSIFICATION WITH STANDARD INTRAOCULAR LENS IMPLANT Right 1/8/2019    Procedure: Right Eye Cataract Extraction with Intraocular Lens;  Surgeon: Monika Fermin MD;  [facility]    PHACOEMULSIFICATION WITH STANDARD INTRAOCULAR LENS IMPLANT Left 2/5/2019    Procedure: Left Eye Cataract Extraction with Intraocular Lens;  Surgeon: Monika Fermin MD;  [facility]    TUBAL LIGATION Bilateral      Social History     Socioeconomic History    Marital status:      Spouse name: Not on file    Number of children: Not on file    Years of education: Not on file    Highest education level: Not on file   Occupational History    Not on file   Tobacco Use    Smoking status: Never    Smokeless tobacco: Never   Substance and Sexual Activity    Alcohol use: No    Drug use: No    Sexual activity: Yes     Partners: Male     Comment: , safe in relationship   Other Topics Concern    Parent/sibling w/ CABG, MI or angioplasty before 65F 55M? Not Asked   Social History Narrative    How much exercise per week? Walk to work every morning (2 miles), swimming 3 times a week, takes walks with , periodically works out at gym on stationary bike     How much calcium per day? Supplement 3x daily        How much caffeine per day? On rare occasion, only if she is out to eat and that is all they have    How much vitamin D per day? supplement    Do you/your family wear seatbelts?  Yes    Do you/your family use safety helmets? Yes    Do you/your family use sunscreen? Yes    Do you/your family keep firearms in the home? No    Do you/your family have a smoke detector(s)? Yes        Do you feel safe in your home? Yes    Has anyone ever  touched you in an unwanted manner? No        Klever MCCORD LPN 7/18/2013                 Social Drivers of Health     Financial Resource Strain: Low Risk  (1/16/2025)    Financial Resource Strain     Within the past 12 months, have you or your family members you live with been unable to get utilities (heat, electricity) when it was really needed?: No   Food Insecurity: Low Risk  (1/16/2025)    Food Insecurity     Within the past 12 months, did you worry that your food would run out before you got money to buy more?: No     Within the past 12 months, did the food you bought just not last and you didn t have money to get more?: No   Transportation Needs: Low Risk  (1/16/2025)    Transportation Needs     Within the past 12 months, has lack of transportation kept you from medical appointments, getting your medicines, non-medical meetings or appointments, work, or from getting things that you need?: No   Physical Activity: Not on file   Stress: No Stress Concern Present (1/16/2025)    [employer] [place] [employer]ational [employer] - Occupational Stress Questionnaire     Feeling of Stress : Not at all   Social Connections: Unknown (1/16/2025)    Social Connection and Isolation Panel [NHANES]     Frequency of Communication with Friends and Family: Not on file     Frequency of Social Gatherings with Friends and Family: Patient declined     Attends Catholic Services: Not on file     Active Member of Clubs or Organizations: Not on file     Attends Club or Organization Meetings: Not on file     Marital Status: Not on file   Interpersonal Safety: Low Risk  (9/18/2024)    Interpersonal Safety     Do you feel physically and emotionally safe where you currently live?: Yes     Within the past 12 months, have you been hit, slapped, kicked or otherwise physically hurt by someone?: No     Within the past 12 months, have you been humiliated or emotionally abused in other ways by your partner or ex-partner?: No   Housing Stability: Low Risk   (1/16/2025)    Housing Stability     Do you have housing? : Yes     Are you worried about losing your housing?: No     Family History   Problem Relation Age of Onset    Glaucoma Father     Cancer Other         breast    Cerebrovascular Disease Other     C.A.D. Other     Macular Degeneration No family hx of     Skin Cancer No family hx of          Lab Results   Component Value Date    A1C 5.4 03/17/2025    A1C 5.2 11/04/2024    A1C 5.3 08/19/2024    A1C 5.5 02/19/2024    A1C 5.7 01/04/2023    A1C 5.5 06/16/2022    A1C 5.7 02/23/2022    A1C 5.6 12/22/2021    A1C 5.4 07/07/2021    A1C 5.2 04/21/2021    A1C 5.6 01/20/2021    A1C 5.1 07/09/2020    A1C 5.3 07/11/2017             Subjective findings- 79-year-old returns clinic for chronic Neuromas right third interspace bilaterally, onychomycosis, diabetes with peripheral neuropathy, callus and foot deformity.  She relates she is doing relatively well.  Relates she still has neuropathy in her feet that is unchanged.  Relates she did get her diabetic shoes and insoles and those are good.  Relates to using the Loprox cream and that it is helping.  Relates to no ulcers, no sores, no injuries since we seen her last.    Objective findings- DP and PT are 2 out of 4 bilaterally.  Has dystrophic discolored thickened nails with subungual debris dystrophy and discoloration to differing degrees 1 through 5 bilaterally.  There is no erythema, no edema, no drainage, no odor, no calor, no pain on palpation bilaterally.    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  Onychomycosis bilaterally.  Diabetes with peripheral Neuropathy.  Diabetes with callus and foot deformity.  Diagnosis and treatment options discussed with patient.  Continue the Diabetic shoes.  Continue Loprox cream.  Previous notes reviewed.  Return to clinic and see me in 1 year.      Low level of medical decision making.        Again, thank you for allowing me to participate in the  care of your patient.      Sincerely,    John Roque DPM    Electronically signed

## 2025-06-09 NOTE — NURSING NOTE
Reason For Visit:   Chief Complaint   Patient presents with    RECHECK     Follow up 3 month neuromas of foot bilateral        LMP  (LMP Unknown)     Pain Assessment  Patient Currently in Pain: Mayuri Moss CMA

## 2025-06-17 ENCOUNTER — LAB (OUTPATIENT)
Dept: LAB | Facility: CLINIC | Age: 80
End: 2025-06-17
Payer: COMMERCIAL

## 2025-06-17 DIAGNOSIS — D68.61 ANTIPHOSPHOLIPID SYNDROME: ICD-10-CM

## 2025-06-17 PROCEDURE — 36415 COLL VENOUS BLD VENIPUNCTURE: CPT | Performed by: PATHOLOGY

## 2025-06-17 PROCEDURE — 99000 SPECIMEN HANDLING OFFICE-LAB: CPT | Performed by: PATHOLOGY

## 2025-06-17 PROCEDURE — 85260 CLOT FACTOR X STUART-POWER: CPT | Performed by: INTERNAL MEDICINE

## 2025-06-18 ENCOUNTER — RESULTS FOLLOW-UP (OUTPATIENT)
Dept: ANTICOAGULATION | Facility: CLINIC | Age: 80
End: 2025-06-18

## 2025-06-18 ENCOUNTER — ANTICOAGULATION THERAPY VISIT (OUTPATIENT)
Dept: ANTICOAGULATION | Facility: CLINIC | Age: 80
End: 2025-06-18

## 2025-06-18 DIAGNOSIS — L93.0 LUPUS ERYTHEMATOSUS: ICD-10-CM

## 2025-06-18 DIAGNOSIS — I82.4Y9 DEEP VEIN THROMBOSIS (DVT) OF PROXIMAL LOWER EXTREMITY, UNSPECIFIED CHRONICITY, UNSPECIFIED LATERALITY (H): ICD-10-CM

## 2025-06-18 DIAGNOSIS — D68.61 ANTIPHOSPHOLIPID SYNDROME: ICD-10-CM

## 2025-06-18 DIAGNOSIS — Z79.01 LONG TERM CURRENT USE OF ANTICOAGULANT THERAPY: Primary | ICD-10-CM

## 2025-06-18 LAB — FACT X ACT/NOR PPP CHRO: 28 % (ref 70–130)

## 2025-06-18 NOTE — PROGRESS NOTES
ANTICOAGULATION MANAGEMENT     Shala Caruso 79 year old female is on warfarin with therapeutic result. (Goal Chromogenic Factor X 40-20%)    Recent labs: (last 7 days)     06/17/25  1307   [id number] 28*       ASSESSMENT     Source(s): Chart Review     Warfarin doses taken: Warfarin taken as instructed  Diet: No new diet changes identified  Medication/supplement changes: None noted  New illness, injury, or hospitalization: No  Signs or symptoms of bleeding or clotting: No  Previous result: Therapeutic last 2(+) visits  Additional findings: None     PLAN     Recommended plan for no diet, medication or health factor changes affecting result    Dosing Instructions: Continue your current warfarin dose with next Chromogenic Factor X in 5 weeks          Summary  As of 6/18/2025      Full warfarin instructions:  5 mg every day               Telephone call with Jeanmarie who verbalizes understanding and agrees to plan    Patient offered & declined to schedule next visit    Education provided:   Contact [phone] with any changes, questions or concerns.     Plan made per [facility] anticoagulation protocol    Teresa Marshall RN  6/18/2025  Anticoagulation Clinic  [email or web address] [place] for routing messages: [facility] [facility]  [facility] patient phone line: [phone]

## 2025-06-23 ENCOUNTER — TELEPHONE (OUTPATIENT)
Dept: ENDOCRINOLOGY | Facility: CLINIC | Age: 80
End: 2025-06-23
Payer: COMMERCIAL

## 2025-06-23 NOTE — TELEPHONE ENCOUNTER
Left Voicemail (1st Attempt) for the patient to call back and schedule the following:    Appointment type: Return Diabetes  Provider: Dorita Cramer  Return date: First available ~9/29/2025  Specialty phone number: [phone]  Additional appointment(s) needed: N/A    Additonal Notes: [id number] [id number]    Reece Cable on 6/23/2025 at 4:22 PM    
None

## 2025-07-02 ENCOUNTER — MYC MEDICAL ADVICE (OUTPATIENT)
Dept: ENDOCRINOLOGY | Facility: CLINIC | Age: 80
End: 2025-07-02
Payer: COMMERCIAL

## 2025-07-03 NOTE — CONFIDENTIAL NOTE
Confirmed Patient is still on Wait list for her provider.       Response sent back confirming.       Gretchen Bailey, RYAN  Adult Endocrinology   [facility]

## 2025-07-16 ENCOUNTER — OFFICE VISIT (OUTPATIENT)
Dept: INTERNAL MEDICINE | Facility: CLINIC | Age: 80
End: 2025-07-16
Payer: COMMERCIAL

## 2025-07-16 VITALS
SYSTOLIC BLOOD PRESSURE: 136 MMHG | DIASTOLIC BLOOD PRESSURE: 78 MMHG | RESPIRATION RATE: 16 BRPM | TEMPERATURE: 97.9 F | HEART RATE: 63 BPM | OXYGEN SATURATION: 97 %

## 2025-07-16 DIAGNOSIS — K13.0 LIP LESION: Primary | ICD-10-CM

## 2025-07-16 NOTE — PROGRESS NOTES
HPI  79-year-old here today for several issues.  She has had the persistent lip lesion.  Has been present now since her last visit she was spearing using the Kenalog but it has not subsided at all.  She continues to play with predominantly with her tongue that has not subsided and she is interested in having it removed.  She also tripped on the lower stair about a week ago fell on the point of her right knee.  Is associated with some swelling and persistent discomfort since that time.  She has not been doing any therapeutic exercises for this at all at this point.  Otherwise she reports no locking or buckling in association with this and it is not giving way at all.  Blood sugars have been under good control 95% in range by her report.  Past Medical History:   Diagnosis Date    Achalasia     Antiphospholipid syndrome     Antiplatelet or antithrombotic long-term use     Coagulation disorder [id number]    Deep vein thrombosis (DVT) (H)     DM (diabetes mellitus) (H)     DVT (deep venous thrombosis) (H) 2003    and PE    Dysphagia     occasional, use Nifedipine    GERD (gastroesophageal reflux disease)     Hyperlipidemia     Lymphedema     Myopia     Neuropathy     toes bilateral    Nonsenile cataract     osteoporosis     Pericarditis     Peripheral vascular disease     Is this the diabetic neropathy?    Raynaud's disease     SLE (systemic lupus erythematosus) (H)     Type 1 diabetes (H)      Past Surgical History:   Procedure Laterality Date    BIOPSY  scraped lymphnode    CATARACT IOL, RT/LT Left 02/2019    CATARACT IOL, RT/LT Right 01/2019    COLONOSCOPY N/A 11/28/2016    Procedure: COLONOSCOPY;  Surgeon: Sang August MD;  Location: [facility]    CYSTOSCOPY, SLING TRANSVAGINAL N/A 12/20/2016    Procedure: CYSTOSCOPY, SLING TRANSVAGINAL;  Surgeon: Jeanmarie Pierson MD;  Location: [facility]    DISSECT LYMPH NODE AXILLA  2004    Lt, during eval for SLE    HYSTEROSCOPIC PLACEMENT CONTRACEPTIVE DEVICE  1985     PHACOEMULSIFICATION WITH STANDARD INTRAOCULAR LENS IMPLANT Right 2019    Procedure: Right Eye Cataract Extraction with Intraocular Lens;  Surgeon: Monika Fermin MD;  Location: [facility]    PHACOEMULSIFICATION WITH STANDARD INTRAOCULAR LENS IMPLANT Left 2019    Procedure: Left Eye Cataract Extraction with Intraocular Lens;  Surgeon: Monika Fermin MD;  Location: [facility]    TUBAL LIGATION Bilateral      Family History   Problem Relation Age of Onset    Breast Cancer Mother             Thyroid Disease Mother         low Thyroid / pills took care of it. she is     Glaucoma Father     Cerebrovascular Disease Father             Cancer Other         breast    Cerebrovascular Disease Other     C.A.D. Other     Macular Degeneration No family hx of     Skin Cancer No family hx of     Diabetes No family hx of         I & my maternal aunt were the only ones I am aware of that developed t1d (aunt          Exam:  /78 (BP Location: Left arm, Patient Position: Sitting, Cuff Size: Adult Small)   Pulse 63   Temp 97.9  F (36.6  C) (Oral)   Resp 16   LMP  (LMP Unknown)   SpO2 97%   The patient is alert, oriented with a clear sensorium.   Skin shows no lesions or rashes and good turgor.   Head is normocephalic and atraumatic.    Left lower lip shows a soft nodular lesion which is not tender or indurated at all.     Lungs are clear.   Heart shows normal S1 and S2 without murmur or gallop.    Extremities show no edema. Mild right knee effusion.  There is tenderness along the anterior and lateral joint lines no pain with hyperflexion no pain or clicking with Ari's testing.        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  Reviewed some quadricep exercises including isometric sit stands with air for the knee.  If this does not continue to improve over the next week or 2 we will refer for physical therapy.    This note was completed using Dragon voice recognition software.      Joe Contreras MD  General Internal Medicine  [facility]  [phone]

## 2025-07-16 NOTE — PROGRESS NOTES
Jeanmarie is a 79 year old that presents in clinic today for the following:     Chief Complaint   Patient presents with    Mouth/Lip Problem     Follow up on growth    Musculoskeletal Problem     Right knee injury, tripped over stairs and landed on knee over 1 week ago, healing slowly.            7/16/2025     3:24 PM   Additional Questions   Roomed by maude         7/16/2025   Declines Weight   Did patient decline having their weight taken? Yes     Screenings from encounters over the past 10 days    No data recorded       Klaus Contreras at 3:30 PM on 7/16/2025

## 2025-07-17 ENCOUNTER — PATIENT OUTREACH (OUTPATIENT)
Dept: CARE COORDINATION | Facility: CLINIC | Age: 80
End: 2025-07-17
Payer: COMMERCIAL

## 2025-07-29 ENCOUNTER — LAB (OUTPATIENT)
Dept: LAB | Facility: CLINIC | Age: 80
End: 2025-07-29
Payer: COMMERCIAL

## 2025-07-29 ENCOUNTER — MYC MEDICAL ADVICE (OUTPATIENT)
Dept: ANTICOAGULATION | Facility: CLINIC | Age: 80
End: 2025-07-29

## 2025-07-29 DIAGNOSIS — D68.61 ANTIPHOSPHOLIPID SYNDROME: ICD-10-CM

## 2025-07-29 PROCEDURE — 85260 CLOT FACTOR X STUART-POWER: CPT | Performed by: INTERNAL MEDICINE

## 2025-07-29 PROCEDURE — 99000 SPECIMEN HANDLING OFFICE-LAB: CPT | Performed by: PATHOLOGY

## 2025-07-29 PROCEDURE — 36415 COLL VENOUS BLD VENIPUNCTURE: CPT | Performed by: PATHOLOGY

## 2025-07-30 ENCOUNTER — ANTICOAGULATION THERAPY VISIT (OUTPATIENT)
Dept: ANTICOAGULATION | Facility: CLINIC | Age: 80
End: 2025-07-30
Payer: COMMERCIAL

## 2025-07-30 DIAGNOSIS — Z79.01 LONG TERM CURRENT USE OF ANTICOAGULANT THERAPY: Primary | ICD-10-CM

## 2025-07-30 DIAGNOSIS — L93.0 LUPUS ERYTHEMATOSUS: ICD-10-CM

## 2025-07-30 DIAGNOSIS — I82.4Y9 DEEP VEIN THROMBOSIS (DVT) OF PROXIMAL LOWER EXTREMITY, UNSPECIFIED CHRONICITY, UNSPECIFIED LATERALITY (H): ICD-10-CM

## 2025-07-30 DIAGNOSIS — D68.61 ANTIPHOSPHOLIPID SYNDROME: ICD-10-CM

## 2025-07-30 LAB — FACT X ACT/NOR PPP CHRO: 34 % (ref 70–130)

## 2025-07-30 NOTE — PROGRESS NOTES
ANTICOAGULATION MANAGEMENT     Shala Caruso 79 year old female is on warfarin with therapeutic result. (Goal Chromogenic Factor X 40-20%)    Recent labs: (last 7 days)     07/29/25  1313   [id number]*       ASSESSMENT     Source(s): Chart Review and Patient/Caregiver Call     Warfarin doses taken: Warfarin taken as instructed  Diet: No new diet changes identified  Medication/supplement changes: None noted  New illness, injury, or hospitalization: No  Signs or symptoms of bleeding or clotting: No  Previous result: Therapeutic last 2(+) visits  Additional findings: None     PLAN     Recommended plan for no diet, medication or health factor changes affecting result    Dosing Instructions: Continue your current warfarin dose with next Chromogenic Factor X in 5 weeks          Summary  As of 7/30/2025      Full warfarin instructions:  5 mg every day               Telephone call with Jeanmarie who verbalizes understanding and agrees to plan and who agrees to plan and repeated back plan correctly    Lab visit scheduled    Education provided:   Taking warfarin: Importance of taking warfarin as instructed  Goal range and lab monitoring: goal range and significance of current result, Importance of therapeutic range, and Importance of following up at instructed interval    Plan made per [facility] anticoagulation protocol    Stacey Beal, RN  7/30/2025  Anticoagulation Clinic  [employer] for routing messages: [facility]  [facility] patient phone line: [phone]

## 2025-08-07 ENCOUNTER — ANCILLARY PROCEDURE (OUTPATIENT)
Dept: BONE DENSITY | Facility: CLINIC | Age: 80
End: 2025-08-07
Attending: INTERNAL MEDICINE
Payer: COMMERCIAL

## 2025-08-07 DIAGNOSIS — M81.0 OSTEOPOROSIS, POSTMENOPAUSAL: ICD-10-CM

## 2025-08-07 PROCEDURE — 77080 DXA BONE DENSITY AXIAL: CPT | Performed by: INTERNAL MEDICINE

## 2025-09-02 ENCOUNTER — ANTICOAGULATION THERAPY VISIT (OUTPATIENT)
Dept: ANTICOAGULATION | Facility: CLINIC | Age: 80
End: 2025-09-02

## 2025-09-02 ENCOUNTER — OFFICE VISIT (OUTPATIENT)
Dept: DERMATOLOGY | Facility: CLINIC | Age: 80
End: 2025-09-02
Attending: DERMATOLOGY
Payer: COMMERCIAL

## 2025-09-02 ENCOUNTER — DOCUMENTATION ONLY (OUTPATIENT)
Dept: ANTICOAGULATION | Facility: CLINIC | Age: 80
End: 2025-09-02

## 2025-09-02 ENCOUNTER — LAB (OUTPATIENT)
Dept: LAB | Facility: CLINIC | Age: 80
End: 2025-09-02
Payer: COMMERCIAL

## 2025-09-02 DIAGNOSIS — L93.0 LUPUS ERYTHEMATOSUS: ICD-10-CM

## 2025-09-02 DIAGNOSIS — E10.649 TYPE 1 DIABETES MELLITUS WITH HYPOGLYCEMIA AND WITHOUT COMA (H): ICD-10-CM

## 2025-09-02 DIAGNOSIS — D68.61 ANTIPHOSPHOLIPID SYNDROME: ICD-10-CM

## 2025-09-02 DIAGNOSIS — B35.1 ONYCHOMYCOSIS: Primary | ICD-10-CM

## 2025-09-02 DIAGNOSIS — Z79.01 LONG TERM CURRENT USE OF ANTICOAGULANT THERAPY: Primary | ICD-10-CM

## 2025-09-02 DIAGNOSIS — I82.4Y9 DEEP VEIN THROMBOSIS (DVT) OF PROXIMAL LOWER EXTREMITY, UNSPECIFIED CHRONICITY, UNSPECIFIED LATERALITY (H): ICD-10-CM

## 2025-09-02 DIAGNOSIS — M32.9 SYSTEMIC LUPUS ERYTHEMATOSUS, UNSPECIFIED SLE TYPE, UNSPECIFIED ORGAN INVOLVEMENT STATUS (H): ICD-10-CM

## 2025-09-02 LAB
ALBUMIN SERPL BCG-MCNC: 4.1 G/DL (ref 3.5–5.2)
ALBUMIN UR-MCNC: NEGATIVE MG/DL
ALP SERPL-CCNC: 56 U/L (ref 40–150)
ALT SERPL W P-5'-P-CCNC: 18 U/L (ref 0–50)
ANION GAP SERPL CALCULATED.3IONS-SCNC: 11 MMOL/L (ref 7–15)
APPEARANCE UR: CLEAR
AST SERPL W P-5'-P-CCNC: 25 U/L (ref 0–45)
BACTERIA #/AREA URNS HPF: ABNORMAL /HPF
BILIRUB SERPL-MCNC: 0.4 MG/DL
BILIRUB UR QL STRIP: NEGATIVE
BUN SERPL-MCNC: 22 MG/DL (ref 8–23)
C3 SERPL-MCNC: 64 MG/DL (ref 81–157)
C4 SERPL-MCNC: 5 MG/DL (ref 13–39)
CALCIUM SERPL-MCNC: 9.7 MG/DL (ref 8.8–10.4)
CHLORIDE SERPL-SCNC: 105 MMOL/L (ref 98–107)
CHOLEST SERPL-MCNC: 138 MG/DL
COLOR UR AUTO: YELLOW
CREAT SERPL-MCNC: 0.63 MG/DL (ref 0.51–0.95)
CREAT SERPL-MCNC: 0.65 MG/DL (ref 0.51–0.95)
CREAT UR-MCNC: 99.3 MG/DL
EGFRCR SERPLBLD CKD-EPI 2021: 90 ML/MIN/1.73M2
EGFRCR SERPLBLD CKD-EPI 2021: >90 ML/MIN/1.73M2
ERYTHROCYTE [DISTWIDTH] IN BLOOD BY AUTOMATED COUNT: 16.3 % (ref 10–15)
EST. AVERAGE GLUCOSE BLD GHB EST-MCNC: 117 MG/DL
FACT X ACT/NOR PPP CHRO: 29 % (ref 70–130)
FASTING STATUS PATIENT QL REPORTED: ABNORMAL
FASTING STATUS PATIENT QL REPORTED: NORMAL
GLUCOSE SERPL-MCNC: 112 MG/DL (ref 70–99)
GLUCOSE UR STRIP-MCNC: NEGATIVE MG/DL
HBA1C MFR BLD: 5.7 %
HCO3 SERPL-SCNC: 25 MMOL/L (ref 22–29)
HCT VFR BLD AUTO: 41.6 % (ref 35–47)
HCT VFR BLD AUTO: 42.7 % (ref 35–47)
HDLC SERPL-MCNC: 66 MG/DL
HGB BLD-MCNC: 13.3 G/DL (ref 11.7–15.7)
HGB UR QL STRIP: NEGATIVE
KETONES UR STRIP-MCNC: ABNORMAL MG/DL
LDLC SERPL CALC-MCNC: 60 MG/DL
LEUKOCYTE ESTERASE UR QL STRIP: ABNORMAL
MCH RBC QN AUTO: 27.9 PG (ref 26.5–33)
MCHC RBC AUTO-ENTMCNC: 32 G/DL (ref 31.5–36.5)
MCV RBC AUTO: 87.2 FL (ref 78–100)
MCV RBC AUTO: 88.4 FL (ref 78–100)
MICROALBUMIN UR-MCNC: <12 MG/L
MICROALBUMIN/CREAT UR: NORMAL MG/G{CREAT}
MUCOUS THREADS #/AREA URNS LPF: PRESENT /LPF
NITRATE UR QL: POSITIVE
NONHDLC SERPL-MCNC: 72 MG/DL
PH UR STRIP: 5.5 [PH] (ref 5–7)
PLATELET # BLD AUTO: 193 10E3/UL (ref 150–450)
POTASSIUM SERPL-SCNC: 4.1 MMOL/L (ref 3.4–5.3)
PROT SERPL-MCNC: 7 G/DL (ref 6.4–8.3)
RBC # BLD AUTO: 4.77 10E6/UL (ref 3.8–5.2)
RBC URINE: 2 /HPF
SODIUM SERPL-SCNC: 141 MMOL/L (ref 135–145)
SP GR UR STRIP: 1.02 (ref 1–1.03)
TRIGL SERPL-MCNC: 62 MG/DL
UROBILINOGEN UR STRIP-MCNC: NORMAL MG/DL
WBC # BLD AUTO: 1.99 10E3/UL (ref 4–11)
WBC URINE: 19 /HPF

## 2025-09-02 PROCEDURE — 85260 CLOT FACTOR X STUART-POWER: CPT | Performed by: INTERNAL MEDICINE

## 2025-09-02 PROCEDURE — 99214 OFFICE O/P EST MOD 30 MIN: CPT | Mod: GC | Performed by: DERMATOLOGY

## 2025-09-02 PROCEDURE — 86160 COMPLEMENT ANTIGEN: CPT | Performed by: INTERNAL MEDICINE

## 2025-09-02 PROCEDURE — 83036 HEMOGLOBIN GLYCOSYLATED A1C: CPT | Performed by: INTERNAL MEDICINE

## 2025-09-02 PROCEDURE — 1126F AMNT PAIN NOTED NONE PRSNT: CPT | Performed by: DERMATOLOGY

## 2025-09-02 PROCEDURE — 82043 UR ALBUMIN QUANTITATIVE: CPT | Performed by: INTERNAL MEDICINE

## 2025-09-02 PROCEDURE — 99000 SPECIMEN HANDLING OFFICE-LAB: CPT | Performed by: PATHOLOGY

## 2025-09-02 ASSESSMENT — PAIN SCALES - GENERAL: PAINLEVEL_OUTOF10: NO PAIN (0)

## (undated) DEVICE — GLOVE PROTEXIS MICRO 7.0  2D73PM70

## (undated) DEVICE — EYE SHIELD PLASTIC

## (undated) DEVICE — EYE CANN IRR 27GA ANTERIOR CHAMBER 581280

## (undated) DEVICE — LINEN TOWEL PACK X5 5464

## (undated) DEVICE — EYE TIP IRRIGATION & ASPIRATION POLYMER 35D BENT 8065751511

## (undated) DEVICE — EYE NDL RETROBULBAR 25GA 1.5" 581275

## (undated) DEVICE — EYE KNIFE SLIT XSTAR VISITEC 2.5MM 45DEG BEVEL UP 373725

## (undated) DEVICE — EYE SOL BSS 500ML BAG 0065-1795-04

## (undated) DEVICE — GLOVE PROTEXIS MICRO 6.0  2D73PM60

## (undated) DEVICE — TAPE MICROPORE 2"X1.5YD 1530S-2

## (undated) DEVICE — PACK CATARACT CUSTOM ASC SEY15CPUMC

## (undated) DEVICE — EYE CANN IRR 25GA CYSTOTOME 581610

## (undated) DEVICE — EYE KNIFE STILETTO VISITEC 1.1MM ANG 45DEG SIDEPORT 376620

## (undated) DEVICE — EYE PACK CUSTOM ANTERIOR 30DEG TIP CENTURION PPK6682-04

## (undated) DEVICE — TAPE MICROPORE 1"X1.5YD 1530S-1

## (undated) RX ORDER — FENTANYL CITRATE 50 UG/ML
INJECTION, SOLUTION INTRAMUSCULAR; INTRAVENOUS
Status: DISPENSED
Start: 2019-02-05

## (undated) RX ORDER — ONDANSETRON 2 MG/ML
INJECTION INTRAMUSCULAR; INTRAVENOUS
Status: DISPENSED
Start: 2019-02-05

## (undated) RX ORDER — PROPOFOL 10 MG/ML
INJECTION, EMULSION INTRAVENOUS
Status: DISPENSED
Start: 2019-01-08

## (undated) RX ORDER — ONDANSETRON 2 MG/ML
INJECTION INTRAMUSCULAR; INTRAVENOUS
Status: DISPENSED
Start: 2019-01-08

## (undated) RX ORDER — FENTANYL CITRATE 50 UG/ML
INJECTION, SOLUTION INTRAMUSCULAR; INTRAVENOUS
Status: DISPENSED
Start: 2019-01-08

## (undated) RX ORDER — BEVACIZUMAB 2.5 MG/0.1
SYRINGE (ML) INTRAOCULAR
Status: DISPENSED
Start: 2019-02-05

## (undated) RX ORDER — LIDOCAINE HYDROCHLORIDE 10 MG/ML
INJECTION, SOLUTION EPIDURAL; INFILTRATION; INTRACAUDAL; PERINEURAL
Status: DISPENSED
Start: 2024-12-20

## (undated) RX ORDER — PROPOFOL 10 MG/ML
INJECTION, EMULSION INTRAVENOUS
Status: DISPENSED
Start: 2019-02-05

## (undated) RX ORDER — LIDOCAINE HYDROCHLORIDE 20 MG/ML
INJECTION, SOLUTION EPIDURAL; INFILTRATION; INTRACAUDAL; PERINEURAL
Status: DISPENSED
Start: 2019-01-08

## (undated) RX ORDER — ACETAMINOPHEN 325 MG/1
TABLET ORAL
Status: DISPENSED
Start: 2019-01-08

## (undated) RX ORDER — ACETAMINOPHEN 325 MG/1
TABLET ORAL
Status: DISPENSED
Start: 2019-02-05